# Patient Record
Sex: FEMALE | Race: WHITE | Employment: OTHER | URBAN - METROPOLITAN AREA
[De-identification: names, ages, dates, MRNs, and addresses within clinical notes are randomized per-mention and may not be internally consistent; named-entity substitution may affect disease eponyms.]

---

## 2017-01-04 ENCOUNTER — TELEPHONE (OUTPATIENT)
Dept: FAMILY MEDICINE | Facility: CLINIC | Age: 47
End: 2017-01-04

## 2017-01-12 ENCOUNTER — MYC REFILL (OUTPATIENT)
Dept: FAMILY MEDICINE | Facility: CLINIC | Age: 47
End: 2017-01-12

## 2017-01-12 DIAGNOSIS — G89.4 CHRONIC PAIN SYNDROME: ICD-10-CM

## 2017-01-12 NOTE — TELEPHONE ENCOUNTER
Last Rx was 90 on 12/15/16.  Was last seen by DR. Hunter 10/27/16.  Due back in April.  Routed to provider to advise, patient wants Rx mailed.  Judi Yeh RN  [facility]

## 2017-01-12 NOTE — TELEPHONE ENCOUNTER
Message from [employer]:  Original authorizing provider: MD Velma Rodriguez WANDA Diaz would like a refill of the following medications:  oxyCODONE-acetaminophen (PERCOCET)  MG per tablet [Srinivasa Hunter MD]    Preferred pharmacy: [facility] [address] AT [facility] & [address] [place] [address]    Comment:  I'm running low can you mail it to [facility] on [address] please. Thank you

## 2017-01-13 RX ORDER — OXYCODONE AND ACETAMINOPHEN 10; 325 MG/1; MG/1
1 TABLET ORAL EVERY 6 HOURS PRN
Qty: 90 TABLET | Refills: 0 | Status: SHIPPED | OUTPATIENT
Start: 2017-01-13 | End: 2017-02-14

## 2017-01-13 NOTE — TELEPHONE ENCOUNTER
Prescription of oxyCODONE-acetaminophen (PERCOCET)  MG was mailed to pharmacy.  Informed patient via [employer].  Liliana AGUILAR

## 2017-01-24 ENCOUNTER — RADIANT APPOINTMENT (OUTPATIENT)
Dept: GENERAL RADIOLOGY | Facility: CLINIC | Age: 47
End: 2017-01-24
Attending: PODIATRIST
Payer: COMMERCIAL

## 2017-01-24 ENCOUNTER — OFFICE VISIT (OUTPATIENT)
Dept: PODIATRY | Facility: CLINIC | Age: 47
End: 2017-01-24
Payer: COMMERCIAL

## 2017-01-24 VITALS
DIASTOLIC BLOOD PRESSURE: 78 MMHG | HEIGHT: 63 IN | WEIGHT: 234 LBS | SYSTOLIC BLOOD PRESSURE: 108 MMHG | BODY MASS INDEX: 41.46 KG/M2

## 2017-01-24 DIAGNOSIS — G89.29 CHRONIC PAIN OF BOTH ANKLES: ICD-10-CM

## 2017-01-24 DIAGNOSIS — M24.572 EQUINUS CONTRACTURE OF LEFT ANKLE: ICD-10-CM

## 2017-01-24 DIAGNOSIS — M24.571 EQUINUS CONTRACTURE OF RIGHT ANKLE: ICD-10-CM

## 2017-01-24 DIAGNOSIS — M25.572 CHRONIC PAIN OF BOTH ANKLES: Primary | ICD-10-CM

## 2017-01-24 DIAGNOSIS — M21.41 PES PLANUS OF BOTH FEET: ICD-10-CM

## 2017-01-24 DIAGNOSIS — M25.571 CHRONIC PAIN OF BOTH ANKLES: Primary | ICD-10-CM

## 2017-01-24 DIAGNOSIS — M72.2 PLANTAR FASCIITIS, BILATERAL: ICD-10-CM

## 2017-01-24 DIAGNOSIS — M25.571 CHRONIC PAIN OF BOTH ANKLES: ICD-10-CM

## 2017-01-24 DIAGNOSIS — M19.072 PRIMARY LOCALIZED OSTEOARTHROSIS, ANKLE AND FOOT, LEFT: ICD-10-CM

## 2017-01-24 DIAGNOSIS — M21.42 PES PLANUS OF BOTH FEET: ICD-10-CM

## 2017-01-24 DIAGNOSIS — G89.29 CHRONIC PAIN OF BOTH ANKLES: Primary | ICD-10-CM

## 2017-01-24 DIAGNOSIS — E66.01 MORBID OBESITY WITH BMI OF 40.0-44.9, ADULT (H): Chronic | ICD-10-CM

## 2017-01-24 DIAGNOSIS — M19.071 PRIMARY LOCALIZED OSTEOARTHROSIS, ANKLE AND FOOT, RIGHT: ICD-10-CM

## 2017-01-24 DIAGNOSIS — G62.9 NEUROPATHY: ICD-10-CM

## 2017-01-24 DIAGNOSIS — M25.572 CHRONIC PAIN OF BOTH ANKLES: ICD-10-CM

## 2017-01-24 PROCEDURE — 99203 OFFICE O/P NEW LOW 30 MIN: CPT | Performed by: PODIATRIST

## 2017-01-24 PROCEDURE — 73610 X-RAY EXAM OF ANKLE: CPT | Mod: LT

## 2017-01-24 NOTE — PATIENT INSTRUCTIONS
Dr. Corbett's Clinic Schedule     [facility]  [address] [address]  Ph: [phone]  Fax: [phone] [facility]  [address] [place] [address]  Ph: [phone]  Fax: [phone] [facility]  [address] [place] [address]  Ph: [phone]  Fax: [phone]   Monday PM &  AM Friday PM   Surgery Scheduling Line:  [phone] [facility] [place]  [address]  [place] [address]  Ph: [phone]   [address]  [place] [address]  Ph: [phone]  Fax: [phone]   Appointment [phone] General After Hours:  [phone] Patient [phone]         PLANTAR FASCIITIS   What is plantar fasciitis?   Plantar fasciitis is often referred to as heel spurs or heel pain. Plantar fasciitis is a very common problem that affects people of all foot shapes, age, weight and activity level. Pain may be in the arch or on the weight-bearing surface of the heel. The pain maycome on without injury or identifiable cause. Pain is generally present when first getting out of bed in the morning or up from a seated break.   What causes plantar fasciitis?   The plantar fascia is a dense fibrous band of tissue that stretches across the bottom surface of the foot. The fascia helps support the foot muscles and arch. Plantar fasciitis is thought to be caused by mechanical strain or overload. Frequent walking without shoes or wearing unsupportive shoes is thought to cause structural overload and ultimately inflammation of the plantar fascia. Some people have heel spurs that can be seen on x-ray. The heel spur is actually a minor component of plantar fascitis and is largely ignored.   How long will this last?   Plantar fasciitis can last from one day to a lifetime. Some people get intermittent fascitis that is very  short-lived. Others suffer daily for years. Excessive body weight, frequent bare foot walking, long hours on the feet, inadequate shoes, predisposing foot structures and excessive activity such as running are all potential issues that lead to chronic and/or recurring plantar fascitis. Having plantar fasciitis means that you are forever prone to this problem and will require modification of some of the above factors. Most people seek treatment within one to four months. Healing usually requires a similar one to four month time frame. Healing time is relative to the amount of effort spent treating the problem.   What can I do?   The easiest solution is to stop walking around your home without shoes. Plantar fasciitis is largely a shoe problem. Shoes are either not being worn often enough or your current shoes are inadequate for your weight, foot structure or activity level. The majority of shoes on the market today are not sufficient to resist development of plantar fasciitis or to promote healing. Assume that your current shoes are inadequate and will need to be replaced. Even high quality shoes wear out with 6 months to one year of frequent use. Other shoe options can be the ones with springs in the heels. SPIRA spring shoes is one brand type.   Weightloss is another option. Losing ten pounds in the next two months may be enough to resolve the problem. Ice and/or heat applied to the area of pain two to three times per day for ten minutes each session can be very helpful. This should continue until the problem resolves. Achilles tendon stretching is essential. Stretch multiple times daily to promote healing and to prevent recurrence in the future. Applying bengay or icy/hot to area can also try to calm down pain to the heel area.     What if this does not help?   Medical treatments often include custom arch supports, cortisone injections, physical therapy, splints to be worn in bed, prescription medications and  surgery. The home treatments listed above will be necessary regardless of these advanced medical treatments. Surgery is rarely needed but is very helpful in selected cases.     Heel pain in my future?   Plantar fasciitis is highly recurrent. Risk factors often continue, including return to bare foot walking, inadequate shoes, excessive body weight, excessive activities, etc. Your life style and foot structure may predispose you to recurrent plantar fasciitis. A daily prevention regimen can be very helpful. Ongoing use of shoe inserts, careful attention to appropriate shoes, daily Achilles stretching, etc. may prevent recurrence. Prompt attention at the earliest warning signs of heel pain can resolve the problem in as short as a few days.   Below are some exercises for Plantar fasciitis:  Stair exercise  You can step on the stairs with the ball of your foot and hold your position for at least 15 seconds, then slowly step down with the heels of your foot. You can do this daily and as often as you want. Plantar fasciitis exercise equipment and handout materials are useful in relieving pain.  Picking the towel  Plantar fasciitis exercise equipment and handout teach you how to exercise by picking the towel. You can sit comfortably and then pick the towel with your toes. Do this at least 10 to 20 times regularly. You can use any object other than a towel as long as the material can be soft and you can pick it up with your toes.  Rolling the bottle or ball  You can get a small ball or bottle and then roll it with your foot. Do this daily for at least 15 to 20 times. Plantar fasciitis exercise equipment and handout are very useful in treating the symptoms of the foot condition.  Stretching the calf  You could lie supine, raise one foot, and then point your toes towards the floor. Do this daily for at least 15 to 20 times. The calf is connected to the heel and the balls of the foot, so you should also exercise this also.  Plantar fasciitis exercise equipment and handout usually mentions the importance of exercising the calf also.  Flex the toes  Sit comfortably and then flex your toes by pointing it towards the floor or towards your body. This will relax and flex your foot and exercise your plantar fascia, the calf, and the Achilles tendon. The inability of the foot to stretch often causes the bunching up of the plantar fascia area leading to the pain.  Massaging the calf and the plantar fascia also helps a lot in alleviating the pain and preventing its recurrence. If you prefer standard plantar fasciitis exercise equipment and handout, then you can avail of this from legitimate sources. You can use the standard stretching device, the wheel, and the belt. These are all significant devices in treating the pain in the plantar fascia.    Orthotic & Prosthetic Clinic Locations  [facility]  [address]  [address] [address]  Phone: [phone]  Fax: [phone] [facility]  [address]  [place] [address]  Phone: [phone]   Fax: [phone]   [facility] [place]  [address]  [place] [address]  Phone: [phone]  Fax: [phone] [facility]  [address]  [place] [address]  Phone: [phone]   Fax: [phone]   [facility]   [address]  [place] [address] [place] [address]  Phone: [phone]   Fax: [phone] * Please call an location listed to make an appointment for a casting/fitting. Your referral was sent to their central office and they will all have the order on file.

## 2017-01-24 NOTE — NURSING NOTE
"Chief Complaint   Patient presents with     Foot Problems     bilateral neuropathy numbness with pain more on ankles and heels feels cramping on toes states both feet are at the same pain level        Initial /78 mmHg  Ht 5' 3\" (1.6 m)  Wt 234 lb (106.142 kg)  BMI 41.46 kg/m2 Estimated body mass index is 41.46 kg/(m^2) as calculated from the following:    Height as of this encounter: 5' 3\" (1.6 m).    Weight as of this encounter: 234 lb (106.142 kg).  BP completed using cuff size: jose Turner MA      "

## 2017-01-24 NOTE — PROGRESS NOTES
PATIENT HISTORY:  Velma Diaz is a 46 year old female who presents to clinic for pain to both ankles.  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 Also lower back issues. Notes in [place], she was established with a podiatrist. Is hoping to start that today as she does have lots of issues with the feet given the neuropathy. Notes more pain and tingling to the feet lately. Denies injury. Pain is 9/10. Takes percocet daily for pain. Notes she has tried icing, night splints, massage, compounding pain medications and chiropractic care. The chiropractic care seems to work best for her. Notes pain will be intermittent. Can be stabbing, tingling, shooting. Can shoot up the leg.     Review of Systems:  Patient denies fever, chills, rash, wound, stiffness, weakness, heart burn, blood in stool, chest pain with activity, calf pain when walking, shortness of breath with activity, chronic cough, easy bleeding/bruising, swelling of ankles, excessive thirst, fatigue, depression, anxiety.  Patient admits to limping, numbness, .     PAST MEDICAL HISTORY:   Past Medical History   Diagnosis Date     Skin cancer of anterior chest 2011     Basal cell on chest     History of cleft palate with cleft lip      cleft lip and palate, and has had 27 operations per the patient     TMJ (temporomandibular joint disorder)      Depressive disorder 2000     Neuropathy (H)      MAYA (obstructive sleep apnea)/Hypoventilation Syndrome 2/24/2014     Study Date: 2/13/2014- (245.1 lbs) Sleep Associated Hypoventilation  suggested with baseline PCO2 42 mmHg, maximum PCO2 55 mmHg. Lowest oxygen saturation 85.0% Apnea/Hypopnea Index 5.5 events per hour.  REM AHI 37.2.  The supine AHI is 13.8. RDI 6.7 Sleep study 3/2014 (245#)- CPAP 6 cm effective, Transcutaneous CO2 Monitoring (TCM) within normal limits.          Morbid obesity with BMI of 40.0-44.9, adult (H) 10/26/2015     Chronic pain syndrome 11/20/2015     She takes up to 90  oxycodone tablets per month for her chronic pain      Hyperlipidemia with target LDL less than 130 10/26/2015        PAST SURGICAL HISTORY:   Past Surgical History   Procedure Laterality Date     Orthopedic surgery  2011, 2011     Bilateral CTR     Ent surgery  1975     Tonsillectomy and Adenoids     Gyn surgery  2011     Hysterectomy     Arthroscopy knee  1986     Right knee     Ankle surgery  7/13     Left for torn tendons & loose bone chips     Repair cleft palate child       Age 3 months & afterwards (multiple surgeries)     Pe tubes       yearly times 10 years     Basal cell carcinoma  2011     Removal from the chest     Carpal tunnel release rt/lt  ~2010     Bilateral     Hc repair peroneal tendons  7/13     C vaginal hysterectomy  2011     Biopsy       Cosmetic surgery       Soft tissue surgery       Back surgery       Cosmetic surgery       Soft tissue surgery  2013     Decompression cubital tunnel Left 8/28/2015     Procedure: DECOMPRESSION CUBITAL TUNNEL;  Surgeon: Gus Donato MD;  Location: [facility]     Colonoscopy  2016        MEDICATIONS:   Current outpatient prescriptions:      oxyCODONE-acetaminophen (PERCOCET)  MG per tablet, Take 1 tablet by mouth every 6 hours as needed for moderate to severe pain, Disp: 90 tablet, Rfl: 0     cyclobenzaprine (FLEXERIL) 10 MG tablet, Take 0.5-1 tablets (5-10 mg) by mouth 3 times daily as needed for muscle spasms, Disp: 30 tablet, Rfl: 2     furosemide (LASIX) 20 MG tablet, Take 1 tablet (20 mg) by mouth daily as needed for leg swelling, Disp: 30 tablet, Rfl: 5     FLUoxetine (PROZAC) 40 MG capsule, Take 1 capsule (40 mg) by mouth daily, Disp: 30 capsule, Rfl: 5     Pregabalin (LYRICA) 225 MG CAPS, Take 1 capsule by mouth 2 times daily, Disp: 60 capsule, Rfl: 5     fluticasone (FLONASE) 50 MCG/ACT nasal spray, Spray 1-2 sprays into both nostrils daily, Disp: 1 Bottle, Rfl: 11     traZODone (DESYREL) 50 MG tablet, Take 1 tablet (50 mg) by mouth nightly  as needed for sleep, Disp: 30 tablet, Rfl: 5     nystatin (MYCOSTATIN) 390873 UNIT/GM POWD, Apply to affected area twice daily as needed, Disp: 60 g, Rfl: 2     lidocaine-prilocaine (EMLA) cream, Apply dime size amount to right hip and left knee qid prn, May use same time as Diclofinac.future refill through PCP, Disp: 30 g, Rfl: 0     SUMAtriptan (IMITREX) 100 MG tablet, Take 1 tablet (100 mg) by mouth at onset of headache for migraine May repeat in 2 hours if needed: max 2/day, Disp: 9 tablet, Rfl: 2     acetaminophen (TYLENOL) 325 MG tablet, Take 2 tablets (650 mg) by mouth every 4 hours as needed for other (mild pain), Disp: 100 tablet, Rfl: 0     ORDER FOR DME, Respironics REMSTAR 60 Series Auto ILSN7ty H2O, Airfit P10 nasal pillow mask w/xsmall pillows, Disp: , Rfl:      Multiple Vitamins-Minerals (MULTI FOR HER PO), Take by mouth daily , Disp: , Rfl:      ALLERGIES:  No Known Allergies     SOCIAL HISTORY:   Social History     Social History     Marital Status:      Spouse Name: N/A     Number of Children: 3     Years of Education: 12     Occupational History     Personal Care       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 No     Social History Narrative        FAMILY HISTORY:   Family History   Problem Relation Age of Onset     Glaucoma No family hx of      Macular Degeneration No family hx of      Alzheimer Disease Paternal Grandmother 60     Neurologic Disorder Sister 20     migraines     Depression/Anxiety Sister      Neurologic Disorder Son 14      "migraines     Asthma Son      HEART DISEASE Mother      OSTEOPOROSIS Mother      CANCER Father      Alcohol/Drug Father      Other Cancer Father      saliva glands & skin CA      DIABETES Maternal Grandmother      Breast Cancer Maternal Grandmother      Cancer - colorectal Other      Aunt     Asthma Daughter      Hypertension Father      CEREBROVASCULAR DISEASE Paternal Grandmother      Depression Sister      Asthma Daughter      Asthma Son      OSTEOPOROSIS No family hx of      Thyroid Disease Sister      Obesity No family hx of      Colon Cancer Other      Other Cancer No family hx of      saliva glands     Other Cancer Maternal Grandfather      skin cancer     Depression Sister      Thyroid Disease Sister      Obesity Mother      Obesity Maternal Grandmother      Obesity Sister         EXAM:Vitals: Ht 5' 3\" (1.6 m)  Wt 234 lb (106.142 kg)  BMI 41.46 kg/m2  BMI= Body mass index is 41.46 kg/(m^2).    General appearance: Patient is alert and fully cooperative with history & exam.  No sign of distress is noted during the visit.     Psychiatric: Affect is pleasant & appropriate.  Patient appears motivated to improve health.     Respiratory: Breathing is regular & unlabored while sitting.     HEENT: Hearing is intact to spoken word.  Speech is clear.  No gross evidence of visual impairment that would impact ambulation.     Dermatologic: Skin is intact to both lower extremities without significant lesions, rash or abrasion.  No paronychia or evidence of soft tissue infection is noted.     Vascular: DP & PT pulses are intact & regular bilaterally.  No significant edema or varicosities noted.  CFT and skin temperature is normal to both lower extremities.     Neurologic: Lower extremity sensation is absent via monofilament.      Musculoskeletal: Patient is ambulatory without assistive device or brace.  Decrease arch height. No pain on palpation of the foot. Patient notes it can occur anywhere but heels mostly. "     Radiographs: I have looked at and reviewed xrays personally.  minimal plantar and posterior heel spurring bilateral. Minimal degenerative changes to feet. No fractures. Ankle joint space well preserved.      ASSESSMENT:    Chronic pain of both ankles  Neuropathy (H)  Pes planus of both feet  Plantar fasciitis, bilateral  Equinus contracture of right ankle  Equinus contracture of left ankle  Morbid obesity with BMI of 40.0-44.9, adult (H)  Primary localized osteoarthrosis, ankle and foot, right  Primary localized osteoarthrosis, ankle and foot, left       PLAN:  [facility].talked about neuropathy and the feet and what to look out for. The potential causes and nature of plantar fasciitis were discussed with the patient.  We reviewed the natural history/prognosis of the condition and risks if left untreated.  These include chronic pain, other sites of pain due to gait changes, and potential plantar fascial rupture.      We discussed possible causes of the condition as it relates to the patients specific situation.      Conservative treatment options were reviewed:  appropriate shoes, avoidance of barefoot walking, inserts/orthoses, stretching, ice, massage, immobilization and NSAIDs.     We also reviewed the options of injection therapy and surgery.  However, it was made clear that surgery is only considered when conservative therapy fails.  The risks and benefits of injection therapy, and surgery were discussed.     Talked about arthritis and treatments including orthotics, injections, icing, NSAIDS, bracing, physical therapy, compounding pain cream, or surgical intervention.    At this time, recommend orthotics, anti inflammatory gel and was given an order for chiropractic care to feet. Discussed possible order for PT with ionto or TENS if pain continues and possible MRI if this does not work.        Patricia Corbett, YEVGENIY, Pod    Weight management plan: Patient was referred to their PCP to  discuss a diet and exercise plan.

## 2017-01-24 NOTE — MR AVS SNAPSHOT
After Visit Summary   2017    Velma Diaz    MRN: [id number]           Patient Information     Date Of Birth          1970        Visit Information        Provider Department      2017 4:00 PM Patricia Corbett, YEVGENIY, Pod [facility]        Today's Diagnoses     Chronic pain of both ankles    -  1     Neuropathy (H)         Pes planus of both feet         Plantar fasciitis, bilateral         Equinus contracture of right ankle         Equinus contracture of left ankle         Morbid obesity with BMI of 40.0-44.9, adult (H)           Care Instructions            Dr. Corbett's Clinic Schedule     [facility]  [address] [place] [address]  Ph: [phone]  Fax: [phone] [facility]  [address] [place] [address]  Ph: [phone]  Fax: [phone] [facility]  [address] [place] [address]  Ph: [phone]  Fax: [phone]   Monday PM &  AM Friday PM   Surgery Scheduling Line:  [phone] [facility]  [address]  [place] [address]  Ph: [phone] [facility]  [address]  [place] [address]  Ph: [phone]  Fax: [phone]   Appointment [phone] General After Hours:  [phone] Patient [phone]         PLANTAR FASCIITIS   What is plantar fasciitis?   Plantar fasciitis is often referred to as heel spurs or heel pain. Plantar fasciitis is a very common problem that affects people of all foot shapes, age, weight and activity level. Pain may be in the arch or on the weight-bearing surface of the heel. The pain maycome on without injury or identifiable cause. Pain is generally present when first getting out of bed in the morning or up from a seated break.   What causes plantar fasciitis?   The plantar fascia is a dense fibrous band of tissue that stretches  across the bottom surface of the foot. The fascia helps support the foot muscles and arch. Plantar fasciitis is thought to be caused by mechanical strain or overload. Frequent walking without shoes or wearing unsupportive shoes is thought to cause structural overload and ultimately inflammation of the plantar fascia. Some people have heel spurs that can be seen on x-ray. The heel spur is actually a minor component of plantar fascitis and is largely ignored.   How long will this last?   Plantar fasciitis can last from one day to a lifetime. Some people get intermittent fascitis that is very short-lived. Others suffer daily for years. Excessive body weight, frequent bare foot walking, long hours on the feet, inadequate shoes, predisposing foot structures and excessive activity such as running are all potential issues that lead to chronic and/or recurring plantar fascitis. Having plantar fasciitis means that you are forever prone to this problem and will require modification of some of the above factors. Most people seek treatment within one to four months. Healing usually requires a similar one to four month time frame. Healing time is relative to the amount of effort spent treating the problem.   What can I do?   The easiest solution is to stop walking around your home without shoes. Plantar fasciitis is largely a shoe problem. Shoes are either not being worn often enough or your current shoes are inadequate for your weight, foot structure or activity level. The majority of shoes on the market today are not sufficient to resist development of plantar fasciitis or to promote healing. Assume that your current shoes are inadequate and will need to be replaced. Even high quality shoes wear out with 6 months to one year of frequent use. Other shoe options can be the ones with springs in the heels. SPIRA spring shoes is one brand type.   Weightloss is another option. Losing ten pounds in the next two months may be enough  to resolve the problem. Ice and/or heat applied to the area of pain two to three times per day for ten minutes each session can be very helpful. This should continue until the problem resolves. Achilles tendon stretching is essential. Stretch multiple times daily to promote healing and to prevent recurrence in the future. Applying bengay or icy/hot to area can also try to calm down pain to the heel area.     What if this does not help?   Medical treatments often include custom arch supports, cortisone injections, physical therapy, splints to be worn in bed, prescription medications and surgery. The home treatments listed above will be necessary regardless of these advanced medical treatments. Surgery is rarely needed but is very helpful in selected cases.     Heel pain in my future?   Plantar fasciitis is highly recurrent. Risk factors often continue, including return to bare foot walking, inadequate shoes, excessive body weight, excessive activities, etc. Your life style and foot structure may predispose you to recurrent plantar fasciitis. A daily prevention regimen can be very helpful. Ongoing use of shoe inserts, careful attention to appropriate shoes, daily Achilles stretching, etc. may prevent recurrence. Prompt attention at the earliest warning signs of heel pain can resolve the problem in as short as a few days.   Below are some exercises for Plantar fasciitis:  Stair exercise  You can step on the stairs with the ball of your foot and hold your position for at least 15 seconds, then slowly step down with the heels of your foot. You can do this daily and as often as you want. Plantar fasciitis exercise equipment and handout materials are useful in relieving pain.  Picking the towel  Plantar fasciitis exercise equipment and handout teach you how to exercise by picking the towel. You can sit comfortably and then pick the towel with your toes. Do this at least 10 to 20 times regularly. You can use any object  other than a towel as long as the material can be soft and you can pick it up with your toes.  Rolling the bottle or ball  You can get a small ball or bottle and then roll it with your foot. Do this daily for at least 15 to 20 times. Plantar fasciitis exercise equipment and handout are very useful in treating the symptoms of the foot condition.  Stretching the calf  You could lie supine, raise one foot, and then point your toes towards the floor. Do this daily for at least 15 to 20 times. The calf is connected to the heel and the balls of the foot, so you should also exercise this also. Plantar fasciitis exercise equipment and handout usually mentions the importance of exercising the calf also.  Flex the toes  Sit comfortably and then flex your toes by pointing it towards the floor or towards your body. This will relax and flex your foot and exercise your plantar fascia, the calf, and the Achilles tendon. The inability of the foot to stretch often causes the bunching up of the plantar fascia area leading to the pain.  Massaging the calf and the plantar fascia also helps a lot in alleviating the pain and preventing its recurrence. If you prefer standard plantar fasciitis exercise equipment and handout, then you can avail of this from legitimate sources. You can use the standard stretching device, the wheel, and the belt. These are all significant devices in treating the pain in the plantar fascia.    Orthotic & Prosthetic Clinic Locations  [facility]  [address] [place] [address] [address]  [place] [address]  Phone: [phone]  Fax: [phone] [facility]  [address]  [place] [address]  Phone: [phone]   Fax: [phone]   [facility]  [address]  [place] [address]  Phone: [phone]  Fax: [phone] [facility]  [address]  [place] [address]  Phone: [phone]   Fax: [phone]   [facility]   [address]  [place] [address] [place] [address]  Phone: [phone]   Fax: [phone] * Please call an location listed to make an appointment for a casting/fitting. Your referral was sent to their central office and they will all have the order on file.             Follow-ups after your visit        Additional Services     CHIROPRACTIC REFERRAL       For chronic ankle pain and plantar fasciitis            ORTHOTICS REFERRAL       Please be aware that coverage of these services is subject to the terms and limitations of your health insurance plan.  Call member services at your health plan with any benefit or coverage questions.      Please bring the following to your appointment:    >>   Any x-rays, CTs or MRIs which have been performed.  Contact the facility where they were done to arrange for  prior to your scheduled appointment.  Any new CT, MRI or other procedures ordered by your specialist must be performed at a [facility] facility or coordinated by your clinic's referral office.    >>   List of current medications   >>   This referral request   >>   Any documents/labs given to you for this referral    ==This Referral PRINTS in the [facility] (ORTHOTICS & PROSTHETICS) Central scheduling office ==     The [facility] Central Scheduling staff will contact patient to arrange appointments. Central Scheduling Phone #:  [place]  [phone]     Orthotics: Foot Orthotics with arch supports and heel pad.                  Your next 10 appointments already scheduled     Feb 01, 2017  2:00 PM   Return Visit with Debra Hicks MD   [facility] ([facility])    [address] [place] [address] [phone]              Who to contact     If you have questions or need follow up information about today's clinic visit or your schedule please contact  "[facility] directly at [phone].  Normal or non-critical lab and imaging results will be communicated to you by MyChart, letter or phone within 4 business days after the clinic has received the results. If you do not hear from us within 7 days, please contact the clinic through Appographyhart or phone. If you have a critical or abnormal lab result, we will notify you by phone as soon as possible.  Submit refill requests through [employer] or call your pharmacy and they will forward the refill request to us. Please allow 3 business days for your refill to be completed.          Additional Information About Your Visit        AppographyharLocalLux Information     [employer] gives you secure access to your electronic health record. If you see a primary care provider, you can also send messages to your care team and make appointments. If you have questions, please call your primary care clinic.  If you do not have a primary care provider, please call [phone] and they will assist you.        Care EveryWhere ID     This is your Care EveryWhere ID. This could be used by other organizations to access your [facility] medical records  [id number]        Your Vitals Were     Height BMI (Body Mass Index)                5' 3\" (1.6 m) 41.46 kg/m2           Blood Pressure from Last 3 Encounters:   01/24/17 108/78   11/14/16 126/81   10/27/16 119/81    Weight from Last 3 Encounters:   01/24/17 234 lb (106.142 kg)   11/14/16 230 lb 4.8 oz (104.463 kg)   10/27/16 226 lb (102.513 kg)              We Performed the Following     CHIROPRACTIC REFERRAL     ORTHOTICS REFERRAL          Today's Medication Changes          These changes are accurate as of: 1/24/17  4:36 PM.  If you have any questions, ask your nurse or doctor.               Start taking these medicines.        Dose/Directions    diclofenac 1 % Gel topical gel   Commonly known as:  VOLTAREN   Used for:  Chronic pain of both ankles, Neuropathy (H), Pes planus of both feet, " Plantar fasciitis, bilateral, Equinus contracture of right ankle, Equinus contracture of left ankle, Morbid obesity with BMI of 40.0-44.9, adult (H)   Started by:  Patricia Corbett DPM, Pod        Apply  2 grams to foot four times daily as needed for pain using enclosed dosing card.   Quantity:  100 g   Refills:  1            Where to get your medicines      These medications were sent to [facility] [address] [place] [address] AT [facility] & [address] [place] [address]    Hours:  24-hours Phone:  [phone]    - diclofenac 1 % Gel topical gel             Primary Care Provider Office Phone # Fax #    Srinivasa Hunter [phone]       [address] [facility] [address]        Goals        Patient-Stated    I will call within next 2 weeks to schedule initial psychiatry appointment     Goal Comments - Note edited  5/20/2015 12:51 PM by Yesica Marsh    As of today's date 5/20/2015 goal is met at 76 - 100%.   Goal Status:  Complete  Patient states this is scheduled with Dr. Salvador for next month, but not on appointment calendar  LUDY Stark, MSW   [phone]  5/20/2015 12:51 PM        I will complete [employer] or reduced fare application within the next month     Goal Comments - Note edited  11/24/2015 12:00 PM by Yesica Marsh    As of today's date 11/24/2015 goal is met at 51 - 75%.   Goal Status:  Active  She reported today having [employer] application, [employer] worker has requested but packet from [employer].    LUDY Stark, MSW   [phone]  11/24/2015 12:00 PM        I will discuss [employer] worker with therapist next week for housing needs      Goal Comments - Note edited  12/3/2015  1:17 PM by Yesica Marsh    As of today's date 12/3/2015 goal is met at 76 - 100%.   Goal Status:  Discontinued  Patient has [employer] worker and has found housing with friend  Yesica  LUDY Camacho, MSW   [phone]  12/3/2015 1:16 PM        Thank you!     Thank you for choosing [facility]  for your care. Our goal is always to provide you with excellent care. Hearing back from our patients is one way we can continue to improve our services. Please take a few minutes to complete the written survey that you may receive in the mail after your visit with us. Thank you!             Your Updated Medication List - Protect others around you: Learn how to safely use, store and throw away your medicines at www.disposemymeds.org.          This list is accurate as of: 1/24/17  4:36 PM.  Always use your most recent med list.                   Brand Name Dispense Instructions for use    acetaminophen 325 MG tablet    TYLENOL    100 tablet    Take 2 tablets (650 mg) by mouth every 4 hours as needed for other (mild pain)       cyclobenzaprine 10 MG tablet    FLEXERIL    30 tablet    Take 0.5-1 tablets (5-10 mg) by mouth 3 times daily as needed for muscle spasms       diclofenac 1 % Gel topical gel    VOLTAREN    100 g    Apply  2 grams to foot four times daily as needed for pain using enclosed dosing card.       FLUoxetine 40 MG capsule    PROzac    30 capsule    Take 1 capsule (40 mg) by mouth daily       fluticasone 50 MCG/ACT spray    FLONASE    1 Bottle    Spray 1-2 sprays into both nostrils daily       furosemide 20 MG tablet    LASIX    30 tablet    Take 1 tablet (20 mg) by mouth daily as needed for leg swelling       lidocaine-prilocaine cream    EMLA    30 g    Apply dime size amount to right hip and left knee qid prn, May use same time as Diclofinac.future refill through PCP       MULTI FOR HER PO      Take by mouth daily       nystatin 598947 UNIT/GM Powd    MYCOSTATIN    60 g    Apply to affected area twice daily as needed       order for DME      Respironics REMSTAR 60 Series Auto DRZA1xd H2O, Airfit P10 nasal pillow mask w/xsmall pillows       oxyCODONE-acetaminophen  MG  per tablet    PERCOCET    90 tablet    Take 1 tablet by mouth every 6 hours as needed for moderate to severe pain       Pregabalin 225 MG Caps    LYRICA    60 capsule    Take 1 capsule by mouth 2 times daily       SUMAtriptan 100 MG tablet    IMITREX    9 tablet    Take 1 tablet (100 mg) by mouth at onset of headache for migraine May repeat in 2 hours if needed: max 2/day       traZODone 50 MG tablet    DESYREL    30 tablet    Take 1 tablet (50 mg) by mouth nightly as needed for sleep

## 2017-01-26 ENCOUNTER — MYC MEDICAL ADVICE (OUTPATIENT)
Dept: FAMILY MEDICINE | Facility: CLINIC | Age: 47
End: 2017-01-26

## 2017-02-01 ENCOUNTER — OFFICE VISIT (OUTPATIENT)
Dept: NEUROLOGY | Facility: CLINIC | Age: 47
End: 2017-02-01
Payer: COMMERCIAL

## 2017-02-01 ENCOUNTER — DOCUMENTATION ONLY (OUTPATIENT)
Dept: LAB | Facility: CLINIC | Age: 47
End: 2017-02-01

## 2017-02-01 VITALS
WEIGHT: 230.6 LBS | HEART RATE: 73 BPM | BODY MASS INDEX: 40.86 KG/M2 | SYSTOLIC BLOOD PRESSURE: 109 MMHG | HEIGHT: 63 IN | DIASTOLIC BLOOD PRESSURE: 64 MMHG

## 2017-02-01 DIAGNOSIS — R20.2 PARESTHESIA OF BOTH LOWER EXTREMITIES: Primary | ICD-10-CM

## 2017-02-01 DIAGNOSIS — G25.81 RESTLESS LEG SYNDROME: ICD-10-CM

## 2017-02-01 PROCEDURE — 99214 OFFICE O/P EST MOD 30 MIN: CPT | Performed by: PSYCHIATRY & NEUROLOGY

## 2017-02-01 NOTE — NURSING NOTE
"Chief Complaint   Patient presents with     RECHECK     Neuropathy in legs       Initial /64 mmHg  Pulse 73  Ht 5' 3\" (1.6 m)  Wt 230 lb 9.6 oz (104.599 kg)  BMI 40.86 kg/m2 Estimated body mass index is 40.86 kg/(m^2) as calculated from the following:    Height as of this encounter: 5' 3\" (1.6 m).    Weight as of this encounter: 230 lb 9.6 oz (104.599 kg).  BP completed using cuff size: jose Banerjee MA      "

## 2017-02-01 NOTE — PATIENT INSTRUCTIONS
Electromyography (EMG) test (Lower limbs)  DATE:  Monday, February 13, 2017   TIME: Reporting by 10:40 AM for registration at  and test preparation.   LOCATION: [facility], [place] (2nd floor)    You may bring pair of warm gloves and or socks to keep your hands and feet warm during the test.     No restrictions for diet. Please feel free to have breakfast/lunch before arrival.     Please do not apply lotion or cream to the skin one day before the test; as it could hinder the electrode connection to the skin.    EMG procedure and reasons for it explained.  -------------------------------------------------------------------------------------------------------------------  Orders Placed This Encounter   Procedures     Ferritin       Diagnostic possibilities reviewed    Preventive Neurology: Encouraged to keep physically and mentally active with particular emphasis on daily stretching exercises, walking, and healthy eating.    Additional  recommendations after the work-up    Thanks

## 2017-02-01 NOTE — PROGRESS NOTES
"ESTABLISHED PATIENT NEUROLOGY NOTE    LOCATION: .[facility]  DATE OF VISIT: February 1, 2017   DATE OF LAST VISIT: March 9, 2016  PRIMARY CARE PROVIDER: Srinivasa Hunter MD     REASON FOR VISIT: Increasing symptoms of feet paresthesias     HISTORY OF PRESENT ILLNESS ([place]): Ms. Velma Diaz previously referred by Srinivasa Hunter MD.  46 year old RIGHT-handed woman with feet paresthesias for more than 10 years. Paresthesias are described as numbness, tingling, burning, pain sensations. Feet paresthesias radiate up into the groin on the right side and up to the junction of the left leg upper 1/3 and lower 2/3 of the leg. Activities like standing and walking exacerbate the symptoms. She does not think that she has lower feet paresthesias in the evening. On direct questioning she relates that she has the feeling of \"spiders crawling\" under her skin. She confirms that she has an inner urge when relaxing to move her legs or wriggle her toes. She denies any previous history of blood loss. Ne previous Ferritin studies completed. She reports that she wheezes when she sleeps, but no snoring. She does not use a CPAP, and relates that she sleeps well. She denies any history of lumbar spine surgery. She has a history of chronic lower back pain for 15 years that radiates mostly down her right leg all the way down to the foot.     PREVIOUS DIAGNOSTIC STUDIES REVIEWED:   BLOOD TESTS:   Component      Latest Ref Rng 1/2/2015 10/26/2015 5/23/2016 10/27/2016   Sodium      133 - 144 mmol/L    137   Potassium      3.4 - 5.3 mmol/L    3.8   Chloride      94 - 109 mmol/L    103   Carbon Dioxide      20 - 32 mmol/L    26   Anion Gap      3 - 14 mmol/L    8   Glucose      70 - 99 mg/dL    90   Urea Nitrogen      7 - 30 mg/dL    10   Creatinine      0.52 - 1.04 mg/dL    0.81   GFR Estimate      >60 mL/min/1.7m2    77   GFR Estimate If Black      >60 mL/min/1.7m2    >90 . . .   Calcium      8.5 - 10.1 mg/dL    9.0   Vitamin B12      " >210 pg/mL 586      TSH      0.40 - 4.00 mU/L  1.29     Vitamin D Deficiency screening      20 - 75 ug/L   44         Reviewed and updated in [facility]:    Current Outpatient Prescriptions on File Prior to Visit:  diclofenac (VOLTAREN) 1 % GEL topical gel Apply  2 grams to foot four times daily as needed for pain using enclosed dosing card.   oxyCODONE-acetaminophen (PERCOCET)  MG per tablet Take 1 tablet by mouth every 6 hours as needed for moderate to severe pain   cyclobenzaprine (FLEXERIL) 10 MG tablet Take 0.5-1 tablets (5-10 mg) by mouth 3 times daily as needed for muscle spasms   FLUoxetine (PROZAC) 40 MG capsule Take 1 capsule (40 mg) by mouth daily   Pregabalin (LYRICA) 225 MG CAPS Take 1 capsule by mouth 2 times daily   traZODone (DESYREL) 50 MG tablet Take 1 tablet (50 mg) by mouth nightly as needed for sleep   nystatin (MYCOSTATIN) 691038 UNIT/GM POWD Apply to affected area twice daily as needed   lidocaine-prilocaine (EMLA) cream Apply dime size amount to right hip and left knee qid prn, May use same time as Diclofinac.future refill through PCP   SUMAtriptan (IMITREX) 100 MG tablet Take 1 tablet (100 mg) by mouth at onset of headache for migraine May repeat in 2 hours if needed: max 2/day   acetaminophen (TYLENOL) 325 MG tablet Take 2 tablets (650 mg) by mouth every 4 hours as needed for other (mild pain)   ORDER FOR [facility] Respironics REMSTAR 60 Series Auto FLXI2av H2O, Airfit P10 nasal pillow mask w/xsmall pillows   Multiple Vitamins-Minerals (MULTI FOR HER PO) Take by mouth daily    furosemide (LASIX) 20 MG tablet Take 1 tablet (20 mg) by mouth daily as needed for leg swelling     No current facility-administered medications on file prior to visit.  Past Medical History   Diagnosis Date     Skin cancer of anterior chest 2011     Basal cell on chest     History of cleft palate with cleft lip      cleft lip and palate, and has had 27 operations per the patient     TMJ (temporomandibular joint disorder)       Depressive disorder 2000     Neuropathy (H)      MAYA (obstructive sleep apnea)/Hypoventilation Syndrome 2/24/2014     Study Date: 2/13/2014- (245.1 lbs) Sleep Associated Hypoventilation  suggested with baseline PCO2 42 mmHg, maximum PCO2 55 mmHg. Lowest oxygen saturation 85.0% Apnea/Hypopnea Index 5.5 events per hour.  REM AHI 37.2.  The supine AHI is 13.8. RDI 6.7 Sleep study 3/2014 (245#)- CPAP 6 cm effective, Transcutaneous CO2 Monitoring (TCM) within normal limits.          Morbid obesity with BMI of 40.0-44.9, adult (H) 10/26/2015     Chronic pain syndrome 11/20/2015     She takes up to 90 oxycodone tablets per month for her chronic pain      Hyperlipidemia with target LDL less than 130 10/26/2015     Past Surgical History   Procedure Laterality Date     Orthopedic surgery  2011, 2011     Bilateral CTR     Ent surgery  1975     Tonsillectomy and Adenoids     Gyn surgery  2011     Hysterectomy     Arthroscopy knee  1986     Right knee     Ankle surgery  7/13     Left for torn tendons & loose bone chips     Repair cleft palate child       Age 3 months & afterwards (multiple surgeries)     Pe tubes       yearly times 10 years     Basal cell carcinoma  2011     Removal from the chest     Carpal tunnel release rt/lt  ~2010     Bilateral     Hc repair peroneal tendons  7/13     C vaginal hysterectomy  2011     Biopsy       Cosmetic surgery       Soft tissue surgery       Back surgery       Cosmetic surgery       Soft tissue surgery  2013     Decompression cubital tunnel Left 8/28/2015     Procedure: DECOMPRESSION CUBITAL TUNNEL;  Surgeon: Gus Donato MD;  [facility]     Colonoscopy  2016     Social History     Social History     Marital Status:      Spouse Name: N/A     Number of Children: 3     Years of Education: 12     Occupational History     Personal Care       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 No     Social History Narrative     This document serves as a record of the services and decisions personally performed and made by Debra Hicks MD. It was created on his behalf by Veronika Lawson, a trained medical scribe. The creation of this document is based the provider's statements to the medical scribe.    Scribvincent Lawson 2/1/2017    GENERAL EXAMINATION:   General appearance: Pleasant female sitting comfortably in a chair  /64 mmHg  Pulse 73  Ht 1.6 m (5' 3\")  Wt 104.599 kg (230 lb 9.6 oz)  BMI 40.86 kg/m2     LIMITED NEUROLOGICAL EXAMINATION:   - Normal strength in all muscle groups of lower limbs.   - Bilateral knee and ankle jerks present  - Diminished pinprick sensation over lower 1/3 of lower limbs.   - Diminished vibration (128Hz) sensation at both ankles    - Heel-shin test normal bilaterally  - Gait normal   - Romberg's Sign negative     IMPRESSION:   Encounter Diagnoses   Name Primary?     Paresthesia of both lower extremities Yes     Restless leg syndrome       COMMENTS: She has features of restless leg syndrome over a background of peripheral neuropathy. Her Electromyography of the lower limbs in 2014 was normal. wWill see if there are any changes with the new Electromyography test  planned for 13th of February, 2017. Also arranged for her to have ferritin level as low level can be associated with restless leg syndrome.     PLAN/RECOMMENDATIONS:   Patient Instructions     Electromyography (EMG) test (Lower limbs)  DATE:  Monday, February 13, 2017   TIME: Reporting by 10:40 AM for registration at  and " test preparation.   LOCATION: [facility] (2nd floor)    You may bring pair of warm gloves and or socks to keep your hands and feet warm during the test.     No restrictions for diet. Please feel free to have breakfast/lunch before arrival.     Please do not apply lotion or cream to the skin one day before the test; as it could hinder the electrode connection to the skin.    EMG procedure and reasons for it explained.  -------------------------------------------------------------------------------------------------------------------  Orders Placed This Encounter   Procedures     Ferritin       Diagnostic possibilities reviewed    Preventive Neurology: Encouraged to keep physically and mentally active with particular emphasis on daily stretching exercises, walking, and healthy eating.    Additional  recommendations after the work-up    Thanks to Srinivasa Hunter MD for allowing me to take part in Velma Diaz's care.  Please call me if you have any questions or concerns.    Time with patient  25 minutes minutes, greater than 50% of which was counseling and coordination of care.    The information in this document, created by the medical scribe for me, accurately reflects the services I personally performed and the decisions made by me. I have reviewed and approved this document for accuracy prior to leaving the patient care area.  Debra Hicks MD, [facility]  2:34 PM, 02/01/2017     Debra Hicks MD, [facility]  Neurologist    Cc: Srinivasa Hunter MD

## 2017-02-01 NOTE — PROGRESS NOTES
This patient's Urine Drug Screen orders is expiring today. Do you want me to send her a letter and extend the order?? Thank you, lab.

## 2017-02-01 NOTE — MR AVS SNAPSHOT
After Visit Summary   2/1/2017    Velma Diaz    MRN: [id number]           Patient Information     Date Of Birth          1970        Visit Information        Provider Department      2/1/2017 2:00 PM Debra Hicks MD [facility]        Today's Diagnoses     Paresthesia of both lower extremities    -  1     Restless leg syndrome           Care Instructions    Electromyography (EMG) test (Lower limbs)  DATE:  Monday, February 13, 2017   TIME: Reporting by 10:40 AM for registration at  and test preparation.   LOCATION: [facility] (2nd floor)    You may bring pair of warm gloves and or socks to keep your hands and feet warm during the test.     No restrictions for diet. Please feel free to have breakfast/lunch before arrival.     Please do not apply lotion or cream to the skin one day before the test; as it could hinder the electrode connection to the skin.    EMG procedure and reasons for it explained.  -------------------------------------------------------------------------------------------------------------------  Orders Placed This Encounter   Procedures     Ferritin       Diagnostic possibilities reviewed    Preventive Neurology: Encouraged to keep physically and mentally active with particular emphasis on daily stretching exercises, walking, and healthy eating.    Additional  recommendations after the work-up    Thanks                           Follow-ups after your visit        Follow-up notes from your care team     Return in about 12 days (around 2/13/2017) for EMG LL at 11:00 AM.      Your next 10 appointments already scheduled     Feb 06, 2017  9:45 AM   [facility] Chiropractor with CHARIS Bahena ([facility]OVALL)    [address]  [place] [id number]   [phone]            Feb 13, 2017 11:00 AM   PROCEDURE with Debra Hicks MD   [facility]  "[place])    [address] [id number] [phone]              Future tests that were ordered for you today     Open Future Orders        Priority Expected Expires Ordered    Ferritin Routine 2/10/2017 2/10/2018 2/1/2017            Who to contact     If you have questions or need follow up information about today's clinic visit or your schedule please contact [facility] directly at [phone].  Normal or non-critical lab and imaging results will be communicated to you by Qapitalhart, letter or phone within 4 business days after the clinic has received the results. If you do not hear from us within 7 days, please contact the clinic through Qapitalhart or phone. If you have a critical or abnormal lab result, we will notify you by phone as soon as possible.  Submit refill requests through Glipho or call your pharmacy and they will forward the refill request to us. Please allow 3 business days for your refill to be completed.          Additional Information About Your Visit        Glipho Information     Glipho gives you secure access to your electronic health record. If you see a primary care provider, you can also send messages to your care team and make appointments. If you have questions, please call your primary care clinic.  If you do not have a primary care provider, please call [phone] and they will assist you.        Care EveryWhere ID     This is your Care EveryWhere ID. This could be used by other organizations to access your [place] medical records  [id number]        Your Vitals Were     Pulse Height BMI (Body Mass Index)             73 1.6 m (5' 3\") 40.86 kg/m2          Blood Pressure from Last 3 Encounters:   02/01/17 109/64   01/24/17 108/78   11/14/16 126/81    Weight from Last 3 Encounters:   02/01/17 104.599 kg (230 lb 9.6 oz)   01/24/17 106.142 kg (234 lb)   11/14/16 104.463 kg (230 lb 4.8 oz)               Primary Care Provider Office Phone # Fax " #    Srinivasa Hunter [phone]       [facility] [address]  [place]. \[facility]ospital [facility] and Outlook.\"" [id number]        Goals        Patient-Stated    I will call within next 2 weeks to schedule initial psychiatry appointment     Goal Comments - Note edited  5/20/2015 12:51 PM by Yesica Marsh    As of today's date 5/20/2015 goal is met at 76 - 100%.   Goal Status:  Complete  Patient states this is scheduled with Dr. Salvador for next month, but not on appointment calendar  LUDY Stark, MSW   [phone]  5/20/2015 12:51 PM        I will complete [employer] or reduced fare application within the next month     Goal Comments - Note edited  11/24/2015 12:00 PM by Yesica Marsh    As of today's date 11/24/2015 goal is met at 51 - 75%.   Goal Status:  Active  She reported today having [employer] application, [employer] worker has requested but packet from [employer].    LUDY Stark, MSW   [phone]  11/24/2015 12:00 PM        I will discuss [employer] worker with therapist next week for housing needs      Goal Comments - Note edited  12/3/2015  1:17 PM by Yesica Marsh    As of today's date 12/3/2015 goal is met at 76 - 100%.   Goal Status:  Discontinued  Patient has [employer] worker and has found housing with friend  LUDY Stark, MSW   [phone]  12/3/2015 1:16 PM        Thank you!     Thank you for choosing [facility]  for your care. Our goal is always to provide you with excellent care. Hearing back from our patients is one way we can continue to improve our services. Please take a few minutes to complete the written survey that you may receive in the mail after your visit with us. Thank you!             Your Updated Medication List - Protect others around you: Learn how to safely use, store and throw away your medicines at www.disposemymeds.org.          This list is accurate as of: 2/1/17  2:47 PM.  Always use your most recent med list.                    Brand Name Dispense Instructions for use    acetaminophen 325 MG tablet    TYLENOL    100 tablet    Take 2 tablets (650 mg) by mouth every 4 hours as needed for other (mild pain)       cyclobenzaprine 10 MG tablet    FLEXERIL    30 tablet    Take 0.5-1 tablets (5-10 mg) by mouth 3 times daily as needed for muscle spasms       diclofenac 1 % Gel topical gel    VOLTAREN    100 g    Apply  2 grams to foot four times daily as needed for pain using enclosed dosing card.       FLUoxetine 40 MG capsule    PROzac    30 capsule    Take 1 capsule (40 mg) by mouth daily       furosemide 20 MG tablet    LASIX    30 tablet    Take 1 tablet (20 mg) by mouth daily as needed for leg swelling       lidocaine-prilocaine cream    EMLA    30 g    Apply dime size amount to right hip and left knee qid prn, May use same time as Diclofinac.future refill through PCP       MULTI FOR HER PO      Take by mouth daily       nystatin 766029 UNIT/GM Powd    MYCOSTATIN    60 g    Apply to affected area twice daily as needed       order for DME      Respironics REMSTAR 60 Series Auto [id number] H2O, Airfit P10 nasal pillow mask w/xsmall pillows       oxyCODONE-acetaminophen  MG per tablet    PERCOCET    90 tablet    Take 1 tablet by mouth every 6 hours as needed for moderate to severe pain       Pregabalin 225 MG Caps    LYRICA    60 capsule    Take 1 capsule by mouth 2 times daily       SUMAtriptan 100 MG tablet    IMITREX    9 tablet    Take 1 tablet (100 mg) by mouth at onset of headache for migraine May repeat in 2 hours if needed: max 2/day       traZODone 50 MG tablet    DESYREL    30 tablet    Take 1 tablet (50 mg) by mouth nightly as needed for sleep

## 2017-02-06 ENCOUNTER — THERAPY VISIT (OUTPATIENT)
Dept: CHIROPRACTIC MEDICINE | Facility: CLINIC | Age: 47
End: 2017-02-06
Payer: COMMERCIAL

## 2017-02-06 DIAGNOSIS — M99.9 NONALLOPATHIC LESION OF LUMBAR REGION: ICD-10-CM

## 2017-02-06 DIAGNOSIS — M62.838 SPASM OF MUSCLE: ICD-10-CM

## 2017-02-06 DIAGNOSIS — M54.50 LUMBAGO: ICD-10-CM

## 2017-02-06 DIAGNOSIS — M99.9 NONALLOPATHIC LESION OF PELVIC REGION: Primary | ICD-10-CM

## 2017-02-06 PROCEDURE — 98940 CHIROPRACT MANJ 1-2 REGIONS: CPT | Mod: AT | Performed by: CHIROPRACTOR

## 2017-02-06 PROCEDURE — 97810 ACUP 1/> WO ESTIM 1ST 15 MIN: CPT | Performed by: CHIROPRACTOR

## 2017-02-06 PROCEDURE — 99213 OFFICE O/P EST LOW 20 MIN: CPT | Mod: 25 | Performed by: CHIROPRACTOR

## 2017-02-06 NOTE — PROGRESS NOTES
Chiropractic Clinic Visit    PCP: Srinivasa Hunter    Velma Diaz is a 46 year old female who is seen  as a self referral presenting with upper trap and low back pain with bilateral leg, foot, lower arm and hand numbness. Patient reports that the onset was 15 years ago When asked, patient denies:, falling, slipping, bending and reaching or sleeping awkwardly. Velma reports having increased in both legs since her last visit in May of 2016.  She notes provacative factors including walking and standing.   Prior sleep without waking to onset, the patient was able to wake during the night with leg cramps. Patient notes  that due to symptoms, they can only take a bath with help of a PCA. Velma Diaz notes   standing rated at a 7/10 painful and prior to this incident it was 3/10.        Injury: none    Location of Pain: bilaterally lower lumbar  at the following level(s) L3 , L4 , L5  and PSIS Right   Duration of Pain: 15 year(s)  Rating of Pain at worst: 10/10  Rating of Pain Currently: 7/10  Symptoms are better with: Other medications and Sitting  Symptoms are worse with: standing and walking  Additional Features: paresthesias, numbness, weakness, pain in other joints and plantar fascitis     Other evaluation and/or treatments so far consists of: Chiropractic care in May 2016.  Recently fitted for custom orthotics.    Health History  as reported by the patient:    How does the patient rate their own health:   Fair    Current or past medical history:   Chemical dependency, Depression, Dizziness/Concussions, Fibromyalgia, Mental Illness, Migraines/headaches, Numbness/tingling, Overweight, Pain at night/rest, Sleep disorder/apnea and Weakness    Medical allergies  None    Past Traumas/Surgeries  Orthopedic: knee, elbow and Other:  Cleft repair, tubal ligation, hysterectomy    Family History  The family history includes Alcohol/Drug in her father; Alzheimer Disease (age of onset: 60) in her paternal grandmother;  Asthma in her daughter, daughter, son, and son; Breast Cancer in her maternal grandmother; CANCER in her father; CEREBROVASCULAR DISEASE in her paternal grandmother; Cancer - colorectal in an other family member; Colon Cancer in an other family member; DIABETES in her maternal grandmother; Depression in her sister and sister; Depression/Anxiety in her sister; HEART DISEASE in her mother; Hypertension in her father; Neurologic Disorder (age of onset: 14) in her son; Neurologic Disorder (age of onset: 20) in her sister; OSTEOPOROSIS in her mother; Obesity in her maternal grandmother, mother, and sister; Other Cancer in her father and maternal grandfather; Thyroid Disease in her sister and sister. There is no history of Glaucoma, Macular Degeneration, OSTEOPOROSIS, Obesity, or Other Cancer.    Medications:  Anti-depressants, Anti-inflammatory, Muscle relaxants, Pain and Sleep    Occupation: None    Primary job tasks:   Prolonged sitting    Barriers as home/work:   requires assistance (with dressing, personal hygiene, transfers, mobility)    Additional health Issues:                   Review of Systems  Musculoskeletal: as above  Remainder of review of systems is negative including constitutional, CV, pulmonary, GI, Skin and Neurologic except as noted in HPI or medical history.    Past Medical History   Diagnosis Date     Skin cancer of anterior chest 2011     Basal cell on chest     History of cleft palate with cleft lip      cleft lip and palate, and has had 27 operations per the patient     TMJ (temporomandibular joint disorder)      Depressive disorder 2000     Neuropathy (H)      MAYA (obstructive sleep apnea)/Hypoventilation Syndrome 2/24/2014     Study Date: 2/13/2014- (245.1 lbs) Sleep Associated Hypoventilation  suggested with baseline PCO2 42 mmHg, maximum PCO2 55 mmHg. Lowest oxygen saturation 85.0% Apnea/Hypopnea Index 5.5 events per hour.  REM AHI 37.2.  The supine AHI is 13.8. RDI 6.7 Sleep study 3/2014  "(245#)- CPAP 6 cm effective, Transcutaneous CO2 Monitoring (TCM) within normal limits.          Morbid obesity with BMI of 40.0-44.9, adult (H) 10/26/2015     Chronic pain syndrome 11/20/2015     She takes up to 90 oxycodone tablets per month for her chronic pain      Hyperlipidemia with target LDL less than 130 10/26/2015     Past Surgical History   Procedure Laterality Date     Orthopedic surgery  2011, 2011     Bilateral CTR     Ent surgery  1975     Tonsillectomy and Adenoids     Gyn surgery  2011     Hysterectomy     Arthroscopy knee  1986     Right knee     Ankle surgery  7/13     Left for torn tendons & loose bone chips     Repair cleft palate child       Age 3 months & afterwards (multiple surgeries)     Pe tubes       yearly times 10 years     Basal cell carcinoma  2011     Removal from the chest     Carpal tunnel release rt/lt  ~2010     Bilateral     Hc repair peroneal tendons  7/13     C vaginal hysterectomy  2011     Biopsy       Cosmetic surgery       Soft tissue surgery       Back surgery       Cosmetic surgery       Soft tissue surgery  2013     Decompression cubital tunnel Left 8/28/2015     Procedure: DECOMPRESSION CUBITAL TUNNEL;  Surgeon: Gus Donato MD;  [facility]: [facility]     Colonoscopy  2016       Objective  There were no vitals taken for this visit.    GENERAL APPEARANCE: healthy, alert and no distress   GAIT: NORMAL  SKIN: no suspicious lesions or rashes  NEURO: Normal strength and tone, mentation intact and speech normal  PSYCH:  mentation appears normal and affect normal/bright      Velma was asked to complete the Neck Disability Index, the Oswestry Low Back Disability Index and Shandra Start Back screening tool, today in the office. The Oswestry Disability score: 22%. Keel Start Total Score:4 Sub Score: 1       Lumbar exam:    Inspection:  \"     no visible deformity in the low back       normal skin\",    ROM:       limited flexion due to pain       limited extension due to " pain       asymmetric rotation of the pelvis with flexion    Tender:       paraspinal muscles    Non Tender:       remainder of lumbar spine    Strength:       hip flexion 5/5       knee extension 5/5       ankle dorsiflexion 5/5       ankle plantarflexion 5/5       dorsiflexion of the great toe 5/5    Reflexes:       patellar (L3, L4) symmetric normal       achilles tendons (S1) symmetric normal    Sensation:      grossly intact throughout lower extremities    Special tests:  SLR - Right negative and Left negative, Fabere - Right negative and Left negative, Yeoman's - Right negative and Left negative, Gifty - Right positive, and Ely's - Right positive,     Segmental spinal dysfunction/restrictions found at:  :  T10 Extension restriction  T11 Extension restriction  T12 Extension restriction  L4 Right rotation restricted  L5 Right rotation restricted  PSIS Right Extension restriction.    The following soft tissue hypotonicities were observed:Piriformis: right, referred pain: no    Trigger points were found in:Lumbar erector spine and Piriformis    Muscle spasm found in:Lumbar erector spine and Piriformis      Radiology:      Assessment:    1. Nonallopathic lesion of pelvic region    2. Lumbago    3. Nonallopathic lesion of lumbar region    4. Spasm of muscle        RX ordered/plan of care  Anticipated outcomes  Possible risks and side effects    After discussing the risk and benefits of care, patient consented to treatment    Prognosis: Guarded      Patient's condition:  Patient had restrictions pre-manipulation    Treatment effectiveness:  Post manipulation there is better intersegmental movement and Patient claims to feel looser post manipulation      Plan:    Procedures:  Evaluation and Management  03785 Moderate exam established patient 20 min    CMT:  91955 Chiropractic manipulative treatment 3-4 regions performed   Thoracic: Activator, T10, T11, T12, Prone  Lumbar: Activator, L4, L5, Prone  Pelvis: Drop Table,  PSIS Right , Prone    Modalities:  92250: Acupuncture:  Points: Huatuotonyaji Points in lumbar spine and Ahsi point in piriformis and lumbar erector,m ST36, SP6, BL55    Therapeutic procedures:  None    Response to Treatment  Reduction in symptoms as reported by patient    Goals:  PAIN: the patient specific goal of thier symptoms is to attain the pre-inury state of 3/10 on the VAS    Treatment plan  Evaluation  Spinal Chiropractic Manipulative Therapy:    Acupuncture:    1 X week, once daily  for 6 weeks            Recommendations:    Instructions:ice 20 minutes every other hour as needed    Follow-up:  Return to care in one week.       Disclaimer: This note consists of symbols derived from keyboarding, dictation and/or voice recognition software. As a result, there may be errors in the script that have gone undetected. Please consider this when interpreting information found in this chart.

## 2017-02-10 DIAGNOSIS — G89.29 OTHER CHRONIC PAIN: ICD-10-CM

## 2017-02-10 DIAGNOSIS — G25.81 RESTLESS LEG SYNDROME: ICD-10-CM

## 2017-02-10 LAB — FERRITIN SERPL-MCNC: 62 NG/ML (ref 8–252)

## 2017-02-10 PROCEDURE — 36415 COLL VENOUS BLD VENIPUNCTURE: CPT | Performed by: FAMILY MEDICINE

## 2017-02-10 PROCEDURE — 80305 DRUG TEST PRSMV DIR OPT OBS: CPT | Performed by: FAMILY MEDICINE

## 2017-02-10 PROCEDURE — 82728 ASSAY OF FERRITIN: CPT | Performed by: FAMILY MEDICINE

## 2017-02-11 LAB
AMPHETAMINES UR QL: ABNORMAL
BARBITURATES UR QL: ABNORMAL
BENZODIAZ UR QL: ABNORMAL
CANNABINOIDS UR QL: ABNORMAL
COCAINE UR QL: ABNORMAL
MDA UR QL SCN: ABNORMAL
METHADONE UR QL SCN: ABNORMAL
METHAMPHET UR QL SCN: ABNORMAL
OPIATES UR QL SCN: ABNORMAL
OXYCODONE UR QL: ABNORMAL
PCP UR QL SCN: ABNORMAL
TRICYCLICS UR QL SCN: ABNORMAL

## 2017-02-12 NOTE — PROGRESS NOTES
Kate,  Thanks for doing the urine drug test. The results came back as expected.    Srinivasa Hunter MD

## 2017-02-13 ENCOUNTER — OFFICE VISIT (OUTPATIENT)
Dept: NEUROLOGY | Facility: CLINIC | Age: 47
End: 2017-02-13
Payer: COMMERCIAL

## 2017-02-13 DIAGNOSIS — G25.81 RESTLESS LEG SYNDROME: ICD-10-CM

## 2017-02-13 DIAGNOSIS — R20.2 BILATERAL LEG PARESTHESIA: Primary | ICD-10-CM

## 2017-02-13 PROCEDURE — 95908 NRV CNDJ TST 3-4 STUDIES: CPT | Performed by: PSYCHIATRY & NEUROLOGY

## 2017-02-13 RX ORDER — PRAMIPEXOLE DIHYDROCHLORIDE 0.12 MG/1
TABLET ORAL
Qty: 90 TABLET | Refills: 3 | Status: SHIPPED | OUTPATIENT
Start: 2017-02-13 | End: 2017-07-19

## 2017-02-13 NOTE — PROGRESS NOTES
Fransisca Rosales Diaz,     Your blood FERRITIN level is normal. Follow-up as recommended during your recent visit.    Thanks,       Debra Hicks MD, MRCPI  Neurologist

## 2017-02-13 NOTE — PROGRESS NOTES
"                                             ELECTRODIAGNOSTIC LABORATORY REPORT    LOCATION: [facility]    DATE OF VISIT:  2017  [id number]  NAME: Ms. Velma Diaz  :  1970 (46 year old)    PRIMARY PROVIDER: Srinivasa Hunter MD  REASON FOR THE TEST: Feet parethesias    CLINICAL DATA: 46 year old RIGHT-handed woman with feet paresthesias for more than 10 years. Paresthesias are described as numbness, tingling, burning, pain sensations. On direct questioning she relates that she has the feeling of \"spiders crawling\" under her skin. She confirms that she has an inner urge when relaxing to move her legs or wriggle her toes. She denies any previous history of blood loss. Recent Ferritin study is normal.     This document serves as a record of the services and decisions personally performed and made by Debra Hicks MD. It was created on his behalf by Veronika Lawson, a trained medical scribe. The creation of this document is based the provider's statements to the medical scribe.    Scribe Veronika Lawson 2017    PERTINENT FINDINGS:   Sensory studies:  1. Right Sural sensory response normal    Motor studies:  1. Right Peroneal (EDB muscle) motor response normal   2. Left Tibial (AH muscle) motor response normal     Needle EMG examination of lower limb muscles normal not done because of pitting edema of the legs up to the knees.     IMPRESSION: Normal nerve conduction study. There is no electrophysiological evidence of  polyneuropathy of lower limbs.  The possibility of small fiber neuropathy cannot be completely excluded as you know that this test evaluates only large nerve fibers.     COMMENTS: Nerve conduction study test results reviewed with her. She has some features which are suggestive of her having restless leg syndrome as outlined in the recent consultation note. Therefore, I have started her on PRAMIPEXOLE  in a titration schedule to see if it helps with her restless leg " syndrome. She does take LYRICA for symptomatic relief of peripheral neuropathy symptoms.     The information in this document, created by the medical scribe for me, accurately reflects the services I personally performed and the decisions made by me. I have reviewed and approved this document for accuracy prior to leaving the patient care area.  Debra Hicks MD, [facility]  1:15 PM, 02/13/17     Thanks to Srinivasa Hunter MD for allowing me to participate in 's care. Please call me if you have any questions or concerns.    Cc. Srinivasa Hunter MD

## 2017-02-13 NOTE — PATIENT INSTRUCTIONS
AFTER VISIT SUMMARY (AVS)  Signed Prescriptions:                        Disp   Refills    pramipexole (MIRAPEX) 0.125 MG tablet      90 tab*3        Sig: Week 1: 1/2 tablet at bedtime. Week 2: 1/2 tablet two           times a day. Week 3: 1/2 tablet three times a           day. Week 4: 1/2 tablet morning and lunchtime, 1           tablet at bedtime. Week 5: 1 tablet morning and           bedtime, 1/2 tablet at lunchtime. Week 6 and           afterwards: 1 tablet three times a day.  Authorizing Provider: BAYRON JAVIER    Electromyography Test test results reviewed with her.     Blood test results-Ferritin reviewed with her.     Diagnostic possibilities reviewed    Preventive Neurology: Encouraged to keep physically and mentally active with particular emphasis on daily stretching exercises, walking, and healthy eating.    To call us for follow-up appointment in next 10 week(s) or earlier if needed.    Thanks

## 2017-02-13 NOTE — MR AVS SNAPSHOT
After Visit Summary   2/13/2017    Velma Diaz    MRN: [id number]           Patient Information     Date Of Birth          1970        Visit Information        Provider Department      2/13/2017 11:00 AM Debra Javier MD [facility]        Today's Diagnoses     Bilateral leg paresthesia    -  1    Restless leg syndrome          Care Instructions    AFTER VISIT SUMMARY (AVS)  Signed Prescriptions:                        Disp   Refills    pramipexole (MIRAPEX) 0.125 MG tablet      90 tab*3        Sig: Week 1: 1/2 tablet at bedtime. Week 2: 1/2 tablet two           times a day. Week 3: 1/2 tablet three times a           day. Week 4: 1/2 tablet morning and lunchtime, 1           tablet at bedtime. Week 5: 1 tablet morning and           bedtime, 1/2 tablet at lunchtime. Week 6 and           afterwards: 1 tablet three times a day.  Authorizing Provider: DEBRA JAVIER    Electromyography Test test results reviewed with her.     Blood test results-Ferritin reviewed with her.     Diagnostic possibilities reviewed    Preventive Neurology: Encouraged to keep physically and mentally active with particular emphasis on daily stretching exercises, walking, and healthy eating.    To call us for follow-up appointment in next 10 week(s) or earlier if needed.    Thanks               Follow-ups after your visit        Follow-up notes from your care team     Return in about 10 weeks (around 4/24/2017).      Your next 10 appointments already scheduled     Feb 20, 2017 10:00 AM CST   [facility] Chiropractor with CHARIS Bahena (HAZEL STOVALL)    [address] [address] [facility] [address]  [place] [id number] [phone]              Who to contact     If you have questions or need follow up information about today's clinic visit or your schedule please contact [facility] directly at [phone].  Normal or non-critical lab and imaging results  will be communicated to you by xaitmenthart, letter or phone within 4 business days after the clinic has received the results. If you do not hear from us within 7 days, please contact the clinic through Well[employer] or phone. If you have a critical or abnormal lab result, we will notify you by phone as soon as possible.  Submit refill requests through Welltok or call your pharmacy and they will forward the refill request to us. Please allow 3 business days for your refill to be completed.          Additional Information About Your Visit        Welltok Information     Well[employer] gives you secure access to your electronic health record. If you see a primary care provider, you can also send messages to your care team and make appointments. If you have questions, please call your primary care clinic.  If you do not have a primary care provider, please call [phone] and they will assist you.        Care EveryWhere ID     This is your Care EveryWhere ID. This could be used by other organizations to access your [place] medical records  [id number]         Blood Pressure from Last 3 Encounters:   02/01/17 109/64   01/24/17 108/78   11/14/16 126/81    Weight from Last 3 Encounters:   02/01/17 104.6 kg (230 lb 9.6 oz)   01/24/17 106.1 kg (234 lb)   11/14/16 104.5 kg (230 lb 4.8 oz)              Today, you had the following     No orders found for display         Today's Medication Changes          These changes are accurate as of: 2/13/17 12:03 PM.  If you have any questions, ask your nurse or doctor.               Start taking these medicines.        Dose/Directions    pramipexole 0.125 MG tablet   Commonly known as:  MIRAPEX   Used for:  Bilateral leg paresthesia, Restless leg syndrome        Week 1: 1/2 tablet at bedtime. Week 2: 1/2 tablet two times a day. Week 3: 1/2 tablet three times a day. Week 4: 1/2 tablet morning and lunchtime, 1 tablet at bedtime. Week 5: 1 tablet morning and bedtime, 1/2 tablet at lunchtime. Week 6 and  afterwards: 1 tablet three times a day.   Quantity:  90 tablet   Refills:  3            Where to get your medicines      Some of these will need a paper prescription and others can be bought over the counter.  Ask your nurse if you have questions.     Bring a paper prescription for each of these medications     pramipexole 0.125 MG tablet                Primary Care Provider Office Phone # Fax #    Srinivasa Hunter [phone]       [facility] [address] [facility] [address]        Goals        General    I will call within next 2 weeks to schedule initial psychiatry appointment (pt-stated)     Notes - Note edited  5/20/2015 12:51 PM by Yesica Mrash    As of today's date 5/20/2015 goal is met at 76 - 100%.   Goal Status:  Complete  Patient states this is scheduled with Dr. Salvador for next month, but not on appointment calendar  LUDY Stark, MSW   [phone]  5/20/2015 12:51 PM        I will complete [employer] or reduced fare application within the next month (pt-stated)     Notes - Note edited  11/24/2015 12:00 PM by Yesica Marsh    As of today's date 11/24/2015 goal is met at 51 - 75%.   Goal Status:  Active  She reported today having [employer] application, [employer] worker has requested but packet from [employer].    LUDY Stark, MSW   [phone]  11/24/2015 12:00 PM        I will discuss [employer] worker with therapist next week for housing needs  (pt-stated)     Notes - Note edited  12/3/2015  1:17 PM by Yesica Marsh    As of today's date 12/3/2015 goal is met at 76 - 100%.   Goal Status:  Discontinued  Patient has [employer] worker and has found housing with friend  LUDY Stark, MSW   [phone]  12/3/2015 1:16 PM        Thank you!     Thank you for choosing [facility]  for your care. Our goal is always to provide you with excellent care. Hearing back from our patients is one way we can continue to  improve our services. Please take a few minutes to complete the written survey that you may receive in the mail after your visit with us. Thank you!             Your Updated Medication List - Protect others around you: Learn how to safely use, store and throw away your medicines at www.disposemymeds.org.          This list is accurate as of: 2/13/17 12:03 PM.  Always use your most recent med list.                   Brand Name Dispense Instructions for use    acetaminophen 325 MG tablet    TYLENOL    100 tablet    Take 2 tablets (650 mg) by mouth every 4 hours as needed for other (mild pain)       cyclobenzaprine 10 MG tablet    FLEXERIL    30 tablet    Take 0.5-1 tablets (5-10 mg) by mouth 3 times daily as needed for muscle spasms       diclofenac 1 % Gel topical gel    VOLTAREN    100 g    Apply  2 grams to foot four times daily as needed for pain using enclosed dosing card.       FLUoxetine 40 MG capsule    PROzac    30 capsule    Take 1 capsule (40 mg) by mouth daily       furosemide 20 MG tablet    LASIX    30 tablet    Take 1 tablet (20 mg) by mouth daily as needed for leg swelling       lidocaine-prilocaine cream    EMLA    30 g    Apply dime size amount to right hip and left knee qid prn, May use same time as Diclofinac.future refill through PCP       MULTI FOR HER PO      Take by mouth daily       nystatin 916290 UNIT/GM Powd    MYCOSTATIN    60 g    Apply to affected area twice daily as needed       order for DME      Respironics REMSTAR 60 Series Auto [id number] H2O, Airfit P10 nasal pillow mask w/xsmall pillows       oxyCODONE-acetaminophen  MG per tablet    PERCOCET    90 tablet    Take 1 tablet by mouth every 6 hours as needed for moderate to severe pain       pramipexole 0.125 MG tablet    MIRAPEX    90 tablet    Week 1: 1/2 tablet at bedtime. Week 2: 1/2 tablet two times a day. Week 3: 1/2 tablet three times a day. Week 4: 1/2 tablet morning and lunchtime, 1 tablet at bedtime. Week 5: 1 tablet  morning and bedtime, 1/2 tablet at lunchtime. Week 6 and afterwards: 1 tablet three times a day.       Pregabalin 225 MG Caps    LYRICA    60 capsule    Take 1 capsule by mouth 2 times daily       SUMAtriptan 100 MG tablet    IMITREX    9 tablet    Take 1 tablet (100 mg) by mouth at onset of headache for migraine May repeat in 2 hours if needed: max 2/day       traZODone 50 MG tablet    DESYREL    30 tablet    Take 1 tablet (50 mg) by mouth nightly as needed for sleep

## 2017-02-14 ENCOUNTER — MYC REFILL (OUTPATIENT)
Dept: FAMILY MEDICINE | Facility: CLINIC | Age: 47
End: 2017-02-14

## 2017-02-14 DIAGNOSIS — G89.4 CHRONIC PAIN SYNDROME: ICD-10-CM

## 2017-02-14 NOTE — TELEPHONE ENCOUNTER
Message from MyChart:  Original authorizing provider: MD Jessica Rodriguezzan Diaz would like a refill of the following medications:  oxyCODONE-acetaminophen (PERCOCET)  MG per tablet [Srinivasa Hunter MD]    Preferred pharmacy: [facility] [address] [address] AT [facility] & [address] [place] [address]    Comment:  I'm running low on my percocet. It can be mailed to the [facility]. Thank you

## 2017-02-14 NOTE — TELEPHONE ENCOUNTER
Controlled Substance Refill Request for Percocet 10-325mg    Last refill: 1/13/2017 - 90qty    Last clinic visit: 10/27/2016     Next appt: None with PCP    Controlled substance agreement on file: Yes:  Date 8/15/2016.    Documentation in problem list reviewed:  Yes    Processing:  Mail to pended pharmacy     Naty Khan RN  [facility]

## 2017-02-14 NOTE — TELEPHONE ENCOUNTER
Routing refill request to provider for review/approval because:  Drug not on the [facility] refill protocol       Naty Khan RN  [facility]

## 2017-02-15 RX ORDER — OXYCODONE AND ACETAMINOPHEN 10; 325 MG/1; MG/1
1 TABLET ORAL EVERY 6 HOURS PRN
Qty: 90 TABLET | Refills: 0 | Status: SHIPPED | OUTPATIENT
Start: 2017-02-15 | End: 2017-03-14

## 2017-02-16 DIAGNOSIS — M54.9 CHRONIC BILATERAL BACK PAIN, UNSPECIFIED BACK LOCATION: Chronic | ICD-10-CM

## 2017-02-16 DIAGNOSIS — G89.29 CHRONIC BILATERAL BACK PAIN, UNSPECIFIED BACK LOCATION: Chronic | ICD-10-CM

## 2017-02-16 NOTE — TELEPHONE ENCOUNTER
cyclobenzaprine (FLEXERIL) 10 MG tablet      Last Written Prescription Date:  10-27-16  Last Fill Quantity: 30,   # refills: 2  Last Office Visit with [facility] prescribing provider: 8-15-16  Future Office visit:    Next 5 appointments (look out 90 days)     May 01, 2017  2:00 PM CDT   [facility] Hicks [facility]    [address] [place] [id number] [phone]                   Routing refill request to provider for review/approval because:  Drug not on the [facility] refill protocol or controlled substance

## 2017-02-17 RX ORDER — CYCLOBENZAPRINE HCL 10 MG
5-10 TABLET ORAL 3 TIMES DAILY PRN
Qty: 30 TABLET | Refills: 2 | Status: SHIPPED | OUTPATIENT
Start: 2017-02-17 | End: 2017-06-26

## 2017-02-20 ENCOUNTER — THERAPY VISIT (OUTPATIENT)
Dept: CHIROPRACTIC MEDICINE | Facility: CLINIC | Age: 47
End: 2017-02-20
Payer: COMMERCIAL

## 2017-02-20 DIAGNOSIS — M99.9 NONALLOPATHIC LESION OF LUMBAR REGION: ICD-10-CM

## 2017-02-20 DIAGNOSIS — M62.838 SPASM OF MUSCLE: ICD-10-CM

## 2017-02-20 DIAGNOSIS — M99.9 NONALLOPATHIC LESION OF PELVIC REGION: ICD-10-CM

## 2017-02-20 DIAGNOSIS — M54.50 LUMBAGO: Primary | ICD-10-CM

## 2017-02-20 PROCEDURE — 98941 CHIROPRACT MANJ 3-4 REGIONS: CPT | Mod: AT | Performed by: CHIROPRACTOR

## 2017-02-20 PROCEDURE — 97810 ACUP 1/> WO ESTIM 1ST 15 MIN: CPT | Performed by: CHIROPRACTOR

## 2017-02-20 NOTE — PROGRESS NOTES
Visit #:  2 of 6, based on treatment plan    Subjective:  Velma Diaz is a 46 year old female who is seen in f/u up for:        Nonallopathic lesion of pelvic region  Lumbago  Nonallopathic lesion of lumbar region  Spasm of muscle.     Since last visit on 2/6/2017,  Velma Diaz reports the following changes: Pain immediately after last treatment: 8/10 and their pain level today 4/10.  Velma reports having a lot of pain the day after her visit.  She also received her foot orthotics that days.  By the weekend she reports that she was bed ridden due to pain and her legs being on fire.  Velma also had a EMG a week ago with no nerve involvement found.  She was prescribed pramoprexil by her physician after her EMG.  She notes that the knife pain in her heels has decreased with her orthotics.     Area of chief complaint:  Lumbar :  Symptoms are graded at 4/10. The quality is described as stiff, achey, stabbing.  Motion has increased, but is still not normal, i ncreased, but is still not normal. Patient feels that they are improved due to a reduction in symptoms.        Objective:  The following was observed:    P: pain elicited on palpation, palpatory tenderness, piriformis R>>L    A: static palpation demonstrates intersegmental asymmetry , pelvis, lumbar    R: motion palpation notes restricted motion, :  T10 Extension restriction  T11 Extension restriction  T12 Extension restriction  L4 Right rotation restricted  L5 Right rotation restricted  PSIS Right Extension restriction    T: hypertonicity at: Piriformis R>>L      Assessment:    Segmental spinal dysfunction/restrictions found at:  T10  T11  T12  L4  L5  PSIS Left    Diagnoses:      1. Lumbago    2. Nonallopathic lesion of pelvic region    3. Nonallopathic lesion of lumbar region    4. Spasm of muscle        Patient's condition:  Patient had restrictions pre-manipulation    Treatment effectiveness:  Post manipulation there is better intersegmental  movement and Patient claims to feel looser post manipulation      Procedures:  CMT:  02362 Chiropractic manipulative treatment 3-4 regions performed   Thoracic: Activator, T10, T11, T12, Prone  Lumbar: Activator, L4, L5, Prone  Pelvis: Drop Table, PSIS Right , Prone    Modalities:  51203: Acupuncture:  Points: Keli Points in lumbar spine and Ahsi point in piriformis, ST36, BL55, SP6    Therapeutic procedures:  None      Prognosis: Good    Progress towards Goals: Patient is making progress towards the goal     Response to Treatment:   Reduction in symptoms as reported by patient      Recommendations:    Instructions:ice 20 minutes every other hour as needed    Follow-up:  Return to care in one week

## 2017-02-27 ENCOUNTER — THERAPY VISIT (OUTPATIENT)
Dept: CHIROPRACTIC MEDICINE | Facility: CLINIC | Age: 47
End: 2017-02-27
Payer: COMMERCIAL

## 2017-02-27 DIAGNOSIS — M54.50 LUMBAGO: ICD-10-CM

## 2017-02-27 DIAGNOSIS — M99.02 THORACIC SEGMENT DYSFUNCTION: ICD-10-CM

## 2017-02-27 DIAGNOSIS — M62.838 SPASM OF MUSCLE: ICD-10-CM

## 2017-02-27 DIAGNOSIS — M99.03 SEGMENTAL DYSFUNCTION OF LUMBAR REGION: ICD-10-CM

## 2017-02-27 DIAGNOSIS — M99.05 SEGMENTAL DYSFUNCTION OF PELVIC REGION: Primary | ICD-10-CM

## 2017-02-27 PROCEDURE — 97810 ACUP 1/> WO ESTIM 1ST 15 MIN: CPT | Performed by: CHIROPRACTOR

## 2017-02-27 PROCEDURE — 98941 CHIROPRACT MANJ 3-4 REGIONS: CPT | Mod: AT | Performed by: CHIROPRACTOR

## 2017-02-27 NOTE — PROGRESS NOTES
Visit #:  3 of 6, based on treatment plan    Subjective:  Velma Diaz is a 46 year old female who is seen in f/u up for:        Nonallopathic lesion of pelvic region  Lumbago  Nonallopathic lesion of lumbar region  Spasm of muscle.     Since last visit on 2/20/2017,  Velma Diaz reports the following changes: Pain immediately after last treatment: 8/10 and their pain level today 4/10.  Velma reports the she is feeling better.  Her low back is not as sore.  She does mention that she is having bilateral knee pain but relates that to wearing her new orthotics and working out more  Frequently.    Area of chief complaint:  Lumbar :  Symptoms are graded at 4/10. The quality is described as stiff, achey, stabbing.  Motion has increased, but is still not normal, i ncreased, but is still not normal. Patient feels that they are improved due to a reduction in symptoms.        Objective:  The following was observed:    P: pain elicited on palpation, palpatory tenderness, piriformis R>>L    A: static palpation demonstrates intersegmental asymmetry , pelvis, lumbar    R: motion palpation notes restricted motion, :  T10 Extension restriction  T11 Extension restriction  T12 Extension restriction  L4 Right rotation restricted  L5 Right rotation restricted  PSIS Right Extension restriction    T: hypertonicity at: Piriformis R>>L      Assessment:    Segmental spinal dysfunction/restrictions found at:  T10  T11  T12  L4  L5  PSIS Left    Diagnoses:      1. Lumbago    2. Nonallopathic lesion of pelvic region    3. Nonallopathic lesion of lumbar region    4. Spasm of muscle        Patient's condition:  Patient had restrictions pre-manipulation    Treatment effectiveness:  Post manipulation there is better intersegmental movement and Patient claims to feel looser post manipulation      Procedures:  CMT:  11725 Chiropractic manipulative treatment 3-4 regions performed   Thoracic: Activator, T10, T11, T12, Prone  Lumbar:  Activator, L4, L5, Prone  Pelvis: Drop Table, PSIS Right , Prone    Modalities:  57714: Acupuncture:  Points: Johnattran Points in lumbar spine and Ahsi point in piriformis, ST36, BL55, SP6    Therapeutic procedures:  None      Prognosis: Good    Progress towards Goals: Patient is making progress towards the goal     Response to Treatment:   Reduction in symptoms as reported by patient      Recommendations:    Instructions:ice 20 minutes every other hour as needed    Follow-up:  Return to care in one week

## 2017-03-13 ENCOUNTER — THERAPY VISIT (OUTPATIENT)
Dept: CHIROPRACTIC MEDICINE | Facility: CLINIC | Age: 47
End: 2017-03-13
Payer: COMMERCIAL

## 2017-03-13 DIAGNOSIS — M54.50 LUMBAGO: Primary | ICD-10-CM

## 2017-03-13 DIAGNOSIS — M99.9 NONALLOPATHIC LESION OF LUMBAR REGION: ICD-10-CM

## 2017-03-13 DIAGNOSIS — M99.9 NONALLOPATHIC LESION OF PELVIC REGION: ICD-10-CM

## 2017-03-13 DIAGNOSIS — M62.838 SPASM OF MUSCLE: ICD-10-CM

## 2017-03-13 PROCEDURE — 98941 CHIROPRACT MANJ 3-4 REGIONS: CPT | Mod: AT | Performed by: CHIROPRACTOR

## 2017-03-13 PROCEDURE — 97810 ACUP 1/> WO ESTIM 1ST 15 MIN: CPT | Performed by: CHIROPRACTOR

## 2017-03-13 NOTE — PROGRESS NOTES
Visit #:  4 of 6, based on treatment plan    Subjective:  Velma Diaz is a 46 year old female who is seen in f/u up for:        Nonallopathic lesion of pelvic region  Lumbago  Nonallopathic lesion of lumbar region  Spasm of muscle.     Since last visit on 2/27/2017,  Velma Diaz reports the following changes: Pain immediately after last treatment: 8/10 and their pain level today 4/10.  Velma reports the she is feeling better.  Her low back is not as sore.  She does mention that she has good days and bad days.  Today her low back is feeling pretty good with not radicular symptoms She does mention that she is still having bilateral knee pain.    Area of chief complaint:  Lumbar :  Symptoms are graded at 4/10. The quality is described as stiff, achey, stabbing.  Motion has increased, but is still not normal, i ncreased, but is still not normal. Patient feels that they are improved due to a reduction in symptoms.        Objective:  The following was observed:    P: pain elicited on palpation, palpatory tenderness, piriformis R>>L    A: static palpation demonstrates intersegmental asymmetry , pelvis, lumbar    R: motion palpation notes restricted motion, :  T10 Extension restriction  T11 Extension restriction  T12 Extension restriction  L4 Right rotation restricted  L5 Right rotation restricted  PSIS Right Extension restriction    T: hypertonicity at: Piriformis R>>L      Assessment:    Segmental spinal dysfunction/restrictions found at:  T10  T11  T12  L4  L5  PSIS Left    Diagnoses:      1. Lumbago    2. Nonallopathic lesion of pelvic region    3. Nonallopathic lesion of lumbar region    4. Spasm of muscle        Patient's condition:  Patient had restrictions pre-manipulation    Treatment effectiveness:  Post manipulation there is better intersegmental movement and Patient claims to feel looser post manipulation      Procedures:  CMT:  49302 Chiropractic manipulative treatment 3-4 regions performed    Thoracic: Activator, T10, T11, T12, Prone  Lumbar: Activator, L4, L5, Prone  Pelvis: Drop Table, PSIS Right , Prone    Modalities:  99605: Acupuncture:  Points: B25, B27, GV3, B62, SI3, K3, and Ahsi point in piriformis    Therapeutic procedures:  None      Prognosis: Good    Progress towards Goals: Patient is making progress towards the goal     Response to Treatment:   Reduction in symptoms as reported by patient      Recommendations:    Instructions:ice 20 minutes every other hour as needed    Follow-up:  Return to care in one week

## 2017-03-14 ENCOUNTER — MYC REFILL (OUTPATIENT)
Dept: FAMILY MEDICINE | Facility: CLINIC | Age: 47
End: 2017-03-14

## 2017-03-14 DIAGNOSIS — G89.4 CHRONIC PAIN SYNDROME: ICD-10-CM

## 2017-03-14 RX ORDER — OXYCODONE AND ACETAMINOPHEN 10; 325 MG/1; MG/1
1 TABLET ORAL EVERY 6 HOURS PRN
Qty: 90 TABLET | Refills: 0 | Status: SHIPPED | OUTPATIENT
Start: 2017-03-14 | End: 2017-04-11

## 2017-03-14 NOTE — TELEPHONE ENCOUNTER
Message from MyChart:  Original authorizing provider: MD Velma Rodriguez would like a refill of the following medications:  oxyCODONE-acetaminophen (PERCOCET)  MG per tablet [Srinivasa Hunter MD]    Preferred pharmacy: [facility] [id number] [place], [place] [address] AVE AT [address]    Comment:

## 2017-03-14 NOTE — TELEPHONE ENCOUNTER
Routing refill request to provider for review/approval because:  Drug not on the [facility] refill protocol       Naty Khan RN  [facility]

## 2017-03-14 NOTE — TELEPHONE ENCOUNTER
TC spoke with patient and notified patient that script was available for . Patient requested that script be mailed to [facility] on [address]. Script mailed as requested.

## 2017-03-14 NOTE — TELEPHONE ENCOUNTER
Controlled Substance Refill Request for Percocet 10-325mg    Last refill: 2/15/2017 - 90qty    Last clinic visit: 10/27/2016     Next appt: None with PCP    Controlled substance agreement on file: Yes:  Date 8/15/2016.    Documentation in problem list reviewed:  Yes    Processing:  Mail to pended pharmacy       Naty Khan RN  [facility]

## 2017-03-25 ENCOUNTER — OFFICE VISIT (OUTPATIENT)
Dept: URGENT CARE | Facility: URGENT CARE | Age: 47
End: 2017-03-25
Payer: COMMERCIAL

## 2017-03-25 VITALS
SYSTOLIC BLOOD PRESSURE: 112 MMHG | OXYGEN SATURATION: 97 % | HEART RATE: 74 BPM | TEMPERATURE: 97.8 F | WEIGHT: 225 LBS | HEIGHT: 63 IN | DIASTOLIC BLOOD PRESSURE: 79 MMHG | BODY MASS INDEX: 39.87 KG/M2

## 2017-03-25 DIAGNOSIS — R21 RASH AND NONSPECIFIC SKIN ERUPTION: Primary | ICD-10-CM

## 2017-03-25 PROCEDURE — 99213 OFFICE O/P EST LOW 20 MIN: CPT | Performed by: FAMILY MEDICINE

## 2017-03-25 RX ORDER — METHYLPREDNISOLONE 4 MG
TABLET, DOSE PACK ORAL
Qty: 21 TABLET | Refills: 0 | Status: SHIPPED | OUTPATIENT
Start: 2017-03-25 | End: 2017-07-19

## 2017-03-25 NOTE — MR AVS SNAPSHOT
After Visit Summary   3/25/2017    Velma Diaz    MRN: [id number]           Patient Information     Date Of Birth          1970        Visit Information        Provider Department      3/25/2017 10:30 AM Yoana Bryson DO [facility]        Today's Diagnoses     Rash and nonspecific skin eruption    -  1      Care Instructions    Start the steroid taper today.    If you have any itching:  Consider using calamine lotion; antihistamines (benadryl is sedating and may be best for home/night time, claritin and zyrtec are non sedating);  topical hydrocortisone lotion (sparingly and for no longer than 10 days); and, avoid hot water in your baths/showers.            Follow-ups after your visit        Your next 10 appointments already scheduled     Mar 28, 2017 12:00 PM CDT   [facility] Chiropractor with [facility] [place] ([facility] [place])    [address]  [place] [id number] [phone]            Mar 28, 2017  1:30 PM CDT   New Visit with Kylah Rowe OD   [facility] ([facility])    [address] [place] [id number] [phone]            May 01, 2017  2:00 PM CDT   Return Visit with Debra Hicks MD   [facility] ([facility])    [address] [facility] [id number] [phone]              Who to contact     If you have questions or need follow up information about today's clinic visit or your schedule please contact [facility] directly at [phone].  Normal or non-critical lab and imaging results will be communicated to you by MyChart, letter or phone within 4 business days after the clinic has received the results. If you do not hear from us within 7 days, please contact the clinic through MyChart or phone. If you have a critical or abnormal lab result, we will notify you by phone as  "soon as possible.  Submit refill requests through [employer] or call your pharmacy and they will forward the refill request to us. Please allow 3 business days for your refill to be completed.          Additional Information About Your Visit        [employer] Information     [employer] gives you secure access to your electronic health record. If you see a primary care provider, you can also send messages to your care team and make appointments. If you have questions, please call your primary care clinic.  If you do not have a primary care provider, please call [phone] and they will assist you.        Care EveryWhere ID     This is your Care EveryWhere ID. This could be used by other organizations to access your [facility] medical records  [id number]        Your Vitals Were     Pulse Temperature Height Pulse Oximetry BMI (Body Mass Index)       74 97.8  F (36.6  C) (Tympanic) 5' 3\" (1.6 m) 97% 39.86 kg/m2        Blood Pressure from Last 3 Encounters:   03/25/17 112/79   02/01/17 109/64   01/24/17 108/78    Weight from Last 3 Encounters:   03/25/17 225 lb (102.1 kg)   02/01/17 230 lb 9.6 oz (104.6 kg)   01/24/17 234 lb (106.1 kg)              Today, you had the following     No orders found for display         Today's Medication Changes          These changes are accurate as of: 3/25/17 11:25 AM.  If you have any questions, ask your nurse or doctor.               Start taking these medicines.        Dose/Directions    methylPREDNISolone 4 MG tablet   Commonly known as:  MEDROL DOSEPAK   Used for:  Rash and nonspecific skin eruption   Started by:  Yoana Bryson, DO        Follow package instructions   Quantity:  21 tablet   Refills:  0            Where to get your medicines      These medications were sent to [facility] [id number] [place] [address] AT [address] [address]    Hours:  24-hours Phone:  [phone]     methylPREDNISolone 4 " MG tablet                Primary Care Provider Office Phone # Fax #    Srinivasa Hunter [phone]       [facility] [address] [facility] [address]        Goals        General    I will call within next 2 weeks to schedule initial psychiatry appointment (pt-stated)     Notes - Note edited  5/20/2015 12:51 PM by Yesica Marsh    As of today's date 5/20/2015 goal is met at 76 - 100%.   Goal Status:  Complete  Patient states this is scheduled with Dr. Salvador for next month, but not on appointment calendar  LUDY Stark, MS   [phone]  5/20/2015 12:51 PM        I will complete [employer] or reduced fare application within the next month (pt-stated)     Notes - Note edited  11/24/2015 12:00 PM by Yesica Marsh    As of today's date 11/24/2015 goal is met at 51 - 75%.   Goal Status:  Active  She reported today having [employer] application, [employer] worker has requested but packet from [employer].    LUDY Stark, MS   [phone]  11/24/2015 12:00 PM        I will discuss [employer] worker with therapist next week for housing needs  (pt-stated)     Notes - Note edited  12/3/2015  1:17 PM by Yesica Marsh    As of today's date 12/3/2015 goal is met at 76 - 100%.   Goal Status:  Discontinued  Patient has [employer] worker and has found housing with friend  LUDY Stark, MS   [phone]  12/3/2015 1:16 PM        Thank you!     Thank you for choosing [facility]  for your care. Our goal is always to provide you with excellent care. Hearing back from our patients is one way we can continue to improve our services. Please take a few minutes to complete the written survey that you may receive in the mail after your visit with us. Thank you!             Your Updated Medication List - Protect others around you: Learn how to safely use, store and throw away your medicines at www.disposemymeds.org.          This list is  accurate as of: 3/25/17 11:25 AM.  Always use your most recent med list.                   Brand Name Dispense Instructions for use    acetaminophen 325 MG tablet    TYLENOL    100 tablet    Take 2 tablets (650 mg) by mouth every 4 hours as needed for other (mild pain)       cyclobenzaprine 10 MG tablet    FLEXERIL    30 tablet    Take 0.5-1 tablets (5-10 mg) by mouth 3 times daily as needed for muscle spasms       diclofenac 1 % Gel topical gel    VOLTAREN    100 g    Apply  2 grams to foot four times daily as needed for pain using enclosed dosing card.       FLUoxetine 40 MG capsule    PROzac    30 capsule    Take 1 capsule (40 mg) by mouth daily       furosemide 20 MG tablet    LASIX    30 tablet    Take 1 tablet (20 mg) by mouth daily as needed for leg swelling       lidocaine-prilocaine cream    EMLA    30 g    Apply dime size amount to right hip and left knee qid prn, May use same time as Diclofinac.future refill through PCP       methylPREDNISolone 4 MG tablet    MEDROL DOSEPAK    21 tablet    Follow package instructions       MULTI FOR HER PO      Take by mouth daily       nystatin 260147 UNIT/GM Powd    MYCOSTATIN    60 g    Apply to affected area twice daily as needed       order for DME      Respironics REMSTAR 60 Series Auto [id number] H2O, Airfit P10 nasal pillow mask w/xsmall pillows       oxyCODONE-acetaminophen  MG per tablet    PERCOCET    90 tablet    Take 1 tablet by mouth every 6 hours as needed for moderate to severe pain       pramipexole 0.125 MG tablet    MIRAPEX    90 tablet    Week 1: 1/2 tablet at bedtime. Week 2: 1/2 tablet two times a day. Week 3: 1/2 tablet three times a day. Week 4: 1/2 tablet morning and lunchtime, 1 tablet at bedtime. Week 5: 1 tablet morning and bedtime, 1/2 tablet at lunchtime. Week 6 and afterwards: 1 tablet three times a day.       Pregabalin 225 MG Caps    LYRICA    60 capsule    Take 1 capsule by mouth 2 times daily       SUMAtriptan 100 MG tablet    IMITREX     9 tablet    Take 1 tablet (100 mg) by mouth at onset of headache for migraine May repeat in 2 hours if needed: max 2/day       traZODone 50 MG tablet    DESYREL    30 tablet    Take 1 tablet (50 mg) by mouth nightly as needed for sleep

## 2017-03-25 NOTE — NURSING NOTE
"Chief Complaint   Patient presents with     Urgent Care     Derm Problem     c/o itching leg for 1 day       Initial /79 (BP Location: Left arm, Patient Position: Chair, Cuff Size: Adult Large)  Pulse 74  Temp 97.8  F (36.6  C) (Tympanic)  Ht 5' 3\" (1.6 m)  Wt 225 lb (102.1 kg)  SpO2 97%  BMI 39.86 kg/m2 Estimated body mass index is 39.86 kg/(m^2) as calculated from the following:    Height as of this encounter: 5' 3\" (1.6 m).    Weight as of this encounter: 225 lb (102.1 kg).  Medication Reconciliation: complete   Raisa Starr MA    "

## 2017-03-25 NOTE — PROGRESS NOTES
"SUBJECTIVE:   Velma Diaz is a 46 year old female presenting with a chief complaint of itchy rash on her legs.  Woke up with the itching and bumps this morning.  Itching right leg and left leg around the knee.  No fevers.  No other symptoms.  Feeling otherwise well.  Has tried antihistamine and steroid without much relief.  No new topical products, detergents, clothing or bedding recalled.       ROS:  5 point review of symptoms negative other than positives stated above.    OBJECTIVE  /79 (BP Location: Left arm, Patient Position: Chair, Cuff Size: Adult Large)  Pulse 74  Temp 97.8  F (36.6  C) (Tympanic)  Ht 5' 3\" (1.6 m)  Wt 225 lb (102.1 kg)  SpO2 97%  BMI 39.86 kg/m2  GENERAL:  Awake, alert and interactive. No acute distress.  SKIN:  Very few widely scattered small raised flesh colored lesions noted on the right calf, ankle and inner thigh and 2 lesions noted around left inner knee.  No erythema. No scabs.  Doesn't appear c/w insect bite marks.  Excoriations noted around the ankle lesions.  No surrounding erythema.      ASSESSMENT/PLAN    ICD-10-CM    1. Rash and nonspecific skin eruption R21 methylPREDNISolone (MEDROL DOSEPAK) 4 MG tablet     Rash vs hives.   We discussed the expected course and symptomatic cares in detail, including return to care if symptoms not improving as expected, do not resolve completely, or if any new or worsening symptoms develop.    Patient Instructions   Start the steroid taper today.    If you have any itching:  Consider using calamine lotion; antihistamines (benadryl is sedating and may be best for home/night time, claritin and zyrtec are non sedating);  topical hydrocortisone lotion (sparingly and for no longer than 10 days); and, avoid hot water in your baths/showers.              "

## 2017-03-25 NOTE — PATIENT INSTRUCTIONS
Start the steroid taper today.    If you have any itching:  Consider using calamine lotion; antihistamines (benadryl is sedating and may be best for home/night time, claritin and zyrtec are non sedating);  topical hydrocortisone lotion (sparingly and for no longer than 10 days); and, avoid hot water in your baths/showers.

## 2017-03-28 ENCOUNTER — OFFICE VISIT (OUTPATIENT)
Dept: OPTOMETRY | Facility: CLINIC | Age: 47
End: 2017-03-28
Payer: COMMERCIAL

## 2017-03-28 ENCOUNTER — THERAPY VISIT (OUTPATIENT)
Dept: CHIROPRACTIC MEDICINE | Facility: CLINIC | Age: 47
End: 2017-03-28
Payer: COMMERCIAL

## 2017-03-28 DIAGNOSIS — M62.838 SPASM OF MUSCLE: ICD-10-CM

## 2017-03-28 DIAGNOSIS — H52.201 ASTIGMATISM OF RIGHT EYE: ICD-10-CM

## 2017-03-28 DIAGNOSIS — M99.9 NONALLOPATHIC LESION OF PELVIC REGION: Primary | ICD-10-CM

## 2017-03-28 DIAGNOSIS — M99.9 NONALLOPATHIC LESION OF LUMBAR REGION: ICD-10-CM

## 2017-03-28 DIAGNOSIS — H52.13 MYOPIA WITH PRESBYOPIA OF BOTH EYES: ICD-10-CM

## 2017-03-28 DIAGNOSIS — M54.50 LUMBAGO: ICD-10-CM

## 2017-03-28 DIAGNOSIS — Z01.00 EXAMINATION OF EYES AND VISION: Primary | ICD-10-CM

## 2017-03-28 DIAGNOSIS — H52.4 MYOPIA WITH PRESBYOPIA OF BOTH EYES: ICD-10-CM

## 2017-03-28 PROCEDURE — 92014 COMPRE OPH EXAM EST PT 1/>: CPT | Performed by: OPTOMETRIST

## 2017-03-28 PROCEDURE — 98941 CHIROPRACT MANJ 3-4 REGIONS: CPT | Mod: AT | Performed by: CHIROPRACTOR

## 2017-03-28 PROCEDURE — 92015 DETERMINE REFRACTIVE STATE: CPT | Performed by: OPTOMETRIST

## 2017-03-28 PROCEDURE — 97810 ACUP 1/> WO ESTIM 1ST 15 MIN: CPT | Performed by: CHIROPRACTOR

## 2017-03-28 ASSESSMENT — TONOMETRY
IOP_METHOD: APPLANATION
OD_IOP_MMHG: 20
OS_IOP_MMHG: 20

## 2017-03-28 ASSESSMENT — REFRACTION_WEARINGRX
OD_SPHERE: +0.25
OS_SPHERE: +0.25
SPECS_TYPE: BIFOCAL
OD_ADD: +1.50
OS_CYLINDER: SPHERE
OD_CYLINDER: SPHERE
OS_ADD: +1.50

## 2017-03-28 ASSESSMENT — REFRACTION_MANIFEST
OS_CYLINDER: SPHERE
OD_ADD: +1.50
OD_SPHERE: -0.25
OD_AXIS: 035
OS_SPHERE: -0.25
OS_ADD: +1.50
OD_CYLINDER: +0.25

## 2017-03-28 ASSESSMENT — SLIT LAMP EXAM - LIDS
COMMENTS: NORMAL
COMMENTS: NORMAL

## 2017-03-28 ASSESSMENT — VISUAL ACUITY
OD_CC: 20/60
OS_CC: 20/20
OD_SC: 20/60
OD_SC+: -1
OS_SC: 20/20
METHOD: SNELLEN - LINEAR
OS_CC: 20/40
OS_SC: 20/40
OD_SC: 20/20
OD_CC: 20/20
CORRECTION_TYPE: GLASSES

## 2017-03-28 ASSESSMENT — EXTERNAL EXAM - RIGHT EYE: OD_EXAM: NORMAL

## 2017-03-28 ASSESSMENT — CUP TO DISC RATIO
OD_RATIO: 0.1
OS_RATIO: 0.1

## 2017-03-28 ASSESSMENT — CONF VISUAL FIELD
OD_NORMAL: 1
OS_NORMAL: 1

## 2017-03-28 ASSESSMENT — EXTERNAL EXAM - LEFT EYE: OS_EXAM: NORMAL

## 2017-03-28 NOTE — PATIENT INSTRUCTIONS
A final glasses prescription was given.  Allow time for adaptation.  The glasses may cause dizziness and affect depth perception for awhile.  Return to clinic 1 year for Comprehensive Vision Exam      Kylah Rowe O.D  [address]  [place]  [address]    [phone]

## 2017-03-28 NOTE — PROGRESS NOTES
Chief Complaint   Patient presents with     COMPREHENSIVE EYE EXAM      Accompanied by self  Last Eye Exam: 1 year ago  Dilated Previously: Yes    What are you currently using to see?  Glasses, driving at night, computer and watching tv       Distance Vision Acuity: Satisfied with vision    Near Vision Acuity: Satisfied with vision while reading  unaided    Eye Comfort: good  Do you use eye drops? : No  Occupation or Hobbies: disabled    Rosa Slade, Optometric Tech          Medical, surgical and family histories reviewed and updated 3/28/2017.       OBJECTIVE: See Ophthalmology exam    ASSESSMENT:    ICD-10-CM    1. Examination of eyes and vision Z01.00 EYE EXAM (SIMPLE-NONBILLABLE)     REFRACTION   2. Myopia with presbyopia of both eyes H52.13 EYE EXAM (SIMPLE-NONBILLABLE)    H52.4 REFRACTION   3. Astigmatism of right eye H52.201 EYE EXAM (SIMPLE-NONBILLABLE)     REFRACTION      PLAN:   A final glasses prescription was given.  Allow time for adaptation.  The glasses may cause dizziness and affect depth perception for awhile.  Return to clinic 1 year for Comprehensive Vision Exam      Kylah Rowe O.D  [facility]  [place]  [address]    [phone]

## 2017-03-28 NOTE — MR AVS SNAPSHOT
After Visit Summary   3/28/2017    Velma Diaz    MRN: [id number]           Patient Information     Date Of Birth          1970        Visit Information        Provider Department      3/28/2017 1:30 PM Kylah Rowe OD [facility]        Today's Diagnoses     Examination of eyes and vision    -  1    Myopia with presbyopia of both eyes        Astigmatism of right eye          Care Instructions        A final glasses prescription was given.  Allow time for adaptation.  The glasses may cause dizziness and affect depth perception for awhile.  Return to clinic 1 year for Comprehensive Vision Exam      Kylah Rowe O.D  [facility]  [address]  [place]  [id number] [phone]                Follow-ups after your visit        Follow-up notes from your care team     Return in about 1 year (around 3/28/2018) for Eye Exam.      Your next 10 appointments already scheduled     Apr 19, 2017 10:00 AM CDT   HAZEL Chiropractor with CHARIS BahenaM GERALD Antunez Chiro ([facility] GERALD ANTUNEZ)    [address]  [place] [address] [phone]            May 01, 2017  2:00 PM CDT   Return Visit with Debra Hicks MD   [facility] ([facility])    [address] [place] [address] [phone]              Who to contact     If you have questions or need follow up information about today's clinic visit or your schedule please contact [facility] directly at [phone].  Normal or non-critical lab and imaging results will be communicated to you by MyChart, letter or phone within 4 business days after the clinic has received the results. If you do not hear from us within 7 days, please contact the clinic through MyChart or phone. If you have a critical or abnormal lab result, we will notify you by phone as soon as possible.  Submit refill requests through  [email or web address] or call your pharmacy and they will forward the refill request to us. Please allow 3 business days for your refill to be completed.          Additional Information About Your Visit        The [employer] Information     [email or web address] gives you secure access to your electronic health record. If you see a primary care provider, you can also send messages to your care team and make appointments. If you have questions, please call your primary care clinic.  If you do not have a primary care provider, please call [phone] and they will assist you.        Care EveryWhere ID     This is your Care EveryWhere ID. This could be used by other organizations to access your [facility] medical records  [id number]         Blood Pressure from Last 3 Encounters:   03/25/17 112/79   02/01/17 109/64   01/24/17 108/78    Weight from Last 3 Encounters:   03/25/17 102.1 kg (225 lb)   02/01/17 104.6 kg (230 lb 9.6 oz)   01/24/17 106.1 kg (234 lb)              We Performed the Following     EYE EXAM (SIMPLE-NONBILLABLE)     REFRACTION        Primary Care Provider Office Phone # Fax #    Srinivasa Hunter [phone]       [facility] [address] [facility] [address]        Goals        General    I will call within next 2 weeks to schedule initial psychiatry appointment (pt-stated)     Notes - Note edited  5/20/2015 12:51 PM by Yesica Marsh    As of today's date 5/20/2015 goal is met at 76 - 100%.   Goal Status:  Complete  Patient states this is scheduled with Dr. Salvador for next month, but not on appointment calendar  LUDY Stark, MSW   [phone]  5/20/2015 12:51 PM        I will complete [employer] or reduced fare application within the next month (pt-stated)     Notes - Note edited  11/24/2015 12:00 PM by Yesica Marsh    As of today's date 11/24/2015 goal is met at 51 - 75%.   Goal Status:  Active  She reported today having [employer] application, [employer] worker has  requested but packet from [employer].    LUDY Stark, MSW   [phone]  11/24/2015 12:00 PM        I will discuss [employer] worker with therapist next week for housing needs  (pt-stated)     Notes - Note edited  12/3/2015  1:17 PM by Yesica Marsh    As of today's date 12/3/2015 goal is met at 76 - 100%.   Goal Status:  Discontinued  Patient has [employer] worker and has found housing with friend  YesicaLUDY Nielson, MSW   [phone]  12/3/2015 1:16 PM        Thank you!     Thank you for choosing [facility]  for your care. Our goal is always to provide you with excellent care. Hearing back from our patients is one way we can continue to improve our services. Please take a few minutes to complete the written survey that you may receive in the mail after your visit with us. Thank you!             Your Updated Medication List - Protect others around you: Learn how to safely use, store and throw away your medicines at www.disposemymeds.org.          This list is accurate as of: 3/28/17  2:18 PM.  Always use your most recent med list.                   Brand Name Dispense Instructions for use    acetaminophen 325 MG tablet    TYLENOL    100 tablet    Take 2 tablets (650 mg) by mouth every 4 hours as needed for other (mild pain)       cyclobenzaprine 10 MG tablet    FLEXERIL    30 tablet    Take 0.5-1 tablets (5-10 mg) by mouth 3 times daily as needed for muscle spasms       diclofenac 1 % Gel topical gel    VOLTAREN    100 g    Apply  2 grams to foot four times daily as needed for pain using enclosed dosing card.       FLUoxetine 40 MG capsule    PROzac    30 capsule    Take 1 capsule (40 mg) by mouth daily       furosemide 20 MG tablet    LASIX    30 tablet    Take 1 tablet (20 mg) by mouth daily as needed for leg swelling       lidocaine-prilocaine cream    EMLA    30 g    Apply dime size amount to right hip and left knee qid prn, May use same time as Diclofinac.future refill through PCP        methylPREDNISolone 4 MG tablet    MEDROL DOSEPAK    21 tablet    Follow package instructions       MULTI FOR HER PO      Take by mouth daily       nystatin 169237 UNIT/GM Powd    MYCOSTATIN    60 g    Apply to affected area twice daily as needed       order for DME      Respironics REMSTAR 60 Series Auto [id number] H2O, Airfit P10 nasal pillow mask w/xsmall pillows       oxyCODONE-acetaminophen  MG per tablet    PERCOCET    90 tablet    Take 1 tablet by mouth every 6 hours as needed for moderate to severe pain       pramipexole 0.125 MG tablet    MIRAPEX    90 tablet    Week 1: 1/2 tablet at bedtime. Week 2: 1/2 tablet two times a day. Week 3: 1/2 tablet three times a day. Week 4: 1/2 tablet morning and lunchtime, 1 tablet at bedtime. Week 5: 1 tablet morning and bedtime, 1/2 tablet at lunchtime. Week 6 and afterwards: 1 tablet three times a day.       Pregabalin 225 MG Caps    LYRICA    60 capsule    Take 1 capsule by mouth 2 times daily       SUMAtriptan 100 MG tablet    IMITREX    9 tablet    Take 1 tablet (100 mg) by mouth at onset of headache for migraine May repeat in 2 hours if needed: max 2/day       traZODone 50 MG tablet    DESYREL    30 tablet    Take 1 tablet (50 mg) by mouth nightly as needed for sleep

## 2017-03-28 NOTE — PROGRESS NOTES
Visit #:  4 of 6, based on treatment plan    Subjective:  Velma Diaz is a 46 year old female who is seen in f/u up for:        Nonallopathic lesion of pelvic region  Lumbago  Nonallopathic lesion of lumbar region  Spasm of muscle.     Since last visit on 3/13/2017,  Velma Diaz reports the following changes: Pain immediately after last treatment: 2/10 and their pain level today 4/10.  Velma reports the she is feeling better.  Her low back is not as sore.  She has been running around a lot over the past few days and her low back seems to be holding up     Area of chief complaint:  Lumbar :  Symptoms are graded at 4/10. The quality is described as stiff, achey, stabbing.  Motion has increased, but is still not normal, increased, but is still not normal. Patient feels that they are improved due to a reduction in symptoms.        Objective:  The following was observed:    P: pain elicited on palpation, palpatory tenderness, piriformis R>>L    A: static palpation demonstrates intersegmental asymmetry , pelvis, lumbar    R: motion palpation notes restricted motion, :  T10 Extension restriction  T11 Extension restriction  T12 Extension restriction  L4 Right rotation restricted  L5 Right rotation restricted  PSIS Right Extension restriction    T: hypertonicity at: Piriformis R>>L      Assessment:    Segmental spinal dysfunction/restrictions found at:  T10  T11  T12  L4  L5  PSIS Left    Diagnoses:      1. Lumbago    2. Nonallopathic lesion of pelvic region    3. Nonallopathic lesion of lumbar region    4. Spasm of muscle        Patient's condition:  Patient had restrictions pre-manipulation    Treatment effectiveness:  Post manipulation there is better intersegmental movement and Patient claims to feel looser post manipulation      Procedures:  CMT:  22596 Chiropractic manipulative treatment 3-4 regions performed   Thoracic: Activator, T10, T11, T12, Prone  Lumbar: Activator, L4, L5, Prone  Pelvis: Drop Table,  PSIS Right , Prone    Modalities:  26344: Acupuncture:  Points: B25, B27, GV3, B62, SI3, K3, and Ahsi point in piriformis    Therapeutic procedures:  None      Prognosis: Good    Progress towards Goals: Patient is making progress towards the goal     Response to Treatment:   Reduction in symptoms as reported by patient      Recommendations:    Instructions:ice 20 minutes every other hour as needed    Follow-up:  Return to care in one week

## 2017-04-08 DIAGNOSIS — M79.89 LEG SWELLING: ICD-10-CM

## 2017-04-08 DIAGNOSIS — F51.01 PRIMARY INSOMNIA: ICD-10-CM

## 2017-04-10 NOTE — TELEPHONE ENCOUNTER
traZODone (DESYREL) 50 MG tablet       Last Written Prescription Date: 10/27/16  Last Fill Quantity: 30; # refills: 5  Last Office Visit with [facility], [facility] or [facility] prescribing provider:  10/27/16   Next 5 appointments (look out 90 days)     May 01, 2017  2:00 PM CDT   [facility] Visit with Debra Hicks MD   [facility]    [address] [place] [id number] [phone]                   Last PHQ-9 score on record=   PHQ-9 SCORE 10/24/2016   Total Score -   Total Score MyChart 12 (Moderate depression)   Total Score -       Lab Results   Component Value Date    AST 13 12/11/2014     Lab Results   Component Value Date    ALT 23 12/11/2014

## 2017-04-10 NOTE — TELEPHONE ENCOUNTER
furosemide (LASIX) 20 MG tablet      Last Written Prescription Date: 10/27/16  Last Fill Quantity: 30, # refills: 5  Last Office Visit with [facility], [facility] or [facility] prescribing provider: 10/27/16  Next 5 appointments (look out 90 days)     May 01, 2017  2:00 PM CDT   Return Visit with Debra Hicks MD   [facility] ([facility]    [address] [place] [address] [phone]                   Potassium   Date Value Ref Range Status   10/27/2016 3.8 3.4 - 5.3 mmol/L Final     Creatinine   Date Value Ref Range Status   10/27/2016 0.81 0.52 - 1.04 mg/dL Final     BP Readings from Last 3 Encounters:   03/25/17 112/79   02/01/17 109/64   01/24/17 108/78

## 2017-04-11 ENCOUNTER — MYC REFILL (OUTPATIENT)
Dept: INTERNAL MEDICINE | Facility: CLINIC | Age: 47
End: 2017-04-11

## 2017-04-11 DIAGNOSIS — G89.4 CHRONIC PAIN SYNDROME: ICD-10-CM

## 2017-04-11 RX ORDER — TRAZODONE HYDROCHLORIDE 50 MG/1
TABLET, FILM COATED ORAL
Qty: 30 TABLET | Refills: 1 | Status: SHIPPED | OUTPATIENT
Start: 2017-04-11 | End: 2017-06-21

## 2017-04-11 RX ORDER — FUROSEMIDE 20 MG
TABLET ORAL
Qty: 30 TABLET | Refills: 1 | Status: SHIPPED | OUTPATIENT
Start: 2017-04-11 | End: 2017-07-19

## 2017-04-11 NOTE — TELEPHONE ENCOUNTER
Message from [employer]:  Original authorizing provider: CONNOR Maurice CNP would like a refill of the following medications:  oxyCODONE-acetaminophen (PERCOCET)  MG per tablet [CONNOR Maurice CNP]    Preferred pharmacy: [facility] [id number] [place] [address] AV AT [facility] & [address] [place] [address]    Comment:  Please mail to [facility] on [address]

## 2017-04-11 NOTE — TELEPHONE ENCOUNTER
Routing refill request to provider for review/approval because:  Drug not on the [facility] refill protocol       Naty Khan RN  [facility]

## 2017-04-11 NOTE — TELEPHONE ENCOUNTER
Controlled Substance Refill Request for Percocet 10-325mg    Last refill: 3/14/2017 - 90qty    Last clinic visit: 10/27/2016     Next appt: None with PCP    Controlled substance agreement on file: Yes:  Date 8/15/2016.    Documentation in problem list reviewed:  Yes    Processing:  Mail to pended pharmacy     Naty Khan RN  [facility]

## 2017-04-12 RX ORDER — OXYCODONE AND ACETAMINOPHEN 10; 325 MG/1; MG/1
1 TABLET ORAL EVERY 6 HOURS PRN
Qty: 90 TABLET | Refills: 0 | Status: SHIPPED | OUTPATIENT
Start: 2017-04-12 | End: 2017-05-10

## 2017-04-12 NOTE — TELEPHONE ENCOUNTER
Script mailed to [facility] as requested. Patient notified via [employer] message that this has been completed.

## 2017-04-19 ENCOUNTER — THERAPY VISIT (OUTPATIENT)
Dept: CHIROPRACTIC MEDICINE | Facility: CLINIC | Age: 47
End: 2017-04-19
Payer: COMMERCIAL

## 2017-04-19 DIAGNOSIS — M62.838 SPASM OF MUSCLE: ICD-10-CM

## 2017-04-19 DIAGNOSIS — M54.50 LUMBAGO: Primary | ICD-10-CM

## 2017-04-19 DIAGNOSIS — M99.9 NONALLOPATHIC LESION OF PELVIC REGION: ICD-10-CM

## 2017-04-19 DIAGNOSIS — M99.02 THORACIC SEGMENT DYSFUNCTION: ICD-10-CM

## 2017-04-19 DIAGNOSIS — M99.9 NONALLOPATHIC LESION OF LUMBAR REGION: ICD-10-CM

## 2017-04-19 PROCEDURE — 98941 CHIROPRACT MANJ 3-4 REGIONS: CPT | Mod: AT | Performed by: CHIROPRACTOR

## 2017-04-19 PROCEDURE — 97810 ACUP 1/> WO ESTIM 1ST 15 MIN: CPT | Performed by: CHIROPRACTOR

## 2017-04-19 NOTE — PROGRESS NOTES
Visit #:  5 of 6, based on treatment plan    Subjective:  Velma Diaz is a 46 year old female who is seen in f/u up for:        Nonallopathic lesion of pelvic region  Lumbago  Nonallopathic lesion of lumbar region  Spasm of muscle.     Since last visit on 3/28/2017,  Velma Diaz reports the following changes: Pain immediately after last treatment: 1/10 and their pain level today 3-4/10.  Velma reports the she is feeling better.  Her low back is not as sore. She notes that she is doing well despite sleeping on a friends couch for a week and riding a bus from [place] to [place] twice in the last week.  She notes she continues to have tingling, burning pain in both thighs, but predominately in her right anterior thigh.    Area of chief complaint:  Lumbar :  Symptoms are graded at 3-4/10. The quality is described as stiff, achey, stabbing.  Motion has increased, but is still not normal. Patient feels that they are improved due to a reduction in symptoms.        Objective:  The following was observed:    P: pain elicited on palpation, palpatory tenderness, piriformis R>>L    A: static palpation demonstrates intersegmental asymmetry , pelvis, lumbar    R: motion palpation notes restricted motion, :  T10 Extension restriction  T11 Extension restriction  T12 Extension restriction  L4 Right rotation restricted  L5 Right rotation restricted  PSIS Right Extension restriction    T: hypertonicity at: Piriformis R>>L      Assessment:    Segmental spinal dysfunction/restrictions found at:  T10  T11  T12  L4  L5  PSIS Left    Diagnoses:      1. Lumbago    2. Nonallopathic lesion of pelvic region    3. Nonallopathic lesion of lumbar region    4. Spasm of muscle   nonallopathic lesion of thoracic region       Patient's condition:  Patient had restrictions pre-manipulation    Treatment effectiveness:  Post manipulation there is better intersegmental movement and Patient claims to feel looser post  manipulation      Procedures:  CMT:  10213 Chiropractic manipulative treatment 3-4 regions performed   Thoracic: Activator, T10, T11, T12, Prone  Lumbar: Activator, L4, L5, Prone  Pelvis: Drop Table, PSIS Right , Prone    Modalities:  39546: Acupuncture:  Points: B25, B27, GV3, B62, SI3, K3, and Ahsi point in piriformis    Therapeutic procedures:  None      Prognosis: Good    Progress towards Goals: Patient is making progress towards the goal     Response to Treatment:   Reduction in symptoms as reported by patient      Recommendations:    Instructions:ice 20 minutes every other hour as needed    Follow-up:  Return to care in one week

## 2017-04-28 ENCOUNTER — TELEPHONE (OUTPATIENT)
Dept: FAMILY MEDICINE | Facility: CLINIC | Age: 47
End: 2017-04-28

## 2017-04-28 NOTE — TELEPHONE ENCOUNTER
Panel Management Review      Patient has the following on her problem list:     Depression / Dysthymia review  PHQ-9 SCORE 12/28/2015 6/15/2016 10/24/2016   Total Score - - -   Total Score MyChart - - 12 (Moderate depression)   Total Score 11 6 -      Patient is due for:  PHQ9      Composite cancer screening  Chart review shows that this patient is due/due soon for the following None  Summary:    Patient is due/failing the following:   PHQ9    Action needed:   Patient needs to do PHQ9.    Type of outreach:    Sent [employer] message.    Questions for provider review:    None                                                                                                                                    Micaela Chavez [facility]        Chart routed to Care Team .

## 2017-05-10 ENCOUNTER — MYC REFILL (OUTPATIENT)
Dept: FAMILY MEDICINE | Facility: CLINIC | Age: 47
End: 2017-05-10

## 2017-05-10 DIAGNOSIS — B37.2 CANDIDAL INTERTRIGO: ICD-10-CM

## 2017-05-10 DIAGNOSIS — G89.4 CHRONIC PAIN SYNDROME: ICD-10-CM

## 2017-05-10 RX ORDER — OXYCODONE AND ACETAMINOPHEN 10; 325 MG/1; MG/1
1 TABLET ORAL EVERY 6 HOURS PRN
Qty: 90 TABLET | Refills: 0 | Status: SHIPPED | OUTPATIENT
Start: 2017-05-10 | End: 2017-06-07

## 2017-05-10 RX ORDER — NYSTATIN 100000 [USP'U]/G
POWDER TOPICAL
Qty: 60 G | Refills: 2 | Status: SHIPPED | OUTPATIENT
Start: 2017-05-10 | End: 2018-03-01

## 2017-05-10 NOTE — TELEPHONE ENCOUNTER
Script delivered to [facility] . Patient notified via MyChart message and voicemail that this has been completed and ready for .

## 2017-05-10 NOTE — TELEPHONE ENCOUNTER
Routing refill request to provider for review/approval because:  Drug not on the [facility] refill protocol   Marisela Shea RN [facility] Triage.

## 2017-05-10 NOTE — TELEPHONE ENCOUNTER
Message from MyChart:  Original authorizing provider: MD Velma Rodriguez would like a refill of the following medications:  nystatin (MYCOSTATIN) 272441 UNIT/GM POWD [Srinivasa Hunter MD]  oxyCODONE-acetaminophen (PERCOCET)  MG per tablet [Srinivasa Hunter MD]    Preferred pharmacy: [facility]    Comment:  I will  script. I need this asap as I am going out of town this weekend for a week. Thank you

## 2017-05-23 DIAGNOSIS — F41.9 ANXIETY AND DEPRESSION: ICD-10-CM

## 2017-05-23 DIAGNOSIS — F32.A ANXIETY AND DEPRESSION: ICD-10-CM

## 2017-05-23 RX ORDER — FLUOXETINE 40 MG/1
40 CAPSULE ORAL DAILY
Qty: 30 CAPSULE | Refills: 0 | Status: SHIPPED | OUTPATIENT
Start: 2017-05-23 | End: 2017-06-26

## 2017-05-23 NOTE — TELEPHONE ENCOUNTER
FLUoxetine (PROZAC) 40 MG capsule     Last Written Prescription Date: 10/27/16  Last Fill Quantity: 30, # refills: 5  Last Office Visit with [facility] primary care provider:  10/27/16        Last PHQ-9 score on record=   PHQ-9 SCORE 10/24/2016   Total Score -   Total Score MyChart 12 (Moderate depression)   Total Score -

## 2017-05-23 NOTE — TELEPHONE ENCOUNTER
Routing refill request to provider for review/approval because:  Patient needs to be seen  PHQ 9 > 5    Marisela Shea RN [facility] Triage.

## 2017-06-07 ENCOUNTER — MYC REFILL (OUTPATIENT)
Dept: FAMILY MEDICINE | Facility: CLINIC | Age: 47
End: 2017-06-07

## 2017-06-07 DIAGNOSIS — G89.4 CHRONIC PAIN SYNDROME: ICD-10-CM

## 2017-06-07 RX ORDER — OXYCODONE AND ACETAMINOPHEN 10; 325 MG/1; MG/1
1 TABLET ORAL EVERY 6 HOURS PRN
Qty: 90 TABLET | Refills: 0 | Status: SHIPPED | OUTPATIENT
Start: 2017-06-07 | End: 2017-07-05

## 2017-06-07 NOTE — TELEPHONE ENCOUNTER
Message from MyChart:  Original authorizing provider: MD Velma Rodriguez would like a refill of the following medications:  oxyCODONE-acetaminophen (PERCOCET)  MG per tablet [Srinivasa Hunter MD]    Preferred pharmacy: [facility]    Comment:  If I can pick this up at clinic tomorrow June 8th that would be good. I'm bringing a friend for an appointment at 2pm. Thank you

## 2017-06-07 NOTE — TELEPHONE ENCOUNTER
Last Rx was 90 tablets on 5/10/17.    Last seen by PCP 10/27/16.    Routing refill request to provider for review/approval because:  Drug not on the [facility] refill protocol   Judi Yeh RN  [facility]

## 2017-06-08 NOTE — TELEPHONE ENCOUNTER
Script delivered to [facility] . Patient notified via [employer] that this has been completed.

## 2017-06-21 DIAGNOSIS — F51.01 PRIMARY INSOMNIA: ICD-10-CM

## 2017-06-21 RX ORDER — TRAZODONE HYDROCHLORIDE 50 MG/1
TABLET, FILM COATED ORAL
Qty: 30 TABLET | Refills: 2 | Status: SHIPPED | OUTPATIENT
Start: 2017-06-21 | End: 2017-07-19

## 2017-06-21 NOTE — TELEPHONE ENCOUNTER
traZODone (DESYREL) 50 MG tablet       Last Written Prescription Date: 4-11-17  Last Fill Quantity: 30; # refills: 1  Last Office Visit with [facility], [facility] or [facility] prescribing provider:  10-27-16        Last PHQ-9 score on record=   PHQ-9 SCORE 10/24/2016   Total Score -   Total Score MyChart 12 (Moderate depression)   Total Score -       Lab Results   Component Value Date    AST 13 12/11/2014     Lab Results   Component Value Date    ALT 23 12/11/2014

## 2017-06-21 NOTE — TELEPHONE ENCOUNTER
Routing refill request to provider for review/approval because:  PHQ 9 > 5  Marisela Shea, RN [facility] Triage.

## 2017-06-26 DIAGNOSIS — M54.9 CHRONIC BILATERAL BACK PAIN, UNSPECIFIED BACK LOCATION: Chronic | ICD-10-CM

## 2017-06-26 DIAGNOSIS — F32.A ANXIETY AND DEPRESSION: ICD-10-CM

## 2017-06-26 DIAGNOSIS — G89.29 OTHER CHRONIC PAIN: ICD-10-CM

## 2017-06-26 DIAGNOSIS — F41.9 ANXIETY AND DEPRESSION: ICD-10-CM

## 2017-06-26 DIAGNOSIS — G89.29 CHRONIC BILATERAL BACK PAIN, UNSPECIFIED BACK LOCATION: Chronic | ICD-10-CM

## 2017-06-27 RX ORDER — FLUOXETINE 40 MG/1
40 CAPSULE ORAL DAILY
Qty: 30 CAPSULE | Refills: 0 | Status: SHIPPED | OUTPATIENT
Start: 2017-06-27 | End: 2017-07-19

## 2017-06-27 RX ORDER — PREGABALIN 225 MG/1
225 CAPSULE ORAL 2 TIMES DAILY
Qty: 60 CAPSULE | Refills: 0 | Status: SHIPPED | OUTPATIENT
Start: 2017-06-27 | End: 2017-07-19

## 2017-06-27 RX ORDER — CYCLOBENZAPRINE HCL 10 MG
TABLET ORAL
Qty: 30 TABLET | Refills: 0 | Status: SHIPPED | OUTPATIENT
Start: 2017-06-27 | End: 2017-08-26

## 2017-06-27 NOTE — TELEPHONE ENCOUNTER
FLUoxetine (PROZAC) 40 MG capsule     Last Written Prescription Date: 5/23/17  Last Fill Quantity: 30, # refills: 0  Last Office Visit with [facility] primary care provider:  10/27/16        Last PHQ-9 score on record=   PHQ-9 SCORE 10/24/2016   Total Score -   Total Score MyChart 12 (Moderate depression)   Total Score -       cyclobenzaprine (FLEXERIL) 10 MG tablet      Last Written Prescription Date:  2/17/17  Last Fill Quantity: 30,   # refills: 2  Last Office Visit with [facility], [facility] or [facility] prescribing provider: 10/27/16  Future Office visit:       Routing refill request to provider for review/approval because:  Drug not on the [facility], [facility] or [facility] refill protocol or controlled substance        Pregabalin (LYRICA) 225 MG CAPS      Last Written Prescription Date:  10/27/16  Last Fill Quantity: 60,   # refills: 5  Last Office Visit with [facility], [facility] or  [facility] prescribing provider: 10/27/16  Future Office visit:       Routing refill request to provider for review/approval because:  Drug not on the [facility], [facility] or  [facility] refill protocol or controlled substance

## 2017-07-05 ENCOUNTER — MYC REFILL (OUTPATIENT)
Dept: FAMILY MEDICINE | Facility: CLINIC | Age: 47
End: 2017-07-05

## 2017-07-05 DIAGNOSIS — G89.4 CHRONIC PAIN SYNDROME: ICD-10-CM

## 2017-07-06 RX ORDER — OXYCODONE AND ACETAMINOPHEN 10; 325 MG/1; MG/1
1 TABLET ORAL EVERY 6 HOURS PRN
Qty: 90 TABLET | Refills: 0 | Status: SHIPPED | OUTPATIENT
Start: 2017-07-06 | End: 2017-07-31

## 2017-07-06 NOTE — TELEPHONE ENCOUNTER
Prescription of oxyCODONE-acetaminophen (PERCOCET)  MG was brought to the  and patient informed to  via [employer].

## 2017-07-06 NOTE — TELEPHONE ENCOUNTER
Message from [employer]:  Original authorizing provider: MD Jessica Rodriguezazn MUÑOZHan Diaz would like a refill of the following medications:  oxyCODONE-acetaminophen (PERCOCET)  MG per tablet [Srinivasa Hunter MD]    Preferred pharmacy: Other - [facility] can  at [facility]     Comment:      Medication renewals requested in this message routed to other providers:  diclofenac (VOLTAREN) 1 % GEL topical gel [Patricia Corbett DPM, Podiatry/Foot and Ankle Surgery]  lidocaine-prilocaine (EMLA) cream [Dilcia Souza, APRN CNP]

## 2017-07-06 NOTE — TELEPHONE ENCOUNTER
Routing refill request to provider for review/approval because:  Drug not on the [facility] refill protocol   Last refill 6/7/17 # 90  Marisela Shea, BARBIE [facility] Triage.

## 2017-07-19 ENCOUNTER — OFFICE VISIT (OUTPATIENT)
Dept: FAMILY MEDICINE | Facility: CLINIC | Age: 47
End: 2017-07-19
Payer: COMMERCIAL

## 2017-07-19 VITALS
OXYGEN SATURATION: 94 % | SYSTOLIC BLOOD PRESSURE: 121 MMHG | WEIGHT: 225 LBS | HEART RATE: 79 BPM | BODY MASS INDEX: 39.86 KG/M2 | DIASTOLIC BLOOD PRESSURE: 84 MMHG | TEMPERATURE: 98.3 F

## 2017-07-19 DIAGNOSIS — F32.A ANXIETY AND DEPRESSION: ICD-10-CM

## 2017-07-19 DIAGNOSIS — M79.89 LEG SWELLING: ICD-10-CM

## 2017-07-19 DIAGNOSIS — M54.9 CHRONIC BILATERAL BACK PAIN, UNSPECIFIED BACK LOCATION: Chronic | ICD-10-CM

## 2017-07-19 DIAGNOSIS — F33.1 MAJOR DEPRESSIVE DISORDER, RECURRENT EPISODE, MODERATE (H): Primary | Chronic | ICD-10-CM

## 2017-07-19 DIAGNOSIS — E66.01 MORBID OBESITY WITH BMI OF 40.0-44.9, ADULT (H): Chronic | ICD-10-CM

## 2017-07-19 DIAGNOSIS — F41.1 GAD (GENERALIZED ANXIETY DISORDER): ICD-10-CM

## 2017-07-19 DIAGNOSIS — F41.9 ANXIETY AND DEPRESSION: ICD-10-CM

## 2017-07-19 DIAGNOSIS — G89.29 OTHER CHRONIC PAIN: ICD-10-CM

## 2017-07-19 DIAGNOSIS — G89.4 CHRONIC PAIN SYNDROME: Chronic | ICD-10-CM

## 2017-07-19 DIAGNOSIS — G89.29 CHRONIC BILATERAL BACK PAIN, UNSPECIFIED BACK LOCATION: Chronic | ICD-10-CM

## 2017-07-19 DIAGNOSIS — G62.9 NEUROPATHY: ICD-10-CM

## 2017-07-19 PROCEDURE — 99214 OFFICE O/P EST MOD 30 MIN: CPT | Performed by: FAMILY MEDICINE

## 2017-07-19 RX ORDER — FUROSEMIDE 20 MG
20 TABLET ORAL DAILY
Qty: 30 TABLET | Refills: 3 | Status: SHIPPED | OUTPATIENT
Start: 2017-07-19 | End: 2017-10-18

## 2017-07-19 RX ORDER — PREGABALIN 225 MG/1
225 CAPSULE ORAL 2 TIMES DAILY
Qty: 60 CAPSULE | Refills: 3 | Status: SHIPPED | OUTPATIENT
Start: 2017-07-19 | End: 2017-10-18

## 2017-07-19 RX ORDER — FLUOXETINE 40 MG/1
40 CAPSULE ORAL DAILY
Qty: 30 CAPSULE | Refills: 3 | Status: SHIPPED | OUTPATIENT
Start: 2017-07-19 | End: 2017-10-18

## 2017-07-19 ASSESSMENT — ANXIETY QUESTIONNAIRES
3. WORRYING TOO MUCH ABOUT DIFFERENT THINGS: SEVERAL DAYS
7. FEELING AFRAID AS IF SOMETHING AWFUL MIGHT HAPPEN: SEVERAL DAYS
1. FEELING NERVOUS, ANXIOUS, OR ON EDGE: MORE THAN HALF THE DAYS
6. BECOMING EASILY ANNOYED OR IRRITABLE: MORE THAN HALF THE DAYS
5. BEING SO RESTLESS THAT IT IS HARD TO SIT STILL: MORE THAN HALF THE DAYS
IF YOU CHECKED OFF ANY PROBLEMS ON THIS QUESTIONNAIRE, HOW DIFFICULT HAVE THESE PROBLEMS MADE IT FOR YOU TO DO YOUR WORK, TAKE CARE OF THINGS AT HOME, OR GET ALONG WITH OTHER PEOPLE: NOT DIFFICULT AT ALL
GAD7 TOTAL SCORE: 11
2. NOT BEING ABLE TO STOP OR CONTROL WORRYING: SEVERAL DAYS

## 2017-07-19 ASSESSMENT — PATIENT HEALTH QUESTIONNAIRE - PHQ9: 5. POOR APPETITE OR OVEREATING: MORE THAN HALF THE DAYS

## 2017-07-19 NOTE — NURSING NOTE
"Chief Complaint   Patient presents with     Depression     Health Maintenance     Phq9, GAD7        Initial /84 (BP Location: Right arm, Patient Position: Chair, Cuff Size: Adult Regular)  Pulse 79  Temp 98.3  F (36.8  C) (Oral)  Wt 225 lb (102.1 kg)  SpO2 94%  BMI 39.86 kg/m2 Estimated body mass index is 39.86 kg/(m^2) as calculated from the following:    Height as of 3/25/17: 5' 3\" (1.6 m).    Weight as of this encounter: 225 lb (102.1 kg).  Medication Reconciliation: complete   Phq9 and GAD7 was given to the patient to be completed.    Susan Hines CMA       "

## 2017-07-19 NOTE — PROGRESS NOTES
SUBJECTIVE:                                                    Velma Diaz is a 46 year old female who presents to clinic today for the following health issues:      Depression and Anxiety Follow-Up    Status since last visit: No change    Other associated symptoms:The last couple of days high pain    Complicating factors:     Significant life event: Yes-  Taken custody for grandson     Current substance abuse: None    PHQ-9 SCORE 6/15/2016 10/24/2016 7/19/2017   Total Score - - -   Total Score MyChart - 12 (Moderate depression) -   Total Score 6 - 7     ROSE MARIE-7 SCORE 10/26/2015 6/15/2016 7/19/2017   Total Score - - -   Total Score 2 3 11       PHQ-9  English  PHQ-9   Any Language  GAD7      Amount of exercise or physical activity: 2-3 days/week for an average of 30-45 minutes    Problems taking medications regularly: No    Medication side effects: none  Diet: regular (no restrictions)    She remains on fluoxetine for anxiety and depression. She is in the process of trying to adopt a 3-year-old child who is not her biological grandson, but the patient is close to the child's biological grandfather and the child comes from a chaotic home life.    The patient is seeing outside chiropractor from [facility] on a regular basis every couple of weeks for her ongoing chronic back pain. She is taking 2-3 oxycodone per day. She is on Lyrica and other medication as below as well for her chronic pain.    She is using furosemide most every day now for leg swelling. The left leg usually swells a bit more than the right.  She's been trying to eat healthy and has been losing some weight.      Problem list and histories reviewed & adjusted, as indicated.  Additional history: as documented    Patient Active Problem List   Diagnosis     History of cleft palate with cleft lip     Back pain     MAYA (obstructive sleep apnea)/Hypoventilation Syndrome     Major depressive disorder, recurrent episode, moderate (H)     Paresthesias      Health Care Home     Vitamin D deficiency     Morbid obesity with BMI of 40.0-44.9, adult (H)     Neuropathy (H)     Hyperlipidemia with target LDL less than 130     Intervertebral disc prolapse with impingement     Nonallopathic lesion of pelvic region     Nonallopathic lesion of lumbar region     Chronic pain syndrome     Restless legs syndrome (RLS)     Insomnia     Migraine     Chronic bilateral back pain, unspecified back location     Chronic pain of left knee     Past Surgical History:   Procedure Laterality Date     ANKLE SURGERY  7/13    Left for torn tendons & loose bone chips     ARTHROSCOPY KNEE  1986    Right knee     BACK SURGERY       Basal cell carcinoma  2011    Removal from the chest     BIOPSY       C VAGINAL HYSTERECTOMY  2011     CARPAL TUNNEL RELEASE RT/LT  ~2010    Bilateral     COLONOSCOPY  2016     COSMETIC SURGERY       COSMETIC SURGERY       DECOMPRESSION CUBITAL TUNNEL Left 8/28/2015    Procedure: DECOMPRESSION CUBITAL TUNNEL;  Surgeon: Gus Donato MD;  [facility]     ENT SURGERY  1975    Tonsillectomy and Adenoids     GYN SURGERY  2011    Hysterectomy     HC REPAIR PERONEAL TENDONS  7/13     ORTHOPEDIC SURGERY  2011, 2011    Bilateral CTR     PE TUBES      yearly times 10 years     REPAIR CLEFT PALATE CHILD      Age 3 months & afterwards (multiple surgeries)     SOFT TISSUE SURGERY       SOFT TISSUE SURGERY  2013       Social History   Substance Use Topics     Smoking status: Former Smoker     Packs/day: 0.50     Years: 15.00     Types: Cigarettes     Quit date: 2/20/2015     Smokeless tobacco: Never Used     Alcohol use No      Comment: Quit in September 27th     Family History   Problem Relation Age of Onset     Alzheimer Disease Paternal Grandmother 60     CEREBROVASCULAR DISEASE Paternal Grandmother      Neurologic Disorder Sister 20     migraines     Depression/Anxiety Sister      Depression Sister      Neurologic Disorder Son 14     migraines     Asthma Son       HEART DISEASE Mother      OSTEOPOROSIS Mother      Obesity Mother      CANCER Father      Alcohol/Drug Father      Other Cancer Father      saliva glands & skin CA      Hypertension Father      DIABETES Maternal Grandmother      Breast Cancer Maternal Grandmother      Obesity Maternal Grandmother      Other Cancer Maternal Grandfather      skin cancer     Cancer - colorectal Other      Aunt     Asthma Daughter      Asthma Daughter      Asthma Son      Thyroid Disease Sister      Colon Cancer Other      Obesity Sister      Glaucoma No family hx of      Macular Degeneration No family hx of          Current Outpatient Prescriptions   Medication Sig Dispense Refill     FLUoxetine (PROZAC) 40 MG capsule Take 1 capsule (40 mg) by mouth daily 30 capsule 3     Pregabalin (LYRICA) 225 MG CAPS Take 225 mg by mouth 2 times daily 60 capsule 3     furosemide (LASIX) 20 MG tablet Take 1 tablet (20 mg) by mouth daily 30 tablet 3     oxyCODONE-acetaminophen (PERCOCET)  MG per tablet Take 1 tablet by mouth every 6 hours as needed for moderate to severe pain 90 tablet 0     cyclobenzaprine (FLEXERIL) 10 MG tablet TAKE 1/2 TO 1 TABLET BY MOUTH 3 TIMES A DAY AS NEEDED FOR MUSCLE SPASMS 30 tablet 0     diclofenac (VOLTAREN) 1 % GEL topical gel Apply  2 grams to foot four times daily as needed for pain using enclosed dosing card. 100 g 1     lidocaine-prilocaine (EMLA) cream Apply dime size amount to right hip and left knee qid prn, May use same time as Diclofinac.future refill through PCP 30 g 0     SUMAtriptan (IMITREX) 100 MG tablet Take 1 tablet (100 mg) by mouth at onset of headache for migraine May repeat in 2 hours if needed: max 2/day 9 tablet 2     acetaminophen (TYLENOL) 325 MG tablet Take 2 tablets (650 mg) by mouth every 4 hours as needed for other (mild pain) 100 tablet 0     ORDER FOR [facility] Respironics REMSTAR 60 Series Auto [id number] H2O, Airfit P10 nasal pillow mask w/xsmall pillows       Multiple Vitamins-Minerals  (MULTI FOR HER PO) Take by mouth daily        [DISCONTINUED] FLUoxetine (PROZAC) 40 MG capsule Take 1 capsule (40 mg) by mouth daily Due for recheck in office 30 capsule 0     [DISCONTINUED] Pregabalin (LYRICA) 225 MG CAPS Take 225 mg by mouth 2 times daily Due for recheck in office 60 capsule 0     nystatin (MYCOSTATIN) 071744 UNIT/GM POWD Apply to affected area twice daily as needed (Patient not taking: Reported on 7/19/2017) 60 g 2     [DISCONTINUED] furosemide (LASIX) 20 MG tablet TAKE 1 TABLET(20 MG) BY MOUTH DAILY AS NEEDED FOR LEG SWELLING 30 tablet 1     No Known Allergies      Reviewed and updated as needed this visit by clinical staffTobacco  Allergies  Med Hx  Surg Hx  Fam Hx  Soc Hx      Reviewed and updated as needed this visit by Provider         ROS:  Noncontributory except as above    OBJECTIVE:     /84 (BP Location: Right arm, Patient Position: Chair, Cuff Size: Adult Regular)  Pulse 79  Temp 98.3  F (36.8  C) (Oral)  Wt 225 lb (102.1 kg)  SpO2 94%  BMI 39.86 kg/m2  Body mass index is 39.86 kg/(m^2).  GENERAL: alert, no distress and obese  MS: Trace right lower extremity edema and 1+ left lower extremity edema  PSYCH: Affect is pleasant and appropriate. She scored an 11 on her ROSE MARIE 7 and a 7 on her PHQ-9     Diagnostic Test Results:  none     ASSESSMENT/PLAN:       ICD-10-CM    1. Major depressive disorder, recurrent episode, moderate (H) F33.1    2. ROSE MARIE (generalized anxiety disorder) F41.1    3. Neuropathy (H) G62.9    4. Morbid obesity with BMI of 40.0-44.9, adult (H) E66.01     Z68.41    5. Chronic bilateral back pain, unspecified back location M54.9     G89.29    6. Chronic pain syndrome G89.4    7. Anxiety and depression F41.9 FLUoxetine (PROZAC) 40 MG capsule    F32.9    8. Other chronic pain G89.29 Pregabalin (LYRICA) 225 MG CAPS   9. Leg swelling M79.89 furosemide (LASIX) 20 MG tablet     We discussed the above items   We will have her continue her same baseline  meds  Discussed some of the details of her upcoming court appearances regarding the possible adoption of this 3-year-old child  She was encouraged to continue with healthy eating habits  Continue chiropractic care as needed/desired  Continue up to 90 tablets of oxycodone maximum per month  Plan a recheck in 3 months with a complete physical and fasting lab work    Srinivasa Hunter MD  [facility]

## 2017-07-19 NOTE — MR AVS SNAPSHOT
After Visit Summary   7/19/2017    Velma Diaz    MRN: [id number]           Patient Information     Date Of Birth          1970        Visit Information        Provider Department      7/19/2017 2:40 PM Srinivasa Hunter MD [facility]        Today's Diagnoses     Major depressive disorder, recurrent episode, moderate (H)    -  1    ROSE MARIE (generalized anxiety disorder)        Neuropathy (H)        Chronic bilateral back pain, unspecified back location        Chronic pain syndrome        Anxiety and depression        Other chronic pain        Leg swelling           Follow-ups after your visit        Follow-up notes from your care team     Return in about 3 months (around 10/19/2017) for Lab Work, Physical Exam.      Who to contact     If you have questions or need follow up information about today's clinic visit or your schedule please contact [facility] directly at [phone].  Normal or non-critical lab and imaging results will be communicated to you by Dana Translationhart, letter or phone within 4 business days after the clinic has received the results. If you do not hear from us within 7 days, please contact the clinic through Dana Translationhart or phone. If you have a critical or abnormal lab result, we will notify you by phone as soon as possible.  Submit refill requests through Zutux or call your pharmacy and they will forward the refill request to us. Please allow 3 business days for your refill to be completed.          Additional Information About Your Visit        MyChart Information     Zutux gives you secure access to your electronic health record. If you see a primary care provider, you can also send messages to your care team and make appointments. If you have questions, please call your primary care clinic.  If you do not have a primary care provider, please call [phone] and they will assist you.        Care EveryWhere ID     This is your Care  EveryWhere ID. This could be used by other organizations to access your [place] medical records  [id number]        Your Vitals Were     Pulse Temperature Pulse Oximetry BMI (Body Mass Index)          79 98.3  F (36.8  C) (Oral) 94% 39.86 kg/m2         Blood Pressure from Last 3 Encounters:   07/19/17 121/84   03/25/17 112/79   02/01/17 109/64    Weight from Last 3 Encounters:   07/19/17 225 lb (102.1 kg)   03/25/17 225 lb (102.1 kg)   02/01/17 230 lb 9.6 oz (104.6 kg)              Today, you had the following     No orders found for display         Today's Medication Changes          These changes are accurate as of: 7/19/17  3:00 PM.  If you have any questions, ask your nurse or doctor.               These medicines have changed or have updated prescriptions.        Dose/Directions    FLUoxetine 40 MG capsule   Commonly known as:  PROzac   This may have changed:  additional instructions   Used for:  Anxiety and depression   Changed by:  Srinivasa Hunter MD        Dose:  40 mg   Take 1 capsule (40 mg) by mouth daily   Quantity:  30 capsule   Refills:  3       furosemide 20 MG tablet   Commonly known as:  LASIX   This may have changed:  See the new instructions.   Used for:  Leg swelling   Changed by:  Srinivasa Hunter MD        Dose:  20 mg   Take 1 tablet (20 mg) by mouth daily   Quantity:  30 tablet   Refills:  3       Pregabalin 225 MG Caps   Commonly known as:  LYRICA   This may have changed:  additional instructions   Used for:  Other chronic pain   Changed by:  Srinivasa Hunter MD        Dose:  225 mg   Take 225 mg by mouth 2 times daily   Quantity:  60 capsule   Refills:  3            Where to get your medicines      These medications were sent to [facility]/pharmacy #[id number] - [place], [place] - [address] [address]     Phone:  [phone]     FLUoxetine 40 MG capsule    furosemide 20 MG tablet         Some of these will need a paper prescription and others can be bought over  the counter.  Ask your nurse if you have questions.     Bring a paper prescription for each of these medications     Pregabalin 225 MG Caps                Primary Care Provider Office Phone # Fax #    Srinivasa Hunter [phone]       [facility] [address] [facility] [address]        Goals        General    I will call within next 2 weeks to schedule initial psychiatry appointment (pt-stated)     Notes - Note edited  5/20/2015 12:51 PM by Yesica Marsh    As of today's date 5/20/2015 goal is met at 76 - 100%.   Goal Status:  Complete  Patient states this is scheduled with Dr. Salvador for next month, but not on appointment calendar  LUDY Stark, MSW   [phone]  5/20/2015 12:51 PM        I will complete [employer] or reduced fare application within the next month (pt-stated)     Notes - Note edited  11/24/2015 12:00 PM by Yesica Marsh    As of today's date 11/24/2015 goal is met at 51 - 75%.   Goal Status:  Active  She reported today having [employer] application, [employer] worker has requested but packet from [employer].    LUDY Stark, MSW   [phone]  11/24/2015 12:00 PM        I will discuss [employer] worker with therapist next week for housing needs  (pt-stated)     Notes - Note edited  12/3/2015  1:17 PM by Yesica Marsh    As of today's date 12/3/2015 goal is met at 76 - 100%.   Goal Status:  Discontinued  Patient has [employer] worker and has found housing with friend  LUDY Stark, MSW   [phone]  12/3/2015 1:16 PM        Equal Access to Services     LOLA HERNANDEZ : Hadii antionette reid Soeugenio, waaxda luqadaha, qaybta kaalmada speedyyadre, grayson sauceda. So [facility] [phone].    ATENCIÓN: Si habla español, tiene a dowd disposición servicios gratuitos de asistencia lingüística. Llame al [phone].    We comply with applicable federal civil rights laws and [place] laws. We do not  discriminate on the basis of race, color, national origin, age, disability sex, sexual orientation or gender identity.            Thank you!     Thank you for choosing [facility]  for your care. Our goal is always to provide you with excellent care. Hearing back from our patients is one way we can continue to improve our services. Please take a few minutes to complete the written survey that you may receive in the mail after your visit with us. Thank you!             Your Updated Medication List - Protect others around you: Learn how to safely use, store and throw away your medicines at www.disposemymeds.org.          This list is accurate as of: 7/19/17  3:00 PM.  Always use your most recent med list.                   Brand Name Dispense Instructions for use Diagnosis    acetaminophen 325 MG tablet    TYLENOL    100 tablet    Take 2 tablets (650 mg) by mouth every 4 hours as needed for other (mild pain)    Lesion of left ulnar nerve       cyclobenzaprine 10 MG tablet    FLEXERIL    30 tablet    TAKE 1/2 TO 1 TABLET BY MOUTH 3 TIMES A DAY AS NEEDED FOR MUSCLE SPASMS    Chronic bilateral back pain, unspecified back location       diclofenac 1 % Gel topical gel    VOLTAREN    100 g    Apply  2 grams to foot four times daily as needed for pain using enclosed dosing card.    Chronic pain of both ankles, Neuropathy (H), Pes planus of both feet, Plantar fasciitis, bilateral, Equinus contracture of right ankle, Equinus contracture of left ankle, Morbid obesity with BMI of 40.0-44.9, adult (H)       FLUoxetine 40 MG capsule    PROzac    30 capsule    Take 1 capsule (40 mg) by mouth daily    Anxiety and depression       furosemide 20 MG tablet    LASIX    30 tablet    Take 1 tablet (20 mg) by mouth daily    Leg swelling       lidocaine-prilocaine cream    EMLA    30 g    Apply dime size amount to right hip and left knee qid prn, May use same time as Diclofinac.future refill through PCP    Other  chronic pain, Fibromyalgia, Myalgia and myositis, Enthesopathy of hip region, right, Left knee pain       MULTI FOR HER PO      Take by mouth daily        nystatin 248230 UNIT/GM Powd    MYCOSTATIN    60 g    Apply to affected area twice daily as needed    Candidal intertrigo       order for DME      Respironics REMSTAR 60 Series Auto [id number] H2O, Airfit P10 nasal pillow mask w/xsmall pillows        oxyCODONE-acetaminophen  MG per tablet    PERCOCET    90 tablet    Take 1 tablet by mouth every 6 hours as needed for moderate to severe pain    Chronic pain syndrome       Pregabalin 225 MG Caps    LYRICA    60 capsule    Take 225 mg by mouth 2 times daily    Other chronic pain       SUMAtriptan 100 MG tablet    IMITREX    9 tablet    Take 1 tablet (100 mg) by mouth at onset of headache for migraine May repeat in 2 hours if needed: max 2/day    Headache(784.0)

## 2017-07-20 ASSESSMENT — ANXIETY QUESTIONNAIRES: GAD7 TOTAL SCORE: 11

## 2017-07-20 ASSESSMENT — PATIENT HEALTH QUESTIONNAIRE - PHQ9: SUM OF ALL RESPONSES TO PHQ QUESTIONS 1-9: 7

## 2017-07-31 ENCOUNTER — MYC REFILL (OUTPATIENT)
Dept: FAMILY MEDICINE | Facility: CLINIC | Age: 47
End: 2017-07-31

## 2017-07-31 DIAGNOSIS — G89.4 CHRONIC PAIN SYNDROME: ICD-10-CM

## 2017-07-31 RX ORDER — OXYCODONE AND ACETAMINOPHEN 10; 325 MG/1; MG/1
1 TABLET ORAL EVERY 6 HOURS PRN
Qty: 90 TABLET | Refills: 0 | Status: SHIPPED | OUTPATIENT
Start: 2017-07-31 | End: 2017-08-26

## 2017-07-31 NOTE — TELEPHONE ENCOUNTER
Last Rx was 90 tabs on 7/6/17.   See patient's MyChart note, going out of town this coming weekend so would like to  this week.    Last visit with PCP 7/19/17.    Takes 90 tabs percocet per month noted in chronic pain plan in Epic.    Routing refill request to provider for review/approval because:  Drug not on the [facility] refill protocol     Judi Yeh RN  [facility]

## 2017-07-31 NOTE — TELEPHONE ENCOUNTER
Message from [employer]:  Original authorizing provider: CONNOR Maurice CNP would like a refill of the following medications:  oxyCODONE-acetaminophen (PERCOCET)  MG per tablet [CONNOR Maurice CNP]    Preferred pharmacy: [facility]    Comment:  I'm running low and will be going out of town this weekend. I'd like to be sure to have enough. I'll come pick it up.

## 2017-08-26 ENCOUNTER — MYC REFILL (OUTPATIENT)
Dept: FAMILY MEDICINE | Facility: CLINIC | Age: 47
End: 2017-08-26

## 2017-08-26 DIAGNOSIS — M54.9 CHRONIC BILATERAL BACK PAIN, UNSPECIFIED BACK LOCATION: Chronic | ICD-10-CM

## 2017-08-26 DIAGNOSIS — G89.4 CHRONIC PAIN SYNDROME: ICD-10-CM

## 2017-08-26 DIAGNOSIS — G89.29 CHRONIC BILATERAL BACK PAIN, UNSPECIFIED BACK LOCATION: Chronic | ICD-10-CM

## 2017-08-28 RX ORDER — OXYCODONE AND ACETAMINOPHEN 10; 325 MG/1; MG/1
1 TABLET ORAL EVERY 6 HOURS PRN
Qty: 90 TABLET | Refills: 0 | Status: SHIPPED | OUTPATIENT
Start: 2017-08-28 | End: 2017-09-19

## 2017-08-28 RX ORDER — CYCLOBENZAPRINE HCL 10 MG
5-10 TABLET ORAL 3 TIMES DAILY PRN
Qty: 30 TABLET | Refills: 1 | Status: SHIPPED | OUTPATIENT
Start: 2017-08-28 | End: 2017-12-11

## 2017-08-28 NOTE — TELEPHONE ENCOUNTER
Controlled Substance Refill Request for Flexeril 10mg    Last refill: 6/27/17 - 30qty      Controlled Substance Refill Request for Percocet 10-325mg    Last refill: 7/31/17 - 90qty    Last clinic visit: 7/19/17     Next appt: 10/18/17    Controlled substance agreement on file: Yes:  Date 8/15/16.    Documentation in problem list reviewed:  Yes    Processing:  Patient will  in clinic       Naty Khan RN  [facility]

## 2017-08-28 NOTE — TELEPHONE ENCOUNTER
Message from MyChart:  Original authorizing provider: MD Velma Rodriguez would like a refill of the following medications:  cyclobenzaprine (FLEXERIL) 10 MG tablet [Srinivasa Hunter MD]  oxyCODONE-acetaminophen (PERCOCET)  MG per tablet [Srinivasa Hunter MD]    Preferred pharmacy: [facility]    Comment:  I can  when ready. Thank you

## 2017-08-28 NOTE — TELEPHONE ENCOUNTER
Patient showed ID and picked up script at  at 2:03 on 8/28/17.    .Robson Spangler  Patient Representative

## 2017-08-28 NOTE — TELEPHONE ENCOUNTER
Routing refill request to provider for review/approval because:  Drug not on the [facility] refill protocol       Naty Khan RN  [facility]

## 2017-08-31 ENCOUNTER — OFFICE VISIT (OUTPATIENT)
Dept: PSYCHOLOGY | Facility: CLINIC | Age: 47
End: 2017-08-31
Payer: COMMERCIAL

## 2017-08-31 DIAGNOSIS — F41.1 GAD (GENERALIZED ANXIETY DISORDER): Primary | ICD-10-CM

## 2017-08-31 PROCEDURE — 90837 PSYTX W PT 60 MINUTES: CPT | Performed by: SOCIAL WORKER

## 2017-08-31 ASSESSMENT — PATIENT HEALTH QUESTIONNAIRE - PHQ9
5. POOR APPETITE OR OVEREATING: MORE THAN HALF THE DAYS
SUM OF ALL RESPONSES TO PHQ QUESTIONS 1-9: 7

## 2017-08-31 ASSESSMENT — ANXIETY QUESTIONNAIRES
6. BECOMING EASILY ANNOYED OR IRRITABLE: MORE THAN HALF THE DAYS
7. FEELING AFRAID AS IF SOMETHING AWFUL MIGHT HAPPEN: SEVERAL DAYS
3. WORRYING TOO MUCH ABOUT DIFFERENT THINGS: SEVERAL DAYS
1. FEELING NERVOUS, ANXIOUS, OR ON EDGE: MORE THAN HALF THE DAYS
2. NOT BEING ABLE TO STOP OR CONTROL WORRYING: SEVERAL DAYS
IF YOU CHECKED OFF ANY PROBLEMS ON THIS QUESTIONNAIRE, HOW DIFFICULT HAVE THESE PROBLEMS MADE IT FOR YOU TO DO YOUR WORK, TAKE CARE OF THINGS AT HOME, OR GET ALONG WITH OTHER PEOPLE: VERY DIFFICULT
5. BEING SO RESTLESS THAT IT IS HARD TO SIT STILL: MORE THAN HALF THE DAYS
GAD7 TOTAL SCORE: 11

## 2017-08-31 NOTE — MR AVS SNAPSHOT
MRN:[id number]                      After Visit Summary   8/31/2017    Velma Diaz    MRN: [id number]           Visit Information        Provider Department      8/31/2017 11:00 AM Blanca Antonio SEBASTIAN SERVANDO [facility]      Your next 10 appointments already scheduled     Sep 13, 2017  2:00 PM CDT   Return Visit with Antonio Rios [facility]    [address] [address]   [phone]            Sep 26, 2017 11:30 AM CDT   [facility]    [address] [address]   [phone]            Oct 18, 2017 10:00 AM CDT   PHYSICAL with Srinivasa Hunter MD   [facility]    [address] [place] [address]   [phone]              MyChart Information     Assure[employer]hart gives you secure access to your electronic health record. If you see a primary care provider, you can also send messages to your care team and make appointments. If you have questions, please call your primary care clinic.  If you do not have a primary care provider, please call [phone] and they will assist you.        Care EveryWhere ID     This is your Care EveryWhere ID. This could be used by other organizations to access your [place] medical records  [id number]        Equal Access to Services     LOLA HERNANDEZ AH: Hadii antionette gutierrez hadasho Sotheresaali, waaxda luqadaha, qaybta kaalmada adeegyada, grayson sauceda. So [facility] [phone].    ATENCIÓN: Si habla español, tiene a dowd disposición servicios gratuitos de asistencia lingüística. Llame al [phone].    We comply with applicable federal civil rights laws and [place] laws. We do not discriminate on the basis of race,  color, national origin, age, disability sex, sexual orientation or gender identity.

## 2017-08-31 NOTE — Clinical Note
Ivan Bernabe-I started with our patient today for her first counseling session.  She currently has increased stress and anxiety in her life at this time.  I will complete DA next session.  I look forward to coordinating care with you, as needed, in the future.  Best, Antonio

## 2017-09-01 ASSESSMENT — ANXIETY QUESTIONNAIRES: GAD7 TOTAL SCORE: 11

## 2017-09-12 ENCOUNTER — TELEPHONE (OUTPATIENT)
Dept: FAMILY MEDICINE | Facility: CLINIC | Age: 47
End: 2017-09-12

## 2017-09-12 NOTE — TELEPHONE ENCOUNTER
Forms received from: [facility]   Phone number listed: [phone]   Fax listed: [phone]  Date received: 09/12/2017  Form description: Letter of Recommendation for Safety Equipment  Once forms are completed, please return to [facility] via fax.  Is patient requesting to be contacted when forms are completed: NA    Form placed: In provider's basket  Nydia Tapia

## 2017-09-13 ENCOUNTER — OFFICE VISIT (OUTPATIENT)
Dept: PSYCHOLOGY | Facility: CLINIC | Age: 47
End: 2017-09-13
Payer: COMMERCIAL

## 2017-09-13 DIAGNOSIS — F41.1 GAD (GENERALIZED ANXIETY DISORDER): Primary | ICD-10-CM

## 2017-09-13 PROCEDURE — 90791 PSYCH DIAGNOSTIC EVALUATION: CPT | Performed by: SOCIAL WORKER

## 2017-09-13 NOTE — MR AVS SNAPSHOT
MRN:[id number]                      After Visit Summary   9/13/2017    Velma Diaz    MRN: [id number]           Visit Information        Provider Department      9/13/2017 2:00 PM Blanca Antonio SEBASTIAN SERVANDO [facility]      Your next 10 appointments already scheduled     Sep 26, 2017 11:30 AM CDT   Return Visit with Antonio Rios [facility]    [address] [address]   [phone]            Oct 09, 2017 10:00 AM CDT   Return Visit with Antonio Rios [facility]    [address] [address]   [phone]            Oct 18, 2017 10:00 AM CDT   PHYSICAL with Srinivasa Hunter MD   [facility]    [address] [place] [address]   [phone]              MyChart Information     Shout For Goodhart gives you secure access to your electronic health record. If you see a primary care provider, you can also send messages to your care team and make appointments. If you have questions, please call your primary care clinic.  If you do not have a primary care provider, please call [phone] and they will assist you.        Care EveryWhere ID     This is your Care EveryWhere ID. This could be used by other organizations to access your [place] medical records  [id number]        Equal Access to Services     LOLA HERNANDEZ AH: Hadii antionette gutierrez hadasho Sotheresaali, waaxda luqadaha, qaybta kaalmada adeegyada, grayson sauceda. So [facility] [phone].    ATENCIÓN: Si habla español, tiene a dowd disposición servicios gratuitos de asistencia lingüística. Llame al [phone].    We comply with applicable federal civil rights laws and [place] laws. We do not discriminate on the basis of race,  color, national origin, age, disability sex, sexual orientation or gender identity.

## 2017-09-13 NOTE — Clinical Note
Ivan Bernabe-I completed patient's DA.  Anxiety seems to be the current issue client is having a difficult time managing.  I look forward to coordinating care with you, as needed, in the future.  Let me know if you have any questions.  Best, Antonio

## 2017-09-14 PROBLEM — F41.1 GAD (GENERALIZED ANXIETY DISORDER): Status: ACTIVE | Noted: 2017-09-14
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
## 2017-09-19 ENCOUNTER — MYC REFILL (OUTPATIENT)
Dept: FAMILY MEDICINE | Facility: CLINIC | Age: 47
End: 2017-09-19

## 2017-09-19 DIAGNOSIS — G89.4 CHRONIC PAIN SYNDROME: ICD-10-CM

## 2017-09-19 NOTE — TELEPHONE ENCOUNTER
Message from CBIT A/Shart:  Original authorizing provider: MD Velma Rodriguez would like a refill of the following medications:  oxyCODONE-acetaminophen (PERCOCET)  MG per tablet [Srinivasa Hunter MD]    Preferred pharmacy: [facility] REQUESTED    Comment:  I have an appointment with Larry on Friday so I'm wondering if I can get my prescription at that time too. THANKS

## 2017-09-19 NOTE — TELEPHONE ENCOUNTER
Routing refill request to provider for review/approval because:  Drug not on the [facility] refill protocol       Naty Khan RN  [facility]

## 2017-09-19 NOTE — TELEPHONE ENCOUNTER
Controlled Substance Refill Request for Percocet 10-325mg    Last refill: 8/28/17 - 90qty    Last clinic visit: 7/19/17     Next appt: 10/18/17    Controlled substance agreement on file: Yes:  Date 8/15/16.    Documentation in problem list reviewed:  Yes    Processing:  Patient will  in clinic

## 2017-09-20 RX ORDER — OXYCODONE AND ACETAMINOPHEN 10; 325 MG/1; MG/1
1 TABLET ORAL EVERY 6 HOURS PRN
Qty: 90 TABLET | Refills: 0 | Status: SHIPPED | OUTPATIENT
Start: 2017-09-20 | End: 2017-10-18

## 2017-09-22 ENCOUNTER — OFFICE VISIT (OUTPATIENT)
Dept: PODIATRY | Facility: CLINIC | Age: 47
End: 2017-09-22
Payer: COMMERCIAL

## 2017-09-22 VITALS — BODY MASS INDEX: 39.68 KG/M2 | OXYGEN SATURATION: 98 % | WEIGHT: 224 LBS | HEART RATE: 80 BPM

## 2017-09-22 DIAGNOSIS — M72.2 PLANTAR FASCIITIS, BILATERAL: Primary | ICD-10-CM

## 2017-09-22 PROCEDURE — 99213 OFFICE O/P EST LOW 20 MIN: CPT | Mod: 25 | Performed by: PODIATRIST

## 2017-09-22 PROCEDURE — 20550 NJX 1 TENDON SHEATH/LIGAMENT: CPT | Mod: 50 | Performed by: PODIATRIST

## 2017-09-22 NOTE — PROGRESS NOTES
Subjective:    Patient is seen today with a 1 month(s) hx of worsening bilateral heel pain left>R.  Points to the plantarmedial calcaneal tubercle.  Most painful upon rising in a.m. or after prolonged sitting.  Aggravated by activity and relieved by rest.  Has tried orthotics in good shoes at all times, icing, stretching, bilateral night splints, minimizing activities without much success.  Not working and has chronic pain, neuropathy, radiculopathy.    ROS:   denies edema, denies increased weakness. Denies ecchymosis or trauma.     No Known Allergies    Current Outpatient Prescriptions   Medication Sig Dispense Refill     oxyCODONE-acetaminophen (PERCOCET)  MG per tablet Take 1 tablet by mouth every 6 hours as needed for moderate to severe pain 90 tablet 0     cyclobenzaprine (FLEXERIL) 10 MG tablet Take 0.5-1 tablets (5-10 mg) by mouth 3 times daily as needed for muscle spasms 30 tablet 1     FLUoxetine (PROZAC) 40 MG capsule Take 1 capsule (40 mg) by mouth daily 30 capsule 3     Pregabalin (LYRICA) 225 MG CAPS Take 225 mg by mouth 2 times daily 60 capsule 3     furosemide (LASIX) 20 MG tablet Take 1 tablet (20 mg) by mouth daily 30 tablet 3     SUMAtriptan (IMITREX) 100 MG tablet Take 1 tablet (100 mg) by mouth at onset of headache for migraine May repeat in 2 hours if needed: max 2/day 9 tablet 2     acetaminophen (TYLENOL) 325 MG tablet Take 2 tablets (650 mg) by mouth every 4 hours as needed for other (mild pain) 100 tablet 0     ORDER FOR [facility] Respironics REMSTAR 60 Series Auto [id number] H2O, Airfit P10 nasal pillow mask w/xsmall pillows       Multiple Vitamins-Minerals (MULTI FOR HER PO) Take by mouth daily        nystatin (MYCOSTATIN) 558571 UNIT/GM POWD Apply to affected area twice daily as needed 60 g 2     diclofenac (VOLTAREN) 1 % GEL topical gel Apply  2 grams to foot four times daily as needed for pain using enclosed dosing card. 100 g 1     lidocaine-prilocaine (EMLA) cream Apply dime size amount  to right hip and left knee qid prn, May use same time as Diclofinac.future refill through PCP 30 g 0       Patient Active Problem List   Diagnosis     History of cleft palate with cleft lip     Back pain     MAYA (obstructive sleep apnea)/Hypoventilation Syndrome     Major depressive disorder, recurrent episode, moderate (H)     Paresthesias     Health Care Home     Vitamin D deficiency     Morbid obesity with BMI of 40.0-44.9, adult (H)     Neuropathy (H)     Hyperlipidemia with target LDL less than 130     Intervertebral disc prolapse with impingement     Nonallopathic lesion of pelvic region     Nonallopathic lesion of lumbar region     Chronic pain syndrome     Restless legs syndrome (RLS)     Insomnia     Migraine     Chronic bilateral back pain, unspecified back location     Chronic pain of left knee     ROSE MARIE (generalized anxiety disorder)       Past Medical History:   Diagnosis Date     Chronic pain syndrome 11/20/2015    She takes up to 90 oxycodone tablets per month for her chronic pain      Depressive disorder 2000     History of cleft palate with cleft lip     cleft lip and palate, and has had 27 operations per the patient     Hyperlipidemia with target LDL less than 130 10/26/2015     Morbid obesity with BMI of 40.0-44.9, adult (H) 10/26/2015     Neuropathy (H)      MAYA (obstructive sleep apnea)/Hypoventilation Syndrome 2/24/2014    Study Date: 2/13/2014- (245.1 lbs) Sleep Associated Hypoventilation  suggested with baseline PCO2 42 mmHg, maximum PCO2 55 mmHg. Lowest oxygen saturation 85.0% Apnea/Hypopnea Index 5.5 events per hour.  REM AHI 37.2.  The supine AHI is 13.8. RDI 6.7 Sleep study 3/2014 (245#)- CPAP 6 cm effective, Transcutaneous CO2 Monitoring (TCM) within normal limits.          Skin cancer of anterior chest 2011    Basal cell on chest     TMJ (temporomandibular joint disorder)        Past Surgical History:   Procedure Laterality Date     ANKLE SURGERY  7/13    Left for torn tendons & loose  bone chips     ARTHROSCOPY KNEE  1986    Right knee     BACK SURGERY       Basal cell carcinoma  2011    Removal from the chest     BIOPSY       C VAGINAL HYSTERECTOMY  2011     CARPAL TUNNEL RELEASE RT/LT  ~2010    Bilateral     COLONOSCOPY  2016     COSMETIC SURGERY       COSMETIC SURGERY       DECOMPRESSION CUBITAL TUNNEL Left 8/28/2015    Procedure: DECOMPRESSION CUBITAL TUNNEL;  Surgeon: Gus Donato MD;  [facility]     ENT SURGERY  1975    Tonsillectomy and Adenoids     GYN SURGERY  2011    Hysterectomy     HC REPAIR PERONEAL TENDONS  7/13     ORTHOPEDIC SURGERY  2011, 2011    Bilateral CTR     PE TUBES      yearly times 10 years     REPAIR CLEFT PALATE CHILD      Age 3 months & afterwards (multiple surgeries)     SOFT TISSUE SURGERY       SOFT TISSUE SURGERY  2013       Family History   Problem Relation Age of Onset     Alzheimer Disease Paternal Grandmother 60     CEREBROVASCULAR DISEASE Paternal Grandmother      Neurologic Disorder Sister 20     migraines     Depression/Anxiety Sister      Depression Sister      Neurologic Disorder Son 14     migraines     Asthma Son      HEART DISEASE Mother      OSTEOPOROSIS Mother      Obesity Mother      CANCER Father      Alcohol/Drug Father      Other Cancer Father      saliva glands & skin CA      Hypertension Father      DIABETES Maternal Grandmother      Breast Cancer Maternal Grandmother      Obesity Maternal Grandmother      Other Cancer Maternal Grandfather      skin cancer     Cancer - colorectal Other      Aunt     Asthma Daughter      Asthma Daughter      Asthma Son      Thyroid Disease Sister      Colon Cancer Other      Obesity Sister      Glaucoma No family hx of      Macular Degeneration No family hx of        Social History   Substance Use Topics     Smoking status: Former Smoker     Packs/day: 0.50     Years: 15.00     Types: Cigarettes     Quit date: 2/20/2015     Smokeless tobacco: Never Used     Alcohol use No      Comment: Quit in  September 27th         Objective:    Pulse 80  Wt 101.6 kg (224 lb)  SpO2 98%  BMI 39.68 kg/m2.  Good historian.  A&O X3 Pulses are palpable +2/4 DP & PT bilateral.  CRT < 3 seconds X 10 digits.  Mild lower extremity edema or varicosities noted.  Sensation to light touch is intact.  Achilles reflex 2/4 bilaterally.  Skin has normal texture and turgor bilaterally. Decreased arch height.   Muscle strength and ROM is within normal limits.  Negative tinel's sign.  No side to side compression pain of the calcaneus.  Pain upon palpation to the bilateral plantarmedial  of the calcaneus left>R.  No erythema, edema ecchymosis, or subcutaneous masses noted.  No pain on palpation or stressing any tendons.      Assessment:  Plantar Fasciitis right and left foot     Plan:   Discussed etiology and treatment options with the patient.  The potential causes and nature of plantar fasciitis were discussed with the patient.  We reviewed the natural history/prognosis of the condition and risks if left untreated.  These include chronic pain, other sites of pain due to gait changes, and potential plantar fascial rupture.      We discussed possible causes of the condition as it relates to the patients specific situation.      Conservative treatment options were reviewed:  appropriate shoes, avoidance of barefoot walking, inserts/orthoses, stretching, ice, massage, immobilization and NSAIDs.     We also reviewed the options of injection therapy and surgery.  However, it was made clear that surgery is only considered when conservative therapy fails.  The risks and benefits of injection therapy, and surgery were discussed.  Dispensed handout.       After thorough discussion and answering all questions, the patient elected to modifying activities, supportive shoes, ice, stretching, and not going barefoot.  Good house shoes at all times and I made recommendations.  Risks complications, and efficacy of cortisone injection discussed.  Patient  understands there is a risk of plantar fascial rupture.  After written consent, sterile prep, injected each heel with 1 cc 1% lidocaine plain and 1 cc kenalog 10 mg.   RTC in 4 weeks if not better otherwise prn.     Axel Juarez DPM, FACFAS

## 2017-09-22 NOTE — MR AVS SNAPSHOT
After Visit Summary   9/22/2017    Velma Diaz    MRN: [id number]           Patient Information     Date Of Birth          1970        Visit Information        Provider Department      9/22/2017 7:30 AM Axel Juarez, YEVGENIY [facility]        Today's Diagnoses     Plantar fasciitis, bilateral    -  1      Care Instructions    We wish you continued good healing. If you have any questions or concerns, please do not hesitate to contact us at [phone]      Please remember to call and schedule a follow up appointment if one was recommended at your earliest convenience.   PODIATRY CLINIC HOURS  TELEPHONE NUMBER    Dr. Axel ROSENBERGPYVONNE FAC FAS    Clinics:  [facility]        Dahlia Batres MA  Medical Assistant  Tuesday 1PM-6PM  [place]  Wednesday 7AM-2PM  [place]  Thursday 10AM-6PM  [place] Friday 7AM-345PM  [place]  Specialty schedulers:   [phone] to make an appointment with any Specialty Provider.        Urgent Care locations:    [facility] Monday-Friday 5 pm - 9 pm. Saturday-Sunday 9 am -5pm    Monday-Friday 11 am - 9 pm Saturday 9 am - 5 pm     Monday-Sunday 12 noon-8PM [phone]     If you need a medication refill, please contact us you may need lab work and/or a follow up visit prior to your refill (i.e. Antifungal medications).    [employer] (secure e-mail communication and access to your chart) to send a message or to make an appointment.    If MRI needed please call [facility] at [phone]        Weight management plan: Patient was referred to their PCP to discuss a diet and exercise plan.            Follow-ups after your visit        Your next 10 appointments already scheduled     Sep 26, 2017 11:30 AM CDT   Return Visit with SERVANDO Yan   [facility]  ([facility])    [address] [address]   [phone]            Sep 29, 2017 10:40 AM CDT   Return Visit with Debra Hicks MD   [facility]    [address] [address]   [phone]            Oct 09, 2017 10:00 AM CDT   Return Visit with SERVANDO Yan   [facility] ([facility])    [address] [address]   [phone]            Oct 18, 2017 10:00 AM CDT   PHYSICAL with Srinivasa Hunter MD   [facility]    [address] [place] [address]   [phone]              Who to contact     If you have questions or need follow up information about today's clinic visit or your schedule please contact [facility] directly at [phone].  Normal or non-critical lab and imaging results will be communicated to you by [employer], letter or phone within 4 business days after the clinic has received the results. If you do not hear from us within 7 days, please contact the clinic through [employer] or phone. If you have a critical or abnormal lab result, we will notify you by phone as soon as possible.  Submit refill requests through [employer] or call your pharmacy and they will forward the refill request to us. Please allow 3 business days for your refill to be completed.          Additional Information About Your Visit        [employer] Information     [employer] gives you secure access to your electronic health record. If you see a primary care provider, you can also send messages to your care team and make appointments. If you have questions, please call your primary care clinic.  If you do not have a primary care provider, please call [phone] and they will assist you.        Care EveryWhere ID     This is your Care  EveryWhere ID. This could be used by other organizations to access your [facility] medical records  [id number]        Your Vitals Were     Pulse Pulse Oximetry BMI (Body Mass Index)             80 98% 39.68 kg/m2          Blood Pressure from Last 3 Encounters:   07/19/17 121/84   03/25/17 112/79   02/01/17 109/64    Weight from Last 3 Encounters:   09/22/17 101.6 kg (224 lb)   07/19/17 102.1 kg (225 lb)   03/25/17 102.1 kg (225 lb)              We Performed the Following     INJECTION SINGLE TENDON SHEATH/LIGAMENT     INJECTION SINGLE TENDON SHEATH/LIGAMENT        Primary Care Provider Office Phone # Fax #    Srinivasa Hunter [phone]       [address] [address]        Goals        General    I will call within next 2 weeks to schedule initial psychiatry appointment (pt-stated)     Notes - Note edited  5/20/2015 12:51 PM by Yesica Marsh    As of today's date 5/20/2015 goal is met at 76 - 100%.   Goal Status:  Complete  Patient states this is scheduled with Dr. Salvador for next month, but not on appointment calendar  LUDY Stark, MSW   [phone]  5/20/2015 12:51 PM        I will complete [employer] or reduced fare application within the next month (pt-stated)     Notes - Note edited  11/24/2015 12:00 PM by Yesica Marsh    As of today's date 11/24/2015 goal is met at 51 - 75%.   Goal Status:  Active  She reported today having [employer] application, [employer] worker has requested but packet from [employer].    LUDY Stark, MSW   [phone]  11/24/2015 12:00 PM        I will discuss [employer] worker with therapist next week for housing needs  (pt-stated)     Notes - Note edited  12/3/2015  1:17 PM by Yesica Marsh    As of today's date 12/3/2015 goal is met at 76 - 100%.   Goal Status:  Discontinued  Patient has [employer] worker and has found housing with friend  LUDY Stark, MSW   [phone]  12/3/2015 1:16 PM        Equal Access  to Services     LOLA HERNANDEZ : Katie Dumas, waradhada luqadaha, qaybta kaalmadre cook, grayson sauceda. So [facility] [phone].    ATENCIÓN: Si maryjane blair, tiene a dowd disposición servicios gratuitos de asistencia lingüística. Llame al [phone].    We comply with applicable federal civil rights laws and [place] laws. We do not discriminate on the basis of race, color, national origin, age, disability sex, sexual orientation or gender identity.            Thank you!     Thank you for choosing [facility]  for your care. Our goal is always to provide you with excellent care. Hearing back from our patients is one way we can continue to improve our services. Please take a few minutes to complete the written survey that you may receive in the mail after your visit with us. Thank you!             Your Updated Medication List - Protect others around you: Learn how to safely use, store and throw away your medicines at www.disposemymeds.org.          This list is accurate as of: 9/22/17  8:12 AM.  Always use your most recent med list.                   Brand Name Dispense Instructions for use Diagnosis    acetaminophen 325 MG tablet    TYLENOL    100 tablet    Take 2 tablets (650 mg) by mouth every 4 hours as needed for other (mild pain)    Lesion of left ulnar nerve       cyclobenzaprine 10 MG tablet    FLEXERIL    30 tablet    Take 0.5-1 tablets (5-10 mg) by mouth 3 times daily as needed for muscle spasms    Chronic bilateral back pain, unspecified back location       diclofenac 1 % Gel topical gel    VOLTAREN    100 g    Apply  2 grams to foot four times daily as needed for pain using enclosed dosing card.    Chronic pain of both ankles, Neuropathy (H), Pes planus of both feet, Plantar fasciitis, bilateral, Equinus contracture of right ankle, Equinus contracture of left ankle, Morbid obesity with BMI of 40.0-44.9, adult (H)       FLUoxetine 40 MG  capsule    PROzac    30 capsule    Take 1 capsule (40 mg) by mouth daily    Anxiety and depression       furosemide 20 MG tablet    LASIX    30 tablet    Take 1 tablet (20 mg) by mouth daily    Leg swelling       lidocaine-prilocaine cream    EMLA    30 g    Apply dime size amount to right hip and left knee qid prn, May use same time as Diclofinac.future refill through PCP    Other chronic pain, Fibromyalgia, Myalgia and myositis, Enthesopathy of hip region, right, Left knee pain       MULTI FOR HER PO      Take by mouth daily        nystatin 395315 UNIT/GM Powd    MYCOSTATIN    60 g    Apply to affected area twice daily as needed    Candidal intertrigo       order for [facility]      Respironics REMSTAR 60 Series Auto [id number] H2O, Airfit P10 nasal pillow mask w/xsmall pillows        oxyCODONE-acetaminophen  MG per tablet    PERCOCET    90 tablet    Take 1 tablet by mouth every 6 hours as needed for moderate to severe pain    Chronic pain syndrome       Pregabalin 225 MG Caps    LYRICA    60 capsule    Take 225 mg by mouth 2 times daily    Other chronic pain       SUMAtriptan 100 MG tablet    IMITREX    9 tablet    Take 1 tablet (100 mg) by mouth at onset of headache for migraine May repeat in 2 hours if needed: max 2/day    Headache(784.0)

## 2017-09-22 NOTE — LETTER
9/22/2017         RE: Velma Diaz  [address]  [place] [address]        Dear Colleague,    Thank you for referring your patient, Velma Diaz, to the [facility]. Please see a copy of my visit note below.    Subjective:    Patient is seen today with a 1 month(s) hx of worsening bilateral heel pain left>R.  Points to the plantarmedial calcaneal tubercle.  Most painful upon rising in a.m. or after prolonged sitting.  Aggravated by activity and relieved by rest.  Has tried orthotics in good shoes at all times, icing, stretching, bilateral night splints, minimizing activities without much success.  Not working and has chronic pain, neuropathy, radiculopathy.    ROS:   denies edema, denies increased weakness. Denies ecchymosis or trauma.     No Known Allergies    Current Outpatient Prescriptions   Medication Sig Dispense Refill     oxyCODONE-acetaminophen (PERCOCET)  MG per tablet Take 1 tablet by mouth every 6 hours as needed for moderate to severe pain 90 tablet 0     cyclobenzaprine (FLEXERIL) 10 MG tablet Take 0.5-1 tablets (5-10 mg) by mouth 3 times daily as needed for muscle spasms 30 tablet 1     FLUoxetine (PROZAC) 40 MG capsule Take 1 capsule (40 mg) by mouth daily 30 capsule 3     Pregabalin (LYRICA) 225 MG CAPS Take 225 mg by mouth 2 times daily 60 capsule 3     furosemide (LASIX) 20 MG tablet Take 1 tablet (20 mg) by mouth daily 30 tablet 3     SUMAtriptan (IMITREX) 100 MG tablet Take 1 tablet (100 mg) by mouth at onset of headache for migraine May repeat in 2 hours if needed: max 2/day 9 tablet 2     acetaminophen (TYLENOL) 325 MG tablet Take 2 tablets (650 mg) by mouth every 4 hours as needed for other (mild pain) 100 tablet 0     ORDER FOR DME Respironics REMSTAR 60 Series Auto THBD6ph H2O, Airfit P10 nasal pillow mask w/xsmall pillows       Multiple Vitamins-Minerals (MULTI FOR HER PO) Take by mouth daily        nystatin (MYCOSTATIN) 126468 UNIT/GM POWD Apply to  affected area twice daily as needed 60 g 2     diclofenac (VOLTAREN) 1 % GEL topical gel Apply  2 grams to foot four times daily as needed for pain using enclosed dosing card. 100 g 1     lidocaine-prilocaine (EMLA) cream Apply dime size amount to right hip and left knee qid prn, May use same time as Diclofinac.future refill through PCP 30 g 0       Patient Active Problem List   Diagnosis     History of cleft palate with cleft lip     Back pain     MAYA (obstructive sleep apnea)/Hypoventilation Syndrome     Major depressive disorder, recurrent episode, moderate (H)     Paresthesias     Health Care Home     Vitamin D deficiency     Morbid obesity with BMI of 40.0-44.9, adult (H)     Neuropathy (H)     Hyperlipidemia with target LDL less than 130     Intervertebral disc prolapse with impingement     Nonallopathic lesion of pelvic region     Nonallopathic lesion of lumbar region     Chronic pain syndrome     Restless legs syndrome (RLS)     Insomnia     Migraine     Chronic bilateral back pain, unspecified back location     Chronic pain of left knee     ROSE MARIE (generalized anxiety disorder)       Past Medical History:   Diagnosis Date     Chronic pain syndrome 11/20/2015    She takes up to 90 oxycodone tablets per month for her chronic pain      Depressive disorder 2000     History of cleft palate with cleft lip     cleft lip and palate, and has had 27 operations per the patient     Hyperlipidemia with target LDL less than 130 10/26/2015     Morbid obesity with BMI of 40.0-44.9, adult (H) 10/26/2015     Neuropathy (H)      MAYA (obstructive sleep apnea)/Hypoventilation Syndrome 2/24/2014    Study Date: 2/13/2014- (245.1 lbs) Sleep Associated Hypoventilation  suggested with baseline PCO2 42 mmHg, maximum PCO2 55 mmHg. Lowest oxygen saturation 85.0% Apnea/Hypopnea Index 5.5 events per hour.  REM AHI 37.2.  The supine AHI is 13.8. RDI 6.7 Sleep study 3/2014 (245#)- CPAP 6 cm effective, Transcutaneous CO2 Monitoring (TCM)  within normal limits.          Skin cancer of anterior chest 2011    Basal cell on chest     TMJ (temporomandibular joint disorder)        Past Surgical History:   Procedure Laterality Date     ANKLE SURGERY  7/13    Left for torn tendons & loose bone chips     ARTHROSCOPY KNEE  1986    Right knee     BACK SURGERY       Basal cell carcinoma  2011    Removal from the chest     BIOPSY       C VAGINAL HYSTERECTOMY  2011     CARPAL TUNNEL RELEASE RT/LT  ~2010    Bilateral     COLONOSCOPY  2016     COSMETIC SURGERY       COSMETIC SURGERY       DECOMPRESSION CUBITAL TUNNEL Left 8/28/2015    Procedure: DECOMPRESSION CUBITAL TUNNEL;  Surgeon: Gus Donato MD;  [facility]     ENT SURGERY  1975    Tonsillectomy and Adenoids     GYN SURGERY  2011    Hysterectomy     HC REPAIR PERONEAL TENDONS  7/13     ORTHOPEDIC SURGERY  2011, 2011    Bilateral CTR     PE TUBES      yearly times 10 years     REPAIR CLEFT PALATE CHILD      Age 3 months & afterwards (multiple surgeries)     SOFT TISSUE SURGERY       SOFT TISSUE SURGERY  2013       Family History   Problem Relation Age of Onset     Alzheimer Disease Paternal Grandmother 60     CEREBROVASCULAR DISEASE Paternal Grandmother      Neurologic Disorder Sister 20     migraines     Depression/Anxiety Sister      Depression Sister      Neurologic Disorder Son 14     migraines     Asthma Son      HEART DISEASE Mother      OSTEOPOROSIS Mother      Obesity Mother      CANCER Father      Alcohol/Drug Father      Other Cancer Father      saliva glands & skin CA      Hypertension Father      DIABETES Maternal Grandmother      Breast Cancer Maternal Grandmother      Obesity Maternal Grandmother      Other Cancer Maternal Grandfather      skin cancer     Cancer - colorectal Other      Aunt     Asthma Daughter      Asthma Daughter      Asthma Son      Thyroid Disease Sister      Colon Cancer Other      Obesity Sister      Glaucoma No family hx of      Macular Degeneration No family  hx of        Social History   Substance Use Topics     Smoking status: Former Smoker     Packs/day: 0.50     Years: 15.00     Types: Cigarettes     Quit date: 2/20/2015     Smokeless tobacco: Never Used     Alcohol use No      Comment: Quit in September 27th         Objective:    Pulse 80  Wt 101.6 kg (224 lb)  SpO2 98%  BMI 39.68 kg/m2.  Good historian.  A&O X3 Pulses are palpable +2/4 DP & PT bilateral.  CRT < 3 seconds X 10 digits.  Mild lower extremity edema or varicosities noted.  Sensation to light touch is intact.  Achilles reflex 2/4 bilaterally.  Skin has normal texture and turgor bilaterally. Decreased arch height.   Muscle strength and ROM is within normal limits.  Negative tinel's sign.  No side to side compression pain of the calcaneus.  Pain upon palpation to the bilateral plantarmedial  of the calcaneus left>R.  No erythema, edema ecchymosis, or subcutaneous masses noted.  No pain on palpation or stressing any tendons.      Assessment:  Plantar Fasciitis right and left foot     Plan:   Discussed etiology and treatment options with the patient.  The potential causes and nature of plantar fasciitis were discussed with the patient.  We reviewed the natural history/prognosis of the condition and risks if left untreated.  These include chronic pain, other sites of pain due to gait changes, and potential plantar fascial rupture.      We discussed possible causes of the condition as it relates to the patients specific situation.      Conservative treatment options were reviewed:  appropriate shoes, avoidance of barefoot walking, inserts/orthoses, stretching, ice, massage, immobilization and NSAIDs.     We also reviewed the options of injection therapy and surgery.  However, it was made clear that surgery is only considered when conservative therapy fails.  The risks and benefits of injection therapy, and surgery were discussed.  Dispensed handout.       After thorough discussion and answering all  questions, the patient elected to modifying activities, supportive shoes, ice, stretching, and not going barefoot.  Good house shoes at all times and I made recommendations.  Risks complications, and efficacy of cortisone injection discussed.  Patient understands there is a risk of plantar fascial rupture.  After written consent, sterile prep, injected each heel with 1 cc 1% lidocaine plain and 1 cc kenalog 10 mg.   RTC in 4 weeks if not better otherwise prn.     Axel Juarez DPM, FACFAS      Again, thank you for allowing me to participate in the care of your patient.        Sincerely,        Axel Juarez DPM

## 2017-09-22 NOTE — PATIENT INSTRUCTIONS
We wish you continued good healing. If you have any questions or concerns, please do not hesitate to contact us at [phone]      Please remember to call and schedule a follow up appointment if one was recommended at your earliest convenience.   PODIATRY CLINIC HOURS  TELEPHONE NUMBER    Dr. Axel Juarez D.P.M [facility]    Clinics:  [facility]        Dahlia Batres MA  Medical Assistant  Tuesday 1PM-6PM  [facility]  Wednesday 7AM-2PM  [place]  Thursday 10AM-6PM  [place] Friday 7AM-345PM  [place]  Specialty schedulers:   [phone] to make an appointment with any Specialty Provider.        Urgent Care locations:    [facility] Monday-Friday 5 pm - 9 pm. Saturday-Sunday 9 am -5pm    Monday-Friday 11 am - 9 pm Saturday 9 am - 5 pm     Monday-Sunday 12 noon-8PM [phone]     If you need a medication refill, please contact us you may need lab work and/or a follow up visit prior to your refill (i.e. Antifungal medications).    [facility] (secure e-mail communication and access to your chart) to send a message or to make an appointment.    If MRI needed please call Tulio Morales at [phone]        Weight management plan: Patient was referred to their PCP to discuss a diet and exercise plan.

## 2017-09-22 NOTE — NURSING NOTE
"Chief Complaint   Patient presents with     Foot Pain     chris feet, surgery 4 years ago left foot, scar is        Initial Pulse 80  Wt 101.6 kg (224 lb)  SpO2 98%  BMI 39.68 kg/m2 Estimated body mass index is 39.68 kg/(m^2) as calculated from the following:    Height as of 3/25/17: 1.6 m (5' 3\").    Weight as of this encounter: 101.6 kg (224 lb).  Medication Reconciliation: complete  "

## 2017-09-24 ENCOUNTER — TRANSFERRED RECORDS (OUTPATIENT)
Dept: HEALTH INFORMATION MANAGEMENT | Facility: CLINIC | Age: 47
End: 2017-09-24

## 2017-09-25 ENCOUNTER — TELEPHONE (OUTPATIENT)
Dept: FAMILY MEDICINE | Facility: CLINIC | Age: 47
End: 2017-09-25

## 2017-09-25 ENCOUNTER — OFFICE VISIT (OUTPATIENT)
Dept: FAMILY MEDICINE | Facility: CLINIC | Age: 47
End: 2017-09-25
Payer: COMMERCIAL

## 2017-09-25 ENCOUNTER — TRANSFERRED RECORDS (OUTPATIENT)
Dept: HEALTH INFORMATION MANAGEMENT | Facility: CLINIC | Age: 47
End: 2017-09-25

## 2017-09-25 VITALS
TEMPERATURE: 97 F | WEIGHT: 227 LBS | BODY MASS INDEX: 40.21 KG/M2 | DIASTOLIC BLOOD PRESSURE: 67 MMHG | SYSTOLIC BLOOD PRESSURE: 107 MMHG | OXYGEN SATURATION: 100 % | HEART RATE: 73 BPM

## 2017-09-25 DIAGNOSIS — M70.61 TROCHANTERIC BURSITIS OF RIGHT HIP: ICD-10-CM

## 2017-09-25 DIAGNOSIS — M25.551 HIP PAIN, RIGHT: Primary | ICD-10-CM

## 2017-09-25 PROCEDURE — 99213 OFFICE O/P EST LOW 20 MIN: CPT | Performed by: FAMILY MEDICINE

## 2017-09-25 NOTE — TELEPHONE ENCOUNTER
Please see message below, patient looking for hosp f/up appt today.    Routed to PCP.    Naty Khan RN  [facility]

## 2017-09-25 NOTE — NURSING NOTE
"Chief Complaint   Patient presents with     Hospital F/U     [place]       Initial /67 (BP Location: Right arm, Patient Position: Chair, Cuff Size: Adult Large)  Pulse 73  Temp 97  F (36.1  C) (Oral)  Wt 227 lb (103 kg)  SpO2 100%  BMI 40.21 kg/m2 Estimated body mass index is 40.21 kg/(m^2) as calculated from the following:    Height as of 3/25/17: 5' 3\" (1.6 m).    Weight as of this encounter: 227 lb (103 kg).  Medication Reconciliation: complete   Susan Hines CMA       "

## 2017-09-25 NOTE — PROGRESS NOTES
SUBJECTIVE:   Velma Diaz is a 46 year old female who presents to clinic today for the following health issues:      ED/UC Followup:    Facility:  [place]  Date of visit: 9/24/17  Reason for visit: Right hip pain  Current Status: It is better but still flaring up. Not her normal.       She started having some right hip pain early Saturday morning. It bothered her during the day. Saturday night she didn't sleep very well because of the discomfort. Yesterday, Sunday, it was bothering her enough that she went into the ER. X-rays of the right hip were normal. She's been having some lateral hip pain and some discomfort in the groin as well. No recent injury or trauma or overuse. She did have cortisone shots for her heels a few days ago because of bilateral plantar fasciitis.  She does have chronic pain issues and has ongoing numbness and tingling in the legs. She'll be seeing neurology for that on Friday.  She did actually get an appointment to see orthopedics later this afternoon at [facility] for further evaluation of her right hip pain as well.    Problem list and histories reviewed & adjusted, as indicated.  Additional history: as documented    Patient Active Problem List   Diagnosis     History of cleft palate with cleft lip     Back pain     MAYA (obstructive sleep apnea)/Hypoventilation Syndrome     Major depressive disorder, recurrent episode, moderate (H)     Paresthesias     Health Care Home     Vitamin D deficiency     Morbid obesity with BMI of 40.0-44.9, adult (H)     Neuropathy (H)     Hyperlipidemia with target LDL less than 130     Intervertebral disc prolapse with impingement     Nonallopathic lesion of pelvic region     Nonallopathic lesion of lumbar region     Chronic pain syndrome     Restless legs syndrome (RLS)     Insomnia     Migraine     Chronic bilateral back pain, unspecified back location     Chronic pain of left knee     ROSE MARIE (generalized anxiety disorder)     Past Surgical History:   Procedure  Laterality Date     ANKLE SURGERY  7/13    Left for torn tendons & loose bone chips     ARTHROSCOPY KNEE  1986    Right knee     BACK SURGERY       Basal cell carcinoma  2011    Removal from the chest     BIOPSY       C VAGINAL HYSTERECTOMY  2011     CARPAL TUNNEL RELEASE RT/LT  ~2010    Bilateral     COLONOSCOPY  2016     COSMETIC SURGERY       COSMETIC SURGERY       DECOMPRESSION CUBITAL TUNNEL Left 8/28/2015    Procedure: DECOMPRESSION CUBITAL TUNNEL;  Surgeon: Gus Donato MD;  [facility]     ENT SURGERY  1975    Tonsillectomy and Adenoids     GYN SURGERY  2011    Hysterectomy     HC REPAIR PERONEAL TENDONS  7/13     ORTHOPEDIC SURGERY  2011, 2011    Bilateral CTR     PE TUBES      yearly times 10 years     REPAIR CLEFT PALATE CHILD      Age 3 months & afterwards (multiple surgeries)     SOFT TISSUE SURGERY       SOFT TISSUE SURGERY  2013       Social History   Substance Use Topics     Smoking status: Former Smoker     Packs/day: 0.50     Years: 15.00     Types: Cigarettes     Quit date: 2/20/2015     Smokeless tobacco: Never Used     Alcohol use No      Comment: Quit in September 27th     Family History   Problem Relation Age of Onset     Alzheimer Disease Paternal Grandmother 60     CEREBROVASCULAR DISEASE Paternal Grandmother      Neurologic Disorder Sister 20     migraines     Depression/Anxiety Sister      Depression Sister      Neurologic Disorder Son 14     migraines     Asthma Son      HEART DISEASE Mother      OSTEOPOROSIS Mother      Obesity Mother      CANCER Father      Alcohol/Drug Father      Other Cancer Father      saliva glands & skin CA      Hypertension Father      DIABETES Maternal Grandmother      Breast Cancer Maternal Grandmother      Obesity Maternal Grandmother      Other Cancer Maternal Grandfather      skin cancer     Cancer - colorectal Other      Aunt     Asthma Daughter      Asthma Daughter      Asthma Son      Thyroid Disease Sister      Colon Cancer Other       Obesity Sister      Glaucoma No family hx of      Macular Degeneration No family hx of          Current Outpatient Prescriptions   Medication Sig Dispense Refill     oxyCODONE-acetaminophen (PERCOCET)  MG per tablet Take 1 tablet by mouth every 6 hours as needed for moderate to severe pain 90 tablet 0     cyclobenzaprine (FLEXERIL) 10 MG tablet Take 0.5-1 tablets (5-10 mg) by mouth 3 times daily as needed for muscle spasms 30 tablet 1     FLUoxetine (PROZAC) 40 MG capsule Take 1 capsule (40 mg) by mouth daily 30 capsule 3     Pregabalin (LYRICA) 225 MG CAPS Take 225 mg by mouth 2 times daily 60 capsule 3     furosemide (LASIX) 20 MG tablet Take 1 tablet (20 mg) by mouth daily 30 tablet 3     nystatin (MYCOSTATIN) 464929 UNIT/GM POWD Apply to affected area twice daily as needed 60 g 2     diclofenac (VOLTAREN) 1 % GEL topical gel Apply  2 grams to foot four times daily as needed for pain using enclosed dosing card. 100 g 1     lidocaine-prilocaine (EMLA) cream Apply dime size amount to right hip and left knee qid prn, May use same time as Diclofinac.future refill through PCP 30 g 0     SUMAtriptan (IMITREX) 100 MG tablet Take 1 tablet (100 mg) by mouth at onset of headache for migraine May repeat in 2 hours if needed: max 2/day 9 tablet 2     acetaminophen (TYLENOL) 325 MG tablet Take 2 tablets (650 mg) by mouth every 4 hours as needed for other (mild pain) 100 tablet 0     ORDER FOR DME Respironics REMSTAR 60 Series Auto KODE3os H2O, Airfit P10 nasal pillow mask w/xsmall pillows       Multiple Vitamins-Minerals (MULTI FOR HER PO) Take by mouth daily        No Known Allergies      Reviewed and updated as needed this visit by clinical staffTobacco  Allergies  Med Hx  Surg Hx  Fam Hx  Soc Hx      Reviewed and updated as needed this visit by Provider         ROS:  Mainly significant for the above    OBJECTIVE:     /67 (BP Location: Right arm, Patient Position: Chair, Cuff Size: Adult Large)  Pulse 73   Temp 97  F (36.1  C) (Oral)  Wt 227 lb (103 kg)  SpO2 100%  BMI 40.21 kg/m2  Body mass index is 40.21 kg/(m^2).  GENERAL: alert, no distress and obese  MS: She has discomfort to palpation over the lateral aspect of the right hip overlying the greater trochanter. She does not have pain like this over the left hip. She has some mild right hip discomfort with range of motion, especially external rotation. No point tenderness in the groin.    Diagnostic Test Results:  Her ER note from yesterday was reviewed via care everywhere. X-ray results showed no significant osteoarthritis.    ASSESSMENT/PLAN:         ICD-10-CM    1. Hip pain, right M25.551    2. Trochanteric bursitis of right hip M70.61      I suspect her right hip pain is primarily due to greater trochanteric bursitis  I discussed this with her and reviewed conservative care for it, including a possible cortisone shot  She is going to see orthopedics later this afternoon and because of that I will defer any specific treatment of this to them (otherwise, I would probably recommend a cortisone shot to be done at this visit)  She will see neurology later in the week    If new, worsening or persistent symptoms, the patient is to call or return for a recheck sooner, otherwise she'll be returning in a few weeks to see me for her annual physical      Srinivasa Hunter MD  [facility]

## 2017-09-25 NOTE — TELEPHONE ENCOUNTER
Reason for Call:  Same Day Appointment, Requested Provider:  Srinivasa Hunter MD    PCP: Srinivasa Hunter    Reason for visit: Hospital follow up for hip pain    Duration of symptoms: Ongoing    Have you been treated for this in the past? Yes    Additional comments: patient asked if there is any way Dr. Hunter can work her into his schedule today. She declined seeing another provider. Please call back to discuss.    Can we leave a detailed message on this number? YES    Phone number patient can be reached at: Home number on file [phone] (home)    Best Time: Anytime    Call taken on 9/25/2017 at 8:26 AM by Afua Sarkar

## 2017-09-25 NOTE — MR AVS SNAPSHOT
After Visit Summary   9/25/2017    Velma Diaz    MRN: [id number]           Patient Information     Date Of Birth          1970        Visit Information        Provider Department      9/25/2017 2:00 PM Srinivasa Hunter MD [facility]        Today's Diagnoses     Hip pain, right    -  1    Trochanteric bursitis of right hip           Follow-ups after your visit        Follow-up notes from your care team     Return if symptoms worsen or fail to improve.      Your next 10 appointments already scheduled     Sep 29, 2017 10:40 AM CDT   Return Visit with Debra Hicks MD   [facility] ([facility])    [address] [place] [address]   [phone]            Oct 09, 2017 10:00 AM CDT   [facility]    [address] [facility] [address] [phone]            Oct 18, 2017 10:00 AM CDT   PHYSICAL with Srinivasa Hunter MD   [facility] ([facility])    [address] [facility] [address] [phone]              Who to contact     If you have questions or need follow up information about today's clinic visit or your schedule please contact [facility] directly at [phone].  Normal or non-critical lab and imaging results will be communicated to you by MyChart, letter or phone within 4 business days after the clinic has received the results. If you do not hear from us within 7 days, please contact the clinic through MyChart or phone. If you have a critical or abnormal lab result, we will notify you by phone as soon as possible.  Submit refill requests through [employer] or call your pharmacy and they will forward the refill request to us. Please allow 3 business days for your refill to be completed.           Additional Information About Your Visit        MyChart Information     [employer] gives you secure access to your electronic health record. If you see a primary care provider, you can also send messages to your care team and make appointments. If you have questions, please call your primary care clinic.  If you do not have a primary care provider, please call [phone] and they will assist you.        Care EveryWhere ID     This is your Care EveryWhere ID. This could be used by other organizations to access your [place] medical records  [id number]        Your Vitals Were     Pulse Temperature Pulse Oximetry BMI (Body Mass Index)          73 97  F (36.1  C) (Oral) 100% 40.21 kg/m2         Blood Pressure from Last 3 Encounters:   09/25/17 107/67   07/19/17 121/84   03/25/17 112/79    Weight from Last 3 Encounters:   09/25/17 227 lb (103 kg)   09/22/17 224 lb (101.6 kg)   07/19/17 225 lb (102.1 kg)              Today, you had the following     No orders found for display       Primary Care Provider Office Phone # Fax #    Srinivasa Hunter [phone]       [address] [facility] [address]        Goals        General    I will call within next 2 weeks to schedule initial psychiatry appointment (pt-stated)     Notes - Note edited  5/20/2015 12:51 PM by Yesica Marsh    As of today's date 5/20/2015 goal is met at 76 - 100%.   Goal Status:  Complete  Patient states this is scheduled with Dr. Salvador for next month, but not on appointment calendar  LUDY Stark, MSW   [phone]  5/20/2015 12:51 PM        I will complete [employer] or reduced fare application within the next month (pt-stated)     Notes - Note edited  11/24/2015 12:00 PM by Yesica Marsh    As of today's date 11/24/2015 goal is met at 51 - 75%.   Goal Status:  Active  She reported today having [employer] application, [facility] worker has requested but packet from [employer].    LUDY Stark, MSW    [phone]  11/24/2015 12:00 PM        I will discuss ARM worker with therapist next week for housing needs  (pt-stated)     Notes - Note edited  12/3/2015  1:17 PM by Yesica Marsh    As of today's date 12/3/2015 goal is met at 76 - 100%.   Goal Status:  Discontinued  Patient has [employer] worker and has found housing with friend  Yesica QUIGLEYHan Marsh, LSW, MSW   [phone]  12/3/2015 1:16 PM        Equal Access to Services     [facility]: Hadii aad ku hadasho Soomaali, waaxda luqadaha, qaybta kaalmada adeegyada, waxay idiin hayaan adeeg khararadha carrillo . So [facility] [phone].    ATENCIÓN: Si habla español, tiene a dowd disposición servicios gratuitos de asistencia lingüística. Llame al [phone].    We comply with applicable federal civil rights laws and [place] laws. We do not discriminate on the basis of race, color, national origin, age, disability sex, sexual orientation or gender identity.            Thank you!     Thank you for choosing [facility]  for your care. Our goal is always to provide you with excellent care. Hearing back from our patients is one way we can continue to improve our services. Please take a few minutes to complete the written survey that you may receive in the mail after your visit with us. Thank you!             Your Updated Medication List - Protect others around you: Learn how to safely use, store and throw away your medicines at www.disposemymeds.org.          This list is accurate as of: 9/25/17  2:23 PM.  Always use your most recent med list.                   Brand Name Dispense Instructions for use Diagnosis    acetaminophen 325 MG tablet    TYLENOL    100 tablet    Take 2 tablets (650 mg) by mouth every 4 hours as needed for other (mild pain)    Lesion of left ulnar nerve       cyclobenzaprine 10 MG tablet    FLEXERIL    30 tablet    Take 0.5-1 tablets (5-10 mg) by mouth 3 times daily as needed for muscle spasms    Chronic bilateral back pain,  unspecified back location       diclofenac 1 % Gel topical gel    VOLTAREN    100 g    Apply  2 grams to foot four times daily as needed for pain using enclosed dosing card.    Chronic pain of both ankles, Neuropathy (H), Pes planus of both feet, Plantar fasciitis, bilateral, Equinus contracture of right ankle, Equinus contracture of left ankle, Morbid obesity with BMI of 40.0-44.9, adult (H)       FLUoxetine 40 MG capsule    PROzac    30 capsule    Take 1 capsule (40 mg) by mouth daily    Anxiety and depression       furosemide 20 MG tablet    LASIX    30 tablet    Take 1 tablet (20 mg) by mouth daily    Leg swelling       lidocaine-prilocaine cream    EMLA    30 g    Apply dime size amount to right hip and left knee qid prn, May use same time as Diclofinac.future refill through PCP    Other chronic pain, Fibromyalgia, Myalgia and myositis, Enthesopathy of hip region, right, Left knee pain       MULTI FOR HER PO      Take by mouth daily        nystatin 442394 UNIT/GM Powd    MYCOSTATIN    60 g    Apply to affected area twice daily as needed    Candidal intertrigo       order for DME      Respironics REMSTAR 60 Series Auto [id number] H2O, Airfit P10 nasal pillow mask w/xsmall pillows        oxyCODONE-acetaminophen  MG per tablet    PERCOCET    90 tablet    Take 1 tablet by mouth every 6 hours as needed for moderate to severe pain    Chronic pain syndrome       Pregabalin 225 MG Caps    LYRICA    60 capsule    Take 225 mg by mouth 2 times daily    Other chronic pain       SUMAtriptan 100 MG tablet    IMITREX    9 tablet    Take 1 tablet (100 mg) by mouth at onset of headache for migraine May repeat in 2 hours if needed: max 2/day    Headache(784.0)

## 2017-09-27 DIAGNOSIS — F51.01 PRIMARY INSOMNIA: ICD-10-CM

## 2017-09-27 NOTE — TELEPHONE ENCOUNTER
traZODone (DESYREL) 50 MG tablet (Discontinued)      Last Written Prescription Date:  6/21/17  Last Fill Quantity: 30,   # refills: 2  Last Office Visit with [facility], [facility] or [facility] prescribing provider: 9/25/17  Future Office visit:    Next 5 appointments (look out 90 days)     Sep 29, 2017 10:40 AM CDT   Return Visit with Debra Hicks MD   [facility]    [address] [facility] [address]   [phone]            Oct 09, 2017 10:00 AM CDT   Return Visit with SERVANDO Yan   [facility]    [address] [facility] [id number] [phone]            Oct 18, 2017 10:00 AM CDT   PHYSICAL with Srinivasa Hunter MD   [facility])    [address] [facility] [id number] [phone]                   Routing refill request to provider for review/approval because:  Drug not active on patient's medication list

## 2017-09-29 ENCOUNTER — TELEPHONE (OUTPATIENT)
Dept: NEUROLOGY | Facility: CLINIC | Age: 47
End: 2017-09-29

## 2017-09-29 ENCOUNTER — OFFICE VISIT (OUTPATIENT)
Dept: NEUROLOGY | Facility: CLINIC | Age: 47
End: 2017-09-29
Payer: COMMERCIAL

## 2017-09-29 VITALS
HEART RATE: 60 BPM | DIASTOLIC BLOOD PRESSURE: 81 MMHG | SYSTOLIC BLOOD PRESSURE: 125 MMHG | BODY MASS INDEX: 39.76 KG/M2 | WEIGHT: 224.4 LBS | HEIGHT: 63 IN

## 2017-09-29 DIAGNOSIS — G25.81 RESTLESS LEGS SYNDROME (RLS): Primary | ICD-10-CM

## 2017-09-29 DIAGNOSIS — G60.9 IDIOPATHIC PERIPHERAL NEUROPATHY: ICD-10-CM

## 2017-09-29 PROCEDURE — 99213 OFFICE O/P EST LOW 20 MIN: CPT | Performed by: PSYCHIATRY & NEUROLOGY

## 2017-09-29 RX ORDER — TRAZODONE HYDROCHLORIDE 50 MG/1
TABLET, FILM COATED ORAL
Qty: 30 TABLET | Refills: 2 | OUTPATIENT
Start: 2017-09-29

## 2017-09-29 NOTE — MR AVS SNAPSHOT
After Visit Summary   9/29/2017    Velma Diaz    MRN: [id number]           Patient Information     Date Of Birth          1970        Visit Information        Provider Department      9/29/2017 10:40 AM Debra Javier MD [facility]        Today's Diagnoses     Restless legs syndrome (RLS)    -  1    Idiopathic peripheral neuropathy-Clinically          Care Instructions    AFTER VISIT SUMMARY (AVS)  Signed Prescriptions:                        Disp   Refills    Rotigotine (NEUPRO) 1 MG/24HR 24 hr patch  30 pat*1        Sig: Place 1 patch onto the skin every evening  Authorizing Provider: DEBRA JAVIER     Educational material on Restless legs syndrome    Diagnostic possibilities reviewed and reasons for work-up explained    Preventive Neurology: Encouraged to keep physically and mentally active with particular emphasis on daily stretching exercises, walking, and healthy eating.    To call us for follow-up appointment in next 6 week(s) or earlier if needed.                        Follow-ups after your visit        Follow-up notes from your care team     Return in about 6 weeks (around 11/10/2017) for Follow-up.      Your next 10 appointments already scheduled     Oct 09, 2017 10:00 AM CDT   Return Visit with SERVANDO Yan   [facility] ([facility])    [address] [facility] [id number] [phone]            Oct 18, 2017 10:00 AM CDT   PHYSICAL with Sriinvasa Hunter MD   [facility] ([facility])    [address] [place] [id number] [phone]              Who to contact     If you have questions or need follow up information about today's clinic visit or your schedule please contact [facility] directly at [phone].  Normal or non-critical lab and imaging results will be communicated to you by  "MyChart, letter or phone within 4 business days after the clinic has received the results. If you do not hear from us within 7 days, please contact the clinic through [email or web address] or phone. If you have a critical or abnormal lab result, we will notify you by phone as soon as possible.  Submit refill requests through [employer] or call your pharmacy and they will forward the refill request to us. Please allow 3 business days for your refill to be completed.          Additional Information About Your Visit        [email or web address] Information     [employer] gives you secure access to your electronic health record. If you see a primary care provider, you can also send messages to your care team and make appointments. If you have questions, please call your primary care clinic.  If you do not have a primary care provider, please call [phone] and they will assist you.        Care EveryWhere ID     This is your Care EveryWhere ID. This could be used by other organizations to access your [place] medical records  [id number]        Your Vitals Were     Pulse Height BMI (Body Mass Index)             60 1.6 m (5' 3\") 39.75 kg/m2          Blood Pressure from Last 3 Encounters:   09/29/17 125/81   09/25/17 107/67   07/19/17 121/84    Weight from Last 3 Encounters:   09/29/17 101.8 kg (224 lb 6.4 oz)   09/25/17 103 kg (227 lb)   09/22/17 101.6 kg (224 lb)              Today, you had the following     No orders found for display         Today's Medication Changes          These changes are accurate as of: 9/29/17 11:05 AM.  If you have any questions, ask your nurse or doctor.               Start taking these medicines.        Dose/Directions    Rotigotine 1 MG/24HR 24 hr patch   Commonly known as:  NEUPRO   Used for:  Restless legs syndrome (RLS)   Started by:  Debra Hicks MD        Dose:  1 patch   Place 1 patch onto the skin every evening   Quantity:  30 patch   Refills:  1            Where to get your medicines      These medications " were sent to [facility]/pharmacy #[id number] - [place], [place] - [address] [address]     Phone:  [phone]     Rotigotine 1 MG/24HR 24 hr patch                Primary Care Provider Office Phone # Fax #    Srinivasa Hunter -[phone]       [address] [facility] [address]        Goals        General    I will call within next 2 weeks to schedule initial psychiatry appointment (pt-stated)     Notes - Note edited  5/20/2015 12:51 PM by Yesica Marsh    As of today's date 5/20/2015 goal is met at 76 - 100%.   Goal Status:  Complete  Patient states this is scheduled with Dr. Salvador for next month, but not on appointment calendar  LUDY Stark, MSW   [phone]  5/20/2015 12:51 PM        I will complete [employer] or reduced fare application within the next month (pt-stated)     Notes - Note edited  11/24/2015 12:00 PM by Yesica Marsh    As of today's date 11/24/2015 goal is met at 51 - 75%.   Goal Status:  Active  She reported today having [employer] application, [employer] worker has requested but packet from [employer].    LUDY Stark, MSW   [phone]  11/24/2015 12:00 PM        I will discuss [employer] worker with therapist next week for housing needs  (pt-stated)     Notes - Note edited  12/3/2015  1:17 PM by Yesica Marsh    As of today's date 12/3/2015 goal is met at 76 - 100%.   Goal Status:  Discontinued  Patient has [employer] worker and has found housing with friend  LUDY Stark, MSW   [phone]  12/3/2015 1:16 PM        Equal Access to Services     [facility]: Hadii antionette gutierrez hadsandrao Sotheresaali, waaxda luqadaha, qaybta kaalmada adeegyada, grayson sauceda. So [facility] [phone].    ATENCIÓN: Si habla español, tiene a dowd disposición servicios gratuitos de asistencia lingüística. Llame al [phone].    We comply with applicable federal civil rights laws and [place] laws. We do not  discriminate on the basis of race, color, national origin, age, disability sex, sexual orientation or gender identity.            Thank you!     Thank you for choosing [facility]  for your care. Our goal is always to provide you with excellent care. Hearing back from our patients is one way we can continue to improve our services. Please take a few minutes to complete the written survey that you may receive in the mail after your visit with us. Thank you!             Your Updated Medication List - Protect others around you: Learn how to safely use, store and throw away your medicines at www.disposemymeds.org.          This list is accurate as of: 9/29/17 11:05 AM.  Always use your most recent med list.                   Brand Name Dispense Instructions for use Diagnosis    acetaminophen 325 MG tablet    TYLENOL    100 tablet    Take 2 tablets (650 mg) by mouth every 4 hours as needed for other (mild pain)    Lesion of left ulnar nerve       cyclobenzaprine 10 MG tablet    FLEXERIL    30 tablet    Take 0.5-1 tablets (5-10 mg) by mouth 3 times daily as needed for muscle spasms    Chronic bilateral back pain, unspecified back location       FLUoxetine 40 MG capsule    PROzac    30 capsule    Take 1 capsule (40 mg) by mouth daily    Anxiety and depression       furosemide 20 MG tablet    LASIX    30 tablet    Take 1 tablet (20 mg) by mouth daily    Leg swelling       MULTI FOR HER PO      Take by mouth daily        nystatin 958072 UNIT/GM Powd    MYCOSTATIN    60 g    Apply to affected area twice daily as needed    Candidal intertrigo       order for DME      Respironics REMSTAR 60 Series Auto [id number] H2O, Airfit P10 nasal pillow mask w/xsmall pillows        oxyCODONE-acetaminophen  MG per tablet    PERCOCET    90 tablet    Take 1 tablet by mouth every 6 hours as needed for moderate to severe pain    Chronic pain syndrome       Pregabalin 225 MG Caps    LYRICA    60 capsule    Take 225 mg by  mouth 2 times daily    Other chronic pain       Rotigotine 1 MG/24HR 24 hr patch    NEUPRO    30 patch    Place 1 patch onto the skin every evening    Restless legs syndrome (RLS)       SUMAtriptan 100 MG tablet    IMITREX    9 tablet    Take 1 tablet (100 mg) by mouth at onset of headache for migraine May repeat in 2 hours if needed: max 2/day    Headache(784.0)

## 2017-09-29 NOTE — TELEPHONE ENCOUNTER
[facility] is faxing a PA request:    Plan does not cover Rotigotine (NEUPRO) 1 MG/24HR 24 hr patch.  Please call none given to initiate Prior Auth or change med.    Insurance type and ID number: none given       Additional Information:     Cristal Sullivan

## 2017-09-29 NOTE — PROGRESS NOTES
ESTABLISHED PATIENT NEUROLOGY NOTE    LOCATION: [facility]  DATE OF VISIT: 2017  NAME: Ms.Kimberly WANDA Diaz  : 1970 (46 year old)  MR #: [id number]    PRIMARY/REFERRING PROVIDER: Srinivasa Hunter MD    REASON FOR VISIT: Lower limb paresthesia    HISTORY OF PRESENT ILLNESS: Ms. Diaz is 46 year old year old woman with the symptoms of feet paresthesias for last few years. She had symptoms of restless leg syndrome also. On detailed question she relates that she has a crawling sensation over her lower limbs in addition to urge to constantly keep moving her toes and legs and urge to keep walking in the house. These activities tend to give some temporary sense of relief. I note that she has been taking Lyrica.    Further review of the chart indicates that her ferritin was in the normal range.    She was given pramipexole last time and she took the pramipexole 0.125 mg 3 times a day. She relates that pramipexole did not help her. It made her forgetful. She felt as if she was in a fog. She discontinued taking it.    Recent Diagnostic Studies:   Previous Electromyography Lower limbs  study reviewed with her-normal study    Blood tests:    Component      Latest Ref Rng & Units 2/10/2017   Ferritin      8 - 252 ng/mL 62       No Known Allergies    Current Outpatient Prescriptions on File Prior to Visit:  oxyCODONE-acetaminophen (PERCOCET)  MG per tablet Take 1 tablet by mouth every 6 hours as needed for moderate to severe pain   cyclobenzaprine (FLEXERIL) 10 MG tablet Take 0.5-1 tablets (5-10 mg) by mouth 3 times daily as needed for muscle spasms   FLUoxetine (PROZAC) 40 MG capsule Take 1 capsule (40 mg) by mouth daily   Pregabalin (LYRICA) 225 MG CAPS Take 225 mg by mouth 2 times daily   furosemide (LASIX) 20 MG tablet Take 1 tablet (20 mg) by mouth daily   SUMAtriptan (IMITREX) 100 MG tablet Take 1 tablet (100 mg) by mouth at onset of headache for migraine May repeat in 2  hours if needed: max 2/day   acetaminophen (TYLENOL) 325 MG tablet Take 2 tablets (650 mg) by mouth every 4 hours as needed for other (mild pain)   ORDER FOR [facility] Respironics REMSTAR 60 Series Auto [id number] H2O, Airfit P10 nasal pillow mask w/xsmall pillows   Multiple Vitamins-Minerals (MULTI FOR HER PO) Take by mouth daily    nystatin (MYCOSTATIN) 001238 UNIT/GM POWD Apply to affected area twice daily as needed (Patient not taking: Reported on 9/29/2017)     No current facility-administered medications on file prior to visit.   Past Medical History:   Diagnosis Date     Chronic pain syndrome 11/20/2015    She takes up to 90 oxycodone tablets per month for her chronic pain      Depressive disorder 2000     History of cleft palate with cleft lip     cleft lip and palate, and has had 27 operations per the patient     Hyperlipidemia with target LDL less than 130 10/26/2015     Morbid obesity with BMI of 40.0-44.9, adult (H) 10/26/2015     Neuropathy (H)      MAYA (obstructive sleep apnea)/Hypoventilation Syndrome 2/24/2014    Study Date: 2/13/2014- (245.1 lbs) Sleep Associated Hypoventilation  suggested with baseline PCO2 42 mmHg, maximum PCO2 55 mmHg. Lowest oxygen saturation 85.0% Apnea/Hypopnea Index 5.5 events per hour.  REM AHI 37.2.  The supine AHI is 13.8. RDI 6.7 Sleep study 3/2014 (245#)- CPAP 6 cm effective, Transcutaneous CO2 Monitoring (TCM) within normal limits.          Skin cancer of anterior chest 2011    Basal cell on chest     TMJ (temporomandibular joint disorder)      Past Surgical History:   Procedure Laterality Date     ANKLE SURGERY  7/13    Left for torn tendons & loose bone chips     ARTHROSCOPY KNEE  1986    Right knee     BACK SURGERY       Basal cell carcinoma  2011    Removal from the chest     BIOPSY       C VAGINAL HYSTERECTOMY  2011     CARPAL TUNNEL RELEASE RT/LT  ~2010    Bilateral     COLONOSCOPY  2016     COSMETIC SURGERY       COSMETIC SURGERY       DECOMPRESSION CUBITAL TUNNEL Left  "8/28/2015    Procedure: DECOMPRESSION CUBITAL TUNNEL;  Surgeon: Gus Donato MD;  [facility]     ENT SURGERY  1975    Tonsillectomy and Adenoids     GYN SURGERY  2011    Hysterectomy     HC REPAIR PERONEAL TENDONS  7/13     ORTHOPEDIC SURGERY  2011, 2011    Bilateral CTR     PE TUBES      yearly times 10 years     REPAIR CLEFT PALATE CHILD      Age 3 months & afterwards (multiple surgeries)     SOFT TISSUE SURGERY       SOFT TISSUE SURGERY  2013     PERSONAL & SOCIAL HISTORY Reviewed and documented in [facility]  GENERAL EXAMINATION: /81 (BP Location: Left arm, Patient Position: Chair, Cuff Size: Adult Large)  Pulse 60  Ht 1.6 m (5' 3\")  Wt 101.8 kg (224 lb 6.4 oz)  BMI 39.75 kg/m2    IMPRESSION:   Encounter Diagnoses   Name Primary?     Restless legs syndrome (RLS) Yes     Idiopathic peripheral neuropathy-Clinically      COMMENTS: Started her on REQUIP-patch as Pramipexole has not been not helpful and moreover gave her side effects.     PLANS:   Patient Instructions   AFTER VISIT SUMMARY (AVS)  Signed Prescriptions:                        Disp   Refills    Rotigotine (NEUPRO) 1 MG/24HR 24 hr patch  30 pat*1        Sig: Place 1 patch onto the skin every evening  Authorizing Provider: DEBRA JAVIER     Educational material on Restless legs syndrome    Diagnostic possibilities reviewed and reasons for work-up explained    Preventive Neurology: Encouraged to keep physically and mentally active with particular emphasis on daily stretching exercises, walking, and healthy eating.    To call us for follow-up appointment in next 6 week(s) or earlier if needed.    Thanks to Srinivasa Hunter MD for allowing me to participate in Ms. Diaz's care. Please feel free to call me with any questions or concerns.     *Chart documentation was completed in part with Dragon voice-recognition software. Even though reviewed, some grammatical, spelling, and word errors may remain.     Debra Javier MD, " MRCPI  Neurologist    Cc: Srinivasa Hunter MD

## 2017-09-29 NOTE — TELEPHONE ENCOUNTER
Trazodone D/C'ed at 7/19/17 OV, will have to contact pharmacy to see if automated refill and if so notify that patient D/C'ed david Khan RN  [facility]

## 2017-09-29 NOTE — TELEPHONE ENCOUNTER
"I called pharmacy who reports the patient got this med in June, July, and August.   Patient is requesting refill, not on auto refill.    Patient was seen 9/25/17 by PCP for hip pain, see note:    I suspect her right hip pain is primarily due to greater trochanteric bursitis  I discussed this with her and reviewed conservative care for it, including a possible cortisone shot  She is going to see orthopedics later this afternoon and because of that I will defer any specific treatment of this to them (otherwise, I would probably recommend a cortisone shot to be done at this visit)  She will see neurology later in the week     If new, worsening or persistent symptoms, the patient is to call or return for a recheck sooner, otherwise she'll be returning in a few weeks to see me for her annual physical        Srinivasa Hunter MD  [facility]      I see the med D/C is \"stopped by patient\" per MA at the 7/19/17 visit.    I called patient who states we can disregard this request, she has enough (uses prn) and will discuss use at upcoming visit in a couple of weeks.  \"Refusal\" routed back to pharmacy with note.  Judi Yeh RN  [facility]      "

## 2017-09-29 NOTE — TELEPHONE ENCOUNTER
Called and spoke with pharmacy and was given patient insurance information for [facility]. I was told the patch that was order is $700.00 out of pocket for patient. Prior Authorization would be needed. Number to [facility] given [phone].  Angeles Banerjee MA

## 2017-09-29 NOTE — TELEPHONE ENCOUNTER
[facility] [employer] gave me number for [facility] [phone] and was told they will have Prior Authorization form faxed to [facility]  Angeles Banerjee MA

## 2017-09-29 NOTE — PATIENT INSTRUCTIONS
AFTER VISIT SUMMARY (AVS)  Signed Prescriptions:                        Disp   Refills    Rotigotine (NEUPRO) 1 MG/24HR 24 hr patch  30 pat*1        Sig: Place 1 patch onto the skin every evening  Authorizing Provider: BAYRON JAVIER     Educational material on Restless legs syndrome    Diagnostic possibilities reviewed and reasons for work-up explained    Preventive Neurology: Encouraged to keep physically and mentally active with particular emphasis on daily stretching exercises, walking, and healthy eating.    To call us for follow-up appointment in next 6 week(s) or earlier if needed.

## 2017-09-29 NOTE — NURSING NOTE
"Chief Complaint   Patient presents with     RECHECK     Increase numbness and tingling in feet and hands       Initial /81 (BP Location: Left arm, Patient Position: Chair, Cuff Size: Adult Large)  Pulse 60  Ht 1.6 m (5' 3\")  Wt 101.8 kg (224 lb 6.4 oz)  BMI 39.75 kg/m2 Estimated body mass index is 39.75 kg/(m^2) as calculated from the following:    Height as of this encounter: 1.6 m (5' 3\").    Weight as of this encounter: 101.8 kg (224 lb 6.4 oz).  Medication Reconciliation: complete   Angeles Banerjee MA      "

## 2017-09-30 ENCOUNTER — MYC MEDICAL ADVICE (OUTPATIENT)
Dept: NEUROLOGY | Facility: CLINIC | Age: 47
End: 2017-09-30

## 2017-10-03 NOTE — TELEPHONE ENCOUNTER
PA form received and partial completely, will place on provider desk for completion  Angeles Banerjee MA

## 2017-10-09 ENCOUNTER — OFFICE VISIT (OUTPATIENT)
Dept: PSYCHOLOGY | Facility: CLINIC | Age: 47
End: 2017-10-09
Payer: COMMERCIAL

## 2017-10-09 DIAGNOSIS — F41.1 GAD (GENERALIZED ANXIETY DISORDER): Primary | ICD-10-CM

## 2017-10-09 PROCEDURE — 90834 PSYTX W PT 45 MINUTES: CPT | Performed by: SOCIAL WORKER

## 2017-10-09 ASSESSMENT — ANXIETY QUESTIONNAIRES
1. FEELING NERVOUS, ANXIOUS, OR ON EDGE: MORE THAN HALF THE DAYS
3. WORRYING TOO MUCH ABOUT DIFFERENT THINGS: SEVERAL DAYS
IF YOU CHECKED OFF ANY PROBLEMS ON THIS QUESTIONNAIRE, HOW DIFFICULT HAVE THESE PROBLEMS MADE IT FOR YOU TO DO YOUR WORK, TAKE CARE OF THINGS AT HOME, OR GET ALONG WITH OTHER PEOPLE: SOMEWHAT DIFFICULT
7. FEELING AFRAID AS IF SOMETHING AWFUL MIGHT HAPPEN: SEVERAL DAYS
GAD7 TOTAL SCORE: 8
6. BECOMING EASILY ANNOYED OR IRRITABLE: SEVERAL DAYS
5. BEING SO RESTLESS THAT IT IS HARD TO SIT STILL: SEVERAL DAYS
2. NOT BEING ABLE TO STOP OR CONTROL WORRYING: SEVERAL DAYS

## 2017-10-09 ASSESSMENT — PATIENT HEALTH QUESTIONNAIRE - PHQ9
5. POOR APPETITE OR OVEREATING: SEVERAL DAYS
SUM OF ALL RESPONSES TO PHQ QUESTIONS 1-9: 7

## 2017-10-09 NOTE — MR AVS SNAPSHOT
MRN:[id number]                      After Visit Summary   10/9/2017    Velma Diaz    MRN: [id number]           Visit Information        Provider Department      10/9/2017 10:00 AM [facility]      Your next 10 appointments already scheduled     Oct 18, 2017 10:00 AM CDT   PHYSICAL with Srinivasa Hunter MD   [facility] ([facility])    [address] [address]   [phone]            Oct 24, 2017  9:30 AM CDT   Return Visit with Antonio Rios [facility] ([facility])    [address] [facility] [address]   [phone]            Nov 06, 2017 10:00 AM CST   Return Visit with Antonio Rios [facility] ([facility])    [address] [facility] [address]   [phone]              MyChart Information     [employer] gives you secure access to your electronic health record. If you see a primary care provider, you can also send messages to your care team and make appointments. If you have questions, please call your primary care clinic.  If you do not have a primary care provider, please call [phone] and they will assist you.        Care EveryWhere ID     This is your Care EveryWhere ID. This could be used by other organizations to access your [place] medical records  [id number]        Equal Access to Services     LOLA HERNANDEZ AH: Hadii antionette britoo Sotheresaali, waaxda luqadaha, qaybta kaalmada adeegyada, grayson sauceda. So [facility] [phone].    ATENCIÓN: Si habla español, tiene a dowd disposición servicios gratuitos de asistencia lingüística. Llame al [phone].    We comply with applicable federal civil rights laws and [place] laws. We do not discriminate on the basis of race,  color, national origin, age, disability, sex, sexual orientation, or gender identity.
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
## 2017-10-10 ASSESSMENT — ANXIETY QUESTIONNAIRES: GAD7 TOTAL SCORE: 8

## 2017-10-12 ENCOUNTER — OFFICE VISIT (OUTPATIENT)
Dept: FAMILY MEDICINE | Facility: CLINIC | Age: 47
End: 2017-10-12
Payer: COMMERCIAL

## 2017-10-12 VITALS
BODY MASS INDEX: 39.33 KG/M2 | WEIGHT: 222 LBS | SYSTOLIC BLOOD PRESSURE: 107 MMHG | HEART RATE: 76 BPM | DIASTOLIC BLOOD PRESSURE: 69 MMHG | TEMPERATURE: 97.2 F | OXYGEN SATURATION: 97 %

## 2017-10-12 DIAGNOSIS — J01.00 ACUTE NON-RECURRENT MAXILLARY SINUSITIS: Primary | ICD-10-CM

## 2017-10-12 PROCEDURE — 99213 OFFICE O/P EST LOW 20 MIN: CPT | Performed by: NURSE PRACTITIONER

## 2017-10-12 ASSESSMENT — PAIN SCALES - GENERAL: PAINLEVEL: NO PAIN (0)

## 2017-10-12 NOTE — PROGRESS NOTES
SUBJECTIVE:   Velma Diaz is a 46 year old female who presents to clinic today for the following health issues:      Acute Illness   Acute illness concerns: Sinus infection  Onset: 1 week    Fever: no    Chills/Sweats: YES- chills    Headache (location?): YES- sinus headache    Sinus Pressure:YES- post-nasal drainage, facial pain and teeth pain    Conjunctivitis:  no    Ear Pain: YES: left    Rhinorrhea: no    Congestion: YES- head and nasal    Sore Throat: no     Cough: no    Wheeze: no    Decreased Appetite: YES    Nausea: no    Vomiting: no    Diarrhea:  no    Dysuria/Freq.: no    Fatigue/Achiness: YES    Sick/Strep Exposure: Grand kid with a cold     Therapies Tried and outcome: OTC clairitin and cold medication    Sinus symptoms for 1 week  Originally thought was due to allergies  Significantly worsened yesterday  Worse in bilateral maxillary sinuses  Over the counter cold medication not helping      Problem list and histories reviewed & adjusted, as indicated.  Additional history: none    Patient Active Problem List   Diagnosis     History of cleft palate with cleft lip     Back pain     MAYA (obstructive sleep apnea)/Hypoventilation Syndrome     Major depressive disorder, recurrent episode, moderate (H)     Paresthesias     Health Care Home     Vitamin D deficiency     Morbid obesity with BMI of 40.0-44.9, adult (H)     Neuropathy     Hyperlipidemia with target LDL less than 130     Intervertebral disc prolapse with impingement     Nonallopathic lesion of pelvic region     Nonallopathic lesion of lumbar region     Chronic pain syndrome     Restless legs syndrome (RLS)     Insomnia     Migraine     Chronic bilateral back pain, unspecified back location     Chronic pain of left knee     ROSE MARIE (generalized anxiety disorder)     Past Surgical History:   Procedure Laterality Date     ANKLE SURGERY  7/13    Left for torn tendons & loose bone chips     ARTHROSCOPY KNEE  1986    Right knee     BACK SURGERY        Basal cell carcinoma  2011    Removal from the chest     BIOPSY       C VAGINAL HYSTERECTOMY  2011     CARPAL TUNNEL RELEASE RT/LT  ~2010    Bilateral     COLONOSCOPY  2016     COSMETIC SURGERY       COSMETIC SURGERY       DECOMPRESSION CUBITAL TUNNEL Left 8/28/2015    Procedure: DECOMPRESSION CUBITAL TUNNEL;  Surgeon: Gus Donato MD;  [facility]     ENT SURGERY  1975    Tonsillectomy and Adenoids     GYN SURGERY  2011    Hysterectomy     HC REPAIR PERONEAL TENDONS  7/13     ORTHOPEDIC SURGERY  2011, 2011    Bilateral CTR     PE TUBES      yearly times 10 years     REPAIR CLEFT PALATE CHILD      Age 3 months & afterwards (multiple surgeries)     SOFT TISSUE SURGERY       SOFT TISSUE SURGERY  2013       Social History   Substance Use Topics     Smoking status: Former Smoker     Packs/day: 0.50     Years: 15.00     Types: Cigarettes     Quit date: 2/20/2015     Smokeless tobacco: Never Used     Alcohol use No      Comment: Quit in September 27th     Family History   Problem Relation Age of Onset     Alzheimer Disease Paternal Grandmother 60     CEREBROVASCULAR DISEASE Paternal Grandmother      Neurologic Disorder Sister 20     migraines     Depression/Anxiety Sister      Depression Sister      Neurologic Disorder Son 14     migraines     Asthma Son      HEART DISEASE Mother      OSTEOPOROSIS Mother      Obesity Mother      CANCER Father      Alcohol/Drug Father      Other Cancer Father      saliva glands & skin CA      Hypertension Father      DIABETES Maternal Grandmother      Breast Cancer Maternal Grandmother      Obesity Maternal Grandmother      Other Cancer Maternal Grandfather      skin cancer     Cancer - colorectal Other      Aunt     Asthma Daughter      Asthma Daughter      Asthma Son      Thyroid Disease Sister      Colon Cancer Other      Obesity Sister      Glaucoma No family hx of      Macular Degeneration No family hx of              Reviewed and updated as needed this visit by clinical  staffTobacco  Allergies  Meds  Med Hx  Surg Hx  Fam Hx  Soc Hx      Reviewed and updated as needed this visit by Provider         ROS:  Constitutional, HEENT, cardiovascular, pulmonary, gi and gu systems are negative, except as otherwise noted.      OBJECTIVE:   /69 (BP Location: Right arm, Patient Position: Chair, Cuff Size: Adult Large)  Pulse 76  Temp 97.2  F (36.2  C) (Oral)  Wt 222 lb (100.7 kg)  SpO2 97%  Breastfeeding? No  BMI 39.33 kg/m2  Body mass index is 39.33 kg/(m^2).  GENERAL: healthy, alert and no distress  HENT: normal cephalic/atraumatic, ear canals and TM's normal, nose and mouth without ulcers or lesions, nasal mucosa edematous , rhinorrhea yellow, oropharynx clear, oral mucous membranes moist and sinuses: maxillary tenderness on bilateral  NECK: no adenopathy, no asymmetry, masses, or scars and thyroid normal to palpation  RESP: lungs clear to auscultation - no rales, rhonchi or wheezes  CV: regular rate and rhythm, normal S1 S2, no S3 or S4, no murmur, click or rub, no peripheral edema and peripheral pulses strong    Diagnostic Test Results:  none     ASSESSMENT/PLAN:       ICD-10-CM    1. Acute non-recurrent maxillary sinusitis J01.00 amoxicillin-clavulanate (AUGMENTIN) 875-125 MG per tablet       Symptoms present > 1 week with double sickening  Recommend antibiotic treatment. Can continue with over the counter medications as needed for symptomatic relief  Steam inhalation, rest, hydration    CONNOR Maurice CNP  [facility]

## 2017-10-12 NOTE — MR AVS SNAPSHOT
After Visit Summary   10/12/2017    Velma Diaz    MRN: [id number]           Patient Information     Date Of Birth          1970        Visit Information        Provider Department      10/12/2017 11:40 AM Jessy Burgos APRN CNP [facility]        Today's Diagnoses     Acute non-recurrent maxillary sinusitis    -  1       Follow-ups after your visit        Your next 10 appointments already scheduled     Oct 18, 2017 10:00 AM CDT   PHYSICAL with Srinivasa Hunter MD   [facility] ([facility])    [address] [place] [address]   [phone]            Oct 24, 2017  9:30 AM CDT   Return Visit with SERVANDO Yan   [facility] ([address] [address]   [phone]            Nov 06, 2017 10:00 AM CST   Return Visit with SERVANDO Yan   [facility] ([address] [address]   [phone]              Who to contact     If you have questions or need follow up information about today's clinic visit or your schedule please contact [facility] directly at [phone].  Normal or non-critical lab and imaging results will be communicated to you by MyChart, letter or phone within 4 business days after the clinic has received the results. If you do not hear from us within 7 days, please contact the clinic through MyChart or phone. If you have a critical or abnormal lab result, we will notify you by phone as soon as possible.  Submit refill requests through [employer] or call your pharmacy and they will forward the refill request to us. Please allow 3 business days for your refill to be completed.          Additional Information About Your Visit        [employer] Information     [employer] gives you  secure access to your electronic health record. If you see a primary care provider, you can also send messages to your care team and make appointments. If you have questions, please call your primary care clinic.  If you do not have a primary care provider, please call [phone] and they will assist you.        Care EveryWhere ID     This is your Care EveryWhere ID. This could be used by other organizations to access your [place] medical records  [id number]        Your Vitals Were     Pulse Temperature Pulse Oximetry Breastfeeding? BMI (Body Mass Index)       76 97.2  F (36.2  C) (Oral) 97% No 39.33 kg/m2        Blood Pressure from Last 3 Encounters:   10/12/17 107/69   09/29/17 125/81   09/25/17 107/67    Weight from Last 3 Encounters:   10/12/17 222 lb (100.7 kg)   09/29/17 224 lb 6.4 oz (101.8 kg)   09/25/17 227 lb (103 kg)              Today, you had the following     No orders found for display         Today's Medication Changes          These changes are accurate as of: 10/12/17 11:51 AM.  If you have any questions, ask your nurse or doctor.               Start taking these medicines.        Dose/Directions    amoxicillin-clavulanate 875-125 MG per tablet   Commonly known as:  AUGMENTIN   Used for:  Acute non-recurrent maxillary sinusitis   Started by:  Jessy Burgos APRN CNP        Dose:  1 tablet   Take 1 tablet by mouth 2 times daily   Quantity:  20 tablet   Refills:  0            Where to get your medicines      These medications were sent to [facility]/pharmacy #[id number] - [place] - [address] [address]     Phone:  [phone]     amoxicillin-clavulanate 875-125 MG per tablet                Primary Care Provider Office Phone # Fax #    Srinivasa Hunter [phone]       [address] [facility] [address]        Goals        General    I will call within next 2 weeks to schedule initial psychiatry appointment (pt-stated)      Notes - Note edited  5/20/2015 12:51 PM by Yesica Marsh    As of today's date 5/20/2015 goal is met at 76 - 100%.   Goal Status:  Complete  Patient states this is scheduled with Dr. Salvador for next month, but not on appointment calendar  LUDY Stark, MSW   [phone]  5/20/2015 12:51 PM        I will complete [employer] or reduced fare application within the next month (pt-stated)     Notes - Note edited  11/24/2015 12:00 PM by Yesica Marsh    As of today's date 11/24/2015 goal is met at 51 - 75%.   Goal Status:  Active  She reported today having [employer] application, [employer] worker has requested but packet from [employer].    LUDY Stark, MSW   [phone]  11/24/2015 12:00 PM        I will discuss [employer] worker with therapist next week for housing needs  (pt-stated)     Notes - Note edited  12/3/2015  1:17 PM by Yesica Marsh    As of today's date 12/3/2015 goal is met at 76 - 100%.   Goal Status:  Discontinued  Patient has [employer] worker and has found housing with friend  LUDY Stark, MS   [phone]  12/3/2015 1:16 PM        Equal Access to Services     LOLA HERNANDEZ AH: Hadii antionette ku hadasho Soomaali, waaxda luqadaha, qaybta kaalmada adeegyada, grayson belle haylemuel carrillo . So [facility] [phone].    ATENCIÓN: Si habla español, tiene a dowd disposición servicios gratuitos de asistencia lingüística. Llame al [phone].    We comply with applicable federal civil rights laws and [place] laws. We do not discriminate on the basis of race, color, national origin, age, disability, sex, sexual orientation, or gender identity.            Thank you!     Thank you for choosing [facility]  for your care. Our goal is always to provide you with excellent care. Hearing back from our patients is one way we can continue to improve our services. Please take a few minutes to complete the written survey that you may receive in the mail after  your visit with us. Thank you!             Your Updated Medication List - Protect others around you: Learn how to safely use, store and throw away your medicines at www.disposemymeds.org.          This list is accurate as of: 10/12/17 11:51 AM.  Always use your most recent med list.                   Brand Name Dispense Instructions for use Diagnosis    acetaminophen 325 MG tablet    TYLENOL    100 tablet    Take 2 tablets (650 mg) by mouth every 4 hours as needed for other (mild pain)    Lesion of left ulnar nerve       amoxicillin-clavulanate 875-125 MG per tablet    AUGMENTIN    20 tablet    Take 1 tablet by mouth 2 times daily    Acute non-recurrent maxillary sinusitis       cyclobenzaprine 10 MG tablet    FLEXERIL    30 tablet    Take 0.5-1 tablets (5-10 mg) by mouth 3 times daily as needed for muscle spasms    Chronic bilateral back pain, unspecified back location       FLUoxetine 40 MG capsule    PROzac    30 capsule    Take 1 capsule (40 mg) by mouth daily    Anxiety and depression       furosemide 20 MG tablet    LASIX    30 tablet    Take 1 tablet (20 mg) by mouth daily    Leg swelling       MULTI FOR HER PO      Take by mouth daily        nystatin 004573 UNIT/GM Powd    MYCOSTATIN    60 g    Apply to affected area twice daily as needed    Candidal intertrigo       order for [facility]      Respironics REMSTAR 60 Series Auto MKLS3cu H2O, Airfit P10 nasal pillow mask w/xsmall pillows        oxyCODONE-acetaminophen  MG per tablet    PERCOCET    90 tablet    Take 1 tablet by mouth every 6 hours as needed for moderate to severe pain    Chronic pain syndrome       Pregabalin 225 MG Caps    LYRICA    60 capsule    Take 225 mg by mouth 2 times daily    Other chronic pain       Rotigotine 1 MG/24HR 24 hr patch    NEUPRO    30 patch    Place 1 patch onto the skin every evening    Restless legs syndrome (RLS)       SUMAtriptan 100 MG tablet    IMITREX    9 tablet    Take 1 tablet (100 mg) by mouth at onset of  headache for migraine May repeat in 2 hours if needed: max 2/day    Headache(784.0)

## 2017-10-12 NOTE — NURSING NOTE
"Chief Complaint   Patient presents with     URI       Initial /69 (BP Location: Right arm, Patient Position: Chair, Cuff Size: Adult Large)  Pulse 76  Temp 97.2  F (36.2  C) (Oral)  Wt 222 lb (100.7 kg)  SpO2 97%  Breastfeeding? No  BMI 39.33 kg/m2 Estimated body mass index is 39.33 kg/(m^2) as calculated from the following:    Height as of 9/29/17: 5' 3\" (1.6 m).    Weight as of this encounter: 222 lb (100.7 kg).  Medication Reconciliation: complete.  VENKATESH Durand MA      "

## 2017-10-13 DIAGNOSIS — G25.81 RESTLESS LEGS SYNDROME (RLS): Primary | ICD-10-CM

## 2017-10-13 RX ORDER — ROPINIROLE 0.25 MG/1
TABLET, FILM COATED ORAL
Qty: 180 TABLET | Refills: 1 | Status: SHIPPED | OUTPATIENT
Start: 2017-10-13 | End: 2018-07-13

## 2017-10-13 NOTE — PROGRESS NOTES
Please call patient and let her know that ROPINIROLE has been ordered through her [facility].

## 2017-10-13 NOTE — PROGRESS NOTES
Patient called no answer, message left explained medication, changes and  information as requested by Dr. Kurt Banerjee MA

## 2017-10-13 NOTE — TELEPHONE ENCOUNTER
Patient was called and informed that Dr. Hicks sent in a rx for Ropinirole which is covered by her insurance company. Patient was instructed to check with her pharmacy  Angeles Banerjee MA

## 2017-10-18 ENCOUNTER — OFFICE VISIT (OUTPATIENT)
Dept: FAMILY MEDICINE | Facility: CLINIC | Age: 47
End: 2017-10-18
Payer: COMMERCIAL

## 2017-10-18 VITALS
WEIGHT: 222 LBS | DIASTOLIC BLOOD PRESSURE: 78 MMHG | OXYGEN SATURATION: 96 % | SYSTOLIC BLOOD PRESSURE: 111 MMHG | HEART RATE: 68 BPM | HEIGHT: 64 IN | BODY MASS INDEX: 37.9 KG/M2 | TEMPERATURE: 99 F

## 2017-10-18 DIAGNOSIS — Z23 NEED FOR PROPHYLACTIC VACCINATION AND INOCULATION AGAINST INFLUENZA: ICD-10-CM

## 2017-10-18 DIAGNOSIS — Z00.00 ROUTINE GENERAL MEDICAL EXAMINATION AT A HEALTH CARE FACILITY: Primary | ICD-10-CM

## 2017-10-18 DIAGNOSIS — M79.89 LEG SWELLING: ICD-10-CM

## 2017-10-18 DIAGNOSIS — Z12.31 VISIT FOR SCREENING MAMMOGRAM: ICD-10-CM

## 2017-10-18 DIAGNOSIS — F41.1 GAD (GENERALIZED ANXIETY DISORDER): ICD-10-CM

## 2017-10-18 DIAGNOSIS — G25.81 RESTLESS LEGS SYNDROME (RLS): ICD-10-CM

## 2017-10-18 DIAGNOSIS — Z87.730 HISTORY OF CLEFT PALATE WITH CLEFT LIP: Chronic | ICD-10-CM

## 2017-10-18 DIAGNOSIS — E78.5 HYPERLIPIDEMIA WITH TARGET LDL LESS THAN 130: ICD-10-CM

## 2017-10-18 DIAGNOSIS — G89.4 CHRONIC PAIN SYNDROME: ICD-10-CM

## 2017-10-18 DIAGNOSIS — F33.1 MAJOR DEPRESSIVE DISORDER, RECURRENT EPISODE, MODERATE (H): Chronic | ICD-10-CM

## 2017-10-18 LAB
ANION GAP SERPL CALCULATED.3IONS-SCNC: 8 MMOL/L (ref 3–14)
BUN SERPL-MCNC: 11 MG/DL (ref 7–30)
CALCIUM SERPL-MCNC: 8.5 MG/DL (ref 8.5–10.1)
CHLORIDE SERPL-SCNC: 104 MMOL/L (ref 94–109)
CHOLEST SERPL-MCNC: 239 MG/DL
CO2 SERPL-SCNC: 26 MMOL/L (ref 20–32)
CREAT SERPL-MCNC: 0.73 MG/DL (ref 0.52–1.04)
ERYTHROCYTE [DISTWIDTH] IN BLOOD BY AUTOMATED COUNT: 13 % (ref 10–15)
GFR SERPL CREATININE-BSD FRML MDRD: 86 ML/MIN/1.7M2
GLUCOSE SERPL-MCNC: 83 MG/DL (ref 70–99)
HCT VFR BLD AUTO: 40.5 % (ref 35–47)
HDLC SERPL-MCNC: 52 MG/DL
HGB BLD-MCNC: 13.7 G/DL (ref 11.7–15.7)
LDLC SERPL CALC-MCNC: 153 MG/DL
MCH RBC QN AUTO: 31.6 PG (ref 26.5–33)
MCHC RBC AUTO-ENTMCNC: 33.8 G/DL (ref 31.5–36.5)
MCV RBC AUTO: 93 FL (ref 78–100)
NONHDLC SERPL-MCNC: 187 MG/DL
PLATELET # BLD AUTO: 272 10E9/L (ref 150–450)
POTASSIUM SERPL-SCNC: 3.9 MMOL/L (ref 3.4–5.3)
RBC # BLD AUTO: 4.34 10E12/L (ref 3.8–5.2)
SODIUM SERPL-SCNC: 138 MMOL/L (ref 133–144)
TRIGL SERPL-MCNC: 172 MG/DL
WBC # BLD AUTO: 5 10E9/L (ref 4–11)

## 2017-10-18 PROCEDURE — 85027 COMPLETE CBC AUTOMATED: CPT | Performed by: FAMILY MEDICINE

## 2017-10-18 PROCEDURE — 36415 COLL VENOUS BLD VENIPUNCTURE: CPT | Performed by: FAMILY MEDICINE

## 2017-10-18 PROCEDURE — 90471 IMMUNIZATION ADMIN: CPT | Performed by: FAMILY MEDICINE

## 2017-10-18 PROCEDURE — 99396 PREV VISIT EST AGE 40-64: CPT | Mod: 25 | Performed by: FAMILY MEDICINE

## 2017-10-18 PROCEDURE — 80061 LIPID PANEL: CPT | Performed by: FAMILY MEDICINE

## 2017-10-18 PROCEDURE — 90686 IIV4 VACC NO PRSV 0.5 ML IM: CPT | Performed by: FAMILY MEDICINE

## 2017-10-18 PROCEDURE — 80048 BASIC METABOLIC PNL TOTAL CA: CPT | Performed by: FAMILY MEDICINE

## 2017-10-18 RX ORDER — PREGABALIN 225 MG/1
225 CAPSULE ORAL 2 TIMES DAILY
Qty: 60 CAPSULE | Refills: 11 | Status: SHIPPED | OUTPATIENT
Start: 2017-10-18 | End: 2018-01-26

## 2017-10-18 RX ORDER — OXYCODONE AND ACETAMINOPHEN 10; 325 MG/1; MG/1
1 TABLET ORAL EVERY 6 HOURS PRN
Qty: 90 TABLET | Refills: 0 | Status: SHIPPED | OUTPATIENT
Start: 2017-10-18 | End: 2017-11-13

## 2017-10-18 RX ORDER — FLUOXETINE 40 MG/1
40 CAPSULE ORAL DAILY
Qty: 30 CAPSULE | Refills: 11 | Status: SHIPPED | OUTPATIENT
Start: 2017-10-18 | End: 2017-10-19

## 2017-10-18 RX ORDER — FUROSEMIDE 20 MG
20 TABLET ORAL DAILY
Qty: 30 TABLET | Refills: 11 | Status: SHIPPED | OUTPATIENT
Start: 2017-10-18 | End: 2018-10-30

## 2017-10-18 NOTE — NURSING NOTE
"Chief Complaint   Patient presents with     Physical     Health Maintenance     Mammogram, Flu shot     Flu Shot       Initial /78 (BP Location: Right arm, Patient Position: Chair, Cuff Size: Adult Regular)  Pulse 68  Temp 99  F (37.2  C) (Oral)  Ht 5' 3.5\" (1.613 m)  Wt 222 lb (100.7 kg)  SpO2 96%  BMI 38.71 kg/m2 Estimated body mass index is 38.71 kg/(m^2) as calculated from the following:    Height as of this encounter: 5' 3.5\" (1.613 m).    Weight as of this encounter: 222 lb (100.7 kg).  Medication Reconciliation: complete   Susan Hines CMA       "

## 2017-10-18 NOTE — MR AVS SNAPSHOT
After Visit Summary   10/18/2017    Velma Diaz    MRN: [id number]           Patient Information     Date Of Birth          1970        Visit Information        Provider Department      10/18/2017 10:00 AM Srinivasa Hunter MD [facility]        Today's Diagnoses     Routine general medical examination at a health care facility    -  1    Chronic pain syndrome        Major depressive disorder, recurrent episode, moderate (H)        ROSE MARIE (generalized anxiety disorder)        Morbid obesity with BMI of 40.0-44.9, adult (H)        Leg swelling        History of cleft palate with cleft lip        Visit for screening mammogram        Need for prophylactic vaccination and inoculation against influenza        Hyperlipidemia with target LDL less than 130        Restless legs syndrome (RLS)          Care Instructions      Preventive Health Recommendations  Female Ages 40 to 49    Yearly exam:     See your health care provider every year in order to  1. Review health changes.   2. Discuss preventive care.    3. Review your medicines if your doctor prescribed any.      Get a Pap test every three years (unless you have an abnormal result and your provider advises testing more often).      If you get Pap tests with HPV test, you only need to test every 5 years, unless you have an abnormal result. You do not need a Pap test if your uterus was removed (hysterectomy) and you have not had cancer.      You should be tested each year for STDs (sexually transmitted diseases), if you're at risk.       Ask your doctor if you should have a mammogram.      Have a colonoscopy (test for colon cancer) if someone in your family has had colon cancer or polyps before age 50.       Have a cholesterol test every 5 years.       Have a diabetes test (fasting glucose) after age 45. If you are at risk for diabetes, you should have this test every 3 years.    Shots: Get a flu shot each year. Get a tetanus  shot every 10 years.     Nutrition:     Eat at least 5 servings of fruits and vegetables each day.    Eat whole-grain bread, whole-wheat pasta and brown rice instead of white grains and rice.    Talk to your provider about Calcium and Vitamin D.     Lifestyle    Exercise at least 150 minutes a week (an average of 30 minutes a day, 5 days a week). This will help you control your weight and prevent disease.    Limit alcohol to one drink per day.    No smoking.     Wear sunscreen to prevent skin cancer.    See your dentist every six months for an exam and cleaning.          Follow-ups after your visit        Follow-up notes from your care team     Return in about 6 months (around 4/18/2018).      Your next 10 appointments already scheduled     Oct 24, 2017  9:30 AM CDT   [facility] with [facility]    [address] [place] [id number]   [phone]            Nov 06, 2017 10:00 AM CST   Return Visit with [facility]    [address] [place] [id number]   [phone]              Future tests that were ordered for you today     Open Future Orders        Priority Expected Expires Ordered    MA SCREENING DIGITAL BILAT - Future  (s+30) Routine  10/18/2018 10/18/2017            Who to contact     If you have questions or need follow up information about today's clinic visit or your schedule please contact [facility] directly at [phone].  Normal or non-critical lab and imaging results will be communicated to you by MyChart, letter or phone within 4 business days after the clinic has received the results. If you do not hear from us within 7 days, please contact the clinic through MyChart or phone. If you have a critical or abnormal lab result, we will notify you by phone as soon as possible.  Submit  "refill requests through [email or web address] or call your pharmacy and they will forward the refill request to us. Please allow 3 business days for your refill to be completed.          Additional Information About Your Visit        [employer] Information     [email or web address] gives you secure access to your electronic health record. If you see a primary care provider, you can also send messages to your care team and make appointments. If you have questions, please call your primary care clinic.  If you do not have a primary care provider, please call [phone] and they will assist you.        Care EveryWhere ID     This is your Care EveryWhere ID. This could be used by other organizations to access your [place] medical records  [id number]        Your Vitals Were     Pulse Temperature Height Pulse Oximetry BMI (Body Mass Index)       68 99  F (37.2  C) (Oral) 5' 3.5\" (1.613 m) 96% 38.71 kg/m2        Blood Pressure from Last 3 Encounters:   10/18/17 111/78   10/12/17 107/69   09/29/17 125/81    Weight from Last 3 Encounters:   10/18/17 222 lb (100.7 kg)   10/12/17 222 lb (100.7 kg)   09/29/17 224 lb 6.4 oz (101.8 kg)              We Performed the Following     Basic metabolic panel     CBC with platelets     DEPRESSION ACTION PLAN (DAP)     FLU VAC, SPLIT VIRUS IM > 3 YO (QUADRIVALENT) [02515]     Lipid panel reflex to direct LDL     Vaccine Administration, Initial [37080]          Where to get your medicines      These medications were sent to [facility]pharmacy #[id number]  [place], [place] - [address] [address]     Phone:  [phone]     FLUoxetine 40 MG capsule    furosemide 20 MG tablet         Some of these will need a paper prescription and others can be bought over the counter.  Ask your nurse if you have questions.     Bring a paper prescription for each of these medications     oxyCODONE-acetaminophen  MG per tablet    Pregabalin 225 MG Caps          Primary Care Provider Office Phone # Fax " #    Srinivasa Hunter [phone]       [address] [facility] [address]        Goals        General    I will call within next 2 weeks to schedule initial psychiatry appointment (pt-stated)     Notes - Note edited  5/20/2015 12:51 PM by Yesica Marsh    As of today's date 5/20/2015 goal is met at 76 - 100%.   Goal Status:  Complete  Patient states this is scheduled with Dr. Salvador for next month, but not on appointment calendar  LUDY Stark, [place]   [phone]  5/20/2015 12:51 PM        I will complete [employer] or reduced fare application within the next month (pt-stated)     Notes - Note edited  11/24/2015 12:00 PM by Yesica Marsh    As of today's date 11/24/2015 goal is met at 51 - 75%.   Goal Status:  Active  She reported today having [employer] application, [employer] worker has requested but packet from [employer].    LUDY [place], [place]   [phone]  11/24/2015 12:00 PM        I will discuss [employer] worker with therapist next week for housing needs  (pt-stated)     Notes - Note edited  12/3/2015  1:17 PM by Yesica Marsh    As of today's date 12/3/2015 goal is met at 76 - 100%.   Goal Status:  Discontinued  Patient has [employer] worker and has found housing with friend  LUDY [place], [place]   [phone]  12/3/2015 1:16 PM        Equal Access to Services     [facility] AH: Hadii aad ku hadasho Soomaali, waaxda luqadaha, qaybta kaalmada adeegyada, waxhadley yoshi dinh [facility] carrillo . So [facility] [phone].    ATENCIÓN: Si habla español, tiene a dowd disposición servicios gratuitos de asistencia lingüística. Llame al [phone].    We comply with applicable federal civil rights laws and [place] laws. We do not discriminate on the basis of race, color, national origin, age, disability, sex, sexual orientation, or gender identity.            Thank you!     Thank you for choosing [facility]  for your care. Our goal is  always to provide you with excellent care. Hearing back from our patients is one way we can continue to improve our services. Please take a few minutes to complete the written survey that you may receive in the mail after your visit with us. Thank you!             Your Updated Medication List - Protect others around you: Learn how to safely use, store and throw away your medicines at www.disposemymeds.org.          This list is accurate as of: 10/18/17 10:37 AM.  Always use your most recent med list.                   Brand Name Dispense Instructions for use Diagnosis    acetaminophen 325 MG tablet    TYLENOL    100 tablet    Take 2 tablets (650 mg) by mouth every 4 hours as needed for other (mild pain)    Lesion of left ulnar nerve       amoxicillin-clavulanate 875-125 MG per tablet    AUGMENTIN    20 tablet    Take 1 tablet by mouth 2 times daily    Acute non-recurrent maxillary sinusitis       cyclobenzaprine 10 MG tablet    FLEXERIL    30 tablet    Take 0.5-1 tablets (5-10 mg) by mouth 3 times daily as needed for muscle spasms    Chronic bilateral back pain, unspecified back location       FLUoxetine 40 MG capsule    PROzac    30 capsule    Take 1 capsule (40 mg) by mouth daily    Major depressive disorder, recurrent episode, moderate (H), ROSE MARIE (generalized anxiety disorder)       furosemide 20 MG tablet    LASIX    30 tablet    Take 1 tablet (20 mg) by mouth daily    Leg swelling       MULTI FOR HER PO      Take by mouth daily        nystatin 336912 UNIT/GM Powd    MYCOSTATIN    60 g    Apply to affected area twice daily as needed    Candidal intertrigo       order for DME      Respironics REMSTAR 60 Series Auto TYGM5ac H2O, Airfit P10 nasal pillow mask w/xsmall pillows        oxyCODONE-acetaminophen  MG per tablet    PERCOCET    90 tablet    Take 1 tablet by mouth every 6 hours as needed for moderate to severe pain    Chronic pain syndrome       Pregabalin 225 MG Caps    LYRICA    60 capsule    Take 225  mg by mouth 2 times daily    Chronic pain syndrome       rOPINIRole 0.25 MG tablet    REQUIP    180 tablet    Week 1 & 2: 1 tablet 1 hour before going to bed. Week 2 and afterwards: 1 tablet two times a day    Restless legs syndrome (RLS)       SUMAtriptan 100 MG tablet    IMITREX    9 tablet    Take 1 tablet (100 mg) by mouth at onset of headache for migraine May repeat in 2 hours if needed: max 2/day    Headache(784.0)

## 2017-10-18 NOTE — LETTER
My Depression Action Plan  Name: Velma Diaz   Date of Birth 1970  Date: 10/18/2017    My doctor: Srinivasa Hunter   My clinic: [address] [id number] [phone]          GREEN    ZONE   Good Control    What it looks like:     Things are going generally well. You have normal up s and down s. You may even feel depressed from time to time, but bad moods usually last less than a day.   What you need to do:  1. Continue to care for yourself (see self care plan)  2. Check your depression survival kit and update it as needed  3. Follow your physician s recommendations including any medication.  4. Do not stop taking medication unless you consult with your physician first.           YELLOW         ZONE Getting Worse    What it looks like:     Depression is starting to interfere with your life.     It may be hard to get out of bed; you may be starting to isolate yourself from others.    Symptoms of depression are starting to last most all day and this has happened for several days.     You may have suicidal thoughts but they are not constant.   What you need to do:     1. Call your care team, your response to treatment will improve if you keep your care team informed of your progress. Yellow periods are signs an adjustment may need to be made.     2. Continue your self-care, even if you have to fake it!    3. Talk to someone in your support network    4. Open up your depression survival kit           RED    ZONE Medical Alert - Get Help    What it looks like:     Depression is seriously interfering with your life.     You may experience these or other symptoms: You can t get out of bed most days, can t work or engage in other necessary activities, you have trouble taking care of basic hygiene, or basic responsibilities, thoughts of suicide or death that will not go away, self-injurious behavior.     What you need to  do:  1. Call your care team and request a same-day appointment. If they are not available (weekends or after hours) call your local crisis line, emergency room or 911.      Electronically signed by: Srinivasa Hunter, October 18, 2017    Depression Self Care Plan / Survival Kit    Self-Care for Depression  Here s the deal. Your body and mind are really not as separate as most people think.  What you do and think affects how you feel and how you feel influences what you do and think. This means if you do things that people who feel good do, it will help you feel better.  Sometimes this is all it takes.  There is also a place for medication and therapy depending on how severe your depression is, so be sure to consult with your medical provider and/ or Behavioral Health Consultant if your symptoms are worsening or not improving.     In order to better manage my stress, I will:    Exercise  Get some form of exercise, every day. This will help reduce pain and release endorphins, the  feel good  chemicals in your brain. This is almost as good as taking antidepressants!  This is not the same as joining a gym and then never going! (they count on that by the way ) It can be as simple as just going for a walk or doing some gardening, anything that will get you moving.      Hygiene   Maintain good hygiene (Get out of bed in the morning, Make your bed, Brush your teeth, Take a shower, and Get dressed like you were going to work, even if you are unemployed).  If your clothes don't fit try to get ones that do.    Diet  I will strive to eat foods that are good for me, drink plenty of water, and avoid excessive sugar, caffeine, alcohol, and other mood-altering substances.  Some foods that are helpful in depression are: complex carbohydrates, B vitamins, flaxseed, fish or fish oil, fresh fruits and vegetables.    Psychotherapy  I agree to participate in Individual Therapy (if recommended).    Medication  If prescribed medications, I  agree to take them.  Missing doses can result in serious side effects.  I understand that drinking alcohol, or other illicit drug use, may cause potential side effects.  I will not stop my medication abruptly without first discussing it with my provider.    Staying Connected With Others  I will stay in touch with my friends, family members, and my primary care provider/team.    Use your imagination  Be creative.  We all have a creative side; it doesn t matter if it s oil painting, sand castles, or mud pies! This will also kick up the endorphins.    Witness Beauty  (AKA stop and smell the roses) Take a look outside, even in mid-winter. Notice colors, textures. Watch the squirrels and birds.     Service to others  Be of service to others.  There is always someone else in need.  By helping others we can  get out of ourselves  and remember the really important things.  This also provides opportunities for practicing all the other parts of the program.    Humor  Laugh and be silly!  Adjust your TV habits for less news and crime-drama and more comedy.    Control your stress  Try breathing deep, massage therapy, biofeedback, and meditation. Find time to relax each day.     My support system    Clinic Contact:  Phone number:    Contact 1:  Phone number:    Contact 2:  Phone number:    Druze/:  Phone number:    Therapist:  Phone number:    Local crisis center:    Phone number:    Other community support:  Phone number:
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
## 2017-10-19 ENCOUNTER — RADIANT APPOINTMENT (OUTPATIENT)
Dept: MAMMOGRAPHY | Facility: CLINIC | Age: 47
End: 2017-10-19
Attending: FAMILY MEDICINE
Payer: COMMERCIAL

## 2017-10-19 DIAGNOSIS — Z12.31 VISIT FOR SCREENING MAMMOGRAM: ICD-10-CM

## 2017-10-19 PROCEDURE — G0202 SCR MAMMO BI INCL CAD: HCPCS | Mod: TC

## 2017-10-19 RX ORDER — FLUOXETINE 40 MG/1
40 CAPSULE ORAL DAILY
Qty: 30 CAPSULE | Refills: 11 | Status: SHIPPED | OUTPATIENT
Start: 2017-10-19 | End: 2018-03-01

## 2017-10-19 NOTE — PROGRESS NOTES
Mireille,  Your electrolytes and kidney tests are normal. Diabetes testing is normal. Blood counts are nice and normal.  Cholesterol values are still on the high side, similar to last year. Continued efforts at diet and exercise treatment for weight loss would be appropriate for this.    Srinivasa Hunter MD

## 2017-10-24 ENCOUNTER — OFFICE VISIT (OUTPATIENT)
Dept: PSYCHOLOGY | Facility: CLINIC | Age: 47
End: 2017-10-24
Payer: COMMERCIAL

## 2017-10-24 ENCOUNTER — OFFICE VISIT (OUTPATIENT)
Dept: FAMILY MEDICINE | Facility: CLINIC | Age: 47
End: 2017-10-24
Payer: COMMERCIAL

## 2017-10-24 VITALS
TEMPERATURE: 97 F | DIASTOLIC BLOOD PRESSURE: 67 MMHG | SYSTOLIC BLOOD PRESSURE: 96 MMHG | WEIGHT: 227 LBS | BODY MASS INDEX: 39.58 KG/M2 | HEART RATE: 79 BPM | OXYGEN SATURATION: 97 %

## 2017-10-24 DIAGNOSIS — F41.1 GAD (GENERALIZED ANXIETY DISORDER): Primary | ICD-10-CM
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
## 2017-10-24 PROCEDURE — 90471 IMMUNIZATION ADMIN: CPT | Performed by: NURSE PRACTITIONER

## 2017-10-24 PROCEDURE — 90834 PSYTX W PT 45 MINUTES: CPT | Performed by: SOCIAL WORKER

## 2017-10-24 PROCEDURE — 90714 TD VACC NO PRESV 7 YRS+ IM: CPT | Performed by: NURSE PRACTITIONER

## 2017-10-24 PROCEDURE — 99213 OFFICE O/P EST LOW 20 MIN: CPT | Mod: 25 | Performed by: NURSE PRACTITIONER

## 2017-10-24 RX ORDER — DOXYCYCLINE 100 MG/1
100 CAPSULE ORAL 2 TIMES DAILY
Qty: 20 CAPSULE | Refills: 0 | Status: SHIPPED | OUTPATIENT
Start: 2017-10-24 | End: 2017-11-29

## 2017-10-24 ASSESSMENT — PATIENT HEALTH QUESTIONNAIRE - PHQ9
5. POOR APPETITE OR OVEREATING: NOT AT ALL
SUM OF ALL RESPONSES TO PHQ QUESTIONS 1-9: 4

## 2017-10-24 ASSESSMENT — ANXIETY QUESTIONNAIRES
1. FEELING NERVOUS, ANXIOUS, OR ON EDGE: SEVERAL DAYS
IF YOU CHECKED OFF ANY PROBLEMS ON THIS QUESTIONNAIRE, HOW DIFFICULT HAVE THESE PROBLEMS MADE IT FOR YOU TO DO YOUR WORK, TAKE CARE OF THINGS AT HOME, OR GET ALONG WITH OTHER PEOPLE: SOMEWHAT DIFFICULT
5. BEING SO RESTLESS THAT IT IS HARD TO SIT STILL: NOT AT ALL
2. NOT BEING ABLE TO STOP OR CONTROL WORRYING: SEVERAL DAYS
7. FEELING AFRAID AS IF SOMETHING AWFUL MIGHT HAPPEN: NOT AT ALL
GAD7 TOTAL SCORE: 4
3. WORRYING TOO MUCH ABOUT DIFFERENT THINGS: SEVERAL DAYS
6. BECOMING EASILY ANNOYED OR IRRITABLE: SEVERAL DAYS

## 2017-10-24 ASSESSMENT — PAIN SCALES - GENERAL: PAINLEVEL: SEVERE PAIN (7)
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
## 2017-10-24 NOTE — MR AVS SNAPSHOT
MRN:[id number]                      After Visit Summary   10/24/2017    Velma Diaz    MRN: [id number]           Visit Information        Provider Department      10/24/2017 9:30 AM ChidiAntonio sun SERVANDO [facility] Generic      Your next 10 appointments already scheduled     Nov 06, 2017 10:00 AM CST   Return Visit with Antonio Rios [facility] ([facility])    [address] [facility] [address]   [phone]            Nov 27, 2017 10:00 AM CST   Return Visit with Antonio Rios [facility] ([facility])    [address] [facility] [address]   [phone]            Nov 29, 2017 10:00 AM CST   Return Visit with Debra Hicks MD   [facility] ([facility])    [address] [place] [address]   [phone]              MyChart Information     [employer] gives you secure access to your electronic health record. If you see a primary care provider, you can also send messages to your care team and make appointments. If you have questions, please call your primary care clinic.  If you do not have a primary care provider, please call [phone] and they will assist you.        Care EveryWhere ID     This is your Care EveryWhere ID. This could be used by other organizations to access your [facility] medical records  [id number]        Equal Access to Services     LOLA HERNANDEZ AH: Hadii antionette britoo Sotheresaali, waaxda luqadaha, qaybta kaalmada adeegyada, grayson sauceda. So [facility] [phone].    ATENCIÓN: Si habla español, tiene a dowd disposición servicios gratuitos de asistencia lingüística. Llame al [phone].    We comply with applicable federal civil rights laws and [place] laws. We do not discriminate on the basis of race, color, national origin, age,  disability, sex, sexual orientation, or gender identity.

## 2017-10-24 NOTE — NURSING NOTE
"Chief Complaint   Patient presents with     Trauma     Cat Bite       Initial BP 96/67 (BP Location: Right arm, Patient Position: Chair, Cuff Size: Adult Regular)  Pulse 79  Temp 97  F (36.1  C) (Oral)  Wt 227 lb (103 kg)  SpO2 97%  Breastfeeding? No  BMI 39.58 kg/m2 Estimated body mass index is 39.58 kg/(m^2) as calculated from the following:    Height as of 10/18/17: 5' 3.5\" (1.613 m).    Weight as of this encounter: 227 lb (103 kg).  Medication Reconciliation: complete.  VENKATESH Durand MA      Screening Questionnaire for Adult Immunization   Are you sick today?   No    Do you have allergies to medications, food, a vaccine component or latex?   No    Have you ever had a serious reaction after receiving a vaccination?   No    Do you have a long-term health problem with heart disease, lung disease,  asthma, kidney disease, metabolic (e.g., diabetes), anemia, or other blood disorder?   No    Do you have cancer, leukemia,HIV/ AIDS, or any immune system problem?   No       In the past 3 months, have you taken medications that weaken your immune system, such as cortisone, prednisone, other steroids, or anticancer drugs, or have you had radiation treatments?   No    Have you had a seizure or a brain, or other nervous system problem?   No    During the past year, have you received a transfusion of blood or blood       products, or been given a  immune (gamma) globulin or an antiviral drug?   No    For women: Are you pregnant or is there a chance you could become         pregnant during the next month?   No    Have you received any vaccinations in the past 4 weeks?   Yes     Immunization questionnaire was positive for at least one answer.  Notified Jessy Burgos CNP  .     VIS for Td given on same date of administration.  Staff signature/Title: VENKATESH Durand MA  Patient/or Parent of Patient instructed to wait 20 minutes after injection in the case of a allergic reaction.           "

## 2017-10-24 NOTE — PROGRESS NOTES
SUBJECTIVE:   Velma Diaz is a 47 year old female who presents to clinic today for the following health issues:      Patient is here today with the concern for a cat bite, was bitten on Saturday, her own cat.    She was bitten on left knee and scratches over left shin 3 days ago  She immediately washed with soap and water and hydrogen peroxide  Denies fever, chills, nausea, vomiting  No drainage from wounds  Tdap 2014  No record of previous tetanus vaccinations  She was recently on Augmentin for sinus infection and completed antibiotics 4 days ago    Problem list and histories reviewed & adjusted, as indicated.  Additional history: none    Patient Active Problem List   Diagnosis     History of cleft palate with cleft lip     MAYA (obstructive sleep apnea)/Hypoventilation Syndrome     Major depressive disorder, recurrent episode, moderate (H)     Paresthesias     Health Care Home     Vitamin D deficiency     Morbid obesity with BMI of 40.0-44.9, adult (H)     Hyperlipidemia with target LDL less than 130     Intervertebral disc prolapse with impingement     Nonallopathic lesion of lumbar region     Chronic pain syndrome     Restless legs syndrome (RLS)     Insomnia     Migraine     Chronic bilateral back pain, unspecified back location     Chronic pain of left knee     ROSE MARIE (generalized anxiety disorder)     Past Surgical History:   Procedure Laterality Date     ANKLE SURGERY  7/13    Left for torn tendons & loose bone chips     ARTHROSCOPY KNEE  1986    Right knee     BACK SURGERY       Basal cell carcinoma  2011    Removal from the chest     BIOPSY       C VAGINAL HYSTERECTOMY  2011     CARPAL TUNNEL RELEASE RT/LT  ~2010    Bilateral     COLONOSCOPY  2016     COSMETIC SURGERY       COSMETIC SURGERY       DECOMPRESSION CUBITAL TUNNEL Left 8/28/2015    Procedure: DECOMPRESSION CUBITAL TUNNEL;  Surgeon: Gus Donato MD;  [facility]     ENT SURGERY  1975    Tonsillectomy and Adenoids     GYN SURGERY   2011    Hysterectomy     HC REPAIR PERONEAL TENDONS  7/13     ORTHOPEDIC SURGERY  2011, 2011    Bilateral CTR     PE TUBES      yearly times 10 years     REPAIR CLEFT PALATE CHILD      Age 3 months & afterwards (multiple surgeries)     SOFT TISSUE SURGERY       SOFT TISSUE SURGERY  2013       Social History   Substance Use Topics     Smoking status: Former Smoker     Packs/day: 0.50     Years: 15.00     Types: Cigarettes     Quit date: 2/20/2015     Smokeless tobacco: Never Used     Alcohol use No      Comment: Quit in September 27th     Family History   Problem Relation Age of Onset     Alzheimer Disease Paternal Grandmother 60     CEREBROVASCULAR DISEASE Paternal Grandmother      Neurologic Disorder Sister 20     migraines     Depression/Anxiety Sister      Depression Sister      Neurologic Disorder Son 14     migraines     Asthma Son      HEART DISEASE Mother      OSTEOPOROSIS Mother      Obesity Mother      CANCER Father      Alcohol/Drug Father      Other Cancer Father      saliva glands & skin CA      Hypertension Father      DIABETES Maternal Grandmother      Breast Cancer Maternal Grandmother      Obesity Maternal Grandmother      Other Cancer Maternal Grandfather      skin cancer     Cancer - colorectal Other      Aunt     Asthma Daughter      Asthma Daughter      Asthma Son      Thyroid Disease Sister      Colon Cancer Other      Obesity Sister      Glaucoma No family hx of      Macular Degeneration No family hx of              Reviewed and updated as needed this visit by clinical staffTobacco  Allergies  Meds  Med Hx  Surg Hx  Fam Hx  Soc Hx      Reviewed and updated as needed this visit by Provider         ROS:  Constitutional, HEENT, cardiovascular, pulmonary, gi and gu systems are negative, except as otherwise noted.      OBJECTIVE:   BP 96/67 (BP Location: Right arm, Patient Position: Chair, Cuff Size: Adult Regular)  Pulse 79  Temp 97  F (36.1  C) (Oral)  Wt 227 lb (103 kg)  SpO2 97%   Breastfeeding? No  BMI 39.58 kg/m2  Body mass index is 39.58 kg/(m^2).  GENERAL: healthy, alert and no distress  RESP: lungs clear to auscultation - no rales, rhonchi or wheezes  CV: regular rate and rhythm, normal S1 S2, no S3 or S4, no murmur, click or rub, no peripheral edema and peripheral pulses strong  MS: no gross musculoskeletal defects noted, no edema  SKIN: small puncture wound (approximately 1-2 mm) over left knee with surrounding area of edema and swelling approximately 1 inch. Tender to palpation. Minimal warmth  3 superficial lacerations over left shin without surrounding erythema or warmth    Diagnostic Test Results:  none     ASSESSMENT/PLAN:       ICD-10-CM    1. Cat bite, initial encounter W55.01XA doxycycline (VIBRAMYCIN) 100 MG capsule     IMMUNIZATION ADMIN, FIRST     TD PRESERV FREE >=7 YRS ADS IM       Recommend antibiotic treatment. Will treat with doxycycline as patient previously on Augmentin.   Could be developing infection over puncture wound. Area of erythema is not warm. Outlined with marker and advised to follow up if spreading beyond outline  Will also update tetanus      CONNOR Maurice [facility]

## 2017-10-25 ASSESSMENT — ANXIETY QUESTIONNAIRES: GAD7 TOTAL SCORE: 4

## 2017-11-04 ENCOUNTER — TRANSFERRED RECORDS (OUTPATIENT)
Dept: FAMILY MEDICINE | Facility: CLINIC | Age: 47
End: 2017-11-04

## 2017-11-06 ENCOUNTER — OFFICE VISIT (OUTPATIENT)
Dept: PSYCHOLOGY | Facility: CLINIC | Age: 47
End: 2017-11-06
Payer: COMMERCIAL

## 2017-11-06 ENCOUNTER — TELEPHONE (OUTPATIENT)
Dept: FAMILY MEDICINE | Facility: CLINIC | Age: 47
End: 2017-11-06

## 2017-11-06 DIAGNOSIS — F41.1 GAD (GENERALIZED ANXIETY DISORDER): Primary | ICD-10-CM

## 2017-11-06 DIAGNOSIS — G89.29 CHRONIC BILATERAL BACK PAIN, UNSPECIFIED BACK LOCATION: Primary | Chronic | ICD-10-CM

## 2017-11-06 DIAGNOSIS — M54.9 CHRONIC BILATERAL BACK PAIN, UNSPECIFIED BACK LOCATION: Primary | Chronic | ICD-10-CM

## 2017-11-06 PROCEDURE — 90834 PSYTX W PT 45 MINUTES: CPT | Performed by: SOCIAL WORKER

## 2017-11-06 NOTE — MR AVS SNAPSHOT
MRN:[id number]                      After Visit Summary   11/6/2017    Velma Diaz    MRN: [id number]           Visit Information        Provider Department      11/6/2017 10:00 AM Antonio Rios SERVANDO [facility] Generic      Your next 10 appointments already scheduled     Nov 27, 2017 10:00 AM CST   Return Visit with Antonio Rios [facility] ([facility])    [address] [address]   [phone]            Nov 29, 2017 10:00 AM CST   Return Visit with Debra Hicks MD   [facility] ([facility])    [address] [place] [id number] [phone]            Dec 11, 2017 10:00 AM CST   Return Visit with Antonio Rios [facility] ([facility])    [address] [address]   [phone]              MyChart Information     [employer] gives you secure access to your electronic health record. If you see a primary care provider, you can also send messages to your care team and make appointments. If you have questions, please call your primary care clinic.  If you do not have a primary care provider, please call [phone] and they will assist you.        Care EveryWhere ID     This is your Care EveryWhere ID. This could be used by other organizations to access your [place] medical records  [id number]        Equal Access to Services     LOLA HERNANDEZ AH: Hadii antionette britoo Soeugenio, waaxda luqadaha, qaybta kaalmada adeegyada, grayson sauceda. So [facility] [phone].    ATENCIÓN: Si habla español, tiene a dowd disposición servicios gratuitos de asistencia lingüística. Llame al [phone].    We comply with applicable federal civil rights laws and [place] laws. We do not discriminate on the basis of race, color, national origin, age,  disability, sex, sexual orientation, or gender identity.

## 2017-11-06 NOTE — TELEPHONE ENCOUNTER
Patient lost her TENS unit and requests a prescription for a new one. This was provided for her today.
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
## 2017-11-13 ENCOUNTER — MYC REFILL (OUTPATIENT)
Dept: FAMILY MEDICINE | Facility: CLINIC | Age: 47
End: 2017-11-13

## 2017-11-13 DIAGNOSIS — G89.4 CHRONIC PAIN SYNDROME: ICD-10-CM

## 2017-11-13 RX ORDER — OXYCODONE AND ACETAMINOPHEN 10; 325 MG/1; MG/1
1 TABLET ORAL EVERY 6 HOURS PRN
Qty: 90 TABLET | Refills: 0 | Status: SHIPPED | OUTPATIENT
Start: 2017-11-13 | End: 2017-12-11

## 2017-11-13 NOTE — TELEPHONE ENCOUNTER
Message from MyChart:  Original authorizing provider: MD Velma Rodriguez would like a refill of the following medications:  oxyCODONE-acetaminophen (PERCOCET)  MG per tablet [Srinivasa Hunter MD]    Preferred pharmacy: [id number] - [place], [place] - [address]    Comment:  I can come in to . Please call me when ready. Thank you

## 2017-11-15 ENCOUNTER — TRANSFERRED RECORDS (OUTPATIENT)
Dept: HEALTH INFORMATION MANAGEMENT | Facility: CLINIC | Age: 47
End: 2017-11-15

## 2017-11-21 ENCOUNTER — TRANSFERRED RECORDS (OUTPATIENT)
Dept: HEALTH INFORMATION MANAGEMENT | Facility: CLINIC | Age: 47
End: 2017-11-21

## 2017-11-27 ENCOUNTER — OFFICE VISIT (OUTPATIENT)
Dept: PSYCHOLOGY | Facility: CLINIC | Age: 47
End: 2017-11-27
Payer: COMMERCIAL

## 2017-11-27 DIAGNOSIS — F41.1 GAD (GENERALIZED ANXIETY DISORDER): Primary | ICD-10-CM

## 2017-11-27 PROCEDURE — 90834 PSYTX W PT 45 MINUTES: CPT | Performed by: SOCIAL WORKER

## 2017-11-27 ASSESSMENT — PATIENT HEALTH QUESTIONNAIRE - PHQ9
SUM OF ALL RESPONSES TO PHQ QUESTIONS 1-9: 6
5. POOR APPETITE OR OVEREATING: SEVERAL DAYS

## 2017-11-27 ASSESSMENT — ANXIETY QUESTIONNAIRES
IF YOU CHECKED OFF ANY PROBLEMS ON THIS QUESTIONNAIRE, HOW DIFFICULT HAVE THESE PROBLEMS MADE IT FOR YOU TO DO YOUR WORK, TAKE CARE OF THINGS AT HOME, OR GET ALONG WITH OTHER PEOPLE: SOMEWHAT DIFFICULT
GAD7 TOTAL SCORE: 6
6. BECOMING EASILY ANNOYED OR IRRITABLE: SEVERAL DAYS
3. WORRYING TOO MUCH ABOUT DIFFERENT THINGS: SEVERAL DAYS
1. FEELING NERVOUS, ANXIOUS, OR ON EDGE: MORE THAN HALF THE DAYS
7. FEELING AFRAID AS IF SOMETHING AWFUL MIGHT HAPPEN: NOT AT ALL
2. NOT BEING ABLE TO STOP OR CONTROL WORRYING: SEVERAL DAYS
5. BEING SO RESTLESS THAT IT IS HARD TO SIT STILL: NOT AT ALL

## 2017-11-27 NOTE — MR AVS SNAPSHOT
MRN:[id number]                      After Visit Summary   11/27/2017    Velma Diaz    MRN: [id number]           Visit Information        Provider Department      11/27/2017 10:00 AM [facility]      Your next 10 appointments already scheduled     Nov 29, 2017 10:00 AM CST   Return Visit with Debra Hicks MD   [facility] ([address] [address]   [phone]            Dec 11, 2017 10:00 AM CST   Return Visit with Antonio Rios [facility] ([facility])    [address] [id number] [phone]            Jan 02, 2018  9:30 AM CST   Return Visit with Antonio Rios [facility] ([facility])    [address] [id number] [phone]              MyChart Information     [employer] gives you secure access to your electronic health record. If you see a primary care provider, you can also send messages to your care team and make appointments. If you have questions, please call your primary care clinic.  If you do not have a primary care provider, please call [phone] and they will assist you.        Care EveryWhere ID     This is your Care EveryWhere ID. This could be used by other organizations to access your [place] medical records  [id number]        Equal Access to Services     LOLA HERNANDEZ AH: Hadii antionette britoo Soeugenio, waaxda luqadaha, qaybta kaalmada adeegyada, grayson sauceda. So [facility] [phone].    ATENCIÓN: Si habla español, tiene a dowd disposición servicios gratuitos de asistencia lingüística. Llame al [phone].    We comply with applicable federal civil rights laws and [place] laws. We do not discriminate on the basis of race, color, national origin, age,  disability, sex, sexual orientation, or gender identity.
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
## 2017-11-28 ASSESSMENT — ANXIETY QUESTIONNAIRES: GAD7 TOTAL SCORE: 6

## 2017-11-29 ENCOUNTER — OFFICE VISIT (OUTPATIENT)
Dept: NEUROLOGY | Facility: CLINIC | Age: 47
End: 2017-11-29
Payer: COMMERCIAL

## 2017-11-29 VITALS
DIASTOLIC BLOOD PRESSURE: 67 MMHG | WEIGHT: 225.6 LBS | SYSTOLIC BLOOD PRESSURE: 103 MMHG | HEART RATE: 68 BPM | BODY MASS INDEX: 39.34 KG/M2

## 2017-11-29 DIAGNOSIS — M65.332 TRIGGER MIDDLE FINGER OF LEFT HAND: ICD-10-CM

## 2017-11-29 DIAGNOSIS — G25.81 RESTLESS LEGS SYNDROME (RLS): Primary | ICD-10-CM

## 2017-11-29 PROCEDURE — 99213 OFFICE O/P EST LOW 20 MIN: CPT | Performed by: PSYCHIATRY & NEUROLOGY

## 2017-11-29 NOTE — PATIENT INSTRUCTIONS
Revised AFTER VISIT SUMMARY (AVS)    Continue REQUIP for restless leg syndrome as before     Would suggest seeking the help of primary care provider Srinivasa Hunter MD if trigger finger symptoms persist and he could referral to  orthopedic specialist    Diagnostic possibilities reviewed    Preventive Neurology: Encouraged to keep physically and mentally active with particular emphasis on daily stretching exercises, walking, and healthy eating.      Explained to her that I am leaving [facility] and therefore advised to coordinate neurological care with Srinivasa Hunter MD.

## 2017-11-29 NOTE — MR AVS SNAPSHOT
After Visit Summary   11/29/2017    Velma Diaz    MRN: [id number]           Patient Information     Date Of Birth          1970        Visit Information        Provider Department      11/29/2017 10:00 AM Debar Hicks MD [facility]        Today's Diagnoses     Restless legs syndrome (RLS)    -  1    Trigger middle finger of left hand          Care Instructions    Revised AFTER VISIT SUMMARY (AVS)    Continue REQUIP for restless leg syndrome as before     Would suggest seeking the help of primary care provider Srinivasa Hunter MD if trigger finger symptoms persist and he could referral to  orthopedic specialist    Diagnostic possibilities reviewed    Preventive Neurology: Encouraged to keep physically and mentally active with particular emphasis on daily stretching exercises, walking, and healthy eating.      Explained to her that I am leaving [facility] and therefore advised to coordinate neurological care with Srinivasa Hunter MD.               Follow-ups after your visit        Follow-up notes from your care team     Return for [id number].      Your next 10 appointments already scheduled     Dec 07, 2017  2:30 PM CST   [facility]    [address] [place] [id number]   [phone]            Dec 11, 2017 10:00 AM CST   [facility]   [address] [address]   [phone]            Jan 02, 2018  9:30 AM CST   Return Visit with SERVANDO Yan   [facility]    [address] [address]   [phone]              Who to contact     If you have questions or need follow up information about today's clinic visit or your schedule please  contact [facility] directly at [phone].  Normal or non-critical lab and imaging results will be communicated to you by MyChart, letter or phone within 4 business days after the clinic has received the results. If you do not hear from us within 7 days, please contact the clinic through MyChart or phone. If you have a critical or abnormal lab result, we will notify you by phone as soon as possible.  Submit refill requests through [employer] or call your pharmacy and they will forward the refill request to us. Please allow 3 business days for your refill to be completed.          Additional Information About Your Visit        [email or web address] Information     [employer] gives you secure access to your electronic health record. If you see a primary care provider, you can also send messages to your care team and make appointments. If you have questions, please call your primary care clinic.  If you do not have a primary care provider, please call [phone] and they will assist you.        Care EveryWhere ID     This is your Care EveryWhere ID. This could be used by other organizations to access your [facility] medical records  [id number]        Your Vitals Were     Pulse BMI (Body Mass Index)                68 39.34 kg/m2           Blood Pressure from Last 3 Encounters:   11/30/17 105/74   11/29/17 103/67   10/24/17 96/67    Weight from Last 3 Encounters:   11/30/17 225 lb (102.1 kg)   11/29/17 225 lb 9.6 oz (102.3 kg)   10/24/17 227 lb (103 kg)              Today, you had the following     No orders found for display       Primary Care Provider Office Phone # Fax #    Srinivasa Hunter [phone]       [address] [facility] [address]        Goals        General    I will call within next 2 weeks to schedule initial psychiatry appointment (pt-stated)     Notes - Note edited  5/20/2015 12:51 PM by Yesica Marsh    As of today's date 5/20/2015 goal is met at 76 - 100%.   Goal Status:   Complete  Patient states this is scheduled with Dr. Salvador for next month, but not on appointment calendar  YesicaLUDY Nielson, MSW   [phone]  5/20/2015 12:51 PM        I will complete [employer] or reduced fare application within the next month (pt-stated)     Notes - Note edited  11/24/2015 12:00 PM by Yesica Marsh    As of today's date 11/24/2015 goal is met at 51 - 75%.   Goal Status:  Active  She reported today having [employer] application, [employer] worker has requested but packet from [employer].    YesicaLUDY Nielson, MSW   [phone]  11/24/2015 12:00 PM        I will discuss [employer] worker with therapist next week for housing needs  (pt-stated)     Notes - Note edited  12/3/2015  1:17 PM by Yesica Marsh    As of today's date 12/3/2015 goal is met at 76 - 100%.   Goal Status:  Discontinued  Patient has [employer] worker and has found housing with friend  LUDY Stark, MSW   [phone]  12/3/2015 1:16 PM        Equal Access to Services     [facility]: Hadii aad ku hadasho Soomaali, waaxda luqadaha, qaybta kaalmada adeegyada, grayson oharan speedy carrillo . So [facility] [phone].    ATENCIÓN: Si habla español, tiene a dowd disposición servicios gratuitos de asistencia lingüística. Llame al [phone].    We comply with applicable federal civil rights laws and [place] laws. We do not discriminate on the basis of race, color, national origin, age, disability, sex, sexual orientation, or gender identity.            Thank you!     Thank you for choosing [facility]  for your care. Our goal is always to provide you with excellent care. Hearing back from our patients is one way we can continue to improve our services. Please take a few minutes to complete the written survey that you may receive in the mail after your visit with us. Thank you!             Your Updated Medication List - Protect others around you: Learn how to safely use, store and throw  away your medicines at www.disposemymeds.org.          This list is accurate as of: 11/29/17 11:59 PM.  Always use your most recent med list.                   Brand Name Dispense Instructions for use Diagnosis    acetaminophen 325 MG tablet    TYLENOL    100 tablet    Take 2 tablets (650 mg) by mouth every 4 hours as needed for other (mild pain)    Lesion of left ulnar nerve       cyclobenzaprine 10 MG tablet    FLEXERIL    30 tablet    Take 0.5-1 tablets (5-10 mg) by mouth 3 times daily as needed for muscle spasms    Chronic bilateral back pain, unspecified back location       FLUoxetine 40 MG capsule    PROzac    30 capsule    Take 1 capsule (40 mg) by mouth daily    Major depressive disorder, recurrent episode, moderate (H), ROSE MARIE (generalized anxiety disorder)       furosemide 20 MG tablet    LASIX    30 tablet    Take 1 tablet (20 mg) by mouth daily    Leg swelling       MULTI FOR HER PO      Take by mouth daily        nystatin 716354 UNIT/GM Powd    MYCOSTATIN    60 g    Apply to affected area twice daily as needed    Candidal intertrigo       * order for DME      Respironics REMSTAR 60 Series Auto [id number] H2O, Airfit P10 nasal pillow mask w/xsmall pillows        * order for DME     1 Device    TENS unit    Chronic bilateral back pain, unspecified back location       oxyCODONE-acetaminophen  MG per tablet    PERCOCET    90 tablet    Take 1 tablet by mouth every 6 hours as needed for moderate to severe pain    Chronic pain syndrome       Pregabalin 225 MG Caps    LYRICA    60 capsule    Take 225 mg by mouth 2 times daily    Chronic pain syndrome       rOPINIRole 0.25 MG tablet    REQUIP    180 tablet    Week 1 & 2: 1 tablet 1 hour before going to bed. Week 2 and afterwards: 1 tablet two times a day    Restless legs syndrome (RLS)       SUMAtriptan 100 MG tablet    IMITREX    9 tablet    Take 1 tablet (100 mg) by mouth at onset of headache for migraine May repeat in 2 hours if needed: max 2/day     Headache(784.0)       * Notice:  This list has 2 medication(s) that are the same as other medications prescribed for you. Read the directions carefully, and ask your doctor or other care provider to review them with you.

## 2017-11-29 NOTE — NURSING NOTE
"Chief Complaint   Patient presents with     RECHECK     Restless leg syndrome       Initial /67 (BP Location: Right arm, Patient Position: Chair, Cuff Size: Adult Large)  Pulse 68  Wt 102.3 kg (225 lb 9.6 oz)  BMI 39.34 kg/m2 Estimated body mass index is 39.34 kg/(m^2) as calculated from the following:    Height as of 10/18/17: 1.613 m (5' 3.5\").    Weight as of this encounter: 102.3 kg (225 lb 9.6 oz).  Medication Reconciliation: complete   Angeles Banerjee MA      "

## 2017-11-29 NOTE — LETTER
2017         RE: Velma Diaz  [address]  [place] [address]        Dear Colleague,    Thank you for referring your patient, Velma Diaz, to the [facility]. Please see a copy of my visit note below.                               ESTABLISHED PATIENT NEUROLOGY NOTE    LOCATION: [facility]  DATE OF VISIT: 2017    NAME: Ms.Kimberly WANDA Diaz  : 1970 (47 year old)  MR #: [id number]    PRIMARY/REFERRING PROVIDER: Srinivasa Hunter MD    REASON FOR VISIT: Restless legs syndrome     HISTORY OF PRESENT ILLNESS: 47-year-old woman with restless legs syndrome.  During her last visit she was started on REQUIP 0.25 mg, one tablet at 7 AM and is second tablet between 9 and 10 PM.  She relates that it has been helping especially in relation to urge to walk and some decrease in unusual sensation over her legs.  She relates that numbness symptom are still present. No side effects reported with Requip.    Additional symptom of her left hand fingers, digits 2 and 3 locking up for last 3 weeks.  It happens while she is watching TV but it can happen other times also.  She also has a history of numbness of the left hand.  The whole event of fingers locking up lasts only a few seconds.  She is able to unlock it. Settles with massaging of fingers.     Recent Diagnostic Studies:   Component      Latest Ref Rng & Units 2/10/2017   Ferritin      8 - 252 ng/mL 62     No Known Allergies  Current Outpatient Prescriptions   Medication Sig     oxyCODONE-acetaminophen (PERCOCET)  MG per tablet Take 1 tablet by mouth every 6 hours as needed for moderate to severe pain     order for DME TENS unit     FLUoxetine (PROZAC) 40 MG capsule Take 1 capsule (40 mg) by mouth daily     Pregabalin (LYRICA) 225 MG CAPS Take 225 mg by mouth 2 times daily     furosemide (LASIX) 20 MG tablet Take 1 tablet (20 mg) by mouth daily     rOPINIRole (REQUIP) 0.25 MG tablet Week 1 & 2: 1 tablet 1 hour before going to  bed. Week 2 and afterwards: 1 tablet two times a day     cyclobenzaprine (FLEXERIL) 10 MG tablet Take 0.5-1 tablets (5-10 mg) by mouth 3 times daily as needed for muscle spasms     nystatin (MYCOSTATIN) 103460 UNIT/GM POWD Apply to affected area twice daily as needed     SUMAtriptan (IMITREX) 100 MG tablet Take 1 tablet (100 mg) by mouth at onset of headache for migraine May repeat in 2 hours if needed: max 2/day     acetaminophen (TYLENOL) 325 MG tablet Take 2 tablets (650 mg) by mouth every 4 hours as needed for other (mild pain)     ORDER FOR DME Respironics REMSTAR 60 Series Auto [id number] H2O, Airfit P10 nasal pillow mask w/xsmall pillows     Multiple Vitamins-Minerals (MULTI FOR HER PO) Take by mouth daily      No current facility-administered medications for this visit.        Current Outpatient Prescriptions on File Prior to Visit:  oxyCODONE-acetaminophen (PERCOCET)  MG per tablet Take 1 tablet by mouth every 6 hours as needed for moderate to severe pain   order for DME TENS unit   FLUoxetine (PROZAC) 40 MG capsule Take 1 capsule (40 mg) by mouth daily   Pregabalin (LYRICA) 225 MG CAPS Take 225 mg by mouth 2 times daily   furosemide (LASIX) 20 MG tablet Take 1 tablet (20 mg) by mouth daily   rOPINIRole (REQUIP) 0.25 MG tablet Week 1 & 2: 1 tablet 1 hour before going to bed. Week 2 and afterwards: 1 tablet two times a day   cyclobenzaprine (FLEXERIL) 10 MG tablet Take 0.5-1 tablets (5-10 mg) by mouth 3 times daily as needed for muscle spasms   nystatin (MYCOSTATIN) 121409 UNIT/GM POWD Apply to affected area twice daily as needed   SUMAtriptan (IMITREX) 100 MG tablet Take 1 tablet (100 mg) by mouth at onset of headache for migraine May repeat in 2 hours if needed: max 2/day   acetaminophen (TYLENOL) 325 MG tablet Take 2 tablets (650 mg) by mouth every 4 hours as needed for other (mild pain)   ORDER FOR DME Respironics REMSTAR 60 Series Auto [id number] H2O, Airfit P10 nasal pillow mask w/xsmall pillows    Multiple Vitamins-Minerals (MULTI FOR HER PO) Take by mouth daily      No current facility-administered medications on file prior to visit.   Past Medical History:   Diagnosis Date     Chronic pain syndrome 11/20/2015    She takes up to 90 oxycodone tablets per month for her chronic pain      Depressive disorder 2000     History of cleft palate with cleft lip     cleft lip and palate, and has had 27 operations per the patient     Hyperlipidemia with target LDL less than 130 10/26/2015     Morbid obesity with BMI of 40.0-44.9, adult (H) 10/26/2015     Neuropathy      MAYA (obstructive sleep apnea)/Hypoventilation Syndrome 2/24/2014    Study Date: 2/13/2014- (245.1 lbs) Sleep Associated Hypoventilation  suggested with baseline PCO2 42 mmHg, maximum PCO2 55 mmHg. Lowest oxygen saturation 85.0% Apnea/Hypopnea Index 5.5 events per hour.  REM AHI 37.2.  The supine AHI is 13.8. RDI 6.7 Sleep study 3/2014 (245#)- CPAP 6 cm effective, Transcutaneous CO2 Monitoring (TCM) within normal limits.          Skin cancer of anterior chest 2011    Basal cell on chest     TMJ (temporomandibular joint disorder)      Past Surgical History:   Procedure Laterality Date     ANKLE SURGERY  7/13    Left for torn tendons & loose bone chips     ARTHROSCOPY KNEE  1986    Right knee     BACK SURGERY       Basal cell carcinoma  2011    Removal from the chest     BIOPSY       C VAGINAL HYSTERECTOMY  2011     CARPAL TUNNEL RELEASE RT/LT  ~2010    Bilateral     COLONOSCOPY  2016     COSMETIC SURGERY       COSMETIC SURGERY       DECOMPRESSION CUBITAL TUNNEL Left 8/28/2015    Procedure: DECOMPRESSION CUBITAL TUNNEL;  Surgeon: Gus Donato MD;  [facility]     ENT SURGERY  1975    Tonsillectomy and Adenoids     GYN SURGERY  2011    Hysterectomy     HC REPAIR PERONEAL TENDONS  7/13     ORTHOPEDIC SURGERY  2011, 2011    Bilateral CTR     PE TUBES      yearly times 10 years     REPAIR CLEFT PALATE CHILD      Age 3 months & afterwards  (multiple surgeries)     SOFT TISSUE SURGERY       SOFT TISSUE SURGERY  2013     PERSONAL & SOCIAL HISTORY Reviewed and documented in [facility]    GENERAL EXAMINATION: /67 (BP Location: Right arm, Patient Position: Chair, Cuff Size: Adult Large)  Pulse 68  Wt 102.3 kg (225 lb 9.6 oz)  BMI 39.34 kg/m2    IMPRESSION:   Encounter Diagnoses   Name Primary?     Restless legs syndrome (RLS) Yes     Trigger middle finger of left hand      COMMENTS: Doing good with Requip for RLS.  Likely to have trigger fingers of Left hand. May need EDX studies of Left Upper limb  To look for focal entrapment neuropathy if hand paresthesias persist.   PLANS:   Patient Instructions   AFTER VISIT SUMMARY (AVS)    Continue REQUIP for restless leg syndrome as before     Would suggest seeking the help of primary care provider Srinivasa Hunter MD if trigger finger symptoms persist and he could referral to  orthopedic specialist    Diagnostic possibilities reviewed    Preventive Neurology: Encouraged to keep physically and mentally active with particular emphasis on daily stretching exercises, walking, and healthy eating.      Explained to her that I am leaving [facility] and therefore advised to coordinate neurological care with Srinivasa Hunter MD.     Thanks to Srinivasa Hunter MD for allowing me to participate in Ms. Diaz's care. Please feel free to call me with any questions or concerns.     *Chart documentation was completed in part with Dragon voice-recognition software. Even though reviewed, some grammatical, spelling, and word errors may remain.           Debra Hicks MD, [facility]  Neurologist    Cc: Srinivasa Hunter MD          Again, thank you for allowing me to participate in the care of your patient.        Sincerely,        Debra Hicks MD

## 2017-11-30 ENCOUNTER — OFFICE VISIT (OUTPATIENT)
Dept: FAMILY MEDICINE | Facility: CLINIC | Age: 47
End: 2017-11-30
Payer: COMMERCIAL

## 2017-11-30 VITALS
OXYGEN SATURATION: 97 % | HEART RATE: 84 BPM | SYSTOLIC BLOOD PRESSURE: 105 MMHG | BODY MASS INDEX: 39.23 KG/M2 | DIASTOLIC BLOOD PRESSURE: 74 MMHG | TEMPERATURE: 97.6 F | WEIGHT: 225 LBS

## 2017-11-30 DIAGNOSIS — J06.9 VIRAL URI WITH COUGH: ICD-10-CM

## 2017-11-30 DIAGNOSIS — R07.0 THROAT PAIN: Primary | ICD-10-CM

## 2017-11-30 DIAGNOSIS — Z87.730 HISTORY OF CLEFT PALATE WITH CLEFT LIP: Chronic | ICD-10-CM

## 2017-11-30 DIAGNOSIS — M25.642 JOINT STIFFNESS OF HAND, LEFT: ICD-10-CM

## 2017-11-30 LAB
DEPRECATED S PYO AG THROAT QL EIA: NORMAL
SPECIMEN SOURCE: NORMAL

## 2017-11-30 PROCEDURE — 99214 OFFICE O/P EST MOD 30 MIN: CPT | Performed by: FAMILY MEDICINE

## 2017-11-30 PROCEDURE — 87880 STREP A ASSAY W/OPTIC: CPT | Performed by: FAMILY MEDICINE

## 2017-11-30 PROCEDURE — 87081 CULTURE SCREEN ONLY: CPT | Performed by: FAMILY MEDICINE

## 2017-11-30 NOTE — MR AVS SNAPSHOT
After Visit Summary   11/30/2017    Velma Diaz    MRN: [id number]           Patient Information     Date Of Birth          1970        Visit Information        Provider Department      11/30/2017 10:20 AM Srinivasa Hunter MD [facility]        Today's Diagnoses     Throat pain    -  1    Viral URI with cough        Joint stiffness of hand, left           Follow-ups after your visit        Follow-up notes from your care team     Return if symptoms worsen or fail to improve.      Your next 10 appointments already scheduled     Dec 11, 2017 10:00 AM CST   Return Visit with Antonio Rios [facility] ([facility])    [address] [place] [id number]   [phone]            Jan 02, 2018  9:30 AM CST   Return Visit with Antonio Rios [facility] ([facility])    [address] [place] [id number]   [phone]              Who to contact     If you have questions or need follow up information about today's clinic visit or your schedule please contact [facility] directly at [phone].  Normal or non-critical lab and imaging results will be communicated to you by MyChart, letter or phone within 4 business days after the clinic has received the results. If you do not hear from us within 7 days, please contact the clinic through Grazehart or phone. If you have a critical or abnormal lab result, we will notify you by phone as soon as possible.  Submit refill requests through Jiglu or call your pharmacy and they will forward the refill request to us. Please allow 3 business days for your refill to be completed.          Additional Information About Your Visit        MyChart Information     Jiglu gives you secure access to your electronic health record. If you see a primary care provider, you can also send messages  to your care team and make appointments. If you have questions, please call your primary care clinic.  If you do not have a primary care provider, please call [phone] and they will assist you.        Care EveryWhere ID     This is your Care EveryWhere ID. This could be used by other organizations to access your May medical records  [id number]        Your Vitals Were     Pulse Temperature Pulse Oximetry BMI (Body Mass Index)          84 97.6  F (36.4  C) (Oral) 97% 39.23 kg/m2         Blood Pressure from Last 3 Encounters:   11/30/17 105/74   11/29/17 103/67   10/24/17 96/67    Weight from Last 3 Encounters:   11/30/17 225 lb (102.1 kg)   11/29/17 225 lb 9.6 oz (102.3 kg)   10/24/17 227 lb (103 kg)              We Performed the Following     Beta strep group A culture     Rapid strep screen        Primary Care Provider Office Phone # Fax #    Srinivasa Hunter [phone]       [address] [facility] [address]        Goals        General    I will call within next 2 weeks to schedule initial psychiatry appointment (pt-stated)     Notes - Note edited  5/20/2015 12:51 PM by Yesiac Mrash    As of today's date 5/20/2015 goal is met at 76 - 100%.   Goal Status:  Complete  Patient states this is scheduled with Dr. Salvador for next month, but not on appointment calendar  LUDY Stark, MSW   [phone]  5/20/2015 12:51 PM        I will complete [employer] or reduced fare application within the next month (pt-stated)     Notes - Note edited  11/24/2015 12:00 PM by Yesica Marsh    As of today's date 11/24/2015 goal is met at 51 - 75%.   Goal Status:  Active  She reported today having [employer] application, [employer] worker has requested but packet from [employer].    LUDY Stark, MSW   [phone]  11/24/2015 12:00 PM        I will discuss [facility] worker with therapist next week for housing needs  (pt-stated)     Notes - Note edited  12/3/2015  1:17 PM  by Yesica Marsh    As of today's date 12/3/2015 goal is met at 76 - 100%.   Goal Status:  Discontinued  Patient has [facility] worker and has found housing with friend  Yesica Marsh, hospitals, MSW   [phone]  12/3/2015 1:16 PM        Equal Access to Services     OLLA HERNANDEZ : Hadii aad ku hadasho Soomaali, waaxda luqadaha, qaybta kaalmada adeegyada, waxay estivenin haysivan speedy corieradha carrillo . So [facility] [phone].    ATENCIÓN: Si habla español, tiene a dowd disposición servicios gratuitos de asistencia lingüística. Llame al [phone].    We comply with applicable federal civil rights laws and [place] laws. We do not discriminate on the basis of race, color, national origin, age, disability, sex, sexual orientation, or gender identity.            Thank you!     Thank you for choosing [facility]  for your care. Our goal is always to provide you with excellent care. Hearing back from our patients is one way we can continue to improve our services. Please take a few minutes to complete the written survey that you may receive in the mail after your visit with us. Thank you!             Your Updated Medication List - Protect others around you: Learn how to safely use, store and throw away your medicines at www.disposemymeds.org.          This list is accurate as of: 11/30/17 10:56 AM.  Always use your most recent med list.                   Brand Name Dispense Instructions for use Diagnosis    acetaminophen 325 MG tablet    TYLENOL    100 tablet    Take 2 tablets (650 mg) by mouth every 4 hours as needed for other (mild pain)    Lesion of left ulnar nerve       cyclobenzaprine 10 MG tablet    FLEXERIL    30 tablet    Take 0.5-1 tablets (5-10 mg) by mouth 3 times daily as needed for muscle spasms    Chronic bilateral back pain, unspecified back location       FLUoxetine 40 MG capsule    PROzac    30 capsule    Take 1 capsule (40 mg) by mouth daily    Major depressive disorder, recurrent episode,  moderate (H), ROSE MARIE (generalized anxiety disorder)       furosemide 20 MG tablet    LASIX    30 tablet    Take 1 tablet (20 mg) by mouth daily    Leg swelling       MULTI FOR HER PO      Take by mouth daily        nystatin 290123 UNIT/GM Powd    MYCOSTATIN    60 g    Apply to affected area twice daily as needed    Candidal intertrigo       * order for DME      Respironics REMSTAR 60 Series Auto [id number] H2O, Airfit P10 nasal pillow mask w/xsmall pillows        * order for DME     1 Device    TENS unit    Chronic bilateral back pain, unspecified back location       oxyCODONE-acetaminophen  MG per tablet    PERCOCET    90 tablet    Take 1 tablet by mouth every 6 hours as needed for moderate to severe pain    Chronic pain syndrome       Pregabalin 225 MG Caps    LYRICA    60 capsule    Take 225 mg by mouth 2 times daily    Chronic pain syndrome       rOPINIRole 0.25 MG tablet    REQUIP    180 tablet    Week 1 & 2: 1 tablet 1 hour before going to bed. Week 2 and afterwards: 1 tablet two times a day    Restless legs syndrome (RLS)       SUMAtriptan 100 MG tablet    IMITREX    9 tablet    Take 1 tablet (100 mg) by mouth at onset of headache for migraine May repeat in 2 hours if needed: max 2/day    Headache(784.0)       * Notice:  This list has 2 medication(s) that are the same as other medications prescribed for you. Read the directions carefully, and ask your doctor or other care provider to review them with you.

## 2017-11-30 NOTE — NURSING NOTE
"Chief Complaint   Patient presents with     URI     Finger     Trigger finger, middle and index fingers left hand       Initial /74 (BP Location: Right arm, Patient Position: Chair, Cuff Size: Adult Large)  Pulse 84  Temp 97.6  F (36.4  C) (Oral)  Wt 225 lb (102.1 kg)  SpO2 97%  BMI 39.23 kg/m2 Estimated body mass index is 39.23 kg/(m^2) as calculated from the following:    Height as of 10/18/17: 5' 3.5\" (1.613 m).    Weight as of this encounter: 225 lb (102.1 kg).  Medication Reconciliation: complete   Susan Hines CMA       "

## 2017-11-30 NOTE — PROGRESS NOTES
"  SUBJECTIVE:   Velma Diaz is a 47 year old female who presents to clinic today for the following health issues:      Acute Illness   Acute illness concerns: Sore throat, cough  Onset: Yesterday afternoon    Fever: no    Chills/Sweats: no    Headache (location?): YES- Little bit    Sinus Pressure:YES    Conjunctivitis:  no    Ear Pain: YES: left    Rhinorrhea: no    Congestion: no    Sore Throat: YES     Cough: YES-non-productive    Wheeze: YES- Last night    Decreased Appetite: no    Nausea: no    Vomiting: no    Diarrhea:  no    Dysuria/Freq.: no    Fatigue/Achiness: YES- Fatigue    Sick/Strep Exposure: no     Therapies Tried and outcome: OTC cough syrup, cough drops      Left hand stiffness, mainly middle and index fingers left hand. She feels like at times her left index and middle fingers don't want to fully straighten out. It doesn't sound like she is getting locking of the fingers to suggest \"trigger finger\". She can flex them fine.        Problem list and histories reviewed & adjusted, as indicated.  Additional history: as documented    Patient Active Problem List   Diagnosis     History of cleft palate with cleft lip     MAYA (obstructive sleep apnea)/Hypoventilation Syndrome     Major depressive disorder, recurrent episode, moderate (H)     Paresthesias     Health Care Home     Vitamin D deficiency     Morbid obesity with BMI of 40.0-44.9, adult (H)     Hyperlipidemia with target LDL less than 130     Intervertebral disc prolapse with impingement     Nonallopathic lesion of lumbar region     Chronic pain syndrome     Restless legs syndrome (RLS)     Insomnia     Migraine     Chronic bilateral back pain, unspecified back location     Chronic pain of left knee     ROSE MARIE (generalized anxiety disorder)     Past Surgical History:   Procedure Laterality Date     ANKLE SURGERY  7/13    Left for torn tendons & loose bone chips     ARTHROSCOPY KNEE  1986    Right knee     BACK SURGERY       Basal cell carcinoma  " 2011    Removal from the chest     BIOPSY       C VAGINAL HYSTERECTOMY  2011     CARPAL TUNNEL RELEASE RT/LT  ~2010    Bilateral     COLONOSCOPY  2016     COSMETIC SURGERY       COSMETIC SURGERY       DECOMPRESSION CUBITAL TUNNEL Left 8/28/2015    Procedure: DECOMPRESSION CUBITAL TUNNEL;  Surgeon: Gus Donato MD;  [facility]     ENT SURGERY  1975    Tonsillectomy and Adenoids     GYN SURGERY  2011    Hysterectomy     HC REPAIR PERONEAL TENDONS  7/13     ORTHOPEDIC SURGERY  2011, 2011    Bilateral CTR     PE TUBES      yearly times 10 years     REPAIR CLEFT PALATE CHILD      Age 3 months & afterwards (multiple surgeries)     SOFT TISSUE SURGERY       SOFT TISSUE SURGERY  2013       Social History   Substance Use Topics     Smoking status: Former Smoker     Packs/day: 0.50     Years: 15.00     Types: Cigarettes     Quit date: 2/20/2015     Smokeless tobacco: Never Used     Alcohol use No      Comment: Quit in September 27th     Family History   Problem Relation Age of Onset     Alzheimer Disease Paternal Grandmother 60     CEREBROVASCULAR DISEASE Paternal Grandmother      Neurologic Disorder Sister 20     migraines     Depression/Anxiety Sister      Depression Sister      Neurologic Disorder Son 14     migraines     Asthma Son      HEART DISEASE Mother      OSTEOPOROSIS Mother      Obesity Mother      CANCER Father      Alcohol/Drug Father      Other Cancer Father      saliva glands & skin CA      Hypertension Father      DIABETES Maternal Grandmother      Breast Cancer Maternal Grandmother      Obesity Maternal Grandmother      Other Cancer Maternal Grandfather      skin cancer     Cancer - colorectal Other      Aunt     Asthma Daughter      Asthma Daughter      Asthma Son      Thyroid Disease Sister      Colon Cancer Other      Obesity Sister      Glaucoma No family hx of      Macular Degeneration No family hx of          Current Outpatient Prescriptions   Medication Sig Dispense Refill      oxyCODONE-acetaminophen (PERCOCET)  MG per tablet Take 1 tablet by mouth every 6 hours as needed for moderate to severe pain 90 tablet 0     order for DME TENS unit 1 Device 0     FLUoxetine (PROZAC) 40 MG capsule Take 1 capsule (40 mg) by mouth daily 30 capsule 11     Pregabalin (LYRICA) 225 MG CAPS Take 225 mg by mouth 2 times daily 60 capsule 11     furosemide (LASIX) 20 MG tablet Take 1 tablet (20 mg) by mouth daily 30 tablet 11     rOPINIRole (REQUIP) 0.25 MG tablet Week 1 & 2: 1 tablet 1 hour before going to bed. Week 2 and afterwards: 1 tablet two times a day 180 tablet 1     cyclobenzaprine (FLEXERIL) 10 MG tablet Take 0.5-1 tablets (5-10 mg) by mouth 3 times daily as needed for muscle spasms 30 tablet 1     nystatin (MYCOSTATIN) 548699 UNIT/GM POWD Apply to affected area twice daily as needed 60 g 2     SUMAtriptan (IMITREX) 100 MG tablet Take 1 tablet (100 mg) by mouth at onset of headache for migraine May repeat in 2 hours if needed: max 2/day 9 tablet 2     acetaminophen (TYLENOL) 325 MG tablet Take 2 tablets (650 mg) by mouth every 4 hours as needed for other (mild pain) 100 tablet 0     ORDER FOR DME Respironics REMSTAR 60 Series Auto [id number] H2O, Airfit P10 nasal pillow mask w/xsmall pillows       Multiple Vitamins-Minerals (MULTI FOR HER PO) Take by mouth daily        No Known Allergies      Reviewed and updated as needed this visit by clinical staffTobacco  Allergies  Meds  Med Hx  Surg Hx  Fam Hx  Soc Hx      Reviewed and updated as needed this visit by Provider         ROS:  Mainly significant for the above. She saw neurology yesterday for ongoing concerns that she has and will be seeing the cleft palate clinic in a couple of weeks or so.    OBJECTIVE:     /74 (BP Location: Right arm, Patient Position: Chair, Cuff Size: Adult Large)  Pulse 84  Temp 97.6  F (36.4  C) (Oral)  Wt 225 lb (102.1 kg)  SpO2 97%  BMI 39.23 kg/m2  Body mass index is 39.23 kg/(m^2).  GENERAL: alert,  no distress and obese  EYES: Eyes grossly normal to inspection, PERRL and conjunctivae and sclerae normal  HENT: ear canals and TM's normal, nose and mouth without ulcers or lesions. Nares are mildly congested. She's had prior cleft lip/palate repair.  NECK: no adenopathy, no asymmetry, masses, or scars and thyroid normal to palpation  RESP: lungs clear to auscultation - no rales, rhonchi or wheezes  MS: She is able to extend and flex her left hand fingers fully. She is not getting any triggering.    Diagnostic Test Results:  Strep screen - Negative    ASSESSMENT/PLAN:         ICD-10-CM    1. Throat pain R07.0 Rapid strep screen     Beta strep group A culture   2. Viral URI with cough J06.9     B97.89    3. History of cleft palate with cleft lip Z87.730    4. Joint stiffness of hand, left M25.642      She appears to have a viral URI  I recommended symptomatic treatment and expectant management for that  I recommended some cold packs to the left palmar and stretching exercises for the left hand    She will follow-up with the cleft palate clinic next month as scheduled    If new, worsening or persistent symptoms, the patient is to call or return for a recheck here prn      Srinivasa Hunter MD  [facility]

## 2017-12-01 LAB
BACTERIA SPEC CULT: NORMAL
SPECIMEN SOURCE: NORMAL

## 2017-12-03 NOTE — PROGRESS NOTES
ESTABLISHED PATIENT NEUROLOGY NOTE    LOCATION: [facility]  DATE OF VISIT: 2017    NAME: Ms.Kimberly WANDA Diaz  : 1970 (47 year old)  MR #: [id number]    PRIMARY/REFERRING PROVIDER: Srinivasa Hunter MD    REASON FOR VISIT: Restless legs syndrome     HISTORY OF PRESENT ILLNESS: 47-year-old woman with restless legs syndrome.  During her last visit she was started on REQUIP 0.25 mg, one tablet at 7 AM and is second tablet between 9 and 10 PM.  She relates that it has been helping especially in relation to urge to walk and some decrease in unusual sensation over her legs.  She relates that numbness symptom are still present. No side effects reported with Requip.    Additional symptom of her left hand fingers, digits 2 and 3 locking up for last 3 weeks.  It happens while she is watching TV but it can happen other times also.  She also has a history of numbness of the left hand.  The whole event of fingers locking up lasts only a few seconds.  She is able to unlock it. Settles with massaging of fingers.     Recent Diagnostic Studies:   Component      Latest Ref Rng & Units 2/10/2017   Ferritin      8 - 252 ng/mL 62     No Known Allergies  Current Outpatient Prescriptions   Medication Sig     oxyCODONE-acetaminophen (PERCOCET)  MG per tablet Take 1 tablet by mouth every 6 hours as needed for moderate to severe pain     order for DME TENS unit     FLUoxetine (PROZAC) 40 MG capsule Take 1 capsule (40 mg) by mouth daily     Pregabalin (LYRICA) 225 MG CAPS Take 225 mg by mouth 2 times daily     furosemide (LASIX) 20 MG tablet Take 1 tablet (20 mg) by mouth daily     rOPINIRole (REQUIP) 0.25 MG tablet Week 1 & 2: 1 tablet 1 hour before going to bed. Week 2 and afterwards: 1 tablet two times a day     cyclobenzaprine (FLEXERIL) 10 MG tablet Take 0.5-1 tablets (5-10 mg) by mouth 3 times daily as needed for muscle spasms     nystatin (MYCOSTATIN) 286778 UNIT/GM POWD Apply to affected  area twice daily as needed     SUMAtriptan (IMITREX) 100 MG tablet Take 1 tablet (100 mg) by mouth at onset of headache for migraine May repeat in 2 hours if needed: max 2/day     acetaminophen (TYLENOL) 325 MG tablet Take 2 tablets (650 mg) by mouth every 4 hours as needed for other (mild pain)     ORDER FOR DME Respironics REMSTAR 60 Series Auto [id number] H2O, Airfit P10 nasal pillow mask w/xsmall pillows     Multiple Vitamins-Minerals (MULTI FOR HER PO) Take by mouth daily      No current facility-administered medications for this visit.        Current Outpatient Prescriptions on File Prior to Visit:  oxyCODONE-acetaminophen (PERCOCET)  MG per tablet Take 1 tablet by mouth every 6 hours as needed for moderate to severe pain   order for DME TENS unit   FLUoxetine (PROZAC) 40 MG capsule Take 1 capsule (40 mg) by mouth daily   Pregabalin (LYRICA) 225 MG CAPS Take 225 mg by mouth 2 times daily   furosemide (LASIX) 20 MG tablet Take 1 tablet (20 mg) by mouth daily   rOPINIRole (REQUIP) 0.25 MG tablet Week 1 & 2: 1 tablet 1 hour before going to bed. Week 2 and afterwards: 1 tablet two times a day   cyclobenzaprine (FLEXERIL) 10 MG tablet Take 0.5-1 tablets (5-10 mg) by mouth 3 times daily as needed for muscle spasms   nystatin (MYCOSTATIN) 622663 UNIT/GM POWD Apply to affected area twice daily as needed   SUMAtriptan (IMITREX) 100 MG tablet Take 1 tablet (100 mg) by mouth at onset of headache for migraine May repeat in 2 hours if needed: max 2/day   acetaminophen (TYLENOL) 325 MG tablet Take 2 tablets (650 mg) by mouth every 4 hours as needed for other (mild pain)   ORDER FOR DME Respironics REMSTAR 60 Series Auto [id number] H2O, Airfit P10 nasal pillow mask w/xsmall pillows   Multiple Vitamins-Minerals (MULTI FOR HER PO) Take by mouth daily      No current facility-administered medications on file prior to visit.   Past Medical History:   Diagnosis Date     Chronic pain syndrome 11/20/2015    She takes up to 90  oxycodone tablets per month for her chronic pain      Depressive disorder 2000     History of cleft palate with cleft lip     cleft lip and palate, and has had 27 operations per the patient     Hyperlipidemia with target LDL less than 130 10/26/2015     Morbid obesity with BMI of 40.0-44.9, adult (H) 10/26/2015     Neuropathy      MAYA (obstructive sleep apnea)/Hypoventilation Syndrome 2/24/2014    Study Date: 2/13/2014- (245.1 lbs) Sleep Associated Hypoventilation  suggested with baseline PCO2 42 mmHg, maximum PCO2 55 mmHg. Lowest oxygen saturation 85.0% Apnea/Hypopnea Index 5.5 events per hour.  REM AHI 37.2.  The supine AHI is 13.8. RDI 6.7 Sleep study 3/2014 (245#)- CPAP 6 cm effective, Transcutaneous CO2 Monitoring (TCM) within normal limits.          Skin cancer of anterior chest 2011    Basal cell on chest     TMJ (temporomandibular joint disorder)      Past Surgical History:   Procedure Laterality Date     ANKLE SURGERY  7/13    Left for torn tendons & loose bone chips     ARTHROSCOPY KNEE  1986    Right knee     BACK SURGERY       Basal cell carcinoma  2011    Removal from the chest     BIOPSY       C VAGINAL HYSTERECTOMY  2011     CARPAL TUNNEL RELEASE RT/LT  ~2010    Bilateral     COLONOSCOPY  2016     COSMETIC SURGERY       COSMETIC SURGERY       DECOMPRESSION CUBITAL TUNNEL Left 8/28/2015    Procedure: DECOMPRESSION CUBITAL TUNNEL;  Surgeon: Gus Donato MD;  [facility]: [facility]     ENT SURGERY  1975    Tonsillectomy and Adenoids     GYN SURGERY  2011    Hysterectomy     HC REPAIR PERONEAL TENDONS  7/13     ORTHOPEDIC SURGERY  2011, 2011    Bilateral CTR     PE TUBES      yearly times 10 years     REPAIR CLEFT PALATE CHILD      Age 3 months & afterwards (multiple surgeries)     SOFT TISSUE SURGERY       SOFT TISSUE SURGERY  2013     PERSONAL & SOCIAL HISTORY Reviewed and documented in [facility]    GENERAL EXAMINATION: /67 (BP Location: Right arm, Patient Position: Chair, Cuff Size: Adult Large)   Pulse 68  Wt 102.3 kg (225 lb 9.6 oz)  BMI 39.34 kg/m2    IMPRESSION:   Encounter Diagnoses   Name Primary?     Restless legs syndrome (RLS) Yes     Trigger middle finger of left hand      COMMENTS: Doing good with Requip for RLS.  Likely to have trigger fingers of Left hand. May need EDX studies of Left Upper limb  To look for focal entrapment neuropathy if hand paresthesias persist.   PLANS:   Patient Instructions   AFTER VISIT SUMMARY (AVS)    Continue REQUIP for restless leg syndrome as before     Would suggest seeking the help of primary care provider Srinivasa Hunter MD if trigger finger symptoms persist and he could referral to  orthopedic specialist    Diagnostic possibilities reviewed    Preventive Neurology: Encouraged to keep physically and mentally active with particular emphasis on daily stretching exercises, walking, and healthy eating.      Explained to her that I am leaving [facility] and therefore advised to coordinate neurological care with Srinivasa Hunter MD.     Thanks to Srinivasa Hunter MD for allowing me to participate in Ms. Diaz's care. Please feel free to call me with any questions or concerns.     *Chart documentation was completed in part with Dragon voice-recognition software. Even though reviewed, some grammatical, spelling, and word errors may remain.           Debra Hicks MD, [facility]  Neurologist    Cc: Srinivasa Hunter MD

## 2017-12-06 DIAGNOSIS — M79.89 LEG SWELLING: ICD-10-CM

## 2017-12-07 ENCOUNTER — OFFICE VISIT (OUTPATIENT)
Dept: NEUROLOGY | Facility: CLINIC | Age: 47
End: 2017-12-07
Payer: COMMERCIAL

## 2017-12-07 ENCOUNTER — MYC MEDICAL ADVICE (OUTPATIENT)
Dept: FAMILY MEDICINE | Facility: CLINIC | Age: 47
End: 2017-12-07

## 2017-12-07 VITALS
RESPIRATION RATE: 16 BRPM | WEIGHT: 226 LBS | TEMPERATURE: 97.9 F | SYSTOLIC BLOOD PRESSURE: 116 MMHG | OXYGEN SATURATION: 96 % | HEART RATE: 79 BPM | DIASTOLIC BLOOD PRESSURE: 76 MMHG | BODY MASS INDEX: 38.58 KG/M2 | HEIGHT: 64 IN

## 2017-12-07 DIAGNOSIS — G25.81 RESTLESS LEGS SYNDROME (RLS): Primary | ICD-10-CM

## 2017-12-07 DIAGNOSIS — G25.81 RESTLESS LEG SYNDROME: ICD-10-CM

## 2017-12-07 DIAGNOSIS — G57.11 MERALGIA PARESTHETICA, RIGHT: ICD-10-CM

## 2017-12-07 DIAGNOSIS — Z98.890 HISTORY OF CARPAL TUNNEL RELEASE OF BOTH WRISTS: ICD-10-CM

## 2017-12-07 DIAGNOSIS — G60.9 IDIOPATHIC PERIPHERAL NEUROPATHY: Primary | ICD-10-CM

## 2017-12-07 PROCEDURE — 99215 OFFICE O/P EST HI 40 MIN: CPT | Performed by: PSYCHIATRY & NEUROLOGY

## 2017-12-07 NOTE — MR AVS SNAPSHOT
After Visit Summary   12/7/2017    Velma Diaz    MRN: [id number]           Patient Information     Date Of Birth          1970        Visit Information        Provider Department      12/7/2017 2:30 PM Joaquín Burger MD [facility]        Today's Diagnoses     Idiopathic peripheral neuropathy    -  1    Restless leg syndrome        Meralgia paresthetica, right        History of carpal tunnel release of both wrists          Care Instructions    AFTER VISIT SUMMARY (AVS):    At today's visit we thoroughly discussed current symptoms, most likely diagnoses, available treatment options, and plan. Given that your symptoms are relatively well controlled, no changes to your medications are needed.    Next follow-up appointment is in the next 6 months or earlier if needed.    Please do not hesitate to call me with any questions or concerns.    Thanks.            Follow-ups after your visit        Your next 10 appointments already scheduled     Dec 11, 2017 10:00 AM CST   [facility]    [address] [place] [id number]   [phone]            Jan 02, 2018  9:30 AM CST   Return Visit with SERVANDO Yan   [facility] ([facility])    [address] [place] [id number]   [phone]            Jun 07, 2018  1:00 PM CDT   Return Visit with Joaquín Burger MD   [facility]    [address] [place] [id number] [phone]              Who to contact     If you have questions or need follow up information about today's clinic visit or your schedule please contact [facility] directly at [phone].  Normal or non-critical lab and imaging results will be communicated to you by Marcy  "letter or phone within 4 business days after the clinic has received the results. If you do not hear from us within 7 days, please contact the clinic through [employer] or phone. If you have a critical or abnormal lab result, we will notify you by phone as soon as possible.  Submit refill requests through [employer] or call your pharmacy and they will forward the refill request to us. Please allow 3 business days for your refill to be completed.          Additional Information About Your Visit        [employer] Information     [employer] gives you secure access to your electronic health record. If you see a primary care provider, you can also send messages to your care team and make appointments. If you have questions, please call your primary care clinic.  If you do not have a primary care provider, please call [phone] and they will assist you.        Care EveryWhere ID     This is your Care EveryWhere ID. This could be used by other organizations to access your [place] medical records  [id number]        Your Vitals Were     Pulse Temperature Respirations Height Pulse Oximetry BMI (Body Mass Index)    79 97.9  F (36.6  C) (Oral) 16 1.613 m (5' 3.5\") 96% 39.41 kg/m2       Blood Pressure from Last 3 Encounters:   12/07/17 116/76   11/30/17 105/74   11/29/17 103/67    Weight from Last 3 Encounters:   12/07/17 102.5 kg (226 lb)   11/30/17 102.1 kg (225 lb)   11/29/17 102.3 kg (225 lb 9.6 oz)              Today, you had the following     No orders found for display         Today's Medication Changes          These changes are accurate as of: 12/7/17  3:11 PM.  If you have any questions, ask your nurse or doctor.               These medicines have changed or have updated prescriptions.        Dose/Directions    rOPINIRole 0.25 MG tablet   Commonly known as:  REQUIP   This may have changed:    - how much to take  - how to take this  - when to take this  - additional instructions   Used for:  Restless legs syndrome (RLS)     "    Week 1 & 2: 1 tablet 1 hour before going to bed. Week 2 and afterwards: 1 tablet two times a day   Quantity:  180 tablet   Refills:  1                Primary Care Provider Office Phone # Fax #    Srinivasa Hunter [phone]       [address] [facility] [address]        Goals        General    I will call within next 2 weeks to schedule initial psychiatry appointment (pt-stated)     Notes - Note edited  5/20/2015 12:51 PM by Yesica Marsh    As of today's date 5/20/2015 goal is met at 76 - 100%.   Goal Status:  Complete  Patient states this is scheduled with Dr. Salvador for next month, but not on appointment calendar  LUDY Stark, MSW   [phone]  5/20/2015 12:51 PM        I will complete [employer] or reduced fare application within the next month (pt-stated)     Notes - Note edited  11/24/2015 12:00 PM by Yesica Marsh    As of today's date 11/24/2015 goal is met at 51 - 75%.   Goal Status:  Active  She reported today having [employer] application, [employer] worker has requested but packet from [employer].    LUDY Stark, MSW   [phone]  11/24/2015 12:00 PM        I will discuss [employer] worker with therapist next week for housing needs  (pt-stated)     Notes - Note edited  12/3/2015  1:17 PM by Yesica Marsh    As of today's date 12/3/2015 goal is met at 76 - 100%.   Goal Status:  Discontinued  Patient has [employer] worker and has found housing with friend  LUDY Stark, MSW   [phone]  12/3/2015 1:16 PM        Equal Access to Services     RICHARDSON HERNANDEZ AH: Hadii antionette reid Soeugenio, waaxda luqadaha, qaybta kaalmada adeegyada, grayson sauceda. So [facility] [phone].    ATENCIÓN: Si habla español, tiene a dowd disposición servicios gratuitos de asistencia lingüística. Llame al [phone].    We comply with applicable federal civil rights laws and [place] laws. We do not discriminate on the basis of race,  color, national origin, age, disability, sex, sexual orientation, or gender identity.            Thank you!     Thank you for choosing [facility]  for your care. Our goal is always to provide you with excellent care. Hearing back from our patients is one way we can continue to improve our services. Please take a few minutes to complete the written survey that you may receive in the mail after your visit with us. Thank you!             Your Updated Medication List - Protect others around you: Learn how to safely use, store and throw away your medicines at www.disposemymeds.org.          This list is accurate as of: 12/7/17  3:11 PM.  Always use your most recent med list.                   Brand Name Dispense Instructions for use Diagnosis    acetaminophen 325 MG tablet    TYLENOL    100 tablet    Take 2 tablets (650 mg) by mouth every 4 hours as needed for other (mild pain)    Lesion of left ulnar nerve       cyclobenzaprine 10 MG tablet    FLEXERIL    30 tablet    Take 0.5-1 tablets (5-10 mg) by mouth 3 times daily as needed for muscle spasms    Chronic bilateral back pain, unspecified back location       FLUoxetine 40 MG capsule    PROzac    30 capsule    Take 1 capsule (40 mg) by mouth daily    Major depressive disorder, recurrent episode, moderate (H), ROSE MARIE (generalized anxiety disorder)       furosemide 20 MG tablet    LASIX    30 tablet    Take 1 tablet (20 mg) by mouth daily    Leg swelling       MULTI FOR HER PO      Take by mouth daily        nystatin 196918 UNIT/GM Powd    MYCOSTATIN    60 g    Apply to affected area twice daily as needed    Candidal intertrigo       * order for DME      Respironics REMSTAR 60 Series Auto [id number] H2O, Airfit P10 nasal pillow mask w/xsmall pillows        * order for DME     1 Device    TENS unit    Chronic bilateral back pain, unspecified back location       oxyCODONE-acetaminophen  MG per tablet    PERCOCET    90 tablet    Take 1 tablet by mouth  every 6 hours as needed for moderate to severe pain    Chronic pain syndrome       Pregabalin 225 MG Caps    LYRICA    60 capsule    Take 225 mg by mouth 2 times daily    Chronic pain syndrome       rOPINIRole 0.25 MG tablet    REQUIP    180 tablet    Week 1 & 2: 1 tablet 1 hour before going to bed. Week 2 and afterwards: 1 tablet two times a day    Restless legs syndrome (RLS)       SUMAtriptan 100 MG tablet    IMITREX    9 tablet    Take 1 tablet (100 mg) by mouth at onset of headache for migraine May repeat in 2 hours if needed: max 2/day    Headache(784.0)       * Notice:  This list has 2 medication(s) that are the same as other medications prescribed for you. Read the directions carefully, and ask your doctor or other care provider to review them with you.

## 2017-12-07 NOTE — PATIENT INSTRUCTIONS
AFTER VISIT SUMMARY (AVS):    At today's visit we thoroughly discussed current symptoms, most likely diagnoses, available treatment options, and plan. Given that your symptoms are relatively well controlled, no changes to your medications are needed.    Next follow-up appointment is in the next 6 months or earlier if needed.    Please do not hesitate to call me with any questions or concerns.    Thanks.

## 2017-12-07 NOTE — NURSING NOTE
COGNITIVE SCREEN  1) Repeat 3 items (Banana, Sunrise, Chair)    2) Clock draw: ABNORMAL Hands read 11:05  3) 3 item recall: Recalls 3 objects  Results: ABNORMAL clock, 1-2 items recalled: PROBABLE COGNITIVE IMPAIRMENT, **INFORM PROVIDER**    Mini-CogTM Copyright S Vin. Licensed by the author for use in [facility]; reprinted with permission (tatiana@[email or web address]). All rights reserved.        Marisela Andrade, CMA

## 2017-12-07 NOTE — PROGRESS NOTES
INITIAL NEUROLOGY CONSULTATION    DATE OF VISIT: 12/7/2017  CLINIC LOCATION: [facility]  MRN: [id number]  PATIENT NAME: Velma Diaz  YOB: 1970    PRIMARY CARE PROVIDER: Srinivasa Hunter MD.    REASON FOR VISIT:   Chief Complaint   Patient presents with     Consult     restless leg syndrome     HISTORY OF PRESENT ILLNESS:                                                    Ms. Velma Diaz is 47 year old right handed female patient with history of chronic pain syndrome, anxiety, migraine, peripheral neuropathy, restless leg syndrome, right meralgia paresthetica, bilateral carpal tunnel syndrome status post bilateral surgical release, and left ulnar neuropathy status post surgical release, who was seen today to establish neurologic care for her conditions. She was previously followed by Dr. Hicks, [place] neurologist, who is leaving [place].    Per patient's report, intermittent numbness, tingling, and pain in both feet started more than 15 years ago. She denies any associated weakness. Symptoms are worse with prolonged standing or walking. Keeping her feet up and rest make his symptoms better. She tried multiple medications, TENS unit and was seen by chiropractors in the past. Currently, she takes 225 mg of Lyrica twice daily and Percocet with good control of pain.    In addition, the patient also developed uncontrolled urge to move her lower extremities at rest. These symptoms are most prominent at night. Walking around and exercising helps. She was tried on pramipexole without good effect. Recently, was started on ropinirole at 0.25 mg twice daily, which works quite well. The patient denies any significant side effects.  Her ferritin level in February 2017 was 62.  She is not on iron replacement.    Also, the patient has history of bilateral carpal tunnel syndromes that were surgically released (2010). She experienced mild left ulnar neuropathy, that eventually worsened  and was also surgically released (2015). She denies any persistent recurrent symptoms in her hands, except occasional tingling in the median nerve distribution on both sides. She also has long-standing history of right anterior thigh burning and pain, started 15 years ago while she was pregnant with her daughter.  During her childhood, she had right hip surgery related to reconstruction of her hard palate. It felt that her right meralgia paresthetica might be related to the extensive scar in her right inguinal area. This symptom is controlled with current analgesics.    Over the years, the patient was extensively evaluated by several neurologists, including Dr. Hilario, Dr. Hicks, Dr. Quiñonez, and Dr. Sanchez. Her repeated EMG's were negative for large fiber peripheral neuropathy, but demonstrated left mild ulnar neuropathy. Extensive laboratory workup was also unrevealing. Her condition felt consistent with possible small fiber peripheral neuropathy of unknown etiology.    During the course of her evaluation, the patient had cervical spine MRI, which demonstrated minimal multilevel degenerative disc disease, which was most advanced at C5-C6, where a mild central canal stenosis secondary to broad-based disc protrusion was seen. According to the scanned report of lumbar spine MRI from 08/21/2014, mild degeneration of lumbar spine was seen. In addition, there was moderate foraminal narrowing bilaterally at L5-S1 possibly affecting L5 nerve roots, and lateral recess narrowing bilaterally at L4-5, which was also possibly affecting the L5 nerve roots, though they did not appear to be compressed. Thoracic spine MRI from the same day demonstrated minimal degenerative disc disease.    On 12/04/2014, the patient had brain MRI with and without contrast which demonstrated supratentorial white matter lesions concerning for demyelinating disease, leading to additional evaluation. Subsequently, the patient was also examined by MS  specialist, Dr. Quiñonez, who felt that these lesions are non-specific and not consistent with multiple sclerosis.    The patient also had a head CT in 2016 for headache ([facility]). It was negative for acute intracranial pathology, according to Care Everywhere report. In 2015, the patient had brain MRI without contrast ([facility]). It was negative for acute intracranial pathology and demonstrated nonspecific white matter lesions felt to be related to small vessel ischemic disease, according to available in Care Everywhere report. Head and neck MRA from the same day, performed at the same facility, were unremarkable.    The patient denies a history of recent head injury. Prior neurological history: negative for stroke, brain neoplasms, seizure disorders, multiple sclerosis, meningitis, encephalitis, and major head injuries.    Neurologic Review of Systems - no amaurosis, diplopia, abnormal speech, unilateral numbness or weakness. She endorses insomnia, fatigue, feet edema, bleeding gums, earache, sinus problem, hay fever, anxiety, depression, panic attacks, restlessness, arthritis, muscle tenderness, trigger fingers, and headaches/migraine (well controlled now). These complaints have been already discussed with other medical providers. Otherwise, she denies any other complaints on 14-point comprehensive review of systems.    PAST MEDICAL/SURGICAL HISTORY:                                                    I personally reviewed patient's past medical and surgical history with the patient at today's visit.  Past Medical History:   Diagnosis Date     Chronic pain syndrome 11/20/2015    She takes up to 90 oxycodone tablets per month for her chronic pain      Depressive disorder 2000     History of cleft palate with cleft lip     cleft lip and palate, and has had 27 operations per the patient     Hyperlipidemia with target LDL less than 130 10/26/2015     Morbid obesity with BMI of 40.0-44.9, adult (H) 10/26/2015     Neuropathy       MAYA (obstructive sleep apnea)/Hypoventilation Syndrome 2/24/2014    Study Date: 2/13/2014- (245.1 lbs) Sleep Associated Hypoventilation  suggested with baseline PCO2 42 mmHg, maximum PCO2 55 mmHg. Lowest oxygen saturation 85.0% Apnea/Hypopnea Index 5.5 events per hour.  REM AHI 37.2.  The supine AHI is 13.8. RDI 6.7 Sleep study 3/2014 (245#)- CPAP 6 cm effective, Transcutaneous CO2 Monitoring (TCM) within normal limits.          Skin cancer of anterior chest 2011    Basal cell on chest     TMJ (temporomandibular joint disorder)      Past Surgical History:   Procedure Laterality Date     ANKLE SURGERY  7/13    Left for torn tendons & loose bone chips     ARTHROSCOPY KNEE  1986    Right knee     BACK SURGERY       Basal cell carcinoma  2011    Removal from the chest     BIOPSY       C VAGINAL HYSTERECTOMY  2011     CARPAL TUNNEL RELEASE RT/LT  ~2010    Bilateral     COLONOSCOPY  2016     COSMETIC SURGERY       COSMETIC SURGERY       DECOMPRESSION CUBITAL TUNNEL Left 8/28/2015    Procedure: DECOMPRESSION CUBITAL TUNNEL;  Surgeon: Gus Donato MD;  [facility]     ENT SURGERY  1975    Tonsillectomy and Adenoids     GYN SURGERY  2011    Hysterectomy     HC REPAIR PERONEAL TENDONS  7/13     ORTHOPEDIC SURGERY  2011, 2011    Bilateral CTR     PE TUBES      yearly times 10 years     REPAIR CLEFT PALATE CHILD      Age 3 months & afterwards (multiple surgeries)     SOFT TISSUE SURGERY       SOFT TISSUE SURGERY  2013     MEDICATIONS:                                                    I personally reviewed patient's medications and allergies with the patient at today's visit.  Current Outpatient Prescriptions on File Prior to Visit:  oxyCODONE-acetaminophen (PERCOCET)  MG per tablet Take 1 tablet by mouth every 6 hours as needed for moderate to severe pain   order for DME TENS unit   FLUoxetine (PROZAC) 40 MG capsule Take 1 capsule (40 mg) by mouth daily   Pregabalin (LYRICA) 225 MG CAPS Take 225 mg by  mouth 2 times daily   furosemide (LASIX) 20 MG tablet Take 1 tablet (20 mg) by mouth daily   cyclobenzaprine (FLEXERIL) 10 MG tablet Take 0.5-1 tablets (5-10 mg) by mouth 3 times daily as needed for muscle spasms   nystatin (MYCOSTATIN) 157021 UNIT/GM POWD Apply to affected area twice daily as needed   SUMAtriptan (IMITREX) 100 MG tablet Take 1 tablet (100 mg) by mouth at onset of headache for migraine May repeat in 2 hours if needed: max 2/day   acetaminophen (TYLENOL) 325 MG tablet Take 2 tablets (650 mg) by mouth every 4 hours as needed for other (mild pain)   ORDER FOR [facility] Respironics REMSTAR 60 Series Auto [id number] H2O, Airfit P10 nasal pillow mask w/xsmall pillows   Multiple Vitamins-Minerals (MULTI FOR HER PO) Take by mouth daily    rOPINIRole (REQUIP) 0.25 MG tablet 1 tablet two times a day.     ALLERGIES:                                                    No Known Allergies  FAMILY/SOCIAL HISTORY:                                                    Family and social history was reviewed with the patient at today's visit.  Family history is positive for migraine, headache, dementia, Alzheimer's disease, and peripheral neuropathy.   Problem (# of Occurrences) Relation (Name,Age of Onset)    Alcohol/Drug (1) Father    Alzheimer Disease (1) Paternal Grandmother (60)    Asthma (4) Son, Daughter, Daughter (Leah Lam), Son (Jens Lam)    Breast Cancer (1) Maternal Grandmother    CANCER (1) Father    CEREBROVASCULAR DISEASE (1) Paternal Grandmother    Cancer - colorectal (1) Other: Aunt    Colon Cancer (1) Other ([place])    DIABETES (1) Maternal Grandmother    Depression (1) Sister    Depression/Anxiety (1) Sister    HEART DISEASE (1) Mother    Hypertension (1) Father    Neurologic Disorder (2) Sister (20): migraines, Son (14): migraines    OSTEOPOROSIS (1) Mother    Obesity (3) Mother, Maternal Grandmother, Sister    Other Cancer (2) Father: saliva glands & skin CA , Maternal Grandfather: skin cancer     "Thyroid Disease (1) Sister       Negative family history of: Glaucoma, Macular Degeneration        , lives with friends. Former smoker, quit in 2015. Denies alcohol and recreational drug use. Currently unemployed.  Social History   Substance Use Topics     Smoking status: Former Smoker     Packs/day: 0.50     Years: 15.00     Types: Cigarettes     Quit date: 2/20/2015     Smokeless tobacco: Never Used     Alcohol use No      Comment: Quit in September 27th     REVIEW OF SYSTEMS:                                                    Patient has completed a [facility] Patient Health History, including a 14-system review which was personally reviewed, and pertinent positives are listed in HPI. She denies any additional problems on the further questioning.    EXAM:                                                    VITAL SIGNS:   /76 (BP Location: Right arm, Patient Position: Sitting, Cuff Size: Adult Large)  Pulse 79  Temp 97.9  F (36.6  C) (Oral)  Resp 16  Ht 1.613 m (5' 3.5\")  Wt 102.5 kg (226 lb)  SpO2 96%  BMI 39.41 kg/m2    General: pt is in NAD, cooperative.  Skin: normal turgor, moist mucous membranes, no lesions/rashes noticed.  HEENT: ATNC, EOMI, PERRL, white sclera, normal conjunctiva, no nystagmus or ptosis. No carotid bruits bilaterally.  Respiratory: lung sounds clear to auscultation bilaterally, no crackles, wheezes, rhonchi. Symmetric lung excursion, no accessory respiratory muscle use.  Cardiovascular: normal S1/S2, no murmurs/rubs/gallops.   Abdomen: Not distended.   : deferred.    Neurological:  Mental: alert, follows commands, mini-cog is 3/5 with 3/3 on memory recall, no aphasia or dysarthria. Fund of knowledge is appropriate for age.  Cranial Nerves:  CN II: visual acuity - able to accurately count fingers with each eye. Visual fields intact, fundi: discs sharp, no papilledema and normal vessels bilaterally.  CN III, IV, VI: EOM intact, pupils equal and reactive  CN " V: facial sensation nl  CN VII: face symmetric, no facial droop  CN VIII: hearing normal  CN IX: palate elevation symmetric, uvula at midline  CN XI SCM normal, shoulder shrug nl  CN XII: tongue midline  Motor: Strength: 5/5 in all major groups of all extremities. Normal tone. No abnormal movements. No pronator drift b/l.  Reflexes: Triceps, biceps, brachioradialis, and patellar reflexes normal and symmetric,achilles reflexes are symmetrically diminished. No clonus noted. Toes are down-going b/l.   Sensory: temperature, light touch, pinprick, and vibration reduced in both distal lower extremities with the right leg slightly more affected. Romberg: negative.  Coordination: FNF and heel-shin tests intact b/l. No dysdiadochokinesia with rapid alternating movements.  Gait:  Normal, able to tandem, toe, and heel walk.    DATA:     LABS: I personally reviewed the following labs:  Office Visit on 11/30/2017   Component Date Value Ref Range Status     Specimen Description 11/30/2017 Throat   Final     Rapid Strep A Screen 11/30/2017 NEGATIVE: No Group A streptococcal antigen detected by immunoassay, await culture report.   Final     Specimen Description 11/30/2017 Throat   Final     Culture Micro 11/30/2017 No beta hemolytic Streptococcus Group A isolated   Final   Office Visit on 10/18/2017   Component Date Value Ref Range Status     Sodium 10/18/2017 138  133 - 144 mmol/L Final     Potassium 10/18/2017 3.9  3.4 - 5.3 mmol/L Final     Chloride 10/18/2017 104  94 - 109 mmol/L Final     Carbon Dioxide 10/18/2017 26  20 - 32 mmol/L Final     Anion Gap 10/18/2017 8  3 - 14 mmol/L Final     Glucose 10/18/2017 83  70 - 99 mg/dL Final    Fasting specimen     Urea Nitrogen 10/18/2017 11  7 - 30 mg/dL Final     Creatinine 10/18/2017 0.73  0.52 - 1.04 mg/dL Final     GFR Estimate 10/18/2017 86  >60 mL/min/1.7m2 Final    Non  GFR Calc     GFR Estimate If Black 10/18/2017 >90  >60 mL/min/1.7m2 Final    African American  GFR Calc     Calcium 10/18/2017 8.5  8.5 - 10.1 mg/dL Final     WBC 10/18/2017 5.0  4.0 - 11.0 10e9/L Final     RBC Count 10/18/2017 4.34  3.8 - 5.2 10e12/L Final     Hemoglobin 10/18/2017 13.7  11.7 - 15.7 g/dL Final     Hematocrit 10/18/2017 40.5  35.0 - 47.0 % Final     MCV 10/18/2017 93  78 - 100 fl Final     MCH 10/18/2017 31.6  26.5 - 33.0 pg Final     MCHC 10/18/2017 33.8  31.5 - 36.5 g/dL Final     RDW 10/18/2017 13.0  10.0 - 15.0 % Final     Platelet Count 10/18/2017 272  150 - 450 10e9/L Final     Cholesterol 10/18/2017 239* <200 mg/dL Final    Desirable:       <200 mg/dl     Triglycerides 10/18/2017 172* <150 mg/dL Final    Comment: Borderline high:  150-199 mg/dl  High:             200-499 mg/dl  Very high:       >499 mg/dl  Fasting specimen       HDL Cholesterol 10/18/2017 52  >49 mg/dL Final     LDL Cholesterol Calculated 10/18/2017 153* <100 mg/dL Final    Comment: Above desirable:  100-129 mg/dl  Borderline High:  130-159 mg/dL  High:             160-189 mg/dL  Very high:       >189 mg/dl       Non HDL Cholesterol 10/18/2017 187* <130 mg/dL Final    Comment: Above Desirable:  130-159 mg/dl  Borderline high:  160-189 mg/dl  High:             190-219 mg/dl  Very high:       >219 mg/dl       EMG/IMAGING/OTHER STUDIES: I reviewed extensively her medical records, including personal review of available brain and spine imaging, EMG's, previous neurologists' notes, and Care Everywhere, as documented in the history of present illness.    ASSESSMENT and PLAN:      ASSESSMENT: Velma Diaz is a 47 year old female patient with history of chronic pain syndrome, anxiety, migraine, peripheral neuropathy, restless leg syndrome, right meralgia paresthetica, bilateral carpal tunnel syndrome status post bilateral surgical release, and left ulnar neuropathy status post surgical release, who presents to establish care for her peripheral neuropathy, right meralgia paresthetica, and restless leg syndrome.    We  thoroughly discussed all of the diagnoses, current symptoms and treatment options. She reports adequate control of her symptoms on the current medication regimen. She denies side effects of both medications.    For peripheral neuropathy and meralgia paresthetica, she is on Lyrica 225 mg twice daily.    For restless leg syndrome, she takes ropinirole 0.25 mg twice daily. We reviewed common and serious side effects, that include, but not limited to impulse control behaviors, somnolence, changes in blood pressure, dizziness, and confusion.  We also discussed that with time augmentation to this medication might develop.    Her ferritin level was 62 in February 2017. I advised the patient to discuss with her primary care provider iron replacement because it is generally recommended for ferritin levels less than 75 for better control of RLS symptoms.    DIAGNOSES:    ICD-10-CM    1. Idiopathic peripheral neuropathy G60.9    2. Restless leg syndrome G25.81    3. Meralgia paresthetica, right G57.11    4. History of carpal tunnel release of both wrists Z98.890      PLAN: At today's visit we thoroughly reviewed current symptoms, most likely diagnoses, available treatment options, and plan. Given that her symptoms are relatively well controlled, no changes to her medications are needed, except iron replacement (as discussed above).    Next follow-up appointment is in the next 6 months or earlier if needed.    I advised the patient to call me with any questions or concerns.    Total Time: 60 minutes with > 50% spent counseling the patient on stated above assessment and recommendations, including nature of the diagnosis, needed w/u, proposed plan of treatment, and prognosis.  Additional time was used to answer patient's questions.    Joaquín Burger MD  / Neurology  [place]  (Chart documentation was completed in part with Dragon voice-recognition software. Even though reviewed, some grammatical, spelling, and word  errors may remain.)

## 2017-12-07 NOTE — TELEPHONE ENCOUNTER
I read the neurology notes but did not see advice on iron documented.    Hemoglobin   Date Value Ref Range Status   10/18/2017 13.7 11.7 - 15.7 g/dL Final   ]  Routed MyChart response to patient to clarify dose recommended and to select pharmacy.  Judi Yeh RN  [facility]

## 2017-12-08 RX ORDER — FUROSEMIDE 20 MG
TABLET ORAL
Qty: 30 TABLET | Refills: 3 | OUTPATIENT
Start: 2017-12-08

## 2017-12-08 NOTE — TELEPHONE ENCOUNTER
Requested Prescriptions   Pending Prescriptions Disp Refills     furosemide (LASIX) 20 MG tablet [Pharmacy Med Name: FUROSEMIDE 20 MG TABLET] 30 tablet 3     Sig: TAKE 1 TABLET (20 MG) BY MOUTH DAILY    Diuretics (Including Combos) Protocol Passed    12/6/2017  9:46 AM       Passed - Blood pressure under 140/90    BP Readings from Last 3 Encounters:   12/07/17 116/76   11/30/17 105/74   11/29/17 103/67                Passed - Recent or future visit with authorizing provider's specialty    Patient had office visit in the last year or has a visit in the next 30 days with authorizing provider.  See chart review.              Passed - Patient is age 18 or older       Passed - No active pregancy on record       Passed - Normal serum creatinine on file in past 12 months    Recent Labs   Lab Test  10/18/17   1048   CR  0.73             Passed - Normal serum potassium on file in past 12 months    Recent Labs   Lab Test  10/18/17   1048   POTASSIUM  3.9                   Passed - Normal serum sodium on file in past 12 months    Recent Labs   Lab Test  10/18/17   1048   NA  138             Passed - No positive pregnancy test in past 12 months        Last filled 10/18/2017 sent 30 with 11 refills  Last OV  11/30/2017    Prescription approved per [facility] Refill Protocol. Refused as duplicate new script sent as above on 10/18/2017 for 1 year supply  Ly Sandoval RN  [facility]

## 2017-12-11 ENCOUNTER — OFFICE VISIT (OUTPATIENT)
Dept: PSYCHOLOGY | Facility: CLINIC | Age: 47
End: 2017-12-11
Payer: COMMERCIAL

## 2017-12-11 DIAGNOSIS — G89.29 CHRONIC BILATERAL BACK PAIN, UNSPECIFIED BACK LOCATION: Chronic | ICD-10-CM

## 2017-12-11 DIAGNOSIS — F41.1 GAD (GENERALIZED ANXIETY DISORDER): Primary | ICD-10-CM

## 2017-12-11 DIAGNOSIS — G89.4 CHRONIC PAIN SYNDROME: ICD-10-CM

## 2017-12-11 DIAGNOSIS — M54.9 CHRONIC BILATERAL BACK PAIN, UNSPECIFIED BACK LOCATION: Chronic | ICD-10-CM

## 2017-12-11 PROCEDURE — 90834 PSYTX W PT 45 MINUTES: CPT | Performed by: SOCIAL WORKER

## 2017-12-11 RX ORDER — FERROUS SULFATE 325(65) MG
325 TABLET ORAL
Qty: 90 TABLET | Refills: 4 | Status: SHIPPED | OUTPATIENT
Start: 2017-12-11 | End: 2019-02-01

## 2017-12-11 RX ORDER — OXYCODONE AND ACETAMINOPHEN 10; 325 MG/1; MG/1
1 TABLET ORAL EVERY 6 HOURS PRN
Qty: 90 TABLET | Refills: 0 | Status: SHIPPED | OUTPATIENT
Start: 2017-12-11 | End: 2018-01-06

## 2017-12-11 RX ORDER — CYCLOBENZAPRINE HCL 10 MG
TABLET ORAL
Qty: 30 TABLET | Refills: 1 | Status: SHIPPED | OUTPATIENT
Start: 2017-12-11 | End: 2018-03-01

## 2017-12-11 NOTE — TELEPHONE ENCOUNTER
Routing to PCP to review and advise.    Please see my chart message - patient requesting Iron supplement.    My chart response from patient: [facility].... he didn't really say. He'd like my levels above 75.    Ly Sandoval RN  [facility]

## 2017-12-11 NOTE — MR AVS SNAPSHOT
MRN:[id number]                      After Visit Summary   12/11/2017    Velma Diaz    MRN: [id number]           Visit Information        Provider Department      12/11/2017 10:00 AM Antonio Rios SERVAND[facility]      Your next 10 appointments already scheduled     Jan 02, 2018  9:30 AM CST   Return Visit with Antonio Rios [facility] ([facility])    [address] [address]   [phone]            Jan 23, 2018  9:30 AM CST   Return Visit with Antonio Rios [facility] ([facility])    [address] [address]   [phone]            Jun 07, 2018  1:00 PM CDT   [facility] Visit with Joaquín Burger MD   [facility]    [address] [place] [phone] [phone]              MyChart Information     [employer] gives you secure access to your electronic health record. If you see a primary care provider, you can also send messages to your care team and make appointments. If you have questions, please call your primary care clinic.  If you do not have a primary care provider, please call [phone] and they will assist you.        Care EveryWhere ID     This is your Care EveryWhere ID. This could be used by other organizations to access your [facility] medical records  [id number]        Equal Access to Services     LOLA HERNANDEZ AH: Hadii antionette gutierrez hadasho Sotheresaali, waaxda luqadaha, qaybta kaalmada adeegyada, waxay yoshi dinh sammchaim carrillo . So [facility] [phone].    ATENCIÓN: Si habla español, tiene a dowd disposición servicios gratuitos de asistencia lingüística. Llame al [phone].    We comply with applicable federal civil rights laws and [place] laws. We do not discriminate on the basis of race, color,  national origin, age, disability, sex, sexual orientation, or gender identity.
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
## 2017-12-11 NOTE — TELEPHONE ENCOUNTER
Requested Prescriptions   Pending Prescriptions Disp Refills     cyclobenzaprine (FLEXERIL) 10 MG tablet [Pharmacy Med Name: CYCLOBENZAPRINE 10 MG TABLET] 30 tablet 1     Sig: TAKE 1/2 TO 1 TABLET BY MOUTH 3 TIMES A DAY AS NEEDED FOR MUSCLE SPASMS    There is no refill protocol information for this order        Routing refill request to provider for review/approval because:  Drug not on the [facility] refill protocol   Marisela Shea RN [facility] Triage.

## 2017-12-11 NOTE — TELEPHONE ENCOUNTER
Controlled Substance Refill Request for Percocet 10-325mg    Last refill: 11/13/17 - 90qty    Last clinic visit: 11/30/17     Next appt: None with PCP    Controlled substance agreement on file: Yes:  Date 8/15/16.    Documentation in problem list reviewed:  Yes    Processing:  Patient will  in clinic       Naty Khan RN  [facility]

## 2017-12-11 NOTE — TELEPHONE ENCOUNTER
Reason for Call:  Medication or medication refill:    Do you use a [facility]?  Name of the pharmacy and phone number for the current request:  Patient     Name of the medication requested:    Disp Refills Start End ARGENIS   oxyCODONE-acetaminophen (PERCOCET)  MG per tablet              Other request:  Patient only has a few days    Can we leave a detailed message on this number? YES    Phone number patient can be reached at: Home number on file [phone] (home)    Best Time:  Anytime     Call taken on 12/11/2017 at 9:46 AM by Juliette Hazel

## 2017-12-11 NOTE — TELEPHONE ENCOUNTER
Routing refill request to provider for review/approval because:  Drug not on the [facility] refill protocol       Naty Khan RN  [facility]

## 2017-12-13 ENCOUNTER — DOCUMENTATION ONLY (OUTPATIENT)
Dept: DENTISTRY | Facility: CLINIC | Age: 47
End: 2017-12-13

## 2018-01-02 ENCOUNTER — OFFICE VISIT (OUTPATIENT)
Dept: PSYCHOLOGY | Facility: CLINIC | Age: 48
End: 2018-01-02
Payer: COMMERCIAL

## 2018-01-02 DIAGNOSIS — F41.1 GAD (GENERALIZED ANXIETY DISORDER): Primary | ICD-10-CM

## 2018-01-02 PROCEDURE — 90834 PSYTX W PT 45 MINUTES: CPT | Performed by: SOCIAL WORKER

## 2018-01-02 ASSESSMENT — ANXIETY QUESTIONNAIRES
5. BEING SO RESTLESS THAT IT IS HARD TO SIT STILL: NOT AT ALL
6. BECOMING EASILY ANNOYED OR IRRITABLE: SEVERAL DAYS
3. WORRYING TOO MUCH ABOUT DIFFERENT THINGS: SEVERAL DAYS
7. FEELING AFRAID AS IF SOMETHING AWFUL MIGHT HAPPEN: NOT AT ALL
IF YOU CHECKED OFF ANY PROBLEMS ON THIS QUESTIONNAIRE, HOW DIFFICULT HAVE THESE PROBLEMS MADE IT FOR YOU TO DO YOUR WORK, TAKE CARE OF THINGS AT HOME, OR GET ALONG WITH OTHER PEOPLE: NOT DIFFICULT AT ALL
GAD7 TOTAL SCORE: 4
2. NOT BEING ABLE TO STOP OR CONTROL WORRYING: SEVERAL DAYS
1. FEELING NERVOUS, ANXIOUS, OR ON EDGE: SEVERAL DAYS

## 2018-01-02 ASSESSMENT — PATIENT HEALTH QUESTIONNAIRE - PHQ9
5. POOR APPETITE OR OVEREATING: NOT AT ALL
SUM OF ALL RESPONSES TO PHQ QUESTIONS 1-9: 7

## 2018-01-02 NOTE — MR AVS SNAPSHOT
MRN:[id number]                      After Visit Summary   1/2/2018    Velma Diaz    MRN: [id number]           Visit Information        Provider Department      1/2/2018 9:30 AM ConyAntonio huizar SERVANDO [facility] Generic      Your next 10 appointments already scheduled     Jan 23, 2018  9:30 AM CST   Return Visit with Antonio Rios [facility]    [address] [address]   [phone]            Feb 06, 2018  9:30 AM CST   Return Visit with Antonio Rios [facility]    [address] [address]   [phone]            Jun 07, 2018  1:00 PM CDT   [facility]    [address] [place] [id number] [phone]              MyChart Information     [employer] gives you secure access to your electronic health record. If you see a primary care provider, you can also send messages to your care team and make appointments. If you have questions, please call your primary care clinic.  If you do not have a primary care provider, please call [phone] and they will assist you.        Care EveryWhere ID     This is your Care EveryWhere ID. This could be used by other organizations to access your [place] medical records  [id number]        Equal Access to Services     LOLA HERNANDEZ : Hadii antionette ku hadasho Soomaali, waaxda luqadaha, qaybta kaalmada adeegyada, waxay yoshi carrillo . So [facility] [phone].    ATENCIÓN: Si habla español, tiene a dowd disposición servicios gratuitos de asistencia lingüística. Llame al [phone].    We comply with applicable federal civil rights laws and [place] laws. We do not discriminate on the basis of race, color,  national origin, age, disability, sex, sexual orientation, or gender identity.
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
## 2018-01-03 ASSESSMENT — ANXIETY QUESTIONNAIRES: GAD7 TOTAL SCORE: 4

## 2018-01-06 ENCOUNTER — MYC MEDICAL ADVICE (OUTPATIENT)
Dept: FAMILY MEDICINE | Facility: CLINIC | Age: 48
End: 2018-01-06

## 2018-01-06 ENCOUNTER — MYC REFILL (OUTPATIENT)
Dept: FAMILY MEDICINE | Facility: CLINIC | Age: 48
End: 2018-01-06

## 2018-01-06 DIAGNOSIS — G89.4 CHRONIC PAIN SYNDROME: ICD-10-CM

## 2018-01-08 RX ORDER — OXYCODONE AND ACETAMINOPHEN 10; 325 MG/1; MG/1
1 TABLET ORAL EVERY 6 HOURS PRN
Qty: 90 TABLET | Refills: 0 | Status: SHIPPED | OUTPATIENT
Start: 2018-01-08 | End: 2018-02-06

## 2018-01-08 NOTE — TELEPHONE ENCOUNTER
Requested Prescriptions   Pending Prescriptions Disp Refills     oxyCODONE-acetaminophen (PERCOCET)  MG per tablet 90 tablet 0     Sig: Take 1 tablet by mouth every 6 hours as needed for moderate to severe pain    There is no refill protocol information for this order        Last filled 12/11/2017  Last OV 11/30/2017    Routing refill request to provider for review/approval because:  Drug not on the [facility] refill protocol       Ly Sandoval RN  [facility]

## 2018-01-08 NOTE — TELEPHONE ENCOUNTER
Message from [email or web address]:  Original authorizing provider: MD Velma Rodriguez would like a refill of the following medications:  oxyCODONE-acetaminophen (PERCOCET)  MG per tablet [Srinivasa Hunter MD]    Preferred pharmacy: WRITTEN PRESCRIPTION REQUESTED    Comment:  I can  at clinic. Please call me at [phone] when ready. Thank you

## 2018-01-08 NOTE — TELEPHONE ENCOUNTER
Routing to PCP to review and advise.  See my chart message.    Ly Sandoval RN  [facility]

## 2018-01-18 ENCOUNTER — TELEPHONE (OUTPATIENT)
Dept: FAMILY MEDICINE | Facility: CLINIC | Age: 48
End: 2018-01-18

## 2018-01-18 NOTE — TELEPHONE ENCOUNTER
Reason for Call:  Other Note    Detailed comments: Patient stopped in at  stating she has re-joined [employer] again.  She states she needs another print out of the goal they had set a few years ago.  She thinks it was 175.   Please send that info to her through [employer].  If any questions please contact he at [phone]./    Phone Number Patient can be reached at: Cell number on file:    Telephone Information:   Mobile [phone]       Best Time: anytime    Can we leave a detailed message on this number? YES    Call taken on 1/18/2018 at 1:00 PM by Whit Thomas

## 2018-01-18 NOTE — TELEPHONE ENCOUNTER
Patient was last seen 10/27/16 by DBL, advised 6 mos follow up at that time.  I don't find a letter or locate a note advising weight loss goal.    Routed to Dr. Hunter to address.    Judi Yeh RN  [facility]

## 2018-01-18 NOTE — LETTER
[address] [address]  Phone: [phone]  Fax: [phone]    January 24, 2018        Velma Diaz  [facility] [address]  [place] [address]          To whom it may concern:    RE: Velma Diaz    A reasonable weight loss goal for Mireille would be to get her weight down to 170 pounds, which would be a BMI of just less than 30 and therefore just under the obesity range.      Please contact me for questions or concerns.      Sincerely,        Srinivasa Hunter MD

## 2018-01-24 NOTE — TELEPHONE ENCOUNTER
I would suggest a weight loss goal of 170 pounds, which would put her BMI just less than 30 and therefore just under the obesity range.  A letter was generated accordingly.

## 2018-01-24 NOTE — TELEPHONE ENCOUNTER
Informed via [employer] as requested, asked patient if she wants this mailed/faxed/.    Flagging for TC.    Naty Khan RN  [facility]

## 2018-01-24 NOTE — TELEPHONE ENCOUNTER
TC contacted patient and she would like to  letter at [facility] . Letter delivered to  for patient .

## 2018-01-26 DIAGNOSIS — G89.4 CHRONIC PAIN SYNDROME: ICD-10-CM

## 2018-01-26 NOTE — TELEPHONE ENCOUNTER
Requested Prescriptions   Pending Prescriptions Disp Refills     LYRICA 225 MG CAPS [Pharmacy Med Name: LYRICA 225 MG CAPSULE] 60 capsule 1     Sig: TAKE 1 TABLET (225 MG) BY MOUTH TWICE A DAY    There is no refill protocol information for this order         Last Written Prescription Date:  10/18/17  Last Fill Quantity: 60,   # refills: 11  Last Office Visit: 11/30/17  Future Office visit:    Next 5 appointments (look out 90 days)     Feb 06, 2018  9:30 AM CST   Return Visit with SERVANDO Yan   [facility] ([facility])    [address] [place] [address]   [phone]                   Routing refill request to provider for review/approval because:  Drug not on the [facility], [facility] or [facility] refill protocol or controlled substance

## 2018-01-30 RX ORDER — PREGABALIN 225 MG/1
CAPSULE ORAL
Qty: 60 CAPSULE | Refills: 5 | Status: SHIPPED | OUTPATIENT
Start: 2018-01-30 | End: 2018-10-07

## 2018-02-06 ENCOUNTER — OFFICE VISIT (OUTPATIENT)
Dept: PSYCHOLOGY | Facility: CLINIC | Age: 48
End: 2018-02-06
Payer: COMMERCIAL

## 2018-02-06 ENCOUNTER — TELEPHONE (OUTPATIENT)
Dept: FAMILY MEDICINE | Facility: CLINIC | Age: 48
End: 2018-02-06

## 2018-02-06 DIAGNOSIS — G89.4 CHRONIC PAIN SYNDROME: ICD-10-CM

## 2018-02-06 DIAGNOSIS — F41.1 GAD (GENERALIZED ANXIETY DISORDER): Primary | ICD-10-CM

## 2018-02-06 PROCEDURE — 90834 PSYTX W PT 45 MINUTES: CPT | Performed by: SOCIAL WORKER

## 2018-02-06 RX ORDER — OXYCODONE AND ACETAMINOPHEN 10; 325 MG/1; MG/1
1 TABLET ORAL EVERY 6 HOURS PRN
Qty: 90 TABLET | Refills: 0 | Status: SHIPPED | OUTPATIENT
Start: 2018-02-06 | End: 2018-03-01

## 2018-02-06 ASSESSMENT — ANXIETY QUESTIONNAIRES
3. WORRYING TOO MUCH ABOUT DIFFERENT THINGS: NOT AT ALL
IF YOU CHECKED OFF ANY PROBLEMS ON THIS QUESTIONNAIRE, HOW DIFFICULT HAVE THESE PROBLEMS MADE IT FOR YOU TO DO YOUR WORK, TAKE CARE OF THINGS AT HOME, OR GET ALONG WITH OTHER PEOPLE: SOMEWHAT DIFFICULT
7. FEELING AFRAID AS IF SOMETHING AWFUL MIGHT HAPPEN: SEVERAL DAYS
GAD7 TOTAL SCORE: 5
2. NOT BEING ABLE TO STOP OR CONTROL WORRYING: SEVERAL DAYS
6. BECOMING EASILY ANNOYED OR IRRITABLE: SEVERAL DAYS
1. FEELING NERVOUS, ANXIOUS, OR ON EDGE: SEVERAL DAYS
5. BEING SO RESTLESS THAT IT IS HARD TO SIT STILL: NOT AT ALL

## 2018-02-06 ASSESSMENT — PATIENT HEALTH QUESTIONNAIRE - PHQ9: 5. POOR APPETITE OR OVEREATING: SEVERAL DAYS

## 2018-02-06 NOTE — TELEPHONE ENCOUNTER
Percocet:    Last Written Prescription Date:  1/8/18  Last Fill Quantity: 90,  # refills: 0   Last Office Visit with [facility] provider:  11/30/17   Future Office Visit:   Appears she is in clinic now to see psych    Routing refill request to provider for review/approval because:  Drug not on the [facility] refill protocol     Judi Yeh RN  [facility]
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
## 2018-02-06 NOTE — TELEPHONE ENCOUNTER
Reason for Call:  Medication or medication refill:    Do you use a [facility]?  Name of the pharmacy and phone number for the current request:   Patient     Name of the medication requested: oxyCODONE-acetaminophen (PERCOCET)  MG per tablet      Other request: no    Can we leave a detailed message on this number? YES    Phone number patient can be reached at: Home number on file [phone] (home)    Best Time:  Anytime     Call taken on 2/6/2018 at 9:12 AM by Juliette Hazel

## 2018-02-06 NOTE — MR AVS SNAPSHOT
MRN:[id number]                      After Visit Summary   2/6/2018    Velma Diaz    MRN: [id number]           Visit Information        Provider Department      2/6/2018 9:30 AM Antonio Rios LICSW [facility]      Your next 10 appointments already scheduled     Feb 27, 2018  9:30 AM CST   Return Visit with SERVANDO Yan   [facility] ([facility])    [address] [facility] [id number] [phone]            Jun 07, 2018  1:00 PM CDT   Return Visit with Joaquín Burger MD   [facility] ([facility])    [address] [place] [address] [phone]              MyChart Information     [employer] gives you secure access to your electronic health record. If you see a primary care provider, you can also send messages to your care team and make appointments. If you have questions, please call your primary care clinic.  If you do not have a primary care provider, please call [phone] and they will assist you.        Care EveryWhere ID     This is your Care EveryWhere ID. This could be used by other organizations to access your [facility] medical records  [id number]        Equal Access to Services     LOLA HERNANDEZ : Hadii antionette britoo Sotheresaali, waaxda luqadaha, qaybta kaalmada adeegyada, grayson sauceda. So [facility] [phone].    ATENCIÓN: Si habla español, tiene a dowd disposición servicios gratuitos de asistencia lingüística. Llame al [phone].    We comply with applicable federal civil rights laws and [place] laws. We do not discriminate on the basis of race, color, national origin, age, disability, sex, sexual orientation, or gender identity.

## 2018-02-07 ASSESSMENT — PATIENT HEALTH QUESTIONNAIRE - PHQ9: SUM OF ALL RESPONSES TO PHQ QUESTIONS 1-9: 7

## 2018-02-07 ASSESSMENT — ANXIETY QUESTIONNAIRES: GAD7 TOTAL SCORE: 5

## 2018-02-20 ENCOUNTER — TELEPHONE (OUTPATIENT)
Dept: FAMILY MEDICINE | Facility: CLINIC | Age: 48
End: 2018-02-20

## 2018-02-20 NOTE — TELEPHONE ENCOUNTER
Reason for Call:  Form, our goal is to have forms completed with 72 hours, however, some forms may require a visit or additional information.    Type of letter, form or note:  disability    Who is the form from?: Patient    Where did the form come from: Patient or family brought in       What clinic location was the form placed at?: [place] ()    Where the form was placed: 's Box    What number is listed as a contact on the form?:  [phone]       Additional comments:  No     Call taken on 2/20/2018 at 12:11 PM by Juliette Hazel

## 2018-02-20 NOTE — PROGRESS NOTES
CLEFT TEAM REPORT      Re: Velma Diaz  [id number]   [facility] MRN: [id number]   :  1970   Date of Visit: 2017    We recently had the opportunity to see Velma was seen consultation for concerns related to her cleft lip and palate. Specific concern was related to has a lot of bleeding and crusting and scabbing in her nose.  At this time, recommendations include follow-up with Dr. Zuniga, ENT, for her hearing loss and the large septal deviation. Consideration should be given to a septal button. She should return to this clinic for endoscopy to assess possible fistula if concerns persist.     We would like to see Velma again on an as-needed basis.  Please do not hesitate to contact us with any questions or concerns ([phone]).      Eunice Calvo MA, CCC-SLP  Interim  /       COPIES DISTRIBUTED   Routed to [facility] for PCP.       HISTORY    Referral: Mireille was seen self-referred for consultation including interdisciplinary evaluation and treatment planning.  This was her first visit to this clinic.    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  She reports having approximately 10 surgeries as a child to repair her cleft lip and palate.  She also had multiple sets of PE tubes for chronic otitis media during childhood.  She had a sleep study in May 2014 revealing sleep apnea with overall AHI of 6.7. She has used CPAP in the past. 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    Cleft Surgical History:   Lip was repaired and palate at 18 months in [place], [place], and she was followed by a cleft team that  she believes was the [facility] cleft clinic.  Her palate was redone in 5th grade because of possible fistula because she remembers there being a pack of gauze tied up to the roof of her mouth after surgery.  Had bone graft at age 13 and was in fixation afterwards.  She had  nose reconstruction  at age 16 and 17 and was very black and blue afterwards. She has a history of PETs as well.     Medications: oxyCODONE-acetaminophen (PERCOCET), cyclobenzaprine (FLEXERIL) 10 MG tablet, FLUoxetine (PROZAC) 40 MG capsule, Pregabalin (LYRICA) 225 MG CAPS, furosemide (LASIX) 20 MG tablet, ropinirole (REQUIP) 0.25 MG tablet, Ferrous sulfate (iron), nystatin (MYCOSTATIN) 290373 UNIT/GM POWD, SUMAtriptan (IMITREX) 100 MG tablet, acetaminophen (TYLENOL) 325 MG tablet, Multiple Vitamins-Minerals (MULTI FOR HER PO)    Allergies: NKDA    RECORDS OBTAINED  Photographs: Facial and intraoral   Hearing: Tympanometry; Puretone air conduction thresholds      FINDINGS     NURSING / PEDIATRICS  Mireille s primary concern today is a palatal opening, increasing sinus issues, and epistaxis in the setting of a history of repaired cleft lip and palate. Her health history is significant for multiple chronic conditions for which she reports she is well established for specialty care; she is also established in primary care with Dr. Srinivasa Hunter. Her family history is significant for a father with cleft lip and son with cleft lip and palate, with no other congenital anomalies and no cardiac concerns. Through childhood she received cleft team care in [place] until the age of 18 years. She has a history of obstructive sleep apnea for which she uses CPAP for partial nights. Review of systems is negative for current problems with hearing, chewing or swallowing, breathing, chest pain, or cardiac concerns. She does report headaches associated with sinus symptoms. She reports distal extremity pain and  spiders  related to neuropathy, for which she is  currently being treated and anticipates vertebral injections for pain control. She should continue regular primary and specialty care in addition to follow-up cleft care as noted elsewhere in this report. Using humidity with her CPAP may improve symptoms at night.  Alejandrina Sethi, PhD, APRN, CPNP-PC    SPEECH-LANGUAGE PATHOLOGY  Velma is a 47-year-old who presents for an evaluation regarding possible fistula.  She reports that her speech has been more nasal over the past few years, particularly on sounds such as /s/, /sh/ and /f/.  She has a cold at this time.  Vocal history is negative for currently smoking, although she has a history of smoking.  She drinks approximately 48 ounces of fluids daily.  She has a cold at this time and was coughing throughout the evaluation.  Evaluation today revealed intelligible speech with distortion of anterior sibilant consonants such as /s, z, sh/.  This is secondary to her malocclusion which is an anterior crossbite with an open bite as well.  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  She has food that collects in the deep crevice.  She will have liquids that come out her nose and blood.  Per ENT exam, she has a large nasal septum perforation.  Exam shows the palate is intact, slightly short, but elevates during /ah/ phonation.  Overall, there was slight reduced oral pressure during production of oral consonants that slightly improved with nasal occlusion.  I did not appreciate audible nasal air emission and there was no visible nasal air emission.  Resonance was essentially within normal limits.  I did note mild vocal dysphonia with roughness.  She denies vocal abusive habits.  It is possible this is related to underlying VPI or her cold today.  I recommend follow-up with endoscopy after her cold has resolved.  I support the plan for a nasal septum button and we should also give consideration to obturating the palatal fistula.  There is no indication for  active speech therapy at this time.  Eunice Caballero MA, CCC-SLP  AT-051/dg  DD12/13/2017-DT12/13/2017 1:01:01 pm  Doc.# [id number]; Job#:[id number]    Dictated, but not reviewed.    AUDIOLOGY  Mireille is new to clinic today.  She has no specific hearing or ear concerns today.  She does report having multiple ear infections as a child in both ear, with what she remembers to be at least 7+ sets of tubes.  She has no family history of hearing loss.  She reports having some balance concerns, but feels this is more related to her neuropathy.  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    Otoscopy revealed clear ear canals in both ears.  Tympanometry revealed normal ear canal volume and normal eardrum mobility in both ears.  Puretone air conduction testing revealed hearing within the normal range from 250-4000Hz, sloping to a mild hearing loss in both ears from 6-8KH.   Recommendations include: 1) monitor hearing and middle ear status annually.    Cipriano Smith, CCC-A    ENT  Mireille is now 47.  Has moved to [place].  Her last surgery was as a teenager in the [place] area.  Her chief complaint is of bloody crusting from her nose and nasal obstruction.  She also has symptoms of palatal fistula complaining of drinking and material coming out her nose and food getting stuck in her hard palate.  Her significant physical findings are clear ears.  She has a good size perforation of the cartilaginous septum with a fair amount of crusting and dried blood around that area and it is undoubtedly the source of the bleeding.  There is also a question with regard to this fistula whether it may be partially obstructed on the nasal side by turbinate swelling and that would benefit from further evaluation, particularly from the nasal side.  She had seen another ear, nose and throat physician who recommended consideration of a septal button and I said  I concurred completely with that.  There is virtually no downside to a button and that should cut down on the bleeding and crusting.  When the nose is anesthetized for placement of the button, that would be an ideal time to examine with some transillumination or using a fiberoptic scope to possibly probing the nasal floor and assessing the state of that fistula.  Demond Anderson MD  RU-1005/dg  DD12/13/2017-DT12/13/2017 12:00:23 pm  Doc.# [id number]; Job#:[id number]    Dictated, but not reviewed.    PLASTIC SURGERY   Patient is 47.  Has a left unilateral cleft lip and palate.  Lip was repaired and palate at 18 months in [place], [place], and she was followed by a cleft team that she believes was the [facility] cleft clinic.  Her palate was redone in 5th grade because of possible fistula because she remembers there being a pack of gauze tied up to the roof of her mouth after surgery.  Had bone graft at age 13 and was in fixation afterwards.  She had  nose reconstruction  at age 16 and 17 and was very black and blue afterwards.  Her concern now is that she has a lot of bleeding and crusting and scabbing in her nose.  Sometimes when her nose bleeds she has blood in her mouth.  Sometimes when she drinks, liquids will come out of her nose.  She has recurrent  sinus problems.   There is a deep groove in the center of her palate that she associates with the back and forth movement of fluid, but also is a problem because when she eats, food gets packed into it and she has trouble getting it out.  Intraorally the palate elevates well.  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  When I probe the area anteriorly she perceives it as touching something in her nose.  It is possible that she has a fistula there with a turbinate impinging on the fistula intermittently.  Intranasally there is a large septal perforation with a lot  of crusting and scabbing and inflammation present.  We didn t scope her today because of an upper respiratory infection.  She has seen an otolaryngologist who has recommended a septal button, as does Dr. Anderson today.  I would agree that a repair of that fistula would be extremely difficult.  The palate repair would also be difficult and unreliable.  I would recommend a prosthetic obturator at least as a trial to see whether that, and the septal button provided some improvement in her symptoms.  When she is fitted for the septal button and soft tissues have been constricted with topical, I think they will get a better look at the dimension of whatever fistula might be present between the floor of the nose and the mouth.  She has no lip concerns.  Scope in the future if needed.  Cherelle Butchre MD  MC-054/dg  DD12/13/2017-DT12/13/2017 12:21:46 pm  Doc.# [id number]; Job#:[id number]    Dictated, but not reviewed.    DENTISTRY & DENTAL HYGIENE  Pt presents to Cleft Palate Clinic today. No changes in med hx.CC: No dental CC. Pt has an established dental home at Dr. Barnes in [place]  LDV:  9/2017. Brushing 2x day,  not  flossing. Poor oral hygiene, anterior calculus. Reviewed home hygiene.  Stressed the importance of flossing. Recommending to continue with 6 month recalls and the use of anti-tartar toothpaste.  Holli Mccullough [facility], LDA    ORTHODONTICS  New patient to cleft clinic  Findings: she is missing U2 s and UL4, and missing L4 s. She has slight Mx and Mn crowding, upper midline is 3mm left of facial, 2mm class III on right,  Bilateral posterior crossbite, Anterior crossbite. She is not interested in any orthodontic treatment.   Recommendations: no orthodontic treatment recommended for patient  Harrison Remy DDS / Orthodontic Resident  Juan Carlos Alexandra DDS    ORAL AND MAXILLOFACIAL SURGERY   47 year old woman with history of left cleft lip/ cleft palate.  CC I m getting food caught in the roof of my  mouth .  CL/CP treated in [place] when, last surgery when she was approx 17.  Patient is most concerned about the opening on the roof of the mouth.  Less concerned about how her teeth line up.  Class III malocclusion with cross bite in the posterior right and left.  Oral-nasal communication or clefting, approx [id number] in length.  No definitive fistula appreciated.  Oral-nasal communication closure vs obturator.  Closure would be difficult due to scar tissue, consider evaluation for obturator.  Gus Salamanca DDS / Oral Surgery Resident  Adan Augustine DDS, MD

## 2018-02-23 ENCOUNTER — TELEPHONE (OUTPATIENT)
Dept: FAMILY MEDICINE | Facility: CLINIC | Age: 48
End: 2018-02-23

## 2018-02-23 NOTE — TELEPHONE ENCOUNTER
Forms received from: [employer]   Phone number listed: [phone]   Fax listed: [phone]  Date received: 02/23/2018  Form description: Prescription and CMN-CPAP supplies  Once forms are completed, please return to [employer] via fax.  Is patient requesting to be contacted when forms are completed: NA    Form placed: In provider's basket  Nydia Tapia

## 2018-02-27 ENCOUNTER — OFFICE VISIT (OUTPATIENT)
Dept: PSYCHOLOGY | Facility: CLINIC | Age: 48
End: 2018-02-27
Payer: COMMERCIAL

## 2018-02-27 DIAGNOSIS — F41.1 GAD (GENERALIZED ANXIETY DISORDER): Primary | ICD-10-CM

## 2018-02-27 PROCEDURE — 90834 PSYTX W PT 45 MINUTES: CPT | Performed by: SOCIAL WORKER
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
## 2018-02-27 NOTE — MR AVS SNAPSHOT
MRN:[id number]                      After Visit Summary   2/27/2018    Velma Diaz    MRN: [id number]           Visit Information        Provider Department      2/27/2018 9:30 AM Antonio Rios LICSW [facility]      Your next 10 appointments already scheduled     Mar 01, 2018  1:40 PM CST   Office Visit with Srinivasa Hunter MD   [facility]    [address] [address]   [phone]           Bring a current list of meds and any records pertaining to this visit. For Physicals, please bring immunization records and any forms needing to be filled out. Please arrive 10 minutes early to complete paperwork.            Mar 20, 2018 10:30 AM CDT   Return Visit with SERVANDO Yan   [facility] ([facility]    [address] [address]   [phone]            Jun 07, 2018  1:00 PM CDT   Return Visit with Joaquín Burger MD   [facility] ([facility]    [address] [place] [id number] [phone]              MyChart Information     [employer] gives you secure access to your electronic health record. If you see a primary care provider, you can also send messages to your care team and make appointments. If you have questions, please call your primary care clinic.  If you do not have a primary care provider, please call [phone] and they will assist you.        Care EveryWhere ID     This is your Care EveryWhere ID. This could be used by other organizations to access your [facility] medical records  [id number]        Equal Access to Services     LOLA HERNANDEZ : Katie Dumas, anastacio wong, grayson figueroa. So [facility] [phone].    ATENCIÓN: Si maryjane  español, tiene a dowd disposición servicios gratuitos de asistencia lingüística. Llame al [phone].    We comply with applicable federal civil rights laws and [place] laws. We do not discriminate on the basis of race, color, national origin, age, disability, sex, sexual orientation, or gender identity.

## 2018-03-01 ENCOUNTER — OFFICE VISIT (OUTPATIENT)
Dept: FAMILY MEDICINE | Facility: CLINIC | Age: 48
End: 2018-03-01
Payer: COMMERCIAL

## 2018-03-01 VITALS
HEIGHT: 63 IN | SYSTOLIC BLOOD PRESSURE: 121 MMHG | HEART RATE: 101 BPM | WEIGHT: 226 LBS | BODY MASS INDEX: 40.04 KG/M2 | TEMPERATURE: 97.8 F | DIASTOLIC BLOOD PRESSURE: 79 MMHG | OXYGEN SATURATION: 96 %

## 2018-03-01 DIAGNOSIS — G89.4 CHRONIC PAIN SYNDROME: Chronic | ICD-10-CM

## 2018-03-01 DIAGNOSIS — G89.29 CHRONIC BILATERAL BACK PAIN, UNSPECIFIED BACK LOCATION: Primary | Chronic | ICD-10-CM

## 2018-03-01 DIAGNOSIS — M54.9 CHRONIC BILATERAL BACK PAIN, UNSPECIFIED BACK LOCATION: Primary | Chronic | ICD-10-CM

## 2018-03-01 DIAGNOSIS — B37.2 CANDIDAL INTERTRIGO: ICD-10-CM

## 2018-03-01 DIAGNOSIS — M79.642 PAIN OF LEFT HAND: ICD-10-CM

## 2018-03-01 DIAGNOSIS — F33.1 MAJOR DEPRESSIVE DISORDER, RECURRENT EPISODE, MODERATE (H): Chronic | ICD-10-CM

## 2018-03-01 DIAGNOSIS — R20.2 PARESTHESIAS: ICD-10-CM

## 2018-03-01 LAB
AMPHETAMINES UR QL: NOT DETECTED NG/ML
BARBITURATES UR QL SCN: NOT DETECTED NG/ML
BENZODIAZ UR QL SCN: NOT DETECTED NG/ML
BUPRENORPHINE UR QL: NOT DETECTED NG/ML
CANNABINOIDS UR QL: NOT DETECTED NG/ML
COCAINE UR QL SCN: NOT DETECTED NG/ML
D-METHAMPHET UR QL: NOT DETECTED NG/ML
ERYTHROCYTE [DISTWIDTH] IN BLOOD BY AUTOMATED COUNT: 13 % (ref 10–15)
FERRITIN SERPL-MCNC: 92 NG/ML (ref 8–252)
HCT VFR BLD AUTO: 39.5 % (ref 35–47)
HGB BLD-MCNC: 13.3 G/DL (ref 11.7–15.7)
IRON SATN MFR SERPL: 21 % (ref 15–46)
IRON SERPL-MCNC: 64 UG/DL (ref 35–180)
MCH RBC QN AUTO: 31.6 PG (ref 26.5–33)
MCHC RBC AUTO-ENTMCNC: 33.7 G/DL (ref 31.5–36.5)
MCV RBC AUTO: 94 FL (ref 78–100)
METHADONE UR QL SCN: NOT DETECTED NG/ML
OPIATES UR QL SCN: NOT DETECTED NG/ML
OXYCODONE UR QL SCN: ABNORMAL NG/ML
PCP UR QL SCN: NOT DETECTED NG/ML
PLATELET # BLD AUTO: 338 10E9/L (ref 150–450)
PROPOXYPH UR QL: NOT DETECTED NG/ML
RBC # BLD AUTO: 4.21 10E12/L (ref 3.8–5.2)
TIBC SERPL-MCNC: 310 UG/DL (ref 240–430)
TRICYCLICS UR QL SCN: ABNORMAL NG/ML
WBC # BLD AUTO: 9.8 10E9/L (ref 4–11)

## 2018-03-01 PROCEDURE — 99214 OFFICE O/P EST MOD 30 MIN: CPT | Performed by: FAMILY MEDICINE

## 2018-03-01 PROCEDURE — 83540 ASSAY OF IRON: CPT | Performed by: FAMILY MEDICINE

## 2018-03-01 PROCEDURE — 80306 DRUG TEST PRSMV INSTRMNT: CPT | Performed by: FAMILY MEDICINE

## 2018-03-01 PROCEDURE — 85027 COMPLETE CBC AUTOMATED: CPT | Performed by: FAMILY MEDICINE

## 2018-03-01 PROCEDURE — 82728 ASSAY OF FERRITIN: CPT | Performed by: FAMILY MEDICINE

## 2018-03-01 PROCEDURE — 83550 IRON BINDING TEST: CPT | Performed by: FAMILY MEDICINE

## 2018-03-01 PROCEDURE — 36415 COLL VENOUS BLD VENIPUNCTURE: CPT | Performed by: FAMILY MEDICINE

## 2018-03-01 RX ORDER — NYSTATIN 100000 [USP'U]/G
POWDER TOPICAL
Qty: 60 G | Refills: 2 | Status: SHIPPED | OUTPATIENT
Start: 2018-03-01 | End: 2019-07-11

## 2018-03-01 RX ORDER — OXYCODONE AND ACETAMINOPHEN 10; 325 MG/1; MG/1
1 TABLET ORAL EVERY 6 HOURS PRN
Qty: 90 TABLET | Refills: 0 | Status: SHIPPED | OUTPATIENT
Start: 2018-03-01 | End: 2018-04-05

## 2018-03-01 RX ORDER — CYCLOBENZAPRINE HCL 10 MG
TABLET ORAL
Qty: 30 TABLET | Refills: 1 | Status: SHIPPED | OUTPATIENT
Start: 2018-03-01 | End: 2018-05-01 | Stop reason: SINTOL

## 2018-03-01 ASSESSMENT — PAIN SCALES - GENERAL: PAINLEVEL: WORST PAIN (10)

## 2018-03-01 NOTE — PROGRESS NOTES
"  SUBJECTIVE:   Velma Diaz is a 47 year old female who presents to clinic today for the following health issues:      Patient is here to follow up on iron levels and left hand pain.    She has a history of restless leg syndrome and neuropathy and her neurologist would like her to have iron levels up in the 60-70 range.  She has been taking an iron supplement.  She would like to have those labs rechecked  She is also still having some left hand pain and stiffness.  She gets some numbness and tingling in the hand at times.  Sometimes her fingers are hard to bend and move.  She is not having any \"trigger fingers\".  She remains on oxycodone 2 tablets daily for chronic back pain.  She requests a refill on that medication today as long as she is here, but she will wait to fill it until next week.    She also is getting a recurrence of candida infection under her abdominal skin fold and requests a refill on her nystatin.    Problem list and histories reviewed & adjusted, as indicated.  Additional history: as documented    Patient Active Problem List   Diagnosis     History of cleft palate with cleft lip     MAYA (obstructive sleep apnea)/Hypoventilation Syndrome     Major depressive disorder, recurrent episode, moderate (H)     Paresthesias     Health Care Home     Vitamin D deficiency     Morbid obesity with BMI of 40.0-44.9, adult (H)     Hyperlipidemia with target LDL less than 130     Intervertebral disc prolapse with impingement     Nonallopathic lesion of lumbar region     Chronic pain syndrome     Restless legs syndrome (RLS)     Insomnia     Migraine     Chronic bilateral back pain, unspecified back location     Chronic pain of left knee     ROSE MARIE (generalized anxiety disorder)     Past Surgical History:   Procedure Laterality Date     ANKLE SURGERY  7/13    Left for torn tendons & loose bone chips     ARTHROSCOPY KNEE  1986    Right knee     BACK SURGERY       Basal cell carcinoma  2011    Removal from the chest "     BIOPSY       C VAGINAL HYSTERECTOMY  2011     CARPAL TUNNEL RELEASE RT/LT  ~2010    Bilateral     COLONOSCOPY  2016     COSMETIC SURGERY       COSMETIC SURGERY       DECOMPRESSION CUBITAL TUNNEL Left 8/28/2015    Procedure: DECOMPRESSION CUBITAL TUNNEL;  Surgeon: Gus Donato MD;  [facility]: [facility]     ENT SURGERY  1975    Tonsillectomy and Adenoids     GYN SURGERY  2011    Hysterectomy     HC REPAIR PERONEAL TENDONS  7/13     ORTHOPEDIC SURGERY  2011, 2011    Bilateral CTR     PE TUBES      yearly times 10 years     REPAIR CLEFT PALATE CHILD      Age 3 months & afterwards (multiple surgeries)     SOFT TISSUE SURGERY       SOFT TISSUE SURGERY  2013       Social History   Substance Use Topics     Smoking status: Former Smoker     Packs/day: 0.50     Years: 15.00     Types: Cigarettes     Quit date: 2/20/2015     Smokeless tobacco: Never Used     Alcohol use No      Comment: Quit in September 27th     Family History   Problem Relation Age of Onset     Alzheimer Disease Paternal Grandmother 60     CEREBROVASCULAR DISEASE Paternal Grandmother      Neurologic Disorder Sister 20     migraines     Depression/Anxiety Sister      Depression Sister      Neurologic Disorder Son 14     migraines     Asthma Son      HEART DISEASE Mother      OSTEOPOROSIS Mother      Obesity Mother      CANCER Father      Alcohol/Drug Father      Other Cancer Father      saliva glands & skin CA      Hypertension Father      DIABETES Maternal Grandmother      Breast Cancer Maternal Grandmother      Obesity Maternal Grandmother      Other Cancer Maternal Grandfather      skin cancer     Cancer - colorectal Other      Aunt     Asthma Daughter      Asthma Daughter      Asthma Son      Thyroid Disease Sister      Colon Cancer Other      Obesity Sister      Glaucoma No family hx of      Macular Degeneration No family hx of          Current Outpatient Prescriptions   Medication Sig Dispense Refill     cyclobenzaprine (FLEXERIL) 10 MG tablet  "TAKE 1/2 TO 1 TABLET BY MOUTH 3 TIMES A DAY AS NEEDED FOR MUSCLE SPASMS 30 tablet 1     nystatin (MYCOSTATIN) 463424 UNIT/GM POWD Apply to affected area twice daily as needed 60 g 2     oxyCODONE-acetaminophen (PERCOCET)  MG per tablet Take 1 tablet by mouth every 6 hours as needed for moderate to severe pain 90 tablet 0     LYRICA 225 MG CAPS TAKE 1 TABLET (225 MG) BY MOUTH TWICE A DAY 60 capsule 5     ferrous sulfate (IRON) 325 (65 FE) MG tablet Take 1 tablet (325 mg) by mouth daily (with breakfast) 90 tablet 4     order for DME TENS unit 1 Device 0     furosemide (LASIX) 20 MG tablet Take 1 tablet (20 mg) by mouth daily 30 tablet 11     rOPINIRole (REQUIP) 0.25 MG tablet Week 1 & 2: 1 tablet 1 hour before going to bed. Week 2 and afterwards: 1 tablet two times a day (Patient taking differently: Take 0.25 mg by mouth 2 times daily Patient takes one in the morning and one in the evening.) 180 tablet 1     SUMAtriptan (IMITREX) 100 MG tablet Take 1 tablet (100 mg) by mouth at onset of headache for migraine May repeat in 2 hours if needed: max 2/day 9 tablet 2     acetaminophen (TYLENOL) 325 MG tablet Take 2 tablets (650 mg) by mouth every 4 hours as needed for other (mild pain) 100 tablet 0     ORDER FOR DME Respironics REMSTAR 60 Series Auto [id number] H2O, Airfit P10 nasal pillow mask w/xsmall pillows       Multiple Vitamins-Minerals (MULTI FOR HER PO) Take by mouth daily        No Known Allergies    Reviewed and updated as needed this visit by clinical staff  Tobacco  Allergies  Meds  Med Hx  Surg Hx  Fam Hx  Soc Hx      Reviewed and updated as needed this visit by Provider         ROS:  She is off the fluoxetine and feels like she is doing fairly well with the depression.  She is seeing our clinic's therapist, Antonio, on a regular basis for that.    OBJECTIVE:     /79 (BP Location: Right arm, Patient Position: Chair, Cuff Size: Adult Large)  Pulse 101  Temp 97.8  F (36.6  C) (Oral)  Ht 5' 3.25\" " (1.607 m)  Wt 226 lb (102.5 kg)  SpO2 96%  BMI 39.72 kg/m2  Body mass index is 39.72 kg/(m^2).  GENERAL: alert, no distress and obese  MS: no gross musculoskeletal defects noted.  No obvious swelling of the left hand.  No triggering of the fingers.  She is able to flex and extend her fingers without significant difficulty.  She has some crepitus with flexion and extension of the wrist and fingers.    Diagnostic Test Results:  none     ASSESSMENT/PLAN:       ICD-10-CM    1. Chronic bilateral back pain, unspecified back location M54.9 cyclobenzaprine (FLEXERIL) 10 MG tablet    G89.29    2. Chronic pain syndrome G89.4 Drug Abuse Screen Panel 13, Urine (Pain Care Package)     oxyCODONE-acetaminophen (PERCOCET)  MG per tablet   3. Intervertebral disc prolapse with impingement M51.9    4. Major depressive disorder, recurrent episode, moderate (H) F33.1    5. Candidal intertrigo B37.2 nystatin (MYCOSTATIN) 116553 UNIT/GM POWD   6. Paresthesias R20.2 CBC with platelets     Iron and iron binding capacity     Ferritin   7. Pain of left hand M79.642      We discussed the above items   We will continue her same baseline meds  We will check some repeat lab test today as above   She will continue ongoing counseling and will stay off the fluoxetine for now  She plans to follow with neurology again on her paresthesias and neuropathy and left hand pain  Plan a recheck in about 6 months, or sooner nataliya Hunter MD  [facility]

## 2018-03-01 NOTE — MR AVS SNAPSHOT
After Visit Summary   3/1/2018    Velma Diaz    MRN: [id number]           Patient Information     Date Of Birth          1970        Visit Information        Provider Department      3/1/2018 1:40 PM Srinivasa Hunter MD [facility]        Today's Diagnoses     Chronic bilateral back pain, unspecified back location    -  1    Chronic pain syndrome        Intervertebral disc prolapse with impingement        Major depressive disorder, recurrent episode, moderate (H)        Candidal intertrigo        Paresthesias        Pain of left hand           Follow-ups after your visit        Follow-up notes from your care team     Return in about 6 months (around 9/1/2018).      Your next 10 appointments already scheduled     Mar 20, 2018 10:30 AM CDT   Return Visit with SERVANDO Yan   [facility] ([facility]    [address] [address] [phone]            Jun 07, 2018  1:00 PM CDT   Return Visit with Joaquín Burger MD   [facility] ([facility])    [address] [place] [address] [phone]              Who to contact     If you have questions or need follow up information about today's clinic visit or your schedule please contact [facility] directly at [phone].  Normal or non-critical lab and imaging results will be communicated to you by MyChart, letter or phone within 4 business days after the clinic has received the results. If you do not hear from us within 7 days, please contact the clinic through MyChart or phone. If you have a critical or abnormal lab result, we will notify you by phone as soon as possible.  Submit refill requests through [facility] or call your pharmacy and they will forward the refill request to us. Please allow 3 business days for your refill to be completed.          Additional  "Information About Your Visit        [employer] Information     [employer] gives you secure access to your electronic health record. If you see a primary care provider, you can also send messages to your care team and make appointments. If you have questions, please call your primary care clinic.  If you do not have a primary care provider, please call [phone] and they will assist you.        Care EveryWhere ID     This is your Care EveryWhere ID. This could be used by other organizations to access your [place] medical records  [id number]        Your Vitals Were     Pulse Temperature Height Pulse Oximetry BMI (Body Mass Index)       101 97.8  F (36.6  C) (Oral) 5' 3.25\" (1.607 m) 96% 39.72 kg/m2        Blood Pressure from Last 3 Encounters:   03/01/18 121/79   12/07/17 116/76   11/30/17 105/74    Weight from Last 3 Encounters:   03/01/18 226 lb (102.5 kg)   12/07/17 226 lb (102.5 kg)   11/30/17 225 lb (102.1 kg)              We Performed the Following     CBC with platelets     Drug Abuse Screen Panel 13, Urine (Pain Care Package)     Ferritin     Iron and iron binding capacity          Today's Medication Changes          These changes are accurate as of 3/1/18  2:06 PM.  If you have any questions, ask your nurse or doctor.               These medicines have changed or have updated prescriptions.        Dose/Directions    cyclobenzaprine 10 MG tablet   Commonly known as:  FLEXERIL   This may have changed:  See the new instructions.   Used for:  Chronic bilateral back pain, unspecified back location   Changed by:  Srinivasa Hunter MD        TAKE 1/2 TO 1 TABLET BY MOUTH 3 TIMES A DAY AS NEEDED FOR MUSCLE SPASMS   Quantity:  30 tablet   Refills:  1       rOPINIRole 0.25 MG tablet   Commonly known as:  REQUIP   This may have changed:    - how much to take  - how to take this  - when to take this  - additional instructions   Used for:  Restless legs syndrome (RLS)        Week 1 & 2: 1 tablet 1 hour before going to bed. " Week 2 and afterwards: 1 tablet two times a day   Quantity:  180 tablet   Refills:  1            Where to get your medicines      These medications were sent to [facility]pharmacy #[id number] - [place], [place] - [address]  [address] [place] [address] [facility] [address]     Phone:  [phone]     cyclobenzaprine 10 MG tablet    nystatin 254452 UNIT/GM Powd         Some of these will need a paper prescription and others can be bought over the counter.  Ask your nurse if you have questions.     Bring a paper prescription for each of these medications     oxyCODONE-acetaminophen  MG per tablet                Primary Care Provider Office Phone # Fax #    Srinivasa Hunter [phone]       [address] [facility] [address]        Goals        General    I will call within next 2 weeks to schedule initial psychiatry appointment (pt-stated)     Notes - Note edited  5/20/2015 12:51 PM by Yesica Marsh    As of today's date 5/20/2015 goal is met at 76 - 100%.   Goal Status:  Complete  Patient states this is scheduled with Dr. Salvador for next month, but not on appointment calendar  LUDY Stark, MSW   [phone]  5/20/2015 12:51 PM        I will complete [employer] or reduced fare application within the next month (pt-stated)     Notes - Note edited  11/24/2015 12:00 PM by Yesica Marsh    As of today's date 11/24/2015 goal is met at 51 - 75%.   Goal Status:  Active  She reported today having [employer] application, [employer] worker has requested but packet from [employer].    LUDY Stark, MSW   [phone]  11/24/2015 12:00 PM        I will discuss [employer] worker with therapist next week for housing needs  (pt-stated)     Notes - Note edited  12/3/2015  1:17 PM by Yesica Marsh    As of today's date 12/3/2015 goal is met at 76 - 100%.   Goal Status:  Discontinued  Patient has [employer] worker and has found housing with friend  LUDY Stark, MSW    [phone]  12/3/2015 1:16 PM        Equal Access to Services     RIKIRICHARDSON MARY : Hadii aad ku hadsandraglenda Soeugenio, waaxda lualexiadaha, qaybta karochelledre ruffbriannadre, grayson oharabritneyradha carrillo . So [facility] [phone].    ATENCIÓN: Si habla español, tiene a dowd disposición servicios gratuitos de asistencia lingüística. Llame al [phone].    We comply with applicable federal civil rights laws and [place] laws. We do not discriminate on the basis of race, color, national origin, age, disability, sex, sexual orientation, or gender identity.            Thank you!     Thank you for choosing [facility]  for your care. Our goal is always to provide you with excellent care. Hearing back from our patients is one way we can continue to improve our services. Please take a few minutes to complete the written survey that you may receive in the mail after your visit with us. Thank you!             Your Updated Medication List - Protect others around you: Learn how to safely use, store and throw away your medicines at www.disposemymeds.org.          This list is accurate as of 3/1/18  2:06 PM.  Always use your most recent med list.                   Brand Name Dispense Instructions for use Diagnosis    acetaminophen 325 MG tablet    TYLENOL    100 tablet    Take 2 tablets (650 mg) by mouth every 4 hours as needed for other (mild pain)    Lesion of left ulnar nerve       cyclobenzaprine 10 MG tablet    FLEXERIL    30 tablet    TAKE 1/2 TO 1 TABLET BY MOUTH 3 TIMES A DAY AS NEEDED FOR MUSCLE SPASMS    Chronic bilateral back pain, unspecified back location       ferrous sulfate 325 (65 FE) MG tablet    IRON    90 tablet    Take 1 tablet (325 mg) by mouth daily (with breakfast)    Restless legs syndrome (RLS)       furosemide 20 MG tablet    LASIX    30 tablet    Take 1 tablet (20 mg) by mouth daily    Leg swelling       LYRICA 225 MG Caps   Generic drug:  Pregabalin     60 capsule    TAKE 1 TABLET  (225 MG) BY MOUTH TWICE A DAY    Chronic pain syndrome       MULTI FOR HER PO      Take by mouth daily        nystatin 340907 UNIT/GM Powd    MYCOSTATIN    60 g    Apply to affected area twice daily as needed    Candidal intertrigo       * order for DME      Respironics REMSTAR 60 Series Auto ECVC0be H2O, Airfit P10 nasal pillow mask w/xsmall pillows        * order for DME     1 Device    TENS unit    Chronic bilateral back pain, unspecified back location       oxyCODONE-acetaminophen  MG per tablet    PERCOCET    90 tablet    Take 1 tablet by mouth every 6 hours as needed for moderate to severe pain    Chronic pain syndrome       rOPINIRole 0.25 MG tablet    REQUIP    180 tablet    Week 1 & 2: 1 tablet 1 hour before going to bed. Week 2 and afterwards: 1 tablet two times a day    Restless legs syndrome (RLS)       SUMAtriptan 100 MG tablet    IMITREX    9 tablet    Take 1 tablet (100 mg) by mouth at onset of headache for migraine May repeat in 2 hours if needed: max 2/day    Headache(784.0)       * Notice:  This list has 2 medication(s) that are the same as other medications prescribed for you. Read the directions carefully, and ask your doctor or other care provider to review them with you.

## 2018-03-01 NOTE — NURSING NOTE
"Chief Complaint   Patient presents with     Anemia     Recheck iron     Hand Pain     Left hand, Trigger fingers also has a sore finger on right hand     Refill Request     Pain medication       Initial /79 (BP Location: Right arm, Patient Position: Chair, Cuff Size: Adult Large)  Pulse 101  Temp 97.8  F (36.6  C) (Oral)  Ht 5' 3.25\" (1.607 m)  Wt 226 lb (102.5 kg)  SpO2 96%  BMI 39.72 kg/m2 Estimated body mass index is 39.72 kg/(m^2) as calculated from the following:    Height as of this encounter: 5' 3.25\" (1.607 m).    Weight as of this encounter: 226 lb (102.5 kg).  Medication Reconciliation: monica Hines CMA       "

## 2018-03-02 NOTE — PROGRESS NOTES
Kate,  Your iron level is up to 64, which is a good value. The rest of your lab work looks as expected. There is no anemia.  You should probably continue with your same iron supplementation.    Srinivasa Hunter MD

## 2018-03-08 ENCOUNTER — TRANSFERRED RECORDS (OUTPATIENT)
Dept: HEALTH INFORMATION MANAGEMENT | Facility: CLINIC | Age: 48
End: 2018-03-08

## 2018-03-14 ENCOUNTER — TRANSFERRED RECORDS (OUTPATIENT)
Dept: HEALTH INFORMATION MANAGEMENT | Facility: CLINIC | Age: 48
End: 2018-03-14

## 2018-03-14 LAB
CREAT SERPL-MCNC: 0.73 MG/DL (ref 0.57–1.11)
GFR SERPL CREATININE-BSD FRML MDRD: >60 ML/MIN/1.73M2
GLUCOSE SERPL-MCNC: 91 MG/DL (ref 65–100)
POTASSIUM SERPL-SCNC: 3.9 MMOL/L (ref 3.55–0)

## 2018-03-19 ENCOUNTER — TRANSFERRED RECORDS (OUTPATIENT)
Dept: HEALTH INFORMATION MANAGEMENT | Facility: CLINIC | Age: 48
End: 2018-03-19

## 2018-03-20 ENCOUNTER — OFFICE VISIT (OUTPATIENT)
Dept: PSYCHOLOGY | Facility: CLINIC | Age: 48
End: 2018-03-20
Payer: COMMERCIAL

## 2018-03-20 DIAGNOSIS — F41.1 GAD (GENERALIZED ANXIETY DISORDER): Primary | ICD-10-CM

## 2018-03-20 DIAGNOSIS — F33.1 MAJOR DEPRESSIVE DISORDER, RECURRENT EPISODE, MODERATE (H): Chronic | ICD-10-CM

## 2018-03-20 PROCEDURE — 90834 PSYTX W PT 45 MINUTES: CPT | Performed by: SOCIAL WORKER

## 2018-03-20 ASSESSMENT — ANXIETY QUESTIONNAIRES
2. NOT BEING ABLE TO STOP OR CONTROL WORRYING: MORE THAN HALF THE DAYS
5. BEING SO RESTLESS THAT IT IS HARD TO SIT STILL: NOT AT ALL
7. FEELING AFRAID AS IF SOMETHING AWFUL MIGHT HAPPEN: SEVERAL DAYS
1. FEELING NERVOUS, ANXIOUS, OR ON EDGE: SEVERAL DAYS
GAD7 TOTAL SCORE: 7
3. WORRYING TOO MUCH ABOUT DIFFERENT THINGS: SEVERAL DAYS
6. BECOMING EASILY ANNOYED OR IRRITABLE: SEVERAL DAYS
IF YOU CHECKED OFF ANY PROBLEMS ON THIS QUESTIONNAIRE, HOW DIFFICULT HAVE THESE PROBLEMS MADE IT FOR YOU TO DO YOUR WORK, TAKE CARE OF THINGS AT HOME, OR GET ALONG WITH OTHER PEOPLE: VERY DIFFICULT

## 2018-03-20 ASSESSMENT — PATIENT HEALTH QUESTIONNAIRE - PHQ9: 5. POOR APPETITE OR OVEREATING: SEVERAL DAYS

## 2018-03-20 NOTE — MR AVS SNAPSHOT
MRN:[id number]                      After Visit Summary   3/20/2018    Velma Diaz    MRN: [id number]           Visit Information        Provider Department      3/20/2018 10:30 AM Antonio Rios LICSW [facility]      Your next 10 appointments already scheduled     Apr 16, 2018 12:30 PM CDT   Return Visit with SERVANDO Yan   [facility]    [address] [id number] [phone]            Jun 07, 2018  1:00 PM CDT   Return Visit with Joaquín Burger MD   [facility] ([facility]    [address] [place] [address] [phone]              MyChart Information     [employer] gives you secure access to your electronic health record. If you see a primary care provider, you can also send messages to your care team and make appointments. If you have questions, please call your primary care clinic.  If you do not have a primary care provider, please call [phone] and they will assist you.        Care EveryWhere ID     This is your Care EveryWhere ID. This could be used by other organizations to access your [place] medical records  [id number]        Equal Access to Services     LOLA HERNANDEZ : Hadii antionette britoo Sotheresaali, waaxda luqadaha, qaybta kaalmada adeegyada, grayson sauceda. So [facility] [phone].    ATENCIÓN: Si habla español, tiene a dowd disposición servicios gratuitos de asistencia lingüística. Llame al [phone].    We comply with applicable federal civil rights laws and [place] laws. We do not discriminate on the basis of race, color, national origin, age, disability, sex, sexual orientation, or gender identity.
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
## 2018-03-21 ASSESSMENT — ANXIETY QUESTIONNAIRES: GAD7 TOTAL SCORE: 7

## 2018-03-21 ASSESSMENT — PATIENT HEALTH QUESTIONNAIRE - PHQ9: SUM OF ALL RESPONSES TO PHQ QUESTIONS 1-9: 10

## 2018-03-27 ENCOUNTER — MYC MEDICAL ADVICE (OUTPATIENT)
Dept: FAMILY MEDICINE | Facility: CLINIC | Age: 48
End: 2018-03-27

## 2018-03-30 ENCOUNTER — TRANSFERRED RECORDS (OUTPATIENT)
Dept: HEALTH INFORMATION MANAGEMENT | Facility: CLINIC | Age: 48
End: 2018-03-30

## 2018-04-05 ENCOUNTER — MYC REFILL (OUTPATIENT)
Dept: FAMILY MEDICINE | Facility: CLINIC | Age: 48
End: 2018-04-05

## 2018-04-05 DIAGNOSIS — G89.4 CHRONIC PAIN SYNDROME: Chronic | ICD-10-CM

## 2018-04-05 RX ORDER — OXYCODONE AND ACETAMINOPHEN 10; 325 MG/1; MG/1
1 TABLET ORAL EVERY 6 HOURS PRN
Qty: 90 TABLET | Refills: 0 | Status: SHIPPED | OUTPATIENT
Start: 2018-04-05 | End: 2018-04-30

## 2018-04-05 NOTE — TELEPHONE ENCOUNTER
Message from MyChart:  Original authorizing provider: MD Velma Rodriguez would like a refill of the following medications:  oxyCODONE-acetaminophen (PERCOCET)  MG per tablet [Srinivasa Hunter MD]    Preferred pharmacy: WRITTEN PRESCRIPTION REQUESTED    Comment:  Please contact me and Ill

## 2018-04-05 NOTE — TELEPHONE ENCOUNTER
Requested Prescriptions   Pending Prescriptions Disp Refills     oxyCODONE-acetaminophen (PERCOCET)  MG per tablet 90 tablet 0     Sig: Take 1 tablet by mouth every 6 hours as needed for moderate to severe pain    There is no refill protocol information for this order        Last refill 3/8/18 # 90  Last OV 3/1/18.    Routing refill request to provider for review/approval because:  Drug not on the [facility] refill protocol.  Marisela Shea RN [facility] Triage.

## 2018-04-16 ENCOUNTER — OFFICE VISIT (OUTPATIENT)
Dept: PSYCHOLOGY | Facility: CLINIC | Age: 48
End: 2018-04-16
Payer: COMMERCIAL

## 2018-04-16 ENCOUNTER — THERAPY VISIT (OUTPATIENT)
Dept: CHIROPRACTIC MEDICINE | Facility: CLINIC | Age: 48
End: 2018-04-16
Payer: COMMERCIAL

## 2018-04-16 DIAGNOSIS — F41.1 GAD (GENERALIZED ANXIETY DISORDER): Primary | ICD-10-CM

## 2018-04-16 DIAGNOSIS — M54.50 LUMBAGO: ICD-10-CM

## 2018-04-16 DIAGNOSIS — F33.1 MAJOR DEPRESSIVE DISORDER, RECURRENT EPISODE, MODERATE (H): Chronic | ICD-10-CM

## 2018-04-16 DIAGNOSIS — M99.03 SEGMENTAL DYSFUNCTION OF LUMBAR REGION: ICD-10-CM

## 2018-04-16 DIAGNOSIS — M99.05 SEGMENTAL DYSFUNCTION OF PELVIC REGION: Primary | ICD-10-CM

## 2018-04-16 DIAGNOSIS — M62.838 SPASM OF MUSCLE: ICD-10-CM

## 2018-04-16 DIAGNOSIS — M99.02 THORACIC SEGMENT DYSFUNCTION: ICD-10-CM

## 2018-04-16 PROCEDURE — 90834 PSYTX W PT 45 MINUTES: CPT | Performed by: SOCIAL WORKER

## 2018-04-16 PROCEDURE — 98941 CHIROPRACT MANJ 3-4 REGIONS: CPT | Mod: AT | Performed by: CHIROPRACTOR

## 2018-04-16 PROCEDURE — 97810 ACUP 1/> WO ESTIM 1ST 15 MIN: CPT | Performed by: CHIROPRACTOR

## 2018-04-16 PROCEDURE — 99211 OFF/OP EST MAY X REQ PHY/QHP: CPT | Mod: 25 | Performed by: CHIROPRACTOR

## 2018-04-16 NOTE — MR AVS SNAPSHOT
MRN:[id number]                      After Visit Summary   4/16/2018    Velma Diaz    MRN: [id number]           Visit Information        Provider Department      4/16/2018 12:30 PM Antonio Rios LICSW [facility]      Your next 10 appointments already scheduled     Apr 16, 2018  4:30 PM CDT   [facility]ractor with Juan Jose Boyd DC   [facility] [place] [facility] ([facility] [place])    [address]  [place] [id number] [phone]            May 14, 2018 11:00 AM CDT   Return Visit with Antonio Rios [facility] ([facility])    [address] [address] [phone]            Jun 07, 2018  1:00 PM CDT   Return Visit with Joaquín Burger MD   [facility] ([facility])    [address] [place] [id number] [phone]              MyChart Information     [facility] gives you secure access to your electronic health record. If you see a primary care provider, you can also send messages to your care team and make appointments. If you have questions, please call your primary care clinic.  If you do not have a primary care provider, please call [phone] and they will assist you.        Care EveryWhere ID     This is your Care EveryWhere ID. This could be used by other organizations to access your [facility] medical records  [id number]        Equal Access to Services     LOLA HERNANDEZ AH: Hadii aad ku hadasho Soomaali, waaxda luqadaha, qaybta kaalmada adeegyada, waxay estivenin haysivan speedy sauceda. So [facility] [phone].    ATENCIÓN: Si habla español, tiene a dowd disposición servicios gratuitos de asistencia lingüística. Llame al [phone].    We comply with applicable federal civil rights laws and [place] laws. We do not discriminate on the basis of race, color, national origin,  age, disability, sex, sexual orientation, or gender identity.
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
## 2018-04-16 NOTE — PROGRESS NOTES
Visit #: 1    Subjective:  Velma Diaz is a 46 year old female who is seen in f/u up for:        Nonallopathic lesion of pelvic region  Lumbago  Nonallopathic lesion of lumbar region  Spasm of muscle.     Since last visit on 4/19/2017,  Velma iDaz reports the following changes: Pain immediately after last treatment: 1/10 and their pain level today 5/10.  Velma reports the she has had a very hectic past year.  There has been a lot of changes in her life, but her back is still sore with pain radiating/burning down both legs with more on the right.  Since her last visit she has had an injection which has helped but the pain came back.  There has been no significant trauma since her last visit.  Overall she is feeling about the same    Area of chief complaint:  Lumbar :  Symptoms are graded at 510. The quality is described as stiff, achey, stabbing.  Motion has been decreasing over the past couple of monther Patient feels that were doing better but symptoms have got tne worse over the past couple of months.      Objective:  The following was observed:    P: pain elicited on palpation, palpatory tenderness, piriformis R>>L    A: static palpation demonstrates intersegmental asymmetry , pelvis, lumbar    R: motion palpation notes restricted motion, :  T10 Extension restriction  T11 Extension restriction  T12 Extension restriction  L4 Right rotation restricted  L5 Right rotation restricted  PSIS Right Extension restriction    T: hypertonicity at: Piriformis R>>L      Assessment:    Segmental spinal dysfunction/restrictions found at:  T10  T11  T12  L4  L5  PSIS Left    Diagnoses:      1. Lumbago    2. Nonallopathic lesion of pelvic region    3. Nonallopathic lesion of lumbar region    4. Spasm of muscle   nonallopathic lesion of thoracic region       Patient's condition:  Patient had restrictions pre-manipulation    Treatment effectiveness:  Post manipulation there is better intersegmental movement and Patient  claims to feel looser post manipulation      Procedures:  CMT:  70624 Chiropractic manipulative treatment 3-4 regions performed   Thoracic: Activator, T10, T11, T12, Prone  Lumbar: Activator, L4, L5, Prone  Pelvis: Drop Table, PSIS Right , Prone    Modalities:  58024: Acupuncture:  Points: B25, B27, GV3, B62, SI3, K3, and Ahsi point in piriformis    Therapeutic procedures:  None      Prognosis: Good    Progress towards Goals: Patient is making progress towards the goal     Response to Treatment:   Reduction in symptoms as reported by patient      Recommendations:    Instructions:ice 20 minutes every other hour as needed    Follow-up:  Return to care in one week

## 2018-04-23 ENCOUNTER — TRANSFERRED RECORDS (OUTPATIENT)
Dept: HEALTH INFORMATION MANAGEMENT | Facility: CLINIC | Age: 48
End: 2018-04-23

## 2018-04-30 ENCOUNTER — MYC REFILL (OUTPATIENT)
Dept: FAMILY MEDICINE | Facility: CLINIC | Age: 48
End: 2018-04-30

## 2018-04-30 DIAGNOSIS — G89.4 CHRONIC PAIN SYNDROME: Chronic | ICD-10-CM

## 2018-04-30 NOTE — TELEPHONE ENCOUNTER
Message from MyChart:  Original authorizing provider: MD Velma Rodriguez would like a refill of the following medications:  oxyCODONE-acetaminophen (PERCOCET)  MG per tablet [Srinivasa Hunter MD]    Preferred pharmacy: WRITTEN PRESCRIPTION REQUESTED    Comment:  I will  at clinic

## 2018-04-30 NOTE — TELEPHONE ENCOUNTER
Requested Prescriptions   Pending Prescriptions Disp Refills     oxyCODONE-acetaminophen (PERCOCET)  MG per tablet 90 tablet 0     Sig: Take 1 tablet by mouth every 6 hours as needed for moderate to severe pain    There is no refill protocol information for this order         Last Written Prescription Date:  4-5-18  Last Fill Quantity: 90,   # refills: 0  Last Office Visit:   Future Office visit:    Next 5 appointments (look out 90 days)     May 14, 2018 11:00 AM CDT   Return Visit with SERVANDO Yan   [facility] ([facility])    [address] [facility] [address]   [phone]            Jun 07, 2018  1:00 PM CDT   Return Visit with Joaquín Burger MD   [facility]    [address] [place] [id number] [phone]                   Routing refill request to provider for review/approval because:  Drug not on the [facility], [facility] or [facility] refill protocol or controlled substance

## 2018-05-01 ENCOUNTER — OFFICE VISIT (OUTPATIENT)
Dept: FAMILY MEDICINE | Facility: CLINIC | Age: 48
End: 2018-05-01
Payer: COMMERCIAL

## 2018-05-01 ENCOUNTER — MYC REFILL (OUTPATIENT)
Dept: FAMILY MEDICINE | Facility: CLINIC | Age: 48
End: 2018-05-01

## 2018-05-01 ENCOUNTER — THERAPY VISIT (OUTPATIENT)
Dept: CHIROPRACTIC MEDICINE | Facility: CLINIC | Age: 48
End: 2018-05-01
Payer: COMMERCIAL

## 2018-05-01 VITALS
DIASTOLIC BLOOD PRESSURE: 74 MMHG | BODY MASS INDEX: 39.89 KG/M2 | SYSTOLIC BLOOD PRESSURE: 109 MMHG | WEIGHT: 227 LBS | TEMPERATURE: 97.9 F | HEART RATE: 98 BPM | OXYGEN SATURATION: 95 %

## 2018-05-01 DIAGNOSIS — G89.4 CHRONIC PAIN SYNDROME: Chronic | ICD-10-CM

## 2018-05-01 DIAGNOSIS — M62.838 SPASM OF MUSCLE: ICD-10-CM

## 2018-05-01 DIAGNOSIS — M99.05 SEGMENTAL DYSFUNCTION OF PELVIC REGION: Primary | ICD-10-CM

## 2018-05-01 DIAGNOSIS — G89.29 CHRONIC LOW BACK PAIN, UNSPECIFIED BACK PAIN LATERALITY, WITH SCIATICA PRESENCE UNSPECIFIED: ICD-10-CM

## 2018-05-01 DIAGNOSIS — M54.50 LUMBAGO: ICD-10-CM

## 2018-05-01 DIAGNOSIS — M54.5 CHRONIC LOW BACK PAIN, UNSPECIFIED BACK PAIN LATERALITY, WITH SCIATICA PRESENCE UNSPECIFIED: ICD-10-CM

## 2018-05-01 DIAGNOSIS — M99.03 SEGMENTAL DYSFUNCTION OF LUMBAR REGION: ICD-10-CM

## 2018-05-01 DIAGNOSIS — L56.8 PHOTOSENSITIVE CONTACT DERMATITIS: Primary | ICD-10-CM

## 2018-05-01 DIAGNOSIS — M99.02 THORACIC SEGMENT DYSFUNCTION: ICD-10-CM

## 2018-05-01 PROCEDURE — 99213 OFFICE O/P EST LOW 20 MIN: CPT | Performed by: NURSE PRACTITIONER

## 2018-05-01 PROCEDURE — 98941 CHIROPRACT MANJ 3-4 REGIONS: CPT | Mod: AT | Performed by: CHIROPRACTOR

## 2018-05-01 PROCEDURE — 97810 ACUP 1/> WO ESTIM 1ST 15 MIN: CPT | Performed by: CHIROPRACTOR

## 2018-05-01 RX ORDER — TRIAMCINOLONE ACETONIDE 1 MG/G
CREAM TOPICAL
Qty: 30 G | Refills: 0 | Status: SHIPPED | OUTPATIENT
Start: 2018-05-01 | End: 2018-05-31

## 2018-05-01 RX ORDER — OXYCODONE AND ACETAMINOPHEN 10; 325 MG/1; MG/1
1 TABLET ORAL EVERY 6 HOURS PRN
Qty: 90 TABLET | Refills: 0 | Status: CANCELLED | OUTPATIENT
Start: 2018-05-01

## 2018-05-01 RX ORDER — OXYCODONE AND ACETAMINOPHEN 10; 325 MG/1; MG/1
1 TABLET ORAL EVERY 6 HOURS PRN
Qty: 90 TABLET | Refills: 0 | Status: SHIPPED | OUTPATIENT
Start: 2018-05-01 | End: 2018-05-31

## 2018-05-01 RX ORDER — METHOCARBAMOL 750 MG/1
750 TABLET, FILM COATED ORAL 3 TIMES DAILY PRN
Qty: 60 TABLET | Refills: 3 | Status: SHIPPED | OUTPATIENT
Start: 2018-05-01 | End: 2018-08-10

## 2018-05-01 ASSESSMENT — PAIN SCALES - GENERAL: PAINLEVEL: NO PAIN (0)

## 2018-05-01 NOTE — MR AVS SNAPSHOT
After Visit Summary   5/1/2018    Vlema Diaz    MRN: [id number]           Patient Information     Date Of Birth          1970        Visit Information        Provider Department      5/1/2018 3:40 PM Jessy Burgos APRN CNP [facility]        Today's Diagnoses     Photosensitive contact dermatitis    -  1    Chronic low back pain, unspecified back pain laterality, with sciatica presence unspecified          Care Instructions    You can apply Kenalog cream three times daily  Apply SPF (at least 30) when going outside and wear long sleeves  Stop taking Flexeril  You can take your anti histamine twice daily. You can take 2 tablets twice daily. At that dose, it may make you drowsy          Follow-ups after your visit        Your next 10 appointments already scheduled     May 14, 2018 11:00 AM CDT   Return Visit with SERVANDO Yan   [facility] ([facility])    [address] [address]   [phone]            May 15, 2018 10:15 AM CDT   [facility] Acupuncture Follow Up with CHARIS Bahena Chiro ([facility] [place])    [address]  [place] [id number] [phone]            Jun 07, 2018  1:00 PM CDT   [facility]    [address] [place] [address] [phone]              Who to contact     If you have questions or need follow up information about today's clinic visit or your schedule please contact [facility] directly at [phone].  Normal or non-critical lab and imaging results will be communicated to you by MyChart, letter or phone within 4 business days after the clinic has received the results. If you do not hear from us within 7 days, please contact the clinic through MyChart or phone. If you  have a critical or abnormal lab result, we will notify you by phone as soon as possible.  Submit refill requests through [employer] or call your pharmacy and they will forward the refill request to us. Please allow 3 business days for your refill to be completed.          Additional Information About Your Visit        [employer] Information     [employer] gives you secure access to your electronic health record. If you see a primary care provider, you can also send messages to your care team and make appointments. If you have questions, please call your primary care clinic.  If you do not have a primary care provider, please call [phone] and they will assist you.        Care EveryWhere ID     This is your Care EveryWhere ID. This could be used by other organizations to access your [facility] medical records  [id number]        Your Vitals Were     Pulse Temperature Pulse Oximetry Breastfeeding? BMI (Body Mass Index)       98 97.9  F (36.6  C) (Oral) 95% No 39.89 kg/m2        Blood Pressure from Last 3 Encounters:   05/01/18 109/74   03/01/18 121/79   12/07/17 116/76    Weight from Last 3 Encounters:   05/01/18 227 lb (103 kg)   03/01/18 226 lb (102.5 kg)   12/07/17 226 lb (102.5 kg)              Today, you had the following     No orders found for display         Today's Medication Changes          These changes are accurate as of 5/1/18  4:16 PM.  If you have any questions, ask your nurse or doctor.               Start taking these medicines.        Dose/Directions    methocarbamol 750 MG tablet   Commonly known as:  ROBAXIN   Used for:  Chronic low back pain, unspecified back pain laterality, with sciatica presence unspecified   Started by:  Jessy Burgos APRN CNP        Dose:  750 mg   Take 1 tablet (750 mg) by mouth 3 times daily as needed for muscle spasms   Quantity:  60 tablet   Refills:  3       triamcinolone 0.1 % cream   Commonly known as:  KENALOG   Used for:  Photosensitive contact dermatitis    Started by:  Jessy Burgos APRN CNP        Apply sparingly to affected area three times daily for 14 days.   Quantity:  30 g   Refills:  0         These medicines have changed or have updated prescriptions.        Dose/Directions    rOPINIRole 0.25 MG tablet   Commonly known as:  REQUIP   This may have changed:    - how much to take  - how to take this  - when to take this  - additional instructions   Used for:  Restless legs syndrome (RLS)        Week 1 & 2: 1 tablet 1 hour before going to bed. Week 2 and afterwards: 1 tablet two times a day   Quantity:  180 tablet   Refills:  1         Stop taking these medicines if you haven't already. Please contact your care team if you have questions.     cyclobenzaprine 10 MG tablet   Commonly known as:  FLEXERIL   Stopped by:  Jessy Burgos APRN CNP                Where to get your medicines      These medications were sent to [facility]/pharmacy #[id number] [place], [place] - [address] [address]     Phone:  [phone]     methocarbamol 750 MG tablet    triamcinolone 0.1 % cream                Primary Care Provider Office Phone # Fax #    Srinivasa Hunter [phone]       [address] [facility] [address]        Goals        General    I will call within next 2 weeks to schedule initial psychiatry appointment (pt-stated)     Notes - Note edited  5/20/2015 12:51 PM by Yesica Marsh    As of today's date 5/20/2015 goal is met at 76 - 100%.   Goal Status:  Complete  Patient states this is scheduled with Dr. Salvador for next month, but not on appointment calendar  Yesica Marsh, LUDY, MSW   [phone]  5/20/2015 12:51 PM        I will complete [employer] or reduced fare application within the next month (pt-stated)     Notes - Note edited  11/24/2015 12:00 PM by Yesica Marsh    As of today's date 11/24/2015 goal is met at 51 - 75%.   Goal Status:  Active  She reported today having  [employer] application, [employer] worker has requested but packet from [employer].    LORRAINE StarkW, MSW   [phone]  11/24/2015 12:00 PM        I will discuss [employer] worker with therapist next week for housing needs  (pt-stated)     Notes - Note edited  12/3/2015  1:17 PM by Yesica Marsh    As of today's date 12/3/2015 goal is met at 76 - 100%.   Goal Status:  Discontinued  Patient has [employer] worker and has found housing with friend  LORRAINE StarkW, MSW   [phone]  12/3/2015 1:16 PM        Equal Access to Services     [facility]: Hadii aad ku hadasho Soomaali, waaxda luqadaha, qaybta kaalmada adeegyada, waxhadley jean-baptistein haysivan speedy carrillo . So [facility] [phone].    ATENCIÓN: Si habla español, tiene a dowd disposición servicios gratuitos de asistencia lingüística. Llame al [phone].    We comply with applicable federal civil rights laws and [place] laws. We do not discriminate on the basis of race, color, national origin, age, disability, sex, sexual orientation, or gender identity.            Thank you!     Thank you for choosing [facility]  for your care. Our goal is always to provide you with excellent care. Hearing back from our patients is one way we can continue to improve our services. Please take a few minutes to complete the written survey that you may receive in the mail after your visit with us. Thank you!             Your Updated Medication List - Protect others around you: Learn how to safely use, store and throw away your medicines at www.disposemymeds.org.          This list is accurate as of 5/1/18  4:16 PM.  Always use your most recent med list.                   Brand Name Dispense Instructions for use Diagnosis    acetaminophen 325 MG tablet    TYLENOL    100 tablet    Take 2 tablets (650 mg) by mouth every 4 hours as needed for other (mild pain)    Lesion of left ulnar nerve       ferrous sulfate 325 (65 Fe) MG tablet    IRON    90  tablet    Take 1 tablet (325 mg) by mouth daily (with breakfast)    Restless legs syndrome (RLS)       furosemide 20 MG tablet    LASIX    30 tablet    Take 1 tablet (20 mg) by mouth daily    Leg swelling       LYRICA 225 MG Caps   Generic drug:  Pregabalin     60 capsule    TAKE 1 TABLET (225 MG) BY MOUTH TWICE A DAY    Chronic pain syndrome       methocarbamol 750 MG tablet    ROBAXIN    60 tablet    Take 1 tablet (750 mg) by mouth 3 times daily as needed for muscle spasms    Chronic low back pain, unspecified back pain laterality, with sciatica presence unspecified       MULTI FOR HER PO      Take by mouth daily        nystatin 126415 UNIT/GM Powd    MYCOSTATIN    60 g    Apply to affected area twice daily as needed    Candidal intertrigo       order for DME      Respironics REMSTAR 60 Series Auto FRMG4xl H2O, Airfit P10 nasal pillow mask w/xsmall pillows        order for DME     1 Device    TENS unit    Chronic bilateral back pain, unspecified back location       oxyCODONE-acetaminophen  MG per tablet    PERCOCET    90 tablet    Take 1 tablet by mouth every 6 hours as needed for moderate to severe pain    Chronic pain syndrome       rOPINIRole 0.25 MG tablet    REQUIP    180 tablet    Week 1 & 2: 1 tablet 1 hour before going to bed. Week 2 and afterwards: 1 tablet two times a day    Restless legs syndrome (RLS)       SUMAtriptan 100 MG tablet    IMITREX    9 tablet    Take 1 tablet (100 mg) by mouth at onset of headache for migraine May repeat in 2 hours if needed: max 2/day    Headache(784.0)       triamcinolone 0.1 % cream    KENALOG    30 g    Apply sparingly to affected area three times daily for 14 days.    Photosensitive contact dermatitis

## 2018-05-01 NOTE — PATIENT INSTRUCTIONS
You can apply Kenalog cream three times daily  Apply SPF (at least 30) when going outside and wear long sleeves  Stop taking Flexeril  You can take your anti histamine twice daily. You can take 2 tablets twice daily. At that dose, it may make you drowsy

## 2018-05-01 NOTE — TELEPHONE ENCOUNTER
Requested Prescriptions   Pending Prescriptions Disp Refills     oxyCODONE-acetaminophen (PERCOCET)  MG per tablet 90 tablet 0     Sig: Take 1 tablet by mouth every 6 hours as needed for moderate to severe pain    There is no refill protocol information for this order         Last Written Prescription Date:  5-1-18  Last Fill Quantity: 90,   # refills: 0  Last Office Visit: 3-1-18  Future Office visit:    Next 5 appointments (look out 90 days)     May 14, 2018 11:00 AM CDT   Return Visit with SERVANDO Yan   [facility] ([facility])    [address] [facility] [address]   [phone]            Jun 07, 2018  1:00 PM CDT   Return Visit with Joaquín Burger MD   [facility]    [address] [place] [address]   [phone]                   Routing refill request to provider for review/approval because:  Drug not on the [facility], [facility] or [facility] refill protocol or controlled substance

## 2018-05-01 NOTE — PROGRESS NOTES
Visit #: 2    Subjective:  Velma Diaz is a 46 year old female who is seen in f/u up for:        Nonallopathic lesion of pelvic region  Lumbago  Nonallopathic lesion of lumbar region  Spasm of muscle.     Since last visit on 4/16/2018,  Velma Diaz reports the following changes: Pain immediately after last treatment: 1/10 and their pain level today 7/10.  Velma reports the she was feeling pretty good after last visit but then sprained her ankle and had to wear a boot for a week.  This affected her walking stride which flared he low back again.    She also mentions that she is having a rash on her arms just when she is out in the sun.  It seems to be calming nadia but it still present and is very itchy.  The rash is only present on her arms where they were exposed to the sun.    Area of chief complaint:  Lumbar :  Symptoms are graded at 7/10. The quality is described as stiff, achey, stabbing.  Motion has been decreasing over the past couple of monther Patient feels that were doing better but symptoms have got tne worse over the past couple of months.      Objective:  The following was observed:    P: pain elicited on palpation, palpatory tenderness, piriformis R>>L    A: static palpation demonstrates intersegmental asymmetry , pelvis, lumbar    R: motion palpation notes restricted motion, :  T10 Extension restriction  T11 Extension restriction  T12 Extension restriction  L4 Right rotation restricted  L5 Right rotation restricted  PSIS Right Extension restriction    T: hypertonicity at: Piriformis R>>L      Assessment:    Segmental spinal dysfunction/restrictions found at:  T10  T11  T12  L4  L5  PSIS Left    Diagnoses:      1. Lumbago    2. Nonallopathic lesion of pelvic region    3. Nonallopathic lesion of lumbar region    4. Spasm of muscle   nonallopathic lesion of thoracic region       Patient's condition:  Patient had restrictions pre-manipulation    Treatment effectiveness:  Post manipulation there is  better intersegmental movement and Patient claims to feel looser post manipulation      Procedures:  CMT:  44826 Chiropractic manipulative treatment 3-4 regions performed   Thoracic: Activator, T10, T11, T12, Prone  Lumbar: Activator, L4, L5, Prone  Pelvis: Drop Table, PSIS Right , Prone    Modalities:  76791: Acupuncture:  Points: B25, B27, GV3, B62, SI3, K3, and Ahsi point in piriformis    Therapeutic procedures:  None      Prognosis: Good    Progress towards Goals: Patient is making progress towards the goal     Response to Treatment:   Reduction in symptoms as reported by patient      Recommendations:    Instructions:ice 20 minutes every other hour as needed    Follow-up:  Return to care in one week   Follow up with GP on rash

## 2018-05-01 NOTE — TELEPHONE ENCOUNTER
Message from MyChart:  Original authorizing provider: MD Velma Rodriguez would like a refill of the following medications:  oxyCODONE-acetaminophen (PERCOCET)  MG per tablet [Srinivasa Hunter MD]    Preferred pharmacy: WRITTEN PRESCRIPTION REQUESTED    Comment:  Call me when ready....

## 2018-05-01 NOTE — PROGRESS NOTES
SUBJECTIVE:   Velma Diaz is a 47 year old female who presents to clinic today for the following health issues:      Rash  Onset: 2 days    Description:   Location: both of her forearms  Character: blotchy, raised, red  Itching (Pruritis): YES    Progression of Symptoms:  worsening    Accompanying Signs & Symptoms:  Fever: no   Body aches or joint pain: no   Sore throat symptoms: no   Recent cold symptoms: no     History:   Previous similar rash: no     Precipitating factors:   Exposure to similar rash: no   New exposures: was in the sun   Recent travel: no     Alleviating factors:      Therapies Tried and outcome: Cortisone cream made it itch more.  Patient. states previous reaction in September 2017.  3 day history of itchy rash. Patient states after sun exposure the itching and redness increased. No sunscreen applied.No new lotions perfumes laundry detergent or animal exposure. No new food exposures.  Nothing relieves the itching redness. No poison ivy exposure.    Patient  states no new medication. She has been using more Flexeril recently which she was using in the fall as well    Problem list and histories reviewed & adjusted, as indicated.  Additional history: none    Patient Active Problem List   Diagnosis     History of cleft palate with cleft lip     MAYA (obstructive sleep apnea)/Hypoventilation Syndrome     Major depressive disorder, recurrent episode, moderate (H)     Paresthesias     Health Care Home     Vitamin D deficiency     Morbid obesity with BMI of 40.0-44.9, adult (H)     Hyperlipidemia with target LDL less than 130     Intervertebral disc prolapse with impingement     Nonallopathic lesion of lumbar region     Chronic pain syndrome     Restless legs syndrome (RLS)     Insomnia     Migraine     Chronic bilateral back pain, unspecified back location     Chronic pain of left knee     ROSE MARIE (generalized anxiety disorder)     Past Surgical History:   Procedure Laterality Date     ANKLE SURGERY   7/13    Left for torn tendons & loose bone chips     ARTHROSCOPY KNEE  1986    Right knee     BACK SURGERY       Basal cell carcinoma  2011    Removal from the chest     BIOPSY       C VAGINAL HYSTERECTOMY  2011     CARPAL TUNNEL RELEASE RT/LT  ~2010    Bilateral     COLONOSCOPY  2016     COSMETIC SURGERY       COSMETIC SURGERY       DECOMPRESSION CUBITAL TUNNEL Left 8/28/2015    Procedure: DECOMPRESSION CUBITAL TUNNEL;  Surgeon: Gus Donato MD;  [facility]     ENT SURGERY  1975    Tonsillectomy and Adenoids     GYN SURGERY  2011    Hysterectomy     HC REPAIR PERONEAL TENDONS  7/13     ORTHOPEDIC SURGERY  2011, 2011    Bilateral CTR     PE TUBES      yearly times 10 years     REPAIR CLEFT PALATE CHILD      Age 3 months & afterwards (multiple surgeries)     SOFT TISSUE SURGERY       SOFT TISSUE SURGERY  2013       Social History   Substance Use Topics     Smoking status: Former Smoker     Packs/day: 0.50     Years: 15.00     Types: Cigarettes     Quit date: 2/20/2015     Smokeless tobacco: Never Used     Alcohol use No      Comment: Quit in September 27th     Family History   Problem Relation Age of Onset     Alzheimer Disease Paternal Grandmother 60     CEREBROVASCULAR DISEASE Paternal Grandmother      Neurologic Disorder Sister 20     migraines     Depression/Anxiety Sister      Depression Sister      Neurologic Disorder Son 14     migraines     Asthma Son      HEART DISEASE Mother      OSTEOPOROSIS Mother      Obesity Mother      CANCER Father      Alcohol/Drug Father      Other Cancer Father      saliva glands & skin CA      Hypertension Father      DIABETES Maternal Grandmother      Breast Cancer Maternal Grandmother      Obesity Maternal Grandmother      Other Cancer Maternal Grandfather      skin cancer     Cancer - colorectal Other      Aunt     Asthma Daughter      Asthma Daughter      Asthma Son      Thyroid Disease Sister      Colon Cancer Other      Obesity Sister      Glaucoma No family  hx of      Macular Degeneration No family hx of            Reviewed and updated as needed this visit by clinical staff  Tobacco  Allergies  Meds  Med Hx  Surg Hx  Fam Hx  Soc Hx      Reviewed and updated as needed this visit by Provider         ROS:    OBJECTIVE:     /74 (BP Location: Right arm, Patient Position: Chair, Cuff Size: Adult Large)  Pulse 98  Temp 97.9  F (36.6  C) (Oral)  Wt 227 lb (103 kg)  SpO2 95%  Breastfeeding? No  BMI 39.89 kg/m2  Body mass index is 39.89 kg/(m^2).  CV: RRR  RESP: Clear to asculation  INTEG: Erythematous coalescing macular rash bilateral forearms when small scattered vessicles    ASSESSMENT/PLAN:         1. Photosensitive contact dermatitis  - triamcinolone (KENALOG) 0.1 % cream; Apply sparingly to affected area three times daily for 14 days.  Dispense: 30 g; Refill: 0    2. Chronic low back pain, unspecified back pain laterality, with sciatica presence unspecified  - methocarbamol (ROBAXIN) 750 MG tablet; Take 1 tablet (750 mg) by mouth 3 times daily as needed for muscle spasms  Dispense: 60 tablet; Refill: 3    Patient advised to apply triamcinolone to affected area TID. Patient advised to apply emollient to affected area. Patient advised to apply sunscreen 20 minutes prior to sun exposure and reapply as needed. Patient advised to avoid hot showers.    Due to possible photosensitivity reaction, cyclobenzaprine discontinued.  Methocarbomal 750 MG tablet by mouth 3 times daily as needed for muscle spasms.    Patient instructions:  You can apply Kenalog cream three times daily  Apply SPF (at least 30) when going outside and wear long sleeves  Stop taking Flexeril  You can take your anti histamine twice daily. You can take 2 tablets twice daily. At that dose, it may make you drowsy    The student, Clem Gee, [employer]student, acted as a scribe and the encounter documented above was completely performed by myself and the documentation reflects the work I have  performed today.         CONNOR Maurice CNP  [facility]

## 2018-05-08 ENCOUNTER — OFFICE VISIT (OUTPATIENT)
Dept: FAMILY MEDICINE | Facility: CLINIC | Age: 48
End: 2018-05-08
Payer: COMMERCIAL

## 2018-05-08 VITALS
TEMPERATURE: 97.8 F | SYSTOLIC BLOOD PRESSURE: 115 MMHG | WEIGHT: 227 LBS | BODY MASS INDEX: 39.89 KG/M2 | HEART RATE: 81 BPM | DIASTOLIC BLOOD PRESSURE: 80 MMHG | OXYGEN SATURATION: 96 %

## 2018-05-08 DIAGNOSIS — J40 BRONCHITIS: Primary | ICD-10-CM

## 2018-05-08 PROCEDURE — 99213 OFFICE O/P EST LOW 20 MIN: CPT | Performed by: NURSE PRACTITIONER

## 2018-05-08 RX ORDER — ALBUTEROL SULFATE 90 UG/1
2 AEROSOL, METERED RESPIRATORY (INHALATION) EVERY 6 HOURS PRN
Qty: 1 INHALER | Refills: 0 | Status: SHIPPED | OUTPATIENT
Start: 2018-05-08 | End: 2018-10-01

## 2018-05-08 RX ORDER — BENZONATATE 100 MG/1
100 CAPSULE ORAL 3 TIMES DAILY PRN
Qty: 42 CAPSULE | Refills: 0 | Status: SHIPPED | OUTPATIENT
Start: 2018-05-08 | End: 2018-05-31

## 2018-05-08 ASSESSMENT — PAIN SCALES - GENERAL: PAINLEVEL: NO PAIN (0)

## 2018-05-08 NOTE — MR AVS SNAPSHOT
After Visit Summary   5/8/2018    Velma Diaz    MRN: [id number]           Patient Information     Date Of Birth          1970        Visit Information        Provider Department      5/8/2018 11:40 AM Jsesy Burgos APRN [facility]        Today's Diagnoses     Bronchitis    -  1      Care Instructions      Viral or Bacterial Bronchitis with Wheezing (Adult)    Bronchitis is an infection of the air passages. It often occurs during a cold and is usually caused by a virus. Symptoms include cough with mucus (phlegm) and low-grade fever. This illness is contagious during the first few days and is spread through the air by coughing and sneezing, or by direct contact (touching the sick person and then touching your own eyes, nose, or mouth).  If there is a lot of inflammation, air flow is restricted. The air passages may also go into spasm, especially if you have asthma. This causes wheezing and difficulty breathing even in people who do not have asthma.  Bronchitis usually lasts 7 to 14 days. The wheezing should improve with treatment during the first week. An inhaler is often prescribed to relax the air passages and stop wheezing. Antibiotics will be prescribed if your doctor thinks there is also a secondary bacterial infection.  Home care    If symptoms are severe, rest at home for the first 2 to 3 days. When you go back to your usual activities, don't let yourself get too tired.    Do not smoke. Also avoid being exposed to secondhand smoke.    You may use over-the-counter medicine to control fever or pain, unless another medicine was prescribed. Note: If you have chronic liver or kidney disease or have ever had a stomach ulcer or gastrointestinal bleeding, talk with your healthcare provider before using these medicines. Also talk to your provider if you are taking medicine to prevent blood clots.) Aspirin should never be given to anyone younger than 18 years  of age who is ill with a viral infection or fever. It may cause severe liver or brain damage.    Your appetite may be poor, so a light diet is fine. Avoid dehydration by drinking 6 to 8 glasses of fluids per day (such as water, soft drinks, sports drinks, juices, tea, or soup). Extra fluids will help loosen secretions in the nose and lungs.    Over-the-counter cough, cold, and sore-throat medicines will not shorten the length of the illness, but they may be helpful to reduce symptoms. (Note: Do not use decongestants if you have high blood pressure.)    If you were given an inhaler, use it exactly as directed. If you need to use it more often than prescribed, your condition may be worsening. If this happens, contact your healthcare provider.    If prescribed, finish all antibiotic medicine, even if you are feeling better after only a few days.  Follow-up care  Follow up with your healthcare provider, or as advised. If you had an X-ray or ECG (electrocardiogram), a specialist will review it. You will be notified of any new findings that may affect your care.  If you are age 65 or older, or if you have a chronic lung disease or condition that affects your immune system, or you smoke, ask your healthcare provider about getting a pneumococcal vaccine and a yearly flu shot (influenza vaccine).  When to seek medical advice  Call your healthcare provider right away if any of these occur:    Fever of 100.4 F (38 C) or higher, or as directed by your healthcare provider    Coughing up increasing amounts of colored sputum    Weakness, drowsiness, headache, facial pain, ear pain, or a stiff neck  Call 911  Call 911 if any of these occur.    Coughing up blood    Worsening weakness, drowsiness, headache, or stiff neck    Increased wheezing not helped with medication, shortness of breath, or pain with breathing  Date Last Reviewed: 9/13/2015 2000-2017 [employer] [address] [place] [address], [place] [address]. All  rights reserved. This information is not intended as a substitute for professional medical care. Always follow your healthcare professional's instructions.                Follow-ups after your visit        Your next 10 appointments already scheduled     May 14, 2018 11:00 AM CDT   Return Visit with SERVANDO Yan   [facility]    [address] [address]   [phone]            May 15, 2018 10:15 AM CDT   [facility] Follow Up with CHARIS Bahena FSJANKI [facility] ([facility] [place])    [address]  [place] [address]   [phone]            Jun 07, 2018  1:00 PM CDT   [facility] Joaquín Burger [facility]   [facility]    [address] [place] [address]   [phone]              Who to contact     If you have questions or need follow up information about today's clinic visit or your schedule please contact [facility] hospital directly at [phone].  Normal or non-critical lab and imaging results will be communicated to you by [employer], letter or phone within 4 business days after the clinic has received the results. If you do not hear from us within 7 days, please contact the clinic through [employer] or phone. If you have a critical or abnormal lab result, we will notify you by phone as soon as possible.  Submit refill requests through [employer] or call your pharmacy and they will forward the refill request to us. Please allow 3 business days for your refill to be completed.          Additional Information About Your Visit        MyChart Information     [employer] gives you secure access to your electronic health record. If you see a primary care provider, you can also send messages to your care team and make appointments. If you have questions, please call your primary care clinic.  If you do  not have a primary care provider, please call [phone] and they will assist you.        Care EveryWhere ID     This is your Care EveryWhere ID. This could be used by other organizations to access your [facility] medical records  [id number]        Your Vitals Were     Pulse Temperature Pulse Oximetry Breastfeeding? BMI (Body Mass Index)       81 97.8  F (36.6  C) (Oral) 96% No 39.89 kg/m2        Blood Pressure from Last 3 Encounters:   05/08/18 115/80   05/01/18 109/74   03/01/18 121/79    Weight from Last 3 Encounters:   05/08/18 227 lb (103 kg)   05/01/18 227 lb (103 kg)   03/01/18 226 lb (102.5 kg)              Today, you had the following     No orders found for display         Today's Medication Changes          These changes are accurate as of 5/8/18 12:14 PM.  If you have any questions, ask your nurse or doctor.               Start taking these medicines.        Dose/Directions    albuterol 108 (90 Base) MCG/ACT Inhaler   Commonly known as:  PROAIR HFA/PROVENTIL HFA/VENTOLIN HFA   Used for:  Bronchitis   Started by:  Jessy Burgos APRN CNP        Dose:  2 puff   Inhale 2 puffs into the lungs every 6 hours as needed for shortness of breath / dyspnea or wheezing   Quantity:  1 Inhaler   Refills:  0       benzonatate 100 MG capsule   Commonly known as:  TESSALON   Used for:  Bronchitis   Started by:  Jessy Burgos APRN CNP        Dose:  100 mg   Take 1 capsule (100 mg) by mouth 3 times daily as needed for cough   Quantity:  42 capsule   Refills:  0         These medicines have changed or have updated prescriptions.        Dose/Directions    rOPINIRole 0.25 MG tablet   Commonly known as:  REQUIP   This may have changed:    - how much to take  - how to take this  - when to take this  - additional instructions   Used for:  Restless legs syndrome (RLS)        Week 1 & 2: 1 tablet 1 hour before going to bed. Week 2 and afterwards: 1 tablet two times a day   Quantity:  180 tablet   Refills:  1             Where to get your medicines      These medications were sent to [id number] - [place], [place] - [address] [facility] [address] [facility]  [address] [place] [address]     Phone:  [phone]     albuterol 108 (90 Base) MCG/ACT Inhaler    benzonatate 100 MG capsule                Primary Care Provider Office Phone # Fax #    Srinivasa Hunter [phone]       [address] [facility] [address]        Goals        General    I will call within next 2 weeks to schedule initial psychiatry appointment (pt-stated)     Notes - Note edited  5/20/2015 12:51 PM by Yesica Marsh    As of today's date 5/20/2015 goal is met at 76 - 100%.   Goal Status:  Complete  Patient states this is scheduled with Dr. Salvador for next month, but not on appointment calendar  LUDY Stark, MSW   [phone]  5/20/2015 12:51 PM        I will complete [employer] or reduced fare application within the next month (pt-stated)     Notes - Note edited  11/24/2015 12:00 PM by Yesica Marsh    As of today's date 11/24/2015 goal is met at 51 - 75%.   Goal Status:  Active  She reported today having [employer] application, [employer] worker has requested but packet from [employer].    LUDY Stark, MSW   [phone]  11/24/2015 12:00 PM        I will discuss [employer] worker with therapist next week for housing needs  (pt-stated)     Notes - Note edited  12/3/2015  1:17 PM by Yesica Marsh    As of today's date 12/3/2015 goal is met at 76 - 100%.   Goal Status:  Discontinued  Patient has [employer] worker and has found housing with friend  LUDY Stark, MSW   [phone]  12/3/2015 1:16 PM        Equal Access to Services     LOLA HERNANDEZ : Katie reid Soeugenio, waaxda luqadaha, qaybta kaalmada joey, grayson sauceda. [facility] [phone].    ATENCIÓN: Si habla español, tiene a dowd disposición servicios gratuitos de asistencia lingüística. Llame al  [phone].    We comply with applicable federal civil rights laws and [place] laws. We do not discriminate on the basis of race, color, national origin, age, disability, sex, sexual orientation, or gender identity.            Thank you!     Thank you for choosing [facility]  for your care. Our goal is always to provide you with excellent care. Hearing back from our patients is one way we can continue to improve our services. Please take a few minutes to complete the written survey that you may receive in the mail after your visit with us. Thank you!             Your Updated Medication List - Protect others around you: Learn how to safely use, store and throw away your medicines at www.disposemymeds.org.          This list is accurate as of 5/8/18 12:14 PM.  Always use your most recent med list.                   Brand Name Dispense Instructions for use Diagnosis    acetaminophen 325 MG tablet    TYLENOL    100 tablet    Take 2 tablets (650 mg) by mouth every 4 hours as needed for other (mild pain)    Lesion of left ulnar nerve       albuterol 108 (90 Base) MCG/ACT Inhaler    PROAIR HFA/PROVENTIL HFA/VENTOLIN HFA    1 Inhaler    Inhale 2 puffs into the lungs every 6 hours as needed for shortness of breath / dyspnea or wheezing    Bronchitis       benzonatate 100 MG capsule    TESSALON    42 capsule    Take 1 capsule (100 mg) by mouth 3 times daily as needed for cough    Bronchitis       ferrous sulfate 325 (65 Fe) MG tablet    IRON    90 tablet    Take 1 tablet (325 mg) by mouth daily (with breakfast)    Restless legs syndrome (RLS)       furosemide 20 MG tablet    LASIX    30 tablet    Take 1 tablet (20 mg) by mouth daily    Leg swelling       LYRICA 225 MG Caps   Generic drug:  Pregabalin     60 capsule    TAKE 1 TABLET (225 MG) BY MOUTH TWICE A DAY    Chronic pain syndrome       methocarbamol 750 MG tablet    ROBAXIN    60 tablet    Take 1 tablet (750 mg) by mouth 3 times daily as  needed for muscle spasms    Chronic low back pain, unspecified back pain laterality, with sciatica presence unspecified       MULTI FOR HER PO      Take by mouth daily        nystatin 144813 UNIT/GM Powd    MYCOSTATIN    60 g    Apply to affected area twice daily as needed    Candidal intertrigo       order for DME      Respironics REMSTAR 60 Series Auto [id number] H2O, Airfit P10 nasal pillow mask w/xsmall pillows        order for DME     1 Device    TENS unit    Chronic bilateral back pain, unspecified back location       oxyCODONE-acetaminophen  MG per tablet    PERCOCET    90 tablet    Take 1 tablet by mouth every 6 hours as needed for moderate to severe pain    Chronic pain syndrome       rOPINIRole 0.25 MG tablet    REQUIP    180 tablet    Week 1 & 2: 1 tablet 1 hour before going to bed. Week 2 and afterwards: 1 tablet two times a day    Restless legs syndrome (RLS)       SUMAtriptan 100 MG tablet    IMITREX    9 tablet    Take 1 tablet (100 mg) by mouth at onset of headache for migraine May repeat in 2 hours if needed: max 2/day    Headache(784.0)       triamcinolone 0.1 % cream    KENALOG    30 g    Apply sparingly to affected area three times daily for 14 days.    Photosensitive contact dermatitis

## 2018-05-08 NOTE — PROGRESS NOTES
SUBJECTIVE:   Velma Diaz is a 47 year old female who presents to clinic today for the following health issues:      Acute Illness   Acute illness concerns: coughing  Onset: 2 days    Fever: no    Chills/Sweats: YES- chills    Headache (location?): no    Sinus Pressure:YES- post-nasal drainage    Conjunctivitis:  no    Ear Pain: no    Rhinorrhea: YES    Congestion: YES- chest and nasal    Sore Throat: no     Cough: YES-productive of green sputum    Wheeze: YES    Decreased Appetite: YES    Nausea: YES- yesterday, better today    Vomiting: no    Diarrhea:  YES- loose stools xx 2 days    Dysuria/Freq.: no    Fatigue/Achiness: YES    Sick/Strep Exposure: no     Therapies Tried and outcome: OTC cough syrup not helpful, Ibuprofen helped a little for her headache.    Cold symptoms for 2 days  Wet cough this morning  Cough worse when laying down  Wheezing  Denies SOB    Of note, rash has resolved      Problem list and histories reviewed & adjusted, as indicated.  Additional history: none    Patient Active Problem List   Diagnosis     History of cleft palate with cleft lip     MAYA (obstructive sleep apnea)/Hypoventilation Syndrome     Major depressive disorder, recurrent episode, moderate (H)     Paresthesias     Health Care Home     Vitamin D deficiency     Morbid obesity with BMI of 40.0-44.9, adult (H)     Hyperlipidemia with target LDL less than 130     Intervertebral disc prolapse with impingement     Nonallopathic lesion of lumbar region     Chronic pain syndrome     Restless legs syndrome (RLS)     Insomnia     Migraine     Chronic bilateral back pain, unspecified back location     Chronic pain of left knee     ROSE MARIE (generalized anxiety disorder)     Past Surgical History:   Procedure Laterality Date     ANKLE SURGERY  7/13    Left for torn tendons & loose bone chips     ARTHROSCOPY KNEE  1986    Right knee     BACK SURGERY       Basal cell carcinoma  2011    Removal from the chest     BIOPSY       C VAGINAL  HYSTERECTOMY  2011     CARPAL TUNNEL RELEASE RT/LT  ~2010    Bilateral     COLONOSCOPY  2016     COSMETIC SURGERY       COSMETIC SURGERY       DECOMPRESSION CUBITAL TUNNEL Left 8/28/2015    Procedure: DECOMPRESSION CUBITAL TUNNEL;  Surgeon: Gus Donato MD;  [facility]     ENT SURGERY  1975    Tonsillectomy and Adenoids     GYN SURGERY  2011    Hysterectomy     HC REPAIR PERONEAL TENDONS  7/13     ORTHOPEDIC SURGERY  2011, 2011    Bilateral CTR     PE TUBES      yearly times 10 years     REPAIR CLEFT PALATE CHILD      Age 3 months & afterwards (multiple surgeries)     SOFT TISSUE SURGERY       SOFT TISSUE SURGERY  2013       Social History   Substance Use Topics     Smoking status: Former Smoker     Packs/day: 0.50     Years: 15.00     Types: Cigarettes     Quit date: 2/20/2015     Smokeless tobacco: Never Used     Alcohol use No      Comment: Quit in September 27th     Family History   Problem Relation Age of Onset     Alzheimer Disease Paternal Grandmother 60     CEREBROVASCULAR DISEASE Paternal Grandmother      Neurologic Disorder Sister 20     migraines     Depression/Anxiety Sister      Depression Sister      Neurologic Disorder Son 14     migraines     Asthma Son      HEART DISEASE Mother      OSTEOPOROSIS Mother      Obesity Mother      CANCER Father      Alcohol/Drug Father      Other Cancer Father      saliva glands & skin CA      Hypertension Father      DIABETES Maternal Grandmother      Breast Cancer Maternal Grandmother      Obesity Maternal Grandmother      Other Cancer Maternal Grandfather      skin cancer     Cancer - colorectal Other      Aunt     Asthma Daughter      Asthma Daughter      Asthma Son      Thyroid Disease Sister      Colon Cancer Other      Obesity Sister      Glaucoma No family hx of      Macular Degeneration No family hx of            Reviewed and updated as needed this visit by clinical staff  Tobacco  Allergies  Med Hx  Surg Hx  Fam Hx  Soc Hx      Reviewed and  updated as needed this visit by Provider         ROS:  Constitutional, HEENT, cardiovascular, pulmonary, gi and gu systems are negative, except as otherwise noted.    OBJECTIVE:     /80 (BP Location: Right arm, Patient Position: Chair, Cuff Size: Adult Large)  Pulse 81  Temp 97.8  F (36.6  C) (Oral)  Wt 227 lb (103 kg)  SpO2 96%  Breastfeeding? No  BMI 39.89 kg/m2  Body mass index is 39.89 kg/(m^2).  GENERAL: healthy, alert and no distress  HENT: ear canals and TM's normal, nose and mouth without ulcers or lesions  NECK: no adenopathy, no asymmetry, masses, or scars and thyroid normal to palpation  RESP: Good excursion bilaterally, scattered expiratory wheezes, no labored breathing  CV: regular rate and rhythm, normal S1 S2, no S3 or S4, no murmur, click or rub    Diagnostic Test Results:  none     ASSESSMENT/PLAN:       ICD-10-CM    1. Bronchitis J40 albuterol (PROAIR HFA/PROVENTIL HFA/VENTOLIN HFA) 108 (90 Base) MCG/ACT Inhaler     benzonatate (TESSALON) 100 MG capsule       Discussed likely viral cause of illness and that antibiotics are not warranted.  Reviewed recommendations for symptomatic care: rest, hydration, steam inhalation, over the counter medications as needed to treat symptoms.  Return to clinic if symptoms worsen, or persist for more than 2 weeks.   Patient voiced understanding.         CONNOR Maurice [facility]

## 2018-05-08 NOTE — PATIENT INSTRUCTIONS
Viral or Bacterial Bronchitis with Wheezing (Adult)    Bronchitis is an infection of the air passages. It often occurs during a cold and is usually caused by a virus. Symptoms include cough with mucus (phlegm) and low-grade fever. This illness is contagious during the first few days and is spread through the air by coughing and sneezing, or by direct contact (touching the sick person and then touching your own eyes, nose, or mouth).  If there is a lot of inflammation, air flow is restricted. The air passages may also go into spasm, especially if you have asthma. This causes wheezing and difficulty breathing even in people who do not have asthma.  Bronchitis usually lasts 7 to 14 days. The wheezing should improve with treatment during the first week. An inhaler is often prescribed to relax the air passages and stop wheezing. Antibiotics will be prescribed if your doctor thinks there is also a secondary bacterial infection.  Home care    If symptoms are severe, rest at home for the first 2 to 3 days. When you go back to your usual activities, don't let yourself get too tired.    Do not smoke. Also avoid being exposed to secondhand smoke.    You may use over-the-counter medicine to control fever or pain, unless another medicine was prescribed. Note: If you have chronic liver or kidney disease or have ever had a stomach ulcer or gastrointestinal bleeding, talk with your healthcare provider before using these medicines. Also talk to your provider if you are taking medicine to prevent blood clots.) Aspirin should never be given to anyone younger than 18 years of age who is ill with a viral infection or fever. It may cause severe liver or brain damage.    Your appetite may be poor, so a light diet is fine. Avoid dehydration by drinking 6 to 8 glasses of fluids per day (such as water, soft drinks, sports drinks, juices, tea, or soup). Extra fluids will help loosen secretions in the nose and lungs.    Over-the-counter  cough, cold, and sore-throat medicines will not shorten the length of the illness, but they may be helpful to reduce symptoms. (Note: Do not use decongestants if you have high blood pressure.)    If you were given an inhaler, use it exactly as directed. If you need to use it more often than prescribed, your condition may be worsening. If this happens, contact your healthcare provider.    If prescribed, finish all antibiotic medicine, even if you are feeling better after only a few days.  Follow-up care  Follow up with your healthcare provider, or as advised. If you had an X-ray or ECG (electrocardiogram), a specialist will review it. You will be notified of any new findings that may affect your care.  If you are age 65 or older, or if you have a chronic lung disease or condition that affects your immune system, or you smoke, ask your healthcare provider about getting a pneumococcal vaccine and a yearly flu shot (influenza vaccine).  When to seek medical advice  Call your healthcare provider right away if any of these occur:    Fever of 100.4 F (38 C) or higher, or as directed by your healthcare provider    Coughing up increasing amounts of colored sputum    Weakness, drowsiness, headache, facial pain, ear pain, or a stiff neck  Call 911  Call 911 if any of these occur.    Coughing up blood    Worsening weakness, drowsiness, headache, or stiff neck    Increased wheezing not helped with medication, shortness of breath, or pain with breathing  Date Last Reviewed: 9/13/2015 2000-2017 [employer]. [address] [place] [address]. All rights reserved. This information is not intended as a substitute for professional medical care. Always follow your healthcare professional's instructions.

## 2018-05-14 ENCOUNTER — OFFICE VISIT (OUTPATIENT)
Dept: PSYCHOLOGY | Facility: CLINIC | Age: 48
End: 2018-05-14
Payer: COMMERCIAL

## 2018-05-14 DIAGNOSIS — F41.1 GAD (GENERALIZED ANXIETY DISORDER): Primary | ICD-10-CM

## 2018-05-14 PROCEDURE — 90834 PSYTX W PT 45 MINUTES: CPT | Performed by: SOCIAL WORKER

## 2018-05-14 ASSESSMENT — ANXIETY QUESTIONNAIRES
6. BECOMING EASILY ANNOYED OR IRRITABLE: NOT AT ALL
2. NOT BEING ABLE TO STOP OR CONTROL WORRYING: NOT AT ALL
GAD7 TOTAL SCORE: 1
IF YOU CHECKED OFF ANY PROBLEMS ON THIS QUESTIONNAIRE, HOW DIFFICULT HAVE THESE PROBLEMS MADE IT FOR YOU TO DO YOUR WORK, TAKE CARE OF THINGS AT HOME, OR GET ALONG WITH OTHER PEOPLE: NOT DIFFICULT AT ALL
5. BEING SO RESTLESS THAT IT IS HARD TO SIT STILL: NOT AT ALL
3. WORRYING TOO MUCH ABOUT DIFFERENT THINGS: NOT AT ALL
7. FEELING AFRAID AS IF SOMETHING AWFUL MIGHT HAPPEN: NOT AT ALL
1. FEELING NERVOUS, ANXIOUS, OR ON EDGE: SEVERAL DAYS

## 2018-05-14 ASSESSMENT — PATIENT HEALTH QUESTIONNAIRE - PHQ9: 5. POOR APPETITE OR OVEREATING: NOT AT ALL
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
## 2018-05-14 NOTE — MR AVS SNAPSHOT
MRN:[id number]                      After Visit Summary   5/14/2018    Velma Diaz    MRN: [id number]           Visit Information        Provider Department      5/14/2018 11:00 AM Antonio Rios LICSW [facility] Generic      Your next 10 appointments already scheduled     May 15, 2018 10:15 AM CDT   [facility] Follow Up with Juan Jose Boyd DC   [facility] [place] [facility] ([facility] [place])    [address]  [place] [id number] [phone]            Jun 07, 2018  1:00 PM CDT   [facility] Visit with Joaquín Burger MD   [facility]    [address] [place] [id number] [phone]            Jun 18, 2018 10:00 AM CDT   Return Visit with SERVANDO Yan   [facility] ([facility])    [address] [place] [id number] [phone]              MyChart Information     [facility] gives you secure access to your electronic health record. If you see a primary care provider, you can also send messages to your care team and make appointments. If you have questions, please call your primary care clinic.  If you do not have a primary care provider, please call [phone] and they will assist you.        Care EveryWhere ID     This is your Care EveryWhere ID. This could be used by other organizations to access your [facility] medical records  [id number]        Equal Access to Services     LOLA HERNANDEZ : Hadii aad ku hadasho Soomaali, waaxda luqadaha, qaybta kaalmada adeegyada, waxhadley sauceda. So [facility] [phone].    ATENCIÓN: Si habla español, tiene a dowd disposición servicios gratuitos de asistencia lingüística. Llame al [phone].    We comply with applicable federal civil rights laws and [place] laws. We do not discriminate on the basis of race, color, national  origin, age, disability, sex, sexual orientation, or gender identity.

## 2018-05-15 ENCOUNTER — THERAPY VISIT (OUTPATIENT)
Dept: CHIROPRACTIC MEDICINE | Facility: CLINIC | Age: 48
End: 2018-05-15
Payer: COMMERCIAL

## 2018-05-15 DIAGNOSIS — M99.02 THORACIC SEGMENT DYSFUNCTION: ICD-10-CM

## 2018-05-15 DIAGNOSIS — M99.03 SEGMENTAL DYSFUNCTION OF LUMBAR REGION: ICD-10-CM

## 2018-05-15 DIAGNOSIS — M99.05 SEGMENTAL DYSFUNCTION OF PELVIC REGION: Primary | ICD-10-CM

## 2018-05-15 DIAGNOSIS — M54.50 LUMBAGO: ICD-10-CM

## 2018-05-15 DIAGNOSIS — M62.838 SPASM OF MUSCLE: ICD-10-CM

## 2018-05-15 PROCEDURE — 97810 ACUP 1/> WO ESTIM 1ST 15 MIN: CPT | Performed by: CHIROPRACTOR

## 2018-05-15 PROCEDURE — 98941 CHIROPRACT MANJ 3-4 REGIONS: CPT | Mod: AT | Performed by: CHIROPRACTOR

## 2018-05-15 ASSESSMENT — ANXIETY QUESTIONNAIRES: GAD7 TOTAL SCORE: 1

## 2018-05-15 ASSESSMENT — PATIENT HEALTH QUESTIONNAIRE - PHQ9: SUM OF ALL RESPONSES TO PHQ QUESTIONS 1-9: 2

## 2018-05-15 NOTE — PROGRESS NOTES
Visit #: 3    Subjective:  Velma Diaz is a 46 year old female who is seen in f/u up for:        Nonallopathic lesion of pelvic region  Lumbago  Nonallopathic lesion of lumbar region  Spasm of muscle.     Since last visit on 5/1/2018,  Velma Diaz reports the following changes: Pain immediately after last treatment: 7/10 and their pain level today 6/10.  Velma reports the she was feeling pretty good after last visit, she was able to go hiking in [place] this past weekend and that seemed to be ok.  Her back is a bit tender today.    Area of chief complaint:  Lumbar :  Symptoms are graded at 6/10. The quality is described as stiff, achey, stabbing.  Motion has improved but not normal. Patient feels that they are improved.     Objective:  The following was observed:    P: pain elicited on palpation, palpatory tenderness, piriformis R>>L    A: static palpation demonstrates intersegmental asymmetry , pelvis, lumbar    R: motion palpation notes restricted motion, :  T10 Extension restriction  T11 Extension restriction  T12 Extension restriction  L4 Right rotation restricted  L5 Right rotation restricted  PSIS Right Extension restriction    T: hypertonicity at: Piriformis R>>L      Assessment:    Segmental spinal dysfunction/restrictions found at:  T10  T11  T12  L4  L5  PSIS Left    Diagnoses:      1. Lumbago    2. Nonallopathic lesion of pelvic region    3. Nonallopathic lesion of lumbar region    4. Spasm of muscle   nonallopathic lesion of thoracic region       Patient's condition:  Patient had restrictions pre-manipulation    Treatment effectiveness:  Post manipulation there is better intersegmental movement and Patient claims to feel looser post manipulation      Procedures:  CMT:  17798 Chiropractic manipulative treatment 3-4 regions performed   Thoracic: Activator, T10, T11, T12, Prone  Lumbar: Activator, L4, L5, Prone  Pelvis: Drop Table, PSIS Right , Prone    Modalities:  80622: Acupuncture:  Points:  B25, B27, GV3, B62, SI3, K3, and Ahsi point in piriformis    Therapeutic procedures:  None      Prognosis: Good    Progress towards Goals: Patient is making progress towards the goal     Response to Treatment:   Reduction in symptoms as reported by patient      Recommendations:    Instructions:ice 20 minutes every other hour as needed    Follow-up:  Return to care in one week   Follow up with GP on rash

## 2018-05-22 ENCOUNTER — TRANSFERRED RECORDS (OUTPATIENT)
Dept: HEALTH INFORMATION MANAGEMENT | Facility: CLINIC | Age: 48
End: 2018-05-22

## 2018-05-23 ENCOUNTER — TRANSFERRED RECORDS (OUTPATIENT)
Dept: HEALTH INFORMATION MANAGEMENT | Facility: CLINIC | Age: 48
End: 2018-05-23

## 2018-05-25 LAB
CREAT SERPL-MCNC: 0.76 MG/DL (ref 0.57–1.11)
GFR SERPL CREATININE-BSD FRML MDRD: >60 ML/MIN/1.73M2

## 2018-05-31 ENCOUNTER — OFFICE VISIT (OUTPATIENT)
Dept: FAMILY MEDICINE | Facility: CLINIC | Age: 48
End: 2018-05-31
Payer: COMMERCIAL

## 2018-05-31 VITALS
TEMPERATURE: 99.4 F | WEIGHT: 230 LBS | OXYGEN SATURATION: 96 % | SYSTOLIC BLOOD PRESSURE: 103 MMHG | HEART RATE: 77 BPM | BODY MASS INDEX: 40.42 KG/M2 | DIASTOLIC BLOOD PRESSURE: 73 MMHG

## 2018-05-31 DIAGNOSIS — E66.01 MORBID OBESITY WITH BMI OF 40.0-44.9, ADULT (H): Chronic | ICD-10-CM

## 2018-05-31 DIAGNOSIS — G89.4 CHRONIC PAIN SYNDROME: Chronic | ICD-10-CM

## 2018-05-31 DIAGNOSIS — M25.551 HIP PAIN, RIGHT: Primary | ICD-10-CM

## 2018-05-31 DIAGNOSIS — G89.29 CHRONIC BILATERAL BACK PAIN, UNSPECIFIED BACK LOCATION: Chronic | ICD-10-CM

## 2018-05-31 DIAGNOSIS — M54.9 CHRONIC BILATERAL BACK PAIN, UNSPECIFIED BACK LOCATION: Chronic | ICD-10-CM

## 2018-05-31 PROCEDURE — 99214 OFFICE O/P EST MOD 30 MIN: CPT | Performed by: FAMILY MEDICINE

## 2018-05-31 RX ORDER — OXYCODONE AND ACETAMINOPHEN 10; 325 MG/1; MG/1
1 TABLET ORAL EVERY 6 HOURS PRN
Qty: 90 TABLET | Refills: 0 | Status: SHIPPED | OUTPATIENT
Start: 2018-05-31 | End: 2018-06-26

## 2018-05-31 NOTE — PROGRESS NOTES
SUBJECTIVE:   Velma Diaz is a 47 year old female who presents to clinic today for the following health issues:          Hospital Follow-up Visit:    Hospital/Nursing Home/IP Rehab Facility: [facility]  Date of Admission: 5/23/18  Date of Discharge: 5/25/18  Reason(s) for Admission: Pain in right hip            Problems taking medications regularly:  None       Medication changes since discharge: None       Problems adhering to non-medication therapy:  None    Summary of hospitalization:  See outside records, reviewed and scanned  Diagnostic Tests/Treatments reviewed.  Follow up needed: none  Other Healthcare Providers Involved in Patient s Care:         Specialist appointment - Neurology next week  Update since discharge: improved.     Post Discharge Medication Reconciliation: discharge medications reconciled, continue medications without change.  Plan of care communicated with patient     Coding guidelines for this visit:  Type of Medical   Decision Making Face-to-Face Visit       within 7 Days of discharge Face-to-Face Visit        within 14 days of discharge   Moderate Complexity [id number]   High Complexity [id number]            She went to the ER last Tuesday, 9 days ago, complaining of right hip pain.  Plain film x-rays were unremarkable of the right hip.  She was given some pain medicine in the ER and sent home.  She returned the next day because her hip pain was worse.  She was having a hard time getting comfortable.  She rated it at 10 out of 10.  She was admitted.  She underwent an MRI scan of the right hip which showed the right hip articular cartilage to be intact.  No fracture or osseous contusion.  Some mild tendinosis was seen.  Orthopedics was consulted.  Arrangements were made for outpatient physical therapy.  She plans to do that at [facility] starting next week.  She also has an appointment to see her neurologist next week.  She also sees a chiropractor and acupuncture provider  periodically.  She feels like her pain is somewhat better.  She is feeling it mainly in the groin area.  She does take oxycodone on a regular basis.  She requests a refill on that, and she is due for one.  She does have multiple chronic pain issues which are ongoing.  She generally does have ongoing pain in the low back and oftentimes over the lateral hip.  This discomfort in the inguinal area of the right hip was new for her, however.    Problem list and histories reviewed & adjusted, as indicated.  Additional history: as documented    Patient Active Problem List   Diagnosis     History of cleft palate with cleft lip     MAYA (obstructive sleep apnea)/Hypoventilation Syndrome     Major depressive disorder, recurrent episode, moderate (H)     Paresthesias     Health Care Home     Vitamin D deficiency     Morbid obesity with BMI of 40.0-44.9, adult (H)     Hyperlipidemia with target LDL less than 130     Intervertebral disc prolapse with impingement     Nonallopathic lesion of lumbar region     Chronic pain syndrome     Restless legs syndrome (RLS)     Insomnia     Migraine     Chronic bilateral back pain, unspecified back location     Chronic pain of left knee     ROSE MARIE (generalized anxiety disorder)     Past Surgical History:   Procedure Laterality Date     ANKLE SURGERY  7/13    Left for torn tendons & loose bone chips     ARTHROSCOPY KNEE  1986    Right knee     BACK SURGERY       Basal cell carcinoma  2011    Removal from the chest     BIOPSY       C VAGINAL HYSTERECTOMY  2011     CARPAL TUNNEL RELEASE RT/LT  ~2010    Bilateral     COLONOSCOPY  2016     COSMETIC SURGERY       COSMETIC SURGERY       DECOMPRESSION CUBITAL TUNNEL Left 8/28/2015    Procedure: DECOMPRESSION CUBITAL TUNNEL;  Surgeon: Gus Donato MD;  [facility]     ENT SURGERY  1975    Tonsillectomy and Adenoids     GYN SURGERY  2011    Hysterectomy     HC REPAIR PERONEAL TENDONS  7/13     ORTHOPEDIC SURGERY  2011, 2011    Bilateral CTR      PE TUBES      yearly times 10 years     REPAIR CLEFT PALATE CHILD      Age 3 months & afterwards (multiple surgeries)     SOFT TISSUE SURGERY       SOFT TISSUE SURGERY  2013       Social History   Substance Use Topics     Smoking status: Former Smoker     Packs/day: 0.50     Years: 15.00     Types: Cigarettes     Quit date: 2/20/2015     Smokeless tobacco: Never Used     Alcohol use No      Comment: Quit in September 27th     Family History   Problem Relation Age of Onset     Alzheimer Disease Paternal Grandmother 60     CEREBROVASCULAR DISEASE Paternal Grandmother      Neurologic Disorder Sister 20     migraines     Depression/Anxiety Sister      Depression Sister      Neurologic Disorder Son 14     migraines     Asthma Son      HEART DISEASE Mother      OSTEOPOROSIS Mother      Obesity Mother      CANCER Father      Alcohol/Drug Father      Other Cancer Father      saliva glands & skin CA      Hypertension Father      DIABETES Maternal Grandmother      Breast Cancer Maternal Grandmother      Obesity Maternal Grandmother      Other Cancer Maternal Grandfather      skin cancer     Cancer - colorectal Other      Aunt     Asthma Daughter      Asthma Daughter      Asthma Son      Thyroid Disease Sister      Colon Cancer Other      Obesity Sister      Glaucoma No family hx of      Macular Degeneration No family hx of          Current Outpatient Prescriptions   Medication Sig Dispense Refill     acetaminophen (TYLENOL) 325 MG tablet Take 2 tablets (650 mg) by mouth every 4 hours as needed for other (mild pain) 100 tablet 0     albuterol (PROAIR HFA/PROVENTIL HFA/VENTOLIN HFA) 108 (90 Base) MCG/ACT Inhaler Inhale 2 puffs into the lungs every 6 hours as needed for shortness of breath / dyspnea or wheezing 1 Inhaler 0     ferrous sulfate (IRON) 325 (65 FE) MG tablet Take 1 tablet (325 mg) by mouth daily (with breakfast) 90 tablet 4     furosemide (LASIX) 20 MG tablet Take 1 tablet (20 mg) by mouth daily 30 tablet 11      LYRICA 225 MG CAPS TAKE 1 TABLET (225 MG) BY MOUTH TWICE A DAY 60 capsule 5     methocarbamol (ROBAXIN) 750 MG tablet Take 1 tablet (750 mg) by mouth 3 times daily as needed for muscle spasms 60 tablet 3     Multiple Vitamins-Minerals (MULTI FOR HER PO) Take by mouth daily        nystatin (MYCOSTATIN) 154631 UNIT/GM POWD Apply to affected area twice daily as needed 60 g 2     order for DME TENS unit 1 Device 0     ORDER FOR DME Respironics REMSTAR 60 Series Auto [id number] H2O, Airfit P10 nasal pillow mask w/xsmall pillows       oxyCODONE-acetaminophen (PERCOCET)  MG per tablet Take 1 tablet by mouth every 6 hours as needed for moderate to severe pain 90 tablet 0     rOPINIRole (REQUIP) 0.25 MG tablet Week 1 & 2: 1 tablet 1 hour before going to bed. Week 2 and afterwards: 1 tablet two times a day (Patient taking differently: Take 0.25 mg by mouth 2 times daily Patient takes one in the morning and one in the evening.) 180 tablet 1     SUMAtriptan (IMITREX) 100 MG tablet Take 1 tablet (100 mg) by mouth at onset of headache for migraine May repeat in 2 hours if needed: max 2/day 9 tablet 2     No Known Allergies    Reviewed and updated as needed this visit by clinical staff  Tobacco  Allergies  Meds  Med Hx  Surg Hx  Fam Hx  Soc Hx      Reviewed and updated as needed this visit by Provider         ROS:  Mainly significant for the above.    OBJECTIVE:     /73 (BP Location: Right arm, Patient Position: Chair, Cuff Size: Adult Large)  Pulse 77  Temp 99.4  F (37.4  C) (Oral)  Wt 230 lb (104.3 kg)  SpO2 96%  BMI 40.42 kg/m2  Body mass index is 40.42 kg/(m^2).  GENERAL: alert, no distress and obese  MS: She does not have any specific tenderness today over the lateral aspect of the right hip to suggest hip bursitis.  She points to the inguinal area of the right hip as to where she is felt the discomfort, but it is better now.    Diagnostic Test Results:  Her hospital discharge summary was obtained and  reviewed    ASSESSMENT/PLAN:       ICD-10-CM    1. Hip pain, right M25.551    2. Chronic pain syndrome G89.4 oxyCODONE-acetaminophen (PERCOCET)  MG per tablet   3. Chronic bilateral back pain, unspecified back location M54.9     G89.29    4. Morbid obesity with BMI of 40.0-44.9, adult (H) E66.01     Z68.41      I will refill her oxycodone with another 1 month supply as per usual  I advised her to follow through with physical therapy next week as scheduled and to see her neurologist as scheduled as well  She will likely connect with her chiropractor in the near future as well  Continue to work on general conditioning  Continue other same baseline routine meds    If new, worsening or persistent symptoms, the patient is to call or return for a recheck.      Srinivasa Hunter MD  [facility]

## 2018-05-31 NOTE — MR AVS SNAPSHOT
After Visit Summary   5/31/2018    Velma Diaz    MRN: [id number]           Patient Information     Date Of Birth          1970        Visit Information        Provider Department      5/31/2018 1:00 PM Srinivasa Hunter MD [facility]        Today's Diagnoses     Hip pain, right    -  1    Chronic pain syndrome           Follow-ups after your visit        Follow-up notes from your care team     Return if symptoms worsen or fail to improve.      Your next 10 appointments already scheduled     Jun 07, 2018 11:00 AM CDT   Chiro Acupuncture Follow Up with CHARIS Bahena FSJANKI Antunez [facility] ([facility] [place])    [address]  [place] [id number] [phone]            Jun 07, 2018  1:00 PM CDT   [facility]    [address] [place] [id number] [phone]            Jun 18, 2018 10:00 AM CDT   Return Visit with SERVANDO Yan   [facility] ([facility])    [address] [id number] [phone]              Who to contact     If you have questions or need follow up information about today's clinic visit or your schedule please contact [facility] directly at [phone].  Normal or non-critical lab and imaging results will be communicated to you by MyChart, letter or phone within 4 business days after the clinic has received the results. If you do not hear from us within 7 days, please contact the clinic through MyChart or phone. If you have a critical or abnormal lab result, we will notify you by phone as soon as possible.  Submit refill requests through [employer] or call your pharmacy and they will forward the refill request to us. Please allow 3 business days for your refill to be completed.          Additional Information  About Your Visit        [employer] Information     [employer] gives you secure access to your electronic health record. If you see a primary care provider, you can also send messages to your care team and make appointments. If you have questions, please call your primary care clinic.  If you do not have a primary care provider, please call [phone] and they will assist you.        Care EveryWhere ID     This is your Care EveryWhere ID. This could be used by other organizations to access your [facility] medical records  [id number]        Your Vitals Were     Pulse Temperature Pulse Oximetry BMI (Body Mass Index)          77 99.4  F (37.4  C) (Oral) 96% 40.42 kg/m2         Blood Pressure from Last 3 Encounters:   05/31/18 103/73   05/08/18 115/80   05/01/18 109/74    Weight from Last 3 Encounters:   05/31/18 230 lb (104.3 kg)   05/08/18 227 lb (103 kg)   05/01/18 227 lb (103 kg)              Today, you had the following     No orders found for display         Today's Medication Changes          These changes are accurate as of 5/31/18  1:12 PM.  If you have any questions, ask your nurse or doctor.               These medicines have changed or have updated prescriptions.        Dose/Directions    rOPINIRole 0.25 MG tablet   Commonly known as:  REQUIP   This may have changed:    - how much to take  - how to take this  - when to take this  - additional instructions   Used for:  Restless legs syndrome (RLS)        Week 1 & 2: 1 tablet 1 hour before going to bed. Week 2 and afterwards: 1 tablet two times a day   Quantity:  180 tablet   Refills:  1            Where to get your medicines      Some of these will need a paper prescription and others can be bought over the counter.  Ask your nurse if you have questions.     Bring a paper prescription for each of these medications     oxyCODONE-acetaminophen  MG per tablet               Information about OPIOIDS     PRESCRIPTION OPIOIDS: WHAT YOU NEED TO KNOW   You have a  prescription for an opioid (narcotic) pain medicine. Opioids can cause addiction. If you have a history of chemical dependency of any type, you are at a higher risk of becoming addicted to opioids. Only take this medicine after all other options have been tried. Take it for as short a time and as few doses as possible.     Do not:    Drive. If you drive while taking these medicines, you could be arrested for driving under the influence (DUI).    Operate heavy machinery    Do any other dangerous activities while taking these medicines.     Drink any alcohol while taking these medicines.      Take with any other medicines that contain acetaminophen. Read all labels carefully. Look for the word  acetaminophen  or  Tylenol.  Ask your pharmacist if you have questions or are unsure.    Store your pills in a secure place, locked if possible. We will not replace any lost or stolen medicine. If you don t finish your medicine, please throw away (dispose) as directed by your pharmacist. The [place] [employer]ution [employer] has more information about safe disposal: https://www.pca.[facility].[place].us/living-green/managing-unwanted-medications    All opioids tend to cause constipation. Drink plenty of water and eat foods that have a lot of fiber, such as fruits, vegetables, prune juice, apple juice and high-fiber cereal. Take a laxative (Miralax, milk of magnesia, Colace, Senna) if you don t move your bowels at least every other day.          Primary Care Provider Office Phone # Fax #    Srinivasa Hunter [phone]       [address] [facility] [address]        Goals        General    I will call within next 2 weeks to schedule initial psychiatry appointment (pt-stated)     Notes - Note edited  5/20/2015 12:51 PM by Yesica Marsh    As of today's date 5/20/2015 goal is met at 76 - 100%.   Goal Status:  Complete  Patient states this is scheduled with Dr. Salvador for next month, but not on appointment  calendar  LUDY Stark, MSW   [phone]  5/20/2015 12:51 PM        I will complete [employer] or reduced fare application within the next month (pt-stated)     Notes - Note edited  11/24/2015 12:00 PM by Yesica Marsh    As of today's date 11/24/2015 goal is met at 51 - 75%.   Goal Status:  Active  She reported today having [employer] application, [employer] worker has requested but packet from [employer].    LUDY Stark, MSW   [phone]  11/24/2015 12:00 PM        I will discuss [employer] worker with therapist next week for housing needs  (pt-stated)     Notes - Note edited  12/3/2015  1:17 PM by Yesica Marsh    As of today's date 12/3/2015 goal is met at 76 - 100%.   Goal Status:  Discontinued  Patient has [employer] worker and has found housing with friend  LUDY Stark, MS   [phone]  12/3/2015 1:16 PM        Equal Access to Services     [facility]: Hadii antionette ku hadasho Soomaali, waaxda luqadaha, qaybta kaalmada adeegyada, waxhadley carrillo . So [facility] [phone].    ATENCIÓN: Si habla español, tiene a dowd disposición servicios gratuitos de asistencia lingüística. Llame al [phone].    We comply with applicable federal civil rights laws and [place] laws. We do not discriminate on the basis of race, color, national origin, age, disability, sex, sexual orientation, or gender identity.            Thank you!     Thank you for choosing [facility]  for your care. Our goal is always to provide you with excellent care. Hearing back from our patients is one way we can continue to improve our services. Please take a few minutes to complete the written survey that you may receive in the mail after your visit with us. Thank you!             Your Updated Medication List - Protect others around you: Learn how to safely use, store and throw away your medicines at www.disposemymeds.org.          This list is accurate as of 5/31/18   1:12 PM.  Always use your most recent med list.                   Brand Name Dispense Instructions for use Diagnosis    acetaminophen 325 MG tablet    TYLENOL    100 tablet    Take 2 tablets (650 mg) by mouth every 4 hours as needed for other (mild pain)    Lesion of left ulnar nerve       albuterol 108 (90 Base) MCG/ACT Inhaler    PROAIR HFA/PROVENTIL HFA/VENTOLIN HFA    1 Inhaler    Inhale 2 puffs into the lungs every 6 hours as needed for shortness of breath / dyspnea or wheezing    Bronchitis       ferrous sulfate 325 (65 Fe) MG tablet    IRON    90 tablet    Take 1 tablet (325 mg) by mouth daily (with breakfast)    Restless legs syndrome (RLS)       furosemide 20 MG tablet    LASIX    30 tablet    Take 1 tablet (20 mg) by mouth daily    Leg swelling       LYRICA 225 MG Caps   Generic drug:  Pregabalin     60 capsule    TAKE 1 TABLET (225 MG) BY MOUTH TWICE A DAY    Chronic pain syndrome       methocarbamol 750 MG tablet    ROBAXIN    60 tablet    Take 1 tablet (750 mg) by mouth 3 times daily as needed for muscle spasms    Chronic low back pain, unspecified back pain laterality, with sciatica presence unspecified       MULTI FOR HER PO      Take by mouth daily        nystatin [id number] UNIT/GM Powd    MYCOSTATIN    60 g    Apply to affected area twice daily as needed    Candidal intertrigo       order for DME      Respironics REMSTAR 60 Series Auto [id number] H2O, Airfit P10 nasal pillow mask w/xsmall pillows        order for DME     1 Device    TENS unit    Chronic bilateral back pain, unspecified back location       oxyCODONE-acetaminophen  MG per tablet    PERCOCET    90 tablet    Take 1 tablet by mouth every 6 hours as needed for moderate to severe pain    Chronic pain syndrome       rOPINIRole 0.25 MG tablet    REQUIP    180 tablet    Week 1 & 2: 1 tablet 1 hour before going to bed. Week 2 and afterwards: 1 tablet two times a day    Restless legs syndrome (RLS)       SUMAtriptan 100 MG tablet    IMITREX     9 tablet    Take 1 tablet (100 mg) by mouth at onset of headache for migraine May repeat in 2 hours if needed: max 2/day    Headache(784.0)

## 2018-06-04 ENCOUNTER — TRANSFERRED RECORDS (OUTPATIENT)
Dept: HEALTH INFORMATION MANAGEMENT | Facility: CLINIC | Age: 48
End: 2018-06-04

## 2018-06-07 ENCOUNTER — OFFICE VISIT (OUTPATIENT)
Dept: NEUROLOGY | Facility: CLINIC | Age: 48
End: 2018-06-07
Payer: COMMERCIAL

## 2018-06-07 ENCOUNTER — THERAPY VISIT (OUTPATIENT)
Dept: CHIROPRACTIC MEDICINE | Facility: CLINIC | Age: 48
End: 2018-06-07
Payer: COMMERCIAL

## 2018-06-07 VITALS
TEMPERATURE: 97.7 F | WEIGHT: 232.4 LBS | SYSTOLIC BLOOD PRESSURE: 108 MMHG | DIASTOLIC BLOOD PRESSURE: 72 MMHG | RESPIRATION RATE: 16 BRPM | BODY MASS INDEX: 40.84 KG/M2 | HEART RATE: 81 BPM | OXYGEN SATURATION: 95 %

## 2018-06-07 DIAGNOSIS — M62.838 SPASM OF MUSCLE: ICD-10-CM

## 2018-06-07 DIAGNOSIS — M99.05 SEGMENTAL DYSFUNCTION OF PELVIC REGION: Primary | ICD-10-CM

## 2018-06-07 DIAGNOSIS — G25.81 RESTLESS LEGS SYNDROME (RLS): Primary | ICD-10-CM

## 2018-06-07 DIAGNOSIS — M99.03 SEGMENTAL DYSFUNCTION OF LUMBAR REGION: ICD-10-CM

## 2018-06-07 DIAGNOSIS — M54.17 LUMBOSACRAL RADICULOPATHY AT L5: ICD-10-CM

## 2018-06-07 DIAGNOSIS — M99.02 THORACIC SEGMENT DYSFUNCTION: ICD-10-CM

## 2018-06-07 DIAGNOSIS — R20.0 NUMBNESS OF RIGHT HAND: ICD-10-CM

## 2018-06-07 DIAGNOSIS — G60.9 IDIOPATHIC PERIPHERAL NEUROPATHY: ICD-10-CM

## 2018-06-07 DIAGNOSIS — M54.50 LUMBAGO: ICD-10-CM

## 2018-06-07 PROCEDURE — 99215 OFFICE O/P EST HI 40 MIN: CPT | Performed by: PSYCHIATRY & NEUROLOGY

## 2018-06-07 PROCEDURE — 98941 CHIROPRACT MANJ 3-4 REGIONS: CPT | Mod: AT | Performed by: CHIROPRACTOR

## 2018-06-07 NOTE — PROGRESS NOTES
ESTABLISHED PATIENT NEUROLOGY NOTE    DATE OF VISIT: 6/7/2018  CLINIC LOCATION: [facility]  MRN: [id number]  PATIENT NAME: Velma Diaz  YOB: 1970    PCP: Srinivasa Hunter MD.    REASON FOR VISIT:   Chief Complaint   Patient presents with     Follow Up For     F/U-restless leg-feeling better one week ago, but she does have an increase of numbness and tingling.     SUBJECTIVE:                                                      HISTORY OF PRESENT ILLNESS: Patient is here for follow up regarding restless leg syndrome and peripheral polyneuropathy. Please refer to my initial note from 12/07/2017 for further information.    Since the last visit, the patient reports that recently she experienced significant worsening of her lower back pain radiating to right leg with 2 recent hospitalizations at the [facility].  Her pain improved following the lumbar epidural steroid injection, but numbness over the dorsum of the right foot and on the lateral side of right leg continues.  She also experiences worsening of her right hand numbness over the medial surface and in the fourth and fifth digits.  She tends to lean on her right elbow a lot.  She has history of bilateral carpal tunnel and left ulnar neuropathy.  Wonders if current symptoms are related to right ulnar neuropathy.    Her peripheral polyneuropathy and restless leg syndrome symptoms are stable.  She takes ropinirole 0.25 mg twice daily without significant side effects.  Was started on iron replacement.  She is on 225 mg of Lyrica twice daily for peripheral neuropathy.  Denies interval development of new focal neurological symptoms.    According to Care Everywhere, the patient had lumbar spine MRI on 03/14/2018 that demonstrated persistent moderate to severe right-sided foraminal stenosis at L5-S1 with impingement of exiting right L5 nerve root along with other milder degenerative changes.  MRI of the right hip from  05/24/2018 demonstrated mild bilateral gluteus minimus, gluteus medius and proximal hamstring tendinosis.    Most recent lab workup from March 2018 includes ferritin (92), normal electrolytes and CBC.  Urine drug screen was positive for TCA and oxycodone.    On review of systems, patient has no other new active complaints.  Medications, allergies, family and social history were also reviewed.  There are no changes reported by patient.    CURRENT MEDICATIONS:   Current Outpatient Prescriptions   Medication     acetaminophen (TYLENOL) 325 MG tablet     albuterol (PROAIR HFA/PROVENTIL HFA/VENTOLIN HFA) 108 (90 Base) MCG/ACT Inhaler     ferrous sulfate (IRON) 325 (65 FE) MG tablet     furosemide (LASIX) 20 MG tablet     LYRICA 225 MG CAPS     methocarbamol (ROBAXIN) 750 MG tablet     Multiple Vitamins-Minerals (MULTI FOR HER PO)     nystatin (MYCOSTATIN) 925131 UNIT/GM POWD     order for DME     ORDER FOR DME     oxyCODONE-acetaminophen (PERCOCET)  MG per tablet     rOPINIRole (REQUIP) 0.25 MG tablet     SUMAtriptan (IMITREX) 100 MG tablet     REVIEW OF SYSTEMS:                                                    10-system review was completed. Pertinent positives are included in HPI. The remainder of ROS is negative.  EXAM:                                                    Physical Exam:   Vitals: /72 (BP Location: Left arm, Patient Position: Sitting, Cuff Size: Adult Large)  Pulse 81  Temp 97.7  F (36.5  C) (Oral)  Resp 16  Wt 105.4 kg (232 lb 6.4 oz)  SpO2 95%  BMI 40.84 kg/m2    General: pt is in NAD, cooperative.  Skin: normal turgor, moist mucous membranes, no lesions/rashes noticed.  HEENT: ATNC, white sclera, normal conjunctiva.  Respiratory: Symmetric lung excursion, no accessory respiratory muscle use.  Abdomen: Non distended.  Neurological: awake, cooperative, follows commands, no aphasia or dysarthria noted, cranial nerves II-XII: no ptosis, extraocular motility is full, face is symmetric,  tongue is midline, equally moves all extremities, light touch sensation is reduced in both feet, no dysmetria bilaterally, casual gait is normal.  DATA:     Labs: I personally reviewed the following labs:  Transferred Records on 03/14/2018   Component Date Value Ref Range Status     Creatinine 03/14/2018 0.73  0.57 - 1.11 mg/dL Final     GFR Estimate 03/14/2018 >60  >60 ml/min/1.73m2 Final     GFR Estimate If Black 03/14/2018 >60  >60 ml/min/1.73m2 Final     Glucose 03/14/2018 91  65 - 100 mg/dL Final     Potassium 03/14/2018 3.9* 3.55 - 0.0 mmol/L Final   Office Visit on 03/01/2018   Component Date Value Ref Range Status     Cannabinoids (29-whq-3-carboxy-9-T* 03/01/2018 Not Detected  NDET^Not Detected ng/mL Final    Cutoff for a negative cannabinoid is 50 ng/mL or less.     Phencyclidine (Phencyclidine) 03/01/2018 Not Detected  NDET^Not Detected ng/mL Final    Cutoff for a negative PCP is 25 ng/mL or less.     Cocaine (Benzoylecgonine) 03/01/2018 Not Detected  NDET^Not Detected ng/mL Final    Cutoff for a negative cocaine is 150 ng/ml or less.     Methamphetamine (d-Methamphetamine) 03/01/2018 Not Detected  NDET^Not Detected ng/mL Final    Cutoff for a negative methamphetamine is 500 ng/ml or less.     Opiates (Morphine) 03/01/2018 Not Detected  NDET^Not Detected ng/mL Final    Cutoff for a negative opiate is 100 ng/ml or less.     Amphetamine (d-Amphetamine) 03/01/2018 Not Detected  NDET^Not Detected ng/mL Final    Cutoff for a negative amphetamine is 500 ng/mL or less.     Benzodiazepines (Nordiazepam) 03/01/2018 Not Detected  NDET^Not Detected ng/mL Final    Cutoff for a negative benzodiazepine is 150 ng/ml or less.     Tricyclic Antidepressants (Desipra* 03/01/2018 Detected, Abnormal Result* NDET^Not Detected ng/mL Final    Comment: Cutoff for a positive tricyclic antidepressant is greater than 300 ng/ml.  This is an unconfirmed screening result to be used for medical purposes only.   Order [id number] for  confirmation or individual confirmation tests to [employer].       Methadone (Methadone) 03/01/2018 Not Detected  NDET^Not Detected ng/mL Final    Cutoff for a negative methadone is 200 ng/ml or less.     Barbiturates (Butalbital) 03/01/2018 Not Detected  NDET^Not Detected ng/mL Final    Cutoff for a negative barbituate is 200 ng/ml or less.     Oxycodone (Oxycodone) 03/01/2018 Detected, Abnormal Result* NDET^Not Detected ng/mL Final    Comment: Cutoff for a positive oxycodone is greater than 100 ng/ml.  This is an unconfirmed screening result to be used for medical purposes only.   Order [id number] for confirmation or individual confirmation tests to [employer].       Propoxyphene (Norpropoxyphene) 03/01/2018 Not Detected  NDET^Not Detected ng/mL Final    Cutoff for a negative propoxyphene is 300 ng/ml or less     Buprenorphine (Buprenorphine) 03/01/2018 Not Detected  NDET^Not Detected ng/mL Final    Cutoff for a negative buprenorphine is 10 ng/ml or less     WBC 03/01/2018 9.8  4.0 - 11.0 10e9/L Final     RBC Count 03/01/2018 4.21  3.8 - 5.2 10e12/L Final     Hemoglobin 03/01/2018 13.3  11.7 - 15.7 g/dL Final     Hematocrit 03/01/2018 39.5  35.0 - 47.0 % Final     MCV 03/01/2018 94  78 - 100 fl Final     MCH 03/01/2018 31.6  26.5 - 33.0 pg Final     MCHC 03/01/2018 33.7  31.5 - 36.5 g/dL Final     RDW 03/01/2018 13.0  10.0 - 15.0 % Final     Platelet Count 03/01/2018 338  150 - 450 10e9/L Final     Iron 03/01/2018 64  35 - 180 ug/dL Final     Iron Binding Cap 03/01/2018 310  240 - 430 ug/dL Final     Iron Saturation Index 03/01/2018 21  15 - 46 % Final     Ferritin 03/01/2018 92  8 - 252 ng/mL Final   I also reviewed Care Everywhere, as detailed in the history of present illness.  ASSESSMENT and PLAN:                                                    Assessment: 47-year-old female patient with history of chronic pain syndrome, peripheral neuropathy, right meralgia paresthetica, left ulnar neuropathy, and bilateral carpal  tunnel syndrome presents for follow-up.    She is on Lyrica 225 mg twice daily for her peripheral neuropathy and meralgia paresthetica with good control of symptoms.  For restless leg syndrome, she uses ropinirole 0.25 mg twice daily without noticeable side effects.  Her ferritin level improved after starting iron replacement (92).  I do not think that we need to make any changes to her medications for these conditions.    At the same time the patient reports symptoms that are consistent with right L5 radiculopathy that are correlating with relatively recent lumbar spine MRI.  Recent epidural steroid injection helped, but not resolved her symptoms.  The patient was seen by a neurosurgeon during her last hospitalization 2 weeks ago.  I advised the patient to follow-up with this physician to discuss surgical options.    Finally, she also complains of right hand paresthesias in the distribution of right ulnar nerve, that might be consistent with right ulnar neuropathy or other entrapment neuropathies, brachial plexopathy, or cervical radiculopathy.  I ordered EMG to further clarify.    Diagnoses:    ICD-10-CM    1. Restless legs syndrome (RLS) G25.81    2. Idiopathic peripheral neuropathy G60.9    3. Numbness of right hand R20.0 EMG   4. Lumbosacral radiculopathy at L5 M54.17      Plan: At today's visit we thoroughly discussed current symptoms, available treatment options, and the plan, which includes:  Orders Placed This Encounter   Procedures     EMG     I advised the patient to cushion her right elbow during the day and use elbow splint at night.    No medication changes.    Additional recommendations after the work-up.    Next follow-up appointment is in the next 4-6 weeks or earlier if needed.    I encouraged the patient to call me with any questions or concerns.    Total Time: 43 minutes with > 50% spent counseling the patient on stated above assessment and recommendations, including review of symptoms, available  treatment options, and proposed plan.  Extra time was needed to discuss patient's symptoms, review recent test results, and answer questions related to the plan.    Joaquín Burger MD  / Neurology

## 2018-06-07 NOTE — MR AVS SNAPSHOT
After Visit Summary   6/7/2018    Velma Diaz    MRN: [id number]           Patient Information     Date Of Birth          1970        Visit Information        Provider Department      6/7/2018 1:00 PM Joaquín [facility] MD [facility]        Today's Diagnoses     Restless legs syndrome (RLS)    -  1    Idiopathic peripheral neuropathy        Numbness of right hand        Lumbosacral radiculopathy at L5          Care Instructions    AFTER VISIT SUMMARY (AVS):    At today's visit we thoroughly discussed current symptoms, available treatment options, and the plan, which includes:  Orders Placed This Encounter   Procedures     EMG     Please discuss surgical options for your right L5 lumbar radiculopathy with your neurosurgeon.    No medication changes.    Additional recommendations after the work-up.    Next follow-up appointment is in the next 4-6 weeks or earlier if needed.    Please do not hesitate to call me with any questions or concerns.    Thanks.           Follow-ups after your visit        Follow-up notes from your care team     Return in about 6 months (around 12/7/2018).      Your next 10 appointments already scheduled     Jun 14, 2018 11:15 AM CDT   Chiro Acupuncture Follow Up with CHARIS Bahena Chiro ([facility] [place])    [address]  [place] [id number] [phone]            Jun 18, 2018 10:00 AM CDT   Return Visit with SERVANDO Yan   [facility]    [address] [place] [id number] [phone]              Future tests that were ordered for you today     Open Future Orders        Priority Expected Expires Ordered    EMG Routine  6/7/2019 6/7/2018            Who to contact     If you have questions or need follow up information about today's clinic visit or your schedule please contact [facility]  [facility]AN[facility] directly at [phone].  Normal or non-critical lab and imaging results will be communicated to you by [employer], letter or phone within 4 business days after the clinic has received the results. If you do not hear from us within 7 days, please contact the clinic through [employer] or phone. If you have a critical or abnormal lab result, we will notify you by phone as soon as possible.  Submit refill requests through [employer] or call your pharmacy and they will forward the refill request to us. Please allow 3 business days for your refill to be completed.          Additional Information About Your Visit        Newswiredhart Information     [employer] gives you secure access to your electronic health record. If you see a primary care provider, you can also send messages to your care team and make appointments. If you have questions, please call your primary care clinic.  If you do not have a primary care provider, please call [phone] and they will assist you.        Care EveryWhere ID     This is your Care EveryWhere ID. This could be used by other organizations to access your [facility] medical records  [id number]        Your Vitals Were     Pulse Temperature Respirations Pulse Oximetry BMI (Body Mass Index)       81 97.7  F (36.5  C) (Oral) 16 95% 40.84 kg/m2        Blood Pressure from Last 3 Encounters:   06/07/18 108/72   05/31/18 103/73   05/08/18 115/80    Weight from Last 3 Encounters:   06/07/18 105.4 kg (232 lb 6.4 oz)   05/31/18 104.3 kg (230 lb)   05/08/18 103 kg (227 lb)                 Today's Medication Changes          These changes are accurate as of 6/7/18  1:52 PM.  If you have any questions, ask your nurse or doctor.               These medicines have changed or have updated prescriptions.        Dose/Directions    rOPINIRole 0.25 MG tablet   Commonly known as:  REQUIP   This may have changed:    - how much to take  - how to take this  - when to take this  - additional instructions   Used for:   Restless legs syndrome (RLS)        Week 1 & 2: 1 tablet 1 hour before going to bed. Week 2 and afterwards: 1 tablet two times a day   Quantity:  180 tablet   Refills:  1                Primary Care Provider Office Phone # Fax #    Srinivasa Hunter [phone]       [address] [address]        Goals        General    I will call within next 2 weeks to schedule initial psychiatry appointment (pt-stated)     Notes - Note edited  5/20/2015 12:51 PM by Yesica Marsh    As of today's date 5/20/2015 goal is met at 76 - 100%.   Goal Status:  Complete  Patient states this is scheduled with Dr. Salvador for next month, but not on appointment calendar  LUDY Stark, MSW   [phone]  5/20/2015 12:51 PM        I will complete [employer] or reduced fare application within the next month (pt-stated)     Notes - Note edited  11/24/2015 12:00 PM by Yesica Marsh    As of today's date 11/24/2015 goal is met at 51 - 75%.   Goal Status:  Active  She reported today having [employer] application, [employer] worker has requested but packet from [employer].    LUDY Stark, MSW   [phone]  11/24/2015 12:00 PM        I will discuss [employer] worker with therapist next week for housing needs  (pt-stated)     Notes - Note edited  12/3/2015  1:17 PM by Yesica Mrash    As of today's date 12/3/2015 goal is met at 76 - 100%.   Goal Status:  Discontinued  Patient has [employer] worker and has found housing with friend  LUDY Stark, MSW   [phone]  12/3/2015 1:16 PM        Equal Access to Services     LOLA HERNANDEZ AH: Hadrmoan Dumas, waaxda luqadaha, qaybta kaalmada adeegyada, grayson sauceda. So [facility] [phone].    ATENCIÓN: Si habla español, tiene a dowd disposición servicios gratuitos de asistencia lingüística. Llame al [phone].    We comply with applicable federal civil rights laws and [place] laws. We do not  discriminate on the basis of race, color, national origin, age, disability, sex, sexual orientation, or gender identity.            Thank you!     Thank you for choosing [facility]  for your care. Our goal is always to provide you with excellent care. Hearing back from our patients is one way we can continue to improve our services. Please take a few minutes to complete the written survey that you may receive in the mail after your visit with us. Thank you!             Your Updated Medication List - Protect others around you: Learn how to safely use, store and throw away your medicines at www.disposemymeds.org.          This list is accurate as of 6/7/18  1:52 PM.  Always use your most recent med list.                   Brand Name Dispense Instructions for use Diagnosis    acetaminophen 325 MG tablet    TYLENOL    100 tablet    Take 2 tablets (650 mg) by mouth every 4 hours as needed for other (mild pain)    Lesion of left ulnar nerve       albuterol 108 (90 Base) MCG/ACT Inhaler    PROAIR HFA/PROVENTIL HFA/VENTOLIN HFA    1 Inhaler    Inhale 2 puffs into the lungs every 6 hours as needed for shortness of breath / dyspnea or wheezing    Bronchitis       ferrous sulfate 325 (65 Fe) MG tablet    IRON    90 tablet    Take 1 tablet (325 mg) by mouth daily (with breakfast)    Restless legs syndrome (RLS)       furosemide 20 MG tablet    LASIX    30 tablet    Take 1 tablet (20 mg) by mouth daily    Leg swelling       LYRICA 225 MG Caps   Generic drug:  Pregabalin     60 capsule    TAKE 1 TABLET (225 MG) BY MOUTH TWICE A DAY    Chronic pain syndrome       methocarbamol 750 MG tablet    ROBAXIN    60 tablet    Take 1 tablet (750 mg) by mouth 3 times daily as needed for muscle spasms    Chronic low back pain, unspecified back pain laterality, with sciatica presence unspecified       MULTI FOR HER PO      Take by mouth daily        nystatin [id number] UNIT/GM Powd    MYCOSTATIN    60 g    Apply to affected  area twice daily as needed    Candidal intertrigo       order for DME      [employer]ironics REMSTAR 60 Series Auto [id number], Airfit P10 nasal pillow mask w/xsmall pillows        order for DME     1 Device    TENS unit    Chronic bilateral back pain, unspecified back location       oxyCODONE-acetaminophen  MG per tablet    PERCOCET    90 tablet    Take 1 tablet by mouth every 6 hours as needed for moderate to severe pain    Chronic pain syndrome       rOPINIRole 0.25 MG tablet    REQUIP    180 tablet    Week 1 & 2: 1 tablet 1 hour before going to bed. Week 2 and afterwards: 1 tablet two times a day    Restless legs syndrome (RLS)       SUMAtriptan 100 MG tablet    IMITREX    9 tablet    Take 1 tablet (100 mg) by mouth at onset of headache for migraine May repeat in 2 hours if needed: max 2/day    Headache(784.0)

## 2018-06-07 NOTE — PROGRESS NOTES
Visit #: 4    Subjective:  Velma Diaz is a 46 year old female who is seen in f/u up for:        Nonallopathic lesion of pelvic region  Lumbago  Nonallopathic lesion of lumbar region  Spasm of muscle.     Since last visit on 5/15/2018,  Velma Diaz reports the following changes: Pain immediately after last treatment: 6/10 and their pain level today 8/10.  Velma reports since last visit she has been in the hospital twice for her back .. During the second visit they gave her an injection and that seemed to help a lot.  She if off the pain medication, is able to walk but is still experiencing some pain.  She will be following up with her neurologist later today.    Area of chief complaint:  Lumbar :  Symptoms are graded at 8/10. The quality is described as stiff, achey, stabbing.  Motion has improved but not normal. Patient feels that they are improved.     Objective:  The following was observed:    P: pain elicited on palpation, palpatory tenderness, piriformis R>>L    A: static palpation demonstrates intersegmental asymmetry , pelvis, lumbar    R: motion palpation notes restricted motion, :  T10 Extension restriction  T11 Extension restriction  T12 Extension restriction  L4 Right rotation restricted  L5 Right rotation restricted  PSIS Right Extension restriction    T: hypertonicity at: Piriformis R>>L      Assessment:    Segmental spinal dysfunction/restrictions found at:  T10  T11  T12  L4  L5  PSIS Left    Diagnoses:      1. Lumbago    2. Nonallopathic lesion of pelvic region    3. Nonallopathic lesion of lumbar region    4. Spasm of muscle   nonallopathic lesion of thoracic region       Patient's condition:  Patient had restrictions pre-manipulation    Treatment effectiveness:  Post manipulation there is better intersegmental movement and Patient claims to feel looser post manipulation      Procedures:  CMT:  30611 Chiropractic manipulative treatment 3-4 regions performed   Thoracic: Activator, T10,  T11, T12, Prone  Lumbar: Activator, L4, L5, Prone  Pelvis: Drop Table, PSIS Right , Prone    Modalities:  None    Therapeutic procedures:  None      Prognosis: Good    Progress towards Goals: Patient is making progress towards the goal     Response to Treatment:   Reduction in symptoms as reported by patient      Recommendations:    Instructions:ice 20 minutes every other hour as needed    Follow-up:  Return to care in one week

## 2018-06-07 NOTE — PATIENT INSTRUCTIONS
AFTER VISIT SUMMARY (AVS):    At today's visit we thoroughly discussed current symptoms, available treatment options, and the plan, which includes:  Orders Placed This Encounter   Procedures     EMG     Please discuss surgical options for your right L5 lumbar radiculopathy with your neurosurgeon.    No medication changes.    Additional recommendations after the work-up.    Next follow-up appointment is in the next 4-6 weeks or earlier if needed.    Please do not hesitate to call me with any questions or concerns.    Thanks.

## 2018-06-07 NOTE — LETTER
6/7/2018         RE: Velma Diaz  [address]  [place] [address]        Dear Colleague,    Thank you for referring your patient, Velma Diaz, to the [facility]. Please see a copy of my visit note below.    ESTABLISHED PATIENT NEUROLOGY NOTE    DATE OF VISIT: 6/7/2018  CLINIC LOCATION: [facility]  MRN: [id number]  PATIENT NAME: Velma Diaz  YOB: 1970    PCP: Srinivasa Hunter MD.    REASON FOR VISIT:   Chief Complaint   Patient presents with     Follow Up For     F/U-restless leg-feeling better one week ago, but she does have an increase of numbness and tingling.     SUBJECTIVE:                                                      HISTORY OF PRESENT ILLNESS: Patient is here for follow up regarding restless leg syndrome and peripheral polyneuropathy. Please refer to my initial note from 12/07/2017 for further information.    Since the last visit, the patient reports that recently she experienced significant worsening of her lower back pain radiating to right leg with 2 recent hospitalizations at the [facility].  Her pain improved following the lumbar epidural steroid injection, but numbness over the dorsum of the right foot and on the lateral side of right leg continues.  She also experiences worsening of her right hand numbness over the medial surface and in the fourth and fifth digits.  She tends to lean on her right elbow a lot.  She has history of bilateral carpal tunnel and left ulnar neuropathy.  Wonders if current symptoms are related to right ulnar neuropathy.    Her peripheral polyneuropathy and restless leg syndrome symptoms are stable.  She takes ropinirole 0.25 mg twice daily without significant side effects.  Was started on iron replacement.  She is on 225 mg of Lyrica twice daily for peripheral neuropathy.  Denies interval development of new focal neurological symptoms.    According to Care Everywhere, the patient had lumbar  spine MRI on 03/14/2018 that demonstrated persistent moderate to severe right-sided foraminal stenosis at L5-S1 with impingement of exiting right L5 nerve root along with other milder degenerative changes.  MRI of the right hip from 05/24/2018 demonstrated mild bilateral gluteus minimus, gluteus medius and proximal hamstring tendinosis.    Most recent lab workup from March 2018 includes ferritin (92), normal electrolytes and CBC.  Urine drug screen was positive for TCA and oxycodone.    On review of systems, patient has no other new active complaints.  Medications, allergies, family and social history were also reviewed.  There are no changes reported by patient.    CURRENT MEDICATIONS:   Current Outpatient Prescriptions   Medication     acetaminophen (TYLENOL) 325 MG tablet     albuterol (PROAIR HFA/PROVENTIL HFA/VENTOLIN HFA) 108 (90 Base) MCG/ACT Inhaler     ferrous sulfate (IRON) 325 (65 FE) MG tablet     furosemide (LASIX) 20 MG tablet     LYRICA 225 MG CAPS     methocarbamol (ROBAXIN) 750 MG tablet     Multiple Vitamins-Minerals (MULTI FOR HER PO)     nystatin (MYCOSTATIN) 296323 UNIT/GM POWD     order for DME     ORDER FOR DME     oxyCODONE-acetaminophen (PERCOCET)  MG per tablet     rOPINIRole (REQUIP) 0.25 MG tablet     SUMAtriptan (IMITREX) 100 MG tablet     REVIEW OF SYSTEMS:                                                    10-system review was completed. Pertinent positives are included in HPI. The remainder of ROS is negative.  EXAM:                                                    Physical Exam:   Vitals: /72 (BP Location: Left arm, Patient Position: Sitting, Cuff Size: Adult Large)  Pulse 81  Temp 97.7  F (36.5  C) (Oral)  Resp 16  Wt 105.4 kg (232 lb 6.4 oz)  SpO2 95%  BMI 40.84 kg/m2    General: pt is in NAD, cooperative.  Skin: normal turgor, moist mucous membranes, no lesions/rashes noticed.  HEENT: ATNC, white sclera, normal conjunctiva.  Respiratory: Symmetric lung  excursion, no accessory respiratory muscle use.  Abdomen: Non distended.  Neurological: awake, cooperative, follows commands, no aphasia or dysarthria noted, cranial nerves II-XII: no ptosis, extraocular motility is full, face is symmetric, tongue is midline, equally moves all extremities, light touch sensation is reduced in both feet, no dysmetria bilaterally, casual gait is normal.  DATA:     Labs: I personally reviewed the following labs:  Transferred Records on 03/14/2018   Component Date Value Ref Range Status     Creatinine 03/14/2018 0.73  0.57 - 1.11 mg/dL Final     GFR Estimate 03/14/2018 >60  >60 ml/min/1.73m2 Final     GFR Estimate If Black 03/14/2018 >60  >60 ml/min/1.73m2 Final     Glucose 03/14/2018 91  65 - 100 mg/dL Final     Potassium 03/14/2018 3.9* 3.55 - 0.0 mmol/L Final   Office Visit on 03/01/2018   Component Date Value Ref Range Status     Cannabinoids (19-zgx-5-carboxy-9-T* 03/01/2018 Not Detected  NDET^Not Detected ng/mL Final    Cutoff for a negative cannabinoid is 50 ng/mL or less.     Phencyclidine (Phencyclidine) 03/01/2018 Not Detected  NDET^Not Detected ng/mL Final    Cutoff for a negative PCP is 25 ng/mL or less.     Cocaine (Benzoylecgonine) 03/01/2018 Not Detected  NDET^Not Detected ng/mL Final    Cutoff for a negative cocaine is 150 ng/ml or less.     Methamphetamine (d-Methamphetamine) 03/01/2018 Not Detected  NDET^Not Detected ng/mL Final    Cutoff for a negative methamphetamine is 500 ng/ml or less.     Opiates (Morphine) 03/01/2018 Not Detected  NDET^Not Detected ng/mL Final    Cutoff for a negative opiate is 100 ng/ml or less.     Amphetamine (d-Amphetamine) 03/01/2018 Not Detected  NDET^Not Detected ng/mL Final    Cutoff for a negative amphetamine is 500 ng/mL or less.     Benzodiazepines (Nordiazepam) 03/01/2018 Not Detected  NDET^Not Detected ng/mL Final    Cutoff for a negative benzodiazepine is 150 ng/ml or less.     Tricyclic Antidepressants (Desipra* 03/01/2018  Detected, Abnormal Result* NDET^Not Detected ng/mL Final    Comment: Cutoff for a positive tricyclic antidepressant is greater than 300 ng/ml.  This is an unconfirmed screening result to be used for medical purposes only.   Order [id number] for confirmation or individual confirmation tests to [facility].       Methadone (Methadone) 03/01/2018 Not Detected  NDET^Not Detected ng/mL Final    Cutoff for a negative methadone is 200 ng/ml or less.     Barbiturates (Butalbital) 03/01/2018 Not Detected  NDET^Not Detected ng/mL Final    Cutoff for a negative barbituate is 200 ng/ml or less.     Oxycodone (Oxycodone) 03/01/2018 Detected, Abnormal Result* NDET^Not Detected ng/mL Final    Comment: Cutoff for a positive oxycodone is greater than 100 ng/ml.  This is an unconfirmed screening result to be used for medical purposes only.   Order [id number] for confirmation or individual confirmation tests to [facility].       Propoxyphene (Norpropoxyphene) 03/01/2018 Not Detected  NDET^Not Detected ng/mL Final    Cutoff for a negative propoxyphene is 300 ng/ml or less     Buprenorphine (Buprenorphine) 03/01/2018 Not Detected  NDET^Not Detected ng/mL Final    Cutoff for a negative buprenorphine is 10 ng/ml or less     WBC 03/01/2018 9.8  4.0 - 11.0 10e9/L Final     RBC Count 03/01/2018 4.21  3.8 - 5.2 10e12/L Final     Hemoglobin 03/01/2018 13.3  11.7 - 15.7 g/dL Final     Hematocrit 03/01/2018 39.5  35.0 - 47.0 % Final     MCV 03/01/2018 94  78 - 100 fl Final     MCH 03/01/2018 31.6  26.5 - 33.0 pg Final     MCHC 03/01/2018 33.7  31.5 - 36.5 g/dL Final     RDW 03/01/2018 13.0  10.0 - 15.0 % Final     Platelet Count 03/01/2018 338  150 - 450 10e9/L Final     Iron 03/01/2018 64  35 - 180 ug/dL Final     Iron Binding Cap 03/01/2018 310  240 - 430 ug/dL Final     Iron Saturation Index 03/01/2018 21  15 - 46 % Final     Ferritin 03/01/2018 92  8 - 252 ng/mL Final   I also reviewed Care Everywhere, as detailed in the history of present  illness.  ASSESSMENT and PLAN:                                                    Assessment: 47-year-old female patient with history of chronic pain syndrome, peripheral neuropathy, right meralgia paresthetica, left ulnar neuropathy, and bilateral carpal tunnel syndrome presents for follow-up.    She is on Lyrica 225 mg twice daily for her peripheral neuropathy and meralgia paresthetica with good control of symptoms.  For restless leg syndrome, she uses ropinirole 0.25 mg twice daily without noticeable side effects.  Her ferritin level improved after starting iron replacement (92).  I do not think that we need to make any changes to her medications for these conditions.    At the same time the patient reports symptoms that are consistent with right L5 radiculopathy that are correlating with relatively recent lumbar spine MRI.  Recent epidural steroid injection helped, but not resolved her symptoms.  The patient was seen by a neurosurgeon during her last hospitalization 2 weeks ago.  I advised the patient to follow-up with this physician to discuss surgical options.    Finally, she also complains of right hand paresthesias in the distribution of right ulnar nerve, that might be consistent with right ulnar neuropathy or other entrapment neuropathies, brachial plexopathy, or cervical radiculopathy.  I ordered EMG to further clarify.    Diagnoses:    ICD-10-CM    1. Restless legs syndrome (RLS) G25.81    2. Idiopathic peripheral neuropathy G60.9    3. Numbness of right hand R20.0 EMG   4. Lumbosacral radiculopathy at L5 M54.17      Plan: At today's visit we thoroughly discussed current symptoms, available treatment options, and the plan, which includes:  Orders Placed This Encounter   Procedures     EMG     I advised the patient to cushion her right elbow during the day and use elbow splint at night.    No medication changes.    Additional recommendations after the work-up.    Next follow-up appointment is in the next  4-6 weeks or earlier if needed.    I encouraged the patient to call me with any questions or concerns.    Total Time: 43 minutes with > 50% spent counseling the patient on stated above assessment and recommendations, including review of symptoms, available treatment options, and proposed plan.  Extra time was needed to discuss patient's symptoms, review recent test results, and answer questions related to the plan.    Joaquín Burger MD  / Neurology         Again, thank you for allowing me to participate in the care of your patient.        Sincerely,        Joaquín Burger MD

## 2018-06-14 ENCOUNTER — OFFICE VISIT (OUTPATIENT)
Dept: FAMILY MEDICINE | Facility: CLINIC | Age: 48
End: 2018-06-14
Payer: COMMERCIAL

## 2018-06-14 ENCOUNTER — THERAPY VISIT (OUTPATIENT)
Dept: CHIROPRACTIC MEDICINE | Facility: CLINIC | Age: 48
End: 2018-06-14
Payer: COMMERCIAL

## 2018-06-14 VITALS
DIASTOLIC BLOOD PRESSURE: 74 MMHG | WEIGHT: 231 LBS | OXYGEN SATURATION: 96 % | BODY MASS INDEX: 40.6 KG/M2 | SYSTOLIC BLOOD PRESSURE: 107 MMHG | TEMPERATURE: 97.4 F | HEART RATE: 69 BPM

## 2018-06-14 DIAGNOSIS — M99.05 SEGMENTAL DYSFUNCTION OF PELVIC REGION: Primary | ICD-10-CM

## 2018-06-14 DIAGNOSIS — G89.29 CHRONIC RIGHT-SIDED LOW BACK PAIN WITH RIGHT-SIDED SCIATICA: Primary | ICD-10-CM

## 2018-06-14 DIAGNOSIS — M62.838 SPASM OF MUSCLE: ICD-10-CM

## 2018-06-14 DIAGNOSIS — R20.0 NUMBNESS AND TINGLING IN RIGHT HAND: ICD-10-CM

## 2018-06-14 DIAGNOSIS — M54.50 LUMBAGO: ICD-10-CM

## 2018-06-14 DIAGNOSIS — M99.02 THORACIC SEGMENT DYSFUNCTION: ICD-10-CM

## 2018-06-14 DIAGNOSIS — R20.2 NUMBNESS AND TINGLING IN RIGHT HAND: ICD-10-CM

## 2018-06-14 DIAGNOSIS — M99.03 SEGMENTAL DYSFUNCTION OF LUMBAR REGION: ICD-10-CM

## 2018-06-14 DIAGNOSIS — G89.4 CHRONIC PAIN SYNDROME: Chronic | ICD-10-CM

## 2018-06-14 DIAGNOSIS — M54.41 CHRONIC RIGHT-SIDED LOW BACK PAIN WITH RIGHT-SIDED SCIATICA: Primary | ICD-10-CM

## 2018-06-14 PROCEDURE — 99213 OFFICE O/P EST LOW 20 MIN: CPT | Performed by: FAMILY MEDICINE

## 2018-06-14 PROCEDURE — 98941 CHIROPRACT MANJ 3-4 REGIONS: CPT | Mod: AT | Performed by: CHIROPRACTOR

## 2018-06-14 PROCEDURE — 97810 ACUP 1/> WO ESTIM 1ST 15 MIN: CPT | Performed by: CHIROPRACTOR

## 2018-06-14 NOTE — MR AVS SNAPSHOT
After Visit Summary   6/14/2018    Velma Diaz    MRN: [id number]           Patient Information     Date Of Birth          1970        Visit Information        Provider Department      6/14/2018 8:00 AM Srinivasa Hunter MD [facility]        Today's Diagnoses     Chronic right-sided low back pain with right-sided sciatica    -  1       Follow-ups after your visit        Additional Services     NEUROSURGERY REFERRAL       Your provider has referred you to: [facility] Neurosurgery -- Dr. Noé Brooks    Please be aware that coverage of these services is subject to the terms and limitations of your health insurance plan.  Call member services at your health plan with any benefit or coverage questions.      Please bring the following with you to your appointment:    (1) Any X-Rays, CTs or MRIs which have been performed.  Contact the facility where they were done to arrange for  prior to your scheduled appointment.   (2) List of current medications  (3) This referral request   (4) Any documents/labs given to you for this referral                  Follow-up notes from your care team     Return if symptoms worsen or fail to improve.      Your next 10 appointments already scheduled     Jun 14, 2018 11:15 AM CDT   [facility] Acupuncture Follow Up with CHARIS [facility] [place] [facility] ([facility] [place])    [address]  [place] [address] [phone]            Jun 18, 2018 10:00 AM CDT   Return Visit with SERVANDO Yan   [facility] ([facility])    [address] [address] [phone]            Jun 19, 2018  3:15 PM CDT   (Arrive by 3:00 PM)   EMG with Mekhi Sanchez MD    [facility] ([facility])    [address] [facility] [address] [phone]           Do not use lotions or creams on the  area to be tested. If you are on blood thinners (Warfarin, Coumadin, Lovenox, etc), please contact your primary care physician to check if it is safe to stop them 3 days prior to testing. If you have anxiety, please check with your referring physician to obtain anti-anxiety medication for the procedure.            Jul 12, 2018  1:00 PM CDT   [facility] Joaquín Burger MD   [facility]    [address] [address] [phone]              Who to contact     If you have questions or need follow up information about today's clinic visit or your schedule please contact [facility] directly at [phone].  Normal or non-critical lab and imaging results will be communicated to you by MyChart, letter or phone within 4 business days after the clinic has received the results. If you do not hear from us within 7 days, please contact the clinic through MyChart or phone. If you have a critical or abnormal lab result, we will notify you by phone as soon as possible.  Submit refill requests through [facility] or call your pharmacy and they will forward the refill request to us. Please allow 3 business days for your refill to be completed.          Additional Information About Your Visit        [employer] Information     [facility] gives you secure access to your electronic health record. If you see a primary care provider, you can also send messages to your care team and make appointments. If you have questions, please call your primary care clinic.  If you do not have a primary care provider, please call [phone] and they will assist you.        Care EveryWhere ID     This is your Care EveryWhere ID. This could be used by other organizations to access your [place] medical records  [id number]        Your Vitals Were     Pulse Temperature Pulse Oximetry BMI (Body Mass Index)          69 97.4  F (36.3  C) (Oral) 96%  40.6 kg/m2         Blood Pressure from Last 3 Encounters:   06/14/18 107/74   06/07/18 108/72   05/31/18 103/73    Weight from Last 3 Encounters:   06/14/18 231 lb (104.8 kg)   06/07/18 232 lb 6.4 oz (105.4 kg)   05/31/18 230 lb (104.3 kg)              We Performed the Following     NEUROSURGERY REFERRAL          Today's Medication Changes          These changes are accurate as of 6/14/18  8:16 AM.  If you have any questions, ask your nurse or doctor.               These medicines have changed or have updated prescriptions.        Dose/Directions    rOPINIRole 0.25 MG tablet   Commonly known as:  REQUIP   This may have changed:    - how much to take  - how to take this  - when to take this  - additional instructions   Used for:  Restless legs syndrome (RLS)        Week 1 & 2: 1 tablet 1 hour before going to bed. Week 2 and afterwards: 1 tablet two times a day   Quantity:  180 tablet   Refills:  1                Primary Care Provider Office Phone # Fax #    Srinivasa Hunter [phone]       [address] [address]        Goals        General    I will call within next 2 weeks to schedule initial psychiatry appointment (pt-stated)     Notes - Note edited  5/20/2015 12:51 PM by Yesica Marsh    As of today's date 5/20/2015 goal is met at 76 - 100%.   Goal Status:  Complete  Patient states this is scheduled with Dr. Salvador for next month, but not on appointment calendar  LUDY Stark, MSW   [phone]  5/20/2015 12:51 PM        I will complete [employer] or reduced fare application within the next month (pt-stated)     Notes - Note edited  11/24/2015 12:00 PM by Yesica Marsh    As of today's date 11/24/2015 goal is met at 51 - 75%.   Goal Status:  Active  She reported today having [employer] application, [facility] worker has requested but packet from [employer].    LUDY Stark, MSW   [phone]  11/24/2015 12:00 PM        I will discuss [facility]  worker with therapist next week for housing needs  (pt-stated)     Notes - Note edited  12/3/2015  1:17 PM by Yesica Marsh    As of today's date 12/3/2015 goal is met at 76 - 100%.   Goal Status:  Discontinued  Patient has [facility] worker and has found housing with friend  Yesica QUIGLEYHan Marsh, LSW, MSW   [phone]  12/3/2015 1:16 PM        Equal Access to Services     [facility]: Hadii aad ku hadasho Soomaali, waaxda luqadaha, qaybta kaalmada adeegyada, waxay idiin hayaan adeeg khbritneysh la'lemuel . So [facility] [phone].    ATENCIÓN: Si habla espforrest, tiene a dowd disposición servicios gratuitos de asistencia lingüística. Llame al [phone].    We comply with applicable federal civil rights laws and [place] laws. We do not discriminate on the basis of race, color, national origin, age, disability, sex, sexual orientation, or gender identity.            Thank you!     Thank you for choosing [facility]  for your care. Our goal is always to provide you with excellent care. Hearing back from our patients is one way we can continue to improve our services. Please take a few minutes to complete the written survey that you may receive in the mail after your visit with us. Thank you!             Your Updated Medication List - Protect others around you: Learn how to safely use, store and throw away your medicines at www.disposemymeds.org.          This list is accurate as of 6/14/18  8:16 AM.  Always use your most recent med list.                   Brand Name Dispense Instructions for use Diagnosis    acetaminophen 325 MG tablet    TYLENOL    100 tablet    Take 2 tablets (650 mg) by mouth every 4 hours as needed for other (mild pain)    Lesion of left ulnar nerve       albuterol 108 (90 Base) MCG/ACT Inhaler    PROAIR HFA/PROVENTIL HFA/VENTOLIN HFA    1 Inhaler    Inhale 2 puffs into the lungs every 6 hours as needed for shortness of breath / dyspnea or wheezing    Bronchitis       ferrous sulfate  325 (65 Fe) MG tablet    IRON    90 tablet    Take 1 tablet (325 mg) by mouth daily (with breakfast)    Restless legs syndrome (RLS)       furosemide 20 MG tablet    LASIX    30 tablet    Take 1 tablet (20 mg) by mouth daily    Leg swelling       LYRICA 225 MG Caps   Generic drug:  Pregabalin     60 capsule    TAKE 1 TABLET (225 MG) BY MOUTH TWICE A DAY    Chronic pain syndrome       methocarbamol 750 MG tablet    ROBAXIN    60 tablet    Take 1 tablet (750 mg) by mouth 3 times daily as needed for muscle spasms    Chronic low back pain, unspecified back pain laterality, with sciatica presence unspecified       MULTI FOR HER PO      Take by mouth daily        nystatin 044130 UNIT/GM Powd    MYCOSTATIN    60 g    Apply to affected area twice daily as needed    Candidal intertrigo       order for DME      Respironics REMSTAR 60 Series Auto [id number] H2O, Airfit P10 nasal pillow mask w/xsmall pillows        order for DME     1 Device    TENS unit    Chronic bilateral back pain, unspecified back location       oxyCODONE-acetaminophen  MG per tablet    PERCOCET    90 tablet    Take 1 tablet by mouth every 6 hours as needed for moderate to severe pain    Chronic pain syndrome       rOPINIRole 0.25 MG tablet    REQUIP    180 tablet    Week 1 & 2: 1 tablet 1 hour before going to bed. Week 2 and afterwards: 1 tablet two times a day    Restless legs syndrome (RLS)       SUMAtriptan 100 MG tablet    IMITREX    9 tablet    Take 1 tablet (100 mg) by mouth at onset of headache for migraine May repeat in 2 hours if needed: max 2/day    Headache(784.0)

## 2018-06-14 NOTE — PROGRESS NOTES
SUBJECTIVE:   Velma Diaz is a 47 year old female who presents to clinic today for the following health issues:          Hospital Follow-up Visit:    Hospital/Nursing Home/IP Rehab Facility: [facility]  Date of Admission: 6/4/18  Date of Discharge: 6/6/18  Reason(s) for Admission: Right hip pain            Problems taking medications regularly:  None       Medication changes since discharge: None       Problems adhering to non-medication therapy:  None  Needs a referral for neurosurgery      Summary of hospitalization:  CareEverywhere information obtained and reviewed  Diagnostic Tests/Treatments reviewed.  Follow up needed: none  Other Healthcare Providers Involved in Patient s Care:         Neurology and physical therapy  Update since discharge: fluctuating course.     Post Discharge Medication Reconciliation: discharge medications reconciled, continue medications without change.  Plan of care communicated with patient     Coding guidelines for this visit:  Type of Medical   Decision Making Face-to-Face Visit       within 7 Days of discharge Face-to-Face Visit        within 14 days of discharge   Moderate Complexity [id number]   High Complexity 51819 [id number]            She was hospitalized again early last week for a flare of her right low back and hip and leg pain.  She was seen by neurosurgery during her hospitalization.  We think it was Dr. Noé Brooks is [facility], operated by [facility] neurosurgery.  Surgical treatment options were discussed.  The patient had another injection, but she has not found that very helpful.  She does have known moderate to severe right L5-S1 foraminal stenosis.  She was seen by neurology after hospitalization and they suggested the patient follow-up with neurosurgery to discuss surgical options.    The patient remains on medical therapy as below.  She is also been having some right hand numbness and tingling and will be having an EMG to further evaluate that.  She will also be going to physical  therapy.    Problem list and histories reviewed & adjusted, as indicated.  Additional history: as documented    Patient Active Problem List   Diagnosis     History of cleft palate with cleft lip     MAYA (obstructive sleep apnea)/Hypoventilation Syndrome     Major depressive disorder, recurrent episode, moderate (H)     Paresthesias     Health Care Home     Vitamin D deficiency     Morbid obesity with BMI of 40.0-44.9, adult (H)     Hyperlipidemia with target LDL less than 130     Intervertebral disc prolapse with impingement     Nonallopathic lesion of lumbar region     Chronic pain syndrome     Restless legs syndrome (RLS)     Insomnia     Migraine     Chronic bilateral back pain, unspecified back location     Chronic pain of left knee     ROSE MARIE (generalized anxiety disorder)     Idiopathic peripheral neuropathy     Past Surgical History:   Procedure Laterality Date     ANKLE SURGERY  7/13    Left for torn tendons & loose bone chips     ARTHROSCOPY KNEE  1986    Right knee     BACK SURGERY       Basal cell carcinoma  2011    Removal from the chest     BIOPSY       C VAGINAL HYSTERECTOMY  2011     CARPAL TUNNEL RELEASE RT/LT  ~2010    Bilateral     COLONOSCOPY  2016     COSMETIC SURGERY       COSMETIC SURGERY       DECOMPRESSION CUBITAL TUNNEL Left 8/28/2015    Procedure: DECOMPRESSION CUBITAL TUNNEL;  Surgeon: Gus Donato MD;  [facility]     ENT SURGERY  1975    Tonsillectomy and Adenoids     GYN SURGERY  2011    Hysterectomy     HC REPAIR PERONEAL TENDONS  7/13     ORTHOPEDIC SURGERY  2011, 2011    Bilateral CTR     PE TUBES      yearly times 10 years     REPAIR CLEFT PALATE CHILD      Age 3 months & afterwards (multiple surgeries)     SOFT TISSUE SURGERY       SOFT TISSUE SURGERY  2013       Social History   Substance Use Topics     Smoking status: Former Smoker     Packs/day: 0.50     Years: 15.00     Types: Cigarettes     Quit date: 2/20/2015     Smokeless tobacco: Never Used     Alcohol use No       Comment: Quit in September 27th     Family History   Problem Relation Age of Onset     Alzheimer Disease Paternal Grandmother 60     CEREBROVASCULAR DISEASE Paternal Grandmother      Neurologic Disorder Sister 20     migraines     Depression/Anxiety Sister      Depression Sister      Neurologic Disorder Son 14     migraines     Asthma Son      HEART DISEASE Mother      OSTEOPOROSIS Mother      Obesity Mother      CANCER Father      Alcohol/Drug Father      Other Cancer Father      saliva glands & skin CA      Hypertension Father      DIABETES Maternal Grandmother      Breast Cancer Maternal Grandmother      Obesity Maternal Grandmother      Other Cancer Maternal Grandfather      skin cancer     Cancer - colorectal Other      Aunt     Asthma Daughter      Asthma Daughter      Asthma Son      Thyroid Disease Sister      Colon Cancer Other      Obesity Sister      Glaucoma No family hx of      Macular Degeneration No family hx of          Current Outpatient Prescriptions   Medication Sig Dispense Refill     acetaminophen (TYLENOL) 325 MG tablet Take 2 tablets (650 mg) by mouth every 4 hours as needed for other (mild pain) 100 tablet 0     ferrous sulfate (IRON) 325 (65 FE) MG tablet Take 1 tablet (325 mg) by mouth daily (with breakfast) 90 tablet 4     furosemide (LASIX) 20 MG tablet Take 1 tablet (20 mg) by mouth daily 30 tablet 11     LYRICA 225 MG CAPS TAKE 1 TABLET (225 MG) BY MOUTH TWICE A DAY 60 capsule 5     methocarbamol (ROBAXIN) 750 MG tablet Take 1 tablet (750 mg) by mouth 3 times daily as needed for muscle spasms 60 tablet 3     Multiple Vitamins-Minerals (MULTI FOR HER PO) Take by mouth daily        nystatin (MYCOSTATIN) 502199 UNIT/GM POWD Apply to affected area twice daily as needed 60 g 2     order for DME TENS unit 1 Device 0     ORDER FOR DME Respironics REMSTAR 60 Series Auto [id number] H2O, Airfit P10 nasal pillow mask w/xsmall pillows       oxyCODONE-acetaminophen (PERCOCET)  MG per tablet  Take 1 tablet by mouth every 6 hours as needed for moderate to severe pain 90 tablet 0     rOPINIRole (REQUIP) 0.25 MG tablet Week 1 & 2: 1 tablet 1 hour before going to bed. Week 2 and afterwards: 1 tablet two times a day (Patient taking differently: Take 0.25 mg by mouth 2 times daily Patient takes one in the morning and one in the evening.) 180 tablet 1     SUMAtriptan (IMITREX) 100 MG tablet Take 1 tablet (100 mg) by mouth at onset of headache for migraine May repeat in 2 hours if needed: max 2/day 9 tablet 2     albuterol (PROAIR HFA/PROVENTIL HFA/VENTOLIN HFA) 108 (90 Base) MCG/ACT Inhaler Inhale 2 puffs into the lungs every 6 hours as needed for shortness of breath / dyspnea or wheezing (Patient not taking: Reported on 6/14/2018) 1 Inhaler 0     No Known Allergies    Reviewed and updated as needed this visit by clinical staff  Tobacco  Allergies  Meds  Med Hx  Surg Hx  Fam Hx  Soc Hx      Reviewed and updated as needed this visit by Provider         ROS:  Mainly significant for the above    OBJECTIVE:     /74 (BP Location: Right arm, Patient Position: Chair, Cuff Size: Adult Large)  Pulse 69  Temp 97.4  F (36.3  C) (Oral)  Wt 231 lb (104.8 kg)  SpO2 96%  BMI 40.6 kg/m2  Body mass index is 40.6 kg/(m^2).  GENERAL: alert, no distress and obese    Diagnostic Test Results:  Hospital information was reviewed via care everywhere.  I also reviewed her neurology note from the last week.    ASSESSMENT/PLAN:         ICD-10-CM    1. Chronic right-sided low back pain with right-sided sciatica M54.41 NEUROSURGERY REFERRAL    G89.29    2. Numbness and tingling in right hand R20.0     R20.2    3. Chronic pain syndrome G89.4      I will refer her to see the neurosurgeon again to discuss surgical treatment options for her right low back and leg pain  She will proceed with physical therapy in the meantime  She will follow-up with neurology for an EMG for further evaluation of her right upper extremity  symptoms  She will continue with her same baseline meds for now  She notes that she may be moving to [place], [place] with her boyfriend later in the summer, but those details have yet to be worked out    If new, worsening or persistent symptoms, the patient is to call or return for a recheck.      Srinivasa Hunter MD  [facility]

## 2018-06-14 NOTE — PROGRESS NOTES
Visit #: 5    Subjective:  Velma Diaz is a 46 year old female who is seen in f/u up for:        Nonallopathic lesion of pelvic region  Lumbago  Nonallopathic lesion of lumbar region  Spasm of muscle.     Since last visit on 5/15/2018,  Velma Diaz reports the following changes: Pain immediately after last treatment: 8/10 and their pain level today 7/10.  Velma reports that she is still experiencing a lot of pain in her low back that is radiating down her leg.  She has seen her neurologist who has referred her for a neurological consult.  She is feeling unsteady when she walks and is using a cane for support.      Area of chief complaint:  Lumbar :  Symptoms are graded at 7/10. The quality is described as stiff, achey, stabbing.  Motion has improved but not normal. Patient feels that they are improved.     Objective:  The following was observed:    P: pain elicited on palpation, palpatory tenderness, piriformis R>>L    A: static palpation demonstrates intersegmental asymmetry , pelvis, lumbar    R: motion palpation notes restricted motion, :  T10 Extension restriction  T11 Extension restriction  T12 Extension restriction  L4 Right rotation restricted  L5 Right rotation restricted  PSIS Right Extension restriction    T: hypertonicity at: Piriformis R>>L      Assessment:    Segmental spinal dysfunction/restrictions found at:  T10  T11  T12  L4  L5  PSIS Left    Diagnoses:      1. Lumbago    2. Nonallopathic lesion of pelvic region    3. Nonallopathic lesion of lumbar region    4.  5. Spasm of muscle   nonallopathic lesion of thoracic region       Patient's condition:  Patient had restrictions pre-manipulation    Treatment effectiveness:  Post manipulation there is better intersegmental movement and Patient claims to feel looser post manipulation      Procedures:  CMT:  27818 Chiropractic manipulative treatment 3-4 regions performed   Thoracic: Activator, T10, T11, T12, Prone  Lumbar: Activator, L4, L5,  Prone  Pelvis: Drop Table, PSIS Right , Prone    Modalities:  27405: Acupuncture, for 15 minutes:  Points: B25, B27, GV3, K3, B62, SI3  Ahsi point in piriformis and lumbar erector  For 15 minutes      Therapeutic procedures:  None      Prognosis: Good    Progress towards Goals: Patient is making progress towards the goal     Response to Treatment:   Reduction in symptoms as reported by patient      Recommendations:    Instructions:ice 20 minutes every other hour as needed    Follow-up:  Return to care in one week

## 2018-06-18 ENCOUNTER — OFFICE VISIT (OUTPATIENT)
Dept: PSYCHOLOGY | Facility: CLINIC | Age: 48
End: 2018-06-18
Payer: COMMERCIAL

## 2018-06-18 DIAGNOSIS — F41.1 GAD (GENERALIZED ANXIETY DISORDER): Primary | ICD-10-CM

## 2018-06-18 PROCEDURE — 90834 PSYTX W PT 45 MINUTES: CPT | Performed by: SOCIAL WORKER

## 2018-06-18 NOTE — MR AVS SNAPSHOT
MRN:[id number]                      After Visit Summary   6/18/2018    Velma Diaz    MRN: [id number]           Visit Information        Provider Department      6/18/2018 10:00 AM Antonio Rios LICSW [facility]      Your next 10 appointments already scheduled     Jun 19, 2018  3:15 PM CDT   (Arrive by 3:00 PM)   EMG with Mekhi Sanchez MD   [facility] ([facility])    [address] [address] [phone]           Do not use lotions or creams on the area to be tested. If you are on blood thinners (Warfarin, Coumadin, Lovenox, etc), please contact your primary care physician to check if it is safe to stop them 3 days prior to testing. If you have anxiety, please check with your referring physician to obtain anti-anxiety medication for the procedure.            Jun 21, 2018 10:30 AM CDT   [facility] Follow Up with CHARIS [facility] [place] [facility] ([facility] [place])    [address]  [place] [address] [phone]            Jul 12, 2018  1:00 PM CDT   Return Visit with Joaquín Burger MD   [facility]    [address] [place] [address] [id number] [phone]            Jul 16, 2018  2:30 PM CDT   Return Visit with SERVANDO Yan   [facility] ([facility])    [address] [place] [id number] [phone]              MyChart Information     [employer] gives you secure access to your electronic health record. If you see a primary care provider, you can also send messages to your care team and make appointments. If you have questions, please call your primary care clinic.  If you do not have a primary care provider, please call [phone] and they will assist you.        Care EveryWhere ID      This is your Care EveryWhere ID. This could be used by other organizations to access your [facility] medical records  [id number]        Equal Access to Services     LOLA HERNANDEZ : Katie Dumas, anastacio wong, pavan cook, grayson sauceda. [facility] [phone].    ATENCIÓN: Si habla español, tiene a dowd disposición servicios gratuitos de asistencia lingüística. Llame al [phone].    We comply with applicable federal civil rights laws and [place] laws. We do not discriminate on the basis of race, color, national origin, age, disability, sex, sexual orientation, or gender identity.
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
## 2018-06-19 ENCOUNTER — OFFICE VISIT (OUTPATIENT)
Dept: NEUROLOGY | Facility: CLINIC | Age: 48
End: 2018-06-19
Payer: COMMERCIAL

## 2018-06-19 DIAGNOSIS — G56.21 ULNAR NEUROPATHY OF RIGHT UPPER EXTREMITY: Primary | ICD-10-CM

## 2018-06-19 DIAGNOSIS — R20.0 NUMBNESS OF RIGHT HAND: ICD-10-CM

## 2018-06-19 NOTE — PROGRESS NOTES
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 Query right ulnar neuropathy.    Techniques: Motor and sensory conduction studies were done with surface recording electrodes. Temperature was monitored and recorded throughout the study. Upper extremities were maintained at a temperature of 32 degrees Centigrade or higher. EMG was done with a concentric needle electrode.     Results:    Right median, ulnar, and radial antidromic sensory NCSs were normal. Right median, and ulnar (the latter recorded from ADM and FDI muscles) was normal. EMG of right FDI and flexor carpi ulnaris showed no abnormal spontaneous activity, and mildly reduced recruitment of large, long duration, stable MUPs. EMG of right flexor pollicis longus, extensor indicis, triceps, and deltoid was normal.     Interpretation:    Abnormal study. There is electrodiagnostic evidence of a right chronic ulnar motor neuropathy localizing proximally to the branch of the flexor carpi ulnaris muscle. Because the right ulnar nerve conduction studies were normal, more precise localization is not possible; if required, inching studies or imaging (ultrasound, MRI) may be helpful. There is no electrodiagnostic evidence of right C8 radiculopathy or lower trunk brachial plexopathy.     EMG Physician:    Mekhi Sanchez MD       Sensory NCS      Nerve / Sites Rec. Site Onset Peak NP Amp Ref. PP Amp Dist Jaren Ref. Temp     ms ms  V  V  V cm m/s m/s  C   R MEDIAN - Dig II Anti      Wrist Dig II  2.29 3.18 43.9 10.0 79.0 14 61.1 48.0 34.1   R ULNAR - Dig V Anti      Wrist Dig V 1.82 2.55 27.9 8.0 56.9 12.5 68.6 48.0 32.5      A.Elbow Dig V 1.93 2.55 31.6  56.0    32.6   R RADIAL - Snuff      Forearm Snuff 1.67 2.24 38.2 15.0 64.8 10 60.0 48.0 32.7       Motor NCS      Nerve / Sites Rec. Site Lat Ref. Amp Ref. Rel Amp Dist Jaren Ref. Dur. Area Temp.     ms ms mV mV % cm m/s m/s ms %  C   R MEDIAN - APB      Wrist APB 2.92 4.40 10.9 5.0 100 8   5.36 100 32.9      Elbow APB 6.56  9.8  90.4 21 57.6 48.0 5.47 80.4 32.7   R ULNAR - ADM      Wrist ADM 2.29 3.50 10.2 5.0 100 8   6.35 100 33      B.Elbow ADM 6.15  9.1  89.4 21 54.5 48.0 6.09 92 33      A.Elbow ADM 7.66  8.7  85.1 8 53.0 48.0 6.35 90.3 33   R ULNAR - FDI      Wrist FDI 2.66  15.6  100    4.27 100 32.9      B.Elbow FDI 6.41  11.2  71.8 22 58.7  4.32 77.3 32.7      A.Elbow FDI 7.97  10.6  67.8 9 57.6  4.64 78.6 32.7       EMG Summary Table     Spontaneous MUAP Recruitment    IA Fib PSW Fasc H.F. Amp Dur. PPP Pattern   R. FIRST D INTEROSS N None None None None 2+ 2+ N Mild Reduced   R. EXT INDICIS N None None None None N N N N   R. FLEX POLL LONG N None None None None N N N N   R. FLEX CARPI ULN N None None None None 2+ 1+ N Mild Reduced   R. TRICEPS N None None None None N N N N   R. DELTOID N None None None None N N N N

## 2018-06-19 NOTE — LETTER
6/19/2018       RE: Velma Diaz  [address]  [facility] [address]     Dear Colleague,    Thank you for referring your patient, Velma Diaz, to the [facility] EMG at [facility]. Please see a copy of my visit note below.        [facility]  Electrodiagnostic Laboratory    Nerve Conduction & EMG Report          Patient:       Velma Diaz  Patient ID:    [id number]  Gender:        Female  YOB: 1970  Age:           47 Years 7 Months      Referring Physician: Joaquín Burger MD    History & Examination:    47 year old woman with previous history of left ulnar neuropathy at the elbow, status post surgical decompression, who now reports numbness/tingling at digits 4-5 of the right hand, aggravated by elbow flexion. Query right ulnar neuropathy.    Techniques: Motor and sensory conduction studies were done with surface recording electrodes. Temperature was monitored and recorded throughout the study. Upper extremities were maintained at a temperature of 32 degrees Centigrade or higher. EMG was done with a concentric needle electrode.     Results:    Right median, ulnar, and radial antidromic sensory NCSs were normal. Right median, and ulnar (the latter recorded from ADM and FDI muscles) was normal. EMG of right FDI and flexor carpi ulnaris showed no abnormal spontaneous activity, and mildly reduced recruitment of large, long duration, stable MUPs. EMG of right flexor pollicis longus, extensor indicis, triceps, and deltoid was normal.     Interpretation:    Abnormal study. There is electrodiagnostic evidence of a right chronic ulnar motor neuropathy localizing proximally to the branch of the flexor carpi ulnaris muscle. Because the right ulnar nerve conduction studies were normal, more precise localization is not possible; if required, inching studies or imaging (ultrasound, MRI) may be helpful. There is no electrodiagnostic evidence of right C8  radiculopathy or lower trunk brachial plexopathy.     EMG Physician:    Mekhi Sanchez MD       Sensory NCS      Nerve / Sites Rec. Site Onset Peak NP Amp Ref. PP Amp Dist Jaren Ref. Temp     ms ms  V  V  V cm m/s m/s  C   R MEDIAN - Dig II Anti      Wrist Dig II 2.29 3.18 43.9 10.0 79.0 14 61.1 48.0 34.1   R ULNAR - Dig V Anti      Wrist Dig V 1.82 2.55 27.9 8.0 56.9 12.5 68.6 48.0 32.5      A.Elbow Dig V 1.93 2.55 31.6  56.0    32.6   R RADIAL - Snuff      Forearm Snuff 1.67 2.24 38.2 15.0 64.8 10 60.0 48.0 32.7       Motor NCS      Nerve / Sites Rec. Site Lat Ref. Amp Ref. Rel Amp Dist Jaren Ref. Dur. Area Temp.     ms ms mV mV % cm m/s m/s ms %  C   R MEDIAN - APB      Wrist APB 2.92 4.40 10.9 5.0 100 8   5.36 100 32.9      Elbow APB 6.56  9.8  90.4 21 57.6 48.0 5.47 80.4 32.7   R ULNAR - ADM      Wrist ADM 2.29 3.50 10.2 5.0 100 8   6.35 100 33      B.Elbow ADM 6.15  9.1  89.4 21 54.5 48.0 6.09 92 33      A.Elbow ADM 7.66  8.7  85.1 8 53.0 48.0 6.35 90.3 33   R ULNAR - FDI      Wrist FDI 2.66  15.6  100    4.27 100 32.9      B.Elbow FDI 6.41  11.2  71.8 22 58.7  4.32 77.3 32.7      A.Elbow FDI 7.97  10.6  67.8 9 57.6  4.64 78.6 32.7       EMG Summary Table     Spontaneous MUAP Recruitment    IA Fib PSW Fasc H.F. Amp Dur. PPP Pattern   R. FIRST D INTEROSS N None None None None 2+ 2+ N Mild Reduced   R. EXT INDICIS N None None None None N N N N   R. FLEX POLL LONG N None None None None N N N N   R. FLEX CARPI ULN N None None None None 2+ 1+ N Mild Reduced   R. TRICEPS N None None None None N N N N   R. DELTOID N None None None None N N N N                        Again, thank you for allowing me to participate in the care of your patient.      Sincerely,    Mekhi Sanchez MD

## 2018-06-19 NOTE — MR AVS SNAPSHOT
After Visit Summary   6/19/2018    Velma Diaz    MRN: [id number]           Patient Information     Date Of Birth          1970        Visit Information        Provider Department      6/19/2018 3:15 PM Mekhi Sanchez MD  Health EMG        Today's Diagnoses     Ulnar neuropathy of right upper extremity    -  1    Numbness of right hand           Follow-ups after your visit        Your next 10 appointments already scheduled     Jun 21, 2018 10:30 AM CDT   [facility] Acupuncture Follow Up with [facility] [place] [facility] ([facility] [place])    [address]  [place] [address]   [phone]            Jul 12, 2018  1:00 PM CDT   Return Visit with Joaquín Burger MD   [facility]    [address] [place] [id number] [phone]            Jul 16, 2018  2:30 PM CDT   Return Visit with SERVANDO Yan   [facility] ([facility])    [address] [facility] [id number] [phone]              Future tests that were ordered for you today     Open Future Orders        Priority Expected Expires Ordered    Needle EMG Each Extremity w/Paraspinal Area Complete (83418) Routine  6/19/2019 6/19/2018            Who to contact     Please call your clinic at [phone] to:    Ask questions about your health    Make or cancel appointments    Discuss your medicines    Learn about your test results    Speak to your doctor            Additional Information About Your Visit        MyChart Information     [employer] gives you secure access to your electronic health record. If you see a primary care provider, you can also send messages to your care team and make appointments. If you have questions, please call your primary care clinic.  If you do not have a primary care provider, please call [phone] and they will  assist you.      [employer] is an electronic gateway that provides easy, online access to your medical records. With [employer], you can request a clinic appointment, read your test results, renew a prescription or communicate with your care team.     To access your existing account, please contact your [facility] or call [phone] for assistance.        Care EveryWhere ID     This is your Care EveryWhere ID. This could be used by other organizations to access your [place] medical records  [id number]         Blood Pressure from Last 3 Encounters:   06/14/18 107/74   06/07/18 108/72   05/31/18 103/73    Weight from Last 3 Encounters:   06/14/18 104.8 kg (231 lb)   06/07/18 105.4 kg (232 lb 6.4 oz)   05/31/18 104.3 kg (230 lb)              We Performed the Following     EMG     HC NCS MOTOR W OR W/O F-WAVE, 5 OR 6          Today's Medication Changes          These changes are accurate as of 6/19/18  3:53 PM.  If you have any questions, ask your nurse or doctor.               These medicines have changed or have updated prescriptions.        Dose/Directions    rOPINIRole 0.25 MG tablet   Commonly known as:  REQUIP   This may have changed:    - how much to take  - how to take this  - when to take this  - additional instructions   Used for:  Restless legs syndrome (RLS)        Week 1 & 2: 1 tablet 1 hour before going to bed. Week 2 and afterwards: 1 tablet two times a day   Quantity:  180 tablet   Refills:  1                Primary Care Provider Office Phone # Fax #    Srinivasa Hunter [phone]       [address] [facility] [address]        Goals        General    I will call within next 2 weeks to schedule initial psychiatry appointment (pt-stated)     Notes - Note edited  5/20/2015 12:51 PM by Yesica Marsh    As of today's date 5/20/2015 goal is met at 76 - 100%.   Goal Status:  Complete  Patient states this is scheduled with Dr. Salvador for next month,  but not on appointment calendar  LUDY Stark, MSW   [phone]  5/20/2015 12:51 PM        I will complete [employer] or reduced fare application within the next month (pt-stated)     Notes - Note edited  11/24/2015 12:00 PM by Yesica Marsh    As of today's date 11/24/2015 goal is met at 51 - 75%.   Goal Status:  Active  She reported today having [employer] application, [employer] worker has requested but packet from [employer].    LUDY Stark, MSW   [phone]  11/24/2015 12:00 PM        I will discuss [facility] worker with therapist next week for housing needs  (pt-stated)     Notes - Note edited  12/3/2015  1:17 PM by Yesica Marsh    As of today's date 12/3/2015 goal is met at 76 - 100%.   Goal Status:  Discontinued  Patient has [employer] worker and has found housing with friend  LUDY Stark, MSW   [phone]  12/3/2015 1:16 PM        Equal Access to Services     [facility]: Hadii aad ku hadasho Soomaali, waaxda luqadaha, qaybta kaalmada adeegyada, grayson carrillo . So [facility] [phone].    ATENCIÓN: Si habla español, tiene a dowd disposición servicios gratuitos de asistencia lingüística. Llame al [phone].    We comply with applicable federal civil rights laws and [place] laws. We do not discriminate on the basis of race, color, national origin, age, disability, sex, sexual orientation, or gender identity.            Thank you!     Thank you for choosing [facility]  for your care. Our goal is always to provide you with excellent care. Hearing back from our patients is one way we can continue to improve our services. Please take a few minutes to complete the written survey that you may receive in the mail after your visit with us. Thank you!             Your Updated Medication List - Protect others around you: Learn how to safely use, store and throw away your medicines at www.disposemymeds.org.          This list is accurate as of  6/19/18  3:53 PM.  Always use your most recent med list.                   Brand Name Dispense Instructions for use Diagnosis    acetaminophen 325 MG tablet    TYLENOL    100 tablet    Take 2 tablets (650 mg) by mouth every 4 hours as needed for other (mild pain)    Lesion of left ulnar nerve       albuterol 108 (90 Base) MCG/ACT Inhaler    PROAIR HFA/PROVENTIL HFA/VENTOLIN HFA    1 Inhaler    Inhale 2 puffs into the lungs every 6 hours as needed for shortness of breath / dyspnea or wheezing    Bronchitis       ferrous sulfate 325 (65 Fe) MG tablet    IRON    90 tablet    Take 1 tablet (325 mg) by mouth daily (with breakfast)    Restless legs syndrome (RLS)       furosemide 20 MG tablet    LASIX    30 tablet    Take 1 tablet (20 mg) by mouth daily    Leg swelling       LYRICA 225 MG Caps   Generic drug:  Pregabalin     60 capsule    TAKE 1 TABLET (225 MG) BY MOUTH TWICE A DAY    Chronic pain syndrome       methocarbamol 750 MG tablet    ROBAXIN    60 tablet    Take 1 tablet (750 mg) by mouth 3 times daily as needed for muscle spasms    Chronic low back pain, unspecified back pain laterality, with sciatica presence unspecified       MULTI FOR HER PO      Take by mouth daily        nystatin 114377 UNIT/GM Powd    MYCOSTATIN    60 g    Apply to affected area twice daily as needed    Candidal intertrigo       order for DME      Respironics REMSTAR 60 Series Auto [id number] H2O, Airfit P10 nasal pillow mask w/xsmall pillows        order for DME     1 Device    TENS unit    Chronic bilateral back pain, unspecified back location       oxyCODONE-acetaminophen  MG per tablet    PERCOCET    90 tablet    Take 1 tablet by mouth every 6 hours as needed for moderate to severe pain    Chronic pain syndrome       rOPINIRole 0.25 MG tablet    REQUIP    180 tablet    Week 1 & 2: 1 tablet 1 hour before going to bed. Week 2 and afterwards: 1 tablet two times a day    Restless legs syndrome (RLS)       SUMAtriptan 100 MG tablet     IMITREX    9 tablet    Take 1 tablet (100 mg) by mouth at onset of headache for migraine May repeat in 2 hours if needed: max 2/day    Headache(784.0)

## 2018-06-21 ENCOUNTER — THERAPY VISIT (OUTPATIENT)
Dept: CHIROPRACTIC MEDICINE | Facility: CLINIC | Age: 48
End: 2018-06-21
Payer: COMMERCIAL

## 2018-06-21 DIAGNOSIS — M99.03 SEGMENTAL DYSFUNCTION OF LUMBAR REGION: ICD-10-CM

## 2018-06-21 DIAGNOSIS — M99.05 SEGMENTAL DYSFUNCTION OF PELVIC REGION: Primary | ICD-10-CM

## 2018-06-21 DIAGNOSIS — M54.50 LUMBAGO: ICD-10-CM

## 2018-06-21 DIAGNOSIS — M99.02 THORACIC SEGMENT DYSFUNCTION: ICD-10-CM

## 2018-06-21 DIAGNOSIS — M62.838 SPASM OF MUSCLE: ICD-10-CM

## 2018-06-21 PROCEDURE — 98941 CHIROPRACT MANJ 3-4 REGIONS: CPT | Mod: AT | Performed by: CHIROPRACTOR

## 2018-06-21 PROCEDURE — 97810 ACUP 1/> WO ESTIM 1ST 15 MIN: CPT | Performed by: CHIROPRACTOR

## 2018-06-21 NOTE — PROGRESS NOTES
Visit #: 6    Subjective:  Velma Diaz is a 46 year old female who is seen in f/u up for:        Nonallopathic lesion of pelvic region  Lumbago  Nonallopathic lesion of lumbar region  Spasm of muscle.     Since last visit on 6/14/2018,  Velma Diaz reports the following changes: Pain immediately after last treatment: 8/10 and their pain level today 7/10.  Velma reports that she is still experiencing a lot of pain in her low back.  She reports taht she has been going to PT who had been working on her leg pain. Her leg pain has reduced but her back flared up.      Area of chief complaint:  Lumbar :  Symptoms are graded at 7/10. The quality is described as stiff, achey, stabbing.  Motion has improved but not normal. Patient feels that they are improved.     Objective:  The following was observed:    P: pain elicited on palpation, palpatory tenderness, piriformis R>>L    A: static palpation demonstrates intersegmental asymmetry , pelvis, lumbar    R: motion palpation notes restricted motion, :  T10 Extension restriction  T11 Extension restriction  T12 Extension restriction  L4 Right rotation restricted  L5 Right rotation restricted  PSIS Right Extension restriction    T: hypertonicity at: Piriformis R>>L      Assessment:    Segmental spinal dysfunction/restrictions found at:  T10  T11  T12  L4  L5  PSIS Left    Diagnoses:      1. Lumbago    2. Nonallopathic lesion of pelvic region    3. Nonallopathic lesion of lumbar region    4.  5. Spasm of muscle   nonallopathic lesion of thoracic region       Patient's condition:  Patient had restrictions pre-manipulation    Treatment effectiveness:  Post manipulation there is better intersegmental movement and Patient claims to feel looser post manipulation      Procedures:  CMT:  51268 Chiropractic manipulative treatment 3-4 regions performed   Thoracic: Activator, T10, T11, T12, Prone  Lumbar: Activator, L4, L5, Prone  Pelvis: Drop Table, PSIS Right ,  Prone    Modalities:  16517: Acupuncture, for 15 minutes:  Points: B25, B27, GV3, K3, B62, SI3  Ahsi point in piriformis and lumbar erector  For 15 minutes      Therapeutic procedures:  None      Prognosis: Good    Progress towards Goals: Patient is making progress towards the goal     Response to Treatment:   Reduction in symptoms as reported by patient      Recommendations:    Instructions:ice 20 minutes every other hour as needed    Follow-up:  Return to care in one week

## 2018-06-26 ENCOUNTER — MYC REFILL (OUTPATIENT)
Dept: FAMILY MEDICINE | Facility: CLINIC | Age: 48
End: 2018-06-26

## 2018-06-26 DIAGNOSIS — G89.4 CHRONIC PAIN SYNDROME: Chronic | ICD-10-CM

## 2018-06-27 RX ORDER — OXYCODONE AND ACETAMINOPHEN 10; 325 MG/1; MG/1
1 TABLET ORAL EVERY 6 HOURS PRN
Qty: 90 TABLET | Refills: 0 | Status: SHIPPED | OUTPATIENT
Start: 2018-06-27 | End: 2018-07-25

## 2018-06-27 NOTE — TELEPHONE ENCOUNTER
Message from [employer]:  Original authorizing provider: MD Mireille Rodriguez WANDAHan Diaz would like a refill of the following medications:  oxyCODONE-acetaminophen (PERCOCET)  MG per tablet [Srinivasa Hunter MD]    Preferred pharmacy: WRITTEN PRESCRIPTION REQUESTED    Comment:  I know it's a little early for my refill but I'm going [place] of [place] this Saturday for a week.

## 2018-06-27 NOTE — TELEPHONE ENCOUNTER
Oxycodone-Acetaminophen      Last Written Prescription Date:  5/31/18  Last Fill Quantity: 90,   # refills: 0  Last Office Visit: 6/14/18  Future Office visit:    Next 5 appointments (look out 90 days)     Jul 12, 2018  1:00 PM CDT   Return Visit with Joaquín Burger MD   [facility]    [address] [place] [id number]   [phone]            Jul 16, 2018  2:30 PM CDT   Return Visit with SERVANDO Yan   [facility] ([facility])    [address] [facility] [address] [phone]                   Routing refill request to provider for review/approval because:  Drug not on the FMG, UMP or  Health refill protocol or controlled substance    Ama Fall RN  [facility]

## 2018-07-02 ENCOUNTER — MYC MEDICAL ADVICE (OUTPATIENT)
Dept: FAMILY MEDICINE | Facility: CLINIC | Age: 48
End: 2018-07-02

## 2018-07-03 NOTE — TELEPHONE ENCOUNTER
Routing to PCP to review and advise.  Please see My Chart message.      Ly Sandoval RN  [facility]

## 2018-07-08 ENCOUNTER — TRANSFERRED RECORDS (OUTPATIENT)
Dept: HEALTH INFORMATION MANAGEMENT | Facility: CLINIC | Age: 48
End: 2018-07-08

## 2018-07-12 ENCOUNTER — OFFICE VISIT (OUTPATIENT)
Dept: NEUROLOGY | Facility: CLINIC | Age: 48
End: 2018-07-12
Payer: COMMERCIAL

## 2018-07-12 VITALS
TEMPERATURE: 97.7 F | BODY MASS INDEX: 41 KG/M2 | DIASTOLIC BLOOD PRESSURE: 70 MMHG | HEART RATE: 82 BPM | SYSTOLIC BLOOD PRESSURE: 118 MMHG | WEIGHT: 233.3 LBS | RESPIRATION RATE: 18 BRPM

## 2018-07-12 DIAGNOSIS — G89.29 CHRONIC BILATERAL BACK PAIN, UNSPECIFIED BACK LOCATION: Chronic | ICD-10-CM

## 2018-07-12 DIAGNOSIS — M54.9 CHRONIC BILATERAL BACK PAIN, UNSPECIFIED BACK LOCATION: Chronic | ICD-10-CM

## 2018-07-12 DIAGNOSIS — G56.21 ULNAR NEUROPATHY OF RIGHT UPPER EXTREMITY: Primary | ICD-10-CM

## 2018-07-12 DIAGNOSIS — G60.9 IDIOPATHIC PERIPHERAL NEUROPATHY: ICD-10-CM

## 2018-07-12 DIAGNOSIS — G25.81 RESTLESS LEGS SYNDROME (RLS): ICD-10-CM

## 2018-07-12 PROCEDURE — 99214 OFFICE O/P EST MOD 30 MIN: CPT | Performed by: PSYCHIATRY & NEUROLOGY

## 2018-07-12 RX ORDER — HYDROXYZINE PAMOATE 50 MG/1
50 CAPSULE ORAL DAILY
COMMUNITY
Start: 2018-07-10 | End: 2018-10-01

## 2018-07-12 NOTE — MR AVS SNAPSHOT
After Visit Summary   7/12/2018    Velma Diaz    MRN: [id number]           Patient Information     Date Of Birth          1970        Visit Information        Provider Department      7/12/2018 1:00 PM Joaquín Burger MD [facility]        Today's Diagnoses     Ulnar neuropathy of right upper extremity    -  1    Idiopathic peripheral neuropathy        Chronic bilateral back pain, unspecified back location        Restless legs syndrome (RLS)          Care Instructions    AFTER VISIT SUMMARY (AVS):    At today's visit we thoroughly discussed EMG results that demonstrated right ulnar neuropathy, available treatment options, and the plan.    Please wear elbow pad during the day and elbow splint every night for the next 2 months.    No new medications were ordered.    Next follow-up appointment is in the next 2 months or earlier if needed.    Please do not hesitate to call me with any questions or concerns.    Thanks.           Follow-ups after your visit        Follow-up notes from your care team     Return in about 2 months (around 9/12/2018).      Your next 10 appointments already scheduled     Jul 16, 2018  2:30 PM CDT   Return Visit with SERVANDO Yan   [facility] ([facility])    [address] [place] [id number] [phone]            Sep 13, 2018  1:00 PM CDT   Return Visit with Joaquín Burger MD   [facility] ([facility])    [address] [place] [id number] [phone]              Who to contact     If you have questions or need follow up information about today's clinic visit or your schedule please contact [facility] directly at [phone].  Normal or non-critical lab and imaging results will be communicated to you by MyChart, letter or phone within 4 business days after the clinic has  received the results. If you do not hear from us within 7 days, please contact the clinic through [employer] or phone. If you have a critical or abnormal lab result, we will notify you by phone as soon as possible.  Submit refill requests through [employer] or call your pharmacy and they will forward the refill request to us. Please allow 3 business days for your refill to be completed.          Additional Information About Your Visit        [employer] Information     [employer] gives you secure access to your electronic health record. If you see a primary care provider, you can also send messages to your care team and make appointments. If you have questions, please call your primary care clinic.  If you do not have a primary care provider, please call [phone] and they will assist you.        Care EveryWhere ID     This is your Care EveryWhere ID. This could be used by other organizations to access your [place] medical records  [id number]        Your Vitals Were     Pulse Temperature Respirations BMI (Body Mass Index)          82 97.7  F (36.5  C) (Oral) 18 41 kg/m2         Blood Pressure from Last 3 Encounters:   07/12/18 118/70   06/14/18 107/74   06/07/18 108/72    Weight from Last 3 Encounters:   07/12/18 105.8 kg (233 lb 4.8 oz)   06/14/18 104.8 kg (231 lb)   06/07/18 105.4 kg (232 lb 6.4 oz)              Today, you had the following     No orders found for display         Today's Medication Changes          These changes are accurate as of 7/12/18  1:10 PM.  If you have any questions, ask your nurse or doctor.               Start taking these medicines.        Dose/Directions    order for DME   Used for:  Ulnar neuropathy of right upper extremity   Started by:  Joaquín Burger MD        Equipment being ordered: right elbow pad and right elbow splint.   Quantity:  1 each   Refills:  0         These medicines have changed or have updated prescriptions.        Dose/Directions    rOPINIRole 0.25 MG  tablet   Commonly known as:  REQUIP   This may have changed:    - how much to take  - how to take this  - when to take this  - additional instructions   Used for:  Restless legs syndrome (RLS)        Week 1 & 2: 1 tablet 1 hour before going to bed. Week 2 and afterwards: 1 tablet two times a day   Quantity:  180 tablet   Refills:  1            Where to get your medicines      Some of these will need a paper prescription and others can be bought over the counter.  Ask your nurse if you have questions.     Bring a paper prescription for each of these medications     order for DME                Primary Care Provider Office Phone # Fax #    Srinivasa Hunter [phone]       [address] [facility] [address]        Goals        General    I will call within next 2 weeks to schedule initial psychiatry appointment (pt-stated)     Notes - Note edited  5/20/2015 12:51 PM by Yesica Marsh    As of today's date 5/20/2015 goal is met at 76 - 100%.   Goal Status:  Complete  Patient states this is scheduled with Dr. Salvador for next month, but not on appointment calendar  LUDY Stark, MSW   [phone]  5/20/2015 12:51 PM        I will complete [employer] or reduced fare application within the next month (pt-stated)     Notes - Note edited  11/24/2015 12:00 PM by Yesica Marsh    As of today's date 11/24/2015 goal is met at 51 - 75%.   Goal Status:  Active  She reported today having [employer] application, [employer] worker has requested but packet from [employer].    LUDY Stark, MSW   [phone]  11/24/2015 12:00 PM        I will discuss [employer] worker with therapist next week for housing needs  (pt-stated)     Notes - Note edited  12/3/2015  1:17 PM by Yesica Marsh    As of today's date 12/3/2015 goal is met at 76 - 100%.   Goal Status:  Discontinued  Patient has [employer] worker and has found housing with friend  LUDY Stark, MSW   [phone]  12/3/2015  1:16 PM        Equal Access to Services     [facility] : Hadii aad ku hadsandraglenda Yingeugenio, waradhada luqadaha, qaybta kaalmadre cook, grayson sauceda. So [facility] [phone].    ATENCIÓN: Si habla español, tiene a dowd disposición servicios gratuitos de asistencia lingüística. Kwaname al [phone].    We comply with applicable federal civil rights laws and [place] laws. We do not discriminate on the basis of race, color, national origin, age, disability, sex, sexual orientation, or gender identity.            Thank you!     Thank you for choosing [facility]  for your care. Our goal is always to provide you with excellent care. Hearing back from our patients is one way we can continue to improve our services. Please take a few minutes to complete the written survey that you may receive in the mail after your visit with us. Thank you!             Your Updated Medication List - Protect others around you: Learn how to safely use, store and throw away your medicines at www.disposemymeds.org.          This list is accurate as of 7/12/18  1:10 PM.  Always use your most recent med list.                   Brand Name Dispense Instructions for use Diagnosis    acetaminophen 325 MG tablet    TYLENOL    100 tablet    Take 2 tablets (650 mg) by mouth every 4 hours as needed for other (mild pain)    Lesion of left ulnar nerve       albuterol 108 (90 Base) MCG/ACT Inhaler    PROAIR HFA/PROVENTIL HFA/VENTOLIN HFA    1 Inhaler    Inhale 2 puffs into the lungs every 6 hours as needed for shortness of breath / dyspnea or wheezing    Bronchitis       ferrous sulfate 325 (65 Fe) MG tablet    IRON    90 tablet    Take 1 tablet (325 mg) by mouth daily (with breakfast)    Restless legs syndrome (RLS)       furosemide 20 MG tablet    LASIX    30 tablet    Take 1 tablet (20 mg) by mouth daily    Leg swelling       hydrOXYzine 50 MG capsule    VISTARIL     Take 50 mg by mouth daily At night         LYRICA 225 MG Caps   Generic drug:  Pregabalin     60 capsule    TAKE 1 TABLET (225 MG) BY MOUTH TWICE A DAY    Chronic pain syndrome       methocarbamol 750 MG tablet    ROBAXIN    60 tablet    Take 1 tablet (750 mg) by mouth 3 times daily as needed for muscle spasms    Chronic low back pain, unspecified back pain laterality, with sciatica presence unspecified       MULTI FOR HER PO      Take by mouth daily        nystatin 488119 UNIT/GM Powd    MYCOSTATIN    60 g    Apply to affected area twice daily as needed    Candidal intertrigo       order for DME      Respironics REMSTAR 60 Series Auto [id number] H2O, Airfit P10 nasal pillow mask w/xsmall pillows        order for DME     1 Device    TENS unit    Chronic bilateral back pain, unspecified back location       order for DME     1 each    Equipment being ordered: right elbow pad and right elbow splint.    Ulnar neuropathy of right upper extremity       oxyCODONE-acetaminophen  MG per tablet    PERCOCET    90 tablet    Take 1 tablet by mouth every 6 hours as needed for moderate to severe pain    Chronic pain syndrome       rOPINIRole 0.25 MG tablet    REQUIP    180 tablet    Week 1 & 2: 1 tablet 1 hour before going to bed. Week 2 and afterwards: 1 tablet two times a day    Restless legs syndrome (RLS)       SUMAtriptan 100 MG tablet    IMITREX    9 tablet    Take 1 tablet (100 mg) by mouth at onset of headache for migraine May repeat in 2 hours if needed: max 2/day    Headache(784.0)

## 2018-07-12 NOTE — PATIENT INSTRUCTIONS
AFTER VISIT SUMMARY (AVS):    At today's visit we thoroughly discussed EMG results that demonstrated right ulnar neuropathy, available treatment options, and the plan.    Please wear elbow pad during the day and elbow splint every night for the next 2 months.    No new medications were ordered.    Next follow-up appointment is in the next 2 months or earlier if needed.    Please do not hesitate to call me with any questions or concerns.    Thanks.

## 2018-07-12 NOTE — PROGRESS NOTES
ESTABLISHED PATIENT NEUROLOGY NOTE    DATE OF VISIT: 7/12/2018  CLINIC LOCATION: [facility]  MRN: [id number]  PATIENT NAME: Velma Diaz  YOB: 1970    PCP: Srinivasa Hunter MD    REASON FOR VISIT:   Chief Complaint   Patient presents with     Follow Up For     EMG review-had an injection in the right IS joint and it feels better     SUBJECTIVE:                                                      HISTORY OF PRESENT ILLNESS: Patient is here for follow up regarding right hand paresthesias.  At the last visit on 06/07/2018, EMG was ordered to clarify her symptoms.  Please refer to my initial/other prior notes for further information.    Since the last visit, the patient reports no significant interval changes of her right hand paresthesias.    The patient completed EMG on 06/19/2018.  There was electrodiagnostic evidence of right chronic ulnar motor neuropathy localizing proximally to the branch of the flexor carpi ulnaris muscle.  No evidence of right C8 radiculopathy or lower trunk brachial plexopathy was noted.    She visited the emergency room twice for her low back pain.  The last time, she got the right SI injection, which was helpful.  Will see a neurosurgeon next week for a follow-up.    Her RLS and peripheral neuropathy are manageable on the current medication regimen.    On review of systems, patient endorses no additional active complaints. Medications, allergies, family and social history were also reviewed. There are no changes reported by patient.  REVIEW OF SYSTEMS:                                                    10-system review was completed. Pertinent positives are included in HPI. The remainder of ROS is negative.  EXAM:                                                    Physical Exam:   Vitals: /70 (BP Location: Left arm, Patient Position: Sitting, Cuff Size: Adult Large)  Pulse 82  Temp 97.7  F (36.5  C) (Oral)  Resp 18  Wt 105.8 kg (233 lb 4.8  oz)  BMI 41 kg/m2    General: pt is in NAD, cooperative.  Skin: normal turgor, moist mucous membranes, no lesions/rashes noticed.  HEENT: ATNC, white sclera, normal conjunctiva.  Respiratory: Symmetric lung excursion, no accessory respiratory muscle use.  Abdomen: Non distended.  Neurological: awake, cooperative, follows commands, no changes compared to the last visit.  DATA:   EMG: I personally reviewed the tabulated EMG data, as detailed in the history of present illness.  ASSESSMENT and PLAN:                                                    Assessment: 47-year-old female patient with history of chronic pain syndrome, peripheral neuropathy, left ulnar neuropathy, low back pain, and restless leg syndrome presents for follow-up of her right hand paresthesias.    She completed the recommended EMG, which demonstrated mild chronic right ulnar motor neuropathy localizing proximally to the branch of the flexor carpi ulnaris muscle.  Nerve conduction studies of the right ulnar nerve were normal.  We reviewed in detail these findings with the patient.  I discussed that most likely her ulnar neuropathy is due to compression at the elbow and we should try conservative measures aimed at that area.  We might consider repeating the EMG study in 2 months if her symptoms do not improve or worsen after that time.    Her RLS and peripheral polyneuropathy are currently manageable on the current medication regimen, and no medication adjustments are needed.    Low back pain and MRI changes will be discussed with a neurosurgeon next week.    Diagnoses:    ICD-10-CM    1. Ulnar neuropathy of right upper extremity G56.21 order for DME   2. Idiopathic peripheral neuropathy G60.9    3. Chronic bilateral back pain, unspecified back location M54.9     G89.29    4. Restless legs syndrome (RLS) G25.81      Plan: At today's visit we thoroughly discussed EMG results (right ulnar neuropathy), available treatment options, and the plan.    I  recommended the patient to wear elbow pad during the day and elbow splint every night for the next 2 months.    No new medications were ordered.    Next follow-up appointment is in the next 2 months or earlier if needed.    I advised the patient to call me with any questions or concerns.    Total Time: 27 minutes with > 50% spent counseling the patient on stated above assessment and recommendations, including nature of the diagnosis, EMG results, and proposed plan of treatment.    Joaquín Burger MD  / Neurology

## 2018-07-12 NOTE — LETTER
7/12/2018         RE: Velma Diaz  [address]  [place] [address]        Dear Colleague,    Thank you for referring your patient, Velma Diaz, to the [facility]. Please see a copy of my visit note below.    ESTABLISHED PATIENT NEUROLOGY NOTE    DATE OF VISIT: 7/12/2018  CLINIC LOCATION: [facility]  MRN: [id number]  PATIENT NAME: Velma Diaz  YOB: 1970    PCP: Srinivasa Hunter MD    REASON FOR VISIT:   Chief Complaint   Patient presents with     Follow Up For     EMG review-had an injection in the right IS joint and it feels better     SUBJECTIVE:                                                      HISTORY OF PRESENT ILLNESS: Patient is here for follow up regarding right hand paresthesias.  At the last visit on 06/07/2018, EMG was ordered to clarify her symptoms.  Please refer to my initial/other prior notes for further information.    Since the last visit, the patient reports no significant interval changes of her right hand paresthesias.    The patient completed EMG on 06/19/2018.  There was electrodiagnostic evidence of right chronic ulnar motor neuropathy localizing proximally to the branch of the flexor carpi ulnaris muscle.  No evidence of right C8 radiculopathy or lower trunk brachial plexopathy was noted.    She visited the emergency room twice for her low back pain.  The last time, she got the right SI injection, which was helpful.  Will see a neurosurgeon next week for a follow-up.    Her RLS and peripheral neuropathy are manageable on the current medication regimen.    On review of systems, patient endorses no additional active complaints. Medications, allergies, family and social history were also reviewed. There are no changes reported by patient.  REVIEW OF SYSTEMS:                                                    10-system review was completed. Pertinent positives are included in HPI. The remainder of ROS is negative.  EXAM:                                                     Physical Exam:   Vitals: /70 (BP Location: Left arm, Patient Position: Sitting, Cuff Size: Adult Large)  Pulse 82  Temp 97.7  F (36.5  C) (Oral)  Resp 18  Wt 105.8 kg (233 lb 4.8 oz)  BMI 41 kg/m2    General: pt is in NAD, cooperative.  Skin: normal turgor, moist mucous membranes, no lesions/rashes noticed.  HEENT: ATNC, white sclera, normal conjunctiva.  Respiratory: Symmetric lung excursion, no accessory respiratory muscle use.  Abdomen: Non distended.  Neurological: awake, cooperative, follows commands, no changes compared to the last visit.  DATA:   EMG: I personally reviewed the tabulated EMG data, as detailed in the history of present illness.  ASSESSMENT and PLAN:                                                    Assessment: 47-year-old female patient with history of chronic pain syndrome, peripheral neuropathy, left ulnar neuropathy, low back pain, and restless leg syndrome presents for follow-up of her right hand paresthesias.    She completed the recommended EMG, which demonstrated mild chronic right ulnar motor neuropathy localizing proximally to the branch of the flexor carpi ulnaris muscle.  Nerve conduction studies of the right ulnar nerve were normal.  We reviewed in detail these findings with the patient.  I discussed that most likely her ulnar neuropathy is due to compression at the elbow and we should try conservative measures aimed at that area.  We might consider repeating the EMG study in 2 months if her symptoms do not improve or worsen after that time.    Her RLS and peripheral polyneuropathy are currently manageable on the current medication regimen, and no medication adjustments are needed.    Low back pain and MRI changes will be discussed with a neurosurgeon next week.    Diagnoses:    ICD-10-CM    1. Ulnar neuropathy of right upper extremity G56.21 order for DME   2. Idiopathic peripheral neuropathy G60.9    3. Chronic  bilateral back pain, unspecified back location M54.9     G89.29    4. Restless legs syndrome (RLS) G25.81      Plan: At today's visit we thoroughly discussed EMG results (right ulnar neuropathy), available treatment options, and the plan.    I recommended the patient to wear elbow pad during the day and elbow splint every night for the next 2 months.    No new medications were ordered.    Next follow-up appointment is in the next 2 months or earlier if needed.    I advised the patient to call me with any questions or concerns.    Total Time: 27 minutes with > 50% spent counseling the patient on stated above assessment and recommendations, including nature of the diagnosis, EMG results, and proposed plan of treatment.    Joaquín Burger MD  / Neurology         Again, thank you for allowing me to participate in the care of your patient.        Sincerely,        Joaquín Burger MD

## 2018-07-16 ENCOUNTER — OFFICE VISIT (OUTPATIENT)
Dept: PSYCHOLOGY | Facility: CLINIC | Age: 48
End: 2018-07-16
Payer: COMMERCIAL

## 2018-07-16 DIAGNOSIS — F33.1 MAJOR DEPRESSIVE DISORDER, RECURRENT EPISODE, MODERATE (H): Chronic | ICD-10-CM

## 2018-07-16 DIAGNOSIS — F41.1 GAD (GENERALIZED ANXIETY DISORDER): Primary | ICD-10-CM

## 2018-07-16 PROCEDURE — 90834 PSYTX W PT 45 MINUTES: CPT | Performed by: SOCIAL WORKER

## 2018-07-16 ASSESSMENT — ANXIETY QUESTIONNAIRES
7. FEELING AFRAID AS IF SOMETHING AWFUL MIGHT HAPPEN: SEVERAL DAYS
6. BECOMING EASILY ANNOYED OR IRRITABLE: MORE THAN HALF THE DAYS
2. NOT BEING ABLE TO STOP OR CONTROL WORRYING: SEVERAL DAYS
5. BEING SO RESTLESS THAT IT IS HARD TO SIT STILL: NOT AT ALL
1. FEELING NERVOUS, ANXIOUS, OR ON EDGE: NOT AT ALL
IF YOU CHECKED OFF ANY PROBLEMS ON THIS QUESTIONNAIRE, HOW DIFFICULT HAVE THESE PROBLEMS MADE IT FOR YOU TO DO YOUR WORK, TAKE CARE OF THINGS AT HOME, OR GET ALONG WITH OTHER PEOPLE: SOMEWHAT DIFFICULT
GAD7 TOTAL SCORE: 7
3. WORRYING TOO MUCH ABOUT DIFFERENT THINGS: SEVERAL DAYS

## 2018-07-16 ASSESSMENT — PATIENT HEALTH QUESTIONNAIRE - PHQ9: 5. POOR APPETITE OR OVEREATING: MORE THAN HALF THE DAYS
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
## 2018-07-16 NOTE — MR AVS SNAPSHOT
MRN:[id number]                      After Visit Summary   7/16/2018    Velma Diaz    MRN: [id number]           Visit Information        Provider Department      7/16/2018 2:30 PM Antonio Rios LICSW [facility]      Your next 10 appointments already scheduled     Aug 06, 2018  2:00 PM CDT   New Visit with Kelli Hahn MD   [facility]    [address] [facility] [address]   [phone]            Aug 13, 2018  2:30 PM CDT   Return Visit with SERVANDO Yan   [facility] ([facility]    [address] [id number] [phone]            Sep 13, 2018  1:00 PM CDT   [facility] Joaquín Burger MD   [facility]    [address] [place] [id number] [phone]              MyChart Information     [employer] gives you secure access to your electronic health record. If you see a primary care provider, you can also send messages to your care team and make appointments. If you have questions, please call your primary care clinic.  If you do not have a primary care provider, please call [phone] and they will assist you.        Care EveryWhere ID     This is your Care EveryWhere ID. This could be used by other organizations to access your [place] medical records  [id number]        Equal Access to Services     LOLA HERNANDEZ AH: Hadii aad ku hadasho Soomaali, waaxda luqadaha, qaybta kaalmada adeegyada, waxhadley jean-baptistein haylemuel carrillo . So [facility] [phone].    ATENCIÓN: Si habla español, tiene a dowd disposición servicios gratuitos de asistencia lingüística. Llame al [phone].    We comply with applicable federal civil rights laws and [place] laws. We do not discriminate on the basis of race, color, national origin, age,  disability, sex, sexual orientation, or gender identity.

## 2018-07-17 ASSESSMENT — ANXIETY QUESTIONNAIRES: GAD7 TOTAL SCORE: 7

## 2018-07-17 ASSESSMENT — PATIENT HEALTH QUESTIONNAIRE - PHQ9: SUM OF ALL RESPONSES TO PHQ QUESTIONS 1-9: 11

## 2018-07-19 ENCOUNTER — TRANSFERRED RECORDS (OUTPATIENT)
Dept: HEALTH INFORMATION MANAGEMENT | Facility: CLINIC | Age: 48
End: 2018-07-19

## 2018-07-24 ENCOUNTER — THERAPY VISIT (OUTPATIENT)
Dept: CHIROPRACTIC MEDICINE | Facility: CLINIC | Age: 48
End: 2018-07-24
Payer: COMMERCIAL

## 2018-07-24 DIAGNOSIS — M54.50 LUMBAGO: ICD-10-CM

## 2018-07-24 DIAGNOSIS — M99.02 THORACIC SEGMENT DYSFUNCTION: ICD-10-CM

## 2018-07-24 DIAGNOSIS — M99.05 SEGMENTAL DYSFUNCTION OF PELVIC REGION: Primary | ICD-10-CM

## 2018-07-24 DIAGNOSIS — M99.03 SEGMENTAL DYSFUNCTION OF LUMBAR REGION: ICD-10-CM

## 2018-07-24 DIAGNOSIS — M62.838 SPASM OF MUSCLE: ICD-10-CM

## 2018-07-24 PROCEDURE — 98941 CHIROPRACT MANJ 3-4 REGIONS: CPT | Mod: AT | Performed by: CHIROPRACTOR

## 2018-07-24 PROCEDURE — 97810 ACUP 1/> WO ESTIM 1ST 15 MIN: CPT | Performed by: CHIROPRACTOR

## 2018-07-24 NOTE — PROGRESS NOTES
Visit #: 7    Subjective:  Velma Diaz is a 46 year old female who is seen in f/u up for:        Nonallopathic lesion of pelvic region  Lumbago  Nonallopathic lesion of lumbar region  Spasm of muscle.     Since last visit on 6/21/2018,  Velma Diaz reports the following changes: Pain immediately after last treatment: 7/10 and their pain level today 5/10.  Velma reports that she is feeling a little better.  She has had a rough month in that she has been to the hospital for her low back pain. And has recieved a SI joint injection which has helped a lot.  She has also started pt for her low back which is helping as well.      Area of chief complaint:  Lumbar :  Symptoms are graded at 5/10. The quality is described as stiff, achey, stabbing.  Motion has improved but not normal. Patient feels that they are improved.     Objective:  The following was observed:    P: pain elicited on palpation, palpatory tenderness, piriformis R>>L    A: static palpation demonstrates intersegmental asymmetry , pelvis, lumbar    R: motion palpation notes restricted motion, :  T10 Extension restriction  T11 Extension restriction  T12 Extension restriction  L4 Right rotation restricted  L5 Right rotation restricted  PSIS Right Extension restriction    T: hypertonicity at: Piriformis R>>L      Assessment:    Segmental spinal dysfunction/restrictions found at:  T10  T11  T12  L4  L5  PSIS Left    Diagnoses:      1. Lumbago    2. Nonallopathic lesion of pelvic region    3. Nonallopathic lesion of lumbar region    4.  5. Spasm of muscle   nonallopathic lesion of thoracic region       Patient's condition:  Patient had restrictions pre-manipulation    Treatment effectiveness:  Post manipulation there is better intersegmental movement and Patient claims to feel looser post manipulation      Procedures:  CMT:  62282 Chiropractic manipulative treatment 3-4 regions performed   Thoracic: Activator, T10, T11, T12, Prone  Lumbar: Activator, L4,  L5, Prone  Pelvis: Drop Table, PSIS Right , Prone    Modalities:  47606: Acupuncture, for 15 minutes:  Points: B25, B27, GV3, K3, B62, SI3  Ahsi point in piriformis and lumbar erector  For 15 minutes      Therapeutic procedures:  None      Prognosis: Good    Progress towards Goals: Patient is making progress towards the goal     Response to Treatment:   Reduction in symptoms as reported by patient      Recommendations:    Instructions:ice 20 minutes every other hour as needed    Follow-up:  Return to care in one week

## 2018-07-25 ENCOUNTER — MYC REFILL (OUTPATIENT)
Dept: FAMILY MEDICINE | Facility: CLINIC | Age: 48
End: 2018-07-25

## 2018-07-25 DIAGNOSIS — G89.4 CHRONIC PAIN SYNDROME: Chronic | ICD-10-CM

## 2018-07-25 RX ORDER — OXYCODONE AND ACETAMINOPHEN 10; 325 MG/1; MG/1
1 TABLET ORAL EVERY 6 HOURS PRN
Qty: 90 TABLET | Refills: 0 | Status: SHIPPED | OUTPATIENT
Start: 2018-07-25 | End: 2018-08-20

## 2018-07-25 NOTE — TELEPHONE ENCOUNTER
Requested Prescriptions   Pending Prescriptions Disp Refills     oxyCODONE-acetaminophen (PERCOCET)  MG per tablet 90 tablet 0     Sig: Take 1 tablet by mouth every 6 hours as needed for moderate to severe pain    There is no refill protocol information for this order          Last refill 6/27/18 # 90  Last OV 6/14/18.    Routing refill request to provider for review/approval because:  Drug not on the [facility] refill protocol   Marisela Shea RN [facility] Triage.

## 2018-07-25 NOTE — TELEPHONE ENCOUNTER
Message from MyChart:  Original authorizing provider: MD Mireille Rodriguez would like a refill of the following medications:  oxyCODONE-acetaminophen (PERCOCET)  MG per tablet [Srinivasa Hunter MD]    Preferred pharmacy: WRITTEN PRESCRIPTION REQUESTED    Comment:  I will come  written prescription at office. Thank you

## 2018-08-06 ENCOUNTER — OFFICE VISIT (OUTPATIENT)
Dept: FAMILY MEDICINE | Facility: CLINIC | Age: 48
End: 2018-08-06
Payer: COMMERCIAL

## 2018-08-06 VITALS
WEIGHT: 238 LBS | OXYGEN SATURATION: 99 % | HEART RATE: 73 BPM | DIASTOLIC BLOOD PRESSURE: 76 MMHG | BODY MASS INDEX: 41.83 KG/M2 | SYSTOLIC BLOOD PRESSURE: 111 MMHG | TEMPERATURE: 97.6 F

## 2018-08-06 DIAGNOSIS — R21 RASH: Primary | ICD-10-CM

## 2018-08-06 DIAGNOSIS — Z20.7 SCABIES EXPOSURE: ICD-10-CM

## 2018-08-06 PROCEDURE — 99213 OFFICE O/P EST LOW 20 MIN: CPT | Performed by: NURSE PRACTITIONER

## 2018-08-06 RX ORDER — PERMETHRIN 50 MG/G
CREAM TOPICAL
Qty: 60 G | Refills: 1 | Status: SHIPPED | OUTPATIENT
Start: 2018-08-06 | End: 2018-08-28

## 2018-08-06 RX ORDER — TRIAMCINOLONE ACETONIDE 1 MG/G
CREAM TOPICAL
Qty: 30 G | Refills: 0 | Status: SHIPPED | OUTPATIENT
Start: 2018-08-06 | End: 2018-08-28

## 2018-08-06 ASSESSMENT — PAIN SCALES - GENERAL: PAINLEVEL: MODERATE PAIN (4)

## 2018-08-06 NOTE — PROGRESS NOTES
SUBJECTIVE:   Velma Diaz is a 47 year old female who presents to clinic today for the following health issues:      Rash  Onset: 4 days ago    Description:   Location: all over  Character: round, raised, red  Itching (Pruritis): YES    Progression of Symptoms:  worsening    Accompanying Signs & Symptoms:  Fever: no   Body aches or joint pain: no   Sore throat symptoms: no   Recent cold symptoms: no     History:   Previous similar rash: no     Precipitating factors:   Exposure to similar rash: no   New exposures: medication: recent Antibody use   Recent travel: no     Alleviating factors:  None    Therapies Tried and outcome: OTC itch cream; relieves itch    47 year old female presents with the above concerns. No new lotions, soaps, perfumes, detergents, foods, medications, insect bites. Exposed to someone with scabies. No recent travel. Very itchy, especially at night. Lesions to hands, wrists, ACs, low back.      Problem list and histories reviewed & adjusted, as indicated.  Additional history: as documented    Patient Active Problem List   Diagnosis     History of cleft palate with cleft lip     MAYA (obstructive sleep apnea)/Hypoventilation Syndrome     Major depressive disorder, recurrent episode, moderate (H)     Paresthesias     Health Care Home     Vitamin D deficiency     Morbid obesity with BMI of 40.0-44.9, adult (H)     Hyperlipidemia with target LDL less than 130     Intervertebral disc prolapse with impingement     Nonallopathic lesion of lumbar region     Chronic pain syndrome     Restless legs syndrome (RLS)     Insomnia     Migraine     Chronic bilateral back pain, unspecified back location     Chronic pain of left knee     ROSE MARIE (generalized anxiety disorder)     Idiopathic peripheral neuropathy     Past Surgical History:   Procedure Laterality Date     ANKLE SURGERY  7/13    Left for torn tendons & loose bone chips     ARTHROSCOPY KNEE  1986    Right knee     BACK SURGERY       Basal cell  carcinoma  2011    Removal from the chest     BIOPSY       C VAGINAL HYSTERECTOMY  2011     CARPAL TUNNEL RELEASE RT/LT  ~2010    Bilateral     COLONOSCOPY  2016     COSMETIC SURGERY       COSMETIC SURGERY       DECOMPRESSION CUBITAL TUNNEL Left 8/28/2015    Procedure: DECOMPRESSION CUBITAL TUNNEL;  Surgeon: Gus Donato MD;  [facility]     ENT SURGERY  1975    Tonsillectomy and Adenoids     GYN SURGERY  2011    Hysterectomy     HC REPAIR PERONEAL TENDONS  7/13     ORTHOPEDIC SURGERY  2011, 2011    Bilateral CTR     PE TUBES      yearly times 10 years     REPAIR CLEFT PALATE CHILD      Age 3 months & afterwards (multiple surgeries)     SOFT TISSUE SURGERY       SOFT TISSUE SURGERY  2013       Social History   Substance Use Topics     Smoking status: Former Smoker     Packs/day: 0.50     Years: 15.00     Types: Cigarettes     Quit date: 2/20/2015     Smokeless tobacco: Never Used     Alcohol use No      Comment: Quit in September 27th     Family History   Problem Relation Age of Onset     Alzheimer Disease Paternal Grandmother 60     Cerebrovascular Disease Paternal Grandmother      Neurologic Disorder Sister 20     migraines     Depression/Anxiety Sister      Depression Sister      Neurologic Disorder Son 14     migraines     Asthma Son      HEART DISEASE Mother      Osteoperosis Mother      Obesity Mother      Cancer Father      Alcohol/Drug Father      Other Cancer Father      saliva glands & skin CA      Hypertension Father      Diabetes Maternal Grandmother      Breast Cancer Maternal Grandmother      Obesity Maternal Grandmother      Other Cancer Maternal Grandfather      skin cancer     Cancer - colorectal Other      Aunt     Asthma Daughter      Asthma Daughter      Asthma Son      Thyroid Disease Sister      Colon Cancer Other      Obesity Sister      Glaucoma No family hx of      Macular Degeneration No family hx of          Current Outpatient Prescriptions   Medication Sig Dispense Refill      acetaminophen (TYLENOL) 325 MG tablet Take 2 tablets (650 mg) by mouth every 4 hours as needed for other (mild pain) 100 tablet 0     ferrous sulfate (IRON) 325 (65 FE) MG tablet Take 1 tablet (325 mg) by mouth daily (with breakfast) 90 tablet 4     furosemide (LASIX) 20 MG tablet Take 1 tablet (20 mg) by mouth daily 30 tablet 11     LYRICA 225 MG CAPS TAKE 1 TABLET (225 MG) BY MOUTH TWICE A DAY 60 capsule 5     methocarbamol (ROBAXIN) 750 MG tablet Take 1 tablet (750 mg) by mouth 3 times daily as needed for muscle spasms 60 tablet 3     Multiple Vitamins-Minerals (MULTI FOR HER PO) Take by mouth daily        nystatin (MYCOSTATIN) 616075 UNIT/GM POWD Apply to affected area twice daily as needed 60 g 2     order for DME Equipment being ordered: right elbow pad and right elbow splint. 1 each 0     order for DME TENS unit 1 Device 0     ORDER FOR DME Respironics REMSTAR 60 Series Auto [id number] H2O, Airfit P10 nasal pillow mask w/xsmall pillows       oxyCODONE-acetaminophen (PERCOCET)  MG per tablet Take 1 tablet by mouth every 6 hours as needed for moderate to severe pain 90 tablet 0     permethrin (ELIMITE) 5 % cream Apply cream from head to toe (except the face); leave on for 8-14 hours then wash off with water; reapply in 1 week 60 g 1     rOPINIRole (REQUIP) 0.25 MG tablet Take 1 tablet (0.25 mg) by mouth 2 times daily 60 tablet 3     SUMAtriptan (IMITREX) 100 MG tablet Take 1 tablet (100 mg) by mouth at onset of headache for migraine May repeat in 2 hours if needed: max 2/day 9 tablet 2     triamcinolone (KENALOG) 0.1 % cream Apply sparingly to affected area two times daily for 10 days. 30 g 0     albuterol (PROAIR HFA/PROVENTIL HFA/VENTOLIN HFA) 108 (90 Base) MCG/ACT Inhaler Inhale 2 puffs into the lungs every 6 hours as needed for shortness of breath / dyspnea or wheezing (Patient not taking: Reported on 6/14/2018) 1 Inhaler 0     hydrOXYzine (VISTARIL) 50 MG capsule Take 50 mg by mouth daily At night        No Known Allergies    Reviewed and updated as needed this visit by clinical staff  Tobacco  Allergies  Meds  Problems  Med Hx  Surg Hx  Fam Hx  Soc Hx        Reviewed and updated as needed this visit by Provider  Allergies  Meds  Problems         ROS:  Constitutional, HEENT, cardiovascular, pulmonary, GI, , musculoskeletal, neuro, skin, endocrine and psych systems are negative, except as otherwise noted.    OBJECTIVE:     /76 (BP Location: Right arm, Patient Position: Chair, Cuff Size: Adult Large)  Pulse 73  Temp 97.6  F (36.4  C) (Oral)  Wt 238 lb (108 kg)  LMP  (LMP Unknown)  SpO2 99%  Breastfeeding? No  BMI 41.83 kg/m2  Body mass index is 41.83 kg/(m^2).  GENERAL: healthy, alert and no distress  NECK: no adenopathy, no asymmetry, masses, or scars and thyroid normal to palpation  RESP: lungs clear to auscultation - no rales, rhonchi or wheezes  CV: regular rate and rhythm, normal S1 S2, no S3 or S4, no murmur, click or rub, no peripheral edema and peripheral pulses strong  MS: no gross musculoskeletal defects noted, no edema  SKIN: erythematous papules webbing of hands bilaterally, bilateral wrists, forearms, ACs, lower back  PSYCH: mentation appears normal, affect normal/bright    Diagnostic Test Results:  none     ASSESSMENT/PLAN:       ICD-10-CM    1. Rash R21 permethrin (ELIMITE) 5 % cream     triamcinolone (KENALOG) 0.1 % cream   2. Scabies exposure Z20.89 permethrin (ELIMITE) 5 % cream     Start permethrin today and repeat in 1 week. Follow directions on the bottle  Use triamcinolone only on the itchiest spots. Don't apply the same time as permethrin   Follow up in clinic if not improving in 1 week    See Patient Instructions    The benefits, risks and potential side effects were discussed in detail. Black box warnings discussed as relevant. All patient questions were answered. The patient was instructed to follow up immediately if any adverse reactions develop.    Patient  verbalizes understanding and agrees with plan of care. Patient stable for discharge.      CONNOR Bryan CNP  [facility]

## 2018-08-06 NOTE — MR AVS SNAPSHOT
After Visit Summary   8/6/2018    Velma Diaz    MRN: [id number]           Patient Information     Date Of Birth          1970        Visit Information        Provider Department      8/6/2018 11:20 AM Thuy Sharma APRN CNP [facility]        Today's Diagnoses     Rash    -  1    Scabies exposure          Care Instructions    Start permethrin today and repeat in 1 week. Follow directions on the bottle  Use triamcinolone only on the itchiest spots. Don't apply the same time as permethrin   Follow up in clinic if not improving in 1 week      At [facility], we strive to deliver an exceptional experience to you, every time we see you.  If you receive a survey in the mail, please send us back your thoughts. We really do value your feedback.    Based on your medical history, these are the current health maintenance/preventive care services that you are due for (some may have been done at this visit.)  Health Maintenance Due   Topic Date Due     HIV SCREEN (SYSTEM ASSIGNED)  10/22/1988     EYE EXAM Q1 YEAR  03/28/2018         Suggested websites for health information:  Www.[employer].apstrata : Up to date and easily searchable information on multiple topics.  Www.medlineplus.gov : medication info, interactive tutorials, watch real surgeries online  Www.familydoctor.org : good info from the Academy of Family Physicians  Www.cdc.gov : public health info, travel advisories, epidemics (H1N1)  Www.aap.org : children's health info, normal development, vaccinations  Www.health.[facility].[place].us : [place] dept of health, public health issues in [place], N1N1    Your care team:                            Family Medicine Internal Medicine   MD Steven Henry MD Shantel Branch-Fleming, MD Katya Georgiev PA-C Nam Ho, MD Pediatrics   Michael Byrd, CHESTER Walker, MD Kelly Fregoso CNP, MD Deborah Mielke, MD Kim  Davidin, APRMEGAN CNP      Clinic hours: Monday - Thursday 7 am-7 pm; Fridays 7 am-5 pm.   Urgent care: Monday - Friday 11 am-9 pm; Saturday and Sunday 9 am-5 pm.  Pharmacy : Monday -Thursday 8 am-8 pm; Friday 8 am-6 pm; Saturday and Sunday 9 am-5 pm.     Clinic: [phone]   Pharmacy: [phone]    Scabies  Scabies is a skin infection. It is caused by a tiny parasitic insect, or mite, that is too small to see directly. It can be seen under a microscope, but it is usually recognized only by the rash and symptoms it causes. This can make it hard to diagnose since the signs and symptoms can be similar to other diseases.  The scabies mite tunnels under the skin. It creates a small burrow, where it leaves its eggs. These eggs karimi and grow into adults. They then create new burrows over the next 1 to 2 weeks. The mites die in about 4 to 6 weeks. The rash and itching are caused by an allergic reaction to the scabies saliva or feces.  Scabies is highly contagious. It is spread by direct skin contact. It is easily spread by close personal contact, sexual contact, or by sharing bed linens or clothing used by an infected person.  It may take 4 to 6 weeks for symptoms to appear after being exposed. Everyone living in the house with you, as well as your sexual partners, should be treated at the same time. After the first treatment, you will no longer be contagious. You may return to work, school or .  Home care    Machine wash in hot water all sheets, towels, pillowcases, underwear, pajamas, and any other clothing you have worn lately. Use the hot cycle of a dryer or use a hot iron to sterilize.    Seal anything that is hard to wash in a plastic trash bag for 4 days. This includes coats, jackets, blankets, and bedspreads. (The insects die after 3 days off the human body.)  Medicines  Scabicides  Medicines used to treat scabies are called scabicides. These are creams that kill the scabies mites. A prescription is  needed. When using these medicines:    Always follow instructions provided by your healthcare provider and pharmacist. Also follow the printed instructions that come with the medicine.    Talk with your provider about precautions to take when using these medicines.    Use the cream on your body when your skin is cool and dry. Don t use it after a hot shower or bath.    Usually the cream is put on your whole body. This means from your chin all the way down to your toes. Scabies does not usually affect an adult s head. So cream is not needed there. For children, discuss this with your child s provider.    Leave the cream or lotion on for the recommended amount of time. This is usually 8 to 12 hours.    Don t leave cream or lotion on your skin longer than directed. Don t use more than recommended.    Clean clothes should be worn after the treatment.    If you wash your hands after using the cream, you will need to reapply the cream to your hands.    If you are breastfeeding, wash off your nipples before feeding. Then reapply the cream after breastfeeding.    For babies or infants, put mittens on their hands. This will stop them from licking the cream or lotion. It will also stop them from scratching themselves because of the itching.  Other medicines    An oral medicine called ivermectin may be prescribed for severe cases. It may also be used if you can t apply creams.    Itching may cause the most discomfort. If large areas of your skin are affected, over-the-counter antihistamines may be used to reduce itching. Or you may be given a prescription antihistamine. Some of these medicines may make you sleepy. They are best used at bedtime. Antihistamines that don t make you sleepy can be used during the day. Note: Don t use medicine that has diphenhydramine if you have glaucoma, or if you are a man who has trouble urinating due to an enlarged prostate.    If you were given antibiotics due to a bacterial infection, take  them until they are finished. It is important to finish the antibiotics even if the wound looks better. This is to make sure the infection has cleared.  Follow-up care  Follow up with your healthcare provider, or as advised. Call your provider if your symptoms don t improve after 1 week, or if new burrows or rashes appear.  When to seek medical advice  Call your healthcare provider right away if any of these occur:    Yellow-brown crusts or drainage from the sores    Other signs of infection, including increasing redness, swelling, pain, or pus    Fever of 100.4 F (38 C) or higher, or as directed by your provider  Date Last Reviewed: 8/1/2016 2000-2017 [employer]. [address] [place] [address], [place] [address]. All rights reserved. This information is not intended as a substitute for professional medical care. Always follow your healthcare professional's instructions.                Follow-ups after your visit        Your next 10 appointments already scheduled     Aug 06, 2018  2:00 PM CDT   New Visit with Kelli Hahn MD   [facility]    [address] [place] [id number]   [phone]            Aug 20, 2018 10:00 AM CDT   Return Visit with SERVANDO Yan   [facility]    [address] [place] [id number] [phone]            Sep 13, 2018  1:00 PM CDT   Return Visit with Joaquín Burger MD   [facility]    [address] [place] [id number] [phone]              Who to contact     If you have questions or need follow up information about today's clinic visit or your schedule please contact [facility] KRISSY GONZALES directly at [phone].  Normal or non-critical lab and imaging results will be communicated to you by MyChart, letter or phone within 4 business days after the  clinic has received the results. If you do not hear from us within 7 days, please contact the clinic through [employer] or phone. If you have a critical or abnormal lab result, we will notify you by phone as soon as possible.  Submit refill requests through [employer] or call your pharmacy and they will forward the refill request to us. Please allow 3 business days for your refill to be completed.          Additional Information About Your Visit        [employer] Information     [employer] gives you secure access to your electronic health record. If you see a primary care provider, you can also send messages to your care team and make appointments. If you have questions, please call your primary care clinic.  If you do not have a primary care provider, please call [phone] and they will assist you.        Care EveryWhere ID     This is your Care EveryWhere ID. This could be used by other organizations to access your [facility] medical records  [id number]        Your Vitals Were     Pulse Temperature Last Period Pulse Oximetry Breastfeeding? BMI (Body Mass Index)    73 97.6  F (36.4  C) (Oral) (LMP Unknown) 99% No 41.83 kg/m2       Blood Pressure from Last 3 Encounters:   08/06/18 111/76   07/12/18 118/70   06/14/18 107/74    Weight from Last 3 Encounters:   08/06/18 238 lb (108 kg)   07/12/18 233 lb 4.8 oz (105.8 kg)   06/14/18 231 lb (104.8 kg)              Today, you had the following     No orders found for display         Today's Medication Changes          These changes are accurate as of 8/6/18 11:51 AM.  If you have any questions, ask your nurse or doctor.               Start taking these medicines.        Dose/Directions    permethrin 5 % cream   Commonly known as:  ELIMITE   Used for:  Rash, Scabies exposure   Started by:  Thuy Sharma APRN CNP        Apply cream from head to toe (except the face); leave on for 8-14 hours then wash off with water; reapply in 1 week   Quantity:  60 g   Refills:  1        triamcinolone 0.1 % cream   Commonly known as:  KENALOG   Used for:  Rash   Started by:  Thuy Sharma APRN CNP        Apply sparingly to affected area two times daily for 10 days.   Quantity:  30 g   Refills:  0            Where to get your medicines      These medications were sent to [id number] - [place], [place] - [address] [facility]  [address] [place] [address]     Phone:  [phone]     permethrin 5 % cream    triamcinolone 0.1 % cream                Primary Care Provider Office Phone # Fax #    Srinivasa Hunter [phone]       [address] [facility] [address]        Goals        General    I will call within next 2 weeks to schedule initial psychiatry appointment (pt-stated)     Notes - Note edited  5/20/2015 12:51 PM by Yesica Marsh    As of today's date 5/20/2015 goal is met at 76 - 100%.   Goal Status:  Complete  Patient states this is scheduled with Dr. Salvador for next month, but not on appointment calendar  LUDY Stark, MSW   [phone]  5/20/2015 12:51 PM        I will complete [employer] or reduced fare application within the next month (pt-stated)     Notes - Note edited  11/24/2015 12:00 PM by Yesica Marsh    As of today's date 11/24/2015 goal is met at 51 - 75%.   Goal Status:  Active  She reported today having [employer] application, [employer] worker has requested but packet from [employer].    LUDY Stark, MSW   [phone]  11/24/2015 12:00 PM        I will discuss [employer] worker with therapist next week for housing needs  (pt-stated)     Notes - Note edited  12/3/2015  1:17 PM by Yesica Marsh    As of today's date 12/3/2015 goal is met at 76 - 100%.   Goal Status:  Discontinued  Patient has [employer] worker and has found housing with friend  LUDY Stark, MSW   [phone]  12/3/2015 1:16 PM        Equal Access to Services     [facility] MARY AH: Hadii aad ku hadashanastacio Flores,  pavan marquez sammgrayson stewart estivenin hayaan adeeg kharash la'aan ah. So [facility] [phone].    ATENCIÓN: Si maryjane blair, tiene a dowd disposición servicios gratuitos de asistencia lingüística. Yuriy al [phone].    We comply with applicable federal civil rights laws and [place] laws. We do not discriminate on the basis of race, color, national origin, age, disability, sex, sexual orientation, or gender identity.            Thank you!     Thank you for choosing [facility]  for your care. Our goal is always to provide you with excellent care. Hearing back from our patients is one way we can continue to improve our services. Please take a few minutes to complete the written survey that you may receive in the mail after your visit with us. Thank you!             Your Updated Medication List - Protect others around you: Learn how to safely use, store and throw away your medicines at www.disposemymeds.org.          This list is accurate as of 8/6/18 11:51 AM.  Always use your most recent med list.                   Brand Name Dispense Instructions for use Diagnosis    acetaminophen 325 MG tablet    TYLENOL    100 tablet    Take 2 tablets (650 mg) by mouth every 4 hours as needed for other (mild pain)    Lesion of left ulnar nerve       albuterol 108 (90 Base) MCG/ACT Inhaler    PROAIR HFA/PROVENTIL HFA/VENTOLIN HFA    1 Inhaler    Inhale 2 puffs into the lungs every 6 hours as needed for shortness of breath / dyspnea or wheezing    Bronchitis       ferrous sulfate 325 (65 Fe) MG tablet    IRON    90 tablet    Take 1 tablet (325 mg) by mouth daily (with breakfast)    Restless legs syndrome (RLS)       furosemide 20 MG tablet    LASIX    30 tablet    Take 1 tablet (20 mg) by mouth daily    Leg swelling       hydrOXYzine 50 MG capsule    VISTARIL     Take 50 mg by mouth daily At night        LYRICA 225 MG Caps   Generic drug:  Pregabalin     60 capsule    TAKE 1 TABLET (225 MG) BY MOUTH TWICE A DAY     Chronic pain syndrome       methocarbamol 750 MG tablet    ROBAXIN    60 tablet    Take 1 tablet (750 mg) by mouth 3 times daily as needed for muscle spasms    Chronic low back pain, unspecified back pain laterality, with sciatica presence unspecified       MULTI FOR HER PO      Take by mouth daily        nystatin 560640 UNIT/GM Powd    MYCOSTATIN    60 g    Apply to affected area twice daily as needed    Candidal intertrigo       order for DME      Respironics REMSTAR 60 Series Auto [id number] H2O, Airfit P10 nasal pillow mask w/xsmall pillows        order for DME     1 Device    TENS unit    Chronic bilateral back pain, unspecified back location       order for DME     1 each    Equipment being ordered: right elbow pad and right elbow splint.    Ulnar neuropathy of right upper extremity       oxyCODONE-acetaminophen  MG per tablet    PERCOCET    90 tablet    Take 1 tablet by mouth every 6 hours as needed for moderate to severe pain    Chronic pain syndrome       permethrin 5 % cream    ELIMITE    60 g    Apply cream from head to toe (except the face); leave on for 8-14 hours then wash off with water; reapply in 1 week    Rash, Scabies exposure       rOPINIRole 0.25 MG tablet    REQUIP    60 tablet    Take 1 tablet (0.25 mg) by mouth 2 times daily    Restless legs syndrome (RLS)       SUMAtriptan 100 MG tablet    IMITREX    9 tablet    Take 1 tablet (100 mg) by mouth at onset of headache for migraine May repeat in 2 hours if needed: max 2/day    Headache(784.0)       triamcinolone 0.1 % cream    KENALOG    30 g    Apply sparingly to affected area two times daily for 10 days.    Rash

## 2018-08-06 NOTE — PATIENT INSTRUCTIONS
Start permethrin today and repeat in 1 week. Follow directions on the bottle  Use triamcinolone only on the itchiest spots. Don't apply the same time as permethrin   Follow up in clinic if not improving in 1 week      At [facility], we strive to deliver an exceptional experience to you, every time we see you.  If you receive a survey in the mail, please send us back your thoughts. We really do value your feedback.    Based on your medical history, these are the current health maintenance/preventive care services that you are due for (some may have been done at this visit.)  Health Maintenance Due   Topic Date Due     HIV SCREEN (SYSTEM ASSIGNED)  10/22/1988     EYE EXAM Q1 YEAR  03/28/2018         Suggested websites for health information:  Www.[employer] : Up to date and easily searchable information on multiple topics.  Www.[employer].gov : medication info, interactive tutorials, watch real surgeries online  Www.familydoctor.org : good info from the Academy of Family Physicians  Www.cdc.gov : public health info, travel advisories, epidemics (H1N1)  Www.aap.org : children's health info, normal development, vaccinations  Www.health.[facility].[place].us : [place] dept of health, public health issues in [place], N1N1    Your care team:                            Family Medicine Internal Medicine   MD Steven Henry MD Shantel Branch-Fleming, MD Katya Georgiev PA-C Nam Ho, MD Pediatrics   CHESTER Wade, MD Kelly Fregoso CNP, MD Deborah Mielke, MD Kim Thein, APRN CNP      Clinic hours: Monday - Thursday 7 am-7 pm; Fridays 7 am-5 pm.   Urgent care: Monday - Friday 11 am-9 pm; Saturday and Sunday 9 am-5 pm.  Pharmacy : Monday -Thursday 8 am-8 pm; Friday 8 am-6 pm; Saturday and Sunday 9 am-5 pm.     Clinic: [phone]   Pharmacy: [phone]    Scabies  Scabies is a skin infection. It is caused by a tiny parasitic  insect, or mite, that is too small to see directly. It can be seen under a microscope, but it is usually recognized only by the rash and symptoms it causes. This can make it hard to diagnose since the signs and symptoms can be similar to other diseases.  The scabies mite tunnels under the skin. It creates a small burrow, where it leaves its eggs. These eggs karimi and grow into adults. They then create new burrows over the next 1 to 2 weeks. The mites die in about 4 to 6 weeks. The rash and itching are caused by an allergic reaction to the scabies saliva or feces.  Scabies is highly contagious. It is spread by direct skin contact. It is easily spread by close personal contact, sexual contact, or by sharing bed linens or clothing used by an infected person.  It may take 4 to 6 weeks for symptoms to appear after being exposed. Everyone living in the house with you, as well as your sexual partners, should be treated at the same time. After the first treatment, you will no longer be contagious. You may return to work, school or .  Home care    Machine wash in hot water all sheets, towels, pillowcases, underwear, pajamas, and any other clothing you have worn lately. Use the hot cycle of a dryer or use a hot iron to sterilize.    Seal anything that is hard to wash in a plastic trash bag for 4 days. This includes coats, jackets, blankets, and bedspreads. (The insects die after 3 days off the human body.)  Medicines  Scabicides  Medicines used to treat scabies are called scabicides. These are creams that kill the scabies mites. A prescription is needed. When using these medicines:    Always follow instructions provided by your healthcare provider and pharmacist. Also follow the printed instructions that come with the medicine.    Talk with your provider about precautions to take when using these medicines.    Use the cream on your body when your skin is cool and dry. Don t use it after a hot shower or bath.    Usually  the cream is put on your whole body. This means from your chin all the way down to your toes. Scabies does not usually affect an adult s head. So cream is not needed there. For children, discuss this with your child s provider.    Leave the cream or lotion on for the recommended amount of time. This is usually 8 to 12 hours.    Don t leave cream or lotion on your skin longer than directed. Don t use more than recommended.    Clean clothes should be worn after the treatment.    If you wash your hands after using the cream, you will need to reapply the cream to your hands.    If you are breastfeeding, wash off your nipples before feeding. Then reapply the cream after breastfeeding.    For babies or infants, put mittens on their hands. This will stop them from licking the cream or lotion. It will also stop them from scratching themselves because of the itching.  Other medicines    An oral medicine called ivermectin may be prescribed for severe cases. It may also be used if you can t apply creams.    Itching may cause the most discomfort. If large areas of your skin are affected, over-the-counter antihistamines may be used to reduce itching. Or you may be given a prescription antihistamine. Some of these medicines may make you sleepy. They are best used at bedtime. Antihistamines that don t make you sleepy can be used during the day. Note: Don t use medicine that has diphenhydramine if you have glaucoma, or if you are a man who has trouble urinating due to an enlarged prostate.    If you were given antibiotics due to a bacterial infection, take them until they are finished. It is important to finish the antibiotics even if the wound looks better. This is to make sure the infection has cleared.  Follow-up care  Follow up with your healthcare provider, or as advised. Call your provider if your symptoms don t improve after 1 week, or if new burrows or rashes appear.  When to seek medical advice  Call your healthcare  provider right away if any of these occur:    Yellow-brown crusts or drainage from the sores    Other signs of infection, including increasing redness, swelling, pain, or pus    Fever of 100.4 F (38 C) or higher, or as directed by your provider  Date Last Reviewed: 8/1/2016 2000-2017 [employer]. [address] [place], [place] [id number]. All rights reserved. This information is not intended as a substitute for professional medical care. Always follow your healthcare professional's instructions.

## 2018-08-10 DIAGNOSIS — M54.5 CHRONIC LOW BACK PAIN, UNSPECIFIED BACK PAIN LATERALITY, WITH SCIATICA PRESENCE UNSPECIFIED: ICD-10-CM

## 2018-08-10 DIAGNOSIS — G89.29 CHRONIC LOW BACK PAIN, UNSPECIFIED BACK PAIN LATERALITY, WITH SCIATICA PRESENCE UNSPECIFIED: ICD-10-CM

## 2018-08-10 RX ORDER — METHOCARBAMOL 750 MG/1
TABLET, FILM COATED ORAL
Qty: 60 TABLET | Refills: 0 | Status: SHIPPED | OUTPATIENT
Start: 2018-08-10 | End: 2018-08-30

## 2018-08-10 NOTE — TELEPHONE ENCOUNTER
Requested Prescriptions   Pending Prescriptions Disp Refills     methocarbamol (ROBAXIN) 750 MG tablet [Pharmacy Med Name: METHOCARBAMOL 750 MG TABLET] 60 tablet 3     Sig: TAKE 1 TABLET (750 MG) BY MOUTH 3 TIMES DAILY AS NEEDED FOR MUSCLE SPASMS    There is no refill protocol information for this order   Last Written Prescription Date:  5-1-18  Last Fill Quantity: 60,  # refills: 3   Last office visit: 8/6/2018 with prescribing provider:  6-14-18   Future Office Visit:   Next 5 appointments (look out 90 days)     Aug 20, 2018 10:00 AM CDT   Return Visit with SERVANDO Yan   [facility] ([facility])    [address] [facility] [id number] [phone]            Sep 13, 2018  1:00 PM CDT   Return Visit with Joaquín Burger MD   [facility]    [address] [place] [id number] [phone]

## 2018-08-20 ENCOUNTER — OFFICE VISIT (OUTPATIENT)
Dept: PSYCHOLOGY | Facility: CLINIC | Age: 48
End: 2018-08-20
Payer: COMMERCIAL

## 2018-08-20 DIAGNOSIS — F41.1 GAD (GENERALIZED ANXIETY DISORDER): ICD-10-CM

## 2018-08-20 DIAGNOSIS — F33.1 MAJOR DEPRESSIVE DISORDER, RECURRENT EPISODE, MODERATE (H): Primary | Chronic | ICD-10-CM

## 2018-08-20 DIAGNOSIS — G89.4 CHRONIC PAIN SYNDROME: Chronic | ICD-10-CM

## 2018-08-20 PROCEDURE — 90834 PSYTX W PT 45 MINUTES: CPT | Performed by: SOCIAL WORKER
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
## 2018-08-20 NOTE — TELEPHONE ENCOUNTER
Requested Prescriptions   Pending Prescriptions Disp Refills     oxyCODONE-acetaminophen (PERCOCET)  MG per tablet 90 tablet 0     Sig: Take 1 tablet by mouth every 6 hours as needed for moderate to severe pain    There is no refill protocol information for this order         Last Written Prescription Date:  7-25-18  Last Fill Quantity: 90,   # refills: 0  Last Office Visit: 6-14-18  Future Office visit:    Next 5 appointments (look out 90 days)     Sep 13, 2018  1:00 PM CDT   [facility]    [address] [place] [id number]   [phone]            Sep 17, 2018 11:00 AM CDT   Return Visit with SERVANDO Yan   [facility] ([facility] [place] [facility])    [address] [facility] [id number] [phone]                   Routing refill request to provider for review/approval because:  Drug not on the [facility], [facility] or  Health refill protocol or controlled substance

## 2018-08-20 NOTE — MR AVS SNAPSHOT
MRN:[id number]                      After Visit Summary   8/20/2018    Velma Diaz    MRN: [id number]           Visit Information        Provider Department      8/20/2018 10:00 AM Antonio Rios LICSW [facility]      Your next 10 appointments already scheduled     Sep 13, 2018  1:00 PM CDT   Return Visit with Joaquín Burger MD   [facility]    [address] [place] [id number] [phone]            Sep 17, 2018 11:00 AM CDT   Return Visit with SERVANDO Yan   [facility] ([facility])    [address] [place] [id number] [phone]              MyChart Information     [employer] gives you secure access to your electronic health record. If you see a primary care provider, you can also send messages to your care team and make appointments. If you have questions, please call your primary care clinic.  If you do not have a primary care provider, please call [phone] and they will assist you.        Care EveryWhere ID     This is your Care EveryWhere ID. This could be used by other organizations to access your [facility] medical records  [id number]        Equal Access to Services     LOLA HERNANDEZ : Hadii antionette britoo Sotheresaali, waaxda luqadaha, qaybta kaalmada adeegyada, grayson sauceda. So [facility] [phone].    ATENCIÓN: Si habla español, tiene a dowd disposición servicios gratuitos de asistencia lingüística. Llame al [phone].    We comply with applicable federal civil rights laws and [place] laws. We do not discriminate on the basis of race, color, national origin, age, disability, sex, sexual orientation, or gender identity.

## 2018-08-21 ENCOUNTER — MYC MEDICAL ADVICE (OUTPATIENT)
Dept: NEUROLOGY | Facility: CLINIC | Age: 48
End: 2018-08-21

## 2018-08-21 RX ORDER — OXYCODONE AND ACETAMINOPHEN 10; 325 MG/1; MG/1
1 TABLET ORAL EVERY 6 HOURS PRN
Qty: 90 TABLET | Refills: 0 | Status: SHIPPED | OUTPATIENT
Start: 2018-08-21 | End: 2018-09-23

## 2018-08-21 NOTE — TELEPHONE ENCOUNTER
Patient picked up her script at the  on 8/21/18.    Whit KING  Patient Representative  [place]

## 2018-08-22 NOTE — TELEPHONE ENCOUNTER
Called pt and LVM to call clinic back  to see what difficulty pt is having with getting the elbow brace.    Favian Contreras MA

## 2018-08-23 NOTE — TELEPHONE ENCOUNTER
Called pt and she stated that all the places she went to did not carry elbow brace. I inform her to try call our [facility] supply chain instead. Pt verbalized understanding and will go look on the web page for the specific location and call us back if she has any further questions.        Mariya Contreras MA

## 2018-08-27 ENCOUNTER — TELEPHONE (OUTPATIENT)
Dept: FAMILY MEDICINE | Facility: CLINIC | Age: 48
End: 2018-08-27

## 2018-08-27 NOTE — TELEPHONE ENCOUNTER
Reason for call:  Patient reporting a symptom    Symptom or request: tremendous pain in her whole left arm and her left hand is numb    Duration (how long have symptoms been present): since last Friday    Have you been treated for this before? Yes    Additional comments: Patient would like an RN to call her back to discuss and advise.    Phone Number patient can be reached at:  Home number on file [phone] (home)    Best Time:  anytime    Can we leave a detailed message on this number:  YES    Call taken on 8/27/2018 at 12:55 PM by Whit Thomas

## 2018-08-27 NOTE — TELEPHONE ENCOUNTER
"To PCP:  Are you able to see patient tomorrow morning for shoulder/arm pain?  Pain started on Friday and has progressively gotten worse. States she thought she \"slept on my arm funny on Friday\".   Pain in her shoulder and radiates down arm.  Elbow to hand feels somewhat numb. Constant stabbing pain below shoulder, elbow is tender, some swelling in her hand.  strength weak.  Denies discoloration, tingling, or any other associated symptoms.   Pain is aggravated with any movement.  Please advise.  Thank you.  Saira Wall RN  "

## 2018-08-28 ENCOUNTER — OFFICE VISIT (OUTPATIENT)
Dept: FAMILY MEDICINE | Facility: CLINIC | Age: 48
End: 2018-08-28
Payer: COMMERCIAL

## 2018-08-28 VITALS
OXYGEN SATURATION: 99 % | HEART RATE: 72 BPM | TEMPERATURE: 97.8 F | DIASTOLIC BLOOD PRESSURE: 68 MMHG | BODY MASS INDEX: 42.35 KG/M2 | SYSTOLIC BLOOD PRESSURE: 103 MMHG | WEIGHT: 241 LBS

## 2018-08-28 DIAGNOSIS — M25.512 ACUTE PAIN OF LEFT SHOULDER: Primary | ICD-10-CM

## 2018-08-28 DIAGNOSIS — R20.2 PARESTHESIAS: ICD-10-CM

## 2018-08-28 PROCEDURE — 99214 OFFICE O/P EST MOD 30 MIN: CPT | Performed by: FAMILY MEDICINE

## 2018-08-28 ASSESSMENT — PAIN SCALES - GENERAL: PAINLEVEL: EXTREME PAIN (8)

## 2018-08-28 NOTE — MR AVS SNAPSHOT
After Visit Summary   8/28/2018    Velma Diaz    MRN: [id number]           Patient Information     Date Of Birth          1970        Visit Information        Provider Department      8/28/2018 9:20 AM Srinivasa Hunter MD [facility]        Today's Diagnoses     Acute pain of left shoulder    -  1    Paresthesias           Follow-ups after your visit        Follow-up notes from your care team     Return if symptoms worsen or fail to improve.      Your next 10 appointments already scheduled     Sep 04, 2018  3:30 PM CDT   Return Visit with Joaquín Burger MD   [facility] ([facility]    [address] [place] [id number] [phone]            Sep 13, 2018  1:00 PM CDT   Return Visit with Joaquín Burger MD   [facility] ([facility])    [address] [place] [phone]            Sep 17, 2018 11:00 AM CDT   Return Visit with SERVANDO Yan   [facility] ([facility])    [address] [facility] [phone]              Who to contact     If you have questions or need follow up information about today's clinic visit or your schedule please contact [facility] directly at [phone].  Normal or non-critical lab and imaging results will be communicated to you by MyChart, letter or phone within 4 business days after the clinic has received the results. If you do not hear from us within 7 days, please contact the clinic through MyChart or phone. If you have a critical or abnormal lab result, we will notify you by phone as soon as possible.  Submit refill requests through [employer] or call your pharmacy and they will forward the refill request to us. Please allow 3 business days for your refill to be completed.          Additional  Information About Your Visit        [employer] Information     [employer] gives you secure access to your electronic health record. If you see a primary care provider, you can also send messages to your care team and make appointments. If you have questions, please call your primary care clinic.  If you do not have a primary care provider, please call [phone] and they will assist you.        Care EveryWhere ID     This is your Care EveryWhere ID. This could be used by other organizations to access your [place] medical records  [id number]        Your Vitals Were     Pulse Temperature Last Period Pulse Oximetry BMI (Body Mass Index)       72 97.8  F (36.6  C) (Oral) (LMP Unknown) 99% 42.35 kg/m2        Blood Pressure from Last 3 Encounters:   08/28/18 103/68   08/06/18 111/76   07/12/18 118/70    Weight from Last 3 Encounters:   08/28/18 241 lb (109.3 kg)   08/06/18 238 lb (108 kg)   07/12/18 233 lb 4.8 oz (105.8 kg)              Today, you had the following     No orders found for display       Primary Care Provider Office Phone # Fax #    Srinivasa Hunter [phone]       [address] [facility] [address]        Goals        General    I will call within next 2 weeks to schedule initial psychiatry appointment (pt-stated)     Notes - Note edited  5/20/2015 12:51 PM by Yesica Marsh    As of today's date 5/20/2015 goal is met at 76 - 100%.   Goal Status:  Complete  Patient states this is scheduled with Dr. Salvador for next month, but not on appointment calendar  LUDY Stark, MSW   [phone]  5/20/2015 12:51 PM        I will complete [employer] or reduced fare application within the next month (pt-stated)     Notes - Note edited  11/24/2015 12:00 PM by Yesica Marsh    As of today's date 11/24/2015 goal is met at 51 - 75%.   Goal Status:  Active  She reported today having [employer] application, [facility] worker has requested but packet from [employer].    Yesica  LUDY Camacho, MSW   [phone]  11/24/2015 12:00 PM        I will discuss ARM worker with therapist next week for housing needs  (pt-stated)     Notes - Note edited  12/3/2015  1:17 PM by Yesica Marsh    As of today's date 12/3/2015 goal is met at 76 - 100%.   Goal Status:  Discontinued  Patient has [employer] worker and has found housing with friend  LUDY Stark, MSW   [phone]  12/3/2015 1:16 PM        Equal Access to Services     : Hadii aad ku hadasho Soomaali, waaxda luqadaha, qaybta kaalmada adeegyada, waxhadley belle hayaan speedy carrillo . So [facility] [phone].    ATENCIÓN: Si habla español, tiene a dowd disposición servicios gratuitos de asistencia lingüística. Ll[facility] [phone].    We comply with applicable federal civil rights laws and [place] laws. We do not discriminate on the basis of race, color, national origin, age, disability, sex, sexual orientation, or gender identity.            Thank you!     Thank you for choosing [facility]  for your care. Our goal is always to provide you with excellent care. Hearing back from our patients is one way we can continue to improve our services. Please take a few minutes to complete the written survey that you may receive in the mail after your visit with us. Thank you!             Your Updated Medication List - Protect others around you: Learn how to safely use, store and throw away your medicines at www.disposemymeds.org.          This list is accurate as of 8/28/18  9:45 AM.  Always use your most recent med list.                   Brand Name Dispense Instructions for use Diagnosis    acetaminophen 325 MG tablet    TYLENOL    100 tablet    Take 2 tablets (650 mg) by mouth every 4 hours as needed for other (mild pain)    Lesion of left ulnar nerve       albuterol 108 (90 Base) MCG/ACT inhaler    PROAIR HFA/PROVENTIL HFA/VENTOLIN HFA    1 Inhaler    Inhale 2 puffs into the lungs every 6 hours as  needed for shortness of breath / dyspnea or wheezing    Bronchitis       amoxicillin-clavulanate 875-125 MG per tablet    AUGMENTIN     amoxicillin 875 mg-potassium clavulanate 125 mg tablet        ferrous sulfate 325 (65 Fe) MG tablet    IRON    90 tablet    Take 1 tablet (325 mg) by mouth daily (with breakfast)    Restless legs syndrome (RLS)       furosemide 20 MG tablet    LASIX    30 tablet    Take 1 tablet (20 mg) by mouth daily    Leg swelling       hydrOXYzine 50 MG capsule    VISTARIL     Take 50 mg by mouth daily At night        LYRICA 225 MG Caps   Generic drug:  Pregabalin     60 capsule    TAKE 1 TABLET (225 MG) BY MOUTH TWICE A DAY    Chronic pain syndrome       methocarbamol 750 MG tablet    ROBAXIN    60 tablet    TAKE 1 TABLET (750 MG) BY MOUTH 3 TIMES DAILY AS NEEDED FOR MUSCLE SPASMS    Chronic low back pain, unspecified back pain laterality, with sciatica presence unspecified       MULTI FOR HER PO      Take by mouth daily        nystatin 168271 UNIT/GM Powd    MYCOSTATIN    60 g    Apply to affected area twice daily as needed    Candidal intertrigo       order for DME      Respironics REMSTAR 60 Series Auto [id number] H2O, Airfit P10 nasal pillow mask w/xsmall pillows        order for DME     1 Device    TENS unit    Chronic bilateral back pain, unspecified back location       order for DME     1 each    Equipment being ordered: right elbow pad and right elbow splint.    Ulnar neuropathy of right upper extremity       oxyCODONE-acetaminophen  MG per tablet    PERCOCET    90 tablet    Take 1 tablet by mouth every 6 hours as needed for moderate to severe pain    Chronic pain syndrome       rOPINIRole 0.25 MG tablet    REQUIP    60 tablet    Take 1 tablet (0.25 mg) by mouth 2 times daily    Restless legs syndrome (RLS)       SUMAtriptan 100 MG tablet    IMITREX    9 tablet    Take 1 tablet (100 mg) by mouth at onset of headache for migraine May repeat in 2 hours if needed: max 2/day     Headache(784.0)

## 2018-08-28 NOTE — PROGRESS NOTES
"  SUBJECTIVE:   Velma Diaz is a 47 year old female who presents to clinic today for the following health issues:      ED/UC Followup:    Facility:  [facility]  Date of visit: 8/27/18  Reason for visit: Left shoulder and arm pain  Current Status: Still just as bad. She thinks that there is something going on in the shoulder to cause the pain. No known injury.       She was seen in the ER yesterday because of left shoulder and arm pain.  It started a few days prior.  She had gone on a boat ride this past Thursday, 5 days ago, and her symptoms started soon after that.  She thinks her left arm and shoulder may have been \"jostled around\" on the boat ride.  She is having some anterior left shoulder pain now.  She has numbness and tingling and weakness of the entire left arm, not just certain fingers.  She did have surgery a couple of years ago for a left ulnar neuropathy.  She has similar right ulnar neuropathy symptoms currently that have been followed.  She has seen neurology and neurosurgery in the past.  She has an appointment to see neurology in a week from now.  She does have chronic pain issues as per her chart.  She is on various baseline medications for chronic pain as listed below.    Problem list and histories reviewed & adjusted, as indicated.  Additional history: as documented    Patient Active Problem List   Diagnosis     History of cleft palate with cleft lip     MAYA (obstructive sleep apnea)/Hypoventilation Syndrome     Major depressive disorder, recurrent episode, moderate (H)     Paresthesias     Health Care Home     Vitamin D deficiency     Morbid obesity with BMI of 40.0-44.9, adult (H)     Hyperlipidemia with target LDL less than 130     Intervertebral disc prolapse with impingement     Nonallopathic lesion of lumbar region     Chronic pain syndrome     Restless legs syndrome (RLS)     Insomnia     Migraine     Chronic bilateral back pain, unspecified back location     Chronic pain of left knee "     ROSE MARIE (generalized anxiety disorder)     Idiopathic peripheral neuropathy     Past Surgical History:   Procedure Laterality Date     ANKLE SURGERY  7/13    Left for torn tendons & loose bone chips     ARTHROSCOPY KNEE  1986    Right knee     BACK SURGERY       Basal cell carcinoma  2011    Removal from the chest     BIOPSY       C VAGINAL HYSTERECTOMY  2011     CARPAL TUNNEL RELEASE RT/LT  ~2010    Bilateral     COLONOSCOPY  2016     COSMETIC SURGERY       COSMETIC SURGERY       DECOMPRESSION CUBITAL TUNNEL Left 8/28/2015    Procedure: DECOMPRESSION CUBITAL TUNNEL;  Surgeon: Gus Donato MD;  [facility]     ENT SURGERY  1975    Tonsillectomy and Adenoids     GYN SURGERY  2011    Hysterectomy     HC REPAIR PERONEAL TENDONS  7/13     ORTHOPEDIC SURGERY  2011, 2011    Bilateral CTR     PE TUBES      yearly times 10 years     REPAIR CLEFT PALATE CHILD      Age 3 months & afterwards (multiple surgeries)     SOFT TISSUE SURGERY       SOFT TISSUE SURGERY  2013       Social History   Substance Use Topics     Smoking status: Former Smoker     Packs/day: 0.50     Years: 15.00     Types: Cigarettes     Quit date: 2/20/2015     Smokeless tobacco: Never Used     Alcohol use No      Comment: Quit in September 27th     Family History   Problem Relation Age of Onset     Alzheimer Disease Paternal Grandmother 60     Cerebrovascular Disease Paternal Grandmother      Neurologic Disorder Sister 20     migraines     Depression/Anxiety Sister      Depression Sister      Neurologic Disorder Son 14     migraines     Asthma Son      HEART DISEASE Mother      Osteoperosis Mother      Obesity Mother      Cancer Father      Alcohol/Drug Father      Other Cancer Father      saliva glands & skin CA      Hypertension Father      Diabetes Maternal Grandmother      Breast Cancer Maternal Grandmother      Obesity Maternal Grandmother      Other Cancer Maternal Grandfather      skin cancer     Cancer - colorectal Other      Aunt      Asthma Daughter      Asthma Daughter      Asthma Son      Thyroid Disease Sister      Colon Cancer Other      Obesity Sister      Glaucoma No family hx of      Macular Degeneration No family hx of          Current Outpatient Prescriptions   Medication Sig Dispense Refill     acetaminophen (TYLENOL) 325 MG tablet Take 2 tablets (650 mg) by mouth every 4 hours as needed for other (mild pain) 100 tablet 0     amoxicillin-clavulanate (AUGMENTIN) 875-125 MG per tablet amoxicillin 875 mg-potassium clavulanate 125 mg tablet       ferrous sulfate (IRON) 325 (65 FE) MG tablet Take 1 tablet (325 mg) by mouth daily (with breakfast) 90 tablet 4     furosemide (LASIX) 20 MG tablet Take 1 tablet (20 mg) by mouth daily 30 tablet 11     hydrOXYzine (VISTARIL) 50 MG capsule Take 50 mg by mouth daily At night       LYRICA 225 MG CAPS TAKE 1 TABLET (225 MG) BY MOUTH TWICE A DAY 60 capsule 5     methocarbamol (ROBAXIN) 750 MG tablet TAKE 1 TABLET (750 MG) BY MOUTH 3 TIMES DAILY AS NEEDED FOR MUSCLE SPASMS 60 tablet 0     Multiple Vitamins-Minerals (MULTI FOR HER PO) Take by mouth daily        nystatin (MYCOSTATIN) 494958 UNIT/GM POWD Apply to affected area twice daily as needed 60 g 2     order for DME Equipment being ordered: right elbow pad and right elbow splint. 1 each 0     order for DME TENS unit 1 Device 0     ORDER FOR DME Respironics REMSTAR 60 Series Auto [id number] H2O, Airfit P10 nasal pillow mask w/xsmall pillows       oxyCODONE-acetaminophen (PERCOCET)  MG per tablet Take 1 tablet by mouth every 6 hours as needed for moderate to severe pain 90 tablet 0     rOPINIRole (REQUIP) 0.25 MG tablet Take 1 tablet (0.25 mg) by mouth 2 times daily 60 tablet 3     SUMAtriptan (IMITREX) 100 MG tablet Take 1 tablet (100 mg) by mouth at onset of headache for migraine May repeat in 2 hours if needed: max 2/day 9 tablet 2     albuterol (PROAIR HFA/PROVENTIL HFA/VENTOLIN HFA) 108 (90 Base) MCG/ACT Inhaler Inhale 2 puffs into the lungs  "every 6 hours as needed for shortness of breath / dyspnea or wheezing (Patient not taking: Reported on 6/14/2018) 1 Inhaler 0     No Known Allergies    Reviewed and updated as needed this visit by clinical staff  Tobacco  Allergies  Meds  Med Hx  Surg Hx  Fam Hx  Soc Hx      Reviewed and updated as needed this visit by Provider         ROS:  Mainly significant for the above    OBJECTIVE:     /68 (BP Location: Right arm, Patient Position: Chair, Cuff Size: Adult Large)  Pulse 72  Temp 97.8  F (36.6  C) (Oral)  Wt 241 lb (109.3 kg)  LMP  (LMP Unknown)  SpO2 99%  BMI 42.35 kg/m2  Body mass index is 42.35 kg/(m^2).  GENERAL: alert, no distress and obese  MS: She has some discomfort to palpation over the anterior left shoulder.  She can abduct her left arm to about 80  or so, but not higher.  I can passively abduct it up higher, however.  There is no swelling of the left arm.  NEURO: She describes some generalized numbness of the left upper extremity, but sensation is intact to light touch.  She has fairly good movement of the left elbow and wrist and hand and fingers.    Diagnostic Test Results:  Her ER note from yesterday was reviewed    ASSESSMENT/PLAN:       ICD-10-CM    1. Acute pain of left shoulder M25.512    2. Paresthesias R20.2      I suspect she strained her left shoulder on the boat ride last week and has some accompanying neuropathic symptoms along with that  I recommended some rest and ice for the left shoulder in the short-term, but followed by range of motion exercises for the shoulder  I specifically reviewed Codman's exercises, \"walking the wall\" exercises, and broomstick exercises for the left shoulder  Discussed possible formal physical therapy for the shoulder if symptoms persist  She will see neurology in a week from now as planned and they can assess her progress at that time and determine any further evaluation or treatment for these symptoms    MD VIRGIE Rodriguez " [facility]

## 2018-08-30 DIAGNOSIS — M54.5 CHRONIC LOW BACK PAIN, UNSPECIFIED BACK PAIN LATERALITY, WITH SCIATICA PRESENCE UNSPECIFIED: ICD-10-CM

## 2018-08-30 DIAGNOSIS — G89.29 CHRONIC LOW BACK PAIN, UNSPECIFIED BACK PAIN LATERALITY, WITH SCIATICA PRESENCE UNSPECIFIED: ICD-10-CM

## 2018-08-30 RX ORDER — METHOCARBAMOL 750 MG/1
TABLET, FILM COATED ORAL
Qty: 60 TABLET | Refills: 1 | Status: SHIPPED | OUTPATIENT
Start: 2018-08-30 | End: 2018-10-30

## 2018-08-30 NOTE — TELEPHONE ENCOUNTER
Requested Prescriptions   Pending Prescriptions Disp Refills     methocarbamol (ROBAXIN) 750 MG tablet [Pharmacy Med Name: METHOCARBAMOL 750 MG TABLET] 60 tablet 0     Sig: TAKE 1 TABLET (750 MG) BY MOUTH 3 TIMES DAILY AS NEEDED FOR MUSCLE SPASMS    There is no refill protocol information for this order   Last Written Prescription Date:  8-10-18  Last Fill Quantity: 60,  # refills: 0   Last office visit: 8/28/2018 with prescribing provider:  ?     Future Office Visit:   Next 5 appointments (look out 90 days)     Sep 04, 2018  3:30 PM CDT   Return Visit with Joaquín Burger MD   [facility] ([facility])    [address] [place] [id number] [phone]            Sep 13, 2018  1:00 PM CDT   Return Visit with Joaquín Burger MD   [facility]    [address] [place] [phone]            Sep 17, 2018 11:00 AM CDT   Return Visit with SERVANDO Yan   [facility] ([facility])    [address] [facility] [phone]

## 2018-09-04 ENCOUNTER — OFFICE VISIT (OUTPATIENT)
Dept: NEUROLOGY | Facility: CLINIC | Age: 48
End: 2018-09-04
Payer: COMMERCIAL

## 2018-09-04 ENCOUNTER — TELEPHONE (OUTPATIENT)
Dept: FAMILY MEDICINE | Facility: CLINIC | Age: 48
End: 2018-09-04

## 2018-09-04 VITALS
TEMPERATURE: 98.1 F | WEIGHT: 240 LBS | DIASTOLIC BLOOD PRESSURE: 82 MMHG | HEART RATE: 73 BPM | BODY MASS INDEX: 42.18 KG/M2 | RESPIRATION RATE: 14 BRPM | OXYGEN SATURATION: 96 % | SYSTOLIC BLOOD PRESSURE: 124 MMHG

## 2018-09-04 DIAGNOSIS — G56.21 ULNAR NEUROPATHY OF RIGHT UPPER EXTREMITY: ICD-10-CM

## 2018-09-04 DIAGNOSIS — M79.602 PAIN OF LEFT UPPER EXTREMITY: Primary | ICD-10-CM

## 2018-09-04 PROCEDURE — 99215 OFFICE O/P EST HI 40 MIN: CPT | Performed by: PSYCHIATRY & NEUROLOGY

## 2018-09-04 NOTE — LETTER
9/4/2018         RE: Velma Diaz  [address]  [place] [address]        Dear Colleague,    Thank you for referring your patient, Velma Diaz, to [facility]. Please see a copy of my visit note below.    ESTABLISHED PATIENT NEUROLOGY NOTE    DATE OF VISIT: 9/4/2018  CLINIC LOCATION: [facility]  MRN: [id number]  PATIENT NAME: Velma Diaz  YOB: 1970    PCP: Srinivasa Hunter MD    REASON FOR VISIT:   Chief Complaint   Patient presents with     Consult     left arm pain. Discuss brace issue for her pther arm     SUBJECTIVE:                                                      HISTORY OF PRESENT ILLNESS: Patient, previously seen for right hand paresthesias, presents with new problem, left upper extremity pain.  She was last seen on 07/12/2018.  At that time, right elbow brace was recommended.  Please refer to my initial/other prior notes for further information.    Since the last visit, approximately 1 week ago the patient developed left arm pain from her shoulder down to her arm along with associated numbness and tingling in all fingers.  Strength is not affected.  She feels that her symptoms are improved compared to the time of onset.  At the same time, she continues to have shoulder and left arm pain along with left hand numbness.  In addition, she lost a prescription for her right elbow brace and asks for another one.  No other new neurological symptoms.    On review of systems, patient endorses no additional active complaints. Medications, allergies, family and social history were also reviewed. There are no changes reported by patient.  REVIEW OF SYSTEMS:                                                    10-system review was completed. Pertinent positives are included in HPI. The remainder of ROS is negative.  EXAM:                                                    Physical Exam:   Vitals: /82 (BP Location: Left arm, Patient Position:  Sitting, Cuff Size: Adult Large)  Pulse 73  Temp 98.1  F (36.7  C) (Oral)  Resp 14  Wt 108.9 kg (240 lb)  LMP  (LMP Unknown)  SpO2 96%  BMI 42.18 kg/m2    General: pt is in NAD, cooperative.  Skin: normal turgor, moist mucous membranes, no lesions/rashes noticed.  HEENT: ATNC, white sclera, normal conjunctiva.  Respiratory: Symmetric lung excursion, no accessory respiratory muscle use.  Abdomen: Non distended.  Neurological: awake, cooperative, follows commands, no aphasia or dysarthria noted, cranial nerves II-XII: no ptosis, extraocular motility is full, face is symmetric, tongue is midline, equally moves all extremities, strength is 5/5 in both upper extremities, normal tone, deep tendon reflexes of upper extremities are symmetric, sensation to the light touch and pinprick is reduced in both hands (medial surfaces) and patchy areas of left upper extremity, pronator drift is negative, finger to nose intact bilaterally, casual gait is normal.  ASSESSMENT and PLAN:                                                    Assessment: 47-year-old female patient, previously seen for right hand paresthesias secondary to right ulnar neuropathy presents to discuss a new problem, left arm pain started approximately 1 week ago.    The neurological exam today is not significantly changed compared to the previous evaluations, except additional area of reduced pinprick in the left upper extremity.  The clinical presentation might be consistent with painful brachial plexopathy (Parsonage-Vázquez syndrome), cervical radiculopathy, and musculoskeletal pathology/nonspecific paresthesias.  I ordered EMG of left upper extremities to further clarify.  Based on results, might consider doing cervical spine MRI.  Meanwhile, I also ordered physical therapy for her left upper extremity strengthening and range of motion.    Diagnoses:    ICD-10-CM    1. Pain of left upper extremity M79.602 EMG     PHYSICAL THERAPY REFERRAL   2. Ulnar  neuropathy of right upper extremity G56.21 order for DME     Plan: At today's visit we thoroughly discussed various diagnostic possibilities for patient's symptoms and the reasons for work-up, which includes:  Orders Placed This Encounter   Procedures     PHYSICAL THERAPY REFERRAL     EMG     Additional recommendations after the work-up.     Next follow-up appointment is in the next 4 weeks or earlier if needed.    I advised the patient to contact me with any questions or concerns.    Total Time: 43 minutes with > 50% spent counseling the patient on stated above assessment and recommendations, including nature of the suspect diagnosis, needed w/u, and proposed plan.  Extra time was needed to discuss patient's symptoms and the recommended plan.    Joaquín Burger MD  HP/ Neurology         Again, thank you for allowing me to participate in the care of your patient.        Sincerely,        Joaquín Burger MD

## 2018-09-04 NOTE — MR AVS SNAPSHOT
After Visit Summary   9/4/2018    Velma Diaz    MRN: [id number]           Patient Information     Date Of Birth          1970        Visit Information        Provider Department      9/4/2018 3:30 PM Joaquín Burger MD [facility]        Today's Diagnoses     Pain of left upper extremity    -  1    Ulnar neuropathy of right upper extremity          Care Instructions    AFTER VISIT SUMMARY (AVS):    At today's visit we discussed various diagnostic possibilities for your symptoms and the reasons for work-up, which includes:  Orders Placed This Encounter   Procedures     PHYSICAL THERAPY REFERRAL     EMG     Additional recommendations after the work-up.    Next follow-up appointment is in the next 4 weeks or earlier if needed.    Please do not hesitate to call me with any questions or concerns.    Thanks.           Follow-ups after your visit        Additional Services     PHYSICAL THERAPY REFERRAL       *This therapy referral will be filtered to a centralized scheduling office at [facility] and the patient will receive a call to schedule an appointment at a [place] location most convenient for them. *     [facility] provides Physical Therapy evaluation and treatment and many specialty services across the [place] system.  If requesting a specialty program, please choose from the list below.    If you have not heard from the scheduling office within 2 business days, please call [phone] for all locations, with the exception of [place], please call [phone] and [facility], please call [phone]  Treatment: Evaluation & Treatment  Special Instructions/Modalities: Left arm pain, range of motion and strengthening exercises  Special Programs: None.  Please be aware that coverage of these services is subject to the terms and limitations of your health insurance plan.  Call member services at your health plan with  "any benefit or coverage questions.      **Note to Provider:  If you are referring outside of [place] for the therapy appointment, please list the name of the location in the \"special instructions\" above, print the referral and give to the patient to schedule the appointment.                  Follow-up notes from your care team     Return in about 4 weeks (around 10/2/2018).      Your next 10 appointments already scheduled     Sep 13, 2018  1:00 PM CDT   Return Visit with Joaquín Burger MD   [facility]    [address] [place] [id number]   [phone]            Sep 17, 2018 11:00 AM CDT   Return Visit with SERVANDO Yan   [facility]    [address] [facility] [id number] [phone]              Future tests that were ordered for you today     Open Future Orders        Priority Expected Expires Ordered    EMG Routine  9/4/2019 9/4/2018            Who to contact     If you have questions or need follow up information about today's clinic visit or your schedule please contact [facility] directly at [phone].  Normal or non-critical lab and imaging results will be communicated to you by MyRefershart, letter or phone within 4 business days after the clinic has received the results. If you do not hear from us within 7 days, please contact the clinic through MyRefershart or phone. If you have a critical or abnormal lab result, we will notify you by phone as soon as possible.  Submit refill requests through VividCortex or call your pharmacy and they will forward the refill request to us. Please allow 3 business days for your refill to be completed.          Additional Information About Your Visit        VividCortex Information     V[employer]Cortex gives you secure access to your electronic health record. If you see a primary care provider, you can also send messages to " your care team and make appointments. If you have questions, please call your primary care clinic.  If you do not have a primary care provider, please call [phone] and they will assist you.        Care EveryWhere ID     This is your Care EveryWhere ID. This could be used by other organizations to access your [place] medical records  [id number]        Your Vitals Were     Pulse Temperature Respirations Last Period Pulse Oximetry BMI (Body Mass Index)    73 98.1  F (36.7  C) (Oral) 14 (LMP Unknown) 96% 42.18 kg/m2       Blood Pressure from Last 3 Encounters:   09/04/18 124/82   08/28/18 103/68   08/06/18 111/76    Weight from Last 3 Encounters:   09/04/18 108.9 kg (240 lb)   08/28/18 109.3 kg (241 lb)   08/06/18 108 kg (238 lb)              We Performed the Following     PHYSICAL THERAPY REFERRAL          Today's Medication Changes          These changes are accurate as of 9/4/18  4:01 PM.  If you have any questions, ask your nurse or doctor.               Start taking these medicines.        Dose/Directions    order for DME   Used for:  Ulnar neuropathy of right upper extremity   Started by:  Joaquín Burger MD        Equipment being ordered: right elbow splint.   Quantity:  1 each   Refills:  0            Where to get your medicines      Some of these will need a paper prescription and others can be bought over the counter.  Ask your nurse if you have questions.     Bring a paper prescription for each of these medications     order for DME                Primary Care Provider Office Phone # Fax #    Srinivasa Hunter [phone]       [address] [facility] [address]        Goals        General    I will call within next 2 weeks to schedule initial psychiatry appointment (pt-stated)     Notes - Note edited  5/20/2015 12:51 PM by Yesica Marsh    As of today's date 5/20/2015 goal is met at 76 - 100%.   Goal Status:  Complete  Patient states this is scheduled  with Dr. Salavdor for next month, but not on appointment calendar  LUDY Stark, MSW   [phone]  5/20/2015 12:51 PM        I will complete [employer] or reduced fare application within the next month (pt-stated)     Notes - Note edited  11/24/2015 12:00 PM by Yesica Marsh    As of today's date 11/24/2015 goal is met at 51 - 75%.   Goal Status:  Active  She reported today having [employer] application, [employer] worker has requested but packet from [employer].    LUDY Stark, MSW   [phone]  11/24/2015 12:00 PM        I will discuss [employer] worker with therapist next week for housing needs  (pt-stated)     Notes - Note edited  12/3/2015  1:17 PM by Yesica Marsh    As of today's date 12/3/2015 goal is met at 76 - 100%.   Goal Status:  Discontinued  Patient has [employer] worker and has found housing with friend  LUDY Stark, MSW   [phone]  12/3/2015 1:16 PM        Equal Access to Services     RICHARDSON HERNANDEZ AH: Hadii antionette gutierrez hadasho Sotheresaali, waaxda luqadaha, qaybta kaalmada adechaimyadre, grayson carrillo . So [facility] [place] [facility] [phone].    ATENCIÓN: Si habla español, tiene a dowd disposición servicios gratuitos de asistencia lingüística. Llame al [phone].    We comply with applicable federal civil rights laws and [place] laws. We do not discriminate on the basis of race, color, national origin, age, disability, sex, sexual orientation, or gender identity.            Thank you!     Thank you for choosing [facility]  for your care. Our goal is always to provide you with excellent care. Hearing back from our patients is one way we can continue to improve our services. Please take a few minutes to complete the written survey that you may receive in the mail after your visit with us. Thank you!             Your Updated Medication List - Protect others around you: Learn how to safely use, store and throw away your medicines at www.StyleTechemymeds.org.           This list is accurate as of 9/4/18  4:01 PM.  Always use your most recent med list.                   Brand Name Dispense Instructions for use Diagnosis    acetaminophen 325 MG tablet    TYLENOL    100 tablet    Take 2 tablets (650 mg) by mouth every 4 hours as needed for other (mild pain)    Lesion of left ulnar nerve       albuterol 108 (90 Base) MCG/ACT inhaler    PROAIR HFA/PROVENTIL HFA/VENTOLIN HFA    1 Inhaler    Inhale 2 puffs into the lungs every 6 hours as needed for shortness of breath / dyspnea or wheezing    Bronchitis       amoxicillin-clavulanate 875-125 MG per tablet    AUGMENTIN     amoxicillin 875 mg-potassium clavulanate 125 mg tablet        ferrous sulfate 325 (65 Fe) MG tablet    IRON    90 tablet    Take 1 tablet (325 mg) by mouth daily (with breakfast)    Restless legs syndrome (RLS)       furosemide 20 MG tablet    LASIX    30 tablet    Take 1 tablet (20 mg) by mouth daily    Leg swelling       hydrOXYzine 50 MG capsule    VISTARIL     Take 50 mg by mouth daily At night        LYRICA 225 MG Caps   Generic drug:  Pregabalin     60 capsule    TAKE 1 TABLET (225 MG) BY MOUTH TWICE A DAY    Chronic pain syndrome       methocarbamol 750 MG tablet    ROBAXIN    60 tablet    TAKE 1 TABLET (750 MG) BY MOUTH 3 TIMES DAILY AS NEEDED FOR MUSCLE SPASMS    Chronic low back pain, unspecified back pain laterality, with sciatica presence unspecified       MULTI FOR HER PO      Take by mouth daily        nystatin 318453 UNIT/GM Powd    MYCOSTATIN    60 g    Apply to affected area twice daily as needed    Candidal intertrigo       order for DME      Respironics REMSTAR 60 Series Auto [id number] H2O, Airfit P10 nasal pillow mask w/xsmall pillows        order for DME     1 Device    TENS unit    Chronic bilateral back pain, unspecified back location       order for DME     1 each    Equipment being ordered: right elbow pad and right elbow splint.    Ulnar neuropathy of right upper extremity       order for  DME     1 each    Equipment being ordered: right elbow splint.    Ulnar neuropathy of right upper extremity       oxyCODONE-acetaminophen  MG per tablet    PERCOCET    90 tablet    Take 1 tablet by mouth every 6 hours as needed for moderate to severe pain    Chronic pain syndrome       rOPINIRole 0.25 MG tablet    REQUIP    60 tablet    Take 1 tablet (0.25 mg) by mouth 2 times daily    Restless legs syndrome (RLS)       SUMAtriptan 100 MG tablet    IMITREX    9 tablet    Take 1 tablet (100 mg) by mouth at onset of headache for migraine May repeat in 2 hours if needed: max 2/day    Headache(784.0)

## 2018-09-04 NOTE — PATIENT INSTRUCTIONS
AFTER VISIT SUMMARY (AVS):    At today's visit we discussed various diagnostic possibilities for your symptoms and the reasons for work-up, which includes:  Orders Placed This Encounter   Procedures     PHYSICAL THERAPY REFERRAL     EMG     Additional recommendations after the work-up.     Next follow-up appointment is in the next 4 weeks or earlier if needed.    Please do not hesitate to call me with any questions or concerns.    Thanks.

## 2018-09-04 NOTE — TELEPHONE ENCOUNTER
Prior Authorization Approval    Authorization Effective Date: 8/5/2018  Authorization Expiration Date: 9/4/2019  Medication: Lyrica approved   Approved Dose/Quantity:   Reference #:     Insurance Company: [employer] - Phone [phone] Fax [phone]  Expected CoPay:       CoPay Card Available:      Foundation Assistance Needed:    Which Pharmacy is filling the prescription (Not needed for infusion/clinic administered): [id number] [place], [place] - [address]  Pharmacy Notified: Yes   Patient Notified: Yes

## 2018-09-04 NOTE — PROGRESS NOTES
ESTABLISHED PATIENT NEUROLOGY NOTE    DATE OF VISIT: 9/4/2018  CLINIC LOCATION: [facility]  MRN: [id number]  PATIENT NAME: Velma Diaz  YOB: 1970    PCP: Srinivasa Hunter MD    REASON FOR VISIT:   Chief Complaint   Patient presents with     Consult     left arm pain. Discuss brace issue for her pther arm     SUBJECTIVE:                                                      HISTORY OF PRESENT ILLNESS: Patient, previously seen for right hand paresthesias, presents with new problem, left upper extremity pain.  She was last seen on 07/12/2018.  At that time, right elbow brace was recommended.  Please refer to my initial/other prior notes for further information.    Since the last visit, approximately 1 week ago the patient developed left arm pain from her shoulder down to her arm along with associated numbness and tingling in all fingers.  Strength is not affected.  She feels that her symptoms are improved compared to the time of onset.  At the same time, she continues to have shoulder and left arm pain along with left hand numbness.  In addition, she lost a prescription for her right elbow brace and asks for another one.  No other new neurological symptoms.    On review of systems, patient endorses no additional active complaints. Medications, allergies, family and social history were also reviewed. There are no changes reported by patient.  REVIEW OF SYSTEMS:                                                    10-system review was completed. Pertinent positives are included in HPI. The remainder of ROS is negative.  EXAM:                                                    Physical Exam:   Vitals: /82 (BP Location: Left arm, Patient Position: Sitting, Cuff Size: Adult Large)  Pulse 73  Temp 98.1  F (36.7  C) (Oral)  Resp 14  Wt 108.9 kg (240 lb)  LMP  (LMP Unknown)  SpO2 96%  BMI 42.18 kg/m2    General: pt is in NAD, cooperative.  Skin: normal turgor, moist mucous  membranes, no lesions/rashes noticed.  HEENT: ATNC, white sclera, normal conjunctiva.  Respiratory: Symmetric lung excursion, no accessory respiratory muscle use.  Abdomen: Non distended.  Neurological: awake, cooperative, follows commands, no aphasia or dysarthria noted, cranial nerves II-XII: no ptosis, extraocular motility is full, face is symmetric, tongue is midline, equally moves all extremities, strength is 5/5 in both upper extremities, normal tone, deep tendon reflexes of upper extremities are symmetric, sensation to the light touch and pinprick is reduced in both hands (medial surfaces) and patchy areas of left upper extremity, pronator drift is negative, finger to nose intact bilaterally, casual gait is normal.  ASSESSMENT and PLAN:                                                    Assessment: 47-year-old female patient, previously seen for right hand paresthesias secondary to right ulnar neuropathy presents to discuss a new problem, left arm pain started approximately 1 week ago.    The neurological exam today is not significantly changed compared to the previous evaluations, except additional area of reduced pinprick in the left upper extremity.  The clinical presentation might be consistent with painful brachial plexopathy (Parsonage-Vázquez syndrome), cervical radiculopathy, and musculoskeletal pathology/nonspecific paresthesias.  I ordered EMG of left upper extremities to further clarify.  Based on results, might consider doing cervical spine MRI.  Meanwhile, I also ordered physical therapy for her left upper extremity strengthening and range of motion.    Diagnoses:    ICD-10-CM    1. Pain of left upper extremity M79.602 EMG     PHYSICAL THERAPY REFERRAL   2. Ulnar neuropathy of right upper extremity G56.21 order for DME     Plan: At today's visit we thoroughly discussed various diagnostic possibilities for patient's symptoms and the reasons for work-up, which includes:  Orders Placed This Encounter    Procedures     PHYSICAL THERAPY REFERRAL     EMG     Additional recommendations after the work-up.     Next follow-up appointment is in the next 4 weeks or earlier if needed.    I advised the patient to contact me with any questions or concerns.    Total Time: 43 minutes with > 50% spent counseling the patient on stated above assessment and recommendations, including nature of the suspect diagnosis, needed w/u, and proposed plan.  Extra time was needed to discuss patient's symptoms and the recommended plan.    Joaquín Burger MD  / Neurology

## 2018-09-04 NOTE — TELEPHONE ENCOUNTER
PA Initiation    Medication: Lyrica  Insurance Company: [employer] - Phone [phone] Fax [phone]  Pharmacy Filling the Rx[id number] [place] [facility] [address]  Filling Pharmacy Phone: [phone]  Filling Pharmacy Fax:    Start Date: 9/4/2018    THIS HAS BEEN SUBMITTED BY THE PRIOR-AUTHORIZATION TEAM. ANY QUESTIONS PLEASE CALL [phone]. THANK YOU

## 2018-09-05 ENCOUNTER — TELEPHONE (OUTPATIENT)
Dept: FAMILY MEDICINE | Facility: CLINIC | Age: 48
End: 2018-09-05

## 2018-09-05 ENCOUNTER — TRANSFERRED RECORDS (OUTPATIENT)
Dept: HEALTH INFORMATION MANAGEMENT | Facility: CLINIC | Age: 48
End: 2018-09-05

## 2018-09-05 DIAGNOSIS — N20.0 CALCULUS OF KIDNEY: Primary | ICD-10-CM

## 2018-09-05 NOTE — TELEPHONE ENCOUNTER
Reason for Call: Request for an order or referral:    Order or referral being requested:  Patient was just seen at [facility] and they referred her to a urologist in the [facility] system but patient wants to see a urologist in the [place] system.  She would like Dr Hunter to give her a referral.    Date needed: as soon as possible    Has the patient been seen by the PCP for this problem? YES    Additional comments:     Phone number Patient can be reached at:  Home number on file [phone] (home)    Best Time:  any    Can we leave a detailed message on this number?  YES    Call taken on 9/5/2018 at 10:28 AM by Germaine Rivera

## 2018-09-05 NOTE — TELEPHONE ENCOUNTER
She could make an appointment to see Dr. Tang at our [place] clinic for this.  I put in a referral.

## 2018-09-11 ENCOUNTER — OFFICE VISIT (OUTPATIENT)
Dept: NEUROLOGY | Facility: CLINIC | Age: 48
End: 2018-09-11
Payer: COMMERCIAL

## 2018-09-11 DIAGNOSIS — M79.602 PAIN OF LEFT UPPER EXTREMITY: ICD-10-CM

## 2018-09-11 NOTE — LETTER
9/11/2018       RE: Velma Diaz  [address]  [place] [address]     Dear Colleague,    Thank you for referring your patient, Velma Diaz, to the [facility] EMG at [facility]. Please see a copy of my visit note below.        [facility]  Electrodiagnostic Laboratory    Nerve Conduction & EMG Report          Patient:       Velma Diaz  Patient ID:    [id number]  Gender:        Female  YOB: 1970  Age:           47 Years 10 Months      Referring Physician: Joaquín Burger MD    History & Examination:     47 year old woman with a 2-3 week history of pain at the left shoulder, upper arm and paresthesias at her dorsal forearm and all fingers. She has a previous history of left ulnar neuropathy status post transposition. Query left cervical radiculopathy, brachial plexopathy, vs another explanation for this presentation.     Techniques:    Motor and sensory conduction studies were done with surface recording electrodes. EMG was done with a concentric needle electrode.     Results:     Left median, ulnar, and radial antidromic sensory NCSs were normal. Left median and ulnar motor NCSs were normal. EMG of left FDI, triceps, deltoid, biceps, brachioradialis, pronator teres, EDC, and C7 paraspinals was normal.     Interpretation:     Normal study. See comment.     Comment: A normal EMG study makes a diagnosis of brachial plexitis (Parsonage Vázquez syndrome) unlikely, but it does not rule out cervical radiculopathy; in fact the sensitivity of needle EMG examination to detect radiculopathy is often no better than 50-60%. Clinical and imaging correlation is recommended.     EMG Physician:    Mekhi Sanchez MD        Sensory NCS      Nerve / Sites Rec. Site Onset Peak NP Amp Ref. PP Amp Dist Jaren Ref. Temp     ms ms  V  V  V cm m/s m/s  C   L MEDIAN - Dig II Anti      Wrist Dig II 2.60 3.23 24.5 10.0 34.4 14 53.8 48.0 33.9   L ULNAR - Dig V Anti       Wrist Dig V 2.14 2.81 22.7 8.0 47.5 12.5 58.5 48.0 33.9   L RADIAL - Snuff      Forearm Snuff 1.41 2.03 26.4 15.0 34.8 10 71.1 48.0 33.6       Motor NCS      Nerve / Sites Rec. Site Lat Ref. Amp Ref. Rel Amp Dist Jaren Ref. Dur. Area Temp.     ms ms mV mV % cm m/s m/s ms %  C   L MEDIAN - APB      Wrist APB 3.02 4.40 9.2 5.0 100 8   6.67 100 32.7      Elbow APB 6.41  8.8  95.4 20 59.1 48.0 6.72 91.3 32.7   L ULNAR - ADM      Wrist ADM 2.45 3.50 10.1 5.0 100 8   5.94 100 33.9      B.Elbow ADM 5.10  8.6  85.1 16 60.2 48.0 5.78 93.3 33.9      A.Elbow ADM 7.08  8.3  81.6 12 60.6 48.0 5.94 87.4 33.9      Wrist (ext) ADM 2.29  9.8  96.3 16 1024.0 48.0 5.99 100 33.9      B. Elbow (ext) ADM 4.58  8.9  87.9 12 230.4  5.73 95.1 33.9      A. Elbow (ext) ADM 7.08  7.5  73.5    5.99 83.8 33.9       EMG Summary Table     Spontaneous MUAP Recruitment    IA Fib/PSW Fasc H.F. Amp Dur. PPP Pattern   L. FIRST D INTEROSS N None None None N N N N   L. TRICEPS N None None None N N N N   L. DELTOID N None None None N N N N   L. BICEPS N None None None N N N N   L. PRON TERES N None None None N N N N   L. EXT DIG COMM N None None None N N N N   L. BRACHIORADIALIS N None None None N N N N   L. C7 PSP N None None None N N N N                      Again, thank you for allowing me to participate in the care of your patient.      Sincerely,    Mekhi Sanchez MD

## 2018-09-11 NOTE — PROGRESS NOTES
[facility]  Electrodiagnostic Laboratory    Nerve Conduction & EMG Report          Patient:       Velma Diaz  Patient ID:    [id number]  Gender:        Female  YOB: 1970  Age:           47 Years 10 Months      Referring Physician: Joaquín Burger MD    History & Examination:     47 year old woman with a 2-3 week history of pain at the left shoulder, upper arm and paresthesias at her dorsal forearm and all fingers. She has a previous history of left ulnar neuropathy status post transposition. Query left cervical radiculopathy, brachial plexopathy, vs another explanation for this presentation.     Techniques:    Motor and sensory conduction studies were done with surface recording electrodes. EMG was done with a concentric needle electrode.     Results:     Left median, ulnar, and radial antidromic sensory NCSs were normal. Left median and ulnar motor NCSs were normal. EMG of left FDI, triceps, deltoid, biceps, brachioradialis, pronator teres, EDC, and C7 paraspinals was normal.     Interpretation:     Normal study. See comment.     Comment: A normal EMG study makes a diagnosis of brachial plexitis (Parsonage Vázquez syndrome) unlikely, but it does not rule out cervical radiculopathy; in fact the sensitivity of needle EMG examination to detect radiculopathy is often no better than 50-60%. Clinical and imaging correlation is recommended.     EMG Physician:    Mekhi Sanchez MD        Sensory NCS      Nerve / Sites Rec. Site Onset Peak NP Amp Ref. PP Amp Dist Jaren Ref. Temp     ms ms  V  V  V cm m/s m/s  C   L MEDIAN - Dig II Anti      Wrist Dig II 2.60 3.23 24.5 10.0 34.4 14 53.8 48.0 33.9   L ULNAR - Dig V Anti      Wrist Dig V 2.14 2.81 22.7 8.0 47.5 12.5 58.5 48.0 33.9   L RADIAL - Snuff      Forearm Snuff 1.41 2.03 26.4 15.0 34.8 10 71.1 48.0 33.6       Motor NCS      Nerve / Sites Rec. Site Lat Ref. Amp Ref. Rel Amp Dist Jaren Ref. Dur. Area Temp.     ms ms mV mV % cm m/s  m/s ms %  C   L MEDIAN - APB      Wrist APB 3.02 4.40 9.2 5.0 100 8   6.67 100 32.7      Elbow APB 6.41  8.8  95.4 20 59.1 48.0 6.72 91.3 32.7   L ULNAR - ADM      Wrist ADM 2.45 3.50 10.1 5.0 100 8   5.94 100 33.9      B.Elbow ADM 5.10  8.6  85.1 16 60.2 48.0 5.78 93.3 33.9      A.Elbow ADM 7.08  8.3  81.6 12 60.6 48.0 5.94 87.4 33.9      Wrist (ext) ADM 2.29  9.8  96.3 16 1024.0 48.0 5.99 100 33.9      B. Elbow (ext) ADM 4.58  8.9  87.9 12 230.4  5.73 95.1 33.9      A. Elbow (ext) ADM 7.08  7.5  73.5    5.99 83.8 33.9       EMG Summary Table     Spontaneous MUAP Recruitment    IA Fib/PSW Fasc H.F. Amp Dur. PPP Pattern   L. FIRST D INTEROSS N None None None N N N N   L. TRICEPS N None None None N N N N   L. DELTOID N None None None N N N N   L. BICEPS N None None None N N N N   L. PRON TERES N None None None N N N N   L. EXT DIG COMM N None None None N N N N   L. BRACHIORADIALIS N None None None N N N N   L. C7 PSP N None None None N N N N

## 2018-09-11 NOTE — MR AVS SNAPSHOT
After Visit Summary   9/11/2018    Velma Diaz    MRN: [id number]           Patient Information     Date Of Birth          1970        Visit Information        Provider Department      9/11/2018 10:30 AM Mekhi Sanchez MD [facility] EMG        Today's Diagnoses     Pain of left upper extremity           Follow-ups after your visit        Your next 10 appointments already scheduled     Sep 12, 2018  9:40 AM CDT   (Arrive by 9:25 AM)   HAZEL Extremity with Elfego Rodas, PT   [facility] ([facility])    [address]  [address]  [facility] [address]   [phone]            Sep 17, 2018  9:15 AM CDT   New Visit with Gus Tang MD   [facility] ([facility])    [address] [address]   [phone]            Sep 17, 2018 11:00 AM CDT   Return Visit with SERVANDO Yan   [facility] ([facility])    [address] [facility] [facility] [address] [phone]            Sep 20, 2018  9:30 AM CDT   Return Visit with Joaquín Burger MD   [facility] ([facility])    [address] [place] [id number] [phone]              Future tests that were ordered for you today     Open Future Orders        Priority Expected Expires Ordered    Needle EMG Each Extremity w/Paraspinal Area Complete (38064) Routine  9/11/2019 9/11/2018            Who to contact     Please call your clinic at [phone] to:    Ask questions about your health    Make or cancel appointments    Discuss your medicines    Learn about your test results    Speak to your doctor            Additional Information About Your Visit        MyChart Information     [employer] gives you secure access to your electronic health record. If you see a primary care provider, you can also send messages to your care team and make appointments. If  you have questions, please call your primary care clinic.  If you do not have a primary care provider, please call [phone] and they will assist you.      [employer] is an electronic gateway that provides easy, online access to your medical records. With [employer], you can request a clinic appointment, read your test results, renew a prescription or communicate with your care team.     To access your existing account, please contact your [facility] or call [phone] for assistance.        Care EveryWhere ID     This is your Care EveryWhere ID. This could be used by other organizations to access your [facility] medical records  [id number]        Your Vitals Were     Last Period                   (LMP Unknown)            Blood Pressure from Last 3 Encounters:   09/04/18 124/82   08/28/18 103/68   08/06/18 111/76    Weight from Last 3 Encounters:   09/04/18 108.9 kg (240 lb)   08/28/18 109.3 kg (241 lb)   08/06/18 108 kg (238 lb)              We Performed the Following     EMG     HC NCS MOTOR W OR W/O F-WAVE, 5 OR 6        Primary Care Provider Office Phone # Fax #    Srinivasa Hunter [phone]       [address] [facility] [address]        Goals        General    I will call within next 2 weeks to schedule initial psychiatry appointment (pt-stated)     Notes - Note edited  5/20/2015 12:51 PM by Yesica Marsh    As of today's date 5/20/2015 goal is met at 76 - 100%.   Goal Status:  Complete  Patient states this is scheduled with Dr. Salvador for next month, but not on appointment calendar  Yesica Marsh, LUDY, MSW   [phone]  5/20/2015 12:51 PM        I will complete [employer] or reduced fare application within the next month (pt-stated)     Notes - Note edited  11/24/2015 12:00 PM by Yesica Marsh    As of today's date 11/24/2015 goal is met at 51 - 75%.   Goal Status:  Active  She reported today having [employer] application, [facility]  worker has requested but packet from [employer].    LUDY Stark, MSW   [phone]  11/24/2015 12:00 PM        I will discuss [facility] worker with therapist next week for housing needs  (pt-stated)     Notes - Note edited  12/3/2015  1:17 PM by Yesica Marsh    As of today's date 12/3/2015 goal is met at 76 - 100%.   Goal Status:  Discontinued  Patient has [facility] worker and has found housing with friend  LUDY Stark, MSW   [phone]  12/3/2015 1:16 PM        Equal Access to Services     [facility]: Hadii aad ku hadasho Soomaali, waaxda luqadaha, qaybta kaalmada adeegyada, waxhadley jean-baptistein hayaan adeeg graeme carrillo . So [facility] [phone].    ATENCIÓN: Si habla español, tiene a dowd disposición servicios gratuitos de asistencia lingüística. [facility] [phone].    We comply with applicable federal civil rights laws and [place] laws. We do not discriminate on the basis of race, color, national origin, age, disability, sex, sexual orientation, or gender identity.            Thank you!     Thank you for choosing [facility]  for your care. Our goal is always to provide you with excellent care. Hearing back from our patients is one way we can continue to improve our services. Please take a few minutes to complete the written survey that you may receive in the mail after your visit with us. Thank you!             Your Updated Medication List - Protect others around you: Learn how to safely use, store and throw away your medicines at www.disposemymeds.org.          This list is accurate as of 9/11/18 11:14 AM.  Always use your most recent med list.                   Brand Name Dispense Instructions for use Diagnosis    acetaminophen 325 MG tablet    TYLENOL    100 tablet    Take 2 tablets (650 mg) by mouth every 4 hours as needed for other (mild pain)    Lesion of left ulnar nerve       albuterol 108 (90 Base) MCG/ACT inhaler    PROAIR HFA/PROVENTIL HFA/VENTOLIN HFA    1 Inhaler    Inhale 2  puffs into the lungs every 6 hours as needed for shortness of breath / dyspnea or wheezing    Bronchitis       amoxicillin-clavulanate 875-125 MG per tablet    AUGMENTIN     amoxicillin 875 mg-potassium clavulanate 125 mg tablet        ferrous sulfate 325 (65 Fe) MG tablet    IRON    90 tablet    Take 1 tablet (325 mg) by mouth daily (with breakfast)    Restless legs syndrome (RLS)       furosemide 20 MG tablet    LASIX    30 tablet    Take 1 tablet (20 mg) by mouth daily    Leg swelling       hydrOXYzine 50 MG capsule    VISTARIL     Take 50 mg by mouth daily At night        LYRICA 225 MG Caps   Generic drug:  Pregabalin     60 capsule    TAKE 1 TABLET (225 MG) BY MOUTH TWICE A DAY    Chronic pain syndrome       methocarbamol 750 MG tablet    ROBAXIN    60 tablet    TAKE 1 TABLET (750 MG) BY MOUTH 3 TIMES DAILY AS NEEDED FOR MUSCLE SPASMS    Chronic low back pain, unspecified back pain laterality, with sciatica presence unspecified       MULTI FOR HER PO      Take by mouth daily        nystatin 404280 UNIT/GM Powd    MYCOSTATIN    60 g    Apply to affected area twice daily as needed    Candidal intertrigo       order for DME      Respironics REMSTAR 60 Series Auto [id number] H2O, Airfit P10 nasal pillow mask w/xsmall pillows        order for DME     1 Device    TENS unit    Chronic bilateral back pain, unspecified back location       order for DME     1 each    Equipment being ordered: right elbow pad and right elbow splint.    Ulnar neuropathy of right upper extremity       order for DME     1 each    Equipment being ordered: right elbow splint.    Ulnar neuropathy of right upper extremity       oxyCODONE-acetaminophen  MG per tablet    PERCOCET    90 tablet    Take 1 tablet by mouth every 6 hours as needed for moderate to severe pain    Chronic pain syndrome       rOPINIRole 0.25 MG tablet    REQUIP    60 tablet    Take 1 tablet (0.25 mg) by mouth 2 times daily    Restless legs syndrome (RLS)       SUMAtriptan  100 MG tablet    IMITREX    9 tablet    Take 1 tablet (100 mg) by mouth at onset of headache for migraine May repeat in 2 hours if needed: max 2/day    Headache(784.0)

## 2018-09-12 ENCOUNTER — THERAPY VISIT (OUTPATIENT)
Dept: PHYSICAL THERAPY | Facility: CLINIC | Age: 48
End: 2018-09-12
Attending: PSYCHIATRY & NEUROLOGY
Payer: COMMERCIAL

## 2018-09-12 DIAGNOSIS — M79.602 PAIN OF LEFT UPPER EXTREMITY: Primary | ICD-10-CM

## 2018-09-12 PROCEDURE — 97161 PT EVAL LOW COMPLEX 20 MIN: CPT | Mod: GP | Performed by: PHYSICAL THERAPIST

## 2018-09-12 PROCEDURE — G8983 BODY POS D/C STATUS: HCPCS | Mod: GP | Performed by: PHYSICAL THERAPIST

## 2018-09-12 PROCEDURE — G8981 BODY POS CURRENT STATUS: HCPCS | Mod: GP | Performed by: PHYSICAL THERAPIST

## 2018-09-12 PROCEDURE — 97110 THERAPEUTIC EXERCISES: CPT | Mod: GP | Performed by: PHYSICAL THERAPIST

## 2018-09-12 PROCEDURE — 97140 MANUAL THERAPY 1/> REGIONS: CPT | Mod: GP | Performed by: PHYSICAL THERAPIST

## 2018-09-12 PROCEDURE — G8982 BODY POS GOAL STATUS: HCPCS | Mod: GP | Performed by: PHYSICAL THERAPIST

## 2018-09-12 NOTE — PROGRESS NOTES
[facility] Initial Evaluation  Subjective:  Patient is a 47 year old female presenting with rehab left shoulder hpi.   Velma Diaz is a 47 year old female with a left shoulder (left upper extremity- stocking glove distribution) condition.  Condition occurred with:  Unknown cause.  Condition occurred: for unknown reasons.  This is a new condition  Patient reports a long history of chronic pain, but more recently reports an increase in left upper extremity pain 3 weeks ago without a specific mechanism of injury or change in daily routine..    Site of Pain: the entire left upper extremity.    Pain is described as aching and is constant and reported as 5/10.  Associated symptoms:  Loss of strength, loss of motion/stiffness, tingling and numbness. Pain is worse during the day.  Symptoms are exacerbated by using arm overhead (gripping (steering wheel, cup)) and relieved by rest.  Since onset symptoms are gradually worsening.  Special tests:  EMG (normal results per patient report).      General health as reported by patient is good.                      Red flags:  None as reported by the patient.                        Objective:  Standing Alignment:    Cervical/Thoracic:  Forward head  Shoulder/UE:  Rounded shoulders                                  Cervical/Thoracic Evaluation  Cervical AROM: normal         Headaches: none  Cervical Myotomes:    C1-2 (Neck Flex): Left:  4+    Right: 4+  C3 (neck side bend): Left: 4+    Right: 4+  C4 (shrug):  Left: 4+    Right: 4+  C5 (Deltoid):  Left: 4-    Right: 4-  C6 (Biceps):  Left: 4    Right: 4  C7 (Triceps):  Left: 4    Right: 4  C8 (Thumb Ext): Left: 4    Right: 4  T1 (Intrinsics): Left: 4    Right: 4      Cervical Dermatomes:  normal                    Cervical Palpation:    Tenderness present at Left:    Rhomboids; Upper Trap; Levator; Erector Spinae and Suboccipitals  Tenderness present at Right:    Rhomboids; Upper Trap; Levator; Erector Spinae  and Suboccipitals    Cervical Stability/Joint Clearing:  Stability/joint clearing spine: negative spurlings A and B bilaterally. pain reduces with gentle cervical traction.      Spinal Segmental Conclusions:    Level:  Hypo at              Shoulder Evaluation:  ROM:  AROM:    Flexion:  Left:  125 deg       Extension: Left: WNL  Abduction:  Left: 130 deg     Adduction:   Left:  WNL    Internal Rotation:  Left:  L1      External Rotation:  Left:  WNL with arm adducted          Elbow Flexion:  Left:  WNL      Elbow Extension:  Left:  WNL             Pain: reported with all end range left shoulder AROM    Strength:    Flexion: Left:4+/5   Pain:    Right: 4+/5     Pain:   Extension:  Left: 4+/5    Pain:    Right: 4+/5    Pain:  Abduction:  Left: 4-/5  Pain:    Right: 4-/5     Pain:  Adduction:  Left: 4+/5    Pain:    Right: 4+/5     Pain:  Internal Rotation:  Left:4/5     Pain:    Right: 4/5     Pain:  External Rotation:   Left:4/5     Pain:   Right:4/5     Pain:        Elbow Flexion:  Left:4+/5     Pain:    Right:4+/5     Pain:  Elbow Extension:  Left:4+/5     Pain:    Right:4+/5     Pain:  Stability Testing:  normal      Special Tests:    Left shoulder positive for the following special tests:  Impingement    Palpation:    Left shoulder tenderness present at:  Acrimioclavicular; Levator; Upper Trap and Bicipital Groove    Mobility Tests:  normal                                                 General     ROS    Assessment/Plan:    Patient is a 47 year old female with left side shoulder complaints.    Patient has the following significant findings with corresponding treatment plan.                Diagnosis 1:  Left upper extremity pain  Pain -  hot/cold therapy, mechanical traction, manual therapy, self management, education and home program  Decreased ROM/flexibility - manual therapy, therapeutic exercise, therapeutic activity and home program  Decreased joint mobility - manual therapy, therapeutic exercise,  therapeutic activity and home program  Decreased strength - therapeutic exercise, therapeutic activities and home program  Decreased function - therapeutic activities and home program  Impaired posture - neuro re-education, therapeutic activities and home program    Therapy Evaluation Codes:   1) History comprised of:   Personal factors that impact the plan of care:      Overall behavior pattern.    Comorbidity factors that impact the plan of care are:      None.     Medications impacting care: None.  2) Examination of Body Systems comprised of:   Body structures and functions that impact the plan of care:      Cervical spine and Shoulder.   Activity limitations that impact the plan of care are:      Bathing, Cooking, Dressing, Grasping, Lifting and Reading/Computer work.  3) Clinical presentation characteristics are:   Stable/Uncomplicated.  4) Decision-Making    Low complexity using standardized patient assessment instrument and/or measureable assessment of functional outcome.  Cumulative Therapy Evaluation is: Low complexity.    Previous and current functional limitations:  (See Goal Flow Sheet for this information)    Short term and Long term goals: (See Goal Flow Sheet for this information)     Communication ability:  Patient appears to be able to clearly communicate and understand verbal and written communication and follow directions correctly.  Treatment Explanation - The following has been discussed with the patient:   RX ordered/plan of care  Anticipated outcomes  Possible risks and side effects  This patient would benefit from PT intervention to resume normal activities.   Rehab potential is good.    Frequency:  1 X week, once daily  Duration:  for 6 weeks  Discharge Plan:  Achieve all LTG.  Independent in home treatment program.  Reach maximal therapeutic benefit.    Please refer to the daily flowsheet for treatment today, total treatment time and time spent performing 1:1 timed codes.

## 2018-09-12 NOTE — PROGRESS NOTES
[facility] Initial Evaluation  Subjective:  Patient is a 47 year old female presenting with rehab left ankle/foot hpi.                                      Pertinent medical history includes:  Chemical dependency, concussions/dizziness, depression, fibromyalgia, mental illness, migraines/headaches, numbness/tingling and other.  Medical allergies: no.  Other surgeries include:  Orthopedic surgery and other.  Current medications:  Anti-inflammatory, muscle relaxants, pain medication and other.  Current occupation is none.                                    Objective:  System    Physical Exam    General     ROS    Assessment/Plan:

## 2018-09-17 ENCOUNTER — OFFICE VISIT (OUTPATIENT)
Dept: PSYCHOLOGY | Facility: CLINIC | Age: 48
End: 2018-09-17
Payer: COMMERCIAL

## 2018-09-17 ENCOUNTER — OFFICE VISIT (OUTPATIENT)
Dept: UROLOGY | Facility: CLINIC | Age: 48
End: 2018-09-17
Payer: COMMERCIAL

## 2018-09-17 ENCOUNTER — RADIANT APPOINTMENT (OUTPATIENT)
Dept: GENERAL RADIOLOGY | Facility: CLINIC | Age: 48
End: 2018-09-17
Attending: UROLOGY
Payer: COMMERCIAL

## 2018-09-17 VITALS — HEART RATE: 70 BPM | OXYGEN SATURATION: 98 % | DIASTOLIC BLOOD PRESSURE: 78 MMHG | SYSTOLIC BLOOD PRESSURE: 112 MMHG

## 2018-09-17 DIAGNOSIS — F33.1 MAJOR DEPRESSIVE DISORDER, RECURRENT EPISODE, MODERATE (H): Primary | Chronic | ICD-10-CM

## 2018-09-17 DIAGNOSIS — F41.1 GAD (GENERALIZED ANXIETY DISORDER): ICD-10-CM

## 2018-09-17 DIAGNOSIS — N20.1 CALCULUS OF URETER: Primary | ICD-10-CM

## 2018-09-17 PROCEDURE — 99243 OFF/OP CNSLTJ NEW/EST LOW 30: CPT | Performed by: UROLOGY

## 2018-09-17 PROCEDURE — 90834 PSYTX W PT 45 MINUTES: CPT | Performed by: SOCIAL WORKER

## 2018-09-17 PROCEDURE — 74019 RADEX ABDOMEN 2 VIEWS: CPT

## 2018-09-17 RX ORDER — TAMSULOSIN HYDROCHLORIDE 0.4 MG/1
0.4 CAPSULE ORAL DAILY
Qty: 30 CAPSULE | Refills: 1 | Status: SHIPPED | OUTPATIENT
Start: 2018-09-17 | End: 2018-10-30

## 2018-09-17 ASSESSMENT — ANXIETY QUESTIONNAIRES
IF YOU CHECKED OFF ANY PROBLEMS ON THIS QUESTIONNAIRE, HOW DIFFICULT HAVE THESE PROBLEMS MADE IT FOR YOU TO DO YOUR WORK, TAKE CARE OF THINGS AT HOME, OR GET ALONG WITH OTHER PEOPLE: NOT DIFFICULT AT ALL
2. NOT BEING ABLE TO STOP OR CONTROL WORRYING: NOT AT ALL
3. WORRYING TOO MUCH ABOUT DIFFERENT THINGS: NOT AT ALL
6. BECOMING EASILY ANNOYED OR IRRITABLE: SEVERAL DAYS
5. BEING SO RESTLESS THAT IT IS HARD TO SIT STILL: NOT AT ALL
GAD7 TOTAL SCORE: 2
1. FEELING NERVOUS, ANXIOUS, OR ON EDGE: SEVERAL DAYS
7. FEELING AFRAID AS IF SOMETHING AWFUL MIGHT HAPPEN: NOT AT ALL

## 2018-09-17 ASSESSMENT — PATIENT HEALTH QUESTIONNAIRE - PHQ9: 5. POOR APPETITE OR OVEREATING: NOT AT ALL

## 2018-09-17 NOTE — MR AVS SNAPSHOT
After Visit Summary   9/17/2018    Velma Diaz    MRN: [id number]           Patient Information     Date Of Birth          1970        Visit Information        Provider Department      9/17/2018 9:15 AM Gus Tang MD [facility]        Today's Diagnoses     Calculus of ureter    -  1       Follow-ups after your visit        Your next 10 appointments already scheduled     Sep 17, 2018 11:00 AM CDT   [facility]    [address] [facility] [address]   [phone]            Sep 18, 2018 11:40 AM CDT   HAZEL Extremity with Elfego Rodas, PT   [facility] ([facility])    [address]  [facility] [address] [address]   [phone]            Sep 20, 2018  9:30 AM CDT   Return Visit with Joaquín Burger MD   [facility]    [address] [place] [address]   [phone]            Sep 25, 2018  9:40 AM CDT   HAZEL Extremity with Elfego Rodas, PT   [facility] ([facility])    [address]  [facility] [address] [address]   [phone]            Oct 08, 2018  9:30 AM CDT   Return Visit with Gus Tang MD   [facility] ([facility])    [address] [facility] [address] [phone]              Future tests that were ordered for you today     Open Future Orders        Priority Expected Expires Ordered    XR  KUB [[id number]] Routine 9/17/2018 9/17/2019 9/17/2018            Who to contact     If you have questions or need follow up information about today's clinic visit or your schedule please contact [facility] directly at [phone].  Normal or non-critical lab and imaging results will be communicated to you by MyChart, letter or phone within 4 business days after the clinic has received the  results. If you do not hear from us within 7 days, please contact the clinic through [employer] or phone. If you have a critical or abnormal lab result, we will notify you by phone as soon as possible.  Submit refill requests through [employer]Save or call your pharmacy and they will forward the refill request to us. Please allow 3 business days for your refill to be completed.          Additional Information About Your Visit        [employer] Information     [employer] gives you secure access to your electronic health record. If you see a primary care provider, you can also send messages to your care team and make appointments. If you have questions, please call your primary care clinic.  If you do not have a primary care provider, please call [phone] and they will assist you.        Care EveryWhere ID     This is your Care EveryWhere ID. This could be used by other organizations to access your [facility] medical records  [id number]        Your Vitals Were     Pulse Last Period Pulse Oximetry             70 (LMP Unknown) 98%          Blood Pressure from Last 3 Encounters:   09/17/18 112/78   09/04/18 124/82   08/28/18 103/68    Weight from Last 3 Encounters:   09/04/18 108.9 kg (240 lb)   08/28/18 109.3 kg (241 lb)   08/06/18 108 kg (238 lb)              We Performed the Following     XR KUB          Today's Medication Changes          These changes are accurate as of 9/17/18 10:03 AM.  If you have any questions, ask your nurse or doctor.               Start taking these medicines.        Dose/Directions    tamsulosin 0.4 MG capsule   Commonly known as:  FLOMAX   Used for:  Calculus of ureter   Started by:  Gus Tang MD        Dose:  0.4 mg   Take 1 capsule (0.4 mg) by mouth daily   Quantity:  30 capsule   Refills:  1            Where to get your medicines      These medications were sent to [facility]/pharmacy #[id number] - [place], [place] - [address] [address]     Phone:  [phone]      tamsulosin 0.4 MG capsule                Primary Care Provider Office Phone # Fax #    Srinivasa Hunter [phone]       [address] [facility] [address]        Goals        General    I will call within next 2 weeks to schedule initial psychiatry appointment (pt-stated)     Notes - Note edited  5/20/2015 12:51 PM by Yesica Marsh    As of today's date 5/20/2015 goal is met at 76 - 100%.   Goal Status:  Complete  Patient states this is scheduled with Dr. Salvador for next month, but not on appointment calendar  LUDY Stark, MSW   [phone]  5/20/2015 12:51 PM        I will complete [employer] or reduced fare application within the next month (pt-stated)     Notes - Note edited  11/24/2015 12:00 PM by Yesica Marsh    As of today's date 11/24/2015 goal is met at 51 - 75%.   Goal Status:  Active  She reported today having [employer] application, [employer] worker has requested but packet from [employer].    LUDY Stark, MS   [phone]  11/24/2015 12:00 PM        I will discuss [employer] worker with therapist next week for housing needs  (pt-stated)     Notes - Note edited  12/3/2015  1:17 PM by Yesica Marsh    As of today's date 12/3/2015 goal is met at 76 - 100%.   Goal Status:  Discontinued  Patient has [employer] worker and has found housing with friend  LUDY Stark, MSW   [phone]  12/3/2015 1:16 PM        Equal Access to Services     [facility] : Hadii aad ku hadasho Soomaali, waaxda luqadaha, qaybta kaalmada adeegyada, grayson sauceda. So [facility] [phone].    ATENCIÓN: Si habla español, tiene a dowd disposición servicios gratuitos de asistencia lingüística. Llame al [phone].    We comply with applicable federal civil rights laws and [place] laws. We do not discriminate on the basis of race, color, national origin, age, disability, sex, sexual orientation, or gender identity.            Thank you!      Thank you for choosing [facility]  for your care. Our goal is always to provide you with excellent care. Hearing back from our patients is one way we can continue to improve our services. Please take a few minutes to complete the written survey that you may receive in the mail after your visit with us. Thank you!             Your Updated Medication List - Protect others around you: Learn how to safely use, store and throw away your medicines at www.disposemymeds.org.          This list is accurate as of 9/17/18 10:03 AM.  Always use your most recent med list.                   Brand Name Dispense Instructions for use Diagnosis    acetaminophen 325 MG tablet    TYLENOL    100 tablet    Take 2 tablets (650 mg) by mouth every 4 hours as needed for other (mild pain)    Lesion of left ulnar nerve       albuterol 108 (90 Base) MCG/ACT inhaler    PROAIR HFA/PROVENTIL HFA/VENTOLIN HFA    1 Inhaler    Inhale 2 puffs into the lungs every 6 hours as needed for shortness of breath / dyspnea or wheezing    Bronchitis       amoxicillin-clavulanate 875-125 MG per tablet    AUGMENTIN     amoxicillin 875 mg-potassium clavulanate 125 mg tablet        ferrous sulfate 325 (65 Fe) MG tablet    IRON    90 tablet    Take 1 tablet (325 mg) by mouth daily (with breakfast)    Restless legs syndrome (RLS)       furosemide 20 MG tablet    LASIX    30 tablet    Take 1 tablet (20 mg) by mouth daily    Leg swelling       hydrOXYzine 50 MG capsule    VISTARIL     Take 50 mg by mouth daily At night        LYRICA 225 MG Caps   Generic drug:  Pregabalin     60 capsule    TAKE 1 TABLET (225 MG) BY MOUTH TWICE A DAY    Chronic pain syndrome       methocarbamol 750 MG tablet    ROBAXIN    60 tablet    TAKE 1 TABLET (750 MG) BY MOUTH 3 TIMES DAILY AS NEEDED FOR MUSCLE SPASMS    Chronic low back pain, unspecified back pain laterality, with sciatica presence unspecified       MULTI FOR HER PO      Take by mouth daily        nystatin 310078  UNIT/GM Powd    MYCOSTATIN    60 g    Apply to affected area twice daily as needed    Candidal intertrigo       order for DME      Respironics REMSTAR 60 Series Auto ZLHB1il H2O, Airfit P10 nasal pillow mask w/xsmall pillows        order for DME     1 Device    TENS unit    Chronic bilateral back pain, unspecified back location       order for DME     1 each    Equipment being ordered: right elbow pad and right elbow splint.    Ulnar neuropathy of right upper extremity       order for DME     1 each    Equipment being ordered: right elbow splint.    Ulnar neuropathy of right upper extremity       oxyCODONE-acetaminophen  MG per tablet    PERCOCET    90 tablet    Take 1 tablet by mouth every 6 hours as needed for moderate to severe pain    Chronic pain syndrome       rOPINIRole 0.25 MG tablet    REQUIP    60 tablet    Take 1 tablet (0.25 mg) by mouth 2 times daily    Restless legs syndrome (RLS)       SUMAtriptan 100 MG tablet    IMITREX    9 tablet    Take 1 tablet (100 mg) by mouth at onset of headache for migraine May repeat in 2 hours if needed: max 2/day    Headache(784.0)       tamsulosin 0.4 MG capsule    FLOMAX    30 capsule    Take 1 capsule (0.4 mg) by mouth daily    Calculus of ureter

## 2018-09-17 NOTE — PROGRESS NOTES
Visit Date:   2018      SUBJECTIVE:  The patient is a pleasant 47-year-old  female who was requested to be seen by Dr. Srinivasa Hunter for a consultation with regard to the  patient's renal colic and ureteral stone.  The patient was seen at [facility] on 2018 with right flank pain.  A CT scan of the abdomen and pelvis showed a 3 mm stone in the right upper ureter with hydronephrosis.  Since then, she denies any pain or discomfort.  She has no hematuria or any irritative voiding symptoms.  She has not passed the stone, however.  She has not required any pain medication at all.      KUB today showed a calcification in the L3-L4 upper ureter, most likely a retained stone.      ASSESSMENT:  A pleasant 47-year-old  female with a history of right renal colic due to a 3 mm stone in the right upper ureter.  A KUB today showed the stone still there most likely.      RECOMMENDATION:  I told the patient most likely she should be able to pass the stone given its size.  I recommend observation since she is not symptomatic from it.  I will have her come back to see me in 2-3 weeks from now with a repeat KUB.  Flomax was restarted today.  If she has worsening pain or any other health issues, she can contact me sooner.         ARIANA BLOUNT MD             D: 2018   T: 2018   MT: REE      Name:     ARVIN NASH   MRN:      [id number]        Account:      [id number]   :      1970           Visit Date:   2018      Document: [id number]       cc: Srinivasa Hunter MD

## 2018-09-17 NOTE — PROGRESS NOTES
S: See dictated note   Current Outpatient Prescriptions   Medication Sig Dispense Refill     tamsulosin (FLOMAX) 0.4 MG capsule Take 1 capsule (0.4 mg) by mouth daily 30 capsule 1     acetaminophen (TYLENOL) 325 MG tablet Take 2 tablets (650 mg) by mouth every 4 hours as needed for other (mild pain) 100 tablet 0     albuterol (PROAIR HFA/PROVENTIL HFA/VENTOLIN HFA) 108 (90 Base) MCG/ACT Inhaler Inhale 2 puffs into the lungs every 6 hours as needed for shortness of breath / dyspnea or wheezing (Patient not taking: Reported on 6/14/2018) 1 Inhaler 0     amoxicillin-clavulanate (AUGMENTIN) 875-125 MG per tablet amoxicillin 875 mg-potassium clavulanate 125 mg tablet       ferrous sulfate (IRON) 325 (65 FE) MG tablet Take 1 tablet (325 mg) by mouth daily (with breakfast) 90 tablet 4     furosemide (LASIX) 20 MG tablet Take 1 tablet (20 mg) by mouth daily 30 tablet 11     hydrOXYzine (VISTARIL) 50 MG capsule Take 50 mg by mouth daily At night       LYRICA 225 MG CAPS TAKE 1 TABLET (225 MG) BY MOUTH TWICE A DAY 60 capsule 5     methocarbamol (ROBAXIN) 750 MG tablet TAKE 1 TABLET (750 MG) BY MOUTH 3 TIMES DAILY AS NEEDED FOR MUSCLE SPASMS 60 tablet 1     Multiple Vitamins-Minerals (MULTI FOR HER PO) Take by mouth daily        nystatin (MYCOSTATIN) 205837 UNIT/GM POWD Apply to affected area twice daily as needed (Patient not taking: Reported on 9/4/2018) 60 g 2     order for DME Equipment being ordered: right elbow splint. 1 each 0     order for DME Equipment being ordered: right elbow pad and right elbow splint. (Patient not taking: Reported on 9/17/2018) 1 each 0     order for DME TENS unit (Patient not taking: Reported on 9/4/2018) 1 Device 0     ORDER FOR DME Respironics REMSTAR 60 Series Auto [id number] H2O, Airfit P10 nasal pillow mask w/xsmall pillows       oxyCODONE-acetaminophen (PERCOCET)  MG per tablet Take 1 tablet by mouth every 6 hours as needed for moderate to severe pain 90 tablet 0     rOPINIRole (REQUIP)  0.25 MG tablet Take 1 tablet (0.25 mg) by mouth 2 times daily 60 tablet 3     SUMAtriptan (IMITREX) 100 MG tablet Take 1 tablet (100 mg) by mouth at onset of headache for migraine May repeat in 2 hours if needed: max 2/day (Patient not taking: Reported on 9/4/2018) 9 tablet 2     No Known Allergies  Past Medical History:   Diagnosis Date     Chronic pain syndrome 11/20/2015    She takes up to 90 oxycodone tablets per month for her chronic pain      Depressive disorder 2000     History of cleft palate with cleft lip     cleft lip and palate, and has had 27 operations per the patient     Hyperlipidemia with target LDL less than 130 10/26/2015     Morbid obesity with BMI of 40.0-44.9, adult (H) 10/26/2015     Neuropathy      MAYA (obstructive sleep apnea)/Hypoventilation Syndrome 2/24/2014    Study Date: 2/13/2014- (245.1 lbs) Sleep Associated Hypoventilation  suggested with baseline PCO2 42 mmHg, maximum PCO2 55 mmHg. Lowest oxygen saturation 85.0% Apnea/Hypopnea Index 5.5 events per hour.  REM AHI 37.2.  The supine AHI is 13.8. RDI 6.7 Sleep study 3/2014 (245#)- CPAP 6 cm effective, Transcutaneous CO2 Monitoring (TCM) within normal limits.          Skin cancer of anterior chest 2011    Basal cell on chest     TMJ (temporomandibular joint disorder)      Past Surgical History:   Procedure Laterality Date     ANKLE SURGERY  7/13    Left for torn tendons & loose bone chips     ARTHROSCOPY KNEE  1986    Right knee     BACK SURGERY       Basal cell carcinoma  2011    Removal from the chest     BIOPSY       C VAGINAL HYSTERECTOMY  2011     CARPAL TUNNEL RELEASE RT/LT  ~2010    Bilateral     COLONOSCOPY  2016     COSMETIC SURGERY       COSMETIC SURGERY       DECOMPRESSION CUBITAL TUNNEL Left 8/28/2015    Procedure: DECOMPRESSION CUBITAL TUNNEL;  Surgeon: Gus Donato MD;  Location: [facility]     ENT SURGERY  1975    Tonsillectomy and Adenoids     GYN SURGERY  2011    Hysterectomy     HC REPAIR PERONEAL TENDONS  7/13      ORTHOPEDIC SURGERY  2011, 2011    Bilateral CTR     PE TUBES      yearly times 10 years     REPAIR CLEFT PALATE CHILD      Age 3 months & afterwards (multiple surgeries)     SOFT TISSUE SURGERY       SOFT TISSUE SURGERY  2013      Family History   Problem Relation Age of Onset     Alzheimer Disease Paternal Grandmother 60     Cerebrovascular Disease Paternal Grandmother      Neurologic Disorder Sister 20     migraines     Depression/Anxiety Sister      Depression Sister      Neurologic Disorder Son 14     migraines     Asthma Son      HEART DISEASE Mother      Osteoporosis Mother      Obesity Mother      Cancer Father      Alcohol/Drug Father      Other Cancer Father      saliva glands & skin CA      Hypertension Father      Diabetes Maternal Grandmother      Breast Cancer Maternal Grandmother      Obesity Maternal Grandmother      Other Cancer Maternal Grandfather      skin cancer     Cancer - colorectal Other      Aunt     Asthma Daughter      Asthma Daughter      Asthma Son      Thyroid Disease Sister      Colon Cancer Other      Obesity Sister      Glaucoma No family hx of      Macular Degeneration No family hx of      Social History     Social History     Marital status:      Spouse name: N/A     Number of children: 3     Years of education: 12     Occupational History     Personal Care       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 No     Social History Narrative       REVIEW OF SYSTEMS  =================  C: NEGATIVE for fever, chills, change in  weight  I: NEGATIVE for worrisome rashes, moles or lesions  E/M: NEGATIVE for ear, mouth and throat problems  R: NEGATIVE for significant cough or SHORTNESS OF BREATH  CV:  NEGATIVE for chest pain, palpitations or peripheral edema  GI: NEGATIVE for nausea, abdominal pain, heartburn, or change in bowel habits  NEURO: NEGATIVE numbness/weakness  : see HPI  PSYCH: NEGATIVE depression/anxiety  LYmph: no new enlarged lymph nodes  Ortho: no new trauma/movements      Physical Exam:  /78 (BP Location: Right arm, Patient Position: Chair, Cuff Size: Adult Large)  Pulse 70  LMP  (LMP Unknown)  SpO2 98%   Patient is pleasant, in no acute distress, good general condition.  HEENT:  Normalcephalic, atraumatic  Lung: no evidence of respiratory distress    Abdomen: Soft, nondistended, non tender. No masses. No rebound or guarding.   Exam: no cva tenderness  Skin: Warm and dry.  No redness.  Neuro: grossly normal  Psych normal mood and affect  Musculoskeletal  moving all extremities    Assessment/Plan:   See dictated note
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
## 2018-09-18 ENCOUNTER — THERAPY VISIT (OUTPATIENT)
Dept: PHYSICAL THERAPY | Facility: CLINIC | Age: 48
End: 2018-09-18
Payer: COMMERCIAL

## 2018-09-18 DIAGNOSIS — M79.602 PAIN OF LEFT UPPER EXTREMITY: ICD-10-CM

## 2018-09-18 PROCEDURE — 97140 MANUAL THERAPY 1/> REGIONS: CPT | Mod: GP | Performed by: PHYSICAL THERAPIST

## 2018-09-18 PROCEDURE — 97012 MECHANICAL TRACTION THERAPY: CPT | Mod: GP | Performed by: PHYSICAL THERAPIST

## 2018-09-18 PROCEDURE — 97110 THERAPEUTIC EXERCISES: CPT | Mod: GP | Performed by: PHYSICAL THERAPIST

## 2018-09-18 ASSESSMENT — ANXIETY QUESTIONNAIRES: GAD7 TOTAL SCORE: 2

## 2018-09-18 ASSESSMENT — PATIENT HEALTH QUESTIONNAIRE - PHQ9: SUM OF ALL RESPONSES TO PHQ QUESTIONS 1-9: 6

## 2018-09-20 ENCOUNTER — OFFICE VISIT (OUTPATIENT)
Dept: NEUROLOGY | Facility: CLINIC | Age: 48
End: 2018-09-20
Payer: COMMERCIAL

## 2018-09-20 VITALS
OXYGEN SATURATION: 98 % | SYSTOLIC BLOOD PRESSURE: 122 MMHG | WEIGHT: 237 LBS | RESPIRATION RATE: 16 BRPM | BODY MASS INDEX: 41.65 KG/M2 | DIASTOLIC BLOOD PRESSURE: 78 MMHG | HEART RATE: 78 BPM | TEMPERATURE: 98.2 F

## 2018-09-20 DIAGNOSIS — G56.21 ULNAR NEUROPATHY OF RIGHT UPPER EXTREMITY: ICD-10-CM

## 2018-09-20 DIAGNOSIS — M79.602 PAIN OF LEFT UPPER EXTREMITY: Primary | ICD-10-CM

## 2018-09-20 PROCEDURE — 99214 OFFICE O/P EST MOD 30 MIN: CPT | Performed by: PSYCHIATRY & NEUROLOGY

## 2018-09-20 NOTE — PROGRESS NOTES
"ESTABLISHED PATIENT NEUROLOGY NOTE    DATE OF VISIT: 9/20/2018  CLINIC LOCATION: [facility]  MRN: [id number]  PATIENT NAME: Velma Diaz  YOB: 1970    PCP: Srinivasa Hunter MD    REASON FOR VISIT:   Chief Complaint   Patient presents with     Follow Up For     EMG review     SUBJECTIVE:                                                      HISTORY OF PRESENT ILLNESS: Patient is here for follow up regarding left upper extremity pain.  Last seen on 09/04/2018.  EMG and physical therapy were ordered.  Please refer to my initial/other prior notes for further information.  The patient is accompanied by her boyfriend, who participates in interview.    Since the last visit, the patient reports that her symptoms slightly improved with physical therapy.  She had one session with cervical traction and felt \"amazing\" after that for a while.  Then her symptoms returned.  She has more sessions planned.  Also wears right elbow brace for a few weeks, but it does come off in the middle of the night.  Did not notice any difference in her symptoms in the right hand.  No new neurological symptoms.    EMG from 09/11/2018 was normal, though note was made that this study would not rule out cervical radiculopathy.    On review of systems, patient endorses no additional active complaints. Medications, allergies, family and social history were also reviewed. There are no changes reported by patient.  REVIEW OF SYSTEMS:                                                    10-system review was completed. Pertinent positives are included in HPI. The remainder of ROS is negative.  EXAM:                                                    Physical Exam:   Vitals: /78 (BP Location: Left arm, Patient Position: Sitting, Cuff Size: Adult Large)  Pulse 78  Temp 98.2  F (36.8  C) (Oral)  Resp 16  Wt 107.5 kg (237 lb)  LMP  (LMP Unknown)  SpO2 98%  BMI 41.65 kg/m2    General: pt is in NAD, " cooperative.  Skin: normal turgor, moist mucous membranes, no lesions/rashes noticed.  HEENT: ATNC, white sclera, normal conjunctiva.  Respiratory: Symmetric lung excursion, no accessory respiratory muscle use.  Abdomen: Non distended.  Neurological: awake, cooperative, follows commands, no exam changes compared to the last visit.  DATA:   EMG: I personally reviewed tabulated data of EMG report, as detailed in the history of present illness.  ASSESSMENT and PLAN:                                                    Assessment: 47-year-old female patient, previously seen for right hand paresthesias secondary to right ulnar neuropathy, presents for follow-up of left arm pain of several week duration.  She completed the recommended EMG of the left upper extremity, which was normal.  Note was made that cervical radiculopathy cannot be excluded based on these results.  The patient reports that physical therapy is helpful, especially cervical traction.  The clinical presentation might be considered a cervical radiculopathy that currently responds to physical therapy.  We discussed that if her pain persists or worsens again, we might consider doing cervical spine MRI and epidural steroid injection (if warranted).      Regarding her right hand paresthesias, she still needs more time for her right elbow splint to work for her ulnar neuropathy.    Diagnoses:    ICD-10-CM    1. Pain of left upper extremity M79.602    2. Ulnar neuropathy of right upper extremity G56.21      Plan: At today's visit we thoroughly discussed current symptoms, EMG results, possible future evaluation, available treatment options, and the plan.  We decided to continue with physical therapy for now.  If her symptoms persist or worsen, we will proceed with cervical spine MRI.    Next follow-up appointment is on as needed basis.    Total Time: 26 minutes with > 50% spent counseling the patient and her boyfriend on stated above assessment and  recommendations, including nature of the diagnosis, review of current symptoms, available treatment options, and the proposed plan.    Joaquín Burger MD  HP/ Neurology

## 2018-09-20 NOTE — MR AVS SNAPSHOT
After Visit Summary   9/20/2018    Velma Diaz    MRN: [id number]           Patient Information     Date Of Birth          1970        Visit Information        Provider Department      9/20/2018 9:30 AM Joaquín Burger MD [facility]        Today's Diagnoses     Pain of left upper extremity    -  1    Ulnar neuropathy of right upper extremity          Care Instructions    AFTER VISIT SUMMARY (AVS):    At today's visit we thoroughly discussed current symptoms, EMG results, possible future evaluation, available treatment options, and the plan.  We decided to continue with physical therapy for now.  If your symptoms persist or worsen, we will proceed with cervical spine MRI.    Next follow-up appointment is on as needed basis.    Please do not hesitate to call me with any questions or concerns.    Thanks.           Follow-ups after your visit        Follow-up notes from your care team     Return if symptoms worsen or fail to improve.      Your next 10 appointments already scheduled     Sep 20, 2018  3:10 PM CDT   [facility] Extremity with Michelle Lazaro, PTA   [facility] ([facility] [place]    [address]  [facility] [address]   [phone]            Sep 25, 2018  9:40 AM CDT   HAZEL Extremity with Elfego Rodas, PT   [facility] [place]    [address]  [facility] [place] [address]   [phone]            Oct 08, 2018  9:30 AM CDT   [facility] Visit with Gus Tang MD   [facility]    [address] [facility] [address]   [phone]            Oct 15, 2018 11:00 AM CDT   Return Visit with SERVANDO Yan   [facility]    [address] [facility] [address]   [phone]            Oct 30, 2018  9:40 AM CDT   PHYSICAL with Srinivasa Hunter MD   [facility] ([place]  [address] [address] [phone]              Who to contact     If you have questions or need follow up information about today's clinic visit or your schedule please contact [facility] directly at [phone].  Normal or non-critical lab and imaging results will be communicated to you by MyChart, letter or phone within 4 business days after the clinic has received the results. If you do not hear from us within 7 days, please contact the clinic through MyChart or phone. If you have a critical or abnormal lab result, we will notify you by phone as soon as possible.  Submit refill requests through [employer] or call your pharmacy and they will forward the refill request to us. Please allow 3 business days for your refill to be completed.          Additional Information About Your Visit        MyChart Information     [employer] gives you secure access to your electronic health record. If you see a primary care provider, you can also send messages to your care team and make appointments. If you have questions, please call your primary care clinic.  If you do not have a primary care provider, please call [phone] and they will assist you.        Care EveryWhere ID     This is your Care EveryWhere ID. This could be used by other organizations to access your [place] medical records  [id number]        Your Vitals Were     Pulse Temperature Respirations Last Period Pulse Oximetry BMI (Body Mass Index)    78 98.2  F (36.8  C) (Oral) 16 (LMP Unknown) 98% 41.65 kg/m2       Blood Pressure from Last 3 Encounters:   09/20/18 122/78   09/17/18 112/78   09/04/18 124/82    Weight from Last 3 Encounters:   09/20/18 107.5 kg (237 lb)   09/04/18 108.9 kg (240 lb)   08/28/18 109.3 kg (241 lb)              Today, you had the following     No orders found for display       Primary Care Provider Office Phone # Fax #    Srinivasa Hunter MD  [phone]       [address] [place], [place]. \[facility].\"" [id number]        Goals        General    I will call within next 2 weeks to schedule initial psychiatry appointment (pt-stated)     Notes - Note edited  5/20/2015 12:51 PM by Yesica Marsh    As of today's date 5/20/2015 goal is met at 76 - 100%.   Goal Status:  Complete  Patient states this is scheduled with Dr. Salvador for next month, but not on appointment calendar  LUDY Stark, MS   [phone]  5/20/2015 12:51 PM        I will complete [employer] or reduced fare application within the next month (pt-stated)     Notes - Note edited  11/24/2015 12:00 PM by Yesica Marsh    As of today's date 11/24/2015 goal is met at 51 - 75%.   Goal Status:  Active  She reported today having [employer] application, [employer] worker has requested but packet from [employer].    LUDY Stark, MS   [phone]  11/24/2015 12:00 PM        I will discuss [employer] worker with therapist next week for housing needs  (pt-stated)     Notes - Note edited  12/3/2015  1:17 PM by Yesica Marsh    As of today's date 12/3/2015 goal is met at 76 - 100%.   Goal Status:  Discontinued  Patient has [employer] worker and has found housing with friend  LUDY Stark, MSW   [phone]  12/3/2015 1:16 PM        Equal Access to Services     [facility]: Hadii aad ku hadasho Soomaali, waaxda luqadaha, qaybta kaalmada adeegyada, waxay yoshi dinh adechaim carrillo . So [facility] [phone].    ATENCIÓN: Si habla español, tiene a dowd disposición servicios gratuitos de asistencia lingüística. Llame al [phone].    We comply with applicable federal civil rights laws and [place] laws. We do not discriminate on the basis of race, color, national origin, age, disability, sex, sexual orientation, or gender identity.            Thank you!     Thank you for choosing [facility]  for your care. Our goal is always to provide you with  excellent care. Hearing back from our patients is one way we can continue to improve our services. Please take a few minutes to complete the written survey that you may receive in the mail after your visit with us. Thank you!             Your Updated Medication List - Protect others around you: Learn how to safely use, store and throw away your medicines at www.disposemymeds.org.          This list is accurate as of 9/20/18  9:59 AM.  Always use your most recent med list.                   Brand Name Dispense Instructions for use Diagnosis    acetaminophen 325 MG tablet    TYLENOL    100 tablet    Take 2 tablets (650 mg) by mouth every 4 hours as needed for other (mild pain)    Lesion of left ulnar nerve       albuterol 108 (90 Base) MCG/ACT inhaler    PROAIR HFA/PROVENTIL HFA/VENTOLIN HFA    1 Inhaler    Inhale 2 puffs into the lungs every 6 hours as needed for shortness of breath / dyspnea or wheezing    Bronchitis       amoxicillin-clavulanate 875-125 MG per tablet    AUGMENTIN     amoxicillin 875 mg-potassium clavulanate 125 mg tablet        ferrous sulfate 325 (65 Fe) MG tablet    IRON    90 tablet    Take 1 tablet (325 mg) by mouth daily (with breakfast)    Restless legs syndrome (RLS)       furosemide 20 MG tablet    LASIX    30 tablet    Take 1 tablet (20 mg) by mouth daily    Leg swelling       hydrOXYzine 50 MG capsule    VISTARIL     Take 50 mg by mouth daily At night        LYRICA 225 MG Caps   Generic drug:  Pregabalin     60 capsule    TAKE 1 TABLET (225 MG) BY MOUTH TWICE A DAY    Chronic pain syndrome       methocarbamol 750 MG tablet    ROBAXIN    60 tablet    TAKE 1 TABLET (750 MG) BY MOUTH 3 TIMES DAILY AS NEEDED FOR MUSCLE SPASMS    Chronic low back pain, unspecified back pain laterality, with sciatica presence unspecified       MULTI FOR HER PO      Take by mouth daily        nystatin 104157 UNIT/GM Powd    MYCOSTATIN    60 g    Apply to affected area twice daily as needed    Candidal intertrigo        order for DME      Respironics REMSTAR 60 Series Auto OLAS2wv H2O, Airfit P10 nasal pillow mask w/xsmall pillows        order for DME     1 Device    TENS unit    Chronic bilateral back pain, unspecified back location       order for DME     1 each    Equipment being ordered: right elbow pad and right elbow splint.    Ulnar neuropathy of right upper extremity       order for DME     1 each    Equipment being ordered: right elbow splint.    Ulnar neuropathy of right upper extremity       oxyCODONE-acetaminophen  MG per tablet    PERCOCET    90 tablet    Take 1 tablet by mouth every 6 hours as needed for moderate to severe pain    Chronic pain syndrome       rOPINIRole 0.25 MG tablet    REQUIP    60 tablet    Take 1 tablet (0.25 mg) by mouth 2 times daily    Restless legs syndrome (RLS)       SUMAtriptan 100 MG tablet    IMITREX    9 tablet    Take 1 tablet (100 mg) by mouth at onset of headache for migraine May repeat in 2 hours if needed: max 2/day    Headache(784.0)       tamsulosin 0.4 MG capsule    FLOMAX    30 capsule    Take 1 capsule (0.4 mg) by mouth daily    Calculus of ureter

## 2018-09-20 NOTE — LETTER
"    9/20/2018         RE: Velma Diaz  [address]  [place] [address]        Dear Colleague,    Thank you for referring your patient, Velma Diaz, to the [facility]. Please see a copy of my visit note below.    ESTABLISHED PATIENT NEUROLOGY NOTE    DATE OF VISIT: 9/20/2018  CLINIC LOCATION: [facility]  MRN: [id number]  PATIENT NAME: Velma Diaz  YOB: 1970    PCP: Srinivasa Hunter MD    REASON FOR VISIT:   Chief Complaint   Patient presents with     Follow Up For     EMG review     SUBJECTIVE:                                                      HISTORY OF PRESENT ILLNESS: Patient is here for follow up regarding left upper extremity pain.  Last seen on 09/04/2018.  EMG and physical therapy were ordered.  Please refer to my initial/other prior notes for further information.  The patient is accompanied by her boyfriend, who participates in interview.    Since the last visit, the patient reports that her symptoms slightly improved with physical therapy.  She had one session with cervical traction and felt \"amazing\" after that for a while.  Then her symptoms returned.  She has more sessions planned.  Also wears right elbow brace for a few weeks, but it does come off in the middle of the night.  Did not notice any difference in her symptoms in the right hand.  No new neurological symptoms.    EMG from 09/11/2018 was normal, though note was made that this study would not rule out cervical radiculopathy.    On review of systems, patient endorses no additional active complaints. Medications, allergies, family and social history were also reviewed. There are no changes reported by patient.  REVIEW OF SYSTEMS:                                                    10-system review was completed. Pertinent positives are included in HPI. The remainder of ROS is negative.  EXAM:                                                    Physical Exam:   Vitals: /78 " (BP Location: Left arm, Patient Position: Sitting, Cuff Size: Adult Large)  Pulse 78  Temp 98.2  F (36.8  C) (Oral)  Resp 16  Wt 107.5 kg (237 lb)  LMP  (LMP Unknown)  SpO2 98%  BMI 41.65 kg/m2    General: pt is in NAD, cooperative.  Skin: normal turgor, moist mucous membranes, no lesions/rashes noticed.  HEENT: ATNC, white sclera, normal conjunctiva.  Respiratory: Symmetric lung excursion, no accessory respiratory muscle use.  Abdomen: Non distended.  Neurological: awake, cooperative, follows commands, no exam changes compared to the last visit.  DATA:   EMG: I personally reviewed tabulated data of EMG report, as detailed in the history of present illness.  ASSESSMENT and PLAN:                                                    Assessment: 47-year-old female patient, previously seen for right hand paresthesias secondary to right ulnar neuropathy, presents for follow-up of left arm pain of several week duration.  She completed the recommended EMG of the left upper extremity, which was normal.  Note was made that cervical radiculopathy cannot be excluded based on these results.  The patient reports that physical therapy is helpful, especially cervical traction.  The clinical presentation might be considered a cervical radiculopathy that currently responds to physical therapy.  We discussed that if her pain persists or worsens again, we might consider doing cervical spine MRI and epidural steroid injection (if warranted).      Regarding her right hand paresthesias, she still needs more time for her right elbow splint to work for her ulnar neuropathy.    Diagnoses:    ICD-10-CM    1. Pain of left upper extremity M79.602    2. Ulnar neuropathy of right upper extremity G56.21      Plan: At today's visit we thoroughly discussed current symptoms, EMG results, possible future evaluation, available treatment options, and the plan.  We decided to continue with physical therapy for now.  If her symptoms persist or  worsen, we will proceed with cervical spine MRI.    Next follow-up appointment is on as needed basis.    Total Time: 26 minutes with > 50% spent counseling the patient and her boyfriend on stated above assessment and recommendations, including nature of the diagnosis, review of current symptoms, available treatment options, and the proposed plan.    Joaquín Burger MD  / Neurology         Again, thank you for allowing me to participate in the care of your patient.        Sincerely,        Joaquín Burger MD

## 2018-09-20 NOTE — PATIENT INSTRUCTIONS
AFTER VISIT SUMMARY (AVS):    At today's visit we thoroughly discussed current symptoms, EMG results, possible future evaluation, available treatment options, and the plan.  We decided to continue with physical therapy for now.  If your symptoms persist or worsen, we will proceed with cervical spine MRI.    Next follow-up appointment is on as needed basis.    Please do not hesitate to call me with any questions or concerns.    Thanks.

## 2018-09-23 ENCOUNTER — MYC REFILL (OUTPATIENT)
Dept: FAMILY MEDICINE | Facility: CLINIC | Age: 48
End: 2018-09-23

## 2018-09-23 DIAGNOSIS — G89.4 CHRONIC PAIN SYNDROME: Chronic | ICD-10-CM

## 2018-09-24 RX ORDER — OXYCODONE AND ACETAMINOPHEN 10; 325 MG/1; MG/1
1 TABLET ORAL EVERY 6 HOURS PRN
Qty: 90 TABLET | Refills: 0 | Status: SHIPPED | OUTPATIENT
Start: 2018-09-24 | End: 2018-10-18

## 2018-09-24 NOTE — TELEPHONE ENCOUNTER
Requested Prescriptions   Pending Prescriptions Disp Refills     oxyCODONE-acetaminophen (PERCOCET)  MG per tablet 90 tablet 0     Sig: Take 1 tablet by mouth every 6 hours as needed for moderate to severe pain    There is no refill protocol information for this order        Last Written Prescription Date:  8/21/2018  Last Fill Quantity: 90,  # refills: 0   Last office visit: 8/28/2018 with prescribing provider:  Dale - acute shoulder pain   Future Office Visit:   Next 5 appointments (look out 90 days)     Oct 08, 2018  9:30 AM CDT   Return Visit with Gus Tang MD   [facility] ([address] [address]   [phone]            Oct 15, 2018 11:00 AM CDT   [facility]    [address] [facility] [address]   [phone]            Oct 30, 2018  9:40 AM CDT   PHYSICAL with Srinivasa Hunter MD   [facility]    [address] [facility] [address]   [phone]                 Routing refill request to provider for review/approval because:  Drug not on the G refill protocol       Ly Sandoval RN  [facility]

## 2018-09-24 NOTE — TELEPHONE ENCOUNTER
Message from MyChart:  Original authorizing provider: MD Mireille Rodriguez would like a refill of the following medications:  oxyCODONE-acetaminophen (PERCOCET)  MG per tablet [Srinivasa Hunter MD]    [id number] [address] [facility]    Comment:  I'll  at clinic

## 2018-09-25 ENCOUNTER — THERAPY VISIT (OUTPATIENT)
Dept: PHYSICAL THERAPY | Facility: CLINIC | Age: 48
End: 2018-09-25
Payer: COMMERCIAL

## 2018-09-25 DIAGNOSIS — M79.602 PAIN OF LEFT UPPER EXTREMITY: ICD-10-CM

## 2018-09-25 PROCEDURE — 97012 MECHANICAL TRACTION THERAPY: CPT | Mod: GP | Performed by: PHYSICAL THERAPIST

## 2018-09-25 PROCEDURE — 97140 MANUAL THERAPY 1/> REGIONS: CPT | Mod: GP | Performed by: PHYSICAL THERAPIST

## 2018-09-28 ENCOUNTER — TRANSFERRED RECORDS (OUTPATIENT)
Dept: HEALTH INFORMATION MANAGEMENT | Facility: CLINIC | Age: 48
End: 2018-09-28

## 2018-10-01 ENCOUNTER — OFFICE VISIT (OUTPATIENT)
Dept: FAMILY MEDICINE | Facility: CLINIC | Age: 48
End: 2018-10-01
Payer: COMMERCIAL

## 2018-10-01 VITALS
DIASTOLIC BLOOD PRESSURE: 71 MMHG | TEMPERATURE: 97.5 F | WEIGHT: 245 LBS | HEART RATE: 78 BPM | BODY MASS INDEX: 43.06 KG/M2 | OXYGEN SATURATION: 98 % | SYSTOLIC BLOOD PRESSURE: 108 MMHG

## 2018-10-01 DIAGNOSIS — F33.1 MAJOR DEPRESSIVE DISORDER, RECURRENT EPISODE, MODERATE (H): Chronic | ICD-10-CM

## 2018-10-01 DIAGNOSIS — K59.01 SLOW TRANSIT CONSTIPATION: ICD-10-CM

## 2018-10-01 DIAGNOSIS — N20.0 RIGHT KIDNEY STONE: Primary | ICD-10-CM

## 2018-10-01 DIAGNOSIS — G89.4 CHRONIC PAIN SYNDROME: Chronic | ICD-10-CM

## 2018-10-01 PROCEDURE — 99496 TRANSJ CARE MGMT HIGH F2F 7D: CPT | Performed by: FAMILY MEDICINE

## 2018-10-01 RX ORDER — CEFDINIR 300 MG/1
CAPSULE ORAL
COMMUNITY
Start: 2018-09-28 | End: 2018-10-30

## 2018-10-01 NOTE — LETTER
My Depression Action Plan  Name: Velma Diaz   Date of Birth 1970  Date: 10/1/2018    My doctor: Srinivasa Hunter   My clinic: [address] [id number] [phone]          GREEN    ZONE   Good Control    What it looks like:     Things are going generally well. You have normal up s and down s. You may even feel depressed from time to time, but bad moods usually last less than a day.   What you need to do:  1. Continue to care for yourself (see self care plan)  2. Check your depression survival kit and update it as needed  3. Follow your physician s recommendations including any medication.  4. Do not stop taking medication unless you consult with your physician first.           YELLOW         ZONE Getting Worse    What it looks like:     Depression is starting to interfere with your life.     It may be hard to get out of bed; you may be starting to isolate yourself from others.    Symptoms of depression are starting to last most all day and this has happened for several days.     You may have suicidal thoughts but they are not constant.   What you need to do:     1. Call your care team, your response to treatment will improve if you keep your care team informed of your progress. Yellow periods are signs an adjustment may need to be made.     2. Continue your self-care, even if you have to fake it!    3. Talk to someone in your support network    4. Open up your depression survival kit           RED    ZONE Medical Alert - Get Help    What it looks like:     Depression is seriously interfering with your life.     You may experience these or other symptoms: You can t get out of bed most days, can t work or engage in other necessary activities, you have trouble taking care of basic hygiene, or basic responsibilities, thoughts of suicide or death that will not go away, self-injurious behavior.     What you need to  do:  1. Call your care team and request a same-day appointment. If they are not available (weekends or after hours) call your local crisis line, emergency room or 911.            Depression Self Care Plan / Survival Kit    Self-Care for Depression  Here s the deal. Your body and mind are really not as separate as most people think.  What you do and think affects how you feel and how you feel influences what you do and think. This means if you do things that people who feel good do, it will help you feel better.  Sometimes this is all it takes.  There is also a place for medication and therapy depending on how severe your depression is, so be sure to consult with your medical provider and/ or Behavioral Health Consultant if your symptoms are worsening or not improving.     In order to better manage my stress, I will:    Exercise  Get some form of exercise, every day. This will help reduce pain and release endorphins, the  feel good  chemicals in your brain. This is almost as good as taking antidepressants!  This is not the same as joining a gym and then never going! (they count on that by the way ) It can be as simple as just going for a walk or doing some gardening, anything that will get you moving.      Hygiene   Maintain good hygiene (Get out of bed in the morning, Make your bed, Brush your teeth, Take a shower, and Get dressed like you were going to work, even if you are unemployed).  If your clothes don't fit try to get ones that do.    Diet  I will strive to eat foods that are good for me, drink plenty of water, and avoid excessive sugar, caffeine, alcohol, and other mood-altering substances.  Some foods that are helpful in depression are: complex carbohydrates, B vitamins, flaxseed, fish or fish oil, fresh fruits and vegetables.    Psychotherapy  I agree to participate in Individual Therapy (if recommended).    Medication  If prescribed medications, I agree to take them.  Missing doses can result in serious  side effects.  I understand that drinking alcohol, or other illicit drug use, may cause potential side effects.  I will not stop my medication abruptly without first discussing it with my provider.    Staying Connected With Others  I will stay in touch with my friends, family members, and my primary care provider/team.    Use your imagination  Be creative.  We all have a creative side; it doesn t matter if it s oil painting, sand castles, or mud pies! This will also kick up the endorphins.    Witness Beauty  (AKA stop and smell the roses) Take a look outside, even in mid-winter. Notice colors, textures. Watch the squirrels and birds.     Service to others  Be of service to others.  There is always someone else in need.  By helping others we can  get out of ourselves  and remember the really important things.  This also provides opportunities for practicing all the other parts of the program.    Humor  Laugh and be silly!  Adjust your TV habits for less news and crime-drama and more comedy.    Control your stress  Try breathing deep, massage therapy, biofeedback, and meditation. Find time to relax each day.     My support system    Clinic Contact:  Phone number:    Contact 1:  Phone number:    Contact 2:  Phone number:    Jewish/:  Phone number:    Therapist:  Phone number:    Local crisis center:    Phone number:    Other community support:  Phone number:

## 2018-10-01 NOTE — MR AVS SNAPSHOT
After Visit Summary   10/1/2018    Velma Diaz    MRN: [id number]           Patient Information     Date Of Birth          1970        Visit Information        Provider Department      10/1/2018 10:20 AM Srinivasa Hunter MD [facility]        Today's Diagnoses     Right kidney stone    -  1       Follow-ups after your visit        Follow-up notes from your care team     Return if symptoms worsen or fail to improve.      Your next 10 appointments already scheduled     Oct 02, 2018  9:30 AM CDT   [facility] Extremity with Alek Richardson, PT   [facility] ([facility]    [address]  [facility] [address]   [phone]            Oct 05, 2018  8:20 AM CDT   HAZEL Extremity with Elfego Rodas, PT   [facility]    [address]  [facility] [address]   [phone]            Oct 08, 2018  9:30 AM CDT   Return Visit with Gus Tang MD   [facility] ([facility])    [address] [place] [address]   [phone]            Oct 09, 2018 11:40 AM CDT   HAZEL Extremity with Elfego Rodas, PT   [facility]    [address]  [facility] [address]   [phone]            Oct 15, 2018 11:00 AM CDT   Return Visit with SERVANDO Yan   [facility]    [address] [facility] [facility] [place] [address]   [phone]            Oct 30, 2018  9:40 AM CDT   [facility]YSICAL with Srinivasa Hunter MD   [facility]    [address] [facility] [address] [phone]              Who to contact     If you have questions or need follow up information about today's clinic visit or your schedule please contact [facility] directly at [phone].  Normal or non-critical lab and imaging  results will be communicated to you by MyChart, letter or phone within 4 business days after the clinic has received the results. If you do not hear from us within 7 days, please contact the clinic through [employer] or phone. If you have a critical or abnormal lab result, we will notify you by phone as soon as possible.  Submit refill requests through [employer] or call your pharmacy and they will forward the refill request to us. Please allow 3 business days for your refill to be completed.          Additional Information About Your Visit        [employer] Information     [employer] gives you secure access to your electronic health record. If you see a primary care provider, you can also send messages to your care team and make appointments. If you have questions, please call your primary care clinic.  If you do not have a primary care provider, please call [phone] and they will assist you.        Care EveryWhere ID     This is your Care EveryWhere ID. This could be used by other organizations to access your [place] medical records  [id number]        Your Vitals Were     Pulse Temperature Last Period Pulse Oximetry BMI (Body Mass Index)       78 97.5  F (36.4  C) (Oral) (LMP Unknown) 98% 43.06 kg/m2        Blood Pressure from Last 3 Encounters:   10/01/18 108/71   09/20/18 122/78   09/17/18 112/78    Weight from Last 3 Encounters:   10/01/18 245 lb (111.1 kg)   09/20/18 237 lb (107.5 kg)   09/04/18 240 lb (108.9 kg)              We Performed the Following     DEPRESSION ACTION PLAN (DAP)        Primary Care Provider Office Phone # Fax #    Srinivasa Hunter [phone]       [address] [facility] [address]        Goals        General    I will call within next 2 weeks to schedule initial psychiatry appointment (pt-stated)     Notes - Note edited  5/20/2015 12:51 PM by Yesica Marsh    As of today's date 5/20/2015 goal is met at 76 - 100%.   Goal Status:  Complete  Patient states this is  scheduled with Dr. Salvador for next month, but not on appointment calendar  LUDY Stark, MSW   [phone]  5/20/2015 12:51 PM        I will complete [employer] or reduced fare application within the next month (pt-stated)     Notes - Note edited  11/24/2015 12:00 PM by Yesica Marsh    As of today's date 11/24/2015 goal is met at 51 - 75%.   Goal Status:  Active  She reported today having [employer] application, [employer] worker has requested but packet from [employer].    LUDY Stark, MSW   [phone]  11/24/2015 12:00 PM        I will discuss [employer] worker with therapist next week for housing needs  (pt-stated)     Notes - Note edited  12/3/2015  1:17 PM by Yesica Marsh    As of today's date 12/3/2015 goal is met at 76 - 100%.   Goal Status:  Discontinued  Patient has [employer] worker and has found housing with friend  LUDY Stark, MSW   [phone]  12/3/2015 1:16 PM        Equal Access to Services     RICHARDSON HERNANDEZ AH: Hadii aad ku hadasho Soomaali, waaxda luqadaha, qaybta kaalmada adeegyada, grayson carrillo . So [facility] [place] [facility] [phone].    ATENCIÓN: Si habla español, tiene a dowd disposición servicios gratuitos de asistencia lingüística. Llame al [phone].    We comply with applicable federal civil rights laws and [place] laws. We do not discriminate on the basis of race, color, national origin, age, disability, sex, sexual orientation, or gender identity.            Thank you!     Thank you for choosing [facility]  for your care. Our goal is always to provide you with excellent care. Hearing back from our patients is one way we can continue to improve our services. Please take a few minutes to complete the written survey that you may receive in the mail after your visit with us. Thank you!             Your Updated Medication List - Protect others around you: Learn how to safely use, store and throw away your medicines at  www.disposemymeds.org.          This list is accurate as of 10/1/18 10:54 AM.  Always use your most recent med list.                   Brand Name Dispense Instructions for use Diagnosis    acetaminophen 325 MG tablet    TYLENOL    100 tablet    Take 2 tablets (650 mg) by mouth every 4 hours as needed for other (mild pain)    Lesion of left ulnar nerve       cefdinir 300 MG capsule    OMNICEF          ferrous sulfate 325 (65 Fe) MG tablet    IRON    90 tablet    Take 1 tablet (325 mg) by mouth daily (with breakfast)    Restless legs syndrome (RLS)       furosemide 20 MG tablet    LASIX    30 tablet    Take 1 tablet (20 mg) by mouth daily    Leg swelling       LYRICA 225 MG Caps   Generic drug:  Pregabalin     60 capsule    TAKE 1 TABLET (225 MG) BY MOUTH TWICE A DAY    Chronic pain syndrome       methocarbamol 750 MG tablet    ROBAXIN    60 tablet    TAKE 1 TABLET (750 MG) BY MOUTH 3 TIMES DAILY AS NEEDED FOR MUSCLE SPASMS    Chronic low back pain, unspecified back pain laterality, with sciatica presence unspecified       MULTI FOR HER PO      Take by mouth daily        nystatin 129265 UNIT/GM Powd    MYCOSTATIN    60 g    Apply to affected area twice daily as needed    Candidal intertrigo       order for DME      Respironics REMSTAR 60 Series Auto [id number] H2O, Airfit P10 nasal pillow mask w/xsmall pillows        order for DME     1 Device    TENS unit    Chronic bilateral back pain, unspecified back location       order for DME     1 each    Equipment being ordered: right elbow pad and right elbow splint.    Ulnar neuropathy of right upper extremity       order for DME     1 each    Equipment being ordered: right elbow splint.    Ulnar neuropathy of right upper extremity       oxyCODONE-acetaminophen  MG per tablet    PERCOCET    90 tablet    Take 1 tablet by mouth every 6 hours as needed for moderate to severe pain    Chronic pain syndrome       rOPINIRole 0.25 MG tablet    REQUIP    60 tablet    Take 1  tablet (0.25 mg) by mouth 2 times daily    Restless legs syndrome (RLS)       SUMAtriptan 100 MG tablet    IMITREX    9 tablet    Take 1 tablet (100 mg) by mouth at onset of headache for migraine May repeat in 2 hours if needed: max 2/day    Headache(784.0)       tamsulosin 0.4 MG capsule    FLOMAX    30 capsule    Take 1 capsule (0.4 mg) by mouth daily    Calculus of ureter

## 2018-10-01 NOTE — PROGRESS NOTES
SUBJECTIVE:   Velma Diaz is a 47 year old female who presents to clinic today for the following health issues:          Hospital Follow-up Visit:    Hospital/Nursing Home/IP Rehab Facility: [facility]  Date of Admission: 9/28/18  Date of Discharge: 9/29/18  Reason(s) for Admission: Abdominal pain            Problems taking medications regularly:  None       Medication changes since discharge: None       Problems adhering to non-medication therapy:  None    Summary of hospitalization:  CareEverywhere information obtained and reviewed  See outside records, reviewed and scanned  Diagnostic Tests/Treatments reviewed.  Follow up needed: none  Other Healthcare Providers Involved in Patient s Care:         Specialist appointment - In a week or so  Update since discharge: improved.     Post Discharge Medication Reconciliation: discharge medications reconciled, continue medications without change.  Plan of care communicated with patient and boyfriend     Coding guidelines for this visit:  Type of Medical   Decision Making Face-to-Face Visit       within 7 Days of discharge Face-to-Face Visit        within 14 days of discharge   Moderate Complexity [id number]   High Complexity 36826 80579            She has had a known right-sided kidney stone over the last month.  She went to the hospital ER a few days ago because of increased right-sided pain and dark colored urine.  She was found to have the obstructed right kidney stone.  She had a cystoscopy with right retrograde pyelogram and right ureteral stent placed.  The kidney stone is still present.  Urine culture was negative for UTI.  She was discharged on cefdinir anyway.  The urologist's office will be calling her in the next week to set up a follow-up appointment.  Her urine color is now back to normal.  She is having less discomfort.  She has been on increased narcotics, however, and has been having some constipation.  She remains on baseline medications for chronic  pain, depression, etc. as below.  She has a physical scheduled with me for the end of the month.    Problem list and histories reviewed & adjusted, as indicated.  Additional history: as documented    Patient Active Problem List   Diagnosis     History of cleft palate with cleft lip     MAYA (obstructive sleep apnea)/Hypoventilation Syndrome     Major depressive disorder, recurrent episode, moderate (H)     Paresthesias     Health Care Home     Vitamin D deficiency     Morbid obesity with BMI of 40.0-44.9, adult (H)     Hyperlipidemia with target LDL less than 130     Intervertebral disc prolapse with impingement     Nonallopathic lesion of lumbar region     Chronic pain syndrome     Restless legs syndrome (RLS)     Insomnia     Migraine     Chronic bilateral back pain, unspecified back location     Chronic pain of left knee     ROSE MARIE (generalized anxiety disorder)     Idiopathic peripheral neuropathy     Pain of left upper extremity     Past Surgical History:   Procedure Laterality Date     ANKLE SURGERY  7/13    Left for torn tendons & loose bone chips     ARTHROSCOPY KNEE  1986    Right knee     BACK SURGERY       Basal cell carcinoma  2011    Removal from the chest     BIOPSY       C VAGINAL HYSTERECTOMY  2011     CARPAL TUNNEL RELEASE RT/LT  ~2010    Bilateral     COLONOSCOPY  2016     COSMETIC SURGERY       cysto, right retrograde pyelogram, right ureteral stent Right 09/2018    for obstructing right kidney stone     DECOMPRESSION CUBITAL TUNNEL Left 8/28/2015    Procedure: DECOMPRESSION CUBITAL TUNNEL;  Surgeon: Gus Donato MD;  [facility]     ENT SURGERY  1975    Tonsillectomy and Adenoids     GYN SURGERY  2011    Hysterectomy     HC REPAIR PERONEAL TENDONS  7/13     ORTHOPEDIC SURGERY  2011, 2011    Bilateral CTR     PE TUBES      yearly times 10 years     REPAIR CLEFT PALATE CHILD      Age 3 months & afterwards (multiple surgeries)     SOFT TISSUE SURGERY  2013       Social History   Substance  Use Topics     Smoking status: Former Smoker     Packs/day: 0.50     Years: 15.00     Types: Cigarettes     Quit date: 2/20/2015     Smokeless tobacco: Never Used     Alcohol use No      Comment: Quit in September 27th     Family History   Problem Relation Age of Onset     Alzheimer Disease Paternal Grandmother 60     Cerebrovascular Disease Paternal Grandmother      Neurologic Disorder Sister 20     migraines     Depression/Anxiety Sister      Depression Sister      Neurologic Disorder Son 14     migraines     Asthma Son      HEART DISEASE Mother      Osteoporosis Mother      Obesity Mother      Cancer Father      Alcohol/Drug Father      Other Cancer Father      saliva glands & skin CA      Hypertension Father      Diabetes Maternal Grandmother      Breast Cancer Maternal Grandmother      Obesity Maternal Grandmother      Other Cancer Maternal Grandfather      skin cancer     Cancer - colorectal Other      Aunt     Asthma Daughter      Asthma Daughter      Asthma Son      Thyroid Disease Sister      Colon Cancer Other      Obesity Sister      Glaucoma No family hx of      Macular Degeneration No family hx of          Current Outpatient Prescriptions   Medication Sig Dispense Refill     acetaminophen (TYLENOL) 325 MG tablet Take 2 tablets (650 mg) by mouth every 4 hours as needed for other (mild pain) 100 tablet 0     cefdinir (OMNICEF) 300 MG capsule        ferrous sulfate (IRON) 325 (65 FE) MG tablet Take 1 tablet (325 mg) by mouth daily (with breakfast) 90 tablet 4     furosemide (LASIX) 20 MG tablet Take 1 tablet (20 mg) by mouth daily 30 tablet 11     LYRICA 225 MG CAPS TAKE 1 TABLET (225 MG) BY MOUTH TWICE A DAY 60 capsule 5     methocarbamol (ROBAXIN) 750 MG tablet TAKE 1 TABLET (750 MG) BY MOUTH 3 TIMES DAILY AS NEEDED FOR MUSCLE SPASMS 60 tablet 1     Multiple Vitamins-Minerals (MULTI FOR HER PO) Take by mouth daily        nystatin (MYCOSTATIN) 811553 UNIT/GM POWD Apply to affected area twice daily as  needed 60 g 2     order for DME Equipment being ordered: right elbow splint. 1 each 0     order for DME Equipment being ordered: right elbow pad and right elbow splint. 1 each 0     order for DME TENS unit 1 Device 0     ORDER FOR DME Respironics REMSTAR 60 Series Auto BHAP3ep H2O, Airfit P10 nasal pillow mask w/xsmall pillows       oxyCODONE-acetaminophen (PERCOCET)  MG per tablet Take 1 tablet by mouth every 6 hours as needed for moderate to severe pain 90 tablet 0     rOPINIRole (REQUIP) 0.25 MG tablet Take 1 tablet (0.25 mg) by mouth 2 times daily 60 tablet 3     SUMAtriptan (IMITREX) 100 MG tablet Take 1 tablet (100 mg) by mouth at onset of headache for migraine May repeat in 2 hours if needed: max 2/day 9 tablet 2     tamsulosin (FLOMAX) 0.4 MG capsule Take 1 capsule (0.4 mg) by mouth daily 30 capsule 1     No Known Allergies    Reviewed and updated as needed this visit by clinical staff  Tobacco  Allergies  Meds  Med Hx  Surg Hx  Fam Hx  Soc Hx      Reviewed and updated as needed this visit by Provider         ROS:  Noncontributory except as above    OBJECTIVE:     /71 (BP Location: Right arm, Patient Position: Chair, Cuff Size: Adult Large)  Pulse 78  Temp 97.5  F (36.4  C) (Oral)  Wt 245 lb (111.1 kg)  LMP  (LMP Unknown)  SpO2 98%  BMI 43.06 kg/m2  Body mass index is 43.06 kg/(m^2).  GENERAL: alert, no distress and obese  ABDOMEN: soft, nontender, without hepatosplenomegaly or masses  BACK: no CVA tenderness, no paralumbar tenderness    Diagnostic Test Results:  Her discharge summary was obtained and reviewed    ASSESSMENT/PLAN:       ICD-10-CM    1. Right kidney stone N20.0    2. Slow transit constipation K59.01    3. Major depressive disorder, recurrent episode, moderate (H) F33.1    4. Chronic pain syndrome G89.4      We discussed her kidney stone status and she will continue with conservative care for that  She will follow-up with urology as per their recommendation  Discussed  using milk of magnesia, magnesium citrate, or MiraLAX with the constipation  Continue with same baseline medicines  Plan a recheck at the end of the month for an annual physical with me as scheduled  I will then be seeing her for a preoperative exam later in November before undergoing sinus surgery in December    Srinivasa Hunter MD  [facility]

## 2018-10-02 ENCOUNTER — THERAPY VISIT (OUTPATIENT)
Dept: PHYSICAL THERAPY | Facility: CLINIC | Age: 48
End: 2018-10-02
Payer: COMMERCIAL

## 2018-10-02 DIAGNOSIS — M79.602 PAIN OF LEFT UPPER EXTREMITY: ICD-10-CM

## 2018-10-02 PROCEDURE — 97012 MECHANICAL TRACTION THERAPY: CPT | Mod: GP | Performed by: PHYSICAL THERAPIST

## 2018-10-04 ENCOUNTER — TRANSFERRED RECORDS (OUTPATIENT)
Dept: HEALTH INFORMATION MANAGEMENT | Facility: CLINIC | Age: 48
End: 2018-10-04

## 2018-10-05 ENCOUNTER — THERAPY VISIT (OUTPATIENT)
Dept: PHYSICAL THERAPY | Facility: CLINIC | Age: 48
End: 2018-10-05
Payer: COMMERCIAL

## 2018-10-05 DIAGNOSIS — M79.602 PAIN OF LEFT UPPER EXTREMITY: ICD-10-CM

## 2018-10-05 PROCEDURE — 97012 MECHANICAL TRACTION THERAPY: CPT | Mod: GP | Performed by: PHYSICAL THERAPIST

## 2018-10-07 DIAGNOSIS — G89.4 CHRONIC PAIN SYNDROME: ICD-10-CM

## 2018-10-08 NOTE — TELEPHONE ENCOUNTER
Requested Prescriptions   Pending Prescriptions Disp Refills     LYRICA 225 MG CAPS [Pharmacy Med Name: LYRICA 225 MG CAPSULE] 60 capsule      Sig: TAKE 1 CAPSULE BY MOUTH TWICE A DAY    There is no refill protocol information for this order         Last Written Prescription Date:  1/30/18  Last Fill Quantity: 60,   # refills: 5  Last Office Visit: 10/1/18  Future Office visit:    Next 5 appointments (look out 90 days)     Oct 15, 2018 11:00 AM CDT   Return Visit with SERVANDO Yan   [address] [address]   [phone]            Oct 30, 2018  9:40 AM CDT   PHYSICAL with Srinivasa Hunter MD   [facility] ([facility])    [address] [place] [address]   [phone]            Nov 12, 2018  9:40 AM CST   Pre-Op physical with Srinivasa Hunter MD   [facility] ([facility])    [address] [place] [address]   [phone]                   Routing refill request to provider for review/approval because:  Drug not on the G, P or [facility] refill protocol or controlled substance

## 2018-10-09 ENCOUNTER — THERAPY VISIT (OUTPATIENT)
Dept: PHYSICAL THERAPY | Facility: CLINIC | Age: 48
End: 2018-10-09
Payer: COMMERCIAL

## 2018-10-09 DIAGNOSIS — M79.602 PAIN OF LEFT UPPER EXTREMITY: ICD-10-CM

## 2018-10-09 PROCEDURE — 97012 MECHANICAL TRACTION THERAPY: CPT | Mod: GP | Performed by: PHYSICAL THERAPIST

## 2018-10-09 PROCEDURE — 97140 MANUAL THERAPY 1/> REGIONS: CPT | Mod: GP | Performed by: PHYSICAL THERAPIST

## 2018-10-09 RX ORDER — PREGABALIN 225 MG/1
CAPSULE ORAL
Qty: 60 CAPSULE | Refills: 5 | Status: SHIPPED | OUTPATIENT
Start: 2018-10-09 | End: 2019-04-10

## 2018-10-09 NOTE — TELEPHONE ENCOUNTER
Routing refill request to provider for review/approval because:  Drug not on the [facility] refill protocol       Ly Sandoval RN  [facility]

## 2018-10-09 NOTE — PROGRESS NOTES
"Subjective:  HPI                    Objective:  System    Physical Exam    General     ROS    Assessment/Plan:    PROGRESS  REPORT    Progress reporting period is from 9/12/2018 to 10/9/2018.       SUBJECTIVE  Subjective changes noted by patient:   Subjective: Patient reports feeling much better with a couple \"flare ups\" this weekend, but otherwise she is doing well. patient reports the cervical traction resolves her symptoms for approximatly 7 days. Patient would like to get a home traction unit so she can manage her condition independantly at home.    Current pain level is  Current Pain level: 3/10.     Previous pain level was   Initial Pain level: 5/10.   Changes in function:  Yes (See Goal flowsheet attached for changes in current functional level)  Adverse reaction to treatment or activity: None    OBJECTIVE  Changes noted in objective findings:  Yes,   Objective: No arm pain following traction. Performing all exercises correctly.  Cervical AROM WNL throughout. Cervical myotomes grossly 4+/5 bilaterally. Left shoulder AROM WNL.    ASSESSMENT/PLAN  Updated problem list and treatment plan: Diagnosis 1:  Left arm pain  Pain -  hot/cold therapy, mechanical traction, manual therapy, self management, education and home program  STG/LTGs have been met or progress has been made towards goals:  Yes (See Goal flow sheet completed today.)  Assessment of Progress: The patient's condition is improving.  Self Management Plans:  Patient has been instructed in a home treatment program.  Patient  has been instructed in self management of symptoms.  I have re-evaluated this patient and find that the nature, scope, duration and intensity of the therapy is appropriate for the medical condition of the patient.  Velma continues to require the following intervention to meet STG and LTG's:  PT    Recommendations:  This patient would benefit from continued therapy.     Frequency:  2 X a month, once daily  Duration:  for 1 " months        Please refer to the daily flowsheet for treatment today, total treatment time and time spent performing 1:1 timed codes.

## 2018-10-15 ENCOUNTER — OFFICE VISIT (OUTPATIENT)
Dept: PSYCHOLOGY | Facility: CLINIC | Age: 48
End: 2018-10-15
Payer: COMMERCIAL

## 2018-10-15 DIAGNOSIS — F33.1 MAJOR DEPRESSIVE DISORDER, RECURRENT EPISODE, MODERATE (H): Primary | Chronic | ICD-10-CM

## 2018-10-15 DIAGNOSIS — F41.1 GAD (GENERALIZED ANXIETY DISORDER): ICD-10-CM

## 2018-10-15 PROCEDURE — 90834 PSYTX W PT 45 MINUTES: CPT | Performed by: SOCIAL WORKER
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
## 2018-10-15 NOTE — MR AVS SNAPSHOT
MRN:[id number]                      After Visit Summary   10/15/2018    Velma Diaz    MRN: [id number]           Visit Information        Provider Department      10/15/2018 11:00 AM Antonio Rios LICSW [facility] Generic      Your next 10 appointments already scheduled     Oct 16, 2018 11:40 AM CDT   HAZEL Extremity with Elfego Rodas, PT   [facility] ([facility] [place])    [address]  [address]  [place] [address] [phone]            Oct 30, 2018  9:40 AM CDT   [facility]    [address] [place] [id number]   [phone]            Nov 12, 2018  9:40 AM CST   Pre-Op physical with Srinivasa Hunter MD   [facility]    [address] [place] [id number]   [phone]            Nov 20, 2018  9:30 AM CST   Return Visit with SERVANDO Yan   [facility]    [address] [place] [id number]   [phone]              MyChart Information     [employer] gives you secure access to your electronic health record. If you see a primary care provider, you can also send messages to your care team and make appointments. If you have questions, please call your primary care clinic.  If you do not have a primary care provider, please call [phone] and they will assist you.        Care EveryWhere ID     This is your Care EveryWhere ID. This could be used by other organizations to access your [place] medical records  [id number]        Equal Access to Services     RICHARDSON HERNANDEZ : Hadii antionette Dumas, waaxda luqadaha, qaybta kaalgrayson vines. So [facility] [phone].    ATENCIÓN: Si tristonla espforrest, jose luz dowd  disposición servicios gratuitos de asistencia lingüística. Llame al [phone].    We comply with applicable federal civil rights laws and [place] laws. We do not discriminate on the basis of race, color, national origin, age, disability, sex, sexual orientation, or gender identity.

## 2018-10-16 ENCOUNTER — THERAPY VISIT (OUTPATIENT)
Dept: PHYSICAL THERAPY | Facility: CLINIC | Age: 48
End: 2018-10-16
Payer: COMMERCIAL

## 2018-10-16 DIAGNOSIS — M79.602 PAIN OF LEFT UPPER EXTREMITY: ICD-10-CM

## 2018-10-16 PROCEDURE — 97140 MANUAL THERAPY 1/> REGIONS: CPT | Mod: GP | Performed by: PHYSICAL THERAPIST

## 2018-10-16 PROCEDURE — 97012 MECHANICAL TRACTION THERAPY: CPT | Mod: GP | Performed by: PHYSICAL THERAPIST

## 2018-10-18 ENCOUNTER — MYC REFILL (OUTPATIENT)
Dept: FAMILY MEDICINE | Facility: CLINIC | Age: 48
End: 2018-10-18

## 2018-10-18 DIAGNOSIS — G89.4 CHRONIC PAIN SYNDROME: Chronic | ICD-10-CM

## 2018-10-18 RX ORDER — OXYCODONE AND ACETAMINOPHEN 10; 325 MG/1; MG/1
1 TABLET ORAL EVERY 6 HOURS PRN
Qty: 90 TABLET | Refills: 0 | Status: SHIPPED | OUTPATIENT
Start: 2018-10-18 | End: 2018-11-15

## 2018-10-18 NOTE — TELEPHONE ENCOUNTER
Message from MyChart:  Original authorizing provider: MD Mireille Rodriguez would like a refill of the following medications:  oxyCODONE-acetaminophen (PERCOCET)  MG per tablet [Srinivasa Hunter MD]    Preferred pharmacy: WRITTEN PRESCRIPTION REQUESTED    Comment:  I'll come  at clinic

## 2018-10-18 NOTE — TELEPHONE ENCOUNTER
Requested Prescriptions   Pending Prescriptions Disp Refills     oxyCODONE-acetaminophen (PERCOCET)  MG per tablet 90 tablet 0     Sig: Take 1 tablet by mouth every 6 hours as needed for moderate to severe pain    There is no refill protocol information for this order        Last refill 9/24/18 # 90  Last OV 10/1/18    Routing refill request to provider for review/approval because:  Drug not on the [facility] refill protocol   Marisela Shea RN [facility] Triage.

## 2018-10-19 ENCOUNTER — TRANSFERRED RECORDS (OUTPATIENT)
Dept: HEALTH INFORMATION MANAGEMENT | Facility: CLINIC | Age: 48
End: 2018-10-19

## 2018-10-21 ENCOUNTER — TRANSFERRED RECORDS (OUTPATIENT)
Dept: HEALTH INFORMATION MANAGEMENT | Facility: CLINIC | Age: 48
End: 2018-10-21

## 2018-10-24 ENCOUNTER — THERAPY VISIT (OUTPATIENT)
Dept: PHYSICAL THERAPY | Facility: CLINIC | Age: 48
End: 2018-10-24
Payer: COMMERCIAL

## 2018-10-24 DIAGNOSIS — M79.602 PAIN OF LEFT UPPER EXTREMITY: ICD-10-CM

## 2018-10-24 PROCEDURE — 97012 MECHANICAL TRACTION THERAPY: CPT | Mod: GP

## 2018-10-30 ENCOUNTER — OFFICE VISIT (OUTPATIENT)
Dept: FAMILY MEDICINE | Facility: CLINIC | Age: 48
End: 2018-10-30
Payer: COMMERCIAL

## 2018-10-30 VITALS
HEIGHT: 63 IN | TEMPERATURE: 97.4 F | BODY MASS INDEX: 41.29 KG/M2 | WEIGHT: 233 LBS | SYSTOLIC BLOOD PRESSURE: 102 MMHG | HEART RATE: 73 BPM | OXYGEN SATURATION: 95 % | DIASTOLIC BLOOD PRESSURE: 73 MMHG

## 2018-10-30 DIAGNOSIS — Z11.4 SCREENING FOR HIV (HUMAN IMMUNODEFICIENCY VIRUS): ICD-10-CM

## 2018-10-30 DIAGNOSIS — M79.89 LEG SWELLING: ICD-10-CM

## 2018-10-30 DIAGNOSIS — G47.33 OSA (OBSTRUCTIVE SLEEP APNEA): Chronic | ICD-10-CM

## 2018-10-30 DIAGNOSIS — G60.9 IDIOPATHIC PERIPHERAL NEUROPATHY: ICD-10-CM

## 2018-10-30 DIAGNOSIS — G43.909 MIGRAINE WITHOUT STATUS MIGRAINOSUS, NOT INTRACTABLE, UNSPECIFIED MIGRAINE TYPE: Chronic | ICD-10-CM

## 2018-10-30 DIAGNOSIS — M79.602 PAIN OF LEFT UPPER EXTREMITY: ICD-10-CM

## 2018-10-30 DIAGNOSIS — F33.1 MAJOR DEPRESSIVE DISORDER, RECURRENT EPISODE, MODERATE (H): Chronic | ICD-10-CM

## 2018-10-30 DIAGNOSIS — G89.29 CHRONIC LOW BACK PAIN, UNSPECIFIED BACK PAIN LATERALITY, WITH SCIATICA PRESENCE UNSPECIFIED: ICD-10-CM

## 2018-10-30 DIAGNOSIS — E78.5 HYPERLIPIDEMIA WITH TARGET LDL LESS THAN 130: ICD-10-CM

## 2018-10-30 DIAGNOSIS — M54.5 CHRONIC LOW BACK PAIN, UNSPECIFIED BACK PAIN LATERALITY, WITH SCIATICA PRESENCE UNSPECIFIED: ICD-10-CM

## 2018-10-30 DIAGNOSIS — E66.01 MORBID OBESITY WITH BMI OF 40.0-44.9, ADULT (H): Chronic | ICD-10-CM

## 2018-10-30 DIAGNOSIS — L98.9 SKIN LESIONS: ICD-10-CM

## 2018-10-30 DIAGNOSIS — Z00.00 ROUTINE GENERAL MEDICAL EXAMINATION AT A HEALTH CARE FACILITY: Primary | ICD-10-CM

## 2018-10-30 DIAGNOSIS — Z23 NEED FOR PROPHYLACTIC VACCINATION AND INOCULATION AGAINST INFLUENZA: ICD-10-CM

## 2018-10-30 LAB
ANION GAP SERPL CALCULATED.3IONS-SCNC: 10 MMOL/L (ref 3–14)
BUN SERPL-MCNC: 8 MG/DL (ref 7–30)
CALCIUM SERPL-MCNC: 9 MG/DL (ref 8.5–10.1)
CHLORIDE SERPL-SCNC: 107 MMOL/L (ref 94–109)
CHOLEST SERPL-MCNC: 211 MG/DL
CO2 SERPL-SCNC: 24 MMOL/L (ref 20–32)
CREAT SERPL-MCNC: 0.74 MG/DL (ref 0.52–1.04)
ERYTHROCYTE [DISTWIDTH] IN BLOOD BY AUTOMATED COUNT: 12.3 % (ref 10–15)
GFR SERPL CREATININE-BSD FRML MDRD: 84 ML/MIN/1.7M2
GLUCOSE SERPL-MCNC: 94 MG/DL (ref 70–99)
HCT VFR BLD AUTO: 41.9 % (ref 35–47)
HDLC SERPL-MCNC: 38 MG/DL
HGB BLD-MCNC: 14.1 G/DL (ref 11.7–15.7)
LDLC SERPL CALC-MCNC: 102 MG/DL
MCH RBC QN AUTO: 31.7 PG (ref 26.5–33)
MCHC RBC AUTO-ENTMCNC: 33.7 G/DL (ref 31.5–36.5)
MCV RBC AUTO: 94 FL (ref 78–100)
NONHDLC SERPL-MCNC: 173 MG/DL
PLATELET # BLD AUTO: 333 10E9/L (ref 150–450)
POTASSIUM SERPL-SCNC: 4 MMOL/L (ref 3.4–5.3)
RBC # BLD AUTO: 4.45 10E12/L (ref 3.8–5.2)
SODIUM SERPL-SCNC: 141 MMOL/L (ref 133–144)
TRIGL SERPL-MCNC: 357 MG/DL
WBC # BLD AUTO: 7.2 10E9/L (ref 4–11)

## 2018-10-30 PROCEDURE — 36415 COLL VENOUS BLD VENIPUNCTURE: CPT | Performed by: FAMILY MEDICINE

## 2018-10-30 PROCEDURE — 99213 OFFICE O/P EST LOW 20 MIN: CPT | Mod: 25 | Performed by: FAMILY MEDICINE

## 2018-10-30 PROCEDURE — 80048 BASIC METABOLIC PNL TOTAL CA: CPT | Performed by: FAMILY MEDICINE

## 2018-10-30 PROCEDURE — 99396 PREV VISIT EST AGE 40-64: CPT | Mod: 25 | Performed by: FAMILY MEDICINE

## 2018-10-30 PROCEDURE — 90686 IIV4 VACC NO PRSV 0.5 ML IM: CPT | Performed by: FAMILY MEDICINE

## 2018-10-30 PROCEDURE — 80061 LIPID PANEL: CPT | Performed by: FAMILY MEDICINE

## 2018-10-30 PROCEDURE — 87389 HIV-1 AG W/HIV-1&-2 AB AG IA: CPT | Performed by: FAMILY MEDICINE

## 2018-10-30 PROCEDURE — 90471 IMMUNIZATION ADMIN: CPT | Performed by: FAMILY MEDICINE

## 2018-10-30 PROCEDURE — 85027 COMPLETE CBC AUTOMATED: CPT | Performed by: FAMILY MEDICINE

## 2018-10-30 RX ORDER — FUROSEMIDE 20 MG
20 TABLET ORAL DAILY
Qty: 30 TABLET | Refills: 11 | Status: SHIPPED | OUTPATIENT
Start: 2018-10-30 | End: 2018-11-12

## 2018-10-30 RX ORDER — SUMATRIPTAN 100 MG/1
100 TABLET, FILM COATED ORAL
Qty: 9 TABLET | Refills: 2 | Status: SHIPPED | OUTPATIENT
Start: 2018-10-30 | End: 2019-10-31

## 2018-10-30 RX ORDER — METHOCARBAMOL 750 MG/1
TABLET, FILM COATED ORAL
Qty: 60 TABLET | Refills: 2 | Status: SHIPPED | OUTPATIENT
Start: 2018-10-30 | End: 2018-12-31

## 2018-10-30 ASSESSMENT — PATIENT HEALTH QUESTIONNAIRE - PHQ9
10. IF YOU CHECKED OFF ANY PROBLEMS, HOW DIFFICULT HAVE THESE PROBLEMS MADE IT FOR YOU TO DO YOUR WORK, TAKE CARE OF THINGS AT HOME, OR GET ALONG WITH OTHER PEOPLE: SOMEWHAT DIFFICULT
SUM OF ALL RESPONSES TO PHQ QUESTIONS 1-9: 13
SUM OF ALL RESPONSES TO PHQ QUESTIONS 1-9: 13

## 2018-10-30 ASSESSMENT — ENCOUNTER SYMPTOMS
CHILLS: 0
DYSURIA: 0
PARESTHESIAS: 0
HEADACHES: 1
EYE PAIN: 1
WEAKNESS: 1
MYALGIAS: 1
BREAST MASS: 0
CONSTIPATION: 0
DIARRHEA: 0
NAUSEA: 0
PALPITATIONS: 0
FEVER: 0
ARTHRALGIAS: 1
JOINT SWELLING: 1
ABDOMINAL PAIN: 0
DIZZINESS: 0
FREQUENCY: 0
SORE THROAT: 0
COUGH: 0
NERVOUS/ANXIOUS: 1
HEMATOCHEZIA: 0
SHORTNESS OF BREATH: 0
HEMATURIA: 0
HEARTBURN: 0

## 2018-10-30 NOTE — PROGRESS NOTES
SUBJECTIVE:   CC: Velma Diaz is an 48 year old woman who presents for a preventive health visit and to address a number of other underlying health conditions.     Physical   Annual:     Getting at least 3 servings of Calcium per day:  Yes    Bi-annual eye exam:  NO    Dental care twice a year:  Yes    Sleep apnea or symptoms of sleep apnea:  Daytime drowsiness    Diet:  Regular (no restrictions)    Frequency of exercise:  2-3 days/week    Duration of exercise:  15-30 minutes    Taking medications regularly:  Yes    Medication side effects:  None    Additional concerns today:  YES    Other concerns:     Red spot on right shoulder x 2 weeks.  Has moles she would like checked on lower back.    She has been dealing with some neck and left arm pain recently. She is been receiving traction and that has been helpful.  She is trying to get a home traction unit covered by insurance.  She has been seeing our clinic's therapist because of anxiety and depression.  She recently broke up with her boyfriend and she feels like that will be positive thing for her going forward.  She has been to the ER a couple of times in recent weeks because of migraine headaches.  Those are doing better now.  She has chronic low back pain and is on medication for that as listed.  She sees neurology for some of her ongoing health concerns.  She uses Lasix about 3-4 times a week because of left leg swelling.  She uses nystatin powder periodically for a rash under her breasts.    Today's PHQ-2 Score:   PHQ-2 ( 1999 Pfizer) 10/30/2018   Q1: Little interest or pleasure in doing things 2   Q2: Feeling down, depressed or hopeless 2   PHQ-2 Score 4   Q1: Little interest or pleasure in doing things More than half the days   Q2: Feeling down, depressed or hopeless More than half the days   PHQ-2 Score 4       Abuse: Current or Past(Physical, Sexual or Emotional)- No  Do you feel safe in your environment - Yes    Social History   Substance Use Topics      Smoking status: Former Smoker     Packs/day: 0.50     Years: 15.00     Types: Cigarettes     Quit date: 2/20/2015     Smokeless tobacco: Never Used     Alcohol use No      Comment: Quit in September 27th     Alcohol Use 10/30/2018   If you drink alcohol do you typically have greater than 3 drinks per day OR greater than 7 drinks per week? Not Applicable       Reviewed orders with patient.  Reviewed health maintenance and updated orders accordingly - Yes  Patient Active Problem List   Diagnosis     History of cleft palate with cleft lip     MAYA (obstructive sleep apnea)/Hypoventilation Syndrome     Major depressive disorder, recurrent episode, moderate (H)     Paresthesias     Health Care Home     Vitamin D deficiency     Morbid obesity with BMI of 40.0-44.9, adult (H)     Hyperlipidemia with target LDL less than 130     Intervertebral disc prolapse with impingement     Nonallopathic lesion of lumbar region     Chronic pain syndrome     Restless legs syndrome (RLS)     Insomnia     Migraine     Chronic bilateral back pain, unspecified back location     Chronic pain of left knee     ROSE MARIE (generalized anxiety disorder)     Idiopathic peripheral neuropathy     Pain of left upper extremity     Past Surgical History:   Procedure Laterality Date     ANKLE SURGERY  7/13    Left for torn tendons & loose bone chips     ARTHROSCOPY KNEE  1986    Right knee     BACK SURGERY       Basal cell carcinoma  2011    Removal from the chest     BIOPSY       C VAGINAL HYSTERECTOMY  2011     CARPAL TUNNEL RELEASE RT/LT  ~2010    Bilateral     COLONOSCOPY  2016     COSMETIC SURGERY       cysto with right ureteroscopy, stone manipulation, double J stent removal  10/04/2018    Dr. Cisneros -- removal of right kidney stone     cysto, right retrograde pyelogram, right ureteral stent Right 09/2018    for obstructing right kidney stone     DECOMPRESSION CUBITAL TUNNEL Left 8/28/2015    Procedure: DECOMPRESSION CUBITAL TUNNEL;  Surgeon:  Gus Donato MD;  [facility]     ENT SURGERY  1975    Tonsillectomy and Adenoids     GYN SURGERY  2011    Hysterectomy     HC REPAIR PERONEAL TENDONS  7/13     ORTHOPEDIC SURGERY  2011, 2011    Bilateral CTR     PE TUBES      yearly times 10 years     REPAIR CLEFT PALATE CHILD      Age 3 months & afterwards (multiple surgeries)     SOFT TISSUE SURGERY  2013       Social History   Substance Use Topics     Smoking status: Former Smoker     Packs/day: 0.50     Years: 15.00     Types: Cigarettes     Quit date: 2/20/2015     Smokeless tobacco: Never Used     Alcohol use No      Comment: Quit in September 27th     Family History   Problem Relation Age of Onset     Alzheimer Disease Paternal Grandmother 60     Cerebrovascular Disease Paternal Grandmother      Neurologic Disorder Sister 20     migraines     Depression/Anxiety Sister      Depression Sister      Neurologic Disorder Son 14     migraines     Asthma Son      HEART DISEASE Mother      Osteoporosis Mother      Obesity Mother      Cancer Father      Alcohol/Drug Father      Other Cancer Father      saliva glands & skin CA      Hypertension Father      Diabetes Maternal Grandmother      Breast Cancer Maternal Grandmother      Obesity Maternal Grandmother      Other Cancer Maternal Grandfather      skin cancer     Cancer - colorectal Other      Aunt     Asthma Daughter      Asthma Daughter      Asthma Son      Thyroid Disease Sister      Colon Cancer Other      Obesity Sister      Glaucoma No family hx of      Macular Degeneration No family hx of          Current Outpatient Prescriptions   Medication Sig Dispense Refill     acetaminophen (TYLENOL) 325 MG tablet Take 2 tablets (650 mg) by mouth every 4 hours as needed for other (mild pain) 100 tablet 0     ferrous sulfate (IRON) 325 (65 FE) MG tablet Take 1 tablet (325 mg) by mouth daily (with breakfast) 90 tablet 4     furosemide (LASIX) 20 MG tablet Take 1 tablet (20 mg) by mouth daily 30 tablet 11      LYRICA 225 MG CAPS TAKE 1 CAPSULE BY MOUTH TWICE A DAY 60 capsule 5     methocarbamol (ROBAXIN) 750 MG tablet TAKE 1 TABLET (750 MG) BY MOUTH 3 TIMES DAILY AS NEEDED FOR MUSCLE SPASMS 60 tablet 2     Multiple Vitamins-Minerals (MULTI FOR HER PO) Take by mouth daily        nystatin (MYCOSTATIN) 898003 UNIT/GM POWD Apply to affected area twice daily as needed 60 g 2     order for DME Equipment being ordered: right elbow splint. 1 each 0     order for DME Equipment being ordered: right elbow pad and right elbow splint. 1 each 0     order for DME TENS unit 1 Device 0     ORDER FOR DME Respironics REMSTAR 60 Series Auto [id number] H2O, Airfit P10 nasal pillow mask w/xsmall pillows       oxyCODONE-acetaminophen (PERCOCET)  MG per tablet Take 1 tablet by mouth every 6 hours as needed for moderate to severe pain 90 tablet 0     rOPINIRole (REQUIP) 0.25 MG tablet Take 1 tablet (0.25 mg) by mouth 2 times daily 60 tablet 3     SUMAtriptan (IMITREX) 100 MG tablet Take 1 tablet (100 mg) by mouth at onset of headache for migraine May repeat in 2 hours if needed: max 2/day 9 tablet 2     [DISCONTINUED] furosemide (LASIX) 20 MG tablet Take 1 tablet (20 mg) by mouth daily 30 tablet 11     No Known Allergies    Patient under age 50, mutual decision reflected in health maintenance.      Pertinent mammograms are reviewed under the imaging tab.  History of abnormal Pap smear: Status post benign hysterectomy. Health Maintenance and Surgical History updated.     Reviewed and updated as needed this visit by clinical staff  Tobacco  Allergies  Meds  Med Hx  Surg Hx  Fam Hx  Soc Hx        Reviewed and updated as needed this visit by Provider            Review of Systems   Constitutional: Negative for chills and fever.   HENT: Negative for congestion, ear pain, hearing loss and sore throat.    Eyes: Positive for pain and visual disturbance.   Respiratory: Negative for cough and shortness of breath.    Cardiovascular: Positive for  peripheral edema. Negative for chest pain and palpitations.   Gastrointestinal: Negative for abdominal pain, constipation, diarrhea, heartburn, hematochezia and nausea.   Breasts:  Negative for tenderness, breast mass and discharge.   Genitourinary: Positive for pelvic pain. Negative for dysuria, frequency, genital sores, hematuria, urgency, vaginal bleeding and vaginal discharge.   Musculoskeletal: Positive for arthralgias, joint swelling and myalgias.   Skin: Negative for rash.   Neurological: Positive for weakness and headaches. Negative for dizziness and paresthesias.   Psychiatric/Behavioral: Positive for mood changes. The patient is nervous/anxious.      She notes that she will be having further surgery in early December for a perforation of her nasal septum and possible cleft lip/palate revision.     OBJECTIVE:   Wt 233 lb (105.7 kg)  LMP  (LMP Unknown)  BMI 40.95 kg/m2  Physical Exam  GENERAL: alert, no distress and obese  EYES: Eyes grossly normal to inspection, PERRL and conjunctivae and sclerae normal  HENT: ear canals and TM's normal, nose and mouth without ulcers or lesions.  She has had previous cleft lip and palate repair.  NECK: no adenopathy, no asymmetry, masses, or scars and thyroid normal to palpation  RESP: lungs clear to auscultation - no rales, rhonchi or wheezes  CV: regular rate and rhythm, normal S1 S2, no S3 or S4, no murmur, click or rub, no peripheral edema and peripheral pulses intact  ABDOMEN: soft, nontender, no hepatosplenomegaly, no masses   MS: no gross musculoskeletal defects noted, no edema  SKIN: no suspicious lesions or rashes.  She does have 3 raised fleshy skin growths of the left lower back consistent with intradermal nevi.  They are 2-3 mm in diameter.  She also has a small erythematous lesion near her right shoulder which looks excoriated.  Does not look like a skin cancer.  NEURO: Normal strength and tone, mentation intact and speech normal  PSYCH: mentation appears  normal, affect normal/bright    Diagnostic Test Results:  none     ASSESSMENT/PLAN:       ICD-10-CM    1. Routine general medical examination at a health care facility Z00.00 CBC with platelets   2. Chronic low back pain, unspecified back pain laterality, with sciatica presence unspecified M54.5 methocarbamol (ROBAXIN) 750 MG tablet    G89.29    3. Leg swelling M79.89 BASIC METABOLIC PANEL     furosemide (LASIX) 20 MG tablet   4. Idiopathic peripheral neuropathy G60.9    5. Pain of left upper extremity M79.602    6. Major depressive disorder, recurrent episode, moderate (H) F33.1    7. Migraine without status migrainosus, not intractable, unspecified migraine type G43.909 SUMAtriptan (IMITREX) 100 MG tablet   8. MAYA (obstructive sleep apnea)/Hypoventilation Syndrome G47.33    9. Morbid obesity with BMI of 40.0-44.9, adult (H) E66.01     Z68.41    10. Hyperlipidemia with target LDL less than 130 E78.5 Lipid panel reflex to direct LDL Fasting   11. Skin lesions L98.9    12. Need for prophylactic vaccination and inoculation against influenza Z23 FLU VACCINE, SPLIT VIRUS, IM (QUADRIVALENT) [11793]- >3 YRS     Vaccine Administration, Initial [76624]   13. Screening for HIV (human immunodeficiency virus) Z11.4 HIV Screening     Blood pressure and other vital signs look acceptable  We discussed the above conditions  We will check fasting labs today as above  We will give her a flu shot today  Continue same baseline meds  Continue to work on diet and exercise treatment for weight loss  Reassurance about the skin lesions  Continue traction and other therapy for the neck and left arm  Continue ongoing care with neurology as per their recommendations  Discussed her anxiety and depression and she will continue with counseling for now, but we will consider further medical therapy for this in the future if needed/desired as well  I will be seeing her again in a few weeks for a preoperative history and physical before undergoing  "the nasal and possible lift/palate surgery    COUNSELING:  Reviewed preventive health counseling, as reflected in patient instructions       Regular exercise       Healthy diet/nutrition       Immunizations    Vaccinated for: Influenza          BP Readings from Last 1 Encounters:   10/01/18 108/71     Estimated body mass index is 40.95 kg/(m^2) as calculated from the following:    Height as of 3/1/18: 5' 3.25\" (1.607 m).    Weight as of this encounter: 233 lb (105.7 kg).      Weight management plan: Discussed healthy diet and exercise guidelines and patient will follow up in 12 months in clinic to re-evaluate.     reports that she quit smoking about 3 years ago. Her smoking use included Cigarettes. She has a 7.50 pack-year smoking history. She has never used smokeless tobacco.      Counseling Resources:  ATP IV Guidelines  Pooled Cohorts Equation Calculator  Breast Cancer Risk Calculator  FRAX Risk Assessment  ICSI Preventive Guidelines  Dietary Guidelines for Americans, 2010  [employer]'s MyPlate  ASA Prophylaxis  Lung CA Screening    Srinivasa Hunter MD  [facility]      Answers for HPI/ROS submitted by the patient on 10/30/2018   PHQ-2 Score: 4  If you checked off any problems, how difficult have these problems made it for you to do your work, take care of things at home, or get along with other people?: Somewhat difficult  PHQ9 TOTAL SCORE: 13      Injectable Influenza Immunization Documentation    1.  Is the person to be vaccinated sick today?   No    2. Does the person to be vaccinated have an allergy to a component   of the vaccine?   No  Egg Allergy Algorithm Link    3. Has the person to be vaccinated ever had a serious reaction   to influenza vaccine in the past?   No    4. Has the person to be vaccinated ever had Guillain-Barré syndrome?   No    Form completed by Patient  Vaccine information supplied.  Due to injection administration, patient instructed to remain in clinic for 15 minutes  " afterwards, and to report any adverse reaction to me immediately.  Susan Hines CMA

## 2018-10-30 NOTE — MR AVS SNAPSHOT
After Visit Summary   10/30/2018    Velma Diaz    MRN: [id number]           Patient Information     Date Of Birth          1970        Visit Information        Provider Department      10/30/2018 9:40 AM Srinivasa Hunter MD [facility]        Today's Diagnoses     Routine general medical examination at a health care facility    -  1    Chronic low back pain, unspecified back pain laterality, with sciatica presence unspecified        Leg swelling        Idiopathic peripheral neuropathy        Pain of left upper extremity        Major depressive disorder, recurrent episode, moderate (H)        Migraine without status migrainosus, not intractable, unspecified migraine type        MAYA (obstructive sleep apnea)/Hypoventilation Syndrome        Hyperlipidemia with target LDL less than 130        Need for prophylactic vaccination and inoculation against influenza        Screening for HIV (human immunodeficiency virus)           Follow-ups after your visit        Follow-up notes from your care team     Return in about 2 weeks (around 11/12/2018) for a pre-op H and P.      Your next 10 appointments already scheduled     Nov 02, 2018  9:40 AM CDT   [facility] Extremity with Elfego Rodas, PT   [facility] [place]    [address] [address]   [phone]            Nov 12, 2018  9:40 AM CST   [facility] physical [facility]asa [facility]   [facility]    [address] [place] [address]   [phone]            Nov 20, 2018  9:30 AM CST   Return Visit with SERVANDO Yan   [facility]    [address] [facility] [address]   [phone]            Nov 26, 2018 10:00 AM CST   New Visit with Wade Larsen OD   [facility]    [address]  "[address] [place] [address] [phone]              Who to contact     If you have questions or need follow up information about today's clinic visit or your schedule please contact [facility] directly at [phone].  Normal or non-critical lab and imaging results will be communicated to you by MyChart, letter or phone within 4 business days after the clinic has received the results. If you do not hear from us within 7 days, please contact the clinic through [employer] or phone. If you have a critical or abnormal lab result, we will notify you by phone as soon as possible.  Submit refill requests through [employer] or call your pharmacy and they will forward the refill request to us. Please allow 3 business days for your refill to be completed.          Additional Information About Your Visit        [employer] Information     [employer] gives you secure access to your electronic health record. If you see a primary care provider, you can also send messages to your care team and make appointments. If you have questions, please call your primary care clinic.  If you do not have a primary care provider, please call [phone] and they will assist you.        Care EveryWhere ID     This is your Care EveryWhere ID. This could be used by other organizations to access your [facility] medical records  [id number]        Your Vitals Were     Pulse Temperature Height Last Period Pulse Oximetry BMI (Body Mass Index)    73 97.4  F (36.3  C) (Oral) 5' 3.25\" (1.607 m) (LMP Unknown) 95% 40.95 kg/m2       Blood Pressure from Last 3 Encounters:   10/30/18 102/73   10/01/18 108/71   09/20/18 122/78    Weight from Last 3 Encounters:   10/30/18 233 lb (105.7 kg)   10/01/18 245 lb (111.1 kg)   09/20/18 237 lb (107.5 kg)              We Performed the Following     BASIC METABOLIC PANEL     CBC with platelets     FLU VACCINE, SPLIT VIRUS, IM (QUADRIVALENT) [44003]- >3 YRS     HIV Screening     Lipid panel reflex to " direct LDL Fasting     Vaccine Administration, Initial [97771]          Where to get your medicines      These medications [facility] sent to [id number] - [facility], [place] - [address] [facility]  [address] [place] [address]     Phone:  [phone]     furosemide 20 MG tablet    methocarbamol 750 MG tablet    SUMAtriptan 100 MG tablet          Primary Care Provider Office Phone # Fax #    Srinivasa Hunter [phone]       [address] [facility] [address]        Goals        General    I will call within next 2 weeks to schedule initial psychiatry appointment (pt-stated)     Notes - Note edited  5/20/2015 12:51 PM by Yesica Marsh    As of today's date 5/20/2015 goal is met at 76 - 100%.   Goal Status:  Complete  Patient states this is scheduled with Dr. Salvador for next month, but not on appointment calendar  LUDY Stark, MSW   [phone]  5/20/2015 12:51 PM        I will complete [employer] or reduced fare application within the next month (pt-stated)     Notes - Note edited  11/24/2015 12:00 PM by Yesica Marsh    As of today's date 11/24/2015 goal is met at 51 - 75%.   Goal Status:  Active  She reported today having [employer] application, [employer] worker has requested but packet from [employer].    LUDY Stark, MSW   [phone]  11/24/2015 12:00 PM        I will discuss [employer] worker with therapist next week for housing needs  (pt-stated)     Notes - Note edited  12/3/2015  1:17 PM by Yesica Marsh    As of today's date 12/3/2015 goal is met at 76 - 100%.   Goal Status:  Discontinued  Patient has [employer] worker and has found housing with friend  LUDY Stark, MSW   [phone]  12/3/2015 1:16 PM        Equal Access to Services     RICHARDSON HERNANDEZ AH: Katie britoo Soomaali, waaxda luqadaha, qaybta kaalmada adeegyadre, grayson carrillo . [facility] [phone].    ATENCIÓN: Si jose gauthier  disposición servicios gratuitos de asistencia lingüística. Yuriy fowler [phone].    We comply with applicable federal civil rights laws and [place] laws. We do not discriminate on the basis of race, color, national origin, age, disability, sex, sexual orientation, or gender identity.            Thank you!     Thank you for choosing [facility]  for your care. Our goal is always to provide you with excellent care. Hearing back from our patients is one way we can continue to improve our services. Please take a few minutes to complete the written survey that you may receive in the mail after your visit with us. Thank you!             Your Updated Medication List - Protect others around you: Learn how to safely use, store and throw away your medicines at www.disposemymeds.org.          This list is accurate as of 10/30/18 10:34 AM.  Always use your most recent med list.                   Brand Name Dispense Instructions for use Diagnosis    acetaminophen 325 MG tablet    TYLENOL    100 tablet    Take 2 tablets (650 mg) by mouth every 4 hours as needed for other (mild pain)    Lesion of left ulnar nerve       ferrous sulfate 325 (65 Fe) MG tablet    IRON    90 tablet    Take 1 tablet (325 mg) by mouth daily (with breakfast)    Restless legs syndrome (RLS)       furosemide 20 MG tablet    LASIX    30 tablet    Take 1 tablet (20 mg) by mouth daily    Leg swelling       LYRICA 225 MG Caps   Generic drug:  Pregabalin     60 capsule    TAKE 1 CAPSULE BY MOUTH TWICE A DAY    Chronic pain syndrome       methocarbamol 750 MG tablet    ROBAXIN    60 tablet    TAKE 1 TABLET (750 MG) BY MOUTH 3 TIMES DAILY AS NEEDED FOR MUSCLE SPASMS    Chronic low back pain, unspecified back pain laterality, with sciatica presence unspecified       MULTI FOR HER PO      Take by mouth daily        nystatin 465238 UNIT/GM Powd    MYCOSTATIN    60 g    Apply to affected area twice daily as needed    Candidal intertrigo        order for DME      Respironics REMSTAR 60 Series Auto [id number] H2O, Airfit P10 nasal pillow mask w/xsmall pillows        order for DME     1 Device    TENS unit    Chronic bilateral back pain, unspecified back location       order for DME     1 each    Equipment being ordered: right elbow pad and right elbow splint.    Ulnar neuropathy of right upper extremity       order for DME     1 each    Equipment being ordered: right elbow splint.    Ulnar neuropathy of right upper extremity       oxyCODONE-acetaminophen  MG per tablet    PERCOCET    90 tablet    Take 1 tablet by mouth every 6 hours as needed for moderate to severe pain    Chronic pain syndrome       rOPINIRole 0.25 MG tablet    REQUIP    60 tablet    Take 1 tablet (0.25 mg) by mouth 2 times daily    Restless legs syndrome (RLS)       SUMAtriptan 100 MG tablet    IMITREX    9 tablet    Take 1 tablet (100 mg) by mouth at onset of headache for migraine May repeat in 2 hours if needed: max 2/day    Migraine without status migrainosus, not intractable, unspecified migraine type

## 2018-10-31 LAB — HIV 1+2 AB+HIV1 P24 AG SERPL QL IA: NONREACTIVE

## 2018-10-31 ASSESSMENT — PATIENT HEALTH QUESTIONNAIRE - PHQ9: SUM OF ALL RESPONSES TO PHQ QUESTIONS 1-9: 13

## 2018-10-31 NOTE — PROGRESS NOTES
Mireille,  Your lab work generally looks good and/or stable from the past.  Electrolytes and kidney tests are normal.  Diabetes testing is normal.  HIV test is normal/negative.  Blood counts are normal.  Lipid values remain elevated.  Continued efforts at healthy eating and regular exercise would be appropriate treatment for this.    Srinivasa Hunter MD

## 2018-11-05 NOTE — PROGRESS NOTES
[address] [id number] [phone]  Dept: [phone]    PRE-OP EVALUATION:  Today's date: 2018    Velma Diaz (: 1970) presents for pre-operative evaluation assessment as requested by Dr. Escamilla.  She requires evaluation and anesthesia risk assessment prior to undergoing surgery/procedure for treatment of a nasal septum perforation.    Fax number for surgical facility: [facility] [phone]  Primary Physician: Srinivasa Hunter  Type of Anesthesia Anticipated: General    Patient has a Health Care Directive or Living Will:  NO    Preop Questions 2018   Who is doing your surgery? Dr Escamilla   What are you having done? nasal septal perforation repair   Date of Surgery/Procedure: Dec 4 2018   Facility or Hospital where procedure/surgery will be performed: [facility]   1.  Do you have a history of Heart attack, stroke, stent, coronary bypass surgery, or other heart surgery? No   2.  Do you ever have any pain or discomfort in your chest? No   3.  Do you have a history of  Heart Failure? No   4.   Are you troubled by shortness of breath when:  walking on a level surface, or up a slight hill, or at night? No   5.  Do you currently have a cold, bronchitis or other respiratory infection? No   6.  Do you have a cough, shortness of breath, or wheezing? No   7.  Do you sometimes get pains in the calves of your legs when you walk? YES    8. Do you or anyone in your family have previous history of blood clots? No   9.  Do you or does anyone in your family have a serious bleeding problem such as prolonged bleeding following surgeries or cuts? No   10. Have you ever had problems with anemia or been told to take iron pills? UNKNOWN   11. Have you had any abnormal blood loss such as black, tarry or bloody stools, or abnormal vaginal bleeding? No   12. Have you ever had a blood transfusion? No    13. Have you or any of your relatives ever had problems with anesthesia? No   14. Do you have sleep apnea, excessive snoring or daytime drowsiness? No   15. Do you have any prosthetic heart valves? No   16. Do you have prosthetic joints? No   17. Is there any chance that you may be pregnant? No         HPI:     HPI related to upcoming procedure: 48-year-old white female with a history of numerous previous surgeries for cleft lip and palate is in now for surgical treatment of a nasal septum perforation.      See problem list for active medical problems.  Problems all longstanding and stable, except as noted/documented.  See ROS for pertinent symptoms related to these conditions.                                                                                                                                                          .    MEDICAL HISTORY:     Patient Active Problem List    Diagnosis Date Noted     Pain of left upper extremity 09/12/2018     Priority: Medium     Idiopathic peripheral neuropathy 06/07/2018     Priority: Medium     ROSE MARIE (generalized anxiety disorder) 09/14/2017     Priority: Medium     Chronic pain of left knee 11/03/2016     Priority: Medium     Chronic bilateral back pain, unspecified back location 10/27/2016     Priority: Medium     Migraine 04/19/2016     Priority: Medium     Chronic pain syndrome 11/20/2015     Priority: Medium     She takes up to 90 oxycodone tablets per month for her chronic pain       Nonallopathic lesion of lumbar region 11/19/2015     Priority: Medium     Morbid obesity with BMI of 40.0-44.9, adult (H) 10/26/2015     Priority: Medium     Hyperlipidemia with target LDL less than 130 10/26/2015     Priority: Medium     Vitamin D deficiency 01/09/2015     Priority: Medium     Health Care Home 12/08/2014     Priority: Medium     No Active Care Coordination 11/24/2015  Care Coordinator: Yesica PETERSW, MSW   See Letters for [facility] Care Plan                Paresthesias 07/10/2014     Priority: Medium     Major depressive disorder, recurrent episode, moderate (H) 03/12/2014     Priority: Medium     MAYA (obstructive sleep apnea)/Hypoventilation Syndrome 02/24/2014     Priority: Medium     Study Date: 2/13/2014- (245.1 lbs) Sleep Associated Hypoventilation  suggested with baseline PCO2 42 mmHg, maximum PCO2 55 mmHg. Lowest oxygen saturation 85.0% Apnea/Hypopnea Index 5.5 events per hour.  REM AHI 37.2.  The supine AHI is 13.8. RDI 6.7  Sleep study 3/2014 (245#)- CPAP 6 cm effective, Transcutaneous CO2 Monitoring (TCM) within normal limits.             History of cleft palate with cleft lip 11/20/2013     Priority: Medium     Intervertebral disc prolapse with impingement 03/27/2013     Priority: Medium     L4-5 foraminal mod stenosis and L5-S1 Bilaterally 8/25/14  C3-4 left spurring no stenosis , C 5-6 right spurring no stenosis C 6-7 Broad Base Disc moderate stenosis April 2014       Restless legs syndrome (RLS) 04/19/2016     Priority: Low     Insomnia 04/19/2016     Priority: Low      Past Medical History:   Diagnosis Date     Chronic pain syndrome 11/20/2015    She takes up to 90 oxycodone tablets per month for her chronic pain      Depressive disorder 2000     History of cleft palate with cleft lip     cleft lip and palate, and has had 27 operations per the patient     Hyperlipidemia with target LDL less than 130 10/26/2015     Morbid obesity with BMI of 40.0-44.9, adult (H) 10/26/2015     Neuropathy      MAYA (obstructive sleep apnea)/Hypoventilation Syndrome 2/24/2014    Study Date: 2/13/2014- (245.1 lbs) Sleep Associated Hypoventilation  suggested with baseline PCO2 42 mmHg, maximum PCO2 55 mmHg. Lowest oxygen saturation 85.0% Apnea/Hypopnea Index 5.5 events per hour.  REM AHI 37.2.  The supine AHI is 13.8. RDI 6.7 Sleep study 3/2014 (245#)- CPAP 6 cm effective, Transcutaneous CO2 Monitoring (TCM) within normal limits.          Skin cancer of anterior chest 2011     Basal cell on chest     TMJ (temporomandibular joint disorder)      Past Surgical History:   Procedure Laterality Date     ANKLE SURGERY  7/13    Left for torn tendons & loose bone chips     ARTHROSCOPY KNEE  1986    Right knee     BACK SURGERY       Basal cell carcinoma  2011    Removal from the chest     BIOPSY       C VAGINAL HYSTERECTOMY  2011     CARPAL TUNNEL RELEASE RT/LT  ~2010    Bilateral     COLONOSCOPY  2016     COSMETIC SURGERY       cysto with right ureteroscopy, stone manipulation, double J stent removal  10/04/2018    Dr. Cisneros -- removal of right kidney stone     cysto, right retrograde pyelogram, right ureteral stent Right 09/2018    for obstructing right kidney stone     DECOMPRESSION CUBITAL TUNNEL Left 8/28/2015    Procedure: DECOMPRESSION CUBITAL TUNNEL;  Surgeon: Gus Donato MD;  Location: [facility]     ENT SURGERY  1975    Tonsillectomy and Adenoids     GYN SURGERY  2011    Hysterectomy     HC REPAIR PERONEAL TENDONS  7/13     ORTHOPEDIC SURGERY  2011, 2011    Bilateral CTR     PE TUBES      yearly times 10 years     REPAIR CLEFT PALATE CHILD      Age 3 months & afterwards (multiple surgeries)     SOFT TISSUE SURGERY  2013     Current Outpatient Prescriptions   Medication Sig Dispense Refill     acetaminophen (TYLENOL) 325 MG tablet Take 2 tablets (650 mg) by mouth every 4 hours as needed for other (mild pain) 100 tablet 0     ferrous sulfate (IRON) 325 (65 FE) MG tablet Take 1 tablet (325 mg) by mouth daily (with breakfast) 90 tablet 4     furosemide (LASIX) 20 MG tablet Take 1 tablet (20 mg) by mouth daily 30 tablet 11     LYRICA 225 MG CAPS TAKE 1 CAPSULE BY MOUTH TWICE A DAY 60 capsule 5     methocarbamol (ROBAXIN) 750 MG tablet TAKE 1 TABLET (750 MG) BY MOUTH 3 TIMES DAILY AS NEEDED FOR MUSCLE SPASMS 60 tablet 2     Multiple Vitamins-Minerals (MULTI FOR HER PO) Take by mouth daily        nystatin (MYCOSTATIN) 328359 UNIT/GM POWD Apply to affected area twice daily as needed 60 g  2     order for DME Equipment being ordered: right elbow splint. 1 each 0     order for DME Equipment being ordered: right elbow pad and right elbow splint. 1 each 0     order for DME TENS unit 1 Device 0     ORDER FOR DME Respironics REMSTAR 60 Series Auto [id number] H2O, Airfit P10 nasal pillow mask w/xsmall pillows       oxyCODONE-acetaminophen (PERCOCET)  MG per tablet Take 1 tablet by mouth every 6 hours as needed for moderate to severe pain 90 tablet 0     rOPINIRole (REQUIP) 0.25 MG tablet Take 1 tablet (0.25 mg) by mouth 2 times daily 60 tablet 3     SUMAtriptan (IMITREX) 100 MG tablet Take 1 tablet (100 mg) by mouth at onset of headache for migraine May repeat in 2 hours if needed: max 2/day 9 tablet 2     OTC products: None, except as noted above    No Known Allergies   Latex Allergy: NO    Social History   Substance Use Topics     Smoking status: Former Smoker     Packs/day: 0.50     Years: 15.00     Types: Cigarettes     Quit date: 2/20/2015     Smokeless tobacco: Never Used     Alcohol use No      Comment: Quit in September 27th     History   Drug Use No       REVIEW OF SYSTEMS:   CONSTITUTIONAL: NEGATIVE for fever, chills, change in weight  INTEGUMENTARY/SKIN: NEGATIVE for worrisome rashes, moles or lesions  EYES: NEGATIVE for vision changes or irritation  ENT/MOUTH: See above  RESP: NEGATIVE for significant cough or SOB  BREAST: NEGATIVE for masses, tenderness or discharge  CV: Mild ankle swelling at times  GI: NEGATIVE for nausea, abdominal pain, heartburn, or change in bowel habits  : NEGATIVE for frequency, dysuria, or hematuria  MUSCULOSKELETAL: Has chronic pain issues  NEURO: Some paresthesias  ENDOCRINE: NEGATIVE for temperature intolerance, skin/hair changes  HEME: NEGATIVE for bleeding problems  PSYCHIATRIC: NEGATIVE for changes in mood or affect    EXAM:   /74 (BP Location: Right arm, Patient Position: Chair, Cuff Size: Adult Large)  Pulse 71  Temp 98.5  F (36.9  C) (Oral)  Wt  239 lb (108.4 kg)  LMP  (LMP Unknown)  SpO2 99%  BMI 42 kg/m2    GENERAL APPEARANCE: alert, no distress, cooperative and over weight     EYES: EOMI, PERRL     HENT: Postsurgical changes consistent with previous cleft lip/palate repair     NECK: no adenopathy, no asymmetry, masses, or scars and thyroid normal to palpation     RESP: lungs clear to auscultation - no rales, rhonchi or wheezes     CV: regular rates and rhythm, normal S1 S2, no S3 or S4 and no murmur, click or rub     ABDOMEN:  soft, nontender, no HSM or masses      MS: extremities normal -- no gross deformities noted     SKIN: no suspicious lesions or rashes     NEURO: Normal strength and tone, sensory exam grossly normal, mentation intact and speech normal     PSYCH: mentation appears normal. and affect normal/bright    DIAGNOSTICS:     Office Visit on 10/30/2018   Component Date Value Ref Range Status     HIV Antigen Antibody Combo 10/30/2018 Nonreactive  NR^Nonreactive     Final    HIV-1 p24 Ag & HIV-1/HIV-2 Ab Not Detected     Sodium 10/30/2018 141  133 - 144 mmol/L Final     Potassium 10/30/2018 4.0  3.4 - 5.3 mmol/L Final     Chloride 10/30/2018 107  94 - 109 mmol/L Final     Carbon Dioxide 10/30/2018 24  20 - 32 mmol/L Final     Anion Gap 10/30/2018 10  3 - 14 mmol/L Final     Glucose 10/30/2018 94  70 - 99 mg/dL Final    Fasting specimen     Urea Nitrogen 10/30/2018 8  7 - 30 mg/dL Final     Creatinine 10/30/2018 0.74  0.52 - 1.04 mg/dL Final     GFR Estimate 10/30/2018 84  >60 mL/min/1.7m2 Final    Non  GFR Calc     GFR Estimate If Black 10/30/2018 >90  >60 mL/min/1.7m2 Final    African American GFR Calc     Calcium 10/30/2018 9.0  8.5 - 10.1 mg/dL Final     WBC 10/30/2018 7.2  4.0 - 11.0 10e9/L Final     RBC Count 10/30/2018 4.45  3.8 - 5.2 10e12/L Final     Hemoglobin 10/30/2018 14.1  11.7 - 15.7 g/dL Final     Hematocrit 10/30/2018 41.9  35.0 - 47.0 % Final     MCV 10/30/2018 94  78 - 100 fl Final     MCH 10/30/2018 31.7   26.5 - 33.0 pg Final     MCHC 10/30/2018 33.7  31.5 - 36.5 g/dL Final     RDW 10/30/2018 12.3  10.0 - 15.0 % Final     Platelet Count 10/30/2018 333  150 - 450 10e9/L Final     Cholesterol 10/30/2018 211* <200 mg/dL Final    Desirable:       <200 mg/dl     Triglycerides 10/30/2018 357* <150 mg/dL Final    Comment: Borderline high:  150-199 mg/dl  High:             200-499 mg/dl  Very high:       >499 mg/dl  Fasting specimen       HDL Cholesterol 10/30/2018 38* >49 mg/dL Final     LDL Cholesterol Calculated 10/30/2018 102* <100 mg/dL Final    Comment: Above desirable:  100-129 mg/dl  Borderline High:  130-159 mg/dL  High:             160-189 mg/dL  Very high:       >189 mg/dl       Non HDL Cholesterol 10/30/2018 173* <130 mg/dL Final    Comment: Above Desirable:  130-159 mg/dl  Borderline high:  160-189 mg/dl  High:             190-219 mg/dl  Very high:       >219 mg/dl           Recent Labs   Lab Test  10/30/18   1040 05/25/18 03/14/18 03/01/18   1419  10/18/17   1048   10/16/14   0803   HGB  14.1   --    --   13.3  13.7   < >  13.7   PLT  333   --    --   338  272   < >  347   NA  141   --    --    --   138   < >  138   POTASSIUM  4.0   --   3.9*   --   3.9   < >  4.7   CR  0.74  0.76  0.73   --   0.73   < >  0.85   A1C   --    --    --    --    --    --   5.5    < > = values in this interval not displayed.        IMPRESSION:   Reason for surgery/procedure: Nasal septum perforation  Diagnosis/reason for consult: Preoperative history and physical    The proposed surgical procedure is considered INTERMEDIATE risk.    REVISED CARDIAC RISK INDEX  The patient has the following serious cardiovascular risks for perioperative complications such as (MI, PE, VFib and 3  AV Block):  No serious cardiac risks  INTERPRETATION: 0 risks: Class I (very low risk - 0.4% complication rate)    The patient has the following additional risks for perioperative complications:  Morbid obesity      ICD-10-CM    1. Preop general physical  exam Z01.818    2. Nasal septal perforation J34.89    3. History of cleft palate with cleft lip Z87.730      She has chronic pain issues and currently takes 90 oxycodone tablets per month because of chronic back pain.  I might suggest using hydrocodone specifically for postoperative pain for a short time to allow her to continue with her usual oxycodone regimen which we track on a monthly basis with her.    RECOMMENDATIONS:       APPROVAL GIVEN to proceed with proposed procedure, without further diagnostic evaluation       Signed Electronically by: Srinivasa Hunter MD    Copy of this evaluation report is provided to requesting physician.    [facility] Preop Guidelines    Revised Cardiac Risk Index

## 2018-11-12 ENCOUNTER — OFFICE VISIT (OUTPATIENT)
Dept: FAMILY MEDICINE | Facility: CLINIC | Age: 48
End: 2018-11-12
Payer: COMMERCIAL

## 2018-11-12 VITALS
TEMPERATURE: 98.5 F | DIASTOLIC BLOOD PRESSURE: 74 MMHG | BODY MASS INDEX: 42 KG/M2 | HEART RATE: 71 BPM | SYSTOLIC BLOOD PRESSURE: 108 MMHG | WEIGHT: 239 LBS | OXYGEN SATURATION: 99 %

## 2018-11-12 DIAGNOSIS — J34.89 NASAL SEPTAL PERFORATION: ICD-10-CM

## 2018-11-12 DIAGNOSIS — Z01.818 PREOP GENERAL PHYSICAL EXAM: Primary | ICD-10-CM

## 2018-11-12 DIAGNOSIS — Z87.730 HISTORY OF CLEFT PALATE WITH CLEFT LIP: Chronic | ICD-10-CM

## 2018-11-12 DIAGNOSIS — G89.29 CHRONIC BILATERAL BACK PAIN, UNSPECIFIED BACK LOCATION: Chronic | ICD-10-CM

## 2018-11-12 DIAGNOSIS — G89.4 CHRONIC PAIN SYNDROME: Chronic | ICD-10-CM

## 2018-11-12 DIAGNOSIS — M54.9 CHRONIC BILATERAL BACK PAIN, UNSPECIFIED BACK LOCATION: Chronic | ICD-10-CM

## 2018-11-12 PROCEDURE — 99215 OFFICE O/P EST HI 40 MIN: CPT | Performed by: FAMILY MEDICINE

## 2018-11-12 RX ORDER — FUROSEMIDE 20 MG
20 TABLET ORAL DAILY
Qty: 30 TABLET | Refills: 11 | COMMUNITY
Start: 2018-11-12 | End: 2019-10-31

## 2018-11-12 NOTE — MR AVS SNAPSHOT
After Visit Summary   11/12/2018    Velma Diaz    MRN: [id number]           Patient Information     Date Of Birth          1970        Visit Information        Provider Department      11/12/2018 9:40 AM Srinivasa Hunter MD [facility]        Today's Diagnoses     Preop general physical exam    -  1    Nasal septal perforation        History of cleft palate with cleft lip          Care Instructions      Before Your Surgery      Call your surgeon if there is any change in your health. This includes signs of a cold or flu (such as a sore throat, runny nose, cough, rash or fever).    Do not smoke, drink alcohol or take over the counter medicine (unless your surgeon or primary care doctor tells you to) for the 24 hours before and after surgery.    If you take prescribed drugs: Follow your doctor s orders about which medicines to take and which to stop until after surgery.    Eating and drinking prior to surgery: follow the instructions from your surgeon    Take a shower or bath the night before surgery. Use the soap your surgeon gave you to gently clean your skin. If you do not have soap from your surgeon, use your regular soap. Do not shave or scrub the surgery site.  Wear clean pajamas and have clean sheets on your bed.           Follow-ups after your visit        Your next 10 appointments already scheduled     Nov 13, 2018  9:45 AM CST   MA SCREENING DIGITAL BILATERAL with [id number]   [facility] ([facility]    [address] [place] [id number] [phone]           How do I prepare for my exam? (Food and drink instructions) No Food and Drink Restrictions.  How do I prepare for my exam? (Other instructions) Do not use any powder, lotion or deodorant under your arms or on your breast. If you do, we will ask you to remove it before your exam.  What should I wear: Wear comfortable, two-piece clothing.  How long does the exam take:  "Most scans will take 15 minutes.  What should I bring: Bring any previous mammograms from other facilities or have them mailed to the breast center.  Do I need a :  No  is needed.  What do I need to tell my doctor: If you have any allergies, tell your care team.  What should I do after the exam: No restrictions, You may resume normal activities.  What is this test: This test is an x-ray of the breast to look for breast disease. The breast is pressed between two plates to flatten and spread the tissue. An X-ray is taken of the breast from different angles.  Who should I call with questions: If you have any questions, please call the Imaging Department where you will have your exam. Directions, parking instructions, and other information is available on our website, [employer].  Other information about my exam Three-dimensional (3D) mammograms are available at [place] locations in [facility], [facility], [facility], [place], [facility] and [place]. [facility] locations include [place] and the [facility] in [place].  Benefits of 3D mammograms include * Improved rate of cancer detection * Decreases your chance of having to go back for more tests, which means fewer: * \"False-positive\" results (This means that there is an abnormal area but it isn't cancer.) * Invasive testing procedures, such as a biopsy or surgery * Can provide clearer images of the breast if you have dense breast tissue.  *3D mammography is an optional exam that anyone can have with a 2D mammogram. It doesn't replace or take the place of a 2D mammogram. 2D mammograms remain an effective screening test for all women.  Not all insurance companies cover the cost of a 3D mammogram. Check with your insurance.            Nov 20, 2018  9:30 AM CST   Return Visit with SERVANDO Yan   [facility]    [address] [place] [address] " [facility] [id number]   [phone]            Nov 26, 2018 10:00 AM CST   New Visit with Wade Larsen OD   [facility]    [address] [place] [address]  [place] [id number] [phone]              Who to contact     If you have questions or need follow up information about today's clinic visit or your schedule please contact [facility] directly at [phone].  Normal or non-critical lab and imaging results will be communicated to you by [employer], letter or phone within 4 business days after the clinic has received the results. If you do not hear from us within 7 days, please contact the clinic through [employer] or phone. If you have a critical or abnormal lab result, we will notify you by phone as soon as possible.  Submit refill requests through [employer] or call your pharmacy and they will forward the refill request to us. Please allow 3 business days for your refill to be completed.          Additional Information About Your Visit        [employer] Information     [employer] gives you secure access to your electronic health record. If you see a primary care provider, you can also send messages to your care team and make appointments. If you have questions, please call your primary care clinic.  If you do not have a primary care provider, please call [phone] and they will assist you.        Care EveryWhere ID     This is your Care EveryWhere ID. This could be used by other organizations to access your [facility] medical records  [id number]        Your Vitals Were     Pulse Temperature Last Period Pulse Oximetry BMI (Body Mass Index)       71 98.5  F (36.9  C) (Oral) (LMP Unknown) 99% 42 kg/m2        Blood Pressure from Last 3 Encounters:   11/12/18 108/74   10/30/18 102/73   10/01/18 108/71    Weight from Last 3 Encounters:   11/12/18 239 lb (108.4 kg)   10/30/18 233 lb (105.7 kg)   10/01/18 245 lb (111.1 kg)              Today, you had the following      No orders found for display         Today's Medication Changes          These changes are accurate as of 11/12/18 10:13 AM.  If you have any questions, ask your nurse or doctor.               These medicines have changed or have updated prescriptions.        Dose/Directions    furosemide 20 MG tablet   Commonly known as:  LASIX   This may have changed:  additional instructions   Changed by:  Srinivasa Hunter MD        Dose:  20 mg   Take 1 tablet (20 mg) by mouth daily as needed   Quantity:  30 tablet   Refills:  11                Primary Care Provider Office Phone # Fax #    Srinivasa Hunter [phone]       [address] [facility] [address]        Goals        General    I will call within next 2 weeks to schedule initial psychiatry appointment (pt-stated)     Notes - Note edited  5/20/2015 12:51 PM by Yesica Marsh    As of today's date 5/20/2015 goal is met at 76 - 100%.   Goal Status:  Complete  Patient states this is scheduled with Dr. Salvador for next month, but not on appointment calendar  LUDY Stark, MS   [phone]  5/20/2015 12:51 PM        I will complete [employer] or reduced fare application within the next month (pt-stated)     Notes - Note edited  11/24/2015 12:00 PM by Yesica Marsh    As of today's date 11/24/2015 goal is met at 51 - 75%.   Goal Status:  Active  She reported today having [employer] application, [employer] worker has requested but packet from [employer].    LUDY Stark, MS   [phone]  11/24/2015 12:00 PM        I will discuss [employer] worker with therapist next week for housing needs  (pt-stated)     Notes - Note edited  12/3/2015  1:17 PM by Yesica Marsh    As of today's date 12/3/2015 goal is met at 76 - 100%.   Goal Status:  Discontinued  Patient has [employer] worker and has found housing with friend  LUDY Stark, MSW   [phone]  12/3/2015 1:16 PM        Equal Access to Services     LOLA HERNANDEZ AH:  Hadii aad ku hadsandrao Sotheresaali, waaxda luqadaha, qaybta kaalrose cook, grayson estivenin hayaaml quijanochaim bedolla lamary annml komal. So [facility] [phone].    ATENCIÓN: Si maryjane blair, tiene a dowd disposición servicios gratuitos de asistencia lingüística. Llame al [phone].    We comply with applicable federal civil rights laws and [place] laws. We do not discriminate on the basis of race, color, national origin, age, disability, sex, sexual orientation, or gender identity.            Thank you!     Thank you for choosing [facility]  for your care. Our goal is always to provide you with excellent care. Hearing back from our patients is one way we can continue to improve our services. Please take a few minutes to complete the written survey that you may receive in the mail after your visit with us. Thank you!             Your Updated Medication List - Protect others around you: Learn how to safely use, store and throw away your medicines at www.disposemymeds.org.          This list is accurate as of 11/12/18 10:13 AM.  Always use your most recent med list.                   Brand Name Dispense Instructions for use Diagnosis    acetaminophen 325 MG tablet    TYLENOL    100 tablet    Take 2 tablets (650 mg) by mouth every 4 hours as needed for other (mild pain)    Lesion of left ulnar nerve       ferrous sulfate 325 (65 Fe) MG tablet    IRON    90 tablet    Take 1 tablet (325 mg) by mouth daily (with breakfast)    Restless legs syndrome (RLS)       furosemide 20 MG tablet    LASIX    30 tablet    Take 1 tablet (20 mg) by mouth daily as needed        LYRICA 225 MG Caps   Generic drug:  Pregabalin     60 capsule    TAKE 1 CAPSULE BY MOUTH TWICE A DAY    Chronic pain syndrome       methocarbamol 750 MG tablet    ROBAXIN    60 tablet    TAKE 1 TABLET (750 MG) BY MOUTH 3 TIMES DAILY AS NEEDED FOR MUSCLE SPASMS    Chronic low back pain, unspecified back pain laterality, with sciatica presence unspecified        MULTI FOR HER PO      Take by mouth daily        nystatin 253114 UNIT/GM Powd    MYCOSTATIN    60 g    Apply to affected area twice daily as needed    Candidal intertrigo       order for DME      Respironics REMSTAR 60 Series Auto CHNW1vm H2O, Airfit P10 nasal pillow mask w/xsmall pillows        order for DME     1 Device    TENS unit    Chronic bilateral back pain, unspecified back location       order for DME     1 each    Equipment being ordered: right elbow pad and right elbow splint.    Ulnar neuropathy of right upper extremity       order for DME     1 each    Equipment being ordered: right elbow splint.    Ulnar neuropathy of right upper extremity       oxyCODONE-acetaminophen  MG per tablet    PERCOCET    90 tablet    Take 1 tablet by mouth every 6 hours as needed for moderate to severe pain    Chronic pain syndrome       rOPINIRole 0.25 MG tablet    REQUIP    60 tablet    Take 1 tablet (0.25 mg) by mouth 2 times daily    Restless legs syndrome (RLS)       SUMAtriptan 100 MG tablet    IMITREX    9 tablet    Take 1 tablet (100 mg) by mouth at onset of headache for migraine May repeat in 2 hours if needed: max 2/day    Migraine without status migrainosus, not intractable, unspecified migraine type

## 2018-11-13 ENCOUNTER — RADIANT APPOINTMENT (OUTPATIENT)
Dept: MAMMOGRAPHY | Facility: CLINIC | Age: 48
End: 2018-11-13
Payer: COMMERCIAL

## 2018-11-13 DIAGNOSIS — Z12.31 VISIT FOR SCREENING MAMMOGRAM: ICD-10-CM

## 2018-11-13 PROCEDURE — 77067 SCR MAMMO BI INCL CAD: CPT | Mod: TC

## 2018-11-14 DIAGNOSIS — G25.81 RESTLESS LEGS SYNDROME (RLS): ICD-10-CM

## 2018-11-14 NOTE — TELEPHONE ENCOUNTER
"Requested Prescriptions   Pending Prescriptions Disp Refills     rOPINIRole (REQUIP) 0.25 MG tablet  Last Written Prescription Date:  7/13/2018  Last Fill Quantity: 60 tablet,  # refills: 3   Last Office Visit: 11/12/2018   Future Office Visit:    Next 5 appointments (look out 90 days)     Nov 20, 2018  9:30 AM CST   [facility]    [address] [facility] [id number] [phone]                  60 tablet 3     Sig: Take 1 tablet (0.25 mg) by mouth 2 times daily    Antiparkinson's Agents Protocol Failed    11/14/2018  3:12 PM       Failed - ALT on record in past 12 months        Recent Labs   Lab Test  12/11/14   1831   ALT  23          Passed - Blood pressure under 140/90 in past 12 months    BP Readings from Last 3 Encounters:   11/12/18 108/74   10/30/18 102/73   10/01/18 108/71          Passed - CBC on record in past 12 months    Recent Labs   Lab Test  10/30/18   1040   WBC  7.2   RBC  4.45   HGB  14.1   HCT  41.9   PLT  333          Passed - Serum Creatinine on file in past 12 months    Recent Labs   Lab Test  10/30/18   1040   CR  0.74          Passed - Patient is age 18 or older       Passed - No active pregnancy on record       Passed - No positive pregnancy test in the past 12 months       Passed - Recent (6 mo) or future (30 days) visit within the authorizing provider's specialty    Patient had office visit in the last 6 months or has a visit in the next 30 days with authorizing provider or within the authorizing provider's specialty.  See \"Patient Info\" tab in inbasket, or \"Choose Columns\" in Meds & Orders section of the refill encounter.              "

## 2018-11-15 ENCOUNTER — MYC REFILL (OUTPATIENT)
Dept: FAMILY MEDICINE | Facility: CLINIC | Age: 48
End: 2018-11-15

## 2018-11-15 DIAGNOSIS — G89.4 CHRONIC PAIN SYNDROME: Chronic | ICD-10-CM

## 2018-11-15 RX ORDER — OXYCODONE AND ACETAMINOPHEN 10; 325 MG/1; MG/1
1 TABLET ORAL EVERY 6 HOURS PRN
Qty: 90 TABLET | Refills: 0 | Status: SHIPPED | OUTPATIENT
Start: 2018-11-15 | End: 2018-12-12

## 2018-11-15 NOTE — TELEPHONE ENCOUNTER
Last percocet Rx was 90 tabs on 10/18/18.  Last office visit was preop on 11/12/18.    Has chronic pain plan in Epic:    Chronic pain syndrome (Chronic)         Problem Detail      Noted:  11/20/2015      Priority:  Medium      Overview Signed 8/15/2016  3:28 PM by Srinivasa Hunter MD     She takes up to 90 oxycodone tablets per month for her chronic pain     Previous Version         Routing refill request to provider for review/approval because:  Drug not on the [facility] refill protocol     Judi Yeh RN  [facility]

## 2018-11-15 NOTE — TELEPHONE ENCOUNTER
Patient picked up her script at the  on 11/15/18.    Whit KING  Patient Representative  [employer]

## 2018-11-15 NOTE — TELEPHONE ENCOUNTER
Message from [employer]:  Original authorizing provider: MD Mireille Rodriguez WANDAHan Diaz would like a refill of the following medications:  oxyCODONE-acetaminophen (PERCOCET)  MG per tablet [Srinivasa Hunter MD]    Preferred pharmacy: WRITTEN PRESCRIPTION REQUESTED    Comment:  With the upcoming holiday Id like to refill so I know Im covered. Ill pock up at clinic.

## 2018-11-19 NOTE — TELEPHONE ENCOUNTER
ALT is outdated.  Pended ALT.    This med was rx by neurology.  Sending to Dr. Burger.  Sera, RN    Addendum.  Since I am not seeing the patient on the regular basis, please forward to her PCP.    Thanks,  Joaquín Burger MD

## 2018-11-20 ENCOUNTER — OFFICE VISIT (OUTPATIENT)
Dept: PSYCHOLOGY | Facility: CLINIC | Age: 48
End: 2018-11-20
Payer: COMMERCIAL

## 2018-11-20 DIAGNOSIS — F33.1 MAJOR DEPRESSIVE DISORDER, RECURRENT EPISODE, MODERATE (H): Primary | Chronic | ICD-10-CM

## 2018-11-20 DIAGNOSIS — F41.1 GAD (GENERALIZED ANXIETY DISORDER): ICD-10-CM

## 2018-11-20 PROCEDURE — 90834 PSYTX W PT 45 MINUTES: CPT | Performed by: SOCIAL WORKER

## 2018-11-20 RX ORDER — ROPINIROLE 0.25 MG/1
0.25 TABLET, FILM COATED ORAL 2 TIMES DAILY
Qty: 60 TABLET | Refills: 5 | Status: SHIPPED | OUTPATIENT
Start: 2018-11-20 | End: 2019-05-27

## 2018-11-20 ASSESSMENT — ANXIETY QUESTIONNAIRES
IF YOU CHECKED OFF ANY PROBLEMS ON THIS QUESTIONNAIRE, HOW DIFFICULT HAVE THESE PROBLEMS MADE IT FOR YOU TO DO YOUR WORK, TAKE CARE OF THINGS AT HOME, OR GET ALONG WITH OTHER PEOPLE: NOT DIFFICULT AT ALL
5. BEING SO RESTLESS THAT IT IS HARD TO SIT STILL: NOT AT ALL
6. BECOMING EASILY ANNOYED OR IRRITABLE: SEVERAL DAYS
3. WORRYING TOO MUCH ABOUT DIFFERENT THINGS: SEVERAL DAYS
7. FEELING AFRAID AS IF SOMETHING AWFUL MIGHT HAPPEN: NOT AT ALL
1. FEELING NERVOUS, ANXIOUS, OR ON EDGE: SEVERAL DAYS
GAD7 TOTAL SCORE: 4
2. NOT BEING ABLE TO STOP OR CONTROL WORRYING: SEVERAL DAYS

## 2018-11-20 ASSESSMENT — PATIENT HEALTH QUESTIONNAIRE - PHQ9
5. POOR APPETITE OR OVEREATING: NOT AT ALL
SUM OF ALL RESPONSES TO PHQ QUESTIONS 1-9: 3
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
## 2018-11-20 NOTE — MR AVS SNAPSHOT
MRN:[id number]                      After Visit Summary   11/20/2018    Velma Diaz    MRN: [id number]           Visit Information        Provider Department      11/20/2018 9:30 AM [facility]      Your next 10 appointments already scheduled     Nov 26, 2018 10:00 AM CST   New Visit with Wade Larsen, JEEVAN   [facility] ([facility]    [address] [place] [id number]   [phone]            Dec 20, 2018  9:00 AM CST   Return Visit with Antonio Rios [facility] ([facility])    [address] [facility] [address] [phone]            Brian 15, 2019  9:30 AM CST   Return Visit with Antonio Rios [facility]    [address] [facility] [id number] [phone]              MyChart Information     "[employer]t gives you secure access to your electronic health record. If you see a primary care provider, you can also send messages to your care team and make appointments. If you have questions, please call your primary care clinic.  If you do not have a primary care provider, please call [phone] and they will assist you.        Care EveryWhere ID     This is your Care EveryWhere ID. This could be used by other organizations to access your [facility] medical records  [id number]        Equal Access to Services     LOLA HERNANDEZ AH: Hadii antionette britoo Soeugenio, waaxda luqadaha, qaybta kaalmada adeegyada, grayson sauceda. So [facility] [phone].    ATENCIÓN: Si habla español, tiene a dowd disposición servicios gratuitos de asistencia lingüística. Llame al [phone].    We comply with applicable federal civil rights laws and [place] laws. We do not discriminate on the basis of race, color, national origin, age,  disability, sex, sexual orientation, or gender identity.

## 2018-11-21 ASSESSMENT — ANXIETY QUESTIONNAIRES: GAD7 TOTAL SCORE: 4

## 2018-11-26 ENCOUNTER — OFFICE VISIT (OUTPATIENT)
Dept: OPTOMETRY | Facility: CLINIC | Age: 48
End: 2018-11-26
Payer: COMMERCIAL

## 2018-11-26 DIAGNOSIS — H52.221 REGULAR ASTIGMATISM OF RIGHT EYE: ICD-10-CM

## 2018-11-26 DIAGNOSIS — H52.13 MYOPIA OF BOTH EYES: ICD-10-CM

## 2018-11-26 DIAGNOSIS — Z01.00 ENCOUNTER FOR EXAMINATION OF EYES AND VISION WITHOUT ABNORMAL FINDINGS: Primary | ICD-10-CM

## 2018-11-26 PROCEDURE — 92014 COMPRE OPH EXAM EST PT 1/>: CPT | Performed by: OPTOMETRIST

## 2018-11-26 PROCEDURE — 92015 DETERMINE REFRACTIVE STATE: CPT | Performed by: OPTOMETRIST

## 2018-11-26 ASSESSMENT — REFRACTION_MANIFEST
OD_SPHERE: -0.25
OS_CYLINDER: SPHERE
OS_ADD: +1.75
OD_ADD: +1.75
OD_CYLINDER: +0.50
OD_AXIS: 042
OS_SPHERE: -0.25

## 2018-11-26 ASSESSMENT — CUP TO DISC RATIO
OS_RATIO: 0.1
OD_RATIO: 0.1

## 2018-11-26 ASSESSMENT — CONF VISUAL FIELD
OS_NORMAL: 1
OD_NORMAL: 1

## 2018-11-26 ASSESSMENT — VISUAL ACUITY
OS_SC: 20/20
OS_SC: 20/120
OD_SC: 20/80
OD_SC+: -1
METHOD: SNELLEN - LINEAR
OS_SC+: -1
OD_SC: 20/20

## 2018-11-26 ASSESSMENT — REFRACTION_WEARINGRX
OS_SPHERE: -0.25
OD_CYLINDER: +0.25
OS_CYLINDER: SPHERE
OS_ADD: +1.50
OD_SPHERE: -0.25
OD_ADD: +1.50
OD_AXIS: 035

## 2018-11-26 ASSESSMENT — TONOMETRY
OD_IOP_MMHG: 17
IOP_METHOD: APPLANATION
OS_IOP_MMHG: 18

## 2018-11-26 ASSESSMENT — SLIT LAMP EXAM - LIDS
COMMENTS: NORMAL
COMMENTS: NORMAL

## 2018-11-26 ASSESSMENT — EXTERNAL EXAM - LEFT EYE: OS_EXAM: NORMAL

## 2018-11-26 ASSESSMENT — EXTERNAL EXAM - RIGHT EYE: OD_EXAM: NORMAL

## 2018-11-26 NOTE — LETTER
11/26/2018         RE: Velma Diaz  [address]  [place] [address]        Dear Colleague,    Thank you for referring your patient, Velma Diaz, to the [facility]. Please see a copy of my visit note below.    Chief Complaint   Patient presents with     COMPREHENSIVE EYE EXAM      Accompanied by self  Last Eye Exam: 03/28/2017  Dilated Previously: Yes    What are you currently using to see?  Glasses-2 years old, patient doesn't wear glasses very much. Patient didn't bring glasses.       Distance Vision Acuity: Noticed gradual change in both eyes    Near Vision Acuity: Not satisfied     Eye Comfort: good  Do you use eye drops? : No  Occupation or Hobbies: disabled    Rosa Slade, [employer]          Medical, surgical and family histories reviewed and updated 11/26/2018.       OBJECTIVE: See Ophthalmology exam    ASSESSMENT:    ICD-10-CM    1. Encounter for examination of eyes and vision without abnormal findings Z01.00    2. Myopia of both eyes H52.13    3. Regular astigmatism of right eye H52.221       PLAN:     Patient Instructions   Velma was advised of today's exam findings.  Optional to fill new glasses prescription, minimal change  Copy of glasses Rx provided today. Okay to use OTC reading glasses- recommend +1.50 or +1.75.   Return in 1 year for eye exam, or sooner if needed.    The affects of the dilating drops last for 4- 6 hours.  You will be more sensitive to light and vision will be blurry up close.  Mydriatic sunglasses were given if needed.      Wade Larsen O.D.  [facility]  [address] [place]  [address]    [phone]           Again, thank you for allowing me to participate in the care of your patient.        Sincerely,        Wade Larsen, JEEVAN

## 2018-11-26 NOTE — PROGRESS NOTES
Chief Complaint   Patient presents with     COMPREHENSIVE EYE EXAM      Accompanied by self  Last Eye Exam: 03/28/2017  Dilated Previously: Yes    What are you currently using to see?  Glasses-2 years old, patient doesn't wear glasses very much. Patient didn't bring glasses.       Distance Vision Acuity: Noticed gradual change in both eyes    Near Vision Acuity: Not satisfied     Eye Comfort: good  Do you use eye drops? : No  Occupation or Hobbies: disabled    Rosa Slade, [employer] Tech          Medical, surgical and family histories reviewed and updated 11/26/2018.       OBJECTIVE: See Ophthalmology exam    ASSESSMENT:    ICD-10-CM    1. Encounter for examination of eyes and vision without abnormal findings Z01.00    2. Myopia of both eyes H52.13    3. Regular astigmatism of right eye H52.221       PLAN:     Patient Instructions   Velma was advised of today's exam findings.  Optional to fill new glasses prescription, minimal change  Copy of glasses Rx provided today. Okay to use OTC reading glasses- recommend +1.50 or +1.75.   Return in 1 year for eye exam, or sooner if needed.    The affects of the dilating drops last for 4- 6 hours.  You will be more sensitive to light and vision will be blurry up close.  Mydriatic sunglasses were given if needed.      Wade Larsen O.D.  [address]  [place]  [address] [phone]

## 2018-11-26 NOTE — MR AVS SNAPSHOT
After Visit Summary   11/26/2018    Velma Diaz    MRN: [id number]           Patient Information     Date Of Birth          1970        Visit Information        Provider Department      11/26/2018 10:00 AM Wade Larsen OD [facility]        Today's Diagnoses     Encounter for examination of eyes and vision without abnormal findings    -  1    Myopia of both eyes        Regular astigmatism of right eye          Care Instructions    Velma was advised of today's exam findings.  Optional to fill new glasses prescription, minimal change  Copy of glasses Rx provided today. Okay to use OTC reading glasses- recommend +1.50 or +1.75.   Return in 1 year for eye exam, or sooner if needed.    The affects of the dilating drops last for 4- 6 hours.  You will be more sensitive to light and vision will be blurry up close.  Mydriatic sunglasses were given if needed.        Wade Larsen O.D.  [address] [place]  [address] [phone]            Follow-ups after your visit        Follow-up notes from your care team     Return in about 1 year (around 11/26/2019) for Comprehensive Eye Exam.      Your next 10 appointments already scheduled     Dec 20, 2018  9:00 AM CST   Return Visit with SERVANDO Yan   [address] [id number] [phone]            Brian 15, 2019  9:30 AM CST   Return Visit with SERVANDO Yan   [address] [id number] [phone]              Who to contact     If you have questions or need follow up information about today's clinic visit or your schedule please contact [facility] directly at [phone].  Normal or non-critical lab and imaging results will be communicated to you by Marcy  letter or phone within 4 business days after the clinic has received the results. If you do not hear from us within 7 days, please contact the clinic through [employer] or phone. If you have a critical or abnormal lab result, we will notify you by phone as soon as possible.  Submit refill requests through [employer] or call your pharmacy and they will forward the refill request to us. Please allow 3 business days for your refill to be completed.          Additional Information About Your Visit        [employer] Information     [employer] gives you secure access to your electronic health record. If you see a primary care provider, you can also send messages to your care team and make appointments. If you have questions, please call your primary care clinic.  If you do not have a primary care provider, please call [phone] and they will assist you.        Care EveryWhere ID     This is your Care EveryWhere ID. This could be used by other organizations to access your [facility] medical records  [id number]        Your Vitals Were     Last Period                   (LMP Unknown)            Blood Pressure from Last 3 Encounters:   11/12/18 108/74   10/30/18 102/73   10/01/18 108/71    Weight from Last 3 Encounters:   11/12/18 108.4 kg (239 lb)   10/30/18 105.7 kg (233 lb)   10/01/18 111.1 kg (245 lb)              Today, you had the following     No orders found for display       Primary Care Provider Office Phone # Fax #    Srinivasa Hunter [phone]       [address] [facility] [address]        Goals        General    I will call within next 2 weeks to schedule initial psychiatry appointment (pt-stated)     Notes - Note edited  5/20/2015 12:51 PM by Yesica Marsh    As of today's date 5/20/2015 goal is met at 76 - 100%.   Goal Status:  Complete  Patient states this is scheduled with Dr. Salvador for next month, but not on appointment calendar  LUDY Stark, MSW   [phone]  5/20/2015  12:51 PM        I will complete [employer] or reduced fare application within the next month (pt-stated)     Notes - Note edited  11/24/2015 12:00 PM by Yesica Marsh    As of today's date 11/24/2015 goal is met at 51 - 75%.   Goal Status:  Active  She reported today having [employer] application, [employer] worker has requested but packet from [employer].    LUDY Stark, MSW   [phone]  11/24/2015 12:00 PM        I will discuss [employer] worker with therapist next week for housing needs  (pt-stated)     Notes - Note edited  12/3/2015  1:17 PM by Yesica Marsh    As of today's date 12/3/2015 goal is met at 76 - 100%.   Goal Status:  Discontinued  Patient has [employer] worker and has found housing with friend  LUDY Stark, MSW   [phone]  12/3/2015 1:16 PM        Equal Access to Services     [facility] AH: Hadii antionette ku hadasho Soomaali, waaxda luqadaha, qaybta kaalmada adeegyada, waxay yoshi carrillo . So [facility] [phone].    ATENCIÓN: Si habla español, tiene a dowd disposición servicios gratuitos de asistencia lingüística. Llame al [phone].    We comply with applicable federal civil rights laws and [place] laws. We do not discriminate on the basis of race, color, national origin, age, disability, sex, sexual orientation, or gender identity.            Thank you!     Thank you for choosing [facility]  for your care. Our goal is always to provide you with excellent care. Hearing back from our patients is one way we can continue to improve our services. Please take a few minutes to complete the written survey that you may receive in the mail after your visit with us. Thank you!             Your Updated Medication List - Protect others around you: Learn how to safely use, store and throw away your medicines at www.disposemymeds.org.          This list is accurate as of 11/26/18 10:45 AM.  Always use your most recent med list.                   Brand  Name Dispense Instructions for use Diagnosis    acetaminophen 325 MG tablet    TYLENOL    100 tablet    Take 2 tablets (650 mg) by mouth every 4 hours as needed for other (mild pain)    Lesion of left ulnar nerve       ferrous sulfate 325 (65 Fe) MG tablet    IRON    90 tablet    Take 1 tablet (325 mg) by mouth daily (with breakfast)    Restless legs syndrome (RLS)       furosemide 20 MG tablet    LASIX    30 tablet    Take 1 tablet (20 mg) by mouth daily as needed        LYRICA 225 MG capsule   Generic drug:  pregabalin     60 capsule    TAKE 1 CAPSULE BY MOUTH TWICE A DAY    Chronic pain syndrome       methocarbamol 750 MG tablet    ROBAXIN    60 tablet    TAKE 1 TABLET (750 MG) BY MOUTH 3 TIMES DAILY AS NEEDED FOR MUSCLE SPASMS    Chronic low back pain, unspecified back pain laterality, with sciatica presence unspecified       MULTI FOR HER PO      Take by mouth daily        nystatin 895241 UNIT/GM Powd    MYCOSTATIN    60 g    Apply to affected area twice daily as needed    Candidal intertrigo       order for DME      Respironics REMSTAR 60 Series Auto [id number] H2O, Airfit P10 nasal pillow mask w/xsmall pillows        order for DME     1 Device    TENS unit    Chronic bilateral back pain, unspecified back location       order for DME     1 each    Equipment being ordered: right elbow pad and right elbow splint.    Ulnar neuropathy of right upper extremity       order for DME     1 each    Equipment being ordered: right elbow splint.    Ulnar neuropathy of right upper extremity       oxyCODONE-acetaminophen  MG per tablet    PERCOCET    90 tablet    Take 1 tablet by mouth every 6 hours as needed for moderate to severe pain    Chronic pain syndrome       rOPINIRole 0.25 MG tablet    REQUIP    60 tablet    Take 1 tablet (0.25 mg) by mouth 2 times daily    Restless legs syndrome (RLS)       SUMAtriptan 100 MG tablet    IMITREX    9 tablet    Take 1 tablet (100 mg) by mouth at onset of headache for migraine May  repeat in 2 hours if needed: max 2/day    Migraine without status migrainosus, not intractable, unspecified migraine type

## 2018-11-26 NOTE — PATIENT INSTRUCTIONS
Velma was advised of today's exam findings.  Optional to fill new glasses prescription, minimal change  Copy of glasses Rx provided today. Okay to use OTC reading glasses- recommend +1.50 or +1.75.   Return in 1 year for eye exam, or sooner if needed.    The affects of the dilating drops last for 4- 6 hours.  You will be more sensitive to light and vision will be blurry up close.  Mydriatic sunglasses were given if needed.      Wade Larsen O.D.  [address] [place]  [address]    [phone]

## 2018-11-29 ENCOUNTER — MYC MEDICAL ADVICE (OUTPATIENT)
Dept: FAMILY MEDICINE | Facility: CLINIC | Age: 48
End: 2018-11-29

## 2018-11-29 NOTE — TELEPHONE ENCOUNTER
Patient was last seen 11/12/18 by Dr. Hunter for pre-op.  Nasal surgery.    She has pain issues listed on problem list.  Neck and arm pain was addressed briefly at 10/30/18 routine visit.    Routed to Dr. Hunter, see patient's mychart request for possible referral to rheumatology.    Judi Yeh RN  [facility]

## 2018-12-12 ENCOUNTER — MYC REFILL (OUTPATIENT)
Dept: FAMILY MEDICINE | Facility: CLINIC | Age: 48
End: 2018-12-12

## 2018-12-12 DIAGNOSIS — G89.4 CHRONIC PAIN SYNDROME: Chronic | ICD-10-CM

## 2018-12-12 RX ORDER — OXYCODONE AND ACETAMINOPHEN 10; 325 MG/1; MG/1
1 TABLET ORAL EVERY 6 HOURS PRN
Qty: 90 TABLET | Refills: 0 | Status: SHIPPED | OUTPATIENT
Start: 2018-12-12 | End: 2019-01-08

## 2018-12-12 NOTE — TELEPHONE ENCOUNTER
Requested Prescriptions   Pending Prescriptions Disp Refills     oxyCODONE-acetaminophen (PERCOCET)  MG per tablet 90 tablet 0     Sig: Take 1 tablet by mouth every 6 hours as needed for moderate to severe pain    There is no refill protocol information for this order        Routing refill request to provider for review/approval because:  Drug not on the [facility] refill protocol     Dasia Rodrigez RN

## 2018-12-17 ENCOUNTER — TRANSFERRED RECORDS (OUTPATIENT)
Dept: HEALTH INFORMATION MANAGEMENT | Facility: CLINIC | Age: 48
End: 2018-12-17

## 2018-12-20 ENCOUNTER — OFFICE VISIT (OUTPATIENT)
Dept: PSYCHOLOGY | Facility: CLINIC | Age: 48
End: 2018-12-20
Payer: COMMERCIAL

## 2018-12-20 ENCOUNTER — OFFICE VISIT (OUTPATIENT)
Dept: FAMILY MEDICINE | Facility: CLINIC | Age: 48
End: 2018-12-20
Payer: COMMERCIAL

## 2018-12-20 VITALS
DIASTOLIC BLOOD PRESSURE: 78 MMHG | WEIGHT: 240 LBS | TEMPERATURE: 97.6 F | HEART RATE: 75 BPM | SYSTOLIC BLOOD PRESSURE: 115 MMHG | BODY MASS INDEX: 42.18 KG/M2 | OXYGEN SATURATION: 94 %

## 2018-12-20 DIAGNOSIS — G89.29 CHRONIC BILATERAL BACK PAIN, UNSPECIFIED BACK LOCATION: Chronic | ICD-10-CM

## 2018-12-20 DIAGNOSIS — M54.9 CHRONIC BILATERAL BACK PAIN, UNSPECIFIED BACK LOCATION: Chronic | ICD-10-CM

## 2018-12-20 DIAGNOSIS — F33.1 MAJOR DEPRESSIVE DISORDER, RECURRENT EPISODE, MODERATE (H): Chronic | ICD-10-CM

## 2018-12-20 DIAGNOSIS — F33.1 MAJOR DEPRESSIVE DISORDER, RECURRENT EPISODE, MODERATE (H): Primary | ICD-10-CM

## 2018-12-20 DIAGNOSIS — M25.551 HIP PAIN, RIGHT: Primary | ICD-10-CM

## 2018-12-20 DIAGNOSIS — F41.1 GAD (GENERALIZED ANXIETY DISORDER): ICD-10-CM

## 2018-12-20 PROCEDURE — 90834 PSYTX W PT 45 MINUTES: CPT | Performed by: SOCIAL WORKER

## 2018-12-20 PROCEDURE — 99214 OFFICE O/P EST MOD 30 MIN: CPT | Performed by: FAMILY MEDICINE

## 2018-12-20 RX ORDER — PREDNISONE 20 MG/1
TABLET ORAL
Refills: 0 | COMMUNITY
Start: 2018-12-17 | End: 2019-05-16

## 2018-12-20 ASSESSMENT — PATIENT HEALTH QUESTIONNAIRE - PHQ9
5. POOR APPETITE OR OVEREATING: NOT AT ALL
SUM OF ALL RESPONSES TO PHQ QUESTIONS 1-9: 6

## 2018-12-20 ASSESSMENT — ANXIETY QUESTIONNAIRES
7. FEELING AFRAID AS IF SOMETHING AWFUL MIGHT HAPPEN: NOT AT ALL
GAD7 TOTAL SCORE: 2
3. WORRYING TOO MUCH ABOUT DIFFERENT THINGS: SEVERAL DAYS
2. NOT BEING ABLE TO STOP OR CONTROL WORRYING: NOT AT ALL
IF YOU CHECKED OFF ANY PROBLEMS ON THIS QUESTIONNAIRE, HOW DIFFICULT HAVE THESE PROBLEMS MADE IT FOR YOU TO DO YOUR WORK, TAKE CARE OF THINGS AT HOME, OR GET ALONG WITH OTHER PEOPLE: NOT DIFFICULT AT ALL
1. FEELING NERVOUS, ANXIOUS, OR ON EDGE: NOT AT ALL
6. BECOMING EASILY ANNOYED OR IRRITABLE: SEVERAL DAYS
5. BEING SO RESTLESS THAT IT IS HARD TO SIT STILL: NOT AT ALL
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
## 2018-12-20 NOTE — PROGRESS NOTES
SUBJECTIVE:   Velma Diaz is a 48 year old female who presents to clinic today for the following health issues:      ED/UC Followup:    Facility:  [facility]  Date of visit: 12/17/18  Reason for visit: Fall  Current Status: Legs feel very heavy, Still has pain and discomfort. Using a walker.       She has a history of chronic back and leg pain.  She often uses a cane to get around.  She is been starting to do some pool therapy again for that.  This last weekend she was on her feet a lot baking cookies.  Her legs became extra sore.  On Monday after the weekend, 3 days ago, which she was walking when her right leg buckled and she fell to the ground and landed on her right hip.  She had right hip pain, so she went to the ER.  X-rays were negative for fracture.  She was prescribed prednisone 20 mg 3 times a day for 5 days.  She will be finishing that up in the next day or 2.  She still has some right hip discomfort, but it somewhat better.  She is using a walker now to get around.  She feels more stable with that.  She anticipates gradually progressing to a cane and then 2 without any walking assistive devices if able.    She did meet with him, our clinics therapist, this morning for her depression.  She meets with him every month or so and finds that helpful.    Problem list and histories reviewed & adjusted, as indicated.  Additional history: as documented    Patient Active Problem List   Diagnosis     History of cleft palate with cleft lip     MAYA (obstructive sleep apnea)/Hypoventilation Syndrome     Major depressive disorder, recurrent episode, moderate (H)     Paresthesias     Health Care Home     Vitamin D deficiency     Morbid obesity with BMI of 40.0-44.9, adult (H)     Hyperlipidemia with target LDL less than 130     Intervertebral disc prolapse with impingement     Nonallopathic lesion of lumbar region     Chronic pain syndrome     Restless legs syndrome (RLS)     Insomnia     Migraine     Chronic bilateral  back pain, unspecified back location     Chronic pain of left knee     ROSE MARIE (generalized anxiety disorder)     Idiopathic peripheral neuropathy     Pain of left upper extremity     Past Surgical History:   Procedure Laterality Date     ANKLE SURGERY  7/13    Left for torn tendons & loose bone chips     ARTHROSCOPY KNEE  1986    Right knee     BACK SURGERY       Basal cell carcinoma  2011    Removal from the chest     BIOPSY       C VAGINAL HYSTERECTOMY  2011     CARPAL TUNNEL RELEASE RT/LT  ~2010    Bilateral     COLONOSCOPY  2016     COSMETIC SURGERY       cysto with right ureteroscopy, stone manipulation, double J stent removal  10/04/2018    Dr. Cisneros -- removal of right kidney stone     cysto, right retrograde pyelogram, right ureteral stent Right 09/2018    for obstructing right kidney stone     DECOMPRESSION CUBITAL TUNNEL Left 8/28/2015    Procedure: DECOMPRESSION CUBITAL TUNNEL;  Surgeon: Gus Donato MD;  Location: [facility]     ENT SURGERY  1975    Tonsillectomy and Adenoids     GYN SURGERY  2011    Hysterectomy     HC REPAIR PERONEAL TENDONS  7/13     ORTHOPEDIC SURGERY  2011, 2011    Bilateral CTR     PE TUBES      yearly times 10 years     REPAIR CLEFT PALATE CHILD      Age 3 months & afterwards (multiple surgeries)     SOFT TISSUE SURGERY  2013       Social History     Tobacco Use     Smoking status: Former Smoker     Packs/day: 0.50     Years: 15.00     Pack years: 7.50     Types: Cigarettes     Last attempt to quit: 2/20/2015     Years since quitting: 3.8     Smokeless tobacco: Never Used   Substance Use Topics     Alcohol use: No     Alcohol/week: 0.0 oz     Comment: Quit in September 27th     Family History   Problem Relation Age of Onset     Alzheimer Disease Paternal Grandmother 60     Cerebrovascular Disease Paternal Grandmother      Neurologic Disorder Sister 20        migraines     Depression/Anxiety Sister      Depression Sister      Neurologic Disorder Son 14        migraines      Asthma Son      Heart Disease Mother      Osteoporosis Mother      Obesity Mother      Cancer Father      Alcohol/Drug Father      Other Cancer Father         saliva glands & skin CA      Hypertension Father      Diabetes Maternal Grandmother      Breast Cancer Maternal Grandmother      Obesity Maternal Grandmother      Other Cancer Maternal Grandfather         skin cancer     Cancer - colorectal Other         Aunt     Asthma Daughter      Asthma Daughter      Asthma Son      Thyroid Disease Sister      Colon Cancer Other      Obesity Sister      Glaucoma No family hx of      Macular Degeneration No family hx of          Current Outpatient Medications   Medication Sig Dispense Refill     acetaminophen (TYLENOL) 325 MG tablet Take 2 tablets (650 mg) by mouth every 4 hours as needed for other (mild pain) 100 tablet 0     ferrous sulfate (IRON) 325 (65 FE) MG tablet Take 1 tablet (325 mg) by mouth daily (with breakfast) 90 tablet 4     furosemide (LASIX) 20 MG tablet Take 1 tablet (20 mg) by mouth daily as needed 30 tablet 11     LYRICA 225 MG CAPS TAKE 1 CAPSULE BY MOUTH TWICE A DAY 60 capsule 5     methocarbamol (ROBAXIN) 750 MG tablet TAKE 1 TABLET (750 MG) BY MOUTH 3 TIMES DAILY AS NEEDED FOR MUSCLE SPASMS 60 tablet 2     Multiple Vitamins-Minerals (MULTI FOR HER PO) Take by mouth daily        nystatin (MYCOSTATIN) 635458 UNIT/GM POWD Apply to affected area twice daily as needed 60 g 2     order for DME Equipment being ordered: right elbow splint. 1 each 0     order for DME Equipment being ordered: right elbow pad and right elbow splint. 1 each 0     order for DME TENS unit 1 Device 0     ORDER FOR DME Respironics REMSTAR 60 Series Auto BIHO8mb H2O, Airfit P10 nasal pillow mask w/xsmall pillows       oxyCODONE-acetaminophen (PERCOCET)  MG per tablet Take 1 tablet by mouth every 6 hours as needed for moderate to severe pain 90 tablet 0     predniSONE (DELTASONE) 20 MG tablet TAKE 3 TABLETS BY MOUTH EVERY DAY  WITH MEAL FOR 5 DAYS  0     rOPINIRole (REQUIP) 0.25 MG tablet Take 1 tablet (0.25 mg) by mouth 2 times daily 60 tablet 5     sodium chloride (OCEAN) 0.65 % nasal spray Inhale 2 sprays in each nostril twice daily until your follow up appointment.       SUMAtriptan (IMITREX) 100 MG tablet Take 1 tablet (100 mg) by mouth at onset of headache for migraine May repeat in 2 hours if needed: max 2/day 9 tablet 2     No Known Allergies    Reviewed and updated as needed this visit by clinical staff  Tobacco  Allergies  Meds  Med Hx  Surg Hx  Fam Hx  Soc Hx      Reviewed and updated as needed this visit by Provider         ROS:  Constitutional, HEENT, cardiovascular, pulmonary, gi and gu systems are negative, except as otherwise noted.    OBJECTIVE:     /78 (BP Location: Right arm, Patient Position: Chair, Cuff Size: Adult Large)   Pulse 75   Temp 97.6  F (36.4  C) (Oral)   Wt 108.9 kg (240 lb)   LMP  (LMP Unknown)   SpO2 94%   BMI 42.18 kg/m    Body mass index is 42.18 kg/m .  GENERAL: alert, no distress and obese  MS: She has mild discomfort to palpation over the lateral aspect of her right hip.  No ecchymosis.    Diagnostic Test Results:  Her ER note and x-ray results from a few days ago were reviewed    ASSESSMENT/PLAN:       ICD-10-CM    1. Hip pain, right M25.551    2. Intervertebral disc prolapse with impingement M51.9    3. Chronic bilateral back pain, unspecified back location M54.9     G89.29    4. Major depressive disorder, recurrent episode, moderate (H) F33.1      She has an acute right hip contusion superimposed on chronic back and leg pain  She will finish out the prednisone as planned  Continue with use of the walker and then try to graduate to a cane as planned  Gradually resume pool therapy as able  Continue with general conditioning as able  Continue same baseline meds, including various products for pain such as acetaminophen, Lyrica, methocarbamol, oxycodone, etc.  Continue routine  meetings with Antonio for depression    If new, worsening or persistent symptoms, the patient is to call or return for a recheck.      Srinivasa Hunter MD  [facility]

## 2018-12-21 ASSESSMENT — ANXIETY QUESTIONNAIRES: GAD7 TOTAL SCORE: 2

## 2018-12-31 DIAGNOSIS — M54.5 CHRONIC LOW BACK PAIN, UNSPECIFIED BACK PAIN LATERALITY, WITH SCIATICA PRESENCE UNSPECIFIED: ICD-10-CM

## 2018-12-31 DIAGNOSIS — G89.29 CHRONIC LOW BACK PAIN, UNSPECIFIED BACK PAIN LATERALITY, WITH SCIATICA PRESENCE UNSPECIFIED: ICD-10-CM

## 2018-12-31 RX ORDER — METHOCARBAMOL 750 MG/1
TABLET, FILM COATED ORAL
Qty: 60 TABLET | Refills: 2 | Status: SHIPPED | OUTPATIENT
Start: 2018-12-31 | End: 2019-03-31

## 2018-12-31 NOTE — TELEPHONE ENCOUNTER
Requested Prescriptions   Pending Prescriptions Disp Refills     methocarbamol (ROBAXIN) 750 MG tablet [Pharmacy Med Name: METHOCARBAMOL 750 MG TABLET] 60 tablet 2     Sig: TAKE 1 TABLET (750 MG) BY MOUTH 3 TIMES DAILY AS NEEDED FOR MUSCLE SPASMS    There is no refill protocol information for this order   Last Written Prescription Date:  10-30-18  Last Fill Quantity: 60,  # refills: 2   Last office visit: 12/20/2018 with prescribing provider:  12-20-18   Future Office Visit:   Next 5 appointments (look out 90 days)    Brian 15, 2019  9:30 AM CST  [facility] [address] [place] [id number]  [phone]   Feb 11, 2019 10:00 AM CST  Return Visit with [facility] ([facility]) [address] [facility] [address]  [phone]

## 2019-01-08 ENCOUNTER — MYC REFILL (OUTPATIENT)
Dept: FAMILY MEDICINE | Facility: CLINIC | Age: 49
End: 2019-01-08

## 2019-01-08 DIAGNOSIS — G89.4 CHRONIC PAIN SYNDROME: Chronic | ICD-10-CM

## 2019-01-09 ENCOUNTER — MYC REFILL (OUTPATIENT)
Dept: FAMILY MEDICINE | Facility: CLINIC | Age: 49
End: 2019-01-09

## 2019-01-09 DIAGNOSIS — G89.4 CHRONIC PAIN SYNDROME: Chronic | ICD-10-CM

## 2019-01-09 NOTE — TELEPHONE ENCOUNTER
Requested Prescriptions   Pending Prescriptions Disp Refills     oxyCODONE-acetaminophen (PERCOCET)  MG per tablet 90 tablet 0     Sig: Take 1 tablet by mouth every 6 hours as needed for moderate to severe pain    There is no refill protocol information for this order        Last Written Prescription Date:  12/12/2018  Last Fill Quantity: 90,  # refills: 1   Last office visit: 12/20/2018 with prescribing provider:  trina   Future Office Visit:   Next 5 appointments (look out 90 days)    Brian 15, 2019  9:30 AM CST  Return Visit with Antonio [facility]ios [facility] ([facility]) [address] [place] [id number]  [phone]   Feb 11, 2019 10:00 AM CST  Return Visit with Antonio Rios [facility] ([facility]) [address] [place] [id number]  [phone]         Routing refill request to provider for review/approval because:  Drug not on the G refill protocol       Ly Sandoval RN  [facility]

## 2019-01-10 RX ORDER — OXYCODONE AND ACETAMINOPHEN 10; 325 MG/1; MG/1
1 TABLET ORAL EVERY 6 HOURS PRN
Qty: 90 TABLET | Refills: 0 | OUTPATIENT
Start: 2019-01-10

## 2019-01-10 RX ORDER — OXYCODONE AND ACETAMINOPHEN 10; 325 MG/1; MG/1
1 TABLET ORAL EVERY 6 HOURS PRN
Qty: 90 TABLET | Refills: 0 | Status: SHIPPED | OUTPATIENT
Start: 2019-01-10 | End: 2019-02-12

## 2019-01-11 PROBLEM — M79.602 PAIN OF LEFT UPPER EXTREMITY: Status: RESOLVED | Noted: 2018-09-12 | Resolved: 2019-01-11

## 2019-01-11 NOTE — PROGRESS NOTES
"Subjective:  HPI                    Objective:  System    Physical Exam    General     ROS    Assessment/Plan:    DISCHARGE REPORT  Patient did not return to PT following todays session. See below for the most recent subjective and objective findings    SUBJECTIVE  Subjective changes noted by patient:   Subjective: Pt reports she's just getting over a \"flu bug\" that she's had for the past week. States she's still fatigued. L arm and neck have been feeling pretty good but she hasn't done much besides lay in bed for 5 days.     Current pain level is  Current Pain level: 3/10.     Previous pain level was   Initial Pain level: 5/10.   OBJECTIVE  Changes noted in objective findings:  Patient has failed to return to therapy so current objective findings are unknown.  Objective: traction only today per pt request     ASSESSMENT/PLAN  Updated problem list and treatment plan: Diagnosis 1:  Neck pain    STG/LTGs have been met or progress has been made towards goals:    Assessment of Progress: The patient has not returned to therapy. Current status is unknown.  Self Management Plans:  Patient has been instructed in a home treatment program.  Patient  has been instructed in self management of symptoms.    Recommendations:  This patient is to be discharged from therapy and continue their home treatment program.    Please refer to the daily flowsheet for treatment today, total treatment time and time spent performing 1:1 timed codes.            "

## 2019-01-15 ENCOUNTER — OFFICE VISIT (OUTPATIENT)
Dept: PSYCHOLOGY | Facility: CLINIC | Age: 49
End: 2019-01-15
Payer: COMMERCIAL

## 2019-01-15 DIAGNOSIS — F33.1 MAJOR DEPRESSIVE DISORDER, RECURRENT EPISODE, MODERATE (H): Primary | ICD-10-CM

## 2019-01-15 DIAGNOSIS — F41.1 GAD (GENERALIZED ANXIETY DISORDER): ICD-10-CM

## 2019-01-15 PROCEDURE — 90834 PSYTX W PT 45 MINUTES: CPT | Performed by: SOCIAL WORKER
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
## 2019-02-01 DIAGNOSIS — G25.81 RESTLESS LEGS SYNDROME (RLS): ICD-10-CM

## 2019-02-04 RX ORDER — FERROUS SULFATE 325(65) MG
325 TABLET ORAL
Qty: 90 TABLET | Refills: 1 | Status: SHIPPED | OUTPATIENT
Start: 2019-02-04 | End: 2019-08-03

## 2019-02-04 NOTE — TELEPHONE ENCOUNTER
"Requested Prescriptions   Pending Prescriptions Disp Refills     ferrous sulfate (FEROSUL) 325 (65 Fe) MG tablet [Pharmacy Med Name: FERROUS SULFATE 325 MG TABLET] 90 tablet 1    Last Written Prescription Date:  12-11-17  Last Fill Quantity: 90,  # refills: 4   Last office visit: 12/20/2018 with prescribing provider:  12-20-18   Future Office Visit:   Next 5 appointments (look out 90 days)    Feb 11, 2019 10:00 AM CST  Return Visit with Antonio Rios [address] [address]  [phone]   Mar 11, 2019 10:00 AM CDT  Return Visit with Antonio Rios [address] [address]  [phone]       \   Sig: TAKE 1 TABLET (325 MG) BY MOUTH DAILY (WITH BREAKFAST)    Iron Supplements Passed - 2/1/2019  6:22 PM       Passed - Patient is 12 years of age or older       Passed - Recent (12 mo) or future (30 days) visit within the authorizing provider's specialty    Patient had office visit in the last 12 months or has a visit in the next 30 days with authorizing provider or within the authorizing provider's specialty.  See \"Patient Info\" tab in inbasket, or \"Choose Columns\" in Meds & Orders section of the refill encounter.             Passed - Hgb OR Hct on record within the past 12 mos.    Patient need only have had a HGB or HCT on file in the past 12 mos. That result does not need to be normal.    Recent Labs   Lab Test 10/30/18  1040 03/01/18  1419 10/18/17  1048   HGB 14.1 13.3 13.7     Recent Labs   Lab Test 10/30/18  1040 03/01/18  1419 10/18/17  1048   HCT 41.9 39.5 40.5       Please verify a HGB or HCT has been checked SINCE THE LAST DOSE CHANGE.           Passed - Medication is active on med list          "

## 2019-02-04 NOTE — TELEPHONE ENCOUNTER
Prescription approved per [facility] Refill Protocol.  Dasia Rodrigez RN

## 2019-02-11 DIAGNOSIS — G89.4 CHRONIC PAIN SYNDROME: Chronic | ICD-10-CM

## 2019-02-11 NOTE — TELEPHONE ENCOUNTER
Reason for Call:  Medication or medication refill:    Do you use a [facility]?  Name of the pharmacy and phone number for the current request:  [facility]    Name of the medication requested: oxyCODONE-acetaminophen (PERCOCET)  MG per tablet    Other request: Patient will  hard copy at .    Can we leave a detailed message on this number? YES    Phone number patient can be reached at: Home number on file [phone] (home)    Best Time: any    Call taken on 2/11/2019 at 9:58 AM by Nydia Tapia

## 2019-02-11 NOTE — TELEPHONE ENCOUNTER
Requested Prescriptions   Pending Prescriptions Disp Refills     oxyCODONE-acetaminophen (PERCOCET)  MG per tablet 90 tablet 0     Sig: Take 1 tablet by mouth every 6 hours as needed for moderate to severe pain    There is no refill protocol information for this order         Last Written Prescription Date:  1/10/18  Last Fill Quantity: 90,   # refills: 0  Last Office Visit: 12/20/18  Future Office visit:    Next 5 appointments (look out 90 days)    Feb 15, 2019  8:00 AM CST  [facility] [address] [place] [id number]  [phone]   Mar 11, 2019 10:00 AM CDT  Return Visit with Antonio Rios [address] [address]  [phone]           Routing refill request to provider for review/approval because:  Drug not on the G, P or [facility] refill protocol or controlled substance

## 2019-02-12 ENCOUNTER — TELEPHONE (OUTPATIENT)
Dept: FAMILY MEDICINE | Facility: CLINIC | Age: 49
End: 2019-02-12

## 2019-02-12 RX ORDER — OXYCODONE AND ACETAMINOPHEN 10; 325 MG/1; MG/1
1 TABLET ORAL EVERY 6 HOURS PRN
Qty: 90 TABLET | Refills: 0 | Status: SHIPPED | OUTPATIENT
Start: 2019-02-12 | End: 2019-03-11

## 2019-02-12 NOTE — TELEPHONE ENCOUNTER
Prescription for Percocet was brought to the  for .  Dedra [facility]  Team 3 Coordinator

## 2019-02-12 NOTE — TELEPHONE ENCOUNTER
Reason for Call:  Other prescription    Detailed comments: Mireille wanted to let Dr. Hunter know that she has to fill her scripts (Oxy and Robaxin) at the [facility] at [facility] this time, instead of her regular pharmacy.  Her regular pharmacy doesn't have the drugs in right now as the drugs are on back-order.    Phone Number Patient can be reached at: Cell number on file:    Telephone Information:   Mobile [phone]       Best Time: anytime    Can we leave a detailed message on this number? YES    Call taken on 2/12/2019 at 11:09 AM by Whit Thomas

## 2019-02-12 NOTE — TELEPHONE ENCOUNTER
Patient picked up her script at the  on 2/12/19.    Whit KING  Patient Representative  [place]

## 2019-02-14 ENCOUNTER — TRANSFERRED RECORDS (OUTPATIENT)
Dept: HEALTH INFORMATION MANAGEMENT | Facility: CLINIC | Age: 49
End: 2019-02-14

## 2019-02-15 ENCOUNTER — OFFICE VISIT (OUTPATIENT)
Dept: PSYCHOLOGY | Facility: CLINIC | Age: 49
End: 2019-02-15
Payer: COMMERCIAL

## 2019-02-15 ENCOUNTER — OFFICE VISIT (OUTPATIENT)
Dept: FAMILY MEDICINE | Facility: CLINIC | Age: 49
End: 2019-02-15
Payer: COMMERCIAL

## 2019-02-15 VITALS
TEMPERATURE: 98.2 F | BODY MASS INDEX: 41.65 KG/M2 | HEART RATE: 79 BPM | OXYGEN SATURATION: 97 % | SYSTOLIC BLOOD PRESSURE: 119 MMHG | DIASTOLIC BLOOD PRESSURE: 78 MMHG | WEIGHT: 237 LBS

## 2019-02-15 DIAGNOSIS — M79.661 PAIN IN BOTH LOWER LEGS: Primary | ICD-10-CM

## 2019-02-15 DIAGNOSIS — F33.1 MAJOR DEPRESSIVE DISORDER, RECURRENT EPISODE, MODERATE (H): ICD-10-CM

## 2019-02-15 DIAGNOSIS — E66.01 MORBID OBESITY WITH BMI OF 40.0-44.9, ADULT (H): ICD-10-CM

## 2019-02-15 DIAGNOSIS — G89.4 CHRONIC PAIN SYNDROME: Chronic | ICD-10-CM

## 2019-02-15 DIAGNOSIS — M79.662 PAIN IN BOTH LOWER LEGS: Primary | ICD-10-CM

## 2019-02-15 DIAGNOSIS — F33.1 MAJOR DEPRESSIVE DISORDER, RECURRENT EPISODE, MODERATE (H): Primary | ICD-10-CM

## 2019-02-15 DIAGNOSIS — F41.1 GAD (GENERALIZED ANXIETY DISORDER): ICD-10-CM

## 2019-02-15 PROCEDURE — 80307 DRUG TEST PRSMV CHEM ANLYZR: CPT | Mod: 90 | Performed by: FAMILY MEDICINE

## 2019-02-15 PROCEDURE — 99000 SPECIMEN HANDLING OFFICE-LAB: CPT | Performed by: FAMILY MEDICINE

## 2019-02-15 PROCEDURE — 90834 PSYTX W PT 45 MINUTES: CPT | Performed by: SOCIAL WORKER

## 2019-02-15 PROCEDURE — 99214 OFFICE O/P EST MOD 30 MIN: CPT | Performed by: FAMILY MEDICINE

## 2019-02-15 ASSESSMENT — PAIN SCALES - GENERAL: PAINLEVEL: SEVERE PAIN (7)

## 2019-02-15 ASSESSMENT — ANXIETY QUESTIONNAIRES
7. FEELING AFRAID AS IF SOMETHING AWFUL MIGHT HAPPEN: NOT AT ALL
GAD7 TOTAL SCORE: 4
1. FEELING NERVOUS, ANXIOUS, OR ON EDGE: SEVERAL DAYS
6. BECOMING EASILY ANNOYED OR IRRITABLE: SEVERAL DAYS
5. BEING SO RESTLESS THAT IT IS HARD TO SIT STILL: NOT AT ALL
IF YOU CHECKED OFF ANY PROBLEMS ON THIS QUESTIONNAIRE, HOW DIFFICULT HAVE THESE PROBLEMS MADE IT FOR YOU TO DO YOUR WORK, TAKE CARE OF THINGS AT HOME, OR GET ALONG WITH OTHER PEOPLE: NOT DIFFICULT AT ALL
3. WORRYING TOO MUCH ABOUT DIFFERENT THINGS: NOT AT ALL
2. NOT BEING ABLE TO STOP OR CONTROL WORRYING: SEVERAL DAYS

## 2019-02-15 ASSESSMENT — PATIENT HEALTH QUESTIONNAIRE - PHQ9
SUM OF ALL RESPONSES TO PHQ QUESTIONS 1-9: 9
5. POOR APPETITE OR OVEREATING: SEVERAL DAYS

## 2019-02-15 NOTE — PROGRESS NOTES
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 above plan.      Antonio Rios, [facility]  October 9, 2017

## 2019-02-15 NOTE — PROGRESS NOTES
SUBJECTIVE:   Velma Diaz is a 48 year old female who presents to clinic today for the following health issues:      ED/UC Followup:    Facility:  [facility]  Date of visit: 2/14/19  Reason for visit: Left leg pain  Current Status: Still having pain       She was seen yesterday in the hospital ER with left leg greater than right leg pain.  She had a prolonged trip home from [place] this past Sunday which took 11 hours instead of the usual 6 or 7 because of a flat tire, bad road conditions, etc.  She does have a history of chronic pain including leg pain, but her left foot was especially painful yesterday.  In the ER she had a left leg ultrasound which was negative for DVT.  She had an EKG and lab work which was all normal, including d-dimer.  She is on oxycodone at baseline as well as methocarbamol.  She also takes Lyrica and Requip for chronic pain and restless leg symptoms.  She has a history of depression, but has been doing okay in that area.    Problem list and histories reviewed & adjusted, as indicated.  Additional history: as documented    Patient Active Problem List   Diagnosis     History of cleft palate with cleft lip     MAYA (obstructive sleep apnea)/Hypoventilation Syndrome     Major depressive disorder, recurrent episode, moderate (H)     Paresthesias     Health Care Home     Vitamin D deficiency     Morbid obesity with BMI of 40.0-44.9, adult (H)     Hyperlipidemia with target LDL less than 130     Intervertebral disc prolapse with impingement     Nonallopathic lesion of lumbar region     Chronic pain syndrome     Restless legs syndrome (RLS)     Insomnia     Migraine     Chronic bilateral back pain, unspecified back location     Chronic pain of left knee     ROSE MARIE (generalized anxiety disorder)     Idiopathic peripheral neuropathy     Past Surgical History:   Procedure Laterality Date     ANKLE SURGERY  7/13    Left for torn tendons & loose bone chips     ARTHROSCOPY KNEE  1986    Right knee     BACK  SURGERY       Basal cell carcinoma  2011    Removal from the chest     BIOPSY       C VAGINAL HYSTERECTOMY  2011     CARPAL TUNNEL RELEASE RT/LT  ~2010    Bilateral     COLONOSCOPY  2016     COSMETIC SURGERY       cysto with right ureteroscopy, stone manipulation, double J stent removal  10/04/2018    Dr. Cisneros -- removal of right kidney stone     cysto, right retrograde pyelogram, right ureteral stent Right 09/2018    for obstructing right kidney stone     DECOMPRESSION CUBITAL TUNNEL Left 8/28/2015    Procedure: DECOMPRESSION CUBITAL TUNNEL;  Surgeon: Gus Donato MD;  [facility]     ENT SURGERY  1975    Tonsillectomy and Adenoids     GYN SURGERY  2011    Hysterectomy     HC REPAIR PERONEAL TENDONS  7/13     ORTHOPEDIC SURGERY  2011, 2011    Bilateral CTR     PE TUBES      yearly times 10 years     REPAIR CLEFT PALATE CHILD      Age 3 months & afterwards (multiple surgeries)     SOFT TISSUE SURGERY  2013       Social History     Tobacco Use     Smoking status: Former Smoker     Packs/day: 0.50     Years: 15.00     Pack years: 7.50     Types: Cigarettes     Last attempt to quit: 2/20/2015     Years since quitting: 3.9     Smokeless tobacco: Never Used   Substance Use Topics     Alcohol use: No     Alcohol/week: 0.0 oz     Comment: Quit in September 27th     Family History   Problem Relation Age of Onset     Alzheimer Disease Paternal Grandmother 60     Cerebrovascular Disease Paternal Grandmother      Neurologic Disorder Sister 20        migraines     Depression/Anxiety Sister      Depression Sister      Neurologic Disorder Son 14        migraines     Asthma Son      Heart Disease Mother      Osteoporosis Mother      Obesity Mother      Cancer Father      Alcohol/Drug Father      Other Cancer Father         saliva glands & skin CA      Hypertension Father      Diabetes Maternal Grandmother      Breast Cancer Maternal Grandmother      Obesity Maternal Grandmother      Other Cancer Maternal  Grandfather         skin cancer     Cancer - colorectal Other         Aunt     Asthma Daughter      Asthma Daughter      Asthma Son      Thyroid Disease Sister      Colon Cancer Other      Obesity Sister      Glaucoma No family hx of      Macular Degeneration No family hx of          Current Outpatient Medications   Medication Sig Dispense Refill     acetaminophen (TYLENOL) 325 MG tablet Take 2 tablets (650 mg) by mouth every 4 hours as needed for other (mild pain) 100 tablet 0     ferrous sulfate (FEROSUL) 325 (65 Fe) MG tablet TAKE 1 TABLET (325 MG) BY MOUTH DAILY (WITH BREAKFAST) 90 tablet 1     furosemide (LASIX) 20 MG tablet Take 1 tablet (20 mg) by mouth daily as needed 30 tablet 11     LYRICA 225 MG CAPS TAKE 1 CAPSULE BY MOUTH TWICE A DAY 60 capsule 5     methocarbamol (ROBAXIN) 750 MG tablet TAKE 1 TABLET (750 MG) BY MOUTH 3 TIMES DAILY AS NEEDED FOR MUSCLE SPASMS 60 tablet 2     Multiple Vitamins-Minerals (MULTI FOR HER PO) Take by mouth daily        nystatin (MYCOSTATIN) 328406 UNIT/GM POWD Apply to affected area twice daily as needed 60 g 2     order for DME Equipment being ordered: right elbow splint. 1 each 0     order for DME Equipment being ordered: right elbow pad and right elbow splint. 1 each 0     order for DME TENS unit 1 Device 0     ORDER FOR DME Respironics REMSTAR 60 Series Auto [id number] H2O, Airfit P10 nasal pillow mask w/xsmall pillows       oxyCODONE-acetaminophen (PERCOCET)  MG per tablet Take 1 tablet by mouth every 6 hours as needed for moderate to severe pain 90 tablet 0     predniSONE (DELTASONE) 20 MG tablet TAKE 3 TABLETS BY MOUTH EVERY DAY WITH MEAL FOR 5 DAYS  0     rOPINIRole (REQUIP) 0.25 MG tablet Take 1 tablet (0.25 mg) by mouth 2 times daily 60 tablet 5     sodium chloride (OCEAN) 0.65 % nasal spray Inhale 2 sprays in each nostril twice daily until your follow up appointment.       SUMAtriptan (IMITREX) 100 MG tablet Take 1 tablet (100 mg) by mouth at onset of headache  for migraine May repeat in 2 hours if needed: max 2/day 9 tablet 2     No Known Allergies    Reviewed and updated as needed this visit by clinical staff  Tobacco  Allergies  Meds  Med Hx  Surg Hx  Fam Hx  Soc Hx      Reviewed and updated as needed this visit by Provider         ROS:  Noncontributory except as above.    OBJECTIVE:     /78 (BP Location: Right arm, Patient Position: Chair, Cuff Size: Adult Large)   Pulse 79   Temp 98.2  F (36.8  C) (Oral)   Wt 107.5 kg (237 lb)   LMP  (LMP Unknown)   SpO2 97%   BMI 41.65 kg/m    Body mass index is 41.65 kg/m .  GENERAL: alert, no distress and obese  MS: She has no pitting edema of the lower extremities.  NEURO: She has some mild discomfort to palpation over the left foot and lower leg, but no apparent erythema or sign of infection.  PSYCH: mentation appears normal, affect normal/bright    Diagnostic Test Results:  Her ER report from yesterday was obtained and reviewed, as well as the various test results.    ASSESSMENT/PLAN:         ICD-10-CM    1. Pain in both lower legs M79.661     M79.662    2. Chronic pain syndrome G89.4 Drug  Screen Comprehensive, Urine w/o Reported Meds (Pain Care Package)   3. Morbid obesity with BMI of 40.0-44.9, adult (H) E66.01     Z68.41    4. Major depressive disorder, recurrent episode, moderate (H) F33.1      She has had some increased leg pain in the last couple of days, likely related to the prolonged inactivity and immobilization from the long car ride on Sunday  I encouraged some stretching and strengthening activities for her  Discussed possible use of a hot tub or other form of heat to help with the circulation  Continue same baseline meds  She is generally up-to-date on routine care, except that she is due for a routine urine drug screen, so we will try to obtain that from her today    If new, worsening or persistent symptoms, the patient is to call or return for a recheck.      Srinivasa Hunter MD  [place]  [facility]

## 2019-02-16 ASSESSMENT — ANXIETY QUESTIONNAIRES: GAD7 TOTAL SCORE: 4

## 2019-02-18 LAB — COMPREHEN DRUG ANALYSIS UR: NORMAL

## 2019-02-25 ENCOUNTER — TRANSFERRED RECORDS (OUTPATIENT)
Dept: HEALTH INFORMATION MANAGEMENT | Facility: CLINIC | Age: 49
End: 2019-02-25

## 2019-02-28 ENCOUNTER — OFFICE VISIT (OUTPATIENT)
Dept: NEUROLOGY | Facility: CLINIC | Age: 49
End: 2019-02-28
Payer: COMMERCIAL

## 2019-02-28 VITALS
BODY MASS INDEX: 42.71 KG/M2 | RESPIRATION RATE: 14 BRPM | HEART RATE: 94 BPM | TEMPERATURE: 97.8 F | SYSTOLIC BLOOD PRESSURE: 116 MMHG | DIASTOLIC BLOOD PRESSURE: 86 MMHG | OXYGEN SATURATION: 94 % | WEIGHT: 243 LBS

## 2019-02-28 DIAGNOSIS — M79.604 PAIN IN BOTH LOWER EXTREMITIES: Primary | ICD-10-CM

## 2019-02-28 DIAGNOSIS — G56.21 ULNAR NEUROPATHY OF RIGHT UPPER EXTREMITY: ICD-10-CM

## 2019-02-28 DIAGNOSIS — M79.602 PAIN IN BOTH UPPER EXTREMITIES: ICD-10-CM

## 2019-02-28 DIAGNOSIS — G60.9 IDIOPATHIC PERIPHERAL NEUROPATHY: ICD-10-CM

## 2019-02-28 DIAGNOSIS — M79.601 PAIN IN BOTH UPPER EXTREMITIES: ICD-10-CM

## 2019-02-28 DIAGNOSIS — M79.605 PAIN IN BOTH LOWER EXTREMITIES: Primary | ICD-10-CM

## 2019-02-28 LAB
HBA1C MFR BLD: 5.1 % (ref 0–5.6)
VIT B12 SERPL-MCNC: 400 PG/ML (ref 193–986)

## 2019-02-28 PROCEDURE — 36415 COLL VENOUS BLD VENIPUNCTURE: CPT | Performed by: PSYCHIATRY & NEUROLOGY

## 2019-02-28 PROCEDURE — 84425 ASSAY OF VITAMIN B-1: CPT | Mod: 90 | Performed by: PSYCHIATRY & NEUROLOGY

## 2019-02-28 PROCEDURE — 83036 HEMOGLOBIN GLYCOSYLATED A1C: CPT | Performed by: PSYCHIATRY & NEUROLOGY

## 2019-02-28 PROCEDURE — 99000 SPECIMEN HANDLING OFFICE-LAB: CPT | Performed by: PSYCHIATRY & NEUROLOGY

## 2019-02-28 PROCEDURE — 83921 ORGANIC ACID SINGLE QUANT: CPT | Mod: 90 | Performed by: PSYCHIATRY & NEUROLOGY

## 2019-02-28 PROCEDURE — 99215 OFFICE O/P EST HI 40 MIN: CPT | Performed by: PSYCHIATRY & NEUROLOGY

## 2019-02-28 PROCEDURE — 82607 VITAMIN B-12: CPT | Performed by: PSYCHIATRY & NEUROLOGY

## 2019-02-28 RX ORDER — NORTRIPTYLINE HCL 10 MG
10 CAPSULE ORAL AT BEDTIME
Qty: 30 CAPSULE | Refills: 1 | Status: SHIPPED | OUTPATIENT
Start: 2019-02-28 | End: 2019-04-22

## 2019-02-28 RX ORDER — DIAZEPAM 5 MG
5 TABLET ORAL 3 TIMES DAILY
COMMUNITY
Start: 2019-02-25 | End: 2019-03-18

## 2019-02-28 NOTE — PROGRESS NOTES
ESTABLISHED PATIENT NEUROLOGY NOTE    DATE OF VISIT: 2/28/2019  CLINIC LOCATION: [facility]  MRN: [id number]  PATIENT NAME: Velma Diaz  YOB: 1970    PCP: Srinivasa Hunter MD    REASON FOR VISIT:   Chief Complaint   Patient presents with     Follow Up     ER visit for increase neuropathy in the legs and arms      SUBJECTIVE:                                                      HISTORY OF PRESENT ILLNESS: Patient presents for a follow up for presumed small fiber peripheral polyneuropathy and bilateral upper extremity paresthesias and pain.  She also has right ulnar neuropathy and restless leg syndrome.  Please refer to my initial/other prior notes for further information.    Since the last visit, the patient reports an increase in arm and leg pain.  She reports worsening of numbness, tingling, and throbbing/burning bilateral leg pain.  She feels that her left leg is more affected than the right, especially over the lateral surface below the knee.  Her both arms get numb over the ulnar surfaces after holding a phone for prolonged time.  She visited emergency room twice recently due to worsening of feet pain (02/14/2019 and 02/25/2019).  At the former visit, the pain was primarily localized in the left lower extremity with associated mild swelling in context of prolonged trip from [place].  Her d-dimer level was normal, and Doppler ultrasound of left lower extremity was negative for DVT.  CBC and BMP were normal.  During second visit she reported bilateral burning feet pain, especially at bedtime.  Small supply of Valium was prescribed.    The patient is on 225 mg of Lyrica twice daily.  In the past she tried gabapentin several years ago.  She also takes Robaxin, Percocet, and ropinirole for her chronic pain and restless leg syndrome.  She denies interval development of new focal neurological symptoms.    On review of systems, patient endorses no additional active complaints.  Medications, allergies, family and social history were also reviewed. There are no changes reported by patient.  REVIEW OF SYSTEMS:                                                    10-system review was completed. Pertinent positives are included in HPI. The remainder of ROS is negative.  EXAM:                                                    Physical Exam:   Vitals: /86 (BP Location: Left arm, Patient Position: Sitting, Cuff Size: Adult Large)   Pulse 94   Temp 97.8  F (36.6  C) (Oral)   Resp 14   Wt 110.2 kg (243 lb)   LMP  (LMP Unknown)   SpO2 94%   BMI 42.71 kg/m      General: pt is in NAD, cooperative.  Skin: normal turgor, moist mucous membranes, no lesions/rashes noticed.  HEENT: ATNC, white sclera, normal conjunctiva.  Respiratory: Symmetric lung excursion, no accessory respiratory muscle use.  Abdomen: Non distended.  Neurological: awake, cooperative, follows commands, no aphasia or dysarthria noted, cranial nerves II-XII: no ptosis, extraocular motility is full, face is symmetric, tongue is midline, equally moves all extremities, achilles reflexes are absent bilaterally, light touch and pinprick sensation is reduced in both feet and distal halves of lower extremities below the knees (right worse than left), on the left lateral surface below the knee the patient reports hyperesthesia to light touch and pinprick, no dysmetria bilaterally, casual gait is normal.  DATA:   I personally reviewed recent emergency room notes, as detailed in the history of present illness.  ASSESSMENT and PLAN:                                                    Assessment: 48-year-old female patient with chronic pain syndrome, right meralgia paresthetica, right ulnar neuropathy, and presumed small fiber peripheral polyneuropathy presents for follow-up.    She reports interval increase in her bilateral feet pain requiring 2 emergency room visits. She is on 225 mg of Lyrica twice daily.  Also takes Robaxin, Percocet,  and ropinirole for her chronic pain and restless leg syndrome.    We had a prolonged discussion regarding management options.    Her neurological exam changes, suggestive of peripheral polyneuropathy, seem to be more pronounced today compared to her prior visits.  Her previous EMG from 2017 was negative for large fiber neuropathy.  However, given exam changes, I think this study needs to be repeated.  In addition, given the perceived asymmetry with more pain over the lateral surface of left lower extremity below the knee, it is possible that the patient has mild peroneal neuropathy.  Ordered EMG will be helpful to evaluate for this possibility as well.    I also placed several screening labs for peripheral neuropathy, including methylmalonic acid level, vitamin B12, vitamin B1, and hemoglobin A1C.  The patient is sober for the last 3 years, prior to that she  extensively used alcohol for many years (might be possible etiology of her polyneuropathy).    We also reviewed treatment options including amitriptyline, nortriptyline, Cymbalta, Effexor, and alpha lipoic acid.  We will start with nortriptyline at the low dose because the patient's symptoms are more prominent in the evening and at night.  I do not think that further increase in Lyrica dose will be beneficial.    Diagnoses:    ICD-10-CM    1. Pain in both lower extremities M79.604 EMG    M79.605    2. Pain in both upper extremities M79.601     M79.602    3. Ulnar neuropathy of right upper extremity G56.21    4. Idiopathic peripheral neuropathy G60.9 nortriptyline (PAMELOR) 10 MG capsule     Vitamin B1 whole blood     Methylmalonic acid     Vitamin B12     Hemoglobin A1c     EMG     Plan: At today's visit we thoroughly discussed current symptoms, available treatment options, and the plan, which includes:    Orders Placed This Encounter   Procedures     Vitamin B1 whole blood     Methylmalonic acid     Vitamin B12     Hemoglobin A1C     EMG     For symptomatic  relief, will add Nortriptyline to her Lyrica.    Additional recommendations after the work-up.    Next follow-up appointment is in the next 4-6 weeks or earlier if needed.    Total Time: 41 minutes with > 50% spent counseling the patient on stated above assessment and recommendations.    Joaquín Burger MD  [facility]/ Neurology

## 2019-02-28 NOTE — LETTER
2/28/2019         RE: Velma Diaz  [address]  [place] [address]        Dear Colleague,    Thank you for referring your patient, Velma Diaz, to the [facility]. Please see a copy of my visit note below.    ESTABLISHED PATIENT NEUROLOGY NOTE    DATE OF VISIT: 2/28/2019  CLINIC LOCATION: [facility]  MRN: [id number]  PATIENT NAME: Velma Diaz  YOB: 1970    PCP: Srinivasa Hunter MD    REASON FOR VISIT:   Chief Complaint   Patient presents with     Follow Up     ER visit for increase neuropathy in the legs and arms      SUBJECTIVE:                                                      HISTORY OF PRESENT ILLNESS: Patient presents for a follow up for presumed small fiber peripheral polyneuropathy and bilateral upper extremity paresthesias and pain.  She also has right ulnar neuropathy and restless leg syndrome.  Please refer to my initial/other prior notes for further information.    Since the last visit, the patient reports an increase in arm and leg pain.  She reports worsening of numbness, tingling, and throbbing/burning bilateral leg pain.  She feels that her left leg is more affected than the right, especially over the lateral surface below the knee.  Her both arms get numb over the ulnar surfaces after holding a phone for prolonged time.  She visited emergency room twice recently due to worsening of feet pain (02/14/2019 and 02/25/2019).  At the former visit, the pain was primarily localized in the left lower extremity with associated mild swelling in context of prolonged trip from [place].  Her d-dimer level was normal, and Doppler ultrasound of left lower extremity was negative for DVT.  CBC and BMP were normal.  During second visit she reported bilateral burning feet pain, especially at bedtime.  Small supply of Valium was prescribed.    The patient is on 225 mg of Lyrica twice daily.  In the past she tried gabapentin several years ago.   She also takes Robaxin, Percocet, and ropinirole for her chronic pain and restless leg syndrome.  She denies interval development of new focal neurological symptoms.    On review of systems, patient endorses no additional active complaints. Medications, allergies, family and social history were also reviewed. There are no changes reported by patient.  REVIEW OF SYSTEMS:                                                    10-system review was completed. Pertinent positives are included in HPI. The remainder of ROS is negative.  EXAM:                                                    Physical Exam:   Vitals: /86 (BP Location: Left arm, Patient Position: Sitting, Cuff Size: Adult Large)   Pulse 94   Temp 97.8  F (36.6  C) (Oral)   Resp 14   Wt 110.2 kg (243 lb)   LMP  (LMP Unknown)   SpO2 94%   BMI 42.71 kg/m       General: pt is in NAD, cooperative.  Skin: normal turgor, moist mucous membranes, no lesions/rashes noticed.  HEENT: ATNC, white sclera, normal conjunctiva.  Respiratory: Symmetric lung excursion, no accessory respiratory muscle use.  Abdomen: Non distended.  Neurological: awake, cooperative, follows commands, no aphasia or dysarthria noted, cranial nerves II-XII: no ptosis, extraocular motility is full, face is symmetric, tongue is midline, equally moves all extremities, achilles reflexes are absent bilaterally, light touch and pinprick sensation is reduced in both feet and distal halves of lower extremities below the knees (right worse than left), on the left lateral surface below the knee the patient reports hyperesthesia to light touch and pinprick, no dysmetria bilaterally, casual gait is normal.  DATA:   I personally reviewed recent emergency room notes, as detailed in the history of present illness.  ASSESSMENT and PLAN:                                                    Assessment: 48-year-old female patient with chronic pain syndrome, right meralgia paresthetica, right ulnar neuropathy,  and presumed small fiber peripheral polyneuropathy presents for follow-up.    She reports interval increase in her bilateral feet pain requiring 2 emergency room visits. She is on 225 mg of Lyrica twice daily.  Also takes Robaxin, Percocet, and ropinirole for her chronic pain and restless leg syndrome.    We had a prolonged discussion regarding management options.    Her neurological exam changes, suggestive of peripheral polyneuropathy, seem to be more pronounced today compared to her prior visits.  Her previous EMG from 2017 was negative for large fiber neuropathy.  However, given exam changes, I think this study needs to be repeated.  In addition, given the perceived asymmetry with more pain over the lateral surface of left lower extremity below the knee, it is possible that the patient has mild peroneal neuropathy.  Ordered EMG will be helpful to evaluate for this possibility as well.    I also placed several screening labs for peripheral neuropathy, including methylmalonic acid level, vitamin B12, vitamin B1, and hemoglobin A1C.  The patient is sober for the last 3 years, prior to that she  extensively used alcohol for many years (might be possible etiology of her polyneuropathy).    We also reviewed treatment options including amitriptyline, nortriptyline, Cymbalta, Effexor, and alpha lipoic acid.  We will start with nortriptyline at the low dose because the patient's symptoms are more prominent in the evening and at night.  I do not think that further increase in Lyrica dose will be beneficial.    Diagnoses:    ICD-10-CM    1. Pain in both lower extremities M79.604 EMG    M79.605    2. Pain in both upper extremities M79.601     M79.602    3. Ulnar neuropathy of right upper extremity G56.21    4. Idiopathic peripheral neuropathy G60.9 nortriptyline (PAMELOR) 10 MG capsule     Vitamin B1 whole blood     Methylmalonic acid     Vitamin B12     Hemoglobin A1c     EMG     Plan: At today's visit we thoroughly  discussed current symptoms, available treatment options, and the plan, which includes:    Orders Placed This Encounter   Procedures     Vitamin B1 whole blood     Methylmalonic acid     Vitamin B12     Hemoglobin A1C     EMG     For symptomatic relief, will add Nortriptyline to her Lyrica.    Additional recommendations after the work-up.    Next follow-up appointment is in the next 4-6 weeks or earlier if needed.    Total Time: 41 minutes with > 50% spent counseling the patient on stated above assessment and recommendations.    Joaquín Burger MD  / Neurology         Again, thank you for allowing me to participate in the care of your patient.        Sincerely,        Joaquín Burger MD

## 2019-02-28 NOTE — PATIENT INSTRUCTIONS
AFTER VISIT SUMMARY (AVS):    At today's visit we thoroughly discussed current symptoms, available treatment options, and the plan, which includes:    Orders Placed This Encounter   Procedures     Vitamin B1 whole blood     Methylmalonic acid     Vitamin B12     Hemoglobin A1C     EMG     For symptomatic relief, will add Nortriptyline to your Lyrica.    Additional recommendations after the work-up.    Next follow-up appointment is in the next 4-6 weeks or earlier if needed.    Please do not hesitate to call me with any questions or concerns.    Thanks.

## 2019-03-02 LAB
METHYLMALONATE SERPL-SCNC: 0.21 UMOL/L (ref 0–0.4)
VIT B1 BLD-MCNC: 180 NMOL/L (ref 70–180)

## 2019-03-07 ENCOUNTER — THERAPY VISIT (OUTPATIENT)
Dept: CHIROPRACTIC MEDICINE | Facility: CLINIC | Age: 49
End: 2019-03-07
Payer: COMMERCIAL

## 2019-03-07 DIAGNOSIS — M62.838 SPASM OF MUSCLE: ICD-10-CM

## 2019-03-07 DIAGNOSIS — M99.03 SEGMENTAL DYSFUNCTION OF LUMBAR REGION: ICD-10-CM

## 2019-03-07 DIAGNOSIS — M99.02 THORACIC SEGMENT DYSFUNCTION: ICD-10-CM

## 2019-03-07 DIAGNOSIS — M54.50 LUMBAGO: ICD-10-CM

## 2019-03-07 DIAGNOSIS — M99.05 SEGMENTAL DYSFUNCTION OF PELVIC REGION: Primary | ICD-10-CM

## 2019-03-07 PROCEDURE — 97810 ACUP 1/> WO ESTIM 1ST 15 MIN: CPT | Mod: GY | Performed by: CHIROPRACTOR

## 2019-03-07 PROCEDURE — 99212 OFFICE O/P EST SF 10 MIN: CPT | Mod: 25 | Performed by: CHIROPRACTOR

## 2019-03-07 PROCEDURE — 98941 CHIROPRACT MANJ 3-4 REGIONS: CPT | Mod: AT | Performed by: CHIROPRACTOR

## 2019-03-07 NOTE — PROGRESS NOTES
Initial Chiropractic Clinic Visit    PCP: Srinivasa Hunterzan Diaz is a 48 year old female who is seen  as a self referral presenting with low back pain with radiculopathy . Patient reports that the onset was 16 years ago and is unsure how it started. She has been a patient in the past, her last visit was on 7/24/2018.  She is being seen for the same reason as her symptoms have gotten worse.  She is having low abck pain and bilateral foot numbness.  Mireille will be getting a emg for her legs next week.     Injury:     Location of Pain: bilateral lower lumbar at the following level(s) L4  and L5   Duration of Pain: 16 year(s)  Rating of Pain at worst: 9/10  Rating of Pain Currently: 7/10  Symptoms are better with: Rest  Symptoms are worse with: sitting  Additional Features:      Health History  as reported by the patient:    How does the patient rate their own health:   Fair    Current or past medical history:   Calf pain, swelling or warmth, Chemical dependency, Depression, Fibromyalgia, Foot drop, Mental Illness, Migraines/headaches, Numbness/tingling, Overweight, Pain at night/rest, Sleep disorder/apnea, Smoking and Weakness    Medical allergies  None    Past Traumas/Surgeries  Other:  Cleft repair, hysterectomy, knee, torn ligament repair ankle.    Family History  The family history includes Alcohol/Drug in her father; Alzheimer Disease (age of onset: 60) in her paternal grandmother; Asthma in her daughter, daughter, son, and son; Breast Cancer in her maternal grandmother; Cancer in her father; Cancer - colorectal in an other family member; Cerebrovascular Disease in her paternal grandmother; Colon Cancer in an other family member; Depression in her sister; Depression/Anxiety in her sister; Diabetes in her maternal grandmother; Heart Disease in her mother; Hypertension in her father; Neurologic Disorder (age of onset: 14) in her son; Neurologic Disorder (age of onset: 20) in her sister; Obesity in her  maternal grandmother, mother, and sister; Osteoporosis in her mother; Other Cancer in her father and maternal grandfather; Thyroid Disease in her sister.    Medications:  Muscle relaxants, Pain and Sleep    Occupation:  none    Primary job tasks:       Barriers as home/work:   none    Additional health Issues:                 Velma was asked to complete the Oswestry Low Back Disability Index and Shandra Start Back screening tool, today in the office.  Disability score: 56%. Keel Start Total Score:5 Sub Score: 1     Review of Systems  Musculoskeletal: as above  Remainder of review of systems is negative including constitutional, CV, pulmonary, GI, Skin and Neurologic except as noted in HPI or medical history.    Past Medical History:   Diagnosis Date     Chronic pain syndrome 11/20/2015    She takes up to 90 oxycodone tablets per month for her chronic pain      Depressive disorder 2000     History of cleft palate with cleft lip     cleft lip and palate, and has had 27 operations per the patient     Hyperlipidemia with target LDL less than 130 10/26/2015     Morbid obesity with BMI of 40.0-44.9, adult (H) 10/26/2015     Neuropathy      MAYA (obstructive sleep apnea)/Hypoventilation Syndrome 2/24/2014    Study Date: 2/13/2014- (245.1 lbs) Sleep Associated Hypoventilation  suggested with baseline PCO2 42 mmHg, maximum PCO2 55 mmHg. Lowest oxygen saturation 85.0% Apnea/Hypopnea Index 5.5 events per hour.  REM AHI 37.2.  The supine AHI is 13.8. RDI 6.7 Sleep study 3/2014 (245#)- CPAP 6 cm effective, Transcutaneous CO2 Monitoring (TCM) within normal limits.          Skin cancer of anterior chest 2011    Basal cell on chest     TMJ (temporomandibular joint disorder)      Past Surgical History:   Procedure Laterality Date     ANKLE SURGERY  7/13    Left for torn tendons & loose bone chips     ARTHROSCOPY KNEE  1986    Right knee     BACK SURGERY       Basal cell carcinoma  2011    Removal from the chest     BIOPSY       C  "VAGINAL HYSTERECTOMY  2011     CARPAL TUNNEL RELEASE RT/LT  ~2010    Bilateral     COLONOSCOPY  2016     COSMETIC SURGERY       cysto with right ureteroscopy, stone manipulation, double J stent removal  10/04/2018    Dr. Cisneros -- removal of right kidney stone     cysto, right retrograde pyelogram, right ureteral stent Right 09/2018    for obstructing right kidney stone     DECOMPRESSION CUBITAL TUNNEL Left 8/28/2015    Procedure: DECOMPRESSION CUBITAL TUNNEL;  Surgeon: Gus Donato MD;  Location: [facility]     ENT SURGERY  1975    Tonsillectomy and Adenoids     GYN SURGERY  2011    Hysterectomy     HC REPAIR PERONEAL TENDONS  7/13     ORTHOPEDIC SURGERY  2011, 2011    Bilateral CTR     PE TUBES      yearly times 10 years     REPAIR CLEFT PALATE CHILD      Age 3 months & afterwards (multiple surgeries)     SOFT TISSUE SURGERY  2013     Objective  LMP  (LMP Unknown)       GENERAL APPEARANCE: healthy, alert and no distress   GAIT: NORMAL  SKIN: no suspicious lesions or rashes  NEURO: Normal strength and tone, mentation intact and speech normal  PSYCH:  mentation appears normal and affect normal/bright    Low back exam:    Inspection:  \"     no visible deformity in the low back       normal skin\",    ROM:       limited flexion due to pain       limited extension due to pain    Tender:       paraspinal muscles    Non Tender:       remainder of lumbar spine    Segmental spinal dysfunction/restrictions found at:  T10, T11 , T12 , L4 , L5  and PSIS Right     The following soft tissue hypotonicities were observed:Piriformis: right, referred pain: no    Trigger points were found in:Piriformis    Muscle spasm found in:Piriformis      Radiology:      Assessment:    1. Segmental dysfunction of pelvic region    2. Lumbago    3. Segmental dysfunction of lumbar region    4. Spasm of muscle    5. Thoracic segment dysfunction        RX ordered/plan of care  Anticipated outcomes  Possible risks and side effects    After " discussing the risk and benefits of care, patient consented to treatment    Prognosis: Good      Patient's condition:  Patient had restrictions pre-manipulation    Treatment effectiveness:  Post manipulation there is better intersegmental movement and Patient claims to feel looser post manipulation      Plan:    Procedures:  Evaluation and Management:  00609 Low to Moderate exam established patient 10 min    CMT:  28553 Chiropractic manipulative treatment 3-4 regions performed   Thoracic: Activator, T10, T11, T12, Prone  Lumbar: Activator, L4, L5, Prone  Pelvis: Drop Table, PSIS Right , Prone    Modalities:  90809: Acupuncture, for 15 minutes:  Points: B25, B27, GV3, K3, B62, SI3  Ahsi point in pirirfmormis  For 15 minutes    Therapeutic procedures:  None    Response to Treatment  Reduction in symptoms as reported by patient      Treatment plan and goals:  Goals:  SITTING: the patient specific goal is to attain pre-injury status of  3 hours comfortably    Frequency of care  This treatment plan will one of supportive care as she was a former patient with the same symptoms.    In-Office Treatment  Evaluation  Spinal Chiropractic Manipulative Therapy:    Acupuncture:          Recommendations:    Instructions:  ice 20 minutes every other hour as needed    Follow-up:    Return to care if symptoms persist.       Discussed the assessment with the patient.      Disclaimer: This note consists of symbols derived from keyboarding, dictation and/or voice recognition software. As a result, there may be errors in the script that have gone undetected. Please consider this when interpreting information found in this chart.

## 2019-03-11 ENCOUNTER — TELEPHONE (OUTPATIENT)
Dept: FAMILY MEDICINE | Facility: CLINIC | Age: 49
End: 2019-03-11

## 2019-03-11 ENCOUNTER — MYC MEDICAL ADVICE (OUTPATIENT)
Dept: FAMILY MEDICINE | Facility: CLINIC | Age: 49
End: 2019-03-11

## 2019-03-11 ENCOUNTER — OFFICE VISIT (OUTPATIENT)
Dept: PSYCHOLOGY | Facility: CLINIC | Age: 49
End: 2019-03-11
Payer: COMMERCIAL

## 2019-03-11 DIAGNOSIS — F33.1 MAJOR DEPRESSIVE DISORDER, RECURRENT EPISODE, MODERATE (H): Primary | ICD-10-CM

## 2019-03-11 DIAGNOSIS — G89.4 CHRONIC PAIN SYNDROME: Chronic | ICD-10-CM

## 2019-03-11 DIAGNOSIS — F41.1 GAD (GENERALIZED ANXIETY DISORDER): ICD-10-CM

## 2019-03-11 PROCEDURE — 90834 PSYTX W PT 45 MINUTES: CPT | Performed by: SOCIAL WORKER

## 2019-03-11 RX ORDER — OXYCODONE AND ACETAMINOPHEN 10; 325 MG/1; MG/1
1 TABLET ORAL EVERY 6 HOURS PRN
Qty: 90 TABLET | Refills: 0 | Status: SHIPPED | OUTPATIENT
Start: 2019-03-11 | End: 2019-04-10

## 2019-03-11 NOTE — PROGRESS NOTES
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 9-17-18, 12-20-18    Intervention(s)  Therapist will encourage and process journaling with client to see if it has improved her mood.    Objective #B  Client will use cognitive strategies identified in therapy to challenge anxious thoughts.  Status: Continued - Date(s): 10-09-17, 1-02-18, 2-06-18, 6-18-18, 9-17-18, 12-20-18    Intervention(s)  Therapist will assign homework Client will complete mood log to learn effective CBT skills and utilize daily. .    Objective #C  Client will engage in self care activities that reduce anxiety and improve mood.  Status: Continued - Date(s): 10-09-17, 1-02-18, 2-06-18, 6-18-18, 9-17-18, 12-20-18    Intervention(s)  Therapist will assign homework Client will develop a list of activities that will imrpove her mood and engage in these activities three times per week. .        Client has reviewed and agreed to the above plan.      Antonio Rios, [facility]  October 9, 2017

## 2019-03-11 NOTE — TELEPHONE ENCOUNTER
Routing refill request to provider for review/approval because:  Drug not on the [facility] refill protocol     Last Written Prescription Date:  2/12/19  Last Fill Quantity: 90,  # refills: 0   Last office visit: 2/15/2019 with prescribing provider   Future Office Visit:   Next 5 appointments (look out 90 days)    Mar 11, 2019 10:00 AM CDT  Return Visit with SERVANDO Yan  [facility] ([facility]) [address] [place] [id number] [phone]   Mar 15, 2019 10:30 AM CDT  [facility] Visit with Joaquín Burger MD  [facility] [address] [place] [phone]        Dasia Rodrigez RN

## 2019-03-11 NOTE — TELEPHONE ENCOUNTER
Panel Management Review      Patient has the following on her problem list:     Depression / Dysthymia review    Measure:  Needs PHQ-9 score of 4 or less during index window.  Administer PHQ-9 and if score is 5 or more, send encounter to provider for next steps.    5 - 7 month window range: 2/28/19-6/30/19    PHQ-9 SCORE 11/20/2018 12/20/2018 2/15/2019   PHQ-9 Total Score - - -   PHQ-9 Total Score MyChart - - -   PHQ-9 Total Score 3 6 9       If PHQ-9 recheck is 5 or more, route to provider for next steps.    Patient is due for:  PHQ9      Composite cancer screening  Chart review shows that this patient is due/due soon for the following None  Summary:    Patient is due/failing the following:   PHQ9    Action needed:   Patient needs to do PHQ9.    Type of outreach:    Sent Luqithart message. 3/11/19    Questions for provider review:    None                                                                                                                                    Susan Hines [facility]        Chart routed to Care Team .

## 2019-03-11 NOTE — TELEPHONE ENCOUNTER
Reason for Call:  Medication or medication refill:    Do you use a [facility]?  Name of the pharmacy and phone number for the current request:  [facility]     Name of the medication requested: Oxycodone    Other request: Please call patient when the script is ready for  at .    Can we leave a detailed message on this number? YES    Phone number patient can be reached at: Cell number on file:    Telephone Information:   Mobile [phone]       Best Time: anytime    Call taken on 3/11/2019 at 9:42 AM by Whit Thomas

## 2019-03-13 ENCOUNTER — OFFICE VISIT (OUTPATIENT)
Dept: NEUROLOGY | Facility: CLINIC | Age: 49
End: 2019-03-13
Attending: PSYCHIATRY & NEUROLOGY
Payer: COMMERCIAL

## 2019-03-13 DIAGNOSIS — G60.9 IDIOPATHIC PERIPHERAL NEUROPATHY: ICD-10-CM

## 2019-03-13 DIAGNOSIS — M79.605 PAIN IN BOTH LOWER EXTREMITIES: ICD-10-CM

## 2019-03-13 DIAGNOSIS — M79.604 PAIN IN BOTH LOWER EXTREMITIES: ICD-10-CM

## 2019-03-13 NOTE — LETTER
3/13/2019       RE: Velma Diaz  [address]  [place] [facility] [address]     Dear Colleague,    Thank you for referring your patient, Velma Diaz, to the [facility] EMG at [facility]. Please see a copy of my visit note below.        [facility]  Electrodiagnostic Laboratory    Nerve Conduction & EMG Report          Patient:       Velma Diaz  Patient ID:    [id number]  Gender:        Female  YOB: 1970  Age:           48 Years 4 Months      History & Examination:  48 year old woman with burning pain in both feet, arms, and hands. Eval for polyneuropathy.    Techniques:  Sensory and motor conduction studies were done with surface recording electrodes. EMG was done with a concentric needle electrode.     Results:  Nerve conduction studies:  1. Right median-D2, right ulnar-D5, bilateral sural, and right superficial peroneal sensory responses are normal.   2. Right median-APB, right ulnar-ADM, bilateral peroneal-EDB, and right tibial-AH motor responses are normal.     Needle EMG of selected bilateral lower limb muscles was performed as tabulated below. No abnormal spontaneous activity was observed in the sampled muscles. Motor unit potential morphology and recruitment patterns were normal.     Interpretation:  This is a normal study. There is no electrophysiologic evidence of a large-fiber polyneuropathy affecting the upper or lower limbs on the basis of this study.     Hema Alfaro MD  Department of Neurology          Sensory NCS      Nerve / Sites Rec. Site Onset Peak NP Amp Ref. PP Amp Dist Jaren Ref. Temp     ms ms  V  V  V cm m/s m/s  C   R MEDIAN - Dig II Anti      Wrist Dig II 2.55 3.39 29.8 10.0 59.8 14 54.9 48.0 32.5   R ULNAR - Dig V Anti      Wrist Dig V 2.08 2.76 23.0 8.0 44.4 12.5 60.0 48.0 32.5   R SURAL - Lat Mall      Calf Ankle 2.50 3.33 9.3 8.0 12.4 14 56.0 38.0 31.2   L SURAL - Lat Mall      Calf Ankle 2.55 3.23 8.9 8.0 9.5 14 54.9 38.0  31.4   R SUP PERONEAL      Lat Leg Hoang 2.45 3.23 13.1  20.2 12.5 51.1 38.0 31.5       Motor NCS      Nerve / Sites Rec. Site Lat Ref. Amp Ref. Rel Amp Dist Jaren Ref. Dur. Area Temp.     ms ms mV mV % cm m/s m/s ms %  C   R MEDIAN - APB      Wrist APB 2.97 4.40 8.4 5.0 100 8   6.04 100 32.4      Elbow APB 6.51  8.4  99.7 21 59.3 48.0 5.99 93.2 32.4   R ULNAR - ADM      Wrist ADM 2.29 3.50 10.6 5.0 100 8   6.51 100 32.5      B.Elbow ADM 5.42  10.0  94.6 17.5 56.0 48.0 6.20 97.3 32.4      A.Elbow ADM 7.29  8.9  83.9 11 58.7 48.0 6.67 89.3 32.4   R DEEP PERONEAL - EDB      Ankle EDB 3.39 6.00 10.9 2.5 100 8   4.74 100 31.5      FibHead EDB 8.85  10.4  94.9 27.5 50.3 38.0 4.90 90.4 31.5      Pop Fos EDB 10.36  10.1  92.5 9 59.6 38.0 5.05 87.1 31.2   L DEEP PERONEAL - EDB      Ankle EDB 3.02 6.00 7.3 2.5 100 8   4.58 100 31.5      FibHead EDB 9.11  6.7  91.7 31 50.9 38.0 5.00 92.2 31.5      Pop Fos EDB 10.57  6.5  88.6 9 61.7 38.0 5.05 88.9 31.4   R TIBIAL - AH      Ankle AH 2.08 6.00 21.1 4.0 100 8   5.73 100 31.5      Pop Fos AH 10.63  15.4  73.2 38 44.5 38.0 6.51 88.1 31.4       EMG Summary Table     Spontaneous MUAP Recruitment    IA Fib/PSW Fasc H.F. Amp Dur. PPP Pattern   R. TIB ANTERIOR N None None None N N N N   R. GASTROCN (MED) N None None None N N N N   L. TIB ANTERIOR N None None None N N N N   L. GASTROCN (MED) N None None None N N N N                                Again, thank you for allowing me to participate in the care of your patient.      Sincerely,    Hema Alfaro MD

## 2019-03-13 NOTE — PROGRESS NOTES
[facility]  Electrodiagnostic Laboratory    Nerve Conduction & EMG Report          Patient:       Velma Diaz  Patient ID:    [id number]  Gender:        Female  YOB: 1970  Age:           48 Years 4 Months      History & Examination:  48 year old woman with burning pain in both feet, arms, and hands. Eval for polyneuropathy.    Techniques:  Sensory and motor conduction studies were done with surface recording electrodes. EMG was done with a concentric needle electrode.     Results:  Nerve conduction studies:  1. Right median-D2, right ulnar-D5, bilateral sural, and right superficial peroneal sensory responses are normal.   2. Right median-APB, right ulnar-ADM, bilateral peroneal-EDB, and right tibial-AH motor responses are normal.     Needle EMG of selected bilateral lower limb muscles was performed as tabulated below. No abnormal spontaneous activity was observed in the sampled muscles. Motor unit potential morphology and recruitment patterns were normal.     Interpretation:  This is a normal study. There is no electrophysiologic evidence of a large-fiber polyneuropathy affecting the upper or lower limbs on the basis of this study.     Hema Alfaro MD  Department of Neurology          Sensory NCS      Nerve / Sites Rec. Site Onset Peak NP Amp Ref. PP Amp Dist Jaren Ref. Temp     ms ms  V  V  V cm m/s m/s  C   R MEDIAN - Dig II Anti      Wrist Dig II 2.55 3.39 29.8 10.0 59.8 14 54.9 48.0 32.5   R ULNAR - Dig V Anti      Wrist Dig V 2.08 2.76 23.0 8.0 44.4 12.5 60.0 48.0 32.5   R SURAL - Lat Mall      Calf Ankle 2.50 3.33 9.3 8.0 12.4 14 56.0 38.0 31.2   L SURAL - Lat Mall      Calf Ankle 2.55 3.23 8.9 8.0 9.5 14 54.9 38.0 31.4   R SUP PERONEAL      Lat Leg Hoang 2.45 3.23 13.1  20.2 12.5 51.1 38.0 31.5       Motor NCS      Nerve / Sites Rec. Site Lat Ref. Amp Ref. Rel Amp Dist Jaren Ref. Dur. Area Temp.     ms ms mV mV % cm m/s m/s ms %  C   R MEDIAN - APB      Wrist APB 2.97 4.40 8.4  5.0 100 8   6.04 100 32.4      Elbow APB 6.51  8.4  99.7 21 59.3 48.0 5.99 93.2 32.4   R ULNAR - ADM      Wrist ADM 2.29 3.50 10.6 5.0 100 8   6.51 100 32.5      B.Elbow ADM 5.42  10.0  94.6 17.5 56.0 48.0 6.20 97.3 32.4      A.Elbow ADM 7.29  8.9  83.9 11 58.7 48.0 6.67 89.3 32.4   R DEEP PERONEAL - EDB      Ankle EDB 3.39 6.00 10.9 2.5 100 8   4.74 100 31.5      FibHead EDB 8.85  10.4  94.9 27.5 50.3 38.0 4.90 90.4 31.5      Pop Fos EDB 10.36  10.1  92.5 9 59.6 38.0 5.05 87.1 31.2   L DEEP PERONEAL - EDB      Ankle EDB 3.02 6.00 7.3 2.5 100 8   4.58 100 31.5      FibHead EDB 9.11  6.7  91.7 31 50.9 38.0 5.00 92.2 31.5      Pop Fos EDB 10.57  6.5  88.6 9 61.7 38.0 5.05 88.9 31.4   R TIBIAL - AH      Ankle AH 2.08 6.00 21.1 4.0 100 8   5.73 100 31.5      Pop Fos AH 10.63  15.4  73.2 38 44.5 38.0 6.51 88.1 31.4       EMG Summary Table     Spontaneous MUAP Recruitment    IA Fib/PSW Fasc H.F. Amp Dur. PPP Pattern   R. TIB ANTERIOR N None None None N N N N   R. GASTROCN (MED) N None None None N N N N   L. TIB ANTERIOR N None None None N N N N   L. GASTROCN (MED) N None None None N N N N

## 2019-03-14 NOTE — PROGRESS NOTES
Called and inform pt that EMG was normal and inform her to keep appt tomorrow due to unresolved symptoms.      Mariya Contreras MA

## 2019-03-15 ENCOUNTER — OFFICE VISIT (OUTPATIENT)
Dept: NEUROLOGY | Facility: CLINIC | Age: 49
End: 2019-03-15
Payer: COMMERCIAL

## 2019-03-15 VITALS
TEMPERATURE: 98.1 F | BODY MASS INDEX: 42.71 KG/M2 | RESPIRATION RATE: 16 BRPM | OXYGEN SATURATION: 98 % | SYSTOLIC BLOOD PRESSURE: 112 MMHG | HEART RATE: 102 BPM | DIASTOLIC BLOOD PRESSURE: 78 MMHG | WEIGHT: 243 LBS

## 2019-03-15 DIAGNOSIS — G60.9 IDIOPATHIC PERIPHERAL NEUROPATHY: ICD-10-CM

## 2019-03-15 PROCEDURE — 99214 OFFICE O/P EST MOD 30 MIN: CPT | Performed by: PSYCHIATRY & NEUROLOGY

## 2019-03-15 RX ORDER — NORTRIPTYLINE HCL 10 MG
25 CAPSULE ORAL AT BEDTIME
Qty: 30 CAPSULE | Refills: 3 | Status: CANCELLED | OUTPATIENT
Start: 2019-03-15

## 2019-03-15 NOTE — LETTER
3/15/2019         RE: Velma Diaz  [address]  [place] [address]        Dear Colleague,    Thank you for referring your patient, Velma Diaz, to the [facility]. Please see a copy of my visit note below.    ESTABLISHED PATIENT NEUROLOGY NOTE    DATE OF VISIT: 3/15/2019  CLINIC LOCATION: [facility]  MRN: [id number]  PATIENT NAME: Vlema Diaz  YOB: 1970    PCP: Srinivasa Hunter MD    REASON FOR VISIT:   Chief Complaint   Patient presents with     Follow Up     EMG and sx are still the same      SUBJECTIVE:                                                      HISTORY OF PRESENT ILLNESS: Patient is here for follow up regarding worsening symptoms due to presumed small fiber peripheral polyneuropathy.  She was last seen on 02/28/2019.  At that time, additional screening labs and EMG were ordered.  Please refer to my initial/other prior notes for further information.    Since the last visit, the patient reports that her symptoms are not significantly changed during the day, though noticed some improvement at night after starting nortriptyline.  It also helped her sleep.  On 225 mg of Lyrica twice daily.  We added low-dose of nortriptyline (10 mg at bedtime) at the last visit.  Took it for 2 weeks.  No noticeable side effects.  She denies interval development of new focal neurological symptoms.    Laboratory work-up from the previous visit, including hemoglobin A1C, vitamin B12, MMA, and vitamin B1, was normal.  EMG from 03/13/2019 was normal without evidence of large fiber polyneuropathy or focal entrapment neuropathies affecting upper or lower limbs.    On review of systems, patient endorses no additional active complaints. Medications, allergies, family and social history were also reviewed. There are no changes reported by patient.  REVIEW OF SYSTEMS:                                                    10-system review was completed. Pertinent  positives are included in HPI. The remainder of ROS is negative.  EXAM:                                                    Physical Exam:   Vitals: /78 (BP Location: Right arm, Patient Position: Sitting, Cuff Size: Adult Large)   Pulse 102   Temp 98.1  F (36.7  C) (Oral)   Resp 16   Wt 110.2 kg (243 lb)   LMP  (LMP Unknown)   SpO2 98%   BMI 42.71 kg/m       General: pt is in NAD, cooperative.  Skin: normal turgor, moist mucous membranes, no lesions/rashes noticed.  HEENT: ATNC, white sclera, normal conjunctiva.  Respiratory: Symmetric lung excursion, no accessory respiratory muscle use.  Abdomen: Non distended.  Neurological: awake, cooperative, follows commands, no significant interval changes in patient's exam compared to the last time.  DATA:   EMG/labs: I personally reviewed tabulated data from EMG report and pertinent labs, as detailed in the history of present illness.  ASSESSMENT AND PLAN:                                                    Assessment: 48-year-old female patient with chronic pain syndrome and presumed small fiber peripheral polyneuropathy presents for follow-up after the completion of additional work-up.  Her EMG was negative, and laboratory work-up was unrevealing.  We added 10 mg of nortriptyline at the last visit to her 225 mg of Lyrica twice daily to see if it helps her pain.  So far she did not notice significant difference with her symptoms during the day, though they somewhat improved at night.    We had a detailed discussion regarding work-up results and available treatment options.  I reviewed with the patient that it might take 4-6 weeks after starting nortriptyline to see an effect, and she only took it for 2 weeks.  On the other hand, we could further increase nortriptyline.  I also discussed alternative options including Effexor, Cymbalta, and alpha lipoic acid along with topical treatments.  We decided not to make any medication changes for the next 4 weeks.  After  that the patient will update me on her progress, and if pain is not well controlled we will further increase nortriptyline to 20 mg at bedtime.    Diagnoses:    ICD-10-CM    1. Idiopathic peripheral neuropathy G60.9      Plan: At today's visit we thoroughly discussed results of lab work and EMG (unrevealing), available treatment options, and the plan.  We decided to continue the same dose of nortriptyline for additional 4 weeks.      Next follow-up appointment is in the next 3 months or earlier if needed.    Total Time: 25 minutes with > 50% spent counseling the patient on stated above assessment and recommendations.    Joaquín Burger MD  / Neurology         Again, thank you for allowing me to participate in the care of your patient.        Sincerely,        Joaquín Burger MD

## 2019-03-15 NOTE — PATIENT INSTRUCTIONS
AFTER VISIT SUMMARY (AVS):    At today's visit we thoroughly discussed results of lab work and EMG (unrevealing), available treatment options, and the plan.  We decided to continue the same dose of nortriptyline for additional 4 weeks.  After that please update me via My Chart whether it is working or not.  If you continue to have a lot of pain we will increase the dose to 20 mg at bedtime.  No other medication changes.    Next follow-up appointment is in the next 3 months or earlier if needed.    Please do not hesitate to call me with any questions or concerns.    Thanks.

## 2019-03-15 NOTE — PROGRESS NOTES
ESTABLISHED PATIENT NEUROLOGY NOTE    DATE OF VISIT: 3/15/2019  CLINIC LOCATION: [facility]  MRN: [id number]  PATIENT NAME: Velma Diaz  YOB: 1970    PCP: Srinivasa Huntre MD    REASON FOR VISIT:   Chief Complaint   Patient presents with     Follow Up     EMG and sx are still the same      SUBJECTIVE:                                                      HISTORY OF PRESENT ILLNESS: Patient is here for follow up regarding worsening symptoms due to presumed small fiber peripheral polyneuropathy.  She was last seen on 02/28/2019.  At that time, additional screening labs and EMG were ordered.  Please refer to my initial/other prior notes for further information.    Since the last visit, the patient reports that her symptoms are not significantly changed during the day, though noticed some improvement at night after starting nortriptyline.  It also helped her sleep.  On 225 mg of Lyrica twice daily.  We added low-dose of nortriptyline (10 mg at bedtime) at the last visit.  Took it for 2 weeks.  No noticeable side effects.  She denies interval development of new focal neurological symptoms.    Laboratory work-up from the previous visit, including hemoglobin A1C, vitamin B12, MMA, and vitamin B1, was normal.  EMG from 03/13/2019 was normal without evidence of large fiber polyneuropathy or focal entrapment neuropathies affecting upper or lower limbs.    On review of systems, patient endorses no additional active complaints. Medications, allergies, family and social history were also reviewed. There are no changes reported by patient.  REVIEW OF SYSTEMS:                                                    10-system review was completed. Pertinent positives are included in HPI. The remainder of ROS is negative.  EXAM:                                                    Physical Exam:   Vitals: /78 (BP Location: Right arm, Patient Position: Sitting, Cuff Size: Adult Large)   Pulse  102   Temp 98.1  F (36.7  C) (Oral)   Resp 16   Wt 110.2 kg (243 lb)   LMP  (LMP Unknown)   SpO2 98%   BMI 42.71 kg/m      General: pt is in NAD, cooperative.  Skin: normal turgor, moist mucous membranes, no lesions/rashes noticed.  HEENT: ATNC, white sclera, normal conjunctiva.  Respiratory: Symmetric lung excursion, no accessory respiratory muscle use.  Abdomen: Non distended.  Neurological: awake, cooperative, follows commands, no significant interval changes in patient's exam compared to the last time.  DATA:   EMG/labs: I personally reviewed tabulated data from EMG report and pertinent labs, as detailed in the history of present illness.  ASSESSMENT AND PLAN:                                                    Assessment: 48-year-old female patient with chronic pain syndrome and presumed small fiber peripheral polyneuropathy presents for follow-up after the completion of additional work-up.  Her EMG was negative, and laboratory work-up was unrevealing.  We added 10 mg of nortriptyline at the last visit to her 225 mg of Lyrica twice daily to see if it helps her pain.  So far she did not notice significant difference with her symptoms during the day, though they somewhat improved at night.    We had a detailed discussion regarding work-up results and available treatment options.  I reviewed with the patient that it might take 4-6 weeks after starting nortriptyline to see an effect, and she only took it for 2 weeks.  On the other hand, we could further increase nortriptyline.  I also discussed alternative options including Effexor, Cymbalta, and alpha lipoic acid along with topical treatments.  We decided not to make any medication changes for the next 4 weeks.  After that the patient will update me on her progress, and if pain is not well controlled we will further increase nortriptyline to 20 mg at bedtime.    Diagnoses:    ICD-10-CM    1. Idiopathic peripheral neuropathy G60.9      Plan: At today's visit  we thoroughly discussed results of lab work and EMG (unrevealing), available treatment options, and the plan.  We decided to continue the same dose of nortriptyline for additional 4 weeks.      Next follow-up appointment is in the next 3 months or earlier if needed.    Total Time: 25 minutes with > 50% spent counseling the patient on stated above assessment and recommendations.    Joaquín Burger MD  / Neurology

## 2019-03-18 ENCOUNTER — OFFICE VISIT (OUTPATIENT)
Dept: FAMILY MEDICINE | Facility: CLINIC | Age: 49
End: 2019-03-18
Payer: COMMERCIAL

## 2019-03-18 ENCOUNTER — THERAPY VISIT (OUTPATIENT)
Dept: CHIROPRACTIC MEDICINE | Facility: CLINIC | Age: 49
End: 2019-03-18
Payer: COMMERCIAL

## 2019-03-18 VITALS
BODY MASS INDEX: 42.53 KG/M2 | DIASTOLIC BLOOD PRESSURE: 88 MMHG | HEART RATE: 85 BPM | OXYGEN SATURATION: 94 % | WEIGHT: 242 LBS | TEMPERATURE: 97.4 F | SYSTOLIC BLOOD PRESSURE: 137 MMHG

## 2019-03-18 DIAGNOSIS — M62.838 SPASM OF MUSCLE: ICD-10-CM

## 2019-03-18 DIAGNOSIS — R07.0 THROAT PAIN: Primary | ICD-10-CM

## 2019-03-18 DIAGNOSIS — M99.03 SEGMENTAL DYSFUNCTION OF LUMBAR REGION: ICD-10-CM

## 2019-03-18 DIAGNOSIS — M99.05 SEGMENTAL DYSFUNCTION OF PELVIC REGION: Primary | ICD-10-CM

## 2019-03-18 DIAGNOSIS — M54.50 LUMBAGO: ICD-10-CM

## 2019-03-18 DIAGNOSIS — M99.02 THORACIC SEGMENT DYSFUNCTION: ICD-10-CM

## 2019-03-18 DIAGNOSIS — J02.0 STREP THROAT: ICD-10-CM

## 2019-03-18 LAB
DEPRECATED S PYO AG THROAT QL EIA: ABNORMAL
SPECIMEN SOURCE: ABNORMAL

## 2019-03-18 PROCEDURE — 97810 ACUP 1/> WO ESTIM 1ST 15 MIN: CPT | Performed by: CHIROPRACTOR

## 2019-03-18 PROCEDURE — 99213 OFFICE O/P EST LOW 20 MIN: CPT | Performed by: PHYSICIAN ASSISTANT

## 2019-03-18 PROCEDURE — 87880 STREP A ASSAY W/OPTIC: CPT | Performed by: PHYSICIAN ASSISTANT

## 2019-03-18 PROCEDURE — 98941 CHIROPRACT MANJ 3-4 REGIONS: CPT | Mod: AT | Performed by: CHIROPRACTOR

## 2019-03-18 RX ORDER — AMOXICILLIN 500 MG/1
500 TABLET, FILM COATED ORAL 2 TIMES DAILY
Qty: 20 TABLET | Refills: 0 | Status: SHIPPED | OUTPATIENT
Start: 2019-03-18 | End: 2019-06-12

## 2019-03-18 NOTE — PROGRESS NOTES
SUBJECTIVE:   Velma Diaz is a 48 year old female who presents to clinic today for the following health issues:      ENT Symptoms             Symptoms: cc Present Absent Comment   Fever/Chills   x    Fatigue   x    Muscle Aches   x    Eye Irritation   x    Sneezing   x    Nasal Ferny/Drg  x  Stuffy, not too bad   Sinus Pressure/Pain  x  A little   Loss of smell   x    Dental pain   x    Sore Throat  x  Has lost voice   Swollen Glands  x     Ear Pain/Fullness  x  A little on both   Cough  x  With sputum yesterday    Wheeze   x    Chest Pain  x     Shortness of breath  x  Tight with deep breath    Rash   x    Other   x      Symptom duration:  1 week   Symptom severity:  severe for throat as she lost her voice   Treatments tried:  cough drops, increasing fluid intake   Contacts:  grandson was sick a few weeks ago with runny nose             Problem list and histories reviewed & adjusted, as indicated.  Additional history: as documented    Patient Active Problem List   Diagnosis     History of cleft palate with cleft lip     MAYA (obstructive sleep apnea)/Hypoventilation Syndrome     Major depressive disorder, recurrent episode, moderate (H)     Paresthesias     Health Care Home     Vitamin D deficiency     Morbid obesity with BMI of 40.0-44.9, adult (H)     Hyperlipidemia with target LDL less than 130     Intervertebral disc prolapse with impingement     Nonallopathic lesion of lumbar region     Chronic pain syndrome     Restless legs syndrome (RLS)     Insomnia     Migraine     Chronic bilateral back pain, unspecified back location     Chronic pain of left knee     ROSE MARIE (generalized anxiety disorder)     Idiopathic peripheral neuropathy     Past Surgical History:   Procedure Laterality Date     ANKLE SURGERY  7/13    Left for torn tendons & loose bone chips     ARTHROSCOPY KNEE  1986    Right knee     BACK SURGERY       Basal cell carcinoma  2011    Removal from the chest     BIOPSY       C VAGINAL HYSTERECTOMY        CARPAL TUNNEL RELEASE RT/LT  ~    Bilateral     COLONOSCOPY  2016     COSMETIC SURGERY       cysto with right ureteroscopy, stone manipulation, double J stent removal  10/04/2018    Dr. Cisneros -- removal of right kidney stone     cysto, right retrograde pyelogram, right ureteral stent Right 2018    for obstructing right kidney stone     DECOMPRESSION CUBITAL TUNNEL Left 2015    Procedure: DECOMPRESSION CUBITAL TUNNEL;  Surgeon: Gus Donato MD;  [facility]: [facility]     ENT SURGERY      Tonsillectomy and Adenoids     GYN SURGERY      Hysterectomy     HC REPAIR PERONEAL TENDONS       ORTHOPEDIC SURGERY  ,     Bilateral CTR     PE TUBES      yearly times 10 years     REPAIR CLEFT PALATE CHILD      Age 3 months & afterwards (multiple surgeries)     SOFT TISSUE SURGERY         Social History     Tobacco Use     Smoking status: Former Smoker     Packs/day: 0.50     Years: 15.00     Pack years: 7.50     Types: Cigarettes     Last attempt to quit: 2015     Years since quittin.0     Smokeless tobacco: Never Used   Substance Use Topics     Alcohol use: No     Alcohol/week: 0.0 oz     Comment: Quit in      Family History   Problem Relation Age of Onset     Alzheimer Disease Paternal Grandmother 60     Cerebrovascular Disease Paternal Grandmother      Neurologic Disorder Sister 20        migraines     Depression/Anxiety Sister      Depression Sister      Neurologic Disorder Son 14        migraines     Asthma Son      Heart Disease Mother      Osteoporosis Mother      Obesity Mother      Cancer Father      Alcohol/Drug Father      Other Cancer Father         saliva glands & skin CA      Hypertension Father      Diabetes Maternal Grandmother      Breast Cancer Maternal Grandmother      Obesity Maternal Grandmother      Other Cancer Maternal Grandfather         skin cancer     Cancer - colorectal Other         Aunt     Asthma Daughter      Asthma Daughter       Asthma Son      Thyroid Disease Sister      Colon Cancer Other      Obesity Sister      Glaucoma No family hx of      Macular Degeneration No family hx of            Reviewed and updated as needed this visit by clinical staff  Tobacco  Allergies  Meds  Med Hx  Surg Hx  Fam Hx  Soc Hx      Reviewed and updated as needed this visit by Provider         ROS:  As above    OBJECTIVE:     /88 (BP Location: Right arm, Patient Position: Chair, Cuff Size: Adult Large)   Pulse 85   Temp 97.4  F (36.3  C) (Oral)   Wt 109.8 kg (242 lb)   LMP  (LMP Unknown)   SpO2 94%   BMI 42.53 kg/m    Body mass index is 42.53 kg/m .  GENERAL: alert, no distress and obese  HENT: ear canals and TM's normal, oropharynx clear and oral mucous membranes moist  NECK: no adenopathy and no asymmetry, masses, or scars  RESP: lungs clear to auscultation - no rales, rhonchi or wheezes  CV: regular rates and rhythm, normal S1 S2, no S3 or S4 and no murmur, click or rub    Diagnostic Test Results:  Results for orders placed or performed in visit on 03/18/19 (from the past 24 hour(s))   Rapid strep screen   Result Value Ref Range    Specimen Description Throat     Rapid Strep A Screen (A)      POSITIVE: Group A Streptococcal antigen detected by immunoassay.       ASSESSMENT/PLAN:         ICD-10-CM    1. Throat pain R07.0 Rapid strep screen   2. Strep throat J02.0 amoxicillin (AMOXIL) 500 MG tablet           Rosa Franklin PA-C  [facility]

## 2019-03-18 NOTE — PROGRESS NOTES
Visit #:  2    Subjective:  Velma Diaz is a 48 year old female who is seen in f/u up for:        Segmental dysfunction of pelvic region  Lumbago  Segmental dysfunction of lumbar region  Spasm of muscle  Thoracic segment dysfunction.     Since last visit on 3/7/2019,  Velma Diaz reports:    Area of chief complaint:  Lumbar :  Symptoms are graded at 5/10. The quality is described as stiff, achey, dull.  Motion has increased, but is still not normal. Patient feels that they have not improved and feel the same. Mireille reports that she is feeling about the same.  Her back is still sore but the pain going down her legs has reduced.     Objective:  The following was observed:    P: palpatory tenderness Piriformis R>>L  A: static palpation demonstrates intersegmental asymmetry , thoracic, lumbar, pelvis  R: motion palpation notes restricted motion, T10, T11 , T12 , L4 , L5  and PSIS Right   T: hypertonicity at: Piriformis R>>L    Segmental spinal dysfunction/restrictions found at:  T10, T11 , T12 , L4 , L5  and PSIS Right       Assessment:    Diagnoses:      1. Segmental dysfunction of pelvic region    2. Lumbago    3. Segmental dysfunction of lumbar region    4. Spasm of muscle    5. Thoracic segment dysfunction        Patient's condition:  Patient had restrictions pre-manipulation    Treatment effectiveness:  Post manipulation there is better intersegmental movement and Patient claims to feel looser post manipulation      Procedures:  CMT:  46329 Chiropractic manipulative treatment 3-4 regions performed   Thoracic: Activator, T10, T11, T12, Prone  Lumbar: Activator, L4, L5, Prone  Pelvis: Drop Table, PSIS Right , Prone    Modalities:  31071: Acupuncture, for 15 minutes:  Points: B25, B27, GV3, K3, B62, SI3  Ahsi point in piriformis  For 15 minutes    Therapeutic procedures:  None    Response to Treatment  Reduction in symptoms as reported by patient    Prognosis: Good    Progress towards Goals: Patient is making  progress towards the goal.     Recommendations:    Instructions:  ice 20 minutes every other hour as needed    Follow-up:    Return to care in one week.

## 2019-03-25 ENCOUNTER — THERAPY VISIT (OUTPATIENT)
Dept: CHIROPRACTIC MEDICINE | Facility: CLINIC | Age: 49
End: 2019-03-25
Payer: COMMERCIAL

## 2019-03-25 DIAGNOSIS — M99.05 SEGMENTAL DYSFUNCTION OF PELVIC REGION: Primary | ICD-10-CM

## 2019-03-25 DIAGNOSIS — M54.50 LUMBAGO: ICD-10-CM

## 2019-03-25 DIAGNOSIS — M99.03 SEGMENTAL DYSFUNCTION OF LUMBAR REGION: ICD-10-CM

## 2019-03-25 DIAGNOSIS — M62.838 SPASM OF MUSCLE: ICD-10-CM

## 2019-03-25 DIAGNOSIS — M99.02 THORACIC SEGMENT DYSFUNCTION: ICD-10-CM

## 2019-03-25 PROCEDURE — 97810 ACUP 1/> WO ESTIM 1ST 15 MIN: CPT | Performed by: CHIROPRACTOR

## 2019-03-25 PROCEDURE — 98941 CHIROPRACT MANJ 3-4 REGIONS: CPT | Mod: AT | Performed by: CHIROPRACTOR

## 2019-03-25 NOTE — PROGRESS NOTES
Visit #:  3    Subjective:  Velma Diaz is a 48 year old female who is seen in f/u up for:        Segmental dysfunction of pelvic region  Lumbago  Segmental dysfunction of lumbar region  Spasm of muscle  Thoracic segment dysfunction.     Since last visit on 3/18/2019,  Velma Diaz reports:    Area of chief complaint:  Lumbar :  Symptoms are graded at 6/10. The quality is described as stiff, achey, dull.  Motion has increased, but is still not normal. Patient feels that they have improved. Her back is still sore but the pain going down to her right hip, but not down her leg.       Objective:  The following was observed:    P: palpatory tenderness Piriformis R>>L  A: static palpation demonstrates intersegmental asymmetry , thoracic, lumbar, pelvis  R: motion palpation notes restricted motion, T10, T11 , T12 , L4 , L5  and PSIS Right   T: hypertonicity at: Piriformis R>>L    Segmental spinal dysfunction/restrictions found at:  T10, T11 , T12 , L4 , L5  and PSIS Right       Assessment:    Diagnoses:      1. Segmental dysfunction of pelvic region    2. Lumbago    3. Segmental dysfunction of lumbar region    4. Spasm of muscle    5. Thoracic segment dysfunction        Patient's condition:  Patient had restrictions pre-manipulation    Treatment effectiveness:  Post manipulation there is better intersegmental movement and Patient claims to feel looser post manipulation      Procedures:  CMT:  19305 Chiropractic manipulative treatment 3-4 regions performed   Thoracic: Activator, T10, T11, T12, Prone  Lumbar: Activator, L4, L5, Prone  Pelvis: Drop Table, PSIS Right , Prone    Modalities:  04924: Acupuncture, for 15 minutes:  Points: B25, B27, GV3, K3, B62, SI3  Ahsi point in piriformis  For 15 minutes    Therapeutic procedures:  None    Response to Treatment  Reduction in symptoms as reported by patient    Prognosis: Good    Progress towards Goals: Patient is making progress towards the  goal.     Recommendations:    Instructions:  ice 20 minutes every other hour as needed    Follow-up:    Return to care in one week.

## 2019-03-26 ASSESSMENT — PATIENT HEALTH QUESTIONNAIRE - PHQ9
SUM OF ALL RESPONSES TO PHQ QUESTIONS 1-9: 7
10. IF YOU CHECKED OFF ANY PROBLEMS, HOW DIFFICULT HAVE THESE PROBLEMS MADE IT FOR YOU TO DO YOUR WORK, TAKE CARE OF THINGS AT HOME, OR GET ALONG WITH OTHER PEOPLE: SOMEWHAT DIFFICULT
SUM OF ALL RESPONSES TO PHQ QUESTIONS 1-9: 7

## 2019-03-27 ASSESSMENT — PATIENT HEALTH QUESTIONNAIRE - PHQ9: SUM OF ALL RESPONSES TO PHQ QUESTIONS 1-9: 7

## 2019-03-31 DIAGNOSIS — M54.5 CHRONIC LOW BACK PAIN, UNSPECIFIED BACK PAIN LATERALITY, WITH SCIATICA PRESENCE UNSPECIFIED: ICD-10-CM

## 2019-03-31 DIAGNOSIS — G89.29 CHRONIC LOW BACK PAIN, UNSPECIFIED BACK PAIN LATERALITY, WITH SCIATICA PRESENCE UNSPECIFIED: ICD-10-CM

## 2019-04-01 ENCOUNTER — THERAPY VISIT (OUTPATIENT)
Dept: CHIROPRACTIC MEDICINE | Facility: CLINIC | Age: 49
End: 2019-04-01
Payer: COMMERCIAL

## 2019-04-01 DIAGNOSIS — M99.02 THORACIC SEGMENT DYSFUNCTION: ICD-10-CM

## 2019-04-01 DIAGNOSIS — M62.838 SPASM OF MUSCLE: ICD-10-CM

## 2019-04-01 DIAGNOSIS — M99.03 SOMATIC DYSFUNCTION OF LUMBAR REGION: ICD-10-CM

## 2019-04-01 DIAGNOSIS — M54.50 LUMBAGO: ICD-10-CM

## 2019-04-01 DIAGNOSIS — M99.05 SEGMENTAL DYSFUNCTION OF PELVIC REGION: Primary | ICD-10-CM

## 2019-04-01 PROCEDURE — 98941 CHIROPRACT MANJ 3-4 REGIONS: CPT | Mod: AT | Performed by: CHIROPRACTOR

## 2019-04-01 PROCEDURE — 97810 ACUP 1/> WO ESTIM 1ST 15 MIN: CPT | Performed by: CHIROPRACTOR

## 2019-04-01 RX ORDER — METHOCARBAMOL 750 MG/1
TABLET, FILM COATED ORAL
Qty: 60 TABLET | Refills: 0 | Status: SHIPPED | OUTPATIENT
Start: 2019-04-01 | End: 2019-04-18

## 2019-04-01 NOTE — PROGRESS NOTES
Visit #:  4    Subjective:  Velma Diaz is a 48 year old female who is seen in f/u up for:        Segmental dysfunction of pelvic region  Lumbago  Segmental dysfunction of lumbar region  Spasm of muscle  Thoracic segment dysfunction.     Since last visit on 3/25/2019,  Velma Diaz reports:    Area of chief complaint:  Lumbar :  Symptoms are graded at 6/10. The quality is described as stiff, achey, dull.  Motion has increased, but is still not normal. Patient feels that they have improved. Her back is still sore but is still having pain into her right hip.  Overall she is feeling improved but is still a little stiff and sore.     Objective:  The following was observed:    P: palpatory tenderness Piriformis R>>L  A: static palpation demonstrates intersegmental asymmetry , thoracic, lumbar, pelvis  R: motion palpation notes restricted motion, T10, T11 , T12 , L4 , L5  and PSIS Right   T: hypertonicity at: Piriformis R>>L    Segmental spinal dysfunction/restrictions found at:  T10, T11 , T12 , L4 , L5  and PSIS Right       Assessment:    Diagnoses:      1. Segmental dysfunction of pelvic region    2. Lumbago    3. Segmental dysfunction of lumbar region    4. Spasm of muscle    5. Thoracic segment dysfunction        Patient's condition:  Patient had restrictions pre-manipulation    Treatment effectiveness:  Post manipulation there is better intersegmental movement and Patient claims to feel looser post manipulation      Procedures:  CMT:  90946 Chiropractic manipulative treatment 3-4 regions performed   Thoracic: Activator, T10, T11, T12, Prone  Lumbar: Activator, L4, L5, Prone  Pelvis: Drop Table, PSIS Right , Prone    Modalities:  97117: Acupuncture, for 15 minutes:  Points: B25, B27, GV3, K3, B62, SI3  Ahsi point in piriformis  For 15 minutes    Therapeutic procedures:  None    Response to Treatment  Reduction in symptoms as reported by patient    Prognosis: Good    Progress towards Goals: Patient is making  progress towards the goal.     Recommendations:    Instructions:  ice 20 minutes every other hour as needed    Follow-up:    Return to care in one week.

## 2019-04-01 NOTE — TELEPHONE ENCOUNTER
Requested Prescriptions   Pending Prescriptions Disp Refills     methocarbamol (ROBAXIN) 750 MG tablet [Pharmacy Med Name: METHOCARBAMOL 750 MG TABLET] 60 tablet 0     Sig: TAKE 1 TABLET (750 MG) BY MOUTH 3 TIMES DAILY AS NEEDED FOR MUSCLE SPASMS    There is no refill protocol information for this order         Last Written Prescription Date:  12/31/18  Last Fill Quantity: 60,   # refills: 2  Last Office Visit: 3/18/19  Future Office visit:    Next 5 appointments (look out 90 days)    Apr 16, 2019  8:30 AM CDT  Return Visit with SERVANDO Yan  [facility] ([facility]) [address] [address]  [phone]   Jun 14, 2019 10:30 AM CDT  Return Visit with Joaquín Burger MD  [facility] [address] [place] [id number]  [phone]           Routing refill request to provider for review/approval because:  Drug not on the [facility], [facility] or  Health refill protocol or controlled substance

## 2019-04-10 DIAGNOSIS — G89.4 CHRONIC PAIN SYNDROME: ICD-10-CM

## 2019-04-10 RX ORDER — OXYCODONE AND ACETAMINOPHEN 10; 325 MG/1; MG/1
1 TABLET ORAL EVERY 6 HOURS PRN
Qty: 90 TABLET | Refills: 0 | Status: SHIPPED | OUTPATIENT
Start: 2019-04-10 | End: 2019-05-09

## 2019-04-10 RX ORDER — PREGABALIN 225 MG/1
CAPSULE ORAL
Qty: 60 CAPSULE | Refills: 5 | Status: SHIPPED | OUTPATIENT
Start: 2019-04-10 | End: 2019-10-31

## 2019-04-10 NOTE — TELEPHONE ENCOUNTER
Routing refill request to provider for review/approval because:  Drug not on the [facility] refill protocol     Dasia Rodrigez RN

## 2019-04-10 NOTE — TELEPHONE ENCOUNTER
[facility]   Patient notified that oxycodone prescription is available to  at the [facility] .

## 2019-04-10 NOTE — TELEPHONE ENCOUNTER
Reason for Call:  Medication or medication refill:    Do you use a [facility]?  Name of the [facility] and phone number [facility] the [facility] [place]    Name of the medication requested: oxycodone and Lyrica    Other request: please call patient to  oxycodone and , and [facility]    Can we leave a detailed message on this number? YES    Phone number patient can be reached at: Home number on file [phone] (home)    Best Time: anytime    Call taken on 4/10/2019 at 11:22 AM by Robson Spangler

## 2019-04-15 ENCOUNTER — ANCILLARY PROCEDURE (OUTPATIENT)
Dept: GENERAL RADIOLOGY | Facility: CLINIC | Age: 49
End: 2019-04-15
Attending: PEDIATRICS
Payer: COMMERCIAL

## 2019-04-15 ENCOUNTER — THERAPY VISIT (OUTPATIENT)
Dept: CHIROPRACTIC MEDICINE | Facility: CLINIC | Age: 49
End: 2019-04-15
Payer: COMMERCIAL

## 2019-04-15 ENCOUNTER — OFFICE VISIT (OUTPATIENT)
Dept: ORTHOPEDICS | Facility: CLINIC | Age: 49
End: 2019-04-15
Payer: COMMERCIAL

## 2019-04-15 VITALS — WEIGHT: 241 LBS | BODY MASS INDEX: 42.35 KG/M2 | DIASTOLIC BLOOD PRESSURE: 70 MMHG | SYSTOLIC BLOOD PRESSURE: 124 MMHG

## 2019-04-15 DIAGNOSIS — S99.921A INJURY OF RIGHT FOOT, INITIAL ENCOUNTER: ICD-10-CM

## 2019-04-15 DIAGNOSIS — M99.02 THORACIC SEGMENT DYSFUNCTION: ICD-10-CM

## 2019-04-15 DIAGNOSIS — M54.50 LUMBAGO: ICD-10-CM

## 2019-04-15 DIAGNOSIS — M62.838 SPASM OF MUSCLE: ICD-10-CM

## 2019-04-15 DIAGNOSIS — S99.911A INJURY OF RIGHT ANKLE, INITIAL ENCOUNTER: Primary | ICD-10-CM

## 2019-04-15 DIAGNOSIS — M99.05 SEGMENTAL DYSFUNCTION OF PELVIC REGION: Primary | ICD-10-CM

## 2019-04-15 DIAGNOSIS — M25.571 RIGHT ANKLE PAIN: ICD-10-CM

## 2019-04-15 DIAGNOSIS — M99.03 SEGMENTAL DYSFUNCTION OF LUMBAR REGION: ICD-10-CM

## 2019-04-15 DIAGNOSIS — S99.911A INJURY OF RIGHT ANKLE, INITIAL ENCOUNTER: ICD-10-CM

## 2019-04-15 PROCEDURE — 98941 CHIROPRACT MANJ 3-4 REGIONS: CPT | Mod: AT | Performed by: CHIROPRACTOR

## 2019-04-15 PROCEDURE — 99203 OFFICE O/P NEW LOW 30 MIN: CPT | Performed by: PEDIATRICS

## 2019-04-15 PROCEDURE — 97810 ACUP 1/> WO ESTIM 1ST 15 MIN: CPT | Performed by: CHIROPRACTOR

## 2019-04-15 PROCEDURE — 73610 X-RAY EXAM OF ANKLE: CPT | Mod: RT

## 2019-04-15 PROCEDURE — 73630 X-RAY EXAM OF FOOT: CPT | Mod: RT

## 2019-04-15 NOTE — PATIENT INSTRUCTIONS
Plan:  - Today's Plan of Care:  Topical Treatments: Ice  Over the counter medication: Patient's preferred OTC medication as directed on packaging.  Rehab: Home Exercise Program  Medical Equipment: Tall boot and ankle brace     -We also discussed other future treatment options:  Rehab: Physical Therapy    Follow Up: 1-2 weeks     If you have any further questions for your physician or physician s care team you can call [phone] and use option 3 to leave a voice message. Calls received during business hours will be returned same day.

## 2019-04-15 NOTE — PROGRESS NOTES
Visit #:  5    Subjective:  Velma Diaz is a 48 year old female who is seen in f/u up for:        Segmental dysfunction of pelvic region  Lumbago  Segmental dysfunction of lumbar region  Spasm of muscle  Thoracic segment dysfunction.     Since last visit on 4/1/2019,  Velma Diaz reports:    Area of chief complaint:  Lumbar :  Symptoms are graded at 6/10. The quality is described as stiff, achey, dull.  Motion has increased, but is still not normal. Patient feels that they have improvedbut she went to a conference for a few days last week and had a stress full week.  This caused her back to be very stiff and sore.  Overall she is feeling improved but is still a little stiff and sore.     Objective:  The following was observed:    P: palpatory tenderness Piriformis R>>L  A: static palpation demonstrates intersegmental asymmetry , thoracic, lumbar, pelvis  R: motion palpation notes restricted motion, T10, T11 , T12 , L4 , L5  and PSIS Right   T: hypertonicity at: Piriformis R>>L    Segmental spinal dysfunction/restrictions found at:  T10, T11 , T12 , L4 , L5  and PSIS Right       Assessment:    Diagnoses:      1. Segmental dysfunction of pelvic region    2. Lumbago    3. Segmental dysfunction of lumbar region    4. Spasm of muscle    5. Thoracic segment dysfunction        Patient's condition:  Patient had restrictions pre-manipulation    Treatment effectiveness:  Post manipulation there is better intersegmental movement and Patient claims to feel looser post manipulation      Procedures:  CMT:  26275 Chiropractic manipulative treatment 3-4 regions performed   Thoracic: Activator, T10, T11, T12, Prone  Lumbar: Activator, L4, L5, Prone  Pelvis: Drop Table, PSIS Right , Prone    Modalities:  09026: Acupuncture, for 15 minutes:  Points: B25, B27, GV3, K3, B62, SI3  Ahsi point in piriformis  For 15 minutes    Therapeutic procedures:  None    Response to Treatment  Reduction in symptoms as reported by  patient    Prognosis: Good    Progress towards Goals: Patient is making progress towards the goal.     Recommendations:    Instructions:  ice 20 minutes every other hour as needed    Follow-up:    Return to care in one week.

## 2019-04-15 NOTE — LETTER
4/15/2019         RE: Velma Diaz  [address]  [place] [address]        Dear Colleague,    Thank you for referring your patient, Velma Diaz, to the [facility]. Please see a copy of my visit note below.    Sports Medicine Clinic Visit    PCP: Srinivasa Hunter    Velma Diaz is a 48 year old female who is seen as an AIC self referral presenting with right ankle injury.    Injury: Patient reports an ankle injury 4 days ago.  She slipped on ice, twisted ankle.  Pain is lateral.  Does have prior injuries to that ankle.    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    Patient's current problem list, past medical and surgical history, and family history were reviewed.    Patient Active Problem List   Diagnosis     History of cleft palate with cleft lip     MYAA (obstructive sleep apnea)/Hypoventilation Syndrome     Major depressive disorder, recurrent episode, moderate (H)     Paresthesias     Health Care Home     Vitamin D deficiency     Morbid obesity with BMI of 40.0-44.9, adult (H)     Hyperlipidemia with target LDL less than 130     Intervertebral disc prolapse with impingement     Nonallopathic lesion of  lumbar region     Chronic pain syndrome     Restless legs syndrome (RLS)     Insomnia     Migraine     Chronic bilateral back pain, unspecified back location     Chronic pain of left knee     ROSE MARIE (generalized anxiety disorder)     Idiopathic peripheral neuropathy     Past Medical History:   Diagnosis Date     Chronic pain syndrome 11/20/2015    She takes up to 90 oxycodone tablets per month for her chronic pain      Depressive disorder 2000     History of cleft palate with cleft lip     cleft lip and palate, and has had 27 operations per the patient     Hyperlipidemia with target LDL less than 130 10/26/2015     Morbid obesity with BMI of 40.0-44.9, adult (H) 10/26/2015     Neuropathy      MAYA (obstructive sleep apnea)/Hypoventilation Syndrome 2/24/2014    Study Date: 2/13/2014- (245.1 lbs) Sleep Associated Hypoventilation  suggested with baseline PCO2 42 mmHg, maximum PCO2 55 mmHg. Lowest oxygen saturation 85.0% Apnea/Hypopnea Index 5.5 events per hour.  REM AHI 37.2.  The supine AHI is 13.8. RDI 6.7 Sleep study 3/2014 (245#)- CPAP 6 cm effective, Transcutaneous CO2 Monitoring (TCM) within normal limits.          Skin cancer of anterior chest 2011    Basal cell on chest     TMJ (temporomandibular joint disorder)      Past Surgical History:   Procedure Laterality Date     ANKLE SURGERY  7/13    Left for torn tendons & loose bone chips     ARTHROSCOPY KNEE  1986    Right knee     BACK SURGERY       Basal cell carcinoma  2011    Removal from the chest     BIOPSY       C VAGINAL HYSTERECTOMY  2011     CARPAL TUNNEL RELEASE RT/LT  ~2010    Bilateral     COLONOSCOPY  2016     COSMETIC SURGERY       cysto with right ureteroscopy, stone manipulation, double J stent removal  10/04/2018    Dr. Cisneros -- removal of right kidney stone     cysto, right retrograde pyelogram, right ureteral stent Right 09/2018    for obstructing right kidney stone     DECOMPRESSION CUBITAL TUNNEL Left 8/28/2015    Procedure: DECOMPRESSION  CUBITAL TUNNEL;  Surgeon: Gus Donato MD;  [facility]     ENT SURGERY  1975    Tonsillectomy and Adenoids     GYN SURGERY  2011    Hysterectomy     HC REPAIR PERONEAL TENDONS  7/13     ORTHOPEDIC SURGERY  2011, 2011    Bilateral CTR     PE TUBES      yearly times 10 years     REPAIR CLEFT PALATE CHILD      Age 3 months & afterwards (multiple surgeries)     SOFT TISSUE SURGERY  2013     Family History   Problem Relation Age of Onset     Alzheimer Disease Paternal Grandmother 60     Cerebrovascular Disease Paternal Grandmother      Neurologic Disorder Sister 20        migraines     Depression/Anxiety Sister      Depression Sister      Neurologic Disorder Son 14        migraines     Asthma Son      Heart Disease Mother      Osteoporosis Mother      Obesity Mother      Cancer Father      Alcohol/Drug Father      Other Cancer Father         saliva glands & skin CA      Hypertension Father      Diabetes Maternal Grandmother      Breast Cancer Maternal Grandmother      Obesity Maternal Grandmother      Other Cancer Maternal Grandfather         skin cancer     Cancer - colorectal Other         Aunt     Asthma Daughter      Asthma Daughter      Asthma Son      Thyroid Disease Sister      Colon Cancer Other      Obesity Sister      Glaucoma No family hx of      Macular Degeneration No family hx of          Objective  /70   Wt 109.3 kg (241 lb)   LMP  (LMP Unknown)   BMI 42.35 kg/m       GENERAL APPEARANCE: healthy, alert and no distress, overweight  GAIT: antalgic  SKIN: no suspicious lesions or rashes  HEENT: Sclera clear, anicteric  CV: good peripheral pulses  RESP: Breathing not labored  NEURO: Normal strength and tone, mentation intact and speech normal  PSYCH:  mentation appears normal and affect normal/bright    Bilateral Foot and Ankle Exam:  Inspection:       no visible ecchymosis       no visible edema or effusion       edema noted right lateral ankle    Foot inspection:       no deformity  noted    Tender:       lateral ankle ligaments  right       proximal 5th metatarsal  right    Non-Tender:       remainder of ankle and foot bilateral    ROM:        Slightly limited ankle dorsiflexion, plantarflexion, inversion and eversion right    Strength:       ankle dorsiflexion 4/5 right       plantarflexion 4/5 right       inversion 4/5 right       eversion 4/5 right    Special Tests:       neg (-) anterior drawer right       neg (-) talar tilt right    Gait:       antalgic gait    Neurovascular:       2+ peripheral pulses bilaterally and brisk capillary refill       sensation grossly intact      Radiology  I ordered, visualized and reviewed these images with the patient  6 XR views of right ankle and foot reviewed: no acute bony abnormality, mild degenerative change throughout and evidence of some prior avulsions (navicular)  - will follow official read      Assessment:  1. Injury of right ankle, initial encounter    2. Injury of right foot, initial encounter      Likely lateral ankle sprain given negative x-rays.  Recommend supportive care including cam walking boot, crutches, rest, ice, elevation. Follow up in 1-2 weeks for repeat exam. Would consider further imaging at that point pending clinical course. Patient will likely wean to ankle brace and need physical therapy at some point.     Plan:  - Today's Plan of Care:  Topical Treatments: Ice  Over the counter medication: Patient's preferred OTC medication as directed on packaging.  Rehab: Home Exercise Program  Medical Equipment: Tall boot and ankle brace     -We also discussed other future treatment options:  Rehab: Physical Therapy    Follow Up: 1-2 weeks     Concerning signs and symptoms were reviewed.  The patient expressed understanding of this management plan and all questions were answered at this time.    Jessica Jaramillo MD [facility]  Primary Care Sports Medicine  [facility]    Again, thank you for allowing me to participate in  the care of your patient.        Sincerely,        Jessica Jaramillo MD

## 2019-04-15 NOTE — PROGRESS NOTES
Sports Medicine Clinic Visit    PCP: Srinivasa Hunter    Velma Diaz is a 48 year old female who is seen as an AIC self referral presenting with right ankle injury.    Injury: Patient reports an ankle injury 4 days ago.  She slipped on ice, twisted ankle.  Pain is lateral.  Does have prior injuries to that ankle.    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    Patient's current problem list, past medical and surgical history, and family history were reviewed.    Patient Active Problem List   Diagnosis     History of cleft palate with cleft lip     MAYA (obstructive sleep apnea)/Hypoventilation Syndrome     Major depressive disorder, recurrent episode, moderate (H)     Paresthesias     Health Care Home     Vitamin D deficiency     Morbid obesity with BMI of 40.0-44.9, adult (H)     Hyperlipidemia with target LDL less than 130     Intervertebral disc prolapse with impingement     Nonallopathic lesion of lumbar region     Chronic pain syndrome     Restless legs syndrome (RLS)     Insomnia     Migraine     Chronic bilateral back pain, unspecified back location     Chronic pain of left knee     ROSE MARIE (generalized anxiety disorder)     Idiopathic peripheral  neuropathy     Past Medical History:   Diagnosis Date     Chronic pain syndrome 11/20/2015    She takes up to 90 oxycodone tablets per month for her chronic pain      Depressive disorder 2000     History of cleft palate with cleft lip     cleft lip and palate, and has had 27 operations per the patient     Hyperlipidemia with target LDL less than 130 10/26/2015     Morbid obesity with BMI of 40.0-44.9, adult (H) 10/26/2015     Neuropathy      MAYA (obstructive sleep apnea)/Hypoventilation Syndrome 2/24/2014    Study Date: 2/13/2014- (245.1 lbs) Sleep Associated Hypoventilation  suggested with baseline PCO2 42 mmHg, maximum PCO2 55 mmHg. Lowest oxygen saturation 85.0% Apnea/Hypopnea Index 5.5 events per hour.  REM AHI 37.2.  The supine AHI is 13.8. RDI 6.7 Sleep study 3/2014 (245#)- CPAP 6 cm effective, Transcutaneous CO2 Monitoring (TCM) within normal limits.          Skin cancer of anterior chest 2011    Basal cell on chest     TMJ (temporomandibular joint disorder)      Past Surgical History:   Procedure Laterality Date     ANKLE SURGERY  7/13    Left for torn tendons & loose bone chips     ARTHROSCOPY KNEE  1986    Right knee     BACK SURGERY       Basal cell carcinoma  2011    Removal from the chest     BIOPSY       C VAGINAL HYSTERECTOMY  2011     CARPAL TUNNEL RELEASE RT/LT  ~2010    Bilateral     COLONOSCOPY  2016     COSMETIC SURGERY       cysto with right ureteroscopy, stone manipulation, double J stent removal  10/04/2018    Dr. Cisneros -- removal of right kidney stone     cysto, right retrograde pyelogram, right ureteral stent Right 09/2018    for obstructing right kidney stone     DECOMPRESSION CUBITAL TUNNEL Left 8/28/2015    Procedure: DECOMPRESSION CUBITAL TUNNEL;  Surgeon: Gus Donato MD;  [facility]     ENT SURGERY  1975    Tonsillectomy and Adenoids     GYN SURGERY  2011    Hysterectomy     HC REPAIR PERONEAL TENDONS  7/13     ORTHOPEDIC SURGERY  2011, 2011    Bilateral CTR     PE  TUBES      yearly times 10 years     REPAIR CLEFT PALATE CHILD      Age 3 months & afterwards (multiple surgeries)     SOFT TISSUE SURGERY  2013     Family History   Problem Relation Age of Onset     Alzheimer Disease Paternal Grandmother 60     Cerebrovascular Disease Paternal Grandmother      Neurologic Disorder Sister 20        migraines     Depression/Anxiety Sister      Depression Sister      Neurologic Disorder Son 14        migraines     Asthma Son      Heart Disease Mother      Osteoporosis Mother      Obesity Mother      Cancer Father      Alcohol/Drug Father      Other Cancer Father         saliva glands & skin CA      Hypertension Father      Diabetes Maternal Grandmother      Breast Cancer Maternal Grandmother      Obesity Maternal Grandmother      Other Cancer Maternal Grandfather         skin cancer     Cancer - colorectal Other         Aunt     Asthma Daughter      Asthma Daughter      Asthma Son      Thyroid Disease Sister      Colon Cancer Other      Obesity Sister      Glaucoma No family hx of      Macular Degeneration No family hx of          Objective  /70   Wt 109.3 kg (241 lb)   LMP  (LMP Unknown)   BMI 42.35 kg/m      GENERAL APPEARANCE: healthy, alert and no distress, overweight  GAIT: antalgic  SKIN: no suspicious lesions or rashes  HEENT: Sclera clear, anicteric  CV: good peripheral pulses  RESP: Breathing not labored  NEURO: Normal strength and tone, mentation intact and speech normal  PSYCH:  mentation appears normal and affect normal/bright    Bilateral Foot and Ankle Exam:  Inspection:       no visible ecchymosis       no visible edema or effusion       edema noted right lateral ankle    Foot inspection:       no deformity noted    Tender:       lateral ankle ligaments  right       proximal 5th metatarsal  right    Non-Tender:       remainder of ankle and foot bilateral    ROM:        Slightly limited ankle dorsiflexion, plantarflexion, inversion and eversion  right    Strength:       ankle dorsiflexion 4/5 right       plantarflexion 4/5 right       inversion 4/5 right       eversion 4/5 right    Special Tests:       neg (-) anterior drawer right       neg (-) talar tilt right    Gait:       antalgic gait    Neurovascular:       2+ peripheral pulses bilaterally and brisk capillary refill       sensation grossly intact      Radiology  I ordered, visualized and reviewed these images with the patient  6 XR views of right ankle and foot reviewed: no acute bony abnormality, mild degenerative change throughout and evidence of some prior avulsions (navicular)  - will follow official read      Assessment:  1. Injury of right ankle, initial encounter    2. Injury of right foot, initial encounter      Likely lateral ankle sprain given negative x-rays.  Recommend supportive care including cam walking boot, crutches, rest, ice, elevation. Follow up in 1-2 weeks for repeat exam. Would consider further imaging at that point pending clinical course. Patient will likely wean to ankle brace and need physical therapy at some point.     Plan:  - Today's Plan of Care:  Topical Treatments: Ice  Over the counter medication: Patient's preferred OTC medication as directed on packaging.  Rehab: Home Exercise Program  Medical Equipment: Tall boot and ankle brace     -We also discussed other future treatment options:  Rehab: Physical Therapy    Follow Up: 1-2 weeks     Concerning signs and symptoms were reviewed.  The patient expressed understanding of this management plan and all questions were answered at this time.    Jessica Jaramillo MD CA[facility]  Primary Care Sports Medicine  [facility]

## 2019-04-16 ENCOUNTER — OFFICE VISIT (OUTPATIENT)
Dept: PSYCHOLOGY | Facility: CLINIC | Age: 49
End: 2019-04-16
Payer: COMMERCIAL

## 2019-04-16 DIAGNOSIS — F41.1 GAD (GENERALIZED ANXIETY DISORDER): ICD-10-CM

## 2019-04-16 DIAGNOSIS — F33.1 MAJOR DEPRESSIVE DISORDER, RECURRENT EPISODE, MODERATE (H): Primary | ICD-10-CM

## 2019-04-16 PROCEDURE — 90834 PSYTX W PT 45 MINUTES: CPT | Performed by: SOCIAL WORKER

## 2019-04-16 ASSESSMENT — ANXIETY QUESTIONNAIRES
6. BECOMING EASILY ANNOYED OR IRRITABLE: SEVERAL DAYS
7. FEELING AFRAID AS IF SOMETHING AWFUL MIGHT HAPPEN: NOT AT ALL
2. NOT BEING ABLE TO STOP OR CONTROL WORRYING: NOT AT ALL
1. FEELING NERVOUS, ANXIOUS, OR ON EDGE: SEVERAL DAYS
3. WORRYING TOO MUCH ABOUT DIFFERENT THINGS: NOT AT ALL
GAD7 TOTAL SCORE: 2
5. BEING SO RESTLESS THAT IT IS HARD TO SIT STILL: NOT AT ALL
IF YOU CHECKED OFF ANY PROBLEMS ON THIS QUESTIONNAIRE, HOW DIFFICULT HAVE THESE PROBLEMS MADE IT FOR YOU TO DO YOUR WORK, TAKE CARE OF THINGS AT HOME, OR GET ALONG WITH OTHER PEOPLE: NOT DIFFICULT AT ALL

## 2019-04-16 ASSESSMENT — PATIENT HEALTH QUESTIONNAIRE - PHQ9
SUM OF ALL RESPONSES TO PHQ QUESTIONS 1-9: 3
5. POOR APPETITE OR OVEREATING: NOT AT ALL
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
## 2019-04-17 ASSESSMENT — ANXIETY QUESTIONNAIRES: GAD7 TOTAL SCORE: 2

## 2019-04-18 DIAGNOSIS — M54.5 CHRONIC LOW BACK PAIN, UNSPECIFIED BACK PAIN LATERALITY, WITH SCIATICA PRESENCE UNSPECIFIED: ICD-10-CM

## 2019-04-18 DIAGNOSIS — G89.29 CHRONIC LOW BACK PAIN, UNSPECIFIED BACK PAIN LATERALITY, WITH SCIATICA PRESENCE UNSPECIFIED: ICD-10-CM

## 2019-04-18 RX ORDER — METHOCARBAMOL 750 MG/1
TABLET, FILM COATED ORAL
Qty: 60 TABLET | Refills: 1 | Status: SHIPPED | OUTPATIENT
Start: 2019-04-18 | End: 2019-05-23

## 2019-04-18 NOTE — TELEPHONE ENCOUNTER
Requested Prescriptions   Pending Prescriptions Disp Refills     methocarbamol (ROBAXIN) 750 MG tablet [Pharmacy Med Name: METHOCARBAMOL 750 MG TABLET] 60 tablet 0     Sig: TAKE 1 TABLET (750 MG) BY MOUTH 3 TIMES DAILY AS NEEDED FOR MUSCLE SPASMS       There is no refill protocol information for this order   Last Written Prescription Date:  4-1-19  Last Fill Quantity: 60,  # refills: 0   Last office visit: 3/18/2019 with prescribing provider:  2-15-19   Future Office Visit:   Next 5 appointments (look out 90 days)    Apr 22, 2019 10:00 AM CDT  Return Visit with Jessica Jaramillo MD  [facility] [place] ([facility] [place]) [address] [address]  [place] [address]  [phone]   May 28, 2019  9:30 AM CDT  Return Visit with SERVANDO Yan  [facility] ([facility]) [address] [facility] [address]  [phone]   Jun 14, 2019 10:30 AM CDT  [facility] Visit with Joaquín Burger MD  [facility] [address] [place] [id number] [phone]

## 2019-04-22 ENCOUNTER — OFFICE VISIT (OUTPATIENT)
Dept: ORTHOPEDICS | Facility: CLINIC | Age: 49
End: 2019-04-22
Payer: COMMERCIAL

## 2019-04-22 ENCOUNTER — ANCILLARY PROCEDURE (OUTPATIENT)
Dept: GENERAL RADIOLOGY | Facility: CLINIC | Age: 49
End: 2019-04-22
Attending: PEDIATRICS
Payer: COMMERCIAL

## 2019-04-22 ENCOUNTER — ANCILLARY PROCEDURE (OUTPATIENT)
Dept: MRI IMAGING | Facility: CLINIC | Age: 49
End: 2019-04-22
Attending: PEDIATRICS
Payer: COMMERCIAL

## 2019-04-22 ENCOUNTER — THERAPY VISIT (OUTPATIENT)
Dept: CHIROPRACTIC MEDICINE | Facility: CLINIC | Age: 49
End: 2019-04-22
Payer: COMMERCIAL

## 2019-04-22 VITALS
HEIGHT: 63 IN | WEIGHT: 242 LBS | DIASTOLIC BLOOD PRESSURE: 82 MMHG | SYSTOLIC BLOOD PRESSURE: 122 MMHG | BODY MASS INDEX: 42.88 KG/M2

## 2019-04-22 DIAGNOSIS — M99.02 THORACIC SEGMENT DYSFUNCTION: ICD-10-CM

## 2019-04-22 DIAGNOSIS — S99.911D INJURY OF RIGHT ANKLE, SUBSEQUENT ENCOUNTER: Primary | ICD-10-CM

## 2019-04-22 DIAGNOSIS — M62.838 SPASM OF MUSCLE: ICD-10-CM

## 2019-04-22 DIAGNOSIS — M99.03 SEGMENTAL DYSFUNCTION OF LUMBAR REGION: ICD-10-CM

## 2019-04-22 DIAGNOSIS — S99.911D INJURY OF RIGHT ANKLE, SUBSEQUENT ENCOUNTER: ICD-10-CM

## 2019-04-22 DIAGNOSIS — M54.50 LUMBAGO: ICD-10-CM

## 2019-04-22 DIAGNOSIS — M99.05 SEGMENTAL DYSFUNCTION OF PELVIC REGION: Primary | ICD-10-CM

## 2019-04-22 PROCEDURE — 97810 ACUP 1/> WO ESTIM 1ST 15 MIN: CPT | Performed by: CHIROPRACTOR

## 2019-04-22 PROCEDURE — 99214 OFFICE O/P EST MOD 30 MIN: CPT | Performed by: PEDIATRICS

## 2019-04-22 PROCEDURE — 73721 MRI JNT OF LWR EXTRE W/O DYE: CPT | Mod: RT | Performed by: RADIOLOGY

## 2019-04-22 PROCEDURE — 98941 CHIROPRACT MANJ 3-4 REGIONS: CPT | Mod: AT | Performed by: CHIROPRACTOR

## 2019-04-22 PROCEDURE — 73610 X-RAY EXAM OF ANKLE: CPT | Mod: RT

## 2019-04-22 ASSESSMENT — MIFFLIN-ST. JEOR: SCORE: 1696.83

## 2019-04-22 NOTE — PATIENT INSTRUCTIONS
Plan:  - Today's Plan of Care:  MRI of the right ankle  Continue with relative rest and activity modification, Ice, Compression, and Elevation.  Can apply ice 10-15 minutes 3-4 times per day as needed.  Continue with walking boot and home exercise program    -We also discussed other future treatment options:  Rehab: Physical Therapy    Follow Up: In clinic with Dr. Jaramillo after MRI (wait at least 1-2 days)    If you have any further questions for your physician or physician s care team you can call [phone] and use option 3 to leave a voice message. Calls received during business hours will be returned same day.

## 2019-04-22 NOTE — LETTER
4/22/2019         RE: Velma Diaz  [address]  [place] [address]        Dear Colleague,    Thank you for referring your patient, Velma Diaz, to the [facility] [place]. Please see a copy of my visit note below.    Sports Medicine Clinic Visit - Interim History April 22, 2019    PCP: Srinivasa Hunter    Velma Diaz is a 48 year old female who is seen in f/u up for Injury of right ankle, subsequent encounter. Since last visit on 4/15/19, patient has been using the walking boot and ankle brace with no improvement in her ankle pain. She reports her pain has increased in the lateral ankle. She has sharp pain with weight bearing. She is currently using a cane to limit weight bearing.    Symptoms are better with: Ice, Rest and Elevation  Symptoms are worse with: weight bearing and dorsiflexion  Additional Features:   Positive: pain   Negative: swelling, bruising, popping, grinding, catching, locking, instability, paresthesias, numbness and weakness    Social History: Unemployed    Review of Systems  Skin: yes bruising, yes swelling  Musculoskeletal: as above  Neurologic: no numbness, paresthesias  Remainder of review of systems is negative including constitutional, CV, pulmonary, GI, except as noted in HPI or medical history.    Patient's current problem list, past medical and surgical history, and family history were reviewed.    Patient Active Problem List   Diagnosis     History of cleft palate with cleft lip     MAYA (obstructive sleep apnea)/Hypoventilation Syndrome     Major depressive disorder, recurrent episode, moderate (H)     Paresthesias     Health Care Home     Vitamin D deficiency     Morbid obesity with BMI of 40.0-44.9, adult (H)     Hyperlipidemia with target LDL less than 130     Intervertebral disc prolapse with impingement     Nonallopathic lesion of lumbar region     Chronic pain syndrome     Restless legs syndrome (RLS)     Insomnia     Migraine     Chronic  bilateral back pain, unspecified back location     Chronic pain of left knee     ROSE MARIE (generalized anxiety disorder)     Idiopathic peripheral neuropathy     Past Medical History:   Diagnosis Date     Chronic pain syndrome 11/20/2015    She takes up to 90 oxycodone tablets per month for her chronic pain      Depressive disorder 2000     History of cleft palate with cleft lip     cleft lip and palate, and has had 27 operations per the patient     Hyperlipidemia with target LDL less than 130 10/26/2015     Morbid obesity with BMI of 40.0-44.9, adult (H) 10/26/2015     Neuropathy      MAYA (obstructive sleep apnea)/Hypoventilation Syndrome 2/24/2014    Study Date: 2/13/2014- (245.1 lbs) Sleep Associated Hypoventilation  suggested with baseline PCO2 42 mmHg, maximum PCO2 55 mmHg. Lowest oxygen saturation 85.0% Apnea/Hypopnea Index 5.5 events per hour.  REM AHI 37.2.  The supine AHI is 13.8. RDI 6.7 Sleep study 3/2014 (245#)- CPAP 6 cm effective, Transcutaneous CO2 Monitoring (TCM) within normal limits.          Skin cancer of anterior chest 2011    Basal cell on chest     TMJ (temporomandibular joint disorder)      Past Surgical History:   Procedure Laterality Date     ANKLE SURGERY  7/13    Left for torn tendons & loose bone chips     ARTHROSCOPY KNEE  1986    Right knee     BACK SURGERY       Basal cell carcinoma  2011    Removal from the chest     BIOPSY       C VAGINAL HYSTERECTOMY  2011     CARPAL TUNNEL RELEASE RT/LT  ~2010    Bilateral     COLONOSCOPY  2016     COSMETIC SURGERY       cysto with right ureteroscopy, stone manipulation, double J stent removal  10/04/2018    Dr. Cisneros -- removal of right kidney stone     cysto, right retrograde pyelogram, right ureteral stent Right 09/2018    for obstructing right kidney stone     DECOMPRESSION CUBITAL TUNNEL Left 8/28/2015    Procedure: DECOMPRESSION CUBITAL TUNNEL;  Surgeon: Gus Donato MD;  [facility]     ENT SURGERY  1975    Tonsillectomy and  "Adenoids     GYN SURGERY  2011    Hysterectomy     HC REPAIR PERONEAL TENDONS  7/13     ORTHOPEDIC SURGERY  2011, 2011    Bilateral CTR     PE TUBES      yearly times 10 years     REPAIR CLEFT PALATE CHILD      Age 3 months & afterwards (multiple surgeries)     SOFT TISSUE SURGERY  2013     Family History   Problem Relation Age of Onset     Alzheimer Disease Paternal Grandmother 60     Cerebrovascular Disease Paternal Grandmother      Neurologic Disorder Sister 20        migraines     Depression/Anxiety Sister      Depression Sister      Neurologic Disorder Son 14        migraines     Asthma Son      Heart Disease Mother      Osteoporosis Mother      Obesity Mother      Cancer Father      Alcohol/Drug Father      Other Cancer Father         saliva glands & skin CA      Hypertension Father      Diabetes Maternal Grandmother      Breast Cancer Maternal Grandmother      Obesity Maternal Grandmother      Other Cancer Maternal Grandfather         skin cancer     Cancer - colorectal Other         Aunt     Asthma Daughter      Asthma Daughter      Asthma Son      Thyroid Disease Sister      Colon Cancer Other      Obesity Sister      Glaucoma No family hx of      Macular Degeneration No family hx of        Objective  /82 (BP Location: Left arm, Patient Position: Chair, Cuff Size: Adult Regular)   Ht 1.6 m (5' 3\")   Wt 109.8 kg (242 lb)   LMP  (LMP Unknown)   BMI 42.87 kg/m       GENERAL APPEARANCE: healthy, alert and no distress, overweight  GAIT: antalgic  SKIN: no suspicious lesions or rashes  HEENT: Sclera clear, anicteric  CV: good peripheral pulses  RESP: Breathing not labored  NEURO: Normal strength and tone, mentation intact and speech normal  PSYCH:  mentation appears normal and affect normal/bright     Bilateral Foot and Ankle Exam:  Inspection:       no visible ecchymosis       no visible edema or effusion       edema noted right lateral ankle     Foot inspection:       no deformity noted     Tender:    "    lateral ankle ligaments  right     Non-Tender:       remainder of ankle and foot bilateral     ROM:        Slightly limited ankle dorsiflexion, plantarflexion, inversion and eversion right     Strength:       ankle dorsiflexion 4/5 right       plantarflexion 4/5 right       inversion 4/5 right       eversion 4/5 right     Special Tests:       neg (-) anterior drawer right       neg (-) talar tilt right     Gait:       antalgic gait     Neurovascular:       2+ peripheral pulses bilaterally and brisk capillary refill       sensation grossly intact      Radiology  I ordered, visualized and reviewed these images with the patient  6 XR views of right ankle and foot reviewed: no acute bony abnormality, mild degenerative change throughout and evidence of some prior avulsions (navicular)  - will follow official read    Assessment:  1. Injury of right ankle, subsequent encounter      Right ankle injury, given lack of improvement, neuropathy we discussed continued immobilization v. Utility of MRI imaging.  Will obtain MRI.  Continue walking boot and supportive care in the interim.    Plan:  - Today's Plan of Care:  MRI of the right ankle  Continue with relative rest and activity modification, Ice, Compression, and Elevation.  Can apply ice 10-15 minutes 3-4 times per day as needed.  Continue with walking boot and home exercise program    -We also discussed other future treatment options:  Rehab: Physical Therapy    Follow Up: In clinic with Dr. Jaramillo after MRI (wait at least 1-2 days)    Concerning signs and symptoms were reviewed.  The patient expressed understanding of this management plan and all questions were answered at this time.    Jessica Jaramillo MD [facility]  Primary Care Sports Medicine  [facility]    Again, thank you for allowing me to participate in the care of your patient.        Sincerely,        Jessica Jaramillo MD

## 2019-04-22 NOTE — PROGRESS NOTES
Sports Medicine Clinic Visit - Interim History April 22, 2019    PCP: Srinivasa Hunter Emily is a 48 year old female who is seen in f/u up for Injury of right ankle, subsequent encounter. Since last visit on 4/15/19, patient has been using the walking boot and ankle brace with no improvement in her ankle pain. She reports her pain has increased in the lateral ankle. She has sharp pain with weight bearing. She is currently using a cane to limit weight bearing.    Symptoms are better with: Ice, Rest and Elevation  Symptoms are worse with: weight bearing and dorsiflexion  Additional Features:   Positive: pain   Negative: swelling, bruising, popping, grinding, catching, locking, instability, paresthesias, numbness and weakness    Social History: Unemployed    Review of Systems  Skin: yes bruising, yes swelling  Musculoskeletal: as above  Neurologic: no numbness, paresthesias  Remainder of review of systems is negative including constitutional, CV, pulmonary, GI, except as noted in HPI or medical history.    Patient's current problem list, past medical and surgical history, and family history were reviewed.    Patient Active Problem List   Diagnosis     History of cleft palate with cleft lip     MAYA (obstructive sleep apnea)/Hypoventilation Syndrome     Major depressive disorder, recurrent episode, moderate (H)     Paresthesias     Health Care Home     Vitamin D deficiency     Morbid obesity with BMI of 40.0-44.9, adult (H)     Hyperlipidemia with target LDL less than 130     Intervertebral disc prolapse with impingement     Nonallopathic lesion of lumbar region     Chronic pain syndrome     Restless legs syndrome (RLS)     Insomnia     Migraine     Chronic bilateral back pain, unspecified back location     Chronic pain of left knee     ROSE MARIE (generalized anxiety disorder)     Idiopathic peripheral neuropathy     Past Medical History:   Diagnosis Date     Chronic pain syndrome 11/20/2015    She takes up to 90  oxycodone tablets per month for her chronic pain      Depressive disorder 2000     History of cleft palate with cleft lip     cleft lip and palate, and has had 27 operations per the patient     Hyperlipidemia with target LDL less than 130 10/26/2015     Morbid obesity with BMI of 40.0-44.9, adult (H) 10/26/2015     Neuropathy      MAYA (obstructive sleep apnea)/Hypoventilation Syndrome 2/24/2014    Study Date: 2/13/2014- (245.1 lbs) Sleep Associated Hypoventilation  suggested with baseline PCO2 42 mmHg, maximum PCO2 55 mmHg. Lowest oxygen saturation 85.0% Apnea/Hypopnea Index 5.5 events per hour.  REM AHI 37.2.  The supine AHI is 13.8. RDI 6.7 Sleep study 3/2014 (245#)- CPAP 6 cm effective, Transcutaneous CO2 Monitoring (TCM) within normal limits.          Skin cancer of anterior chest 2011    Basal cell on chest     TMJ (temporomandibular joint disorder)      Past Surgical History:   Procedure Laterality Date     ANKLE SURGERY  7/13    Left for torn tendons & loose bone chips     ARTHROSCOPY KNEE  1986    Right knee     BACK SURGERY       Basal cell carcinoma  2011    Removal from the chest     BIOPSY       C VAGINAL HYSTERECTOMY  2011     CARPAL TUNNEL RELEASE RT/LT  ~2010    Bilateral     COLONOSCOPY  2016     COSMETIC SURGERY       cysto with right ureteroscopy, stone manipulation, double J stent removal  10/04/2018    Dr. Cisneros -- removal of right kidney stone     cysto, right retrograde pyelogram, right ureteral stent Right 09/2018    for obstructing right kidney stone     DECOMPRESSION CUBITAL TUNNEL Left 8/28/2015    Procedure: DECOMPRESSION CUBITAL TUNNEL;  Surgeon: Gus Donato MD;  [facility]     ENT SURGERY  1975    Tonsillectomy and Adenoids     GYN SURGERY  2011    Hysterectomy     HC REPAIR PERONEAL TENDONS  7/13     ORTHOPEDIC SURGERY  2011, 2011    Bilateral CTR     PE TUBES      yearly times 10 years     REPAIR CLEFT PALATE CHILD      Age 3 months & afterwards (multiple surgeries)  "    SOFT TISSUE SURGERY  2013     Family History   Problem Relation Age of Onset     Alzheimer Disease Paternal Grandmother 60     Cerebrovascular Disease Paternal Grandmother      Neurologic Disorder Sister 20        migraines     Depression/Anxiety Sister      Depression Sister      Neurologic Disorder Son 14        migraines     Asthma Son      Heart Disease Mother      Osteoporosis Mother      Obesity Mother      Cancer Father      Alcohol/Drug Father      Other Cancer Father         saliva glands & skin CA      Hypertension Father      Diabetes Maternal Grandmother      Breast Cancer Maternal Grandmother      Obesity Maternal Grandmother      Other Cancer Maternal Grandfather         skin cancer     Cancer - colorectal Other         Aunt     Asthma Daughter      Asthma Daughter      Asthma Son      Thyroid Disease Sister      Colon Cancer Other      Obesity Sister      Glaucoma No family hx of      Macular Degeneration No family hx of        Objective  /82 (BP Location: Left arm, Patient Position: Chair, Cuff Size: Adult Regular)   Ht 1.6 m (5' 3\")   Wt 109.8 kg (242 lb)   LMP  (LMP Unknown)   BMI 42.87 kg/m      GENERAL APPEARANCE: healthy, alert and no distress, overweight  GAIT: antalgic  SKIN: no suspicious lesions or rashes  HEENT: Sclera clear, anicteric  CV: good peripheral pulses  RESP: Breathing not labored  NEURO: Normal strength and tone, mentation intact and speech normal  PSYCH:  mentation appears normal and affect normal/bright     Bilateral Foot and Ankle Exam:  Inspection:       no visible ecchymosis       no visible edema or effusion       edema noted right lateral ankle     Foot inspection:       no deformity noted     Tender:       lateral ankle ligaments  right     Non-Tender:       remainder of ankle and foot bilateral     ROM:        Slightly limited ankle dorsiflexion, plantarflexion, inversion and eversion right     Strength:       ankle dorsiflexion 4/5 right       " plantarflexion 4/5 right       inversion 4/5 right       eversion 4/5 right     Special Tests:       neg (-) anterior drawer right       neg (-) talar tilt right     Gait:       antalgic gait     Neurovascular:       2+ peripheral pulses bilaterally and brisk capillary refill       sensation grossly intact      Radiology  I ordered, visualized and reviewed these images with the patient  6 XR views of right ankle and foot reviewed: no acute bony abnormality, mild degenerative change throughout and evidence of some prior avulsions (navicular)  - will follow official read    Assessment:  1. Injury of right ankle, subsequent encounter      Right ankle injury, given lack of improvement, neuropathy we discussed continued immobilization v. Utility of MRI imaging.  Will obtain MRI.  Continue walking boot and supportive care in the interim.    Plan:  - Today's Plan of Care:  MRI of the right ankle  Continue with relative rest and activity modification, Ice, Compression, and Elevation.  Can apply ice 10-15 minutes 3-4 times per day as needed.  Continue with walking boot and home exercise program    -We also discussed other future treatment options:  Rehab: Physical Therapy    Follow Up: In clinic with Dr. Jaramillo after MRI (wait at least 1-2 days)    Concerning signs and symptoms were reviewed.  The patient expressed understanding of this management plan and all questions were answered at this time.    Jessica Jaramillo MD [facility]  Primary Care Sports Medicine  [facility]

## 2019-04-22 NOTE — PROGRESS NOTES
Visit #:  6    Subjective:  Velma Diaz is a 48 year old female who is seen in f/u up for:        Segmental dysfunction of pelvic region  Lumbago  Segmental dysfunction of lumbar region  Spasm of muscle  Thoracic segment dysfunction.     Since last visit on 4/15/2019,  Velma Diaz reports:    Area of chief complaint:  Lumbar :  Symptoms are graded at 6/10. The quality is described as stiff, achey, dull.  Motion has decreased . Patient feels that her low back is stiff and sore.  She attributes this wo having to walk in a ankle boot as she sprained her ankle.  This has affected her gait.  He low back is a little more stiff and sore as a result.     Objective:  The following was observed:    P: palpatory tenderness Piriformis R>>L  A: static palpation demonstrates intersegmental asymmetry , thoracic, lumbar, pelvis  R: motion palpation notes restricted motion, T10, T11 , T12 , L4 , L5  and PSIS Right   T: hypertonicity at: Piriformis R>>L    Segmental spinal dysfunction/restrictions found at:  T10, T11 , T12 , L4 , L5  and PSIS Right       Assessment:    Diagnoses:      1. Segmental dysfunction of pelvic region    2. Lumbago    3. Segmental dysfunction of lumbar region    4. Spasm of muscle    5. Thoracic segment dysfunction        Patient's condition:  Patient had restrictions pre-manipulation    Treatment effectiveness:  Post manipulation there is better intersegmental movement and Patient claims to feel looser post manipulation      Procedures:  CMT:  82756 Chiropractic manipulative treatment 3-4 regions performed   Thoracic: Activator, T10, T11, T12, Prone  Lumbar: Activator, L4, L5, Prone  Pelvis: Drop Table, PSIS Right , Prone    Modalities:  84954: Acupuncture, for 15 minutes:  Points: B25, B27, GV3, K3, B62, SI3  Ahsi point in piriformis  For 15 minutes    Therapeutic procedures:  None    Response to Treatment  Reduction in symptoms as reported by patient    Prognosis: Good    Progress towards Goals:  Patient is making progress towards the goal.     Recommendations:    Instructions:  ice 20 minutes every other hour as needed    Follow-up:    Return to care in one week.

## 2019-04-25 ENCOUNTER — OFFICE VISIT (OUTPATIENT)
Dept: ORTHOPEDICS | Facility: CLINIC | Age: 49
End: 2019-04-25
Payer: COMMERCIAL

## 2019-04-25 VITALS
WEIGHT: 242 LBS | HEIGHT: 63 IN | SYSTOLIC BLOOD PRESSURE: 126 MMHG | DIASTOLIC BLOOD PRESSURE: 82 MMHG | BODY MASS INDEX: 42.88 KG/M2

## 2019-04-25 DIAGNOSIS — S99.911D INJURY OF RIGHT ANKLE, SUBSEQUENT ENCOUNTER: Primary | ICD-10-CM

## 2019-04-25 PROCEDURE — 99213 OFFICE O/P EST LOW 20 MIN: CPT | Performed by: PEDIATRICS

## 2019-04-25 ASSESSMENT — MIFFLIN-ST. JEOR: SCORE: 1696.83

## 2019-04-25 NOTE — LETTER
4/25/2019         RE: Velma Diaz  [address]  [place] [address]        Dear Colleague,    Thank you for referring your patient, Velma Diaz, to the [facility]. Please see a copy of my visit note below.    Sports Medicine Clinic Visit - Interim History April 25, 2019    PCP: Srinivasa Hunter    Velma Diaz is a 48 year old female who is seen in f/u up for Injury of right ankle, subsequent encounter. Since last visit on 4/22/19 patient has completed an MRI of the right ankle. She reports her ankle pain is not as intense and she reports she is able to walk more with the walking boot.    Symptoms are better with: Ice, Rest and Elevation  Symptoms are worse with: weight bearing and Dorsiflexion  Additional Features:   Positive: pain   Negative: swelling, bruising, popping, grinding, catching, locking, instability, paresthesias, numbness and weakness    Social History: Unemployed    Review of Systems  Skin: no bruising, no swelling  Musculoskeletal: as above  Neurologic: no numbness, paresthesias  Remainder of review of systems is negative including constitutional, CV, pulmonary, GI, except as noted in HPI or medical history.    Patient's current problem list, past medical and surgical history, and family history were reviewed.    Patient Active Problem List   Diagnosis     History of cleft palate with cleft lip     MAAY (obstructive sleep apnea)/Hypoventilation Syndrome     Major depressive disorder, recurrent episode, moderate (H)     Paresthesias     Health Care Home     Vitamin D deficiency     Morbid obesity with BMI of 40.0-44.9, adult (H)     Hyperlipidemia with target LDL less than 130     Intervertebral disc prolapse with impingement     Nonallopathic lesion of lumbar region     Chronic pain syndrome     Restless legs syndrome (RLS)     Insomnia     Migraine     Chronic bilateral back pain, unspecified back location     Chronic pain of left knee     ROSE MARIE  (generalized anxiety disorder)     Idiopathic peripheral neuropathy     Past Medical History:   Diagnosis Date     Chronic pain syndrome 11/20/2015    She takes up to 90 oxycodone tablets per month for her chronic pain      Depressive disorder 2000     History of cleft palate with cleft lip     cleft lip and palate, and has had 27 operations per the patient     Hyperlipidemia with target LDL less than 130 10/26/2015     Morbid obesity with BMI of 40.0-44.9, adult (H) 10/26/2015     Neuropathy      MAYA (obstructive sleep apnea)/Hypoventilation Syndrome 2/24/2014    Study Date: 2/13/2014- (245.1 lbs) Sleep Associated Hypoventilation  suggested with baseline PCO2 42 mmHg, maximum PCO2 55 mmHg. Lowest oxygen saturation 85.0% Apnea/Hypopnea Index 5.5 events per hour.  REM AHI 37.2.  The supine AHI is 13.8. RDI 6.7 Sleep study 3/2014 (245#)- CPAP 6 cm effective, Transcutaneous CO2 Monitoring (TCM) within normal limits.          Skin cancer of anterior chest 2011    Basal cell on chest     TMJ (temporomandibular joint disorder)      Past Surgical History:   Procedure Laterality Date     ANKLE SURGERY  7/13    Left for torn tendons & loose bone chips     ARTHROSCOPY KNEE  1986    Right knee     BACK SURGERY       Basal cell carcinoma  2011    Removal from the chest     BIOPSY       C VAGINAL HYSTERECTOMY  2011     CARPAL TUNNEL RELEASE RT/LT  ~2010    Bilateral     COLONOSCOPY  2016     COSMETIC SURGERY       cysto with right ureteroscopy, stone manipulation, double J stent removal  10/04/2018    Dr. Cisneros -- removal of right kidney stone     cysto, right retrograde pyelogram, right ureteral stent Right 09/2018    for obstructing right kidney stone     DECOMPRESSION CUBITAL TUNNEL Left 8/28/2015    Procedure: DECOMPRESSION CUBITAL TUNNEL;  Surgeon: Gus Donato MD;  [facility]     ENT SURGERY  1975    Tonsillectomy and Adenoids     GYN SURGERY  2011    Hysterectomy     HC REPAIR PERONEAL TENDONS  7/13      "ORTHOPEDIC SURGERY  2011, 2011    Bilateral CTR     PE TUBES      yearly times 10 years     REPAIR CLEFT PALATE CHILD      Age 3 months & afterwards (multiple surgeries)     SOFT TISSUE SURGERY  2013     Family History   Problem Relation Age of Onset     Alzheimer Disease Paternal Grandmother 60     Cerebrovascular Disease Paternal Grandmother      Neurologic Disorder Sister 20        migraines     Depression/Anxiety Sister      Depression Sister      Neurologic Disorder Son 14        migraines     Asthma Son      Heart Disease Mother      Osteoporosis Mother      Obesity Mother      Cancer Father      Alcohol/Drug Father      Other Cancer Father         saliva glands & skin CA      Hypertension Father      Diabetes Maternal Grandmother      Breast Cancer Maternal Grandmother      Obesity Maternal Grandmother      Other Cancer Maternal Grandfather         skin cancer     Cancer - colorectal Other         Aunt     Asthma Daughter      Asthma Daughter      Asthma Son      Thyroid Disease Sister      Colon Cancer Other      Obesity Sister      Glaucoma No family hx of      Macular Degeneration No family hx of        Objective  /82 (BP Location: Left arm, Patient Position: Chair, Cuff Size: Adult Regular)   Ht 1.6 m (5' 3\")   Wt 109.8 kg (242 lb)   LMP  (LMP Unknown)   BMI 42.87 kg/m       GENERAL APPEARANCE: healthy, alert and no distress   GAIT: slightly antalgic  SKIN: no suspicious lesions or rashes  HEENT: Sclera clear, anicteric  CV: good peripheral pulses  RESP: Breathing not labored  NEURO: Normal strength and tone, mentation intact and speech normal  PSYCH:  mentation appears normal and affect normal/bright    Bilateral Foot and Ankle Exam:  Inspection:       no visible ecchymosis       no visible edema or effusion       edema noted right lateral ankle - mild     Foot inspection:       no deformity noted     Tender:       lateral ankle ligaments  right     Non-Tender:       remainder of ankle and foot " bilateral     ROM:        Slightly limited ankle dorsiflexion, plantarflexion, inversion and eversion right     Strength:       ankle dorsiflexion 4/5 right       plantarflexion 4/5 right       inversion 4/5 right       eversion 4/5 right     Special Tests:       neg (-) anterior drawer right       neg (-) talar tilt right     Gait:       antalgic gait     Neurovascular:       2+ peripheral pulses bilaterally and brisk capillary refill       sensation grossly intact    Radiology  I ordered, visualized and reviewed these images with the patient  Exam: MRI of the right ankle dated 4/22/2019.     COMPARISON: Radiographs dated 4/22/2019.     CLINICAL HISTORY: Ankle pain.     TECHNIQUE: Multiplanar, multisequence MR imaging of the right ankle  was obtained using standard sequences in 3 orthogonal planes without  the use of intravenous or intra-articular gadolinium contrast.     FINDINGS:     Small amount of fluid at the right ankle joint.     No marrow signal abnormalities in the right ankle to suggest fracture,  osteonecrosis, or marrow infiltration.     The Achilles tendon is intact. The plantar fascia is unremarkable.  Normal fat in the sinus tarsi.     No osteochondral lesion. The deep and superficial fibers of the  deltoid ligamentous complex are intact. The plantar musculature is  intact.      The anterior extensor tendons are intact. The Lisfranc ligament is  unremarkable. The medial flexor and lateral peroneal tendons are  intact.     The anterior and posterior syndesmotic ligaments, inferior transverse,  anterior and posterior talofibular, and calcaneofibular ligaments are  intact.                                                                      IMPRESSION:  1. No bone marrow signal abnormalities in the right ankle to suggest  fracture, osteonecrosis, marrow infiltration, or an osteochondral  lesion.  2. The tendinous and ligamentous structures about the right ankle are  intact.  3. No significant right  ankle joint effusion.     Assessment:  1. Injury of right ankle, subsequent encounter      Reassuring MRI, discussed weaning to ankle brace, start PT.    Plan:  - Today's Plan of Care:  Rehab: Physical Therapy: [facility] - [phone]  Gradually wean out of walking boot to ankle brace  Continue home exercise program    Follow Up: 6 - 8 weeks or as needed    Concerning signs and symptoms were reviewed.  The patient expressed understanding of this management plan and all questions were answered at this time.    Jessica Jaramillo MD CAQ  Primary Care Sports Medicine  [facility]        Again, thank you for allowing me to participate in the care of your patient.        Sincerely,        Jessica Jaramillo MD

## 2019-04-25 NOTE — PROGRESS NOTES
Sports Medicine Clinic Visit - Interim History April 25, 2019    PCP: Srinivasa Hunter Emily is a 48 year old female who is seen in f/u up for Injury of right ankle, subsequent encounter. Since last visit on 4/22/19 patient has completed an MRI of the right ankle. She reports her ankle pain is not as intense and she reports she is able to walk more with the walking boot.    Symptoms are better with: Ice, Rest and Elevation  Symptoms are worse with: weight bearing and Dorsiflexion  Additional Features:   Positive: pain   Negative: swelling, bruising, popping, grinding, catching, locking, instability, paresthesias, numbness and weakness    Social History: Unemployed    Review of Systems  Skin: no bruising, no swelling  Musculoskeletal: as above  Neurologic: no numbness, paresthesias  Remainder of review of systems is negative including constitutional, CV, pulmonary, GI, except as noted in HPI or medical history.    Patient's current problem list, past medical and surgical history, and family history were reviewed.    Patient Active Problem List   Diagnosis     History of cleft palate with cleft lip     MAYA (obstructive sleep apnea)/Hypoventilation Syndrome     Major depressive disorder, recurrent episode, moderate (H)     Paresthesias     Health Care Home     Vitamin D deficiency     Morbid obesity with BMI of 40.0-44.9, adult (H)     Hyperlipidemia with target LDL less than 130     Intervertebral disc prolapse with impingement     Nonallopathic lesion of lumbar region     Chronic pain syndrome     Restless legs syndrome (RLS)     Insomnia     Migraine     Chronic bilateral back pain, unspecified back location     Chronic pain of left knee     ROSE MARIE (generalized anxiety disorder)     Idiopathic peripheral neuropathy     Past Medical History:   Diagnosis Date     Chronic pain syndrome 11/20/2015    She takes up to 90 oxycodone tablets per month for her chronic pain      Depressive disorder 2000     History of  cleft palate with cleft lip     cleft lip and palate, and has had 27 operations per the patient     Hyperlipidemia with target LDL less than 130 10/26/2015     Morbid obesity with BMI of 40.0-44.9, adult (H) 10/26/2015     Neuropathy      MAYA (obstructive sleep apnea)/Hypoventilation Syndrome 2/24/2014    Study Date: 2/13/2014- (245.1 lbs) Sleep Associated Hypoventilation  suggested with baseline PCO2 42 mmHg, maximum PCO2 55 mmHg. Lowest oxygen saturation 85.0% Apnea/Hypopnea Index 5.5 events per hour.  REM AHI 37.2.  The supine AHI is 13.8. RDI 6.7 Sleep study 3/2014 (245#)- CPAP 6 cm effective, Transcutaneous CO2 Monitoring (TCM) within normal limits.          Skin cancer of anterior chest 2011    Basal cell on chest     TMJ (temporomandibular joint disorder)      Past Surgical History:   Procedure Laterality Date     ANKLE SURGERY  7/13    Left for torn tendons & loose bone chips     ARTHROSCOPY KNEE  1986    Right knee     BACK SURGERY       Basal cell carcinoma  2011    Removal from the chest     BIOPSY       C VAGINAL HYSTERECTOMY  2011     CARPAL TUNNEL RELEASE RT/LT  ~2010    Bilateral     COLONOSCOPY  2016     COSMETIC SURGERY       cysto with right ureteroscopy, stone manipulation, double J stent removal  10/04/2018    Dr. Cisneros -- removal of right kidney stone     cysto, right retrograde pyelogram, right ureteral stent Right 09/2018    for obstructing right kidney stone     DECOMPRESSION CUBITAL TUNNEL Left 8/28/2015    Procedure: DECOMPRESSION CUBITAL TUNNEL;  Surgeon: Gus Donato MD;  [facility]     ENT SURGERY  1975    Tonsillectomy and Adenoids     GYN SURGERY  2011    Hysterectomy     HC REPAIR PERONEAL TENDONS  7/13     ORTHOPEDIC SURGERY  2011, 2011    Bilateral CTR     PE TUBES      yearly times 10 years     REPAIR CLEFT PALATE CHILD      Age 3 months & afterwards (multiple surgeries)     SOFT TISSUE SURGERY  2013     Family History   Problem Relation Age of Onset     Alzheimer  "Disease Paternal Grandmother 60     Cerebrovascular Disease Paternal Grandmother      Neurologic Disorder Sister 20        migraines     Depression/Anxiety Sister      Depression Sister      Neurologic Disorder Son 14        migraines     Asthma Son      Heart Disease Mother      Osteoporosis Mother      Obesity Mother      Cancer Father      Alcohol/Drug Father      Other Cancer Father         saliva glands & skin CA      Hypertension Father      Diabetes Maternal Grandmother      Breast Cancer Maternal Grandmother      Obesity Maternal Grandmother      Other Cancer Maternal Grandfather         skin cancer     Cancer - colorectal Other         Aunt     Asthma Daughter      Asthma Daughter      Asthma Son      Thyroid Disease Sister      Colon Cancer Other      Obesity Sister      Glaucoma No family hx of      Macular Degeneration No family hx of        Objective  /82 (BP Location: Left arm, Patient Position: Chair, Cuff Size: Adult Regular)   Ht 1.6 m (5' 3\")   Wt 109.8 kg (242 lb)   LMP  (LMP Unknown)   BMI 42.87 kg/m      GENERAL APPEARANCE: healthy, alert and no distress   GAIT: slightly antalgic  SKIN: no suspicious lesions or rashes  HEENT: Sclera clear, anicteric  CV: good peripheral pulses  RESP: Breathing not labored  NEURO: Normal strength and tone, mentation intact and speech normal  PSYCH:  mentation appears normal and affect normal/bright    Bilateral Foot and Ankle Exam:  Inspection:       no visible ecchymosis       no visible edema or effusion       edema noted right lateral ankle - mild     Foot inspection:       no deformity noted     Tender:       lateral ankle ligaments  right     Non-Tender:       remainder of ankle and foot bilateral     ROM:        Slightly limited ankle dorsiflexion, plantarflexion, inversion and eversion right     Strength:       ankle dorsiflexion 4/5 right       plantarflexion 4/5 right       inversion 4/5 right       eversion 4/5 right     Special " Tests:       neg (-) anterior drawer right       neg (-) talar tilt right     Gait:       antalgic gait     Neurovascular:       2+ peripheral pulses bilaterally and brisk capillary refill       sensation grossly intact    Radiology  I ordered, visualized and reviewed these images with the patient  Exam: MRI of the right ankle dated 4/22/2019.     COMPARISON: Radiographs dated 4/22/2019.     CLINICAL HISTORY: Ankle pain.     TECHNIQUE: Multiplanar, multisequence MR imaging of the right ankle  was obtained using standard sequences in 3 orthogonal planes without  the use of intravenous or intra-articular gadolinium contrast.     FINDINGS:     Small amount of fluid at the right ankle joint.     No marrow signal abnormalities in the right ankle to suggest fracture,  osteonecrosis, or marrow infiltration.     The Achilles tendon is intact. The plantar fascia is unremarkable.  Normal fat in the sinus tarsi.     No osteochondral lesion. The deep and superficial fibers of the  deltoid ligamentous complex are intact. The plantar musculature is  intact.      The anterior extensor tendons are intact. The Lisfranc ligament is  unremarkable. The medial flexor and lateral peroneal tendons are  intact.     The anterior and posterior syndesmotic ligaments, inferior transverse,  anterior and posterior talofibular, and calcaneofibular ligaments are  intact.                                                                      IMPRESSION:  1. No bone marrow signal abnormalities in the right ankle to suggest  fracture, osteonecrosis, marrow infiltration, or an osteochondral  lesion.  2. The tendinous and ligamentous structures about the right ankle are  intact.  3. No significant right ankle joint effusion.     Assessment:  1. Injury of right ankle, subsequent encounter      Reassuring MRI, discussed weaning to ankle brace, start PT.    Plan:  - Today's Plan of Care:  Rehab: Physical Therapy: [facility] -  [phone]  Gradually wean out of walking boot to ankle brace  Continue home exercise program    Follow Up: 6 - 8 weeks or as needed    Concerning signs and symptoms were reviewed.  The patient expressed understanding of this management plan and all questions were answered at this time.    Jessica Jaramillo MD CAQ  Primary Care Sports Medicine  [facility]

## 2019-04-25 NOTE — PATIENT INSTRUCTIONS
Plan:  - Today's Plan of Care:  Rehab: Physical Therapy: [facility] - [phone]  Gradually wean out of walking boot to ankle brace  Continue home exercise program    Follow Up: 6 - 8 weeks or as needed    If you have any further questions for your physician or physician s care team you can call [phone] and use option 3 to leave a voice message. Calls received during business hours will be returned same day.

## 2019-04-29 ENCOUNTER — THERAPY VISIT (OUTPATIENT)
Dept: CHIROPRACTIC MEDICINE | Facility: CLINIC | Age: 49
End: 2019-04-29
Payer: COMMERCIAL

## 2019-04-29 DIAGNOSIS — M62.838 SPASM OF MUSCLE: ICD-10-CM

## 2019-04-29 DIAGNOSIS — M99.03 SEGMENTAL DYSFUNCTION OF LUMBAR REGION: ICD-10-CM

## 2019-04-29 DIAGNOSIS — M99.05 SEGMENTAL DYSFUNCTION OF PELVIC REGION: Primary | ICD-10-CM

## 2019-04-29 DIAGNOSIS — M99.02 THORACIC SEGMENT DYSFUNCTION: ICD-10-CM

## 2019-04-29 DIAGNOSIS — M54.50 LUMBAGO: ICD-10-CM

## 2019-04-29 PROCEDURE — 97810 ACUP 1/> WO ESTIM 1ST 15 MIN: CPT | Performed by: CHIROPRACTOR

## 2019-04-29 PROCEDURE — 98941 CHIROPRACT MANJ 3-4 REGIONS: CPT | Mod: AT | Performed by: CHIROPRACTOR

## 2019-04-29 NOTE — PROGRESS NOTES
Visit #:  7    Subjective:  Velma Diaz is a 48 year old female who is seen in f/u up for:        Segmental dysfunction of pelvic region  Lumbago  Segmental dysfunction of lumbar region  Spasm of muscle  Thoracic segment dysfunction.     Since last visit on 4/22/2019,  Velma Diaz reports:    Area of chief complaint:  Lumbar :  Symptoms are graded at 5/10. The quality is described as stiff, achey, dull.  Motion has increased but not normal. Patient feels that her low back is stiff and sore.  She continues to think this is due to having to walk in a ankle boot as she sprained her ankle.  This has affected her gait. She will be starting PT for this later on this week.      Objective:  The following was observed:    P: palpatory tenderness Piriformis R>>L  A: static palpation demonstrates intersegmental asymmetry , thoracic, lumbar, pelvis  R: motion palpation notes restricted motion, T10, T11 , T12 , L4 , L5  and PSIS Right   T: hypertonicity at: Piriformis R>>L    Segmental spinal dysfunction/restrictions found at:  T10, T11 , T12 , L4 , L5  and PSIS Right       Assessment:    Diagnoses:      1. Segmental dysfunction of pelvic region    2. Lumbago    3. Segmental dysfunction of lumbar region    4. Spasm of muscle    5. Thoracic segment dysfunction        Patient's condition:  Patient had restrictions pre-manipulation    Treatment effectiveness:  Post manipulation there is better intersegmental movement and Patient claims to feel looser post manipulation      Procedures:  CMT:  36030 Chiropractic manipulative treatment 3-4 regions performed   Thoracic: Activator, T10, T11, T12, Prone  Lumbar: Activator, L4, L5, Prone  Pelvis: Drop Table, PSIS Right , Prone    Modalities:  32058: Acupuncture, for 15 minutes:  Points: B25, B27, GV3, K3, B62, SI3  Ahsi point in piriformis  For 15 minutes    Therapeutic procedures:  None    Response to Treatment  Reduction in symptoms as reported by patient    Prognosis:  Good    Progress towards Goals: Patient is making progress towards the goal.     Recommendations:    Instructions:  ice 20 minutes every other hour as needed    Follow-up:    Return to care in one week.

## 2019-05-01 ENCOUNTER — THERAPY VISIT (OUTPATIENT)
Dept: PHYSICAL THERAPY | Facility: CLINIC | Age: 49
End: 2019-05-01
Attending: PEDIATRICS
Payer: COMMERCIAL

## 2019-05-01 DIAGNOSIS — M25.571 PAIN IN JOINT, ANKLE AND FOOT, RIGHT: ICD-10-CM

## 2019-05-01 DIAGNOSIS — S99.911D INJURY OF RIGHT ANKLE, SUBSEQUENT ENCOUNTER: ICD-10-CM

## 2019-05-01 PROCEDURE — 97110 THERAPEUTIC EXERCISES: CPT | Mod: GP | Performed by: PHYSICAL THERAPIST

## 2019-05-01 PROCEDURE — 97161 PT EVAL LOW COMPLEX 20 MIN: CPT | Mod: GP | Performed by: PHYSICAL THERAPIST

## 2019-05-01 NOTE — PROGRESS NOTES
[facility] Initial Evaluation  Subjective:  The history is provided by the patient.   Velma Diaz is a 48 year old female with a right ankle condition.  Condition occurred with:  A fall/slip.  Condition occurred: in the community.  This is a new condition  Pt slipped on the ice and twisted her ankle into inversion about 3 weeks ago  .    Patient reports pain:  Anterior, lateral and medial.  Radiates to:  Knee.  Pain is described as aching and is intermittent and reported as 10/10 (at worst).  Associated symptoms:  Loss of motion/stiffness and loss of strength. Worse during: no pattern.  Symptoms are exacerbated by walking, standing, weight bearing, descending stairs, ascending stairs and bending/squatting and relieved by ice, NSAID's and rest.  Since onset symptoms are gradually improving.  Special tests:  MRI (no acute findings).      General health as reported by patient is fair.  Pertinent medical history includes:  Chemical dependency, depression, smoking, mental illness and fibromyalgia.  Medical allergies: no.  Other surgeries include:  None reported.  Current medications:  Anti-inflammatory and anti-depressants.  Current occupation is None.        Barriers include:  None as reported by patient.    Red flags:  None as reported by patient.                        Objective:        Flexibility/Screens:       Lower Extremity:      Decreased right lower extremity flexibility:  Gastroc and Soleus          Ankle/Foot Evaluation  ROM:  PROM is not assessed (d/t pt arriving late, will assess next session).  AROM:    Dorsiflexion: Left:    Right:   0  Plantarflexion: Left:     Right:  WNL  Inversion: Left:      Right:  15  Eversion:     Right:  10      PROM:                Pain: in all planes    Strength is not assessed (d/t pain).      PALPATION:     Right ankle tenderness present at:   gastroc/soleus; plantar fascia; anterior talofibular ligament and anterior tibiofibular ligament  Right ankle  tenderness not present at:  medial malleolus or lateral malleolus  EDEMA: normal                                                        Knee Evaluation:  ROM:  AROM: normal                              General     ROS    Assessment/Plan:    Patient is a 48 year old female with right side ankle complaints.    Patient has the following significant findings with corresponding treatment plan.                Diagnosis 1:  R ankle pain  Pain -  hot/cold therapy, manual therapy, self management, education and home program  Decreased ROM/flexibility - manual therapy, therapeutic exercise and home program  Decreased strength - therapeutic exercise, therapeutic activities and home program    Therapy Evaluation Codes:   1) History comprised of:   Personal factors that impact the plan of care:      None.    Comorbidity factors that impact the plan of care are:      None.     Medications impacting care: None.  2) Examination of Body Systems comprised of:   Body structures and functions that impact the plan of care:      Ankle, Hip and Knee.   Activity limitations that impact the plan of care are:      Lifting, Sitting, Squatting/kneeling, Stairs, Standing, Walking and Sleeping.  3) Clinical presentation characteristics are:   Stable/Uncomplicated.  4) Decision-Making    Low complexity using standardized patient assessment instrument and/or measureable assessment of functional outcome.  Cumulative Therapy Evaluation is: Low complexity.    Previous and current functional limitations:  (See Goal Flow Sheet for this information)    Short term and Long term goals: (See Goal Flow Sheet for this information)     Communication ability:  Patient appears to be able to clearly communicate and understand verbal and written communication and follow directions correctly.  Treatment Explanation - The following has been discussed with the patient:   RX ordered/plan of care  Anticipated outcomes  Possible risks and side effects  This patient  would benefit from PT intervention to resume normal activities.   Rehab potential is good.    Frequency:  1 X week, once daily  Duration:  for 6 weeks  Discharge Plan:  Achieve all LTG.  Independent in home treatment program.  Reach maximal therapeutic benefit.    Please refer to the daily flowsheet for treatment today, total treatment time and time spent performing 1:1 timed codes.

## 2019-05-06 ENCOUNTER — THERAPY VISIT (OUTPATIENT)
Dept: PHYSICAL THERAPY | Facility: CLINIC | Age: 49
End: 2019-05-06
Payer: COMMERCIAL

## 2019-05-06 ENCOUNTER — THERAPY VISIT (OUTPATIENT)
Dept: CHIROPRACTIC MEDICINE | Facility: CLINIC | Age: 49
End: 2019-05-06
Payer: COMMERCIAL

## 2019-05-06 DIAGNOSIS — M25.571 PAIN IN JOINT, ANKLE AND FOOT, RIGHT: ICD-10-CM

## 2019-05-06 DIAGNOSIS — M62.838 SPASM OF MUSCLE: ICD-10-CM

## 2019-05-06 DIAGNOSIS — M99.03 SEGMENTAL DYSFUNCTION OF LUMBAR REGION: ICD-10-CM

## 2019-05-06 DIAGNOSIS — M99.02 THORACIC SEGMENT DYSFUNCTION: ICD-10-CM

## 2019-05-06 DIAGNOSIS — M54.50 LUMBAGO: ICD-10-CM

## 2019-05-06 DIAGNOSIS — M99.05 SEGMENTAL DYSFUNCTION OF PELVIC REGION: Primary | ICD-10-CM

## 2019-05-06 PROCEDURE — 97810 ACUP 1/> WO ESTIM 1ST 15 MIN: CPT | Mod: GY | Performed by: CHIROPRACTOR

## 2019-05-06 PROCEDURE — 97110 THERAPEUTIC EXERCISES: CPT | Mod: GP | Performed by: PHYSICAL THERAPIST

## 2019-05-06 PROCEDURE — 98941 CHIROPRACT MANJ 3-4 REGIONS: CPT | Mod: AT | Performed by: CHIROPRACTOR

## 2019-05-06 PROCEDURE — 97530 THERAPEUTIC ACTIVITIES: CPT | Mod: GP | Performed by: PHYSICAL THERAPIST

## 2019-05-06 PROCEDURE — 97116 GAIT TRAINING THERAPY: CPT | Mod: GP | Performed by: PHYSICAL THERAPIST

## 2019-05-06 NOTE — PROGRESS NOTES
"Subjective:  HPI                    Objective:  System    Ankle/Foot Evaluation  ROM:    AROM:    Dorsiflexion:  Left:   5 in knee flexion, -3 in knee extension  Right:   -12 in knee flexion, -15 in knee extension  Plantarflexion:  Left:  65    Right:  57 negative  Inversion:  Left:  22     Right:  31 negativ  Eversion:  9     Right:  5 positive      PROM:    Dorsiflexion:  Left:    9 in knee flexion, 4 in knee extension     Right:   -6 in knee flexion \"stabbing,\"  -8 in knee extension   Plantarflexion: Left:        Right:  63 negative              Strength:    Dorsiflexion:  Left: 5-/5      Pain:-   Right: 3/5    Pain:+  Plantarflexion: Left: 5-/5    Pain:-   Right: 4/5   Pain:-  Inversion:Left: 5-/5   Pain:-     Right: 4/5   Pain:+  Eversion:Left: 5-/5   Pain:-  Right: 4/5   Pain:+                                                                              General     ROS    Assessment/Plan:        "

## 2019-05-06 NOTE — PROGRESS NOTES
Visit #:  8    Subjective:  Velma Diaz is a 48 year old female who is seen in f/u up for:        Segmental dysfunction of pelvic region  Lumbago  Segmental dysfunction of lumbar region  Spasm of muscle  Thoracic segment dysfunction.     Since last visit on 4/29/2019,  Velma Diaz reports:    Area of chief complaint:  Lumbar :  Symptoms are graded at 4/10. The quality is described as stiff, achey, dull.  Motion has increased but not normal. Patient feels that her low back is stiff and sore.  She continues to think this is due to having to walk in a ankle boot as she sprained her ankle.  She went to the park this past weekend and walked around, which may have caused some low back stiffness.  Overall she is feeling better.  She also started PT today for her ankle.     Objective:  The following was observed:    P: palpatory tenderness Piriformis R>>L  A: static palpation demonstrates intersegmental asymmetry , thoracic, lumbar, pelvis  R: motion palpation notes restricted motion, T10, T11 , T12 , L4 , L5  and PSIS Right   T: hypertonicity at: Piriformis R>>L    Segmental spinal dysfunction/restrictions found at:  T10, T11 , T12 , L4 , L5  and PSIS Right       Assessment:    Diagnoses:      1. Segmental dysfunction of pelvic region    2. Lumbago    3. Segmental dysfunction of lumbar region    4. Spasm of muscle    5. Thoracic segment dysfunction        Patient's condition:  Patient had restrictions pre-manipulation    Treatment effectiveness:  Post manipulation there is better intersegmental movement and Patient claims to feel looser post manipulation      Procedures:  CMT:  12279 Chiropractic manipulative treatment 3-4 regions performed   Thoracic: Activator, T10, T11, T12, Prone  Lumbar: Activator, L4, L5, Prone  Pelvis: Drop Table, PSIS Right , Prone    Modalities:  05064: Acupuncture, for 15 minutes:  Points: B25, B27, GV3, K3, B62, SI3  Ahsi point in piriformis  For 15 minutes    Therapeutic  procedures:  None    Response to Treatment  Reduction in symptoms as reported by patient    Prognosis: Good    Progress towards Goals: Patient is making progress towards the goal.     Recommendations:    Instructions:  ice 20 minutes every other hour as needed    Follow-up:    Return to care in one week.

## 2019-05-09 ENCOUNTER — MYC REFILL (OUTPATIENT)
Dept: FAMILY MEDICINE | Facility: CLINIC | Age: 49
End: 2019-05-09

## 2019-05-09 DIAGNOSIS — G89.4 CHRONIC PAIN SYNDROME: ICD-10-CM

## 2019-05-09 RX ORDER — OXYCODONE AND ACETAMINOPHEN 10; 325 MG/1; MG/1
1 TABLET ORAL EVERY 6 HOURS PRN
Qty: 90 TABLET | Refills: 0 | Status: SHIPPED | OUTPATIENT
Start: 2019-05-09 | End: 2019-06-03

## 2019-05-09 NOTE — TELEPHONE ENCOUNTER
Requested Prescriptions   Pending Prescriptions Disp Refills     oxyCODONE-acetaminophen (PERCOCET)  MG per tablet 90 tablet 0     Sig: Take 1 tablet by mouth every 6 hours as needed for moderate to severe pain       There is no refill protocol information for this order        Last refill 4/10/19 # 90  Last OV 12/20/18 for medication.    Routing refill request to provider for review/approval because:  Drug not on the [facility] refill protocol     Marisela Shea RN [facility] Triage.

## 2019-05-13 ENCOUNTER — THERAPY VISIT (OUTPATIENT)
Dept: PHYSICAL THERAPY | Facility: CLINIC | Age: 49
End: 2019-05-13
Payer: COMMERCIAL

## 2019-05-13 ENCOUNTER — THERAPY VISIT (OUTPATIENT)
Dept: CHIROPRACTIC MEDICINE | Facility: CLINIC | Age: 49
End: 2019-05-13
Payer: COMMERCIAL

## 2019-05-13 DIAGNOSIS — M99.03 SEGMENTAL DYSFUNCTION OF LUMBAR REGION: ICD-10-CM

## 2019-05-13 DIAGNOSIS — M99.02 THORACIC SEGMENT DYSFUNCTION: ICD-10-CM

## 2019-05-13 DIAGNOSIS — M62.838 SPASM OF MUSCLE: ICD-10-CM

## 2019-05-13 DIAGNOSIS — M25.571 PAIN IN JOINT, ANKLE AND FOOT, RIGHT: ICD-10-CM

## 2019-05-13 DIAGNOSIS — M99.05 SEGMENTAL DYSFUNCTION OF PELVIC REGION: Primary | ICD-10-CM

## 2019-05-13 DIAGNOSIS — M54.50 LUMBAGO: ICD-10-CM

## 2019-05-13 PROCEDURE — 97530 THERAPEUTIC ACTIVITIES: CPT | Mod: GP | Performed by: PHYSICAL THERAPIST

## 2019-05-13 PROCEDURE — 97110 THERAPEUTIC EXERCISES: CPT | Mod: GP | Performed by: PHYSICAL THERAPIST

## 2019-05-13 PROCEDURE — 97810 ACUP 1/> WO ESTIM 1ST 15 MIN: CPT | Mod: GY | Performed by: CHIROPRACTOR

## 2019-05-13 PROCEDURE — 98941 CHIROPRACT MANJ 3-4 REGIONS: CPT | Mod: AT | Performed by: CHIROPRACTOR

## 2019-05-13 NOTE — PROGRESS NOTES
Visit #:  9    Subjective:  Velma Diaz is a 48 year old female who is seen in f/u up for:        Segmental dysfunction of pelvic region  Lumbago  Segmental dysfunction of lumbar region  Spasm of muscle  Thoracic segment dysfunction.     Since last visit on 5/6/2019,  Velma Diaz reports:    Area of chief complaint:  Lumbar :  Symptoms are graded at 4/10. The quality is described as stiff, achey, dull.  Motion has increased but not normal. Patient feels that her low back is stiff and sore.  Mireille reports that she is still feeling stiff and sore in her low back and feels this is due to her walking awkwardly.  Her ankle is improving and it is getting easier to walk.  Overall she is feeling improved.      Objective:  The following was observed:    P: palpatory tenderness Piriformis R>>L  A: static palpation demonstrates intersegmental asymmetry , thoracic, lumbar, pelvis  R: motion palpation notes restricted motion, T10, T11 , T12 , L4 , L5  and PSIS Right   T: hypertonicity at: Piriformis R>>L    Segmental spinal dysfunction/restrictions found at:  T10, T11 , T12 , L4 , L5  and PSIS Right       Assessment:    Diagnoses:      1. Segmental dysfunction of pelvic region    2. Lumbago    3. Segmental dysfunction of lumbar region    4. Spasm of muscle    5. Thoracic segment dysfunction        Patient's condition:  Patient had restrictions pre-manipulation    Treatment effectiveness:  Post manipulation there is better intersegmental movement and Patient claims to feel looser post manipulation      Procedures:  CMT:  80043 Chiropractic manipulative treatment 3-4 regions performed   Thoracic: Activator, T10, T11, T12, Prone  Lumbar: Activator, L4, L5, Prone  Pelvis: Drop Table, PSIS Right , Prone    Modalities:  19641: Acupuncture, for 15 minutes:  Points: B25, B27, GV3, K3, B62, SI3  Ahsi point in piriformis  For 15 minutes    Therapeutic procedures:  None    Response to Treatment  Reduction in symptoms as reported by  patient    Prognosis: Good    Progress towards Goals: Patient is making progress towards the goal.     Recommendations:    Instructions:  ice 20 minutes every other hour as needed    Follow-up:    Return to care in one week.

## 2019-05-13 NOTE — PROGRESS NOTES
"Subjective:  HPI                    Objective:  System    Physical Exam    General     ROS    Assessment/Plan:    SUBJECTIVE  Subjective: Pt reports continued progress.  Likes walking more smoothly.  Feels best first thing in the morning and uses brace as the day goes along.   Current Pain level: 3/10   Changes in function:  Yes (See Goal flowsheet attached for changes in current functional level)     Adverse reaction to treatment or activity:  None    OBJECTIVE  Objective: Pt ambulates without device or brace, mildly antalgic.  No discomfort resisted ankle motions all directions.  \"A little\" tenderness to palpation R ATF only.     ASSESSMENT  Velma continues to require intervention to meet STG and LTG's: PT  Patient is progressing as expected.  Response to therapy has shown an improvement in  function  Progress made towards STG/LTG?  Yes (See Goal flowsheet attached for updates on achievement of STG and LTG)    PLAN  Current treatment program is being advanced to more complex exercises.    PTA/ATC plan:  N/A    Please refer to the daily flowsheet for treatment today, total treatment time and time spent performing 1:1 timed codes.        "

## 2019-05-16 ENCOUNTER — ANCILLARY PROCEDURE (OUTPATIENT)
Dept: GENERAL RADIOLOGY | Facility: CLINIC | Age: 49
End: 2019-05-16
Attending: FAMILY MEDICINE
Payer: COMMERCIAL

## 2019-05-16 ENCOUNTER — OFFICE VISIT (OUTPATIENT)
Dept: FAMILY MEDICINE | Facility: CLINIC | Age: 49
End: 2019-05-16
Payer: COMMERCIAL

## 2019-05-16 VITALS
WEIGHT: 238 LBS | BODY MASS INDEX: 42.17 KG/M2 | DIASTOLIC BLOOD PRESSURE: 73 MMHG | HEART RATE: 90 BPM | HEIGHT: 63 IN | OXYGEN SATURATION: 97 % | TEMPERATURE: 97.3 F | SYSTOLIC BLOOD PRESSURE: 112 MMHG

## 2019-05-16 DIAGNOSIS — M17.11 PRIMARY OSTEOARTHRITIS OF RIGHT KNEE: ICD-10-CM

## 2019-05-16 DIAGNOSIS — M25.561 ACUTE PAIN OF RIGHT KNEE: ICD-10-CM

## 2019-05-16 DIAGNOSIS — M25.561 ACUTE PAIN OF RIGHT KNEE: Primary | ICD-10-CM

## 2019-05-16 PROCEDURE — 73562 X-RAY EXAM OF KNEE 3: CPT | Mod: RT

## 2019-05-16 PROCEDURE — 99214 OFFICE O/P EST MOD 30 MIN: CPT | Performed by: FAMILY MEDICINE

## 2019-05-16 RX ORDER — PREDNISONE 20 MG/1
TABLET ORAL
Qty: 10 TABLET | Refills: 0 | Status: SHIPPED | OUTPATIENT
Start: 2019-05-16 | End: 2019-06-12

## 2019-05-16 ASSESSMENT — PAIN SCALES - GENERAL: PAINLEVEL: EXTREME PAIN (8)

## 2019-05-16 ASSESSMENT — MIFFLIN-ST. JEOR: SCORE: 1678.69

## 2019-05-16 NOTE — PROGRESS NOTES
SUBJECTIVE:   Velma Diaz is a 48 year old female who presents to clinic today for the following   health issues:  SUBJECTIVE:  Velma Diaz is a 48 year old female who sustained a right knee injury 2-3 days ago. Mechanism of injury: over use, swelling. Immediate symptoms: delayed pain, delayed swelling. Symptoms have been gradual since that time. Prior history of related problems: many years ago, during HS has an Arthroscopy of the right knee.    OBJECTIVE:  Vital signs as noted above.  Appearance: in no apparent distress.  Knee exam: soft tissue tenderness over lateral aspect, mild swelling and tenderness, over knee cap..  X-ray: no fracture or dislocation noted, soft tissue swelling.    ASSESSMENT:  (M25.561) Acute pain of right knee  (primary encounter diagnosis)    Plan: XR Knee Right 3 Views, predniSONE (DELTASONE)         20 MG tablet, nabumetone (RELAFEN) 750 MG         tablet, PHYSICAL THERAPY REFERRAL, order for         DME  (M17.11) Primary osteoarthritis of right knee  Plan: XR Knee Right 3 Views, predniSONE (DELTASONE)         20 MG tablet, nabumetone (RELAFEN) 750 MG         tablet      PLAN:  NSAID, ice suggested  see primary care physician in follow up  Knee Brace  See orders in [facility]      Joint Pain    Onset: Tuesday    Description:   Location: right knee  Character: Dull ache, stabbing,Burning and tight    Intensity: severe, 8/10    Progression of Symptoms: worse, intermittent    Accompanying Signs & Symptoms:  Other symptoms: radiation of pain to thighs    History:   Previous similar pain: no       Precipitating factors:   Trauma or overuse: no     Alleviating factors:  Improved by: ice    Therapies Tried and outcome: oils and norco; did not do anything      Olivier Muñoz MD.

## 2019-05-20 ENCOUNTER — THERAPY VISIT (OUTPATIENT)
Dept: PHYSICAL THERAPY | Facility: CLINIC | Age: 49
End: 2019-05-20
Payer: COMMERCIAL

## 2019-05-20 ENCOUNTER — THERAPY VISIT (OUTPATIENT)
Dept: CHIROPRACTIC MEDICINE | Facility: CLINIC | Age: 49
End: 2019-05-20
Payer: COMMERCIAL

## 2019-05-20 DIAGNOSIS — M99.02 THORACIC SEGMENT DYSFUNCTION: ICD-10-CM

## 2019-05-20 DIAGNOSIS — M99.05 SEGMENTAL DYSFUNCTION OF PELVIC REGION: Primary | ICD-10-CM

## 2019-05-20 DIAGNOSIS — M54.50 LUMBAGO: ICD-10-CM

## 2019-05-20 DIAGNOSIS — M99.03 SEGMENTAL DYSFUNCTION OF LUMBAR REGION: ICD-10-CM

## 2019-05-20 DIAGNOSIS — M62.838 SPASM OF MUSCLE: ICD-10-CM

## 2019-05-20 DIAGNOSIS — M25.571 PAIN IN JOINT, ANKLE AND FOOT, RIGHT: ICD-10-CM

## 2019-05-20 PROCEDURE — 97110 THERAPEUTIC EXERCISES: CPT | Mod: GP | Performed by: PHYSICAL THERAPIST

## 2019-05-20 PROCEDURE — 98941 CHIROPRACT MANJ 3-4 REGIONS: CPT | Mod: AT | Performed by: CHIROPRACTOR

## 2019-05-20 PROCEDURE — 97810 ACUP 1/> WO ESTIM 1ST 15 MIN: CPT | Performed by: CHIROPRACTOR

## 2019-05-20 PROCEDURE — 97530 THERAPEUTIC ACTIVITIES: CPT | Mod: GP | Performed by: PHYSICAL THERAPIST

## 2019-05-20 NOTE — PROGRESS NOTES
Visit #:  10    Subjective:  Velma Diaz is a 48 year old female who is seen in f/u up for:        Segmental dysfunction of pelvic region  Lumbago  Segmental dysfunction of lumbar region  Spasm of muscle  Thoracic segment dysfunction.     Since last visit on 5/13/2019,  Velma Diaz reports:    Area of chief complaint:  Lumbar :  Symptoms are graded at 2/10. The quality is described as stiff, and achey.  Motion has increased but not normal. Patient feels that her low back is stiff and sore, but has improved.  She is walking better as her ankle is improving.  Overall she is feeling improved.      Objective:  The following was observed:    P: palpatory tenderness Piriformis R>>L  A: static palpation demonstrates intersegmental asymmetry , thoracic, lumbar, pelvis  R: motion palpation notes restricted motion, T10, T11 , T12 , L4 , L5  and PSIS Right   T: hypertonicity at: Piriformis R>>L    Segmental spinal dysfunction/restrictions found at:  T10, T11 , T12 , L4 , L5  and PSIS Right       Assessment:    Diagnoses:      1. Segmental dysfunction of pelvic region    2. Lumbago    3. Segmental dysfunction of lumbar region    4. Spasm of muscle    5. Thoracic segment dysfunction        Patient's condition:  Patient had restrictions pre-manipulation    Treatment effectiveness:  Post manipulation there is better intersegmental movement and Patient claims to feel looser post manipulation      Procedures:  CMT:  97032 Chiropractic manipulative treatment 3-4 regions performed   Thoracic: Activator, T10, T11, T12, Prone  Lumbar: Activator, L4, L5, Prone  Pelvis: Drop Table, PSIS Right , Prone    Modalities:  70540: Acupuncture, for 15 minutes:  Points: B25, B27, GV3, K3, B62, SI3  Ahsi point in piriformis  For 15 minutes    Therapeutic procedures:  None    Response to Treatment  Reduction in symptoms as reported by patient    Prognosis: Good    Progress towards Goals: Patient is making progress towards the  goal.     Recommendations:    Instructions:  ice 20 minutes every other hour as needed    Follow-up:    Return to care in one week.

## 2019-05-23 DIAGNOSIS — M54.5 CHRONIC LOW BACK PAIN, UNSPECIFIED BACK PAIN LATERALITY, WITH SCIATICA PRESENCE UNSPECIFIED: ICD-10-CM

## 2019-05-23 DIAGNOSIS — G89.29 CHRONIC LOW BACK PAIN, UNSPECIFIED BACK PAIN LATERALITY, WITH SCIATICA PRESENCE UNSPECIFIED: ICD-10-CM

## 2019-05-23 RX ORDER — METHOCARBAMOL 750 MG/1
TABLET, FILM COATED ORAL
Qty: 60 TABLET | Refills: 1 | Status: SHIPPED | OUTPATIENT
Start: 2019-05-23 | End: 2019-06-29

## 2019-05-23 NOTE — TELEPHONE ENCOUNTER
Requested Prescriptions   Pending Prescriptions Disp Refills     methocarbamol (ROBAXIN) 750 MG tablet [Pharmacy Med Name: METHOCARBAMOL 750 MG TABLET] 60 tablet 1     Sig: TAKE 1 TABLETS (750 MG) BY MOUTH 3 TIMES DAILY AS NEEDED FOR MUSCLE SPASMS       There is no refill protocol information for this order   Last Written Prescription Date:  4-18-19  Last Fill Quantity: 60,  # refills: 1   Last office visit: 5/16/2019 with prescribing provider:  12-20-18   Future Office Visit:   Next 5 appointments (look out 90 days)    May 28, 2019  9:30 AM CDT  Return Visit with SERVANDO Yan  [facility] ([facility] [address] [id number] [phone]   Jun 14, 2019 10:30 AM CDT  Return Visit with Joaquín Burger MD  [facility] [address] [place] [id number] [phone]

## 2019-05-27 DIAGNOSIS — G25.81 RESTLESS LEGS SYNDROME (RLS): ICD-10-CM

## 2019-05-28 ENCOUNTER — OFFICE VISIT (OUTPATIENT)
Dept: PSYCHOLOGY | Facility: CLINIC | Age: 49
End: 2019-05-28
Payer: COMMERCIAL

## 2019-05-28 DIAGNOSIS — F33.1 MAJOR DEPRESSIVE DISORDER, RECURRENT EPISODE, MODERATE (H): Primary | ICD-10-CM

## 2019-05-28 DIAGNOSIS — F41.1 GAD (GENERALIZED ANXIETY DISORDER): ICD-10-CM

## 2019-05-28 PROCEDURE — 90834 PSYTX W PT 45 MINUTES: CPT | Performed by: SOCIAL WORKER
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
## 2019-05-28 NOTE — TELEPHONE ENCOUNTER
Requested Prescriptions   Pending Prescriptions Disp Refills     rOPINIRole (REQUIP) 0.25 MG tablet [Pharmacy Med Name: ROPINIROLE HCL 0.25 MG TABLET] 60 tablet 5     Sig: TAKE 1 TABLET (0.25 MG) BY MOUTH 2 TIMES DAILY   Last Written Prescription Date:  11/20/18  Last Fill Quantity: 60,  # refills: 5   Last office visit: 5/16/2019 with prescribing provider:     Future Office Visit:   Next 5 appointments (look out 90 days)    Jun 14, 2019 10:30 AM CDT  Return Visit with Joaquín Burger MD  [facility] [address] [place] [address]  [phone]   Jul 08, 2019 10:00 AM CDT  Return Visit with Antonio Rios [facility] ([facility]) [address] [facility] [address]  [phone]   Jul 29, 2019 10:00 AM CDT  [facility]  [facility] ([facility]) [address] [facility] [address]  [phone]             Antiparkinson's Agents Protocol Failed - 5/27/2019 12:00 PM        Failed - ALT on record in past 12 months         Recent Labs   Lab Test 12/11/14  1831   ALT 23             Passed - Blood pressure under 140/90 in past 12 months     BP Readings from Last 3 Encounters:   05/16/19 112/73   04/25/19 126/82   04/22/19 122/82                 Passed - CBC on record in past 12 months     Recent Labs   Lab Test 10/30/18  1040   WBC 7.2   RBC 4.45   HGB 14.1   HCT 41.9                    Passed - Serum Creatinine on file in past 12 months     Recent Labs   Lab Test 10/30/18  1040   CR 0.74             Passed - Medication is active on med list        Passed - Patient is age 18 or older        Passed - No active pregnancy on record        Passed - No positive pregnancy test in the past 12 months        Passed - Recent (6 mo) or future (30 days) visit within the authorizing provider's  "specialty     Patient had office visit in the last 6 months or has a visit in the next 30 days with authorizing provider or within the authorizing provider's specialty.  See \"Patient Info\" tab in inbasket, or \"Choose Columns\" in Meds & Orders section of the refill encounter.              "

## 2019-05-29 ENCOUNTER — THERAPY VISIT (OUTPATIENT)
Dept: PHYSICAL THERAPY | Facility: CLINIC | Age: 49
End: 2019-05-29
Payer: COMMERCIAL

## 2019-05-29 ENCOUNTER — THERAPY VISIT (OUTPATIENT)
Dept: CHIROPRACTIC MEDICINE | Facility: CLINIC | Age: 49
End: 2019-05-29
Payer: COMMERCIAL

## 2019-05-29 DIAGNOSIS — M54.50 LOW BACK PAIN: ICD-10-CM

## 2019-05-29 DIAGNOSIS — M99.05 SEGMENTAL DYSFUNCTION OF PELVIC REGION: Primary | ICD-10-CM

## 2019-05-29 DIAGNOSIS — M99.03 SEGMENTAL DYSFUNCTION OF LUMBAR REGION: ICD-10-CM

## 2019-05-29 DIAGNOSIS — M25.571 PAIN IN JOINT, ANKLE AND FOOT, RIGHT: ICD-10-CM

## 2019-05-29 DIAGNOSIS — M99.02 THORACIC SEGMENT DYSFUNCTION: ICD-10-CM

## 2019-05-29 DIAGNOSIS — M62.838 SPASM OF MUSCLE: ICD-10-CM

## 2019-05-29 PROCEDURE — 97810 ACUP 1/> WO ESTIM 1ST 15 MIN: CPT | Performed by: CHIROPRACTOR

## 2019-05-29 PROCEDURE — 98941 CHIROPRACT MANJ 3-4 REGIONS: CPT | Mod: AT | Performed by: CHIROPRACTOR

## 2019-05-29 PROCEDURE — 97112 NEUROMUSCULAR REEDUCATION: CPT | Mod: GP | Performed by: PHYSICAL THERAPIST

## 2019-05-29 PROCEDURE — 97110 THERAPEUTIC EXERCISES: CPT | Mod: GP | Performed by: PHYSICAL THERAPIST

## 2019-05-29 NOTE — PROGRESS NOTES
Visit #:  11    Subjective:  Velma Diaz is a 48 year old female who is seen in f/u up for:        Segmental dysfunction of pelvic region  Lumbago  Segmental dysfunction of lumbar region  Spasm of muscle  Thoracic segment dysfunction.     Since last visit on 5/20/2019,  Velma Diaz reports:    Area of chief complaint:  Lumbar :  Symptoms are graded at 2/10. The quality is described as stiff, and achey.  Motion has increased but not normal. Patient feels that her low back is stiff and sore, but has improved.  She continues to walk better as her ankle is improving, which is helping with her low back.  Overall she is feeling improved.      Objective:  The following was observed:    P: palpatory tenderness Piriformis R>>L  A: static palpation demonstrates intersegmental asymmetry , thoracic, lumbar, pelvis  R: motion palpation notes restricted motion, T10, T11 , T12 , L4 , L5  and PSIS Right   T: hypertonicity at: Piriformis R>>L    Segmental spinal dysfunction/restrictions found at:  T10, T11 , T12 , L4 , L5  and PSIS Right       Assessment:    Diagnoses:      1. Segmental dysfunction of pelvic region    2. Lumbago    3. Segmental dysfunction of lumbar region    4. Spasm of muscle    5. Thoracic segment dysfunction        Patient's condition:  Patient had restrictions pre-manipulation    Treatment effectiveness:  Post manipulation there is better intersegmental movement and Patient claims to feel looser post manipulation      Procedures:  CMT:  18897 Chiropractic manipulative treatment 3-4 regions performed   Thoracic: Activator, T10, T11, T12, Prone  Lumbar: Activator, L4, L5, Prone  Pelvis: Drop Table, PSIS Right , Prone    Modalities:  91653: Acupuncture, for 15 minutes:  Points: B25, B27, GV3, K3, B62, SI3  Ahsi point in piriformis  For 15 minutes    Therapeutic procedures:  None    Response to Treatment  Reduction in symptoms as reported by patient    Prognosis: Good    Progress towards Goals: Patient is  making progress towards the goal.     Recommendations:    Instructions:  ice 20 minutes every other hour as needed    Follow-up:    Return to care in one week.

## 2019-05-29 NOTE — PROGRESS NOTES
"Subjective:  HPI                    Objective:  System    Physical Exam    General     ROS    Assessment/Plan:    SUBJECTIVE  Subjective: Pt reports she continues to progress, rated as \"80-90%\" to date.  Feeling better with walking, although can be tough on uneven surfaces.   Current pain level: 7/10, which she reports is largely her chronic pain baseline.  Changes in function:  Yes (See Goal flowsheet attached for changes in current functional level)     Adverse reaction to treatment or activity:  None    OBJECTIVE  Objective: Tender to palpation R ATF only.  Discomfort PROM end range eversion but not inversion, DF, PF.  Pt ambulates without gross asymmetry.  SLS eyes open L 8 seconds, R 5 seconds.     ASSESSMENT  Velma continues to require intervention to meet STG and LTG's: PT  Patient is progressing as expected.  Response to therapy has shown an improvement in  function  Progress made towards STG/LTG?  Yes (See Goal flowsheet attached for updates on achievement of STG and LTG)    PLAN  Current treatment program is being advanced to more complex exercises.    PTA/ATC plan:  N/A    Please refer to the daily flowsheet for treatment today, total treatment time and time spent performing 1:1 timed codes.        "

## 2019-05-30 RX ORDER — ROPINIROLE 0.25 MG/1
0.25 TABLET, FILM COATED ORAL 2 TIMES DAILY
Qty: 60 TABLET | Refills: 5 | Status: SHIPPED | OUTPATIENT
Start: 2019-05-30 | End: 2019-10-31

## 2019-06-03 DIAGNOSIS — G89.4 CHRONIC PAIN SYNDROME: ICD-10-CM

## 2019-06-03 RX ORDER — OXYCODONE AND ACETAMINOPHEN 10; 325 MG/1; MG/1
1 TABLET ORAL EVERY 6 HOURS PRN
Qty: 90 TABLET | Refills: 0 | Status: SHIPPED | OUTPATIENT
Start: 2019-06-03 | End: 2019-07-08

## 2019-06-03 NOTE — TELEPHONE ENCOUNTER
Reason for Call:  Medication or medication refill:    Do you use a [facility]?  Name of the pharmacy and phone number for the current request:  [facility] [place]     Name of the medication requested: oxycodone    Other request: Patient states she would like to  the script on June 11, 2019, if possible as she will be in clinic with family.    Can we leave a detailed message on this number? YES    Phone number patient can be reached at: Home number on file [phone] (home)    Best Time: anytime    Call taken on 6/3/2019 at 2:27 PM by Whit Thomas

## 2019-06-03 NOTE — TELEPHONE ENCOUNTER
Requested Prescriptions   Pending Prescriptions Disp Refills     oxyCODONE-acetaminophen (PERCOCET)  MG per tablet 90 tablet 0     Sig: Take 1 tablet by mouth every 6 hours as needed for moderate to severe pain       There is no refill protocol information for this order         Last Written Prescription Date:  5-9-19  Last Fill Quantity: 90,   # refills: 0  Last Office Visit: 2-15-19  Future Office visit:    Next 5 appointments (look out 90 days)    Jun 14, 2019 10:30 AM CDT  Return Visit with Joaquín Burger MD  [facility] [address] [place] [address]  [phone]   Jul 08, 2019 10:00 AM CDT  Return Visit with SERVANDO Yan  [facility] ([facility]) [address] [place] [address]  [phone]   Jul 29, 2019 10:00 AM CDT  Return Visit with SERVANDO Yan  [facility] ([facility]) [address] [facility] [address]  [phone]           Routing refill request to provider for review/approval because:  Drug not on the [facility], [facility] or [facility] refill protocol or controlled substance

## 2019-06-04 ENCOUNTER — MYC MEDICAL ADVICE (OUTPATIENT)
Dept: FAMILY MEDICINE | Facility: CLINIC | Age: 49
End: 2019-06-04

## 2019-06-09 ENCOUNTER — TRANSFERRED RECORDS (OUTPATIENT)
Dept: HEALTH INFORMATION MANAGEMENT | Facility: CLINIC | Age: 49
End: 2019-06-09

## 2019-06-10 ENCOUNTER — TRANSFERRED RECORDS (OUTPATIENT)
Dept: HEALTH INFORMATION MANAGEMENT | Facility: CLINIC | Age: 49
End: 2019-06-10

## 2019-06-11 ENCOUNTER — TELEPHONE (OUTPATIENT)
Dept: FAMILY MEDICINE | Facility: CLINIC | Age: 49
End: 2019-06-11

## 2019-06-11 ENCOUNTER — PATIENT OUTREACH (OUTPATIENT)
Dept: CARE COORDINATION | Facility: CLINIC | Age: 49
End: 2019-06-11

## 2019-06-11 DIAGNOSIS — Z76.89 HEALTH CARE HOME: Primary | ICD-10-CM

## 2019-06-11 ASSESSMENT — ACTIVITIES OF DAILY LIVING (ADL): DEPENDENT_IADLS:: INDEPENDENT

## 2019-06-11 NOTE — LETTER
Health Care Home - Access Care Plan    About Me:    Patient Name:  Velma Diaz    YOB: 1970  Age:                      48 year old   [facility] MRN:     [id number] Telephone Information:   Home Phone [phone]   Mobile [phone]   Home Phone [phone]       Address:  [facility] [address] [facility] [address] Email address:  [email or web address]      Emergency Contact(s)   Name Relationship Lgl Grd Work Phone Home Phone Mobile Phone   1. FEDE PORTILLO Other   [phone]   2. NSC Other                 Health Maintenance: Routine Health maintenance Reviewed: Due/Overdue   Health Maintenance Due   Topic Date Due     ROSE MARIE ASSESSMENT  10/23/2015     PHQ-9  02/10/2016         My Access Plan  Medical Emergency 911   Questions or concerns during clinic hours Primary Clinic Line, I will call the clinic directly: [facility] - [phone]   24 Hour Appointment Line [phone] or  [phone]) (toll free)   24 Hour Nurse Line [phone] (toll free)   Questions or concerns outside clinic hours 24 Hour Appointment Line, I will call the after-hours on-call line:   [facility] [phone] or [phone]) (toll-free)   Preferred Urgent Care     Preferred Hospital [facility]  [phone]   Preferred Pharmacy WRITTEN PRESCRIPTION REQUESTED     Behavioral Health Crisis Line The National Suicide Prevention Lifeline at 1-976.262.2494 or 911                     My Care Team Members  Patient Care Team       Relationship Specialty Notifications Start End    Srinivasa Hunter MD PCP - General Family Practice  1/3/14     Merged    Phone: [phone] Fax: [phone]         [address] [facility] [address]    Celsa Tena, OT Occupational Therapist Occupational Therapy  4/7/15     [facility]    Phone: [phone] Fax: [phone]         [facility] [address] [facility] [address] [address]     Debra Hicks MD MD Neurosurgery  4/10/15     Mekhi Sanchez MD Referring Physician Neurology  6/12/15     referred to ortho hand center    Phone: [phone] Fax: [phone]         [address] [facility] [id number] [facility] [address]    Gus Donato MD MD Hand Surgery  6/12/15     Phone: [phone] Fax: [phone]         [facility] [place] [address] [facility] [address]    Family Katrina Paulino Psychologist   11/3/15     Sees Patient weekly for therapy    Phone: [phone]         [facility] [address] [facility] [address] [place] [address]    Srinivasa Hunter MD Assigned PCP   4/26/19     Phone: [phone] Fax: [phone]         [address] [facility] [address]           My Medical and Care Information  Problem List   Patient Active Problem List   Diagnosis     History of cleft palate with cleft lip     MAYA (obstructive sleep apnea)/Hypoventilation Syndrome     Major depressive disorder, recurrent episode, moderate (H)     Paresthesias     Health Care Home     Vitamin D deficiency     Morbid obesity with BMI of 40.0-44.9, adult (H)     Hyperlipidemia with target LDL less than 130     Intervertebral disc prolapse with impingement     Nonallopathic lesion of lumbar region     Chronic pain syndrome     Restless legs syndrome (RLS)     Insomnia     Migraine     Chronic bilateral back pain, unspecified back location     Chronic pain of left knee     ROSE MARIE (generalized anxiety disorder)     Idiopathic peripheral neuropathy     Pain in joint, ankle and foot, right      Current Medications and Allergies:  See printed Medication Report

## 2019-06-11 NOTE — TELEPHONE ENCOUNTER
I do not have any availability today, but I could see her at 12:20 PM tomorrow or 10:20 AM on Thursday.

## 2019-06-11 NOTE — TELEPHONE ENCOUNTER
Reason for Call:  Same Day Appointment, Requested Provider:  Srinivasa Hunter MD    PCP: Srinivasa Hunter    Reason for visit: Hospital follow up for dizziness, cold sweats, hot flashes    Duration of symptoms: 3 days    Have you been treated for this in the past? Yes - seen at [facility] 6/9 and 6/10    Additional comments: Patient scheduled soonest available with Dr. Hunter for 6/17. However, patient asked if Dr. Hunter could work her into his schedule because she is still having symptoms. She stated she feels dizzy constantly. Patient feels she should not have been released from the hospital yesterday. Please call back to discuss.    Can we leave a detailed message on this number? YES    Phone number patient can be reached at: Home number on file [phone] (home)    Best Time: anytime    Call taken on 6/11/2019 at 8:03 AM by Afua Sarkar

## 2019-06-11 NOTE — PROGRESS NOTES
Clinic Care Coordination Contact  Clinic Care Coordination Contact  OUTREACH    Referral Information:  [facility]    Primary Diagnosis: Other (include Comment box)(chest wall pain)    .Clinic Care Coordination post hospital - primary care RN CC     [place] Utilization: The patient uses the [facility] system, and the [facility] system specifically [facility].  Clinic Utilization  Difficulty keeping appointments:: No  Compliance Concerns: No  No-Show Concerns: No  No PCP office visit in Past Year: No  Utilization    Last refreshed: 6/11/2019 11:02 AM:  Hospital Admissions 0           Last refreshed: 6/11/2019 11:02 AM:  ED Visits 0           Last refreshed: 6/11/2019 11:02 AM:  No Show Count (past year) 4              Current as of: 6/11/2019 11:02 AM              Clinical Concerns:  Current Medical Concerns: The patient was recently seen in the emergency room at [facility] 2 days in a row for chest wall pain.  She did state that she thought that the results of her orthostatic hypotension test were positive.  The RN CC explained that quite often dehydration is part of the issue and encouraged the patient to drink frequently especially with the warmer weather.  Patient stated understanding the RN CC question how the patient is treating the pain in her chest wall that she is rating at a 4/10 at rest.  The patient stated that she had taken 1 dose of Tylenol and has not been using it since.  And that she has not tried ice or heat yet to treat the pain in her chest wall.  Patient states that getting up to walk to the bathroom or anywhere else is extremely painful.  She does have a follow-up appointment with her PCP coming up this week.  Patient Active Problem List   Diagnosis     History of cleft palate with cleft lip     MAYA (obstructive sleep apnea)/Hypoventilation Syndrome     Major depressive disorder, recurrent episode, moderate (H)     Paresthesias     Health Care Home      Vitamin D deficiency     Morbid obesity with BMI of 40.0-44.9, adult (H)     Hyperlipidemia with target LDL less than 130     Intervertebral disc prolapse with impingement     Nonallopathic lesion of lumbar region     Chronic pain syndrome     Restless legs syndrome (RLS)     Insomnia     Migraine     Chronic bilateral back pain, unspecified back location     Chronic pain of left knee     ROSE MARIE (generalized anxiety disorder)     Idiopathic peripheral neuropathy     Pain in joint, ankle and foot, right     Current Behavioral Concerns: History of anxiety and depression.  No indication of problems at this time.    Education Provided to patient: offerings of care coordination.   Pain  Pain (GOAL):: Yes  Type: Acute (<3mo)  Location of chronic pain:: chest wall  Progression: Constant  Description of pain: Sharp  Chronic pain severity:: 4  Limitation of routine activities due to chronic pain:: Yes  Description: Unable to perform most daily activities (chores, hobbies, social activities, driving)  Alleviating Factors: Rest  Aggravating Factors: Activity  Health Maintenance Reviewed: Due/Overdue   Health Maintenance Due   Topic Date Due     ROSE MARIE ASSESSMENT  10/23/2015     PHQ-9  02/10/2016     Clinical Pathway: None    Medication Management:  Patient is knowledgeable on medications and is adherent.  No financial concerns reported at this time.  The patient sets up her own medications.  No changes at this time and no questions or concerns.     Functional Status:  Dependent ADLs:: Independent  Dependent IADLs:: Independent  Bed or wheelchair confined:: No  Mobility Status: Independent  Fallen 2 or more times in the past year?: Yes  Any fall with injury in the past year?: No    Living Situation:  Current living arrangement:: Other  Type of residence:: Private home - stairs    Diet/Exercise/Sleep:  Diet:: Regular  Inadequate nutrition (GOAL):: No  Food Insecurity: No  Tube Feeding: No  Exercise:: Currently not  exercising  Inadequate activity/exercise (GOAL):: No  Significant changes in sleep pattern (GOAL): No    Transportation:  Transportation concerns (GOAL):: No  Transportation means:: Regular car     Psychosocial:  Zoroastrian or spiritual beliefs that impact treatment:: No  Mental health DX:: Yes  Mental health DX how managed:: Medication  Informal Support system:: Friends, Family, Parent     Financial/Insurance:   Financial/Insurance concerns (GOAL):: No     Resources and Interventions:  Current Resources:    ;   Community Resources: PCA  Supplies used at home:: None  Equipment Currently Used at Home: cane, straight, walker, standard    Advance Care Plan/Directive  Advanced Care Plans/Directives on file:: No  Advanced Care Plan/Directive Status: Considering Options    Referrals Placed: None     Goals:         Patient/Caregiver understanding: The patient has some understanding of the disease process.  Although she has pertinent questions regarding the process.    Outreach Frequency: 2 weeks  Future Appointments              Tomorrow Srinivasa Hunter MD [facility]    In 3 days Joaquín Burger MD [facility]    In 3 weeks Antonio Rios [facility]    In 1 month Antonio Rios [facility]          Plan: 1.  The patient will treat the pain with scheduled Tylenol and other modalities such as ice and heat.  2.  The patient will monitor symptoms for any increase in the pain and report changes to the provider.  3.  The patient will follow up with the providers as they have recommended.  4.  Care Coordination to remain available for the patient to contact in the event of future needs.          Brian Howard MSN, RN, PHN, CCM   Primary Care Clinical RN Care Coordinator  [facility]   [email or web address]  Office:  [phone]

## 2019-06-11 NOTE — LETTER
[place] CARE COORDINATION  [address]  [facility] [address]    June 11, 2019    Velma WANDA Diaz  [address] [address]      Dear Velma,    I am a clinic care coordinator who works with Srinivasa Hunter MD at [facility]. I wanted to thank you for spending the time to talk with me.  I wanted to introduce myself and provide you with my contact information so that you can call me with questions or concerns about your health care. Below is a description of clinic care coordination and how I can further assist you.     The clinic care coordinator is a registered nurse and/or  who understand the health care system. The goal of clinic care coordination is to help you manage your health and improve access to the [facility] in the most efficient manner. The registered nurse can assist you in meeting your health care goals by providing education, coordinating services, and strengthening the communication among your providers. The  can assist you with financial, behavioral, psychosocial, chemical dependency, counseling, and/or psychiatric resources.    Please feel free to contact me at [phone], with any questions or concerns. We at [facility] are focused on providing you with the highest-quality healthcare experience possible and that all starts with you.     Sincerely,     Brian Howard MSN, RN, PHN, [facility]   Primary Care Clinical RN Care Coordinator  [facility]   [email or web address]  Office: [phone]    Enclosed: I have enclosed a copy of a 24 Hour Access Plan. This has helpful phone numbers for you to call when needed. Please keep this in an easy to access place to use as needed.

## 2019-06-11 NOTE — TELEPHONE ENCOUNTER
Routing to PCP to review and advise.    Ly Sandoval RN  [facility]

## 2019-06-11 NOTE — TELEPHONE ENCOUNTER
Attempt # 1  Called patient at home number.[phone]  Was call answered?  Yes, scheduled for 12:20 tomorrow per Dale. Cancelled appointment for 6/17 per patient request.        Ly Sandoval RN  [facility]

## 2019-06-12 ENCOUNTER — OFFICE VISIT (OUTPATIENT)
Dept: FAMILY MEDICINE | Facility: CLINIC | Age: 49
End: 2019-06-12
Payer: COMMERCIAL

## 2019-06-12 VITALS
BODY MASS INDEX: 41.27 KG/M2 | WEIGHT: 233 LBS | TEMPERATURE: 98.2 F | DIASTOLIC BLOOD PRESSURE: 87 MMHG | HEART RATE: 68 BPM | OXYGEN SATURATION: 99 % | SYSTOLIC BLOOD PRESSURE: 135 MMHG

## 2019-06-12 DIAGNOSIS — F41.1 GAD (GENERALIZED ANXIETY DISORDER): ICD-10-CM

## 2019-06-12 DIAGNOSIS — F33.1 MAJOR DEPRESSIVE DISORDER, RECURRENT EPISODE, MODERATE (H): Chronic | ICD-10-CM

## 2019-06-12 DIAGNOSIS — R07.89 ATYPICAL CHEST PAIN: Primary | ICD-10-CM

## 2019-06-12 PROCEDURE — 99214 OFFICE O/P EST MOD 30 MIN: CPT | Performed by: FAMILY MEDICINE

## 2019-06-12 ASSESSMENT — ANXIETY QUESTIONNAIRES
IF YOU CHECKED OFF ANY PROBLEMS ON THIS QUESTIONNAIRE, HOW DIFFICULT HAVE THESE PROBLEMS MADE IT FOR YOU TO DO YOUR WORK, TAKE CARE OF THINGS AT HOME, OR GET ALONG WITH OTHER PEOPLE: NOT DIFFICULT AT ALL
7. FEELING AFRAID AS IF SOMETHING AWFUL MIGHT HAPPEN: NOT AT ALL
5. BEING SO RESTLESS THAT IT IS HARD TO SIT STILL: NOT AT ALL
2. NOT BEING ABLE TO STOP OR CONTROL WORRYING: SEVERAL DAYS
GAD7 TOTAL SCORE: 5
1. FEELING NERVOUS, ANXIOUS, OR ON EDGE: SEVERAL DAYS
3. WORRYING TOO MUCH ABOUT DIFFERENT THINGS: SEVERAL DAYS
6. BECOMING EASILY ANNOYED OR IRRITABLE: SEVERAL DAYS

## 2019-06-12 ASSESSMENT — PATIENT HEALTH QUESTIONNAIRE - PHQ9
5. POOR APPETITE OR OVEREATING: SEVERAL DAYS
SUM OF ALL RESPONSES TO PHQ QUESTIONS 1-9: 7

## 2019-06-12 NOTE — PROGRESS NOTES
"Subjective     Velma Diaz is a 48 year old female who presents to clinic today for the following health issues:    HPI   ED/UC Followup:    Facility:  [place]  Date of visit: 6/9/19 and 6/10/19  Reason for visit: Chest pain and dizziness  Current Status: Still really dizzy and can't do much activity. Still has some left sided chest pain.      She was seen both Sunday and Monday of these last few days in the ER for a variety of symptoms including dizziness and left-sided chest pain.  She had fairly extensive testing done including EKG, lab work, CT to rule out pulmonary embolism, etc.  Her chest discomfort was felt to be muscular and some of her symptoms were felt to be anxiety related.  She does admit increased stress in her life.  Her son is living with her now.  She takes care of her \"grandson\", Dev.  She has a new boyfriend.  She does have chronic pain and neuropathy issues as per her chart.  She has been on medication in the past for anxiety and depression, but she is not on any SSRI or other specific meds for those conditions currently, other than her chronic pain meds which include nortriptyline and Lyrica.  She does plan to meet with her therapist, Antonio, in our clinic in the near future.    Patient Active Problem List   Diagnosis     History of cleft palate with cleft lip     MAYA (obstructive sleep apnea)/Hypoventilation Syndrome     Major depressive disorder, recurrent episode, moderate (H)     Paresthesias     Health Care Home     Vitamin D deficiency     Morbid obesity with BMI of 40.0-44.9, adult (H)     Hyperlipidemia with target LDL less than 130     Intervertebral disc prolapse with impingement     Nonallopathic lesion of lumbar region     Chronic pain syndrome     Restless legs syndrome (RLS)     Insomnia     Migraine     Chronic bilateral back pain, unspecified back location     Chronic pain of left knee     ROSE MARIE (generalized anxiety disorder)     Idiopathic peripheral neuropathy     Pain in joint, " ankle and foot, right     Past Surgical History:   Procedure Laterality Date     ANKLE SURGERY      Left for torn tendons & loose bone chips     ARTHROSCOPY KNEE  1986    Right knee     BACK SURGERY       Basal cell carcinoma  2011    Removal from the chest     BIOPSY       C VAGINAL HYSTERECTOMY       CARPAL TUNNEL RELEASE RT/LT  ~    Bilateral     COLONOSCOPY  2016     COSMETIC SURGERY       cysto with right ureteroscopy, stone manipulation, double J stent removal  10/04/2018    Dr. Cisneros -- removal of right kidney stone     cysto, right retrograde pyelogram, right ureteral stent Right 2018    for obstructing right kidney stone     DECOMPRESSION CUBITAL TUNNEL Left 2015    Procedure: DECOMPRESSION CUBITAL TUNNEL;  Surgeon: Gus Donato MD;  [facility]     ENT SURGERY      Tonsillectomy and Adenoids     GYN SURGERY      Hysterectomy     HC REPAIR PERONEAL TENDONS       ORTHOPEDIC SURGERY  ,     Bilateral CTR     PE TUBES      yearly times 10 years     REPAIR CLEFT PALATE CHILD      Age 3 months & afterwards (multiple surgeries)     SOFT TISSUE SURGERY         Social History     Tobacco Use     Smoking status: Former Smoker     Packs/day: 0.50     Years: 15.00     Pack years: 7.50     Types: Cigarettes     Last attempt to quit: 2015     Years since quittin.3     Smokeless tobacco: Never Used   Substance Use Topics     Alcohol use: No     Alcohol/week: 0.0 oz     Comment: Quit in      Family History   Problem Relation Age of Onset     Alzheimer Disease Paternal Grandmother 60     Cerebrovascular Disease Paternal Grandmother      Neurologic Disorder Sister 20        migraines     Depression/Anxiety Sister      Depression Sister      Neurologic Disorder Son 14        migraines     Asthma Son      Heart Disease Mother      Osteoporosis Mother      Obesity Mother      Cancer Father      Alcohol/Drug Father      Other Cancer Father          saliva glands & skin CA      Hypertension Father      Diabetes Maternal Grandmother      Breast Cancer Maternal Grandmother      Obesity Maternal Grandmother      Other Cancer Maternal Grandfather         skin cancer     Cancer - colorectal Other         Aunt     Asthma Daughter      Asthma Daughter      Asthma Son      Thyroid Disease Sister      Colon Cancer Other      Obesity Sister      Glaucoma No family hx of      Macular Degeneration No family hx of          Current Outpatient Medications   Medication Sig Dispense Refill     acetaminophen (TYLENOL) 325 MG tablet Take 2 tablets (650 mg) by mouth every 4 hours as needed for other (mild pain) 100 tablet 0     ferrous sulfate (FEROSUL) 325 (65 Fe) MG tablet TAKE 1 TABLET (325 MG) BY MOUTH DAILY (WITH BREAKFAST) 90 tablet 1     furosemide (LASIX) 20 MG tablet Take 1 tablet (20 mg) by mouth daily as needed 30 tablet 11     methocarbamol (ROBAXIN) 750 MG tablet TAKE 1 TABLETS (750 MG) BY MOUTH 3 TIMES DAILY AS NEEDED FOR MUSCLE SPASMS 60 tablet 1     Multiple Vitamins-Minerals (MULTI FOR HER PO) Take by mouth daily        nortriptyline (PAMELOR) 10 MG capsule Take 2 capsules (20 mg) by mouth At Bedtime 60 capsule 1     nystatin (MYCOSTATIN) 609714 UNIT/GM POWD Apply to affected area twice daily as needed 60 g 2     ORDER FOR DME Respironics REMSTAR 60 Series Auto [id number] H2O, Airfit P10 nasal pillow mask w/xsmall pillows       oxyCODONE-acetaminophen (PERCOCET)  MG per tablet Take 1 tablet by mouth every 6 hours as needed for moderate to severe pain 90 tablet 0     pregabalin (LYRICA) 225 MG capsule TAKE 1 CAPSULE BY MOUTH TWICE A DAY 60 capsule 5     rOPINIRole (REQUIP) 0.25 MG tablet TAKE 1 TABLET (0.25 MG) BY MOUTH 2 TIMES DAILY 60 tablet 5     sodium chloride (OCEAN) 0.65 % nasal spray Inhale 2 sprays in each nostril twice daily until your follow up appointment.       SUMAtriptan (IMITREX) 100 MG tablet Take 1 tablet (100 mg) by mouth at onset of headache  "for migraine May repeat in 2 hours if needed: max 2/day 9 tablet 2     nabumetone (RELAFEN) 750 MG tablet Take 1 tablet (750 mg) by mouth 2 times daily (Patient not taking: Reported on 6/12/2019) 60 tablet 0     No Known Allergies      Reviewed and updated as needed this visit by Provider         Review of Systems   ROS COMP: Constitutional, HEENT, cardiovascular, pulmonary, gi and gu systems are negative, except as otherwise noted.      Objective    LMP  (LMP Unknown)   Body mass index is 41.27 kg/m .   /87 (BP Location: Right arm, Patient Position: Chair, Cuff Size: Adult Large)   Pulse 68   Temp 98.2  F (36.8  C) (Oral)   Wt 105.7 kg (233 lb)   LMP  (LMP Unknown)   SpO2 99%   BMI 41.27 kg/m      Physical Exam   GENERAL: alert, no distress and over weight  RESP: lungs clear to auscultation - no rales, rhonchi or wheezes  CV: regular rate and rhythm, normal S1 S2, no S3 or S4, no murmur, click or rub  CHEST: She does have some discomfort to palpation over the left upper chest which reproduces her chest pain and also occurs with deep breathing  PSYCH: mentation appears normal, affect normal/bright.  She scored a 7 on the PHQ 9 and a 5 on the ROSE MARIE 7    Diagnostic Test Results:  Both of her ER notes were reviewed as well as various test results        Assessment & Plan       ICD-10-CM    1. Atypical chest pain R07.89    2. Major depressive disorder, recurrent episode, moderate (H) F33.1    3. ROSE MARIE (generalized anxiety disorder) F41.1         BMI:   Estimated body mass index is 41.27 kg/m  as calculated from the following:    Height as of 5/16/19: 1.6 m (5' 3\").    Weight as of this encounter: 105.7 kg (233 lb).     I gave her reassurance that her current chest discomfort is almost certainly muscular  No cardiac or pulmonary source identified for this  We discussed her underlying anxiety and depression and she will continue with counseling for that  We could consider further pharmacotherapy as well, but she " is already on a number of baseline medicines for other conditions, so we will hold off on that for now    Return for a recheck if symptoms worsen or fail to improve.    Srinivasa Hunter MD  [facility]

## 2019-06-13 ASSESSMENT — ANXIETY QUESTIONNAIRES: GAD7 TOTAL SCORE: 5

## 2019-06-13 NOTE — PROGRESS NOTES
ESTABLISHED PATIENT NEUROLOGY NOTE    DATE OF VISIT: 6/14/2019  CLINIC LOCATION: [facility]  MRN: [id number]  PATIENT NAME: Velma Diaz  YOB: 1970    PCP: Srinivasa Hunter MD    REASON FOR VISIT:   Chief Complaint   Patient presents with     Follow Up     neuropathy is worse. Hand are worse then feet     SUBJECTIVE:                                                      HISTORY OF PRESENT ILLNESS: Patient is here for follow up regarding presumed small fiber peripheral polyneuropathy.  Last visit was on 03/15/2019.  At that time no medication changes were made, but the plan was to possibly increase nortriptyline dose to 20 mg at bedtime if pain persists.  Please refer to my initial/other prior notes for further information.  The patient is accompanied by her close friend, Jonathan.    Since the last visit, the patient reports some improvement of her bilateral feet pain, but feels that her hands are still hurting. She increased her nortriptyline dose to 20 mg at bedtime after contacting me via My Chart in April 2019.  She also takes 225 mg of Lyrica twice daily.  No significant side effects.  No interval development of new focal neurological symptoms.    On review of systems, patient endorses recent evaluation for left chest wall pain, but no other active complaints. Medications, allergies, family and social history were also reviewed. There are no changes reported by patient.  REVIEW OF SYSTEMS:                                                    10-system review was completed. Pertinent positives are included in HPI. The remainder of ROS is negative.  EXAM:                                                    Physical Exam:   Vitals: /82 (BP Location: Right arm, Patient Position: Sitting, Cuff Size: Adult Large)   Pulse 88   Temp 98.2  F (36.8  C) (Oral)   Resp 16   Wt 104.8 kg (231 lb)   LMP  (LMP Unknown)   SpO2 96%   BMI 40.92 kg/m      General: pt is in NAD,  cooperative.  Skin: normal turgor, moist mucous membranes, no lesions/rashes noticed.  HEENT: ATNC, white sclera, normal conjunctiva.  Respiratory: Symmetric lung excursion, no accessory respiratory muscle use.  Abdomen: Non distended.  Neurological: awake, cooperative, follows commands, no aphasia or dysarthria noted, cranial nerves II-XII: no ptosis, extraocular motility is full, face is symmetric, tongue is midline, equally moves all extremities, there is reduced sensation in both feet and distal lower extremities, no dysmetria, casual gait is normal.  ASSESSMENT AND PLAN:                                                    Assessment: 48-year-old female patient with chronic pain syndrome and presumed small fiber peripheral polyneuropathy presents for follow-up.  She reports pain improvement in her feet, but continues to have hand pain.  She is on 20 mg of nortriptyline at bedtime and 225 mg of Lyrica twice daily without significant side effects.    We reviewed the current symptoms, available treatment options, and the plan.  The dose of nortriptyline could be further increased.  Alternative options include Effexor, Cymbalta, alpha lipoic acid, and topical treatments (capsaicin cream and lidocaine ointment).  We decided to further increase nortriptyline.    Diagnoses:    ICD-10-CM    1. Idiopathic peripheral neuropathy G60.9 nortriptyline (PAMELOR) 10 MG capsule     Plan: At today's visit we thoroughly discussed current symptoms, available treatment options, and the plan.  We decided to slightly increase nortriptyline without other medication changes.    Next follow-up appointment is in the next 3 months or earlier if needed.    Total Time: 25 minutes with > 50% spent counseling the patient on stated above assessment and recommendations.    Joaquín Burger MD  / Neurology

## 2019-06-14 ENCOUNTER — OFFICE VISIT (OUTPATIENT)
Dept: NEUROLOGY | Facility: CLINIC | Age: 49
End: 2019-06-14
Payer: COMMERCIAL

## 2019-06-14 VITALS
SYSTOLIC BLOOD PRESSURE: 136 MMHG | HEART RATE: 88 BPM | TEMPERATURE: 98.2 F | WEIGHT: 231 LBS | DIASTOLIC BLOOD PRESSURE: 82 MMHG | OXYGEN SATURATION: 96 % | BODY MASS INDEX: 40.92 KG/M2 | RESPIRATION RATE: 16 BRPM

## 2019-06-14 DIAGNOSIS — G60.9 IDIOPATHIC PERIPHERAL NEUROPATHY: ICD-10-CM

## 2019-06-14 PROCEDURE — 99214 OFFICE O/P EST MOD 30 MIN: CPT | Performed by: PSYCHIATRY & NEUROLOGY

## 2019-06-14 RX ORDER — NORTRIPTYLINE HCL 10 MG
30 CAPSULE ORAL AT BEDTIME
Qty: 90 CAPSULE | Refills: 3 | Status: SHIPPED | OUTPATIENT
Start: 2019-06-14 | End: 2019-09-16

## 2019-06-14 NOTE — PATIENT INSTRUCTIONS
AFTER VISIT SUMMARY (AVS):    At today's visit we thoroughly discussed current symptoms, available treatment options, and the plan.  We decided to slightly increase nortriptyline without other medication changes.    Next follow-up appointment is in the next 3 months or earlier if needed.    Please do not hesitate to call me with any questions or concerns.    Thanks.

## 2019-06-14 NOTE — LETTER
6/14/2019         RE: Velma Diaz  [address]  [place] [address]        Dear Colleague,    Thank you for referring your patient, Velma Diaz, to the [facility]. Please see a copy of my visit note below.    ESTABLISHED PATIENT NEUROLOGY NOTE    DATE OF VISIT: 6/14/2019  CLINIC LOCATION: [facility]  MRN: [id number]  PATIENT NAME: Velma Diaz  YOB: 1970    PCP: Srinivasa Hunter MD    REASON FOR VISIT:   Chief Complaint   Patient presents with     Follow Up     neuropathy is worse. Hand are worse then feet     SUBJECTIVE:                                                      HISTORY OF PRESENT ILLNESS: Patient is here for follow up regarding presumed small fiber peripheral polyneuropathy.  Last visit was on 03/15/2019.  At that time no medication changes were made, but the plan was to possibly increase nortriptyline dose to 20 mg at bedtime if pain persists.  Please refer to my initial/other prior notes for further information.  The patient is accompanied by her close friend, Jontahan.    Since the last visit, the patient reports some improvement of her bilateral feet pain, but feels that her hands are still hurting. She increased her nortriptyline dose to 20 mg at bedtime after contacting me via My Chart in April 2019.  She also takes 225 mg of Lyrica twice daily.  No significant side effects.  No interval development of new focal neurological symptoms.    On review of systems, patient endorses recent evaluation for left chest wall pain, but no other active complaints. Medications, allergies, family and social history were also reviewed. There are no changes reported by patient.  REVIEW OF SYSTEMS:                                                    10-system review was completed. Pertinent positives are included in HPI. The remainder of ROS is negative.  EXAM:                                                    Physical Exam:   Vitals: /82 (BP  Location: Right arm, Patient Position: Sitting, Cuff Size: Adult Large)   Pulse 88   Temp 98.2  F (36.8  C) (Oral)   Resp 16   Wt 104.8 kg (231 lb)   LMP  (LMP Unknown)   SpO2 96%   BMI 40.92 kg/m       General: pt is in NAD, cooperative.  Skin: normal turgor, moist mucous membranes, no lesions/rashes noticed.  HEENT: ATNC, white sclera, normal conjunctiva.  Respiratory: Symmetric lung excursion, no accessory respiratory muscle use.  Abdomen: Non distended.  Neurological: awake, cooperative, follows commands, no aphasia or dysarthria noted, cranial nerves II-XII: no ptosis, extraocular motility is full, face is symmetric, tongue is midline, equally moves all extremities, there is reduced sensation in both feet and distal lower extremities, no dysmetria, casual gait is normal.  ASSESSMENT AND PLAN:                                                    Assessment: 48-year-old female patient with chronic pain syndrome and presumed small fiber peripheral polyneuropathy presents for follow-up.  She reports pain in prominence her feet, but continues to have hand pain.  She is on 20 mg of nortriptyline at bedtime and 225 mg of Lyrica twice daily without significant side effects.    We reviewed the current symptoms, available treatment options, and the plan.  The dose of nortriptyline could be further increased.  Alternative options include Effexor, Cymbalta, alpha lipoic acid, and topical treatments (capsaicin cream and lidocaine ointment).  We decided to further increase nortriptyline.    Diagnoses:    ICD-10-CM    1. Idiopathic peripheral neuropathy G60.9 nortriptyline (PAMELOR) 10 MG capsule     Plan: At today's visit we thoroughly discussed current symptoms, available treatment options, and the plan.  We decided to slightly increase nortriptyline without other medication changes.    Next follow-up appointment is in the next 3 months or earlier if needed.    Total Time: 25 minutes with > 50% spent counseling the  patient on stated above assessment and recommendations.    Joaquín Burger MD  / Neurology      Again, thank you for allowing me to participate in the care of your patient.        Sincerely,        Joaquín Burger MD

## 2019-06-19 ENCOUNTER — THERAPY VISIT (OUTPATIENT)
Dept: CHIROPRACTIC MEDICINE | Facility: CLINIC | Age: 49
End: 2019-06-19
Payer: COMMERCIAL

## 2019-06-19 DIAGNOSIS — M62.838 SPASM OF MUSCLE: ICD-10-CM

## 2019-06-19 DIAGNOSIS — M99.05 SEGMENTAL DYSFUNCTION OF PELVIC REGION: Primary | ICD-10-CM

## 2019-06-19 DIAGNOSIS — M54.50 LUMBAGO: ICD-10-CM

## 2019-06-19 DIAGNOSIS — M99.02 THORACIC SEGMENT DYSFUNCTION: ICD-10-CM

## 2019-06-19 DIAGNOSIS — M99.03 SEGMENTAL DYSFUNCTION OF LUMBAR REGION: ICD-10-CM

## 2019-06-19 PROCEDURE — 97810 ACUP 1/> WO ESTIM 1ST 15 MIN: CPT | Performed by: CHIROPRACTOR

## 2019-06-19 PROCEDURE — 98941 CHIROPRACT MANJ 3-4 REGIONS: CPT | Mod: AT | Performed by: CHIROPRACTOR

## 2019-06-19 NOTE — PROGRESS NOTES
Visit #:  12    Subjective:  Velma Diaz is a 48 year old female who is seen in f/u up for:        Segmental dysfunction of pelvic region  Lumbago  Segmental dysfunction of lumbar region  Spasm of muscle  Thoracic segment dysfunction.     Since last visit on 5/29/2019,  Velma Diaz reports:    Area of chief complaint:  Lumbar :  Symptoms are graded at 4/10. The quality is described as stiff, and achey.  Motion has increased but not normal. Patient feels that her low back is stiff and sore, but has improved.  She continues to walk better as her ankle is improving, which is helping with her low back.  She did have a couple of trips to the ER with what she thought was a MI but turned out to be a panic attack.  She has been going through some family stress.        Objective:  The following was observed:    P: palpatory tenderness Piriformis R>>L  A: static palpation demonstrates intersegmental asymmetry , thoracic, lumbar, pelvis  R: motion palpation notes restricted motion, T10, T11 , T12 , L4 , L5  and PSIS Right   T: hypertonicity at: Piriformis R>>L    Segmental spinal dysfunction/restrictions found at:  T10, T11 , T12 , L4 , L5  and PSIS Right       Assessment:    Diagnoses:      1. Segmental dysfunction of pelvic region    2. Lumbago    3. Segmental dysfunction of lumbar region    4. Spasm of muscle    5. Thoracic segment dysfunction        Patient's condition:  Patient had restrictions pre-manipulation    Treatment effectiveness:  Post manipulation there is better intersegmental movement and Patient claims to feel looser post manipulation      Procedures:  CMT:  25648 Chiropractic manipulative treatment 3-4 regions performed   Thoracic: Activator, T10, T11, T12, Prone  Lumbar: Activator, L4, L5, Prone  Pelvis: Drop Table, PSIS Right , Prone    Modalities:  49329: Acupuncture, for 15 minutes:  Points: B25, B27, GV3, K3, B62, SI3  Ahsi point in piriformis  For 15 minutes    Therapeutic  procedures:  None    Response to Treatment  Reduction in symptoms as reported by patient    Prognosis: Good    Progress towards Goals: Patient is making progress towards the goal.     Recommendations:    Instructions:  ice 20 minutes every other hour as needed    Follow-up:    Return to care in one week.

## 2019-06-29 DIAGNOSIS — M54.5 CHRONIC LOW BACK PAIN, UNSPECIFIED BACK PAIN LATERALITY, WITH SCIATICA PRESENCE UNSPECIFIED: ICD-10-CM

## 2019-06-29 DIAGNOSIS — G89.29 CHRONIC LOW BACK PAIN, UNSPECIFIED BACK PAIN LATERALITY, WITH SCIATICA PRESENCE UNSPECIFIED: ICD-10-CM

## 2019-07-01 ENCOUNTER — THERAPY VISIT (OUTPATIENT)
Dept: CHIROPRACTIC MEDICINE | Facility: CLINIC | Age: 49
End: 2019-07-01
Payer: COMMERCIAL

## 2019-07-01 DIAGNOSIS — M54.50 LUMBAGO: ICD-10-CM

## 2019-07-01 DIAGNOSIS — M99.02 THORACIC SEGMENT DYSFUNCTION: ICD-10-CM

## 2019-07-01 DIAGNOSIS — M99.05 SEGMENTAL DYSFUNCTION OF PELVIC REGION: Primary | ICD-10-CM

## 2019-07-01 DIAGNOSIS — M62.838 SPASM OF MUSCLE: ICD-10-CM

## 2019-07-01 DIAGNOSIS — M99.03 SEGMENTAL DYSFUNCTION OF LUMBAR REGION: ICD-10-CM

## 2019-07-01 PROCEDURE — 98941 CHIROPRACT MANJ 3-4 REGIONS: CPT | Mod: AT | Performed by: CHIROPRACTOR

## 2019-07-01 PROCEDURE — 97810 ACUP 1/> WO ESTIM 1ST 15 MIN: CPT | Performed by: CHIROPRACTOR

## 2019-07-01 RX ORDER — METHOCARBAMOL 750 MG/1
TABLET, FILM COATED ORAL
Qty: 60 TABLET | Refills: 1 | Status: SHIPPED | OUTPATIENT
Start: 2019-07-01 | End: 2019-09-01

## 2019-07-01 NOTE — TELEPHONE ENCOUNTER
Requested Prescriptions   Pending Prescriptions Disp Refills     methocarbamol (ROBAXIN) 750 MG tablet [Pharmacy Med Name: METHOCARBAMOL 750 MG TABLET] 60 tablet 1     Sig: TAKE 1 TABLETS (750 MG) BY MOUTH 3 TIMES DAILY AS NEEDED FOR MUSCLE SPASMS       There is no refill protocol information for this order   Last Written Prescription Date:  5-23-19  Last Fill Quantity: 60,  # refills: 1   Last office visit: 6/12/2019 with prescribing provider:  6-12-19   Future Office Visit:   Next 5 appointments (look out 90 days)    Jul 08, 2019 10:00 AM CDT  Return Visit with Antonio Rios [facility] ([facility]) [address] [place] [id number]  [phone]   Jul 29, 2019 10:00 AM CDT  Return Visit with Antonio Rios [facility] ([facility]) [address] [facility] [id number]  [phone]   Sep 12, 2019 10:30 AM CDT  Return Visit with Joaquín Burger MD  [facility] ([facility]) [address] [place] [id number] [phone]

## 2019-07-01 NOTE — PROGRESS NOTES
Visit #:  13    Subjective:  Velma Diaz is a 48 year old female who is seen in f/u up for:        Segmental dysfunction of pelvic region  Lumbago  Segmental dysfunction of lumbar region  Spasm of muscle  Thoracic segment dysfunction.     Since last visit on 6/19/2019,  Velma Diaz reports:    Area of chief complaint:  Lumbar :  Symptoms are graded at 4/10. The quality is described as stiff, and achey.  Motion has increased but not normal. Patient feels that her low back is stiff and sore, but is feeling better.  She is having stiffness in other joints but feels this is an arthritis flare.She has new orthopedic shoes which have helped her a lot.    Objective:  The following was observed:    P: palpatory tenderness Piriformis R>>L  A: static palpation demonstrates intersegmental asymmetry , thoracic, lumbar, pelvis  R: motion palpation notes restricted motion, T10, T11 , T12 , L4 , L5  and PSIS Right   T: hypertonicity at: Piriformis R>>L    Segmental spinal dysfunction/restrictions found at:  T10, T11 , T12 , L4 , L5  and PSIS Right       Assessment:    Diagnoses:      1. Segmental dysfunction of pelvic region    2. Lumbago    3. Segmental dysfunction of lumbar region    4. Spasm of muscle    5. Thoracic segment dysfunction        Patient's condition:  Patient had restrictions pre-manipulation    Treatment effectiveness:  Post manipulation there is better intersegmental movement and Patient claims to feel looser post manipulation      Procedures:  CMT:  91768 Chiropractic manipulative treatment 3-4 regions performed   Thoracic: Activator, T10, T11, T12, Prone  Lumbar: Activator, L4, L5, Prone  Pelvis: Drop Table, PSIS Right , Prone    Modalities:  68779: Acupuncture, for 15 minutes:  Points: B25, B27, GV3, K3, B62, SI3  Ahsi point in piriformis  For 15 minutes    Therapeutic procedures:  None    Response to Treatment  Reduction in symptoms as reported by patient    Prognosis: Good    Progress towards Goals:  Patient is making progress towards the goal.     Recommendations:    Instructions:  ice 20 minutes every other hour as needed    Follow-up:    Return to care in one week.

## 2019-07-08 ENCOUNTER — OFFICE VISIT (OUTPATIENT)
Dept: PSYCHOLOGY | Facility: CLINIC | Age: 49
End: 2019-07-08
Payer: COMMERCIAL

## 2019-07-08 ENCOUNTER — TELEPHONE (OUTPATIENT)
Dept: FAMILY MEDICINE | Facility: CLINIC | Age: 49
End: 2019-07-08

## 2019-07-08 DIAGNOSIS — F33.1 MAJOR DEPRESSIVE DISORDER, RECURRENT EPISODE, MODERATE (H): Primary | ICD-10-CM

## 2019-07-08 DIAGNOSIS — F41.1 GAD (GENERALIZED ANXIETY DISORDER): ICD-10-CM

## 2019-07-08 DIAGNOSIS — G89.4 CHRONIC PAIN SYNDROME: ICD-10-CM

## 2019-07-08 PROCEDURE — 90834 PSYTX W PT 45 MINUTES: CPT | Performed by: SOCIAL WORKER

## 2019-07-08 RX ORDER — OXYCODONE AND ACETAMINOPHEN 10; 325 MG/1; MG/1
1 TABLET ORAL EVERY 6 HOURS PRN
Qty: 90 TABLET | Refills: 0 | Status: SHIPPED | OUTPATIENT
Start: 2019-07-08 | End: 2019-08-06

## 2019-07-08 ASSESSMENT — PATIENT HEALTH QUESTIONNAIRE - PHQ9
5. POOR APPETITE OR OVEREATING: SEVERAL DAYS
SUM OF ALL RESPONSES TO PHQ QUESTIONS 1-9: 10

## 2019-07-08 ASSESSMENT — ANXIETY QUESTIONNAIRES
GAD7 TOTAL SCORE: 7
2. NOT BEING ABLE TO STOP OR CONTROL WORRYING: SEVERAL DAYS
IF YOU CHECKED OFF ANY PROBLEMS ON THIS QUESTIONNAIRE, HOW DIFFICULT HAVE THESE PROBLEMS MADE IT FOR YOU TO DO YOUR WORK, TAKE CARE OF THINGS AT HOME, OR GET ALONG WITH OTHER PEOPLE: SOMEWHAT DIFFICULT
7. FEELING AFRAID AS IF SOMETHING AWFUL MIGHT HAPPEN: NOT AT ALL
1. FEELING NERVOUS, ANXIOUS, OR ON EDGE: MORE THAN HALF THE DAYS
5. BEING SO RESTLESS THAT IT IS HARD TO SIT STILL: NOT AT ALL
6. BECOMING EASILY ANNOYED OR IRRITABLE: MORE THAN HALF THE DAYS
3. WORRYING TOO MUCH ABOUT DIFFERENT THINGS: SEVERAL DAYS

## 2019-07-08 NOTE — TELEPHONE ENCOUNTER
Reason for Call:  Medication or medication refill:    Do you use a [facility]?  Name of the pharmacy and phone number for the current request:  [facility] [address], [place] [address]    Name of the medication requested: oxyCODONE-acetaminophen (PERCOCET)  MG per tablet    Other request: Patient would like to  at [facility]      Can we leave a detailed message on this number? YES    Phone number patient can be reached at: Cell number on file:    Telephone Information:   Mobile [phone]       Best Time: Anytime    Call taken on 7/8/2019 at 9:51 AM by Rosalinda Lopez

## 2019-07-08 NOTE — TELEPHONE ENCOUNTER
Patient was last seen 6/12/19.   Last Rx was 6/3/19 for 90 tabs.      Routing refill request to provider for review/approval because:  Drug not on the G refill protocol   Judi Yeh RN  [facility]
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
## 2019-07-09 ASSESSMENT — ANXIETY QUESTIONNAIRES: GAD7 TOTAL SCORE: 7

## 2019-07-11 ENCOUNTER — OFFICE VISIT (OUTPATIENT)
Dept: FAMILY MEDICINE | Facility: CLINIC | Age: 49
End: 2019-07-11
Payer: COMMERCIAL

## 2019-07-11 VITALS
TEMPERATURE: 98.3 F | DIASTOLIC BLOOD PRESSURE: 80 MMHG | BODY MASS INDEX: 41.98 KG/M2 | SYSTOLIC BLOOD PRESSURE: 118 MMHG | HEART RATE: 86 BPM | WEIGHT: 237 LBS | OXYGEN SATURATION: 97 %

## 2019-07-11 DIAGNOSIS — L98.9 SKIN LESION OF LEFT ARM: ICD-10-CM

## 2019-07-11 DIAGNOSIS — B37.2 CANDIDAL INTERTRIGO: ICD-10-CM

## 2019-07-11 DIAGNOSIS — M77.11 RIGHT TENNIS ELBOW: ICD-10-CM

## 2019-07-11 DIAGNOSIS — D23.9 INTRADERMAL NEVUS: Primary | ICD-10-CM

## 2019-07-11 PROCEDURE — 99000 SPECIMEN HANDLING OFFICE-LAB: CPT | Performed by: FAMILY MEDICINE

## 2019-07-11 PROCEDURE — 11300 SHAVE SKIN LESION 0.5 CM/<: CPT | Performed by: FAMILY MEDICINE

## 2019-07-11 PROCEDURE — 88305 TISSUE EXAM BY PATHOLOGIST: CPT | Performed by: FAMILY MEDICINE

## 2019-07-11 PROCEDURE — 99213 OFFICE O/P EST LOW 20 MIN: CPT | Mod: 25 | Performed by: FAMILY MEDICINE

## 2019-07-11 RX ORDER — NYSTATIN 100000 [USP'U]/G
POWDER TOPICAL
Qty: 60 G | Refills: 2 | Status: SHIPPED | OUTPATIENT
Start: 2019-07-11 | End: 2020-09-01

## 2019-07-11 ASSESSMENT — PAIN SCALES - GENERAL: PAINLEVEL: WORST PAIN (10)

## 2019-07-11 NOTE — PROGRESS NOTES
Subjective     Velma Diaz is a 48 year old female who presents to clinic today for the following health issues:    HPI   Spot on her left arm that was black and now red and did bleed.  Check moles on her back that itch and bleeding at times.  Would like to discuss right elbow pain. 10/10 at times.    She noticed a spot on her left forearm a couple of weeks ago.  He developed a hard black area on it.  She went swimming in the black area came off and then it bled.  She was concerned about it.    She also has 3 raised moles on her lower back.  They rub on her clothing.  She wonders if they could be removed.    She also requests a refill on the nystatin powder that she has used under her breasts and her abdominal pannus.  With the heat and humidity, she has been getting a yeast rash in those areas again.    She is also been having pain over the right lateral elbow.  There are certain movements and activities that make it especially painful.  No recent injury to it.    Patient Active Problem List   Diagnosis     History of cleft palate with cleft lip     MAYA (obstructive sleep apnea)/Hypoventilation Syndrome     Major depressive disorder, recurrent episode, moderate (H)     Paresthesias     Health Care Home     Vitamin D deficiency     Morbid obesity with BMI of 40.0-44.9, adult (H)     Hyperlipidemia with target LDL less than 130     Intervertebral disc prolapse with impingement     Nonallopathic lesion of lumbar region     Chronic pain syndrome     Restless legs syndrome (RLS)     Insomnia     Migraine     Chronic bilateral back pain, unspecified back location     Chronic pain of left knee     ROSE MARIE (generalized anxiety disorder)     Idiopathic peripheral neuropathy     Pain in joint, ankle and foot, right     Past Surgical History:   Procedure Laterality Date     ANKLE SURGERY  7/13    Left for torn tendons & loose bone chips     ARTHROSCOPY KNEE  1986    Right knee     BACK SURGERY       Basal cell carcinoma  2011     Removal from the chest     BIOPSY       C VAGINAL HYSTERECTOMY       CARPAL TUNNEL RELEASE RT/LT  ~    Bilateral     COLONOSCOPY  2016     COSMETIC SURGERY       cysto with right ureteroscopy, stone manipulation, double J stent removal  10/04/2018    Dr. Cisneros -- removal of right kidney stone     cysto, right retrograde pyelogram, right ureteral stent Right 2018    for obstructing right kidney stone     DECOMPRESSION CUBITAL TUNNEL Left 2015    Procedure: DECOMPRESSION CUBITAL TUNNEL;  Surgeon: Gus Donato MD;  [facility]     ENT SURGERY      Tonsillectomy and Adenoids     GYN SURGERY      Hysterectomy     HC REPAIR PERONEAL TENDONS       ORTHOPEDIC SURGERY  ,     Bilateral CTR     PE TUBES      yearly times 10 years     REPAIR CLEFT PALATE CHILD      Age 3 months & afterwards (multiple surgeries)     SOFT TISSUE SURGERY         Social History     Tobacco Use     Smoking status: Former Smoker     Packs/day: 0.50     Years: 15.00     Pack years: 7.50     Types: Cigarettes     Last attempt to quit: 2015     Years since quittin.3     Smokeless tobacco: Never Used   Substance Use Topics     Alcohol use: No     Alcohol/week: 0.0 oz     Comment: Quit in      Family History   Problem Relation Age of Onset     Alzheimer Disease Paternal Grandmother 60     Cerebrovascular Disease Paternal Grandmother      Neurologic Disorder Sister 20        migraines     Depression/Anxiety Sister      Depression Sister      Neurologic Disorder Son 14        migraines     Asthma Son      Heart Disease Mother      Osteoporosis Mother      Obesity Mother      Cancer Father      Alcohol/Drug Father      Other Cancer Father         saliva glands & skin CA      Hypertension Father      Diabetes Maternal Grandmother      Breast Cancer Maternal Grandmother      Obesity Maternal Grandmother      Other Cancer Maternal Grandfather         skin cancer     Cancer -  colorectal Other         Aunt     Asthma Daughter      Asthma Daughter      Asthma Son      Thyroid Disease Sister      Colon Cancer Other      Obesity Sister      Glaucoma No family hx of      Macular Degeneration No family hx of          Current Outpatient Medications   Medication Sig Dispense Refill     acetaminophen (TYLENOL) 325 MG tablet Take 2 tablets (650 mg) by mouth every 4 hours as needed for other (mild pain) 100 tablet 0     ferrous sulfate (FEROSUL) 325 (65 Fe) MG tablet TAKE 1 TABLET (325 MG) BY MOUTH DAILY (WITH BREAKFAST) 90 tablet 1     furosemide (LASIX) 20 MG tablet Take 1 tablet (20 mg) by mouth daily as needed 30 tablet 11     methocarbamol (ROBAXIN) 750 MG tablet TAKE 1 TABLETS (750 MG) BY MOUTH 3 TIMES DAILY AS NEEDED FOR MUSCLE SPASMS 60 tablet 1     Multiple Vitamins-Minerals (MULTI FOR HER PO) Take by mouth daily        nortriptyline (PAMELOR) 10 MG capsule Take 3 capsules (30 mg) by mouth At Bedtime 90 capsule 3       2     ORDER FOR DME Respironics REMSTAR 60 Series Auto [id number] H2O, Airfit P10 nasal pillow mask w/xsmall pillows       oxyCODONE-acetaminophen (PERCOCET)  MG per tablet Take 1 tablet by mouth every 6 hours as needed for moderate to severe pain 90 tablet 0     pregabalin (LYRICA) 225 MG capsule TAKE 1 CAPSULE BY MOUTH TWICE A DAY 60 capsule 5     rOPINIRole (REQUIP) 0.25 MG tablet TAKE 1 TABLET (0.25 MG) BY MOUTH 2 TIMES DAILY 60 tablet 5     sodium chloride (OCEAN) 0.65 % nasal spray Inhale 2 sprays in each nostril twice daily until your follow up appointment.       SUMAtriptan (IMITREX) 100 MG tablet Take 1 tablet (100 mg) by mouth at onset of headache for migraine May repeat in 2 hours if needed: max 2/day 9 tablet 2     nabumetone (RELAFEN) 750 MG tablet Take 1 tablet (750 mg) by mouth 2 times daily (Patient not taking: Reported on 7/11/2019) 60 tablet 0     No Known Allergies      Reviewed and updated as needed this visit by Provider         Review of Systems  "  ROS COMP: Constitutional, HEENT, cardiovascular, pulmonary, gi and gu systems are negative, except as otherwise noted.      Objective    /80 (BP Location: Right arm, Patient Position: Chair, Cuff Size: Adult Large)   Pulse 86   Temp 98.3  F (36.8  C) (Oral)   Wt 107.5 kg (237 lb)   LMP  (LMP Unknown)   SpO2 97%   BMI 41.98 kg/m    Body mass index is 41.98 kg/m .  Physical Exam   GENERAL: alert, no distress and obese  MS: She has tenderness over the right lateral epicondyle and the proximal extensor tendons of the right forearm.  She has discomfort there with resistance to wrist extension.  No swelling or erythema or warmth.  SKIN: She has an excoriated papular skin lesion on the flexor side of the left forearm.  It's about 3 mm in diameter.  No signs of skin cancer or infection there.  She has 3 similar appearing raised pink-colored fleshy roughly 3 mm diameter growths on the lower back.  These all look like intradermal nevi.  She would like to have them removed.  After discussion with the patient, they were cleansed, anesthetized, and then excised.  A Hyfrecator was used to obtain hemostasis.  I sent the middle one of the 3 into pathology to confirm its benign nature, but I am quite confident all 3 of these are benign, so I did not send all of them in.    Diagnostic Test Results:  none         Assessment & Plan       ICD-10-CM    1. Intradermal nevus D23.9 SHAV SKIN LESION TRUNK/ARM/LEG <=0.5 CM     Surgical pathology exam     HANDLING CHARGE TO REF LAB   2. Skin lesion of left arm L98.9    3. Right tennis elbow M77.11    4. Candidal intertrigo B37.2 nystatin (MYCOSTATIN) 800943 UNIT/GM external powder        BMI:   Estimated body mass index is 41.98 kg/m  as calculated from the following:    Height as of 5/16/19: 1.6 m (5' 3\").    Weight as of this encounter: 107.5 kg (237 lb).   Weight management plan: Discussed healthy diet and exercise guidelines    Routine wound care for the lower back removal " sites  I recommended bacitracin ointment and a bandage over the left forearm skin lesion  This certainly looks benign, and I suspect it will heal up fine if she stops picking at it  I think the black hard area was likely a scab, but it is gone now  Reviewed conservative care for tennis elbow including relative rest, ice, stretching, and possible use of a brace  I refilled her nystatin powder    Return for a recheck if symptoms worsen or fail to improve.    Srinivasa Hunter MD  [facility]

## 2019-07-15 ENCOUNTER — TRANSFERRED RECORDS (OUTPATIENT)
Dept: HEALTH INFORMATION MANAGEMENT | Facility: CLINIC | Age: 49
End: 2019-07-15

## 2019-07-15 ENCOUNTER — THERAPY VISIT (OUTPATIENT)
Dept: CHIROPRACTIC MEDICINE | Facility: CLINIC | Age: 49
End: 2019-07-15
Payer: COMMERCIAL

## 2019-07-15 DIAGNOSIS — M99.02 THORACIC SEGMENT DYSFUNCTION: ICD-10-CM

## 2019-07-15 DIAGNOSIS — M62.838 SPASM OF MUSCLE: ICD-10-CM

## 2019-07-15 DIAGNOSIS — M99.05 SEGMENTAL DYSFUNCTION OF PELVIC REGION: Primary | ICD-10-CM

## 2019-07-15 DIAGNOSIS — M99.03 SEGMENTAL DYSFUNCTION OF LUMBAR REGION: ICD-10-CM

## 2019-07-15 DIAGNOSIS — M54.50 LUMBAGO: ICD-10-CM

## 2019-07-15 PROCEDURE — 98941 CHIROPRACT MANJ 3-4 REGIONS: CPT | Mod: AT | Performed by: CHIROPRACTOR

## 2019-07-15 PROCEDURE — 97810 ACUP 1/> WO ESTIM 1ST 15 MIN: CPT | Performed by: CHIROPRACTOR

## 2019-07-15 NOTE — PROGRESS NOTES
Visit #:  14    Subjective:  Velma Diaz is a 48 year old female who is seen in f/u up for:        Segmental dysfunction of pelvic region  Lumbago  Segmental dysfunction of lumbar region  Spasm of muscle  Thoracic segment dysfunction.     Since last visit on 7/1/2019,  Velma Diaz reports:    Area of chief complaint:  Lumbar :  Symptoms are graded at 4/10. The quality is described as stiff, and achey.  Motion has increased but not normal. Patient feels that her low back is stiff and sore, but is feeling better.  She is having some difficulty in sitting but that is due to some moles that were removed last week.  Other than that her low back overall is feeling a little better.    Objective:  The following was observed:    P: palpatory tenderness Piriformis R>>L  A: static palpation demonstrates intersegmental asymmetry , thoracic, lumbar, pelvis  R: motion palpation notes restricted motion, T10, T11 , T12 , L4 , L5  and PSIS Right   T: hypertonicity at: Piriformis R>>L    Segmental spinal dysfunction/restrictions found at:  T10, T11 , T12 , L4 , L5  and PSIS Right       Assessment:    Diagnoses:      1. Segmental dysfunction of pelvic region    2. Lumbago    3. Segmental dysfunction of lumbar region    4. Spasm of muscle    5. Thoracic segment dysfunction        Patient's condition:  Patient had restrictions pre-manipulation    Treatment effectiveness:  Post manipulation there is better intersegmental movement and Patient claims to feel looser post manipulation      Procedures:  CMT:  20598 Chiropractic manipulative treatment 3-4 regions performed   Thoracic: Activator, T10, T11, T12, Prone  Lumbar: Activator, L4, L5, Prone  Pelvis: Drop Table, PSIS Right , Prone    Modalities:  23907: Acupuncture, for 15 minutes:  Points: B25, B27, GV3, K3, B62, SI3  Ahsi point in piriformis  For 15 minutes    Therapeutic procedures:  None    Response to Treatment  Reduction in symptoms as reported by patient    Prognosis:  Good    Progress towards Goals: Patient is making progress towards the goal.     Recommendations:    Instructions:  ice 20 minutes every other hour as needed    Follow-up:    Return to care in one week.

## 2019-07-16 ENCOUNTER — PATIENT OUTREACH (OUTPATIENT)
Dept: CARE COORDINATION | Facility: CLINIC | Age: 49
End: 2019-07-16

## 2019-07-16 DIAGNOSIS — Z76.89 HEALTH CARE HOME: Primary | ICD-10-CM

## 2019-07-16 LAB — COPATH REPORT: NORMAL

## 2019-07-16 NOTE — PROGRESS NOTES
Clinic Care Coordination Contact  [facility]/Voicemail       Clinical Data: Care Coordinator Outreach  Outreach attempted x 1.  Left message on voicemail with call back information and requested return call.  Plan: Care Coordinator mailed out care coordination introduction letter on 6-11-19. Care Coordinator will try to reach patient again in 1-2 business days.        Brian Howard MSN, RN, PHN, CCM   Primary Care Clinical RN Care Coordinator  [facility]   [email or web address]  Office: [phone]

## 2019-07-17 NOTE — RESULT ENCOUNTER NOTE
Mireille,  The growth removed from your lower back was confirmed to be a benign fleshy mole, as expected.    Srinivasa Hunter MD

## 2019-07-23 NOTE — PROGRESS NOTES
Clinic Care Coordination Contact  [facility]/Voicemail       Clinical Data: Care Coordinator Outreach  Outreach attempted x 3.  Left message on voicemail with call back information and requested return call.  Plan: Care Coordinator mailed out care coordination introduction letter on 6/11/19. Care Coordinator will do no further outreaches at this time.          Brian Howard MSN, RN, PHN, [facility]   Primary Care Clinical RN Care Coordinator  [facility]   [email or web address]  Office: [phone]

## 2019-07-29 ENCOUNTER — THERAPY VISIT (OUTPATIENT)
Dept: CHIROPRACTIC MEDICINE | Facility: CLINIC | Age: 49
End: 2019-07-29
Payer: COMMERCIAL

## 2019-07-29 ENCOUNTER — OFFICE VISIT (OUTPATIENT)
Dept: PSYCHOLOGY | Facility: CLINIC | Age: 49
End: 2019-07-29
Payer: COMMERCIAL

## 2019-07-29 DIAGNOSIS — F33.1 MAJOR DEPRESSIVE DISORDER, RECURRENT EPISODE, MODERATE (H): Primary | ICD-10-CM

## 2019-07-29 DIAGNOSIS — M99.05 SEGMENTAL DYSFUNCTION OF PELVIC REGION: Primary | ICD-10-CM

## 2019-07-29 DIAGNOSIS — F41.1 GAD (GENERALIZED ANXIETY DISORDER): ICD-10-CM

## 2019-07-29 DIAGNOSIS — M54.50 LUMBAGO: ICD-10-CM

## 2019-07-29 DIAGNOSIS — M99.02 THORACIC SEGMENT DYSFUNCTION: ICD-10-CM

## 2019-07-29 DIAGNOSIS — M62.838 SPASM OF MUSCLE: ICD-10-CM

## 2019-07-29 DIAGNOSIS — M99.03 SEGMENTAL DYSFUNCTION OF LUMBAR REGION: ICD-10-CM

## 2019-07-29 PROCEDURE — 90834 PSYTX W PT 45 MINUTES: CPT | Performed by: SOCIAL WORKER

## 2019-07-29 PROCEDURE — 98941 CHIROPRACT MANJ 3-4 REGIONS: CPT | Mod: AT | Performed by: CHIROPRACTOR

## 2019-07-29 PROCEDURE — 97810 ACUP 1/> WO ESTIM 1ST 15 MIN: CPT | Performed by: CHIROPRACTOR
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
## 2019-07-29 NOTE — PROGRESS NOTES
Visit #:  15    Subjective:  Velma Diaz is a 48 year old female who is seen in f/u up for:        Segmental dysfunction of pelvic region  Lumbago  Segmental dysfunction of lumbar region  Spasm of muscle  Thoracic segment dysfunction.     Since last visit on 7/15/2019,  Velma Diaz reports:    Area of chief complaint:  Lumbar :  Symptoms are graded at 6/10. The quality is described as stiff, and achey.  Motion has increased but not normal. Patient feels that her low back is stiff and sore, but is feeling better.  She was able to travel since her last visit by car for a quick vacation.  She was able to participate in most activities to some degree.  She is feeling a little stiff and sore as a result.    Objective:  The following was observed:    P: palpatory tenderness Piriformis R>>L  A: static palpation demonstrates intersegmental asymmetry , thoracic, lumbar, pelvis  R: motion palpation notes restricted motion, T10, T11 , T12 , L4 , L5  and PSIS Right   T: hypertonicity at: Piriformis R>>L    Segmental spinal dysfunction/restrictions found at:  T10, T11 , T12 , L4 , L5  and PSIS Right       Assessment:    Diagnoses:      1. Segmental dysfunction of pelvic region    2. Lumbago    3. Segmental dysfunction of lumbar region    4. Spasm of muscle    5. Thoracic segment dysfunction        Patient's condition:  Patient had restrictions pre-manipulation    Treatment effectiveness:  Post manipulation there is better intersegmental movement and Patient claims to feel looser post manipulation      Procedures:  CMT:  69870 Chiropractic manipulative treatment 3-4 regions performed   Thoracic: Activator, T10, T11, T12, Prone  Lumbar: Activator, L4, L5, Prone  Pelvis: Drop Table, PSIS Right , Prone    Modalities:  47555: Acupuncture, for 15 minutes:  Points: B25, B27, GV3, K3, B62, SI3  Ahsi point in piriformis  For 15 minutes    Therapeutic procedures:  None    Response to Treatment  Reduction in symptoms as reported by  patient    Prognosis: Good    Progress towards Goals: Patient is making progress towards the goal.     Recommendations:    Instructions:  ice 20 minutes every other hour as needed    Follow-up:    Return to care in one week.

## 2019-08-05 PROBLEM — M25.571 PAIN IN JOINT, ANKLE AND FOOT, RIGHT: Status: RESOLVED | Noted: 2019-05-06 | Resolved: 2019-08-05

## 2019-08-05 NOTE — PROGRESS NOTES
"Subjective:  HPI                    Objective:  System    Physical Exam    General     ROS    Assessment/Plan:    DISCHARGE REPORT    Progress reporting period is from 05/01/2019 to 08/05/2019.   Patient has not returned to therapy so current subjective and objective findings are unknown.  The subjective and objective information are from the last visit (05/29/2019) with this patient.    SUBJECTIVE  Subjective: Pt reports she continues to progress, rated as \"80-90%\" to date.  Feeling better with walking, although can be tough on uneven surfaces.   Current Pain level: 7/10(which she reports is largely her chronic pain baseline)   Initial Pain level: 10/10     OBJECTIVE  Objective: Tender to palpation R ATF only.  Discomfort PROM end range eversion but not inversion, DF, PF.  Pt ambulates without gross asymmetry.  SLS eyes open L 8 seconds, R 5 seconds.      ASSESSMENT/PLAN  Updated problem list and treatment plan: Diagnosis 1:  Ankle pain -- home program  STG/LTGs have been met or progress has been made towards goals:  N/A  Assessment of Progress: The patient has not returned to therapy. Current status is unknown.  Self Management Plans:  Patient has been instructed in a home treatment program.  Velma continues to require the following intervention to meet STG and LTG's: PT intervention is no longer required to meet STG/LTG.    Recommendations:  Pt last seen in PT 05/29/2019.  She has since cancelled f/u without rescheduling.  Discharge to [facility].  "

## 2019-08-06 ENCOUNTER — TELEPHONE (OUTPATIENT)
Dept: FAMILY MEDICINE | Facility: CLINIC | Age: 49
End: 2019-08-06

## 2019-08-06 ENCOUNTER — MYC REFILL (OUTPATIENT)
Dept: FAMILY MEDICINE | Facility: CLINIC | Age: 49
End: 2019-08-06

## 2019-08-06 DIAGNOSIS — G89.4 CHRONIC PAIN SYNDROME: ICD-10-CM

## 2019-08-06 NOTE — TELEPHONE ENCOUNTER
Forms received from: [employer]   Phone number listed: [phone]   Fax listed: [phone]  Date received: 08/06/2019  Form description: PT  Once forms are completed, please return to [employer] via fax.  Is patient requesting to be contacted when forms are completed: NA    Form placed: In provider's basket  Nydia Tapia

## 2019-08-07 ENCOUNTER — MYC REFILL (OUTPATIENT)
Dept: FAMILY MEDICINE | Facility: CLINIC | Age: 49
End: 2019-08-07

## 2019-08-07 DIAGNOSIS — G89.4 CHRONIC PAIN SYNDROME: ICD-10-CM

## 2019-08-07 RX ORDER — OXYCODONE AND ACETAMINOPHEN 10; 325 MG/1; MG/1
1 TABLET ORAL EVERY 6 HOURS PRN
Qty: 90 TABLET | Refills: 0 | Status: CANCELLED | OUTPATIENT
Start: 2019-08-07

## 2019-08-08 NOTE — TELEPHONE ENCOUNTER
Last Rx was  90 tabs on 7/8/19.  Last seen 7/11/19.       Overview Signed 8/15/2016  3:28 PM by Srinivasa Hunter MD   She takes up to 90 oxycodone tablets per month for her chronic pain     Routing refill request to provider for review/approval because:  Drug not on the [facility] refill protocol     Judi Yeh RN  [facility]

## 2019-08-08 NOTE — TELEPHONE ENCOUNTER
Patient states she will be out of medication 8/10/2019 and would like to  refill in office ASAP.  Patient would like to be notified when process complete.     Tanya Multani

## 2019-08-09 RX ORDER — OXYCODONE AND ACETAMINOPHEN 10; 325 MG/1; MG/1
1 TABLET ORAL EVERY 6 HOURS PRN
Qty: 90 TABLET | Refills: 0 | Status: SHIPPED | OUTPATIENT
Start: 2019-08-09 | End: 2019-09-06

## 2019-08-09 NOTE — TELEPHONE ENCOUNTER
Patient notified that script is available to  at the [facility] .

## 2019-08-12 ENCOUNTER — THERAPY VISIT (OUTPATIENT)
Dept: CHIROPRACTIC MEDICINE | Facility: CLINIC | Age: 49
End: 2019-08-12
Payer: COMMERCIAL

## 2019-08-12 ENCOUNTER — OFFICE VISIT (OUTPATIENT)
Dept: FAMILY MEDICINE | Facility: CLINIC | Age: 49
End: 2019-08-12
Payer: COMMERCIAL

## 2019-08-12 VITALS
SYSTOLIC BLOOD PRESSURE: 118 MMHG | WEIGHT: 239.4 LBS | HEART RATE: 92 BPM | BODY MASS INDEX: 42.42 KG/M2 | RESPIRATION RATE: 18 BRPM | HEIGHT: 63 IN | DIASTOLIC BLOOD PRESSURE: 76 MMHG | OXYGEN SATURATION: 97 % | TEMPERATURE: 97 F

## 2019-08-12 DIAGNOSIS — M54.50 LUMBAGO: ICD-10-CM

## 2019-08-12 DIAGNOSIS — M99.03 SEGMENTAL DYSFUNCTION OF LUMBAR REGION: ICD-10-CM

## 2019-08-12 DIAGNOSIS — M99.02 THORACIC SEGMENT DYSFUNCTION: ICD-10-CM

## 2019-08-12 DIAGNOSIS — M62.838 SPASM OF MUSCLE: ICD-10-CM

## 2019-08-12 DIAGNOSIS — M99.05 SEGMENTAL DYSFUNCTION OF PELVIC REGION: Primary | ICD-10-CM

## 2019-08-12 DIAGNOSIS — G95.9 CERVICAL MYELOPATHY (H): Primary | ICD-10-CM

## 2019-08-12 PROCEDURE — 97810 ACUP 1/> WO ESTIM 1ST 15 MIN: CPT | Performed by: CHIROPRACTOR

## 2019-08-12 PROCEDURE — 98941 CHIROPRACT MANJ 3-4 REGIONS: CPT | Mod: AT | Performed by: CHIROPRACTOR

## 2019-08-12 PROCEDURE — 99213 OFFICE O/P EST LOW 20 MIN: CPT | Performed by: NURSE PRACTITIONER

## 2019-08-12 RX ORDER — METHYLPREDNISOLONE 4 MG
TABLET, DOSE PACK ORAL
Qty: 21 TABLET | Refills: 0 | Status: SHIPPED | OUTPATIENT
Start: 2019-08-12 | End: 2019-10-28

## 2019-08-12 ASSESSMENT — MIFFLIN-ST. JEOR: SCORE: 1685.04

## 2019-08-12 ASSESSMENT — PAIN SCALES - GENERAL: PAINLEVEL: MODERATE PAIN (5)

## 2019-08-12 NOTE — PROGRESS NOTES
Subjective     Velma Diaz is a 48 year old female who presents to clinic today for the following health issues:    HPI     Chief Complaint   Patient presents with     Musculoskeletal Problem     was seen for the rt arm pain in July but it has gone up to the neck and down all the way to the finger.     Patient notes she was initially diagnosed with tennis elbow.  She now has pain radiating from right neck down right arm and into her right 4th and 5th fingers.  She notes weakness and has been dropping objects.  She denies fever, injury.  She had similar symptoms in the past with her left arm.  She has tried her neck traction machine and chiropractic care without improvement.  She chronically is on percocet for pain.          Patient Active Problem List   Diagnosis     History of cleft palate with cleft lip     MAYA (obstructive sleep apnea)/Hypoventilation Syndrome     Major depressive disorder, recurrent episode, moderate (H)     Paresthesias     Health Care Home     Vitamin D deficiency     Morbid obesity with BMI of 40.0-44.9, adult (H)     Hyperlipidemia with target LDL less than 130     Intervertebral disc prolapse with impingement     Nonallopathic lesion of lumbar region     Chronic pain syndrome     Restless legs syndrome (RLS)     Insomnia     Migraine     Chronic bilateral back pain, unspecified back location     Chronic pain of left knee     ROSE MARIE (generalized anxiety disorder)     Idiopathic peripheral neuropathy     Past Surgical History:   Procedure Laterality Date     ANKLE SURGERY  7/13    Left for torn tendons & loose bone chips     ARTHROSCOPY KNEE  1986    Right knee     BACK SURGERY       Basal cell carcinoma  2011    Removal from the chest     BIOPSY       C VAGINAL HYSTERECTOMY  2011     CARPAL TUNNEL RELEASE RT/LT  ~2010    Bilateral     COLONOSCOPY  2016     COSMETIC SURGERY       cysto with right ureteroscopy, stone manipulation, double J stent removal  10/04/2018    Dr. Cisneros -- removal  of right kidney stone     cysto, right retrograde pyelogram, right ureteral stent Right 2018    for obstructing right kidney stone     DECOMPRESSION CUBITAL TUNNEL Left 2015    Procedure: DECOMPRESSION CUBITAL TUNNEL;  Surgeon: Gus Donato MD;  [facility]     ENT SURGERY      Tonsillectomy and Adenoids     GYN SURGERY      Hysterectomy     HC REPAIR PERONEAL TENDONS       ORTHOPEDIC SURGERY  ,     Bilateral CTR     PE TUBES      yearly times 10 years     REPAIR CLEFT PALATE CHILD      Age 3 months & afterwards (multiple surgeries)     SOFT TISSUE SURGERY         Social History     Tobacco Use     Smoking status: Former Smoker     Packs/day: 0.50     Years: 15.00     Pack years: 7.50     Types: Cigarettes     Last attempt to quit: 2015     Years since quittin.4     Smokeless tobacco: Never Used   Substance Use Topics     Alcohol use: No     Alcohol/week: 0.0 oz     Comment: Quit in      Family History   Problem Relation Age of Onset     Alzheimer Disease Paternal Grandmother 60     Cerebrovascular Disease Paternal Grandmother      Neurologic Disorder Sister 20        migraines     Depression/Anxiety Sister      Depression Sister      Neurologic Disorder Son 14        migraines     Asthma Son      Heart Disease Mother      Osteoporosis Mother      Obesity Mother      Cancer Father      Alcohol/Drug Father      Other Cancer Father         saliva glands & skin CA      Hypertension Father      Diabetes Maternal Grandmother      Breast Cancer Maternal Grandmother      Obesity Maternal Grandmother      Other Cancer Maternal Grandfather         skin cancer     Cancer - colorectal Other         Aunt     Asthma Daughter      Asthma Daughter      Asthma Son      Thyroid Disease Sister      Colon Cancer Other      Obesity Sister      Glaucoma No family hx of      Macular Degeneration No family hx of          Current Outpatient Medications   Medication Sig  Dispense Refill     acetaminophen (TYLENOL) 325 MG tablet Take 2 tablets (650 mg) by mouth every 4 hours as needed for other (mild pain) 100 tablet 0     ferrous sulfate (FEROSUL) 325 (65 Fe) MG tablet TAKE 1 TABLET (325 MG) BY MOUTH DAILY (WITH BREAKFAST) 30 tablet 5     furosemide (LASIX) 20 MG tablet Take 1 tablet (20 mg) by mouth daily as needed 30 tablet 11     methocarbamol (ROBAXIN) 750 MG tablet TAKE 1 TABLETS (750 MG) BY MOUTH 3 TIMES DAILY AS NEEDED FOR MUSCLE SPASMS 60 tablet 1     methylPREDNISolone (MEDROL DOSEPAK) 4 MG tablet therapy pack Follow Package Directions 21 tablet 0     Multiple Vitamins-Minerals (MULTI FOR HER PO) Take by mouth daily        nortriptyline (PAMELOR) 10 MG capsule Take 3 capsules (30 mg) by mouth At Bedtime 90 capsule 3     nystatin (MYCOSTATIN) 627809 UNIT/GM external powder Apply to affected area twice daily as needed 60 g 2     ORDER FOR DME Respironics REMSTAR 60 Series Auto [id number] H2O, Airfit P10 nasal pillow mask w/xsmall pillows       oxyCODONE-acetaminophen (PERCOCET)  MG per tablet Take 1 tablet by mouth every 6 hours as needed for moderate to severe pain 90 tablet 0     pregabalin (LYRICA) 225 MG capsule TAKE 1 CAPSULE BY MOUTH TWICE A DAY 60 capsule 5     rOPINIRole (REQUIP) 0.25 MG tablet TAKE 1 TABLET (0.25 MG) BY MOUTH 2 TIMES DAILY 60 tablet 5     sodium chloride (OCEAN) 0.65 % nasal spray Inhale 2 sprays in each nostril twice daily until your follow up appointment.       SUMAtriptan (IMITREX) 100 MG tablet Take 1 tablet (100 mg) by mouth at onset of headache for migraine May repeat in 2 hours if needed: max 2/day 9 tablet 2     nabumetone (RELAFEN) 750 MG tablet Take 1 tablet (750 mg) by mouth 2 times daily (Patient not taking: Reported on 7/11/2019) 60 tablet 0     No Known Allergies  BP Readings from Last 3 Encounters:   08/12/19 118/76   07/11/19 118/80   06/14/19 136/82    Wt Readings from Last 3 Encounters:   08/12/19 108.6 kg (239 lb 6.4 oz)  "  07/11/19 107.5 kg (237 lb)   06/14/19 104.8 kg (231 lb)                    Reviewed and updated as needed this visit by Provider         Review of Systems   ROS COMP: Constitutional, HEENT, cardiovascular, pulmonary, gi and gu systems are negative, except as otherwise noted.      Objective    /76   Pulse 92   Temp 97  F (36.1  C) (Oral)   Resp 18   Ht 1.6 m (5' 3\")   Wt 108.6 kg (239 lb 6.4 oz)   LMP  (LMP Unknown)   SpO2 97%   BMI 42.41 kg/m    Body mass index is 42.41 kg/m .  Physical Exam   GENERAL: healthy, alert and no distress  RESP: lungs clear to auscultation - no rales, rhonchi or wheezes  CV: regular rate and rhythm, normal S1 S2, no S3 or S4, no murmur, click or rub, no peripheral edema and peripheral pulses strong  MS: Neck: Tenderness to right paracervical muscles; full range of motion; Pain with right lateral rotation and bending; 4/5 strength noted to right , full range of motion of right shoulder      Diagnostic Test Results:  none         Assessment & Plan     1. Cervical myelopathy (H)  Given weakness, will have patient undergo MRI.  She will trial medrol dosepak for pain.  - MR Cervical Spine w/o Contrast; Future  - methylPREDNISolone (MEDROL DOSEPAK) 4 MG tablet therapy pack; Follow Package Directions  Dispense: 21 tablet; Refill: 0             Return for Pending test results. We will call with results..    CONNOR Pitts [facility]    "

## 2019-08-12 NOTE — PROGRESS NOTES
Visit #:  16    Subjective:  Velma Diaz is a 48 year old female who is seen in f/u up for:        Segmental dysfunction of pelvic region  Lumbago  Segmental dysfunction of lumbar region  Spasm of muscle  Thoracic segment dysfunction.     Since last visit on 7/29/2019,  Velma Diaz reports:    Area of chief complaint:  Lumbar :  Symptoms are graded at 5/10. The quality is described as stiff, and achey.  Motion has increased but not normal. Patient feels that her low back is stiff and sore, but is feeling better.  She has started going to [employer] and they have been doing some myofascial work which is helping with the reduction in symptoms.  Overall she is feeling improved.      Objective:  The following was observed:    P: palpatory tenderness Piriformis R>>L  A: static palpation demonstrates intersegmental asymmetry , thoracic, lumbar, pelvis  R: motion palpation notes restricted motion, T10, T11 , T12 , L4 , L5  and PSIS Right   T: hypertonicity at: Piriformis R>>L    Segmental spinal dysfunction/restrictions found at:  T10, T11 , T12 , L4 , L5  and PSIS Right       Assessment:    Diagnoses:      1. Segmental dysfunction of pelvic region    2. Lumbago    3. Segmental dysfunction of lumbar region    4. Spasm of muscle    5. Thoracic segment dysfunction        Patient's condition:  Patient had restrictions pre-manipulation    Treatment effectiveness:  Post manipulation there is better intersegmental movement and Patient claims to feel looser post manipulation      Procedures:  CMT:  32065 Chiropractic manipulative treatment 3-4 regions performed   Thoracic: Activator, T10, T11, T12, Prone  Lumbar: Activator, L4, L5, Prone  Pelvis: Drop Table, PSIS Right , Prone    Modalities:  75702: Acupuncture, for 15 minutes:  Points: B25, B27, GV3, K3, B62, SI3  Ahsi point in piriformis  For 15 minutes    Therapeutic procedures:  None    Response to Treatment  Reduction in symptoms as reported by patient    Prognosis:  Good    Progress towards Goals: Patient is making progress towards the goal.     Recommendations:    Instructions:  ice 20 minutes every other hour as needed    Follow-up:    Return to care in one week.

## 2019-08-13 ENCOUNTER — ANCILLARY PROCEDURE (OUTPATIENT)
Dept: MRI IMAGING | Facility: CLINIC | Age: 49
End: 2019-08-13
Attending: NURSE PRACTITIONER
Payer: COMMERCIAL

## 2019-08-13 DIAGNOSIS — G95.9 CERVICAL MYELOPATHY (H): ICD-10-CM

## 2019-08-13 PROCEDURE — 72141 MRI NECK SPINE W/O DYE: CPT | Mod: TC

## 2019-08-14 DIAGNOSIS — R29.898 RIGHT ARM WEAKNESS: Primary | ICD-10-CM

## 2019-08-14 DIAGNOSIS — M79.621 PAIN OF RIGHT UPPER ARM: ICD-10-CM

## 2019-08-15 ENCOUNTER — MYC MEDICAL ADVICE (OUTPATIENT)
Dept: FAMILY MEDICINE | Facility: CLINIC | Age: 49
End: 2019-08-15

## 2019-08-21 NOTE — TELEPHONE ENCOUNTER
EMG order already in chart. She can schedule the EMG at the [facility] or another /[facility] location but usnure what number to give patient  Please assist and write back to her with info    Mary Carmichael RN    
Left patient VM to return call to pink team  Teri Contreras CMA on 8/15/2019 at 1:34 PM      
[facility]    
Quality 110: Preventive Care And Screening: Influenza Immunization: Influenza Immunization previously received during influenza season
Detail Level: Detailed

## 2019-08-26 ENCOUNTER — OFFICE VISIT (OUTPATIENT)
Dept: PSYCHOLOGY | Facility: CLINIC | Age: 49
End: 2019-08-26
Payer: COMMERCIAL

## 2019-08-26 ENCOUNTER — THERAPY VISIT (OUTPATIENT)
Dept: CHIROPRACTIC MEDICINE | Facility: CLINIC | Age: 49
End: 2019-08-26
Payer: COMMERCIAL

## 2019-08-26 DIAGNOSIS — M54.50 LUMBAGO: ICD-10-CM

## 2019-08-26 DIAGNOSIS — M62.838 SPASM OF MUSCLE: ICD-10-CM

## 2019-08-26 DIAGNOSIS — F33.1 MAJOR DEPRESSIVE DISORDER, RECURRENT EPISODE, MODERATE (H): Primary | ICD-10-CM

## 2019-08-26 DIAGNOSIS — M99.03 SEGMENTAL DYSFUNCTION OF LUMBAR REGION: ICD-10-CM

## 2019-08-26 DIAGNOSIS — F41.1 GAD (GENERALIZED ANXIETY DISORDER): ICD-10-CM

## 2019-08-26 DIAGNOSIS — M99.02 THORACIC SEGMENT DYSFUNCTION: ICD-10-CM

## 2019-08-26 DIAGNOSIS — M99.05 SEGMENTAL DYSFUNCTION OF PELVIC REGION: Primary | ICD-10-CM

## 2019-08-26 PROCEDURE — 97810 ACUP 1/> WO ESTIM 1ST 15 MIN: CPT | Performed by: CHIROPRACTOR

## 2019-08-26 PROCEDURE — 90834 PSYTX W PT 45 MINUTES: CPT | Performed by: SOCIAL WORKER

## 2019-08-26 PROCEDURE — 98941 CHIROPRACT MANJ 3-4 REGIONS: CPT | Mod: AT | Performed by: CHIROPRACTOR

## 2019-08-26 ASSESSMENT — ANXIETY QUESTIONNAIRES
5. BEING SO RESTLESS THAT IT IS HARD TO SIT STILL: NOT AT ALL
IF YOU CHECKED OFF ANY PROBLEMS ON THIS QUESTIONNAIRE, HOW DIFFICULT HAVE THESE PROBLEMS MADE IT FOR YOU TO DO YOUR WORK, TAKE CARE OF THINGS AT HOME, OR GET ALONG WITH OTHER PEOPLE: SOMEWHAT DIFFICULT
2. NOT BEING ABLE TO STOP OR CONTROL WORRYING: SEVERAL DAYS
6. BECOMING EASILY ANNOYED OR IRRITABLE: MORE THAN HALF THE DAYS
7. FEELING AFRAID AS IF SOMETHING AWFUL MIGHT HAPPEN: SEVERAL DAYS
GAD7 TOTAL SCORE: 7
3. WORRYING TOO MUCH ABOUT DIFFERENT THINGS: SEVERAL DAYS
1. FEELING NERVOUS, ANXIOUS, OR ON EDGE: SEVERAL DAYS

## 2019-08-26 ASSESSMENT — PATIENT HEALTH QUESTIONNAIRE - PHQ9
SUM OF ALL RESPONSES TO PHQ QUESTIONS 1-9: 8
5. POOR APPETITE OR OVEREATING: SEVERAL DAYS
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
## 2019-08-26 NOTE — PROGRESS NOTES
Visit #:  17    Subjective:  Velma Diaz is a 48 year old female who is seen in f/u up for:        Segmental dysfunction of pelvic region  Lumbago  Segmental dysfunction of lumbar region  Spasm of muscle  Thoracic segment dysfunction.     Since last visit on 8/12/2019,  Velma Diaz reports:    Area of chief complaint:  Lumbar :  Symptoms are graded at 6/10. The quality is described as stiff, and achey.  Motion has increased but not normal. Patient feels that her low back is stiff and sore, but is feeling better, overall.  She did have a flare up yesterday but she blamed that on walking a lot on Saturday and may have over done it.  She still continues to feel overall she is improving.      Objective:  The following was observed:    P: palpatory tenderness Piriformis R>>L  A: static palpation demonstrates intersegmental asymmetry , thoracic, lumbar, pelvis  R: motion palpation notes restricted motion, T10, T11 , T12 , L4 , L5  and PSIS Right   T: hypertonicity at: Piriformis R>>L    Segmental spinal dysfunction/restrictions found at:  T10, T11 , T12 , L4 , L5  and PSIS Right       Assessment:    Diagnoses:      1. Segmental dysfunction of pelvic region    2. Lumbago    3. Segmental dysfunction of lumbar region    4. Spasm of muscle    5. Thoracic segment dysfunction        Patient's condition:  Patient had restrictions pre-manipulation    Treatment effectiveness:  Post manipulation there is better intersegmental movement and Patient claims to feel looser post manipulation      Procedures:  CMT:  82800 Chiropractic manipulative treatment 3-4 regions performed   Thoracic: Activator, T10, T11, T12, Prone  Lumbar: Activator, L4, L5, Prone  Pelvis: Drop Table, PSIS Right , Prone    Modalities:  79254: Acupuncture, for 15 minutes:  Points: B25, B27, GV3, K3, B62, SI3  Ahsi point in piriformis  For 15 minutes    Therapeutic procedures:  None    Response to Treatment  Reduction in symptoms as reported by  patient    Prognosis: Good    Progress towards Goals: Patient is making progress towards the goal.     Recommendations:    Instructions:  ice 20 minutes every other hour as needed    Follow-up:    Return to care in one week.

## 2019-08-27 ASSESSMENT — ANXIETY QUESTIONNAIRES: GAD7 TOTAL SCORE: 7

## 2019-09-01 DIAGNOSIS — G89.29 CHRONIC LOW BACK PAIN, UNSPECIFIED BACK PAIN LATERALITY, WITH SCIATICA PRESENCE UNSPECIFIED: ICD-10-CM

## 2019-09-01 DIAGNOSIS — M54.5 CHRONIC LOW BACK PAIN, UNSPECIFIED BACK PAIN LATERALITY, WITH SCIATICA PRESENCE UNSPECIFIED: ICD-10-CM

## 2019-09-03 RX ORDER — METHOCARBAMOL 750 MG/1
TABLET, FILM COATED ORAL
Qty: 60 TABLET | Refills: 2 | Status: SHIPPED | OUTPATIENT
Start: 2019-09-03 | End: 2019-10-28

## 2019-09-03 NOTE — TELEPHONE ENCOUNTER
Requested Prescriptions   Pending Prescriptions Disp Refills     methocarbamol (ROBAXIN) 750 MG tablet [Pharmacy Med Name: METHOCARBAMOL 750 MG TABLET] 60 tablet 1     Sig: TAKE 1 TABLETS (750 MG) BY MOUTH 3 TIMES DAILY AS NEEDED FOR MUSCLE SPASMS       There is no refill protocol information for this order         Last Written Prescription Date:  7/1/19  Last Fill Quantity: 60,   # refills: 1  Last Office Visit: 8/12/19  Future Office visit:    Next 5 appointments (look out 90 days)    Sep 12, 2019 10:30 AM CDT  Return Visit with Joaquín Buregr MD  [facility] ([facility]) [address] [place] [phone]  [phone]   Sep 24, 2019  3:00 PM CDT  Return Visit with Antonio Rios [facility] ([facility]) [address] [place] [phone]  [phone]   Oct 16, 2019  9:30 AM CDT  Return Visit with SERVANDO Yan  [facility] ([facility]) [address] [place] [phone]  [phone]   Oct 31, 2019  9:20 AM CDT  PHYSICAL with Srinivasa Hunter MD  [facility] ([facility]) [address] [place] [phone]  [phone]           Routing refill request to provider for review/approval because:  Drug not on the [facility] P or  Health refill protocol or controlled substance

## 2019-09-06 ENCOUNTER — MYC REFILL (OUTPATIENT)
Dept: FAMILY MEDICINE | Facility: CLINIC | Age: 49
End: 2019-09-06

## 2019-09-06 DIAGNOSIS — G89.4 CHRONIC PAIN SYNDROME: ICD-10-CM

## 2019-09-09 ENCOUNTER — THERAPY VISIT (OUTPATIENT)
Dept: CHIROPRACTIC MEDICINE | Facility: CLINIC | Age: 49
End: 2019-09-09
Payer: COMMERCIAL

## 2019-09-09 DIAGNOSIS — M99.05 SEGMENTAL DYSFUNCTION OF PELVIC REGION: Primary | ICD-10-CM

## 2019-09-09 DIAGNOSIS — M62.838 SPASM OF MUSCLE: ICD-10-CM

## 2019-09-09 DIAGNOSIS — M54.50 LUMBAGO: ICD-10-CM

## 2019-09-09 DIAGNOSIS — M99.03 SEGMENTAL DYSFUNCTION OF LUMBAR REGION: ICD-10-CM

## 2019-09-09 DIAGNOSIS — M99.02 THORACIC SEGMENT DYSFUNCTION: ICD-10-CM

## 2019-09-09 PROCEDURE — 97810 ACUP 1/> WO ESTIM 1ST 15 MIN: CPT | Performed by: CHIROPRACTOR

## 2019-09-09 PROCEDURE — 98941 CHIROPRACT MANJ 3-4 REGIONS: CPT | Mod: AT | Performed by: CHIROPRACTOR

## 2019-09-09 NOTE — PROGRESS NOTES
Visit #:  18    Subjective:  Velma Diaz is a 48 year old female who is seen in f/u up for:        Segmental dysfunction of pelvic region  Lumbago  Segmental dysfunction of lumbar region  Spasm of muscle  Thoracic segment dysfunction.     Since last visit on 8/26/2019,  Velma Diaz reports:    Area of chief complaint:  Lumbar :  Symptoms are graded at 3/10. The quality is described as stiff, and achey.  Motion has increased but not normal. Patient feels that her low back is stiff and sore, but is feeling better, overall.  She continues to do the exercises and stretches prescribed by her physical therapist.  She also started a job where she is on her feet 3 hours a day and so far is handling that.  Overall she is feeling improved.      Objective:  The following was observed:    P: palpatory tenderness Piriformis R>>L  A: static palpation demonstrates intersegmental asymmetry , thoracic, lumbar, pelvis  R: motion palpation notes restricted motion, T10, T11 , T12 , L4 , L5  and PSIS Right   T: hypertonicity at: Piriformis R>>L    Segmental spinal dysfunction/restrictions found at:  T10, T11 , T12 , L4 , L5  and PSIS Right       Assessment:    Diagnoses:      1. Segmental dysfunction of pelvic region    2. Lumbago    3. Segmental dysfunction of lumbar region    4. Spasm of muscle    5. Thoracic segment dysfunction        Patient's condition:  Patient had restrictions pre-manipulation    Treatment effectiveness:  Post manipulation there is better intersegmental movement and Patient claims to feel looser post manipulation      Procedures:  CMT:  43831 Chiropractic manipulative treatment 3-4 regions performed   Thoracic: Activator, T10, T11, T12, Prone  Lumbar: Activator, L4, L5, Prone  Pelvis: Drop Table, PSIS Right , Prone    Modalities:  44334: Acupuncture, for 15 minutes:  Points: B25, B27, GV3, K3, B62, SI3  Ahsi point in piriformis  For 15 minutes    Therapeutic procedures:  None    Response to  Treatment  Reduction in symptoms as reported by patient    Prognosis: Good    Progress towards Goals: Patient is making progress towards the goal.     Recommendations:    Instructions:  ice 20 minutes every other hour as needed    Follow-up:    Return to care in one week.

## 2019-09-10 RX ORDER — OXYCODONE AND ACETAMINOPHEN 10; 325 MG/1; MG/1
1 TABLET ORAL EVERY 6 HOURS PRN
Qty: 90 TABLET | Refills: 0 | Status: SHIPPED | OUTPATIENT
Start: 2019-09-10 | End: 2019-10-08

## 2019-09-10 NOTE — TELEPHONE ENCOUNTER
Patient notified that script is available to  at the [facility] .

## 2019-09-10 NOTE — TELEPHONE ENCOUNTER
Requested Prescriptions   Pending Prescriptions Disp Refills     oxyCODONE-acetaminophen (PERCOCET)  MG per tablet 90 tablet 0     Sig: Take 1 tablet by mouth every 6 hours as needed for moderate to severe pain       There is no refill protocol information for this order        Last Written Prescription Date:  8/9/2019  Last Fill Quantity: 90,  # refills: 0   Last office visit: 8/12/2019 with prescribing provider:     Future Office Visit:   Next 5 appointments (look out 90 days)    Sep 12, 2019 10:30 AM CDT  Return Visit with Joaquín Burger MD  [facility] ([facility]) [address] [place] [phone]  [phone]   Sep 24, 2019  3:00 PM CDT  Return Visit with Antonio Rios [facility] ([facility]) [address] [place] [phone]  [phone]   Oct 16, 2019  9:30 AM CDT  [facility]  [facility] ([facility]) [address] [place] [phone]  [phone]   Oct 31, 2019  9:20 AM CDT  PHYSICAL with Srinivasa Hunter MD  [facility] ([facility]) [address] [place] [phone]  [phone]         Routing refill request to provider for review/approval because:  Drug not on the [facility] refill protocol       Ly Sandoval RN  [facility]

## 2019-09-12 ENCOUNTER — TELEPHONE (OUTPATIENT)
Dept: NEUROLOGY | Facility: CLINIC | Age: 49
End: 2019-09-12

## 2019-09-12 DIAGNOSIS — G60.9 IDIOPATHIC PERIPHERAL NEUROPATHY: ICD-10-CM

## 2019-09-12 NOTE — TELEPHONE ENCOUNTER
"Requested Prescriptions   Pending Prescriptions Disp Refills     nortriptyline (PAMELOR) 10 MG capsule 90 capsule 3     Sig: Take 3 capsules (30 mg) by mouth At Bedtime  Last Written Prescription Date:  6/14/2019  Last Fill Quantity: 90 capsule,  # refills: 3   Last Office Visit: 9/12/2019   Future Office Visit:    Next 5 appointments (look out 90 days)    Sep 24, 2019  3:00 PM CDT  Return Visit with SERVANDO Yan  [facility]) [address] [place] [phone]  [phone]   Oct 16, 2019  9:30 AM CDT  Return Visit with Antonio Rios [facility] ([facility]) [address] [place] [phone]  [phone]   Oct 31, 2019  9:20 AM CDT  PHYSICAL with Srinivasa Hunter MD  [facility] ([facility]) [address] [place] [phone]  [phone]              Tricyclic Agents ( Annual appt and no PHQ9) Passed - 9/12/2019  2:33 PM        Passed - Blood Pressure under 140/90 in past 12 mos     BP Readings from Last 3 Encounters:   08/12/19 118/76   07/11/19 118/80   06/14/19 136/82           Passed - Recent (12 mo) or future (30 days) visit within authorizing provider's specialty     Patient had office visit in the last 12 months or has a visit in the next 30 days with authorizing provider or within the authorizing provider's specialty.  See \"Patient Info\" tab in inbasket, or \"Choose Columns\" in Meds & Orders section of the refill encounter.            Passed - Medication is active on med list        Passed - Patient is age 18 or older        Passed - Patient is not pregnant        Passed - No positive pregnancy test on record in past 12 mos          "

## 2019-09-16 ENCOUNTER — TRANSFERRED RECORDS (OUTPATIENT)
Dept: HEALTH INFORMATION MANAGEMENT | Facility: CLINIC | Age: 49
End: 2019-09-16

## 2019-09-16 RX ORDER — NORTRIPTYLINE HCL 10 MG
30 CAPSULE ORAL AT BEDTIME
Qty: 30 CAPSULE | Refills: 5 | Status: SHIPPED | OUTPATIENT
Start: 2019-09-16 | End: 2019-12-05

## 2019-09-17 ENCOUNTER — TRANSFERRED RECORDS (OUTPATIENT)
Dept: HEALTH INFORMATION MANAGEMENT | Facility: CLINIC | Age: 49
End: 2019-09-17

## 2019-09-20 ENCOUNTER — OFFICE VISIT (OUTPATIENT)
Dept: FAMILY MEDICINE | Facility: CLINIC | Age: 49
End: 2019-09-20
Payer: COMMERCIAL

## 2019-09-20 VITALS
BODY MASS INDEX: 40.74 KG/M2 | OXYGEN SATURATION: 97 % | WEIGHT: 230 LBS | HEART RATE: 76 BPM | SYSTOLIC BLOOD PRESSURE: 115 MMHG | DIASTOLIC BLOOD PRESSURE: 79 MMHG | TEMPERATURE: 98.4 F

## 2019-09-20 DIAGNOSIS — R20.2 PARESTHESIAS: Primary | ICD-10-CM

## 2019-09-20 DIAGNOSIS — G89.4 CHRONIC PAIN SYNDROME: Chronic | ICD-10-CM

## 2019-09-20 DIAGNOSIS — F41.1 GAD (GENERALIZED ANXIETY DISORDER): ICD-10-CM

## 2019-09-20 DIAGNOSIS — G60.9 IDIOPATHIC PERIPHERAL NEUROPATHY: ICD-10-CM

## 2019-09-20 PROCEDURE — 99214 OFFICE O/P EST MOD 30 MIN: CPT | Performed by: FAMILY MEDICINE

## 2019-09-20 RX ORDER — METOCLOPRAMIDE 10 MG/1
10 TABLET ORAL
COMMUNITY
End: 2019-12-05

## 2019-09-20 RX ORDER — HYDROXYZINE PAMOATE 25 MG/1
25 CAPSULE ORAL 3 TIMES DAILY PRN
COMMUNITY
End: 2019-12-05

## 2019-09-20 ASSESSMENT — PAIN SCALES - GENERAL: PAINLEVEL: SEVERE PAIN (7)

## 2019-09-20 NOTE — PROGRESS NOTES
Subjective     Velma Diaz is a 48 year old female who presents to clinic today for the following health issues:    HPI   ED/UC Followup:    Facility:  [facility]  Date of visit: 9/17/19  Reason for visit: Right sided numbness  Current Status: Intermittent numbness on right side, feels sore, tired. Noticed after she came back from the ED that her back side is bruised and not sure what this is from, she did have a massage on Saturday and not sure if this is what caused it.     America Smallwood MA    She had an intensive deep tissue massage on Saturday, September 14.  It felt good at the time, but the next day she developed a lot of pain and stiffness.  She was seen in the ER on September 16.  Lab work and other testing was unremarkable.  She was sent home.  She returned the next day with some right sided numbness and ongoing tiredness.  She has noticed some bruising over the buttocks.  She notes that the massage therapist on Saturday really worked her muscles hard and used his elbows to get into the deep tissues.  She does have baseline neuropathy and various pain issues and is seeing neurology next month for EMG studies for further evaluation of that.  She is already taking Lyrica and various other medications as listed below.  She did have a brain MRI on Tuesday and various lab tests on Monday and Tuesday which were generally unremarkable and reassuring.    Patient Active Problem List   Diagnosis     History of cleft palate with cleft lip     MAYA (obstructive sleep apnea)/Hypoventilation Syndrome     Major depressive disorder, recurrent episode, moderate (H)     Paresthesias     Health Care Home     Vitamin D deficiency     Morbid obesity with BMI of 40.0-44.9, adult (H)     Hyperlipidemia with target LDL less than 130     Intervertebral disc prolapse with impingement     Nonallopathic lesion of lumbar region     Chronic pain syndrome     Restless legs syndrome (RLS)     Insomnia     Migraine      Chronic bilateral back pain, unspecified back location     Chronic pain of left knee     ROSE MARIE (generalized anxiety disorder)     Idiopathic peripheral neuropathy     Past Surgical History:   Procedure Laterality Date     ANKLE SURGERY      Left for torn tendons & loose bone chips     ARTHROSCOPY KNEE  1986    Right knee     BACK SURGERY       Basal cell carcinoma      Removal from the chest     BIOPSY       C VAGINAL HYSTERECTOMY       CARPAL TUNNEL RELEASE RT/LT  ~    Bilateral     COLONOSCOPY  2016     COSMETIC SURGERY       cysto with right ureteroscopy, stone manipulation, double J stent removal  10/04/2018    Dr. Cisneros -- removal of right kidney stone     cysto, right retrograde pyelogram, right ureteral stent Right 2018    for obstructing right kidney stone     DECOMPRESSION CUBITAL TUNNEL Left 2015    Procedure: DECOMPRESSION CUBITAL TUNNEL;  Surgeon: Gus Donato MD;  [facility]     ENT SURGERY      Tonsillectomy and Adenoids     GYN SURGERY      Hysterectomy     HC REPAIR PERONEAL TENDONS       ORTHOPEDIC SURGERY  ,     Bilateral CTR     PE TUBES      yearly times 10 years     REPAIR CLEFT PALATE CHILD      Age 3 months & afterwards (multiple surgeries)     SOFT TISSUE SURGERY         Social History     Tobacco Use     Smoking status: Former Smoker     Packs/day: 0.50     Years: 15.00     Pack years: 7.50     Types: Cigarettes     Last attempt to quit: 2015     Years since quittin.5     Smokeless tobacco: Never Used   Substance Use Topics     Alcohol use: No     Alcohol/week: 0.0 oz     Comment: Quit in      Family History   Problem Relation Age of Onset     Alzheimer Disease Paternal Grandmother 60     Cerebrovascular Disease Paternal Grandmother      Neurologic Disorder Sister 20        migraines     Depression/Anxiety Sister      Depression Sister      Neurologic Disorder Son 14        migraines     Asthma Son       Heart Disease Mother      Osteoporosis Mother      Obesity Mother      Cancer Father      Alcohol/Drug Father      Other Cancer Father         saliva glands & skin CA      Hypertension Father      Diabetes Maternal Grandmother      Breast Cancer Maternal Grandmother      Obesity Maternal Grandmother      Other Cancer Maternal Grandfather         skin cancer     Cancer - colorectal Other         Aunt     Asthma Daughter      Asthma Daughter      Asthma Son      Thyroid Disease Sister      Colon Cancer Other      Obesity Sister      Glaucoma No family hx of      Macular Degeneration No family hx of          Current Outpatient Medications   Medication Sig Dispense Refill     acetaminophen (TYLENOL) 325 MG tablet Take 2 tablets (650 mg) by mouth every 4 hours as needed for other (mild pain) 100 tablet 0     ferrous sulfate (FEROSUL) 325 (65 Fe) MG tablet TAKE 1 TABLET (325 MG) BY MOUTH DAILY (WITH BREAKFAST) 30 tablet 5     furosemide (LASIX) 20 MG tablet Take 1 tablet (20 mg) by mouth daily as needed 30 tablet 11     methocarbamol (ROBAXIN) 750 MG tablet TAKE 1 TABLETS (750 MG) BY MOUTH 3 TIMES DAILY AS NEEDED FOR MUSCLE SPASMS 60 tablet 2     Multiple Vitamins-Minerals (MULTI FOR HER PO) Take by mouth daily        nortriptyline (PAMELOR) 10 MG capsule Take 3 capsules (30 mg) by mouth At Bedtime 30 capsule 5     nystatin (MYCOSTATIN) 585720 UNIT/GM external powder Apply to affected area twice daily as needed 60 g 2     ORDER FOR DME Respironics REMSTAR 60 Series Auto [id number] H2O, Airfit P10 nasal pillow mask w/xsmall pillows       oxyCODONE-acetaminophen (PERCOCET)  MG per tablet Take 1 tablet by mouth every 6 hours as needed for moderate to severe pain 90 tablet 0     pregabalin (LYRICA) 225 MG capsule TAKE 1 CAPSULE BY MOUTH TWICE A DAY 60 capsule 5     rOPINIRole (REQUIP) 0.25 MG tablet TAKE 1 TABLET (0.25 MG) BY MOUTH 2 TIMES DAILY 60 tablet 5     sodium chloride (OCEAN) 0.65 % nasal spray Inhale 2 sprays in  "each nostril twice daily until your follow up appointment.       SUMAtriptan (IMITREX) 100 MG tablet Take 1 tablet (100 mg) by mouth at onset of headache for migraine May repeat in 2 hours if needed: max 2/day 9 tablet 2     hydrOXYzine (VISTARIL) 25 MG capsule Take 25 mg by mouth 3 times daily as needed for itching       methylPREDNISolone (MEDROL DOSEPAK) 4 MG tablet therapy pack Follow Package Directions (Patient not taking: Reported on 9/20/2019) 21 tablet 0     metoclopramide (REGLAN) 10 MG tablet Take 10 mg by mouth 4 times daily (before meals and nightly)       nabumetone (RELAFEN) 750 MG tablet Take 1 tablet (750 mg) by mouth 2 times daily (Patient not taking: Reported on 7/11/2019) 60 tablet 0     No Known Allergies    Reviewed and updated as needed this visit by Provider         Review of Systems   ROS COMP: Constitutional, HEENT, cardiovascular, pulmonary, gi and gu systems are negative, except as otherwise noted.      Objective    LMP  (LMP Unknown)   Body mass index is 40.74 kg/m .   /79 (BP Location: Right arm, Patient Position: Chair, Cuff Size: Adult Large)   Pulse 76   Temp 98.4  F (36.9  C) (Oral)   Wt 104.3 kg (230 lb)   LMP  (LMP Unknown)   SpO2 97%   BMI 40.74 kg/m      Physical Exam   GENERAL: alert, no distress and obese  MS: no gross musculoskeletal defects noted, no edema  SKIN: She does have some resolving ecchymoses on her buttocks that are now yellowish to green in color  NEURO: Grossly normal strength and tone, mentation intact and speech normal    Diagnostic Test Results:  Both of her ER notes were reviewed as well as her various test results        Assessment & Plan       ICD-10-CM    1. Paresthesias R20.2    2. Idiopathic peripheral neuropathy G60.9    3. ROSE MARIE (generalized anxiety disorder) F41.1    4. Chronic pain syndrome G89.4         BMI:   Estimated body mass index is 40.74 kg/m  as calculated from the following:    Height as of 8/12/19: 1.6 m (5' 3\").    Weight as of " this encounter: 104.3 kg (230 lb).   Weight management plan: Discussed healthy diet and exercise guidelines    We discussed the above items  She will stay on her same baseline medications  I reassured about her various test results  I encouraged her to remain physically active and to try to work on general conditioning  She will be following up with neurology next month and I will be seeing her at the end of next month for an annual physical    Return in about 6 weeks (around 10/31/2019) for Physical Exam.    Srinivasa Hunter MD  [facility]

## 2019-09-23 ENCOUNTER — THERAPY VISIT (OUTPATIENT)
Dept: CHIROPRACTIC MEDICINE | Facility: CLINIC | Age: 49
End: 2019-09-23
Payer: COMMERCIAL

## 2019-09-23 DIAGNOSIS — M54.50 LUMBAGO: ICD-10-CM

## 2019-09-23 DIAGNOSIS — M62.838 SPASM OF MUSCLE: ICD-10-CM

## 2019-09-23 DIAGNOSIS — M99.02 THORACIC SEGMENT DYSFUNCTION: ICD-10-CM

## 2019-09-23 DIAGNOSIS — M99.01 CERVICAL SEGMENT DYSFUNCTION: ICD-10-CM

## 2019-09-23 DIAGNOSIS — M99.05 SEGMENTAL DYSFUNCTION OF PELVIC REGION: Primary | ICD-10-CM

## 2019-09-23 DIAGNOSIS — M99.03 SEGMENTAL DYSFUNCTION OF LUMBAR REGION: ICD-10-CM

## 2019-09-23 PROCEDURE — 97810 ACUP 1/> WO ESTIM 1ST 15 MIN: CPT | Mod: GY | Performed by: CHIROPRACTOR

## 2019-09-23 PROCEDURE — 98941 CHIROPRACT MANJ 3-4 REGIONS: CPT | Mod: AT | Performed by: CHIROPRACTOR

## 2019-09-24 ENCOUNTER — OFFICE VISIT (OUTPATIENT)
Dept: PSYCHOLOGY | Facility: CLINIC | Age: 49
End: 2019-09-24
Payer: COMMERCIAL

## 2019-09-24 ENCOUNTER — TELEPHONE (OUTPATIENT)
Dept: FAMILY MEDICINE | Facility: CLINIC | Age: 49
End: 2019-09-24

## 2019-09-24 DIAGNOSIS — F41.1 GAD (GENERALIZED ANXIETY DISORDER): ICD-10-CM

## 2019-09-24 DIAGNOSIS — F33.1 MAJOR DEPRESSIVE DISORDER, RECURRENT EPISODE, MODERATE (H): Primary | ICD-10-CM

## 2019-09-24 PROCEDURE — 90834 PSYTX W PT 45 MINUTES: CPT | Performed by: SOCIAL WORKER
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
## 2019-09-24 NOTE — TELEPHONE ENCOUNTER
Forms received from: [facility] [employer]   Phone number listed: [phone]   Fax listed: [phone]  Date received: 09/24/2019  Form description: Prescription and CMN-cpap supplies  Once forms are completed, please return to [facility] via fax.  Is patient requesting to be contacted when forms are completed: NA   Form placed: In provider's basket  Nydia Tapia

## 2019-09-26 ENCOUNTER — TELEPHONE (OUTPATIENT)
Dept: FAMILY MEDICINE | Facility: CLINIC | Age: 49
End: 2019-09-26

## 2019-09-26 NOTE — TELEPHONE ENCOUNTER
Forms received from: [employer]    Phone number listed:    Fax listed: [phone]  Date received: 09/26/2019  Form description: physical therapy order  Once forms are completed, please return to [employer]  via fax.  Form placed: in providers folder  Yanelis Salazar

## 2019-09-26 NOTE — TELEPHONE ENCOUNTER
Requested information faxed to number provided.  [facility]  Team 3 Coordinator

## 2019-09-29 ENCOUNTER — MYC MEDICAL ADVICE (OUTPATIENT)
Dept: FAMILY MEDICINE | Facility: CLINIC | Age: 49
End: 2019-09-29

## 2019-09-30 NOTE — TELEPHONE ENCOUNTER
"See [employer] message for details:  \"The pharmacy has been waiting on a reauthorization for Lyrica for over a week. I have gone a few days without it now & can feel it. Can we get this taken care of ASAP?\"    Routed to [facility] Med pool for follow up.   Mervat Klein RN on 9/30/2019 at 7:21 AM        "

## 2019-10-01 NOTE — TELEPHONE ENCOUNTER
Prior Authorization Approval    Authorization Effective Date: 8/31/2019  Authorization Expiration Date: 9/30/2020  Medication: pregabalin (LYRICA) 225 MG capsule  Approved Dose/Quantity:    Reference #: [id number]   Insurance Company: [employer] - Phone [phone] Fax [phone]  Expected CoPay:       CoPay Card Available:      Foundation Assistance Needed:    Which Pharmacy is filling the prescription (Not needed for infusion/clinic administered): [id number] - [place], [place] - [address]  Pharmacy Notified: Yes  Patient Notified: Yes **Instructed pharmacy to notify patient when script is ready to /ship.**

## 2019-10-02 ENCOUNTER — OFFICE VISIT (OUTPATIENT)
Dept: NEUROLOGY | Facility: CLINIC | Age: 49
End: 2019-10-02
Payer: COMMERCIAL

## 2019-10-02 DIAGNOSIS — R29.898 RIGHT ARM WEAKNESS: ICD-10-CM

## 2019-10-02 DIAGNOSIS — M79.621 PAIN OF RIGHT UPPER ARM: ICD-10-CM

## 2019-10-02 NOTE — LETTER
10/2/2019       RE: Velma Diaz  [address]  [place] [facility] [address]     Dear Colleague,    Thank you for referring your patient, Velma Diaz, to the [facility] EMG at [facility]. Please see a copy of my visit note below.        [facility]  Electrodiagnostic Laboratory    Nerve Conduction & EMG Report    Patient: Velma Diaz YOB: 1970  Patient ID: [id number] Age: 48 Years 11 Months  Gender: Female      History & Examination:  48 year old woman with pain in right arm. Also reports numbness in right hand. Eval for radiculopathy vs focal neuropathy.     Techniques:  Sensory and motor conduction studies were done with surface recording electrodes. EMG was done with a concentric needle electrode.     Results:  Nerve conduction studies:  1. Right median-D2, ulnar-D5, and radial sensory responses are normal.   2. Right median-APB and ulnar-ADM motor responses are normal.     Needle EMG of selected proximal and distal right upper limb muscles was performed as tabulated below. No abnormal spontaneous activity was observed in the sampled muscles. Motor unit potential morphology and recruitment patterns were normal.     Interpretation:  This is a normal study. There is no electrophysiologic evidence of a foal neuropathy or cervical radiculopathy affecting the right upper limb on the basis of thi study.     Hema Alfaro MD  Department of Neurology    Sensory NCS      Nerve / Sites Rec. Site Onset Peak NP Amp Ref. PP Amp Dist Jaren Ref. Temp     ms ms  V  V  V cm m/s m/s  C   R MEDIAN - Dig II Anti      Wrist Dig II 2.19 2.92 40.8 10.0 109.5 14 64.0 48.0 32.7   R ULNAR - Dig V Anti      Wrist Dig V 1.88 2.50 40.8 8.0 75.2 12.5 66.7 48.0 33.4       Motor NCS      Nerve / Sites Rec. Site Lat Ref. Amp Ref. Rel Amp Dist Jaren Ref. Dur. Area Temp.     ms ms mV mV % cm m/s m/s ms %  C   R MEDIAN - APB      Wrist APB 3.07 4.40 12.2 5.0 100 8   5.47 100 33.6      Elbow  APB 6.51  11.7  96.2 19 55.3 48.0 5.31 97.5 33.6   R ULNAR - ADM      Wrist ADM 2.03 3.50 11.5 5.0 100 8   5.57 100 33.8      B.Elbow ADM 5.42  10.4  90.8 20 59.1 48.0 5.16 90.4 33.9      A.Elbow ADM 7.19  10.4  90.1 9 50.8 48.0 5.36 89.2 33.9   R ULNAR - FDI      Wrist FDI 2.55  12.5  100    3.75 100 31.5      B.Elbow FDI 5.78  10.2  82.2 19 58.8  4.17 94.8 31.5      A.Elbow FDI 7.60  8.9  71.1 10 54.9  4.01 83.9 31.5       EMG Summary Table     Spontaneous MUAP Recruitment    IA Fib/PSW Fasc H.F. Amp Dur. PPP Pattern   R. DELTOID N None None None N N None Normal   R. BICEPS N None None None N N None Normal   R. TRICEPS N None None None N N None Normal   R. PRON TERES N None None None N N None Normal   R. FIRST D INTEROSS N None None None N N None Normal                    Again, thank you for allowing me to participate in the care of your patient.      Sincerely,    Hema Alfaro MD

## 2019-10-02 NOTE — PROGRESS NOTES
[facility]  Electrodiagnostic Laboratory    Nerve Conduction & EMG Report          Patient: Velma Diaz YOB: 1970  Patient ID: [id number] Age: 48 Years 11 Months  Gender: Female      History & Examination:  48 year old woman with pain in right arm. Also reports numbness in right hand. Eval for radiculopathy vs focal neuropathy.     Techniques:  Sensory and motor conduction studies were done with surface recording electrodes. EMG was done with a concentric needle electrode.     Results:  Nerve conduction studies:  1. Right median-D2, ulnar-D5, and radial sensory responses are normal.   2. Right median-APB and ulnar-ADM motor responses are normal.     Needle EMG of selected proximal and distal right upper limb muscles was performed as tabulated below. No abnormal spontaneous activity was observed in the sampled muscles. Motor unit potential morphology and recruitment patterns were normal.     Interpretation:  This is a normal study. There is no electrophysiologic evidence of a foal neuropathy or cervical radiculopathy affecting the right upper limb on the basis of thi study.       Hema Alfaro MD  Department of Neurology        Sensory NCS      Nerve / Sites Rec. Site Onset Peak NP Amp Ref. PP Amp Dist Jaren Ref. Temp     ms ms  V  V  V cm m/s m/s  C   R MEDIAN - Dig II Anti      Wrist Dig II 2.19 2.92 40.8 10.0 109.5 14 64.0 48.0 32.7   R ULNAR - Dig V Anti      Wrist Dig V 1.88 2.50 40.8 8.0 75.2 12.5 66.7 48.0 33.4       Motor NCS      Nerve / Sites Rec. Site Lat Ref. Amp Ref. Rel Amp Dist Jaren Ref. Dur. Area Temp.     ms ms mV mV % cm m/s m/s ms %  C   R MEDIAN - APB      Wrist APB 3.07 4.40 12.2 5.0 100 8   5.47 100 33.6      Elbow APB 6.51  11.7  96.2 19 55.3 48.0 5.31 97.5 33.6   R ULNAR - ADM      Wrist ADM 2.03 3.50 11.5 5.0 100 8   5.57 100 33.8      B.Elbow ADM 5.42  10.4  90.8 20 59.1 48.0 5.16 90.4 33.9      A.Elbow ADM 7.19  10.4  90.1 9 50.8 48.0 5.36 89.2 33.9   R ULNAR  - FDI      Wrist FDI 2.55  12.5  100    3.75 100 31.5      B.Elbow FDI 5.78  10.2  82.2 19 58.8  4.17 94.8 31.5      A.Elbow FDI 7.60  8.9  71.1 10 54.9  4.01 83.9 31.5       EMG Summary Table     Spontaneous MUAP Recruitment    IA Fib/PSW Fasc H.F. Amp Dur. PPP Pattern   R. DELTOID N None None None N N None Normal   R. BICEPS N None None None N N None Normal   R. TRICEPS N None None None N N None Normal   R. PRON TERES N None None None N N None Normal   R. FIRST D INTEROSS N None None None N N None Normal

## 2019-10-07 ENCOUNTER — THERAPY VISIT (OUTPATIENT)
Dept: CHIROPRACTIC MEDICINE | Facility: CLINIC | Age: 49
End: 2019-10-07
Payer: COMMERCIAL

## 2019-10-07 DIAGNOSIS — M99.05 SEGMENTAL DYSFUNCTION OF PELVIC REGION: Primary | ICD-10-CM

## 2019-10-07 DIAGNOSIS — M99.03 SEGMENTAL DYSFUNCTION OF LUMBAR REGION: ICD-10-CM

## 2019-10-07 DIAGNOSIS — M54.50 CHRONIC LOW BACK PAIN: ICD-10-CM

## 2019-10-07 DIAGNOSIS — G89.29 CHRONIC LOW BACK PAIN: ICD-10-CM

## 2019-10-07 DIAGNOSIS — M99.02 THORACIC SEGMENT DYSFUNCTION: ICD-10-CM

## 2019-10-07 DIAGNOSIS — M62.838 SPASM OF MUSCLE: ICD-10-CM

## 2019-10-07 PROCEDURE — 98941 CHIROPRACT MANJ 3-4 REGIONS: CPT | Mod: AT | Performed by: CHIROPRACTOR

## 2019-10-07 PROCEDURE — 97810 ACUP 1/> WO ESTIM 1ST 15 MIN: CPT | Performed by: CHIROPRACTOR

## 2019-10-07 NOTE — PROGRESS NOTES
Visit #:  20    Subjective:  Velma Diaz is a 48 year old female who is seen in f/u up for:        Segmental dysfunction of pelvic region  Lumbago  Segmental dysfunction of lumbar region  Spasm of muscle  Thoracic segment dysfunction.     Since last visit on 9/9/2019,  Velma Diaz reports:    Area of chief complaint:  Lumbar :  Symptoms are graded at 7/10. The quality is described as stiff, and achey.  Motion has decreased. Patient feels that her low back is stiff and sore.  Mireille reports that she was feeling pretty better than she did last visit but is still having some right sided low back pain with some radicular symptoms.  She has not done anything out of the ordinary.    Objective:  The following was observed:    P: palpatory tenderness Piriformis R>>L  A: static palpation demonstrates intersegmental asymmetry , thoracic, lumbar, pelvis  R: motion palpation notes restricted motion,  T10, T11 , T12 , L4 , L5  and PSIS Right   T: hypertonicity at: Piriformis R>>L    Segmental spinal dysfunction/restrictions found at:  C1, C2, T10, T11 , T12 , L4 , L5  and PSIS Right       Assessment:    Diagnoses:      1. Segmental dysfunction of pelvic region    2. Lumbago    3. Segmental dysfunction of lumbar region    4. Spasm of muscle    5. Thoracic segment dysfunction        Patient's condition:  Patient had restrictions pre-manipulation    Treatment effectiveness:  Post manipulation there is better intersegmental movement and Patient claims to feel looser post manipulation      Procedures:  CMT:  48481 Chiropractic manipulative treatment 3-4 regions performed   Thoracic: Activator, T10, T11, T12, Prone  Lumbar: Activator, L4, L5, Prone  Pelvis: Drop Table, PSIS Right , Prone    Modalities:  52096: Acupuncture, for 15 minutes:  Points: B25, B27, GV3, K3, B62, SI3  Ahsi point in piriformis  For 15 minutes    Therapeutic procedures:  None    Response to Treatment  Reduction in symptoms as reported by  patient    Prognosis: Good    Progress towards Goals: Patient is making progress towards the goal.     Recommendations:    Instructions:  ice 20 minutes every other hour as needed    Follow-up:    Return to care in one week.

## 2019-10-08 ENCOUNTER — MYC REFILL (OUTPATIENT)
Dept: FAMILY MEDICINE | Facility: CLINIC | Age: 49
End: 2019-10-08

## 2019-10-08 DIAGNOSIS — G89.4 CHRONIC PAIN SYNDROME: ICD-10-CM

## 2019-10-10 DIAGNOSIS — G89.29 CHRONIC LOW BACK PAIN: ICD-10-CM

## 2019-10-10 DIAGNOSIS — G89.29 CHRONIC BILATERAL BACK PAIN, UNSPECIFIED BACK LOCATION: Primary | Chronic | ICD-10-CM

## 2019-10-10 DIAGNOSIS — M54.9 CHRONIC BILATERAL BACK PAIN, UNSPECIFIED BACK LOCATION: Primary | Chronic | ICD-10-CM

## 2019-10-10 DIAGNOSIS — M54.50 CHRONIC LOW BACK PAIN: ICD-10-CM

## 2019-10-10 RX ORDER — OXYCODONE AND ACETAMINOPHEN 10; 325 MG/1; MG/1
1 TABLET ORAL EVERY 6 HOURS PRN
Qty: 90 TABLET | Refills: 0 | Status: SHIPPED | OUTPATIENT
Start: 2019-10-10 | End: 2019-11-04

## 2019-10-10 NOTE — TELEPHONE ENCOUNTER
Requested Prescriptions   Pending Prescriptions Disp Refills     oxyCODONE-acetaminophen (PERCOCET)  MG per tablet 90 tablet 0     Sig: Take 1 tablet by mouth every 6 hours as needed for moderate to severe pain       There is no refill protocol information for this order        I send Mychart response to patient to choose a pharmacy so we can e-prescribe this.    Await response then route to Dr. Hunter.    Judi Yeh RN  [facility]

## 2019-10-10 NOTE — TELEPHONE ENCOUNTER
Pharmacy selected by patient.  Routed to provider to address percocet refill.      Judi Yeh RN  [facility]

## 2019-10-11 RX ORDER — METHOCARBAMOL 750 MG/1
TABLET, FILM COATED ORAL
Qty: 60 TABLET | Refills: 1 | Status: SHIPPED | OUTPATIENT
Start: 2019-10-11 | End: 2019-11-29

## 2019-10-11 NOTE — TELEPHONE ENCOUNTER
Requested Prescriptions   Pending Prescriptions Disp Refills     methocarbamol (ROBAXIN) 750 MG tablet [Pharmacy Med Name: METHOCARBAMOL 750 MG TABLET] 60 tablet 1     Sig: TAKE 1 TABLETS (750 MG) BY MOUTH 3 TIMES DAILY AS NEEDED FOR MUSCLE SPASMS       There is no refill protocol information for this order   Last Written Prescription Date:  9-3-19  Last Fill Quantity: 60,  # refills: 2   Last office visit: 9/20/2019 with prescribing provider:     Future Office Visit:   Next 5 appointments (look out 90 days)    Oct 16, 2019  9:30 AM CDT  Return Visit with Antonio Rios [facility] [address] [place] [id number]  [phone]   Oct 31, 2019  9:20 AM CDT  [facility] [address] [place] [id number]  [phone]   Nov 14, 2019  9:00 AM CST  [facility]  [facility] ([facility]) [address] [place] [id number]  [phone]

## 2019-10-11 NOTE — TELEPHONE ENCOUNTER
RN replied to patient via Mychart. See message for details.     Mervat Klein RN, BSN, PHN  [facility]: [place]

## 2019-10-11 NOTE — TELEPHONE ENCOUNTER
Routing refill request to provider for review/approval because:  Drug not on the [facility] refill protocol     Mervat Klein RN, BSN, PHN  [facility]: [place]

## 2019-10-15 ENCOUNTER — OFFICE VISIT (OUTPATIENT)
Dept: NEUROLOGY | Facility: CLINIC | Age: 49
End: 2019-10-15
Payer: COMMERCIAL

## 2019-10-15 VITALS
SYSTOLIC BLOOD PRESSURE: 122 MMHG | TEMPERATURE: 97.9 F | OXYGEN SATURATION: 99 % | DIASTOLIC BLOOD PRESSURE: 80 MMHG | HEIGHT: 63 IN | BODY MASS INDEX: 41.82 KG/M2 | WEIGHT: 236 LBS | HEART RATE: 89 BPM

## 2019-10-15 DIAGNOSIS — R20.2 PARESTHESIA OF RIGHT ARM: Primary | ICD-10-CM

## 2019-10-15 DIAGNOSIS — M79.621 PAIN OF RIGHT UPPER ARM: ICD-10-CM

## 2019-10-15 PROCEDURE — 99214 OFFICE O/P EST MOD 30 MIN: CPT | Performed by: PSYCHIATRY & NEUROLOGY

## 2019-10-15 ASSESSMENT — MIFFLIN-ST. JEOR: SCORE: 1669.62

## 2019-10-15 NOTE — LETTER
10/15/2019         RE: Velma Diaz  [address]  [place] [address]        Dear Colleague,    Thank you for referring your patient, Velma Diaz, to the [facility]. Please see a copy of my visit note below.    ESTABLISHED PATIENT NEUROLOGY NOTE    DATE OF VISIT: 10/15/2019  CLINIC LOCATION: [facility]  MRN: [id number]  PATIENT NAME: Velma Diaz  YOB: 1970    PCP: Srinivasa Hunter MD    REASON FOR VISIT:   Chief Complaint   Patient presents with     Follow Up     EMG     SUBJECTIVE:                                                      HISTORY OF PRESENT ILLNESS: Patient with presumed small fiber peripheral polyneuropathy presents to discuss right arm paresthesias and pain.  Last visit was on 06/14/2019.  At that time nortriptyline dose was slightly increased. Please refer to my initial/other prior notes for further information.  The patient is accompanied by her male friend, who participates in interview.    Since the last visit, the patient reports that she developed a significant right arm numbness, tingling, and pain several months ago requiring 2 emergency room visits and additional evaluation at the primary care office.  She continues to experience these symptoms at the present time along with increased burning sensation in both legs.  She feels that her right elbow is affected the most.  Denies any additional symptoms.    Cervical spine MRI from 08/13/2019 demonstrated multilevel degenerative disc disease, most pronounced at C5-6 level with minimal indentation of ventral cord without cord signal change.  Moderate left neuroforaminal stenosis, progressed from prior study, was noted.    EMG from 10/2/2019 was normal with no evidence of focal neuropathy or cervical radiculopathy affecting the right limb.  She had several previous studies.  EMG from 06/19/2018 demonstrated right ulnar motor neuropathy.  EMG of left lower extremity from 09/11/2018  "was normal.  EMG from 03/13/2019 was normal.    According to available medical records on 09/16/2019 she developed headache, which was evaluated in the emergency department ([facility]).  Then on 09/17/2019 she developed dizziness along with associated right-sided numbness.  Laboratory evaluation demonstrated unremarkable CBC, BMP, and magnesium.  Brain MRI without contrast was negative for acute intracranial pathology.  Several non-specific tiny foci of T2 hyperintensity are noted.    On review of systems, patient endorses no additional active complaints. Medications, allergies, family and social history were also reviewed. There are no changes reported by patient.  REVIEW OF SYSTEMS:                                                    10-system review was completed. Pertinent positives are included in HPI. The remainder of ROS is negative.  EXAM:                                                    Physical Exam:   Vitals: /80 (BP Location: Left arm, Patient Position: Sitting, Cuff Size: Adult Regular)   Pulse 89   Temp 97.9  F (36.6  C) (Oral)   Ht 1.6 m (5' 3\")   Wt 107 kg (236 lb)   LMP  (LMP Unknown)   SpO2 99%   BMI 41.81 kg/m       General: pt is in NAD, cooperative.  Skin: normal turgor, moist mucous membranes, no lesions/rashes noticed.  HEENT: ATNC, white sclera, normal conjunctiva.  Respiratory: Symmetric lung excursion, no accessory respiratory muscle use.  Abdomen: Non distended.  Neurological: awake, cooperative, follows commands, no aphasia or dysarthria noted, cranial nerves II-XII: no ptosis, extraocular motility is full, face is symmetric, tongue is midline, equally moves all extremities, no dysmetria bilaterally, casual gait is normal.  DATA:   EMG/Imaging: I personally reviewed cervical spine MRI images, tabulated data from EMG report, and [facility] notes, as detailed in the history of present illness.  ASSESSMENT AND PLAN:                                                  "   Assessment: 48-year-old female patient with chronic pain syndrome and presumed small fiber peripheral polyneuropathy presents for follow-up.  She is on 30 mg of nortriptyline and 225 mg of Lyrica twice daily for pain control.  She is most concerned about persistent right arm paresthesias and pain that started several months ago.    We had a detailed discussion with the patient and her friend regarding her ongoing symptoms of the right upper extremity.  Her recent EMG was unrevealing.  Cervical spine MRI demonstrated moderate left neuroforaminal stenosis at C5-6 level, but no explanations for her right arm symptoms.  Brain MRI was also unrevealing, according to report from [facility].  I discussed with the patient that I do not see any neurological explanations for her ongoing right upper extremity pain that might be related to musculoskeletal pathology, fibromyalgia, or rheumatological conditions.  I advised the patient to return to her primary care provider for ongoing evaluation.    I also reviewed with the patient that it is not entirely clear to me whether she has a small fiber neuropathy or not, but nortriptyline could be continued along with Lyrica by her primary care provider, if the patient feels that it is helpful for her chronic pain syndrome.    Diagnoses:    ICD-10-CM    1. Paresthesia of right arm R20.2    2. Pain of right upper arm M79.621      Plan: At today's visit we thoroughly discussed current symptoms, evaluation results, and the plan.  We discussed that her recent EMG, cervical spine MRI, and brain MRI did not show any explanations for ongoing right arm numbness, tingling, and pain.  Based on this information, I do not think that her current symptoms are caused by neurological conditions.  I advised the patient to return back to her primary care provider to continue the evaluation.    No new medications.  We discussed that her primary care provider could take over nortriptyline if it  helps chronic pain.    Next follow-up appointment is on as-needed basis.    Total Time: 26 minutes with > 50% spent counseling the patient and her friend on stated above assessment and recommendations.    Joaquín Burger MD  / Neurology      Again, thank you for allowing me to participate in the care of your patient.        Sincerely,        Joaquín Burger MD

## 2019-10-15 NOTE — PATIENT INSTRUCTIONS
AFTER VISIT SUMMARY (AVS):    At today's visit we thoroughly discussed current symptoms, evaluation results, and the plan.  We discussed that your recent EMG, cervical spine MRI, and brain MRI did not have any explanations for ongoing right arm numbness, tingling, and pain.  Based on this information, I do not think that your current symptoms are caused by neurological conditions.  I would advise you to return back to your primary care provider to continue your evaluation.    No new medications.  We discussed that your primary care provider could take over nortriptyline if it helps chronic pain.    Next follow-up appointment is on as-needed basis.    Please do not hesitate to call me with any questions or concerns.    Thanks.

## 2019-10-15 NOTE — PROGRESS NOTES
ESTABLISHED PATIENT NEUROLOGY NOTE    DATE OF VISIT: 10/15/2019  CLINIC LOCATION: [facility]  MRN: [id number]  PATIENT NAME: Velma Diaz  YOB: 1970    PCP: Srinivasa Hunter MD    REASON FOR VISIT:   Chief Complaint   Patient presents with     Follow Up     EMG     SUBJECTIVE:                                                      HISTORY OF PRESENT ILLNESS: Patient with presumed small fiber peripheral polyneuropathy presents to discuss right arm paresthesias and pain.  Last visit was on 06/14/2019.  At that time nortriptyline dose was slightly increased. Please refer to my initial/other prior notes for further information.  The patient is accompanied by her male friend, who participates in interview.    Since the last visit, the patient reports that she developed a significant right arm numbness, tingling, and pain several months ago requiring 2 emergency room visits and additional evaluation at the primary care office.  She continues to experience these symptoms at the present time along with increased burning sensation in both legs.  She feels that her right elbow is affected the most.  Denies any additional symptoms.    Cervical spine MRI from 08/13/2019 demonstrated multilevel degenerative disc disease, most pronounced at C5-6 level with minimal indentation of ventral cord without cord signal change.  Moderate left neuroforaminal stenosis, progressed from prior study, was noted.    EMG from 10/2/2019 was normal with no evidence of focal neuropathy or cervical radiculopathy affecting the right limb.  She had several previous studies.  EMG from 06/19/2018 demonstrated right ulnar motor neuropathy.  EMG of left lower extremity from 09/11/2018 was normal.  EMG from 03/13/2019 was normal.    According to available medical records on 09/16/2019 she developed headache, which was evaluated in the emergency department ([facility]).  Then on 09/17/2019 she developed dizziness along  "with associated right-sided numbness.  Laboratory evaluation demonstrated unremarkable CBC, BMP, and magnesium.  Brain MRI without contrast was negative for acute intracranial pathology.  Several non-specific tiny foci of T2 hyperintensity are noted.    On review of systems, patient endorses no additional active complaints. Medications, allergies, family and social history were also reviewed. There are no changes reported by patient.  REVIEW OF SYSTEMS:                                                    10-system review was completed. Pertinent positives are included in HPI. The remainder of ROS is negative.  EXAM:                                                    Physical Exam:   Vitals: /80 (BP Location: Left arm, Patient Position: Sitting, Cuff Size: Adult Regular)   Pulse 89   Temp 97.9  F (36.6  C) (Oral)   Ht 1.6 m (5' 3\")   Wt 107 kg (236 lb)   LMP  (LMP Unknown)   SpO2 99%   BMI 41.81 kg/m      General: pt is in NAD, cooperative.  Skin: normal turgor, moist mucous membranes, no lesions/rashes noticed.  HEENT: ATNC, white sclera, normal conjunctiva.  Respiratory: Symmetric lung excursion, no accessory respiratory muscle use.  Abdomen: Non distended.  Neurological: awake, cooperative, follows commands, no aphasia or dysarthria noted, cranial nerves II-XII: no ptosis, extraocular motility is full, face is symmetric, tongue is midline, equally moves all extremities, no dysmetria bilaterally, casual gait is normal.  DATA:   EMG/Imaging: I personally reviewed cervical spine MRI images, tabulated data from EMG report, and [facility] notes, as detailed in the history of present illness.  ASSESSMENT AND PLAN:                                                    Assessment: 48-year-old female patient with chronic pain syndrome and presumed small fiber peripheral polyneuropathy presents for follow-up.  She is on 30 mg of nortriptyline and 225 mg of Lyrica twice daily for pain control.  She is most " concerned about persistent right arm paresthesias and pain that started several months ago.    We had a detailed discussion with the patient and her friend regarding her ongoing symptoms of the right upper extremity.  Her recent EMG was unrevealing.  Cervical spine MRI demonstrated moderate left neuroforaminal stenosis at C5-6 level, but no explanations for her right arm symptoms.  Brain MRI was also unrevealing, according to report from [facility].  I discussed with the patient that I do not see any neurological explanations for her ongoing right upper extremity pain that might be related to musculoskeletal pathology, fibromyalgia, or rheumatological conditions.  I advised the patient to return to her primary care provider for ongoing evaluation.    I also reviewed with the patient that it is not entirely clear to me whether she has a small fiber neuropathy or not, but nortriptyline could be continued along with Lyrica by her primary care provider, if the patient feels that it is helpful for her chronic pain syndrome.    Diagnoses:    ICD-10-CM    1. Paresthesia of right arm R20.2    2. Pain of right upper arm M79.621      Plan: At today's visit we thoroughly discussed current symptoms, evaluation results, and the plan.  We discussed that her recent EMG, cervical spine MRI, and brain MRI did not show any explanations for ongoing right arm numbness, tingling, and pain.  Based on this information, I do not think that her current symptoms are caused by neurological conditions.  I advised the patient to return back to her primary care provider to continue the evaluation.    No new medications.  We discussed that her primary care provider could take over nortriptyline if it helps chronic pain.    Next follow-up appointment is on as-needed basis.    Total Time: 26 minutes with > 50% spent counseling the patient and her friend on stated above assessment and recommendations.    Joaquín Burger MD  HP/HW  Neurology

## 2019-10-16 ENCOUNTER — OFFICE VISIT (OUTPATIENT)
Dept: PSYCHOLOGY | Facility: CLINIC | Age: 49
End: 2019-10-16
Payer: COMMERCIAL

## 2019-10-16 DIAGNOSIS — F41.1 GAD (GENERALIZED ANXIETY DISORDER): ICD-10-CM

## 2019-10-16 DIAGNOSIS — F33.1 MAJOR DEPRESSIVE DISORDER, RECURRENT EPISODE, MODERATE (H): Primary | ICD-10-CM

## 2019-10-16 PROCEDURE — 90834 PSYTX W PT 45 MINUTES: CPT | Performed by: SOCIAL WORKER

## 2019-10-16 ASSESSMENT — ANXIETY QUESTIONNAIRES
5. BEING SO RESTLESS THAT IT IS HARD TO SIT STILL: NOT AT ALL
3. WORRYING TOO MUCH ABOUT DIFFERENT THINGS: SEVERAL DAYS
GAD7 TOTAL SCORE: 5
6. BECOMING EASILY ANNOYED OR IRRITABLE: SEVERAL DAYS
1. FEELING NERVOUS, ANXIOUS, OR ON EDGE: SEVERAL DAYS
7. FEELING AFRAID AS IF SOMETHING AWFUL MIGHT HAPPEN: NOT AT ALL
2. NOT BEING ABLE TO STOP OR CONTROL WORRYING: SEVERAL DAYS
IF YOU CHECKED OFF ANY PROBLEMS ON THIS QUESTIONNAIRE, HOW DIFFICULT HAVE THESE PROBLEMS MADE IT FOR YOU TO DO YOUR WORK, TAKE CARE OF THINGS AT HOME, OR GET ALONG WITH OTHER PEOPLE: NOT DIFFICULT AT ALL

## 2019-10-16 ASSESSMENT — PATIENT HEALTH QUESTIONNAIRE - PHQ9
5. POOR APPETITE OR OVEREATING: SEVERAL DAYS
SUM OF ALL RESPONSES TO PHQ QUESTIONS 1-9: 7
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
## 2019-10-17 ASSESSMENT — ANXIETY QUESTIONNAIRES: GAD7 TOTAL SCORE: 5

## 2019-10-25 ENCOUNTER — TRANSFERRED RECORDS (OUTPATIENT)
Dept: HEALTH INFORMATION MANAGEMENT | Facility: CLINIC | Age: 49
End: 2019-10-25

## 2019-10-28 ENCOUNTER — OFFICE VISIT (OUTPATIENT)
Dept: FAMILY MEDICINE | Facility: CLINIC | Age: 49
End: 2019-10-28
Payer: COMMERCIAL

## 2019-10-28 VITALS
SYSTOLIC BLOOD PRESSURE: 116 MMHG | HEART RATE: 89 BPM | TEMPERATURE: 98.2 F | DIASTOLIC BLOOD PRESSURE: 81 MMHG | OXYGEN SATURATION: 95 % | WEIGHT: 237 LBS | BODY MASS INDEX: 41.98 KG/M2

## 2019-10-28 DIAGNOSIS — M54.9 CHRONIC BILATERAL BACK PAIN, UNSPECIFIED BACK LOCATION: Chronic | ICD-10-CM

## 2019-10-28 DIAGNOSIS — G89.4 CHRONIC PAIN SYNDROME: Chronic | ICD-10-CM

## 2019-10-28 DIAGNOSIS — G89.29 CHRONIC BILATERAL BACK PAIN, UNSPECIFIED BACK LOCATION: Chronic | ICD-10-CM

## 2019-10-28 DIAGNOSIS — M79.89 LEG SWELLING: ICD-10-CM

## 2019-10-28 DIAGNOSIS — M25.551 HIP PAIN, RIGHT: Primary | ICD-10-CM

## 2019-10-28 PROCEDURE — 99214 OFFICE O/P EST MOD 30 MIN: CPT | Performed by: FAMILY MEDICINE

## 2019-10-28 NOTE — PROGRESS NOTES
Subjective     Velma Diaz is a 49 year old female who presents to clinic today for the following health issues:    HPI   ED/UC Followup:    Facility:  [facility]  Date of visit: 10/25/19  Reason for visit: Right hip pain  Current Status: Still in pain. Pain when bending at the hip.      Patient was seen last week in the ER because of right hip pain.  No specific injury or trauma.  It is still hurting quite a bit.  She has missed work since last Thursday, October 24 because of it.  She does not feel ready to return to work.  She has a history of chronic pain.  She has had numerous back and arm and leg symptoms over the years.  She has been seen by neurology in recent months and was not felt to have any specific neurologic cause for her pain.  She has been meeting with physical therapy and also chiropractic for care of her chronic pain issues as well.  She is on baseline medications as below.  She is using a walker now to help with ambulation.  She has an appointment to see me in 3 days from now for an annual physical and fasting lab work.    Patient Active Problem List   Diagnosis     History of cleft palate with cleft lip     MAYA (obstructive sleep apnea)/Hypoventilation Syndrome     Major depressive disorder, recurrent episode, moderate (H)     Paresthesias     Health Care Home     Vitamin D deficiency     Morbid obesity with BMI of 40.0-44.9, adult (H)     Hyperlipidemia with target LDL less than 130     Intervertebral disc prolapse with impingement     Nonallopathic lesion of lumbar region     Chronic pain syndrome     Restless legs syndrome (RLS)     Insomnia     Migraine     Chronic bilateral back pain, unspecified back location     Chronic pain of left knee     ROSE MARIE (generalized anxiety disorder)     Idiopathic peripheral neuropathy     Past Surgical History:   Procedure Laterality Date     ANKLE SURGERY  7/13    Left for torn tendons & loose bone chips     ARTHROSCOPY KNEE  1986    Right knee     BACK SURGERY        Basal cell carcinoma  2011    Removal from the chest     BIOPSY       C VAGINAL HYSTERECTOMY  2011     CARPAL TUNNEL RELEASE RT/LT  ~    Bilateral     COLONOSCOPY  2016     COSMETIC SURGERY       cysto with right ureteroscopy, stone manipulation, double J stent removal  10/04/2018    Dr. Cisneros -- removal of right kidney stone     cysto, right retrograde pyelogram, right ureteral stent Right 2018    for obstructing right kidney stone     DECOMPRESSION CUBITAL TUNNEL Left 2015    Procedure: DECOMPRESSION CUBITAL TUNNEL;  Surgeon: Gus Donato MD;  [facility]     ENT SURGERY      Tonsillectomy and Adenoids     GYN SURGERY      Hysterectomy     HC REPAIR PERONEAL TENDONS       ORTHOPEDIC SURGERY  ,     Bilateral CTR     PE TUBES      yearly times 10 years     REPAIR CLEFT PALATE CHILD      Age 3 months & afterwards (multiple surgeries)     SOFT TISSUE SURGERY         Social History     Tobacco Use     Smoking status: Former Smoker     Packs/day: 0.50     Years: 15.00     Pack years: 7.50     Types: Cigarettes     Last attempt to quit: 2015     Years since quittin.6     Smokeless tobacco: Never Used   Substance Use Topics     Alcohol use: No     Alcohol/week: 0.0 standard drinks     Comment: Quit in      Family History   Problem Relation Age of Onset     Alzheimer Disease Paternal Grandmother 60     Cerebrovascular Disease Paternal Grandmother      Neurologic Disorder Sister 20        migraines     Depression/Anxiety Sister      Depression Sister      Neurologic Disorder Son 14        migraines     Asthma Son      Heart Disease Mother      Osteoporosis Mother      Obesity Mother      Cancer Father      Alcohol/Drug Father      Other Cancer Father         saliva glands & skin CA      Hypertension Father      Diabetes Maternal Grandmother      Breast Cancer Maternal Grandmother      Obesity Maternal Grandmother      Other Cancer Maternal  Grandfather         skin cancer     Cancer - colorectal Other         Aunt     Asthma Daughter      Asthma Daughter      Asthma Son      Thyroid Disease Sister      Colon Cancer Other      Obesity Sister      Glaucoma No family hx of      Macular Degeneration No family hx of          Current Outpatient Medications   Medication Sig Dispense Refill     acetaminophen (TYLENOL) 325 MG tablet Take 2 tablets (650 mg) by mouth every 4 hours as needed for other (mild pain) 100 tablet 0     ferrous sulfate (FEROSUL) 325 (65 Fe) MG tablet TAKE 1 TABLET (325 MG) BY MOUTH DAILY (WITH BREAKFAST) 30 tablet 5     methocarbamol (ROBAXIN) 750 MG tablet TAKE 1 TABLETS (750 MG) BY MOUTH 3 TIMES DAILY AS NEEDED FOR MUSCLE SPASMS 60 tablet 1     Multiple Vitamins-Minerals (MULTI FOR HER PO) Take by mouth daily        nortriptyline (PAMELOR) 10 MG capsule Take 3 capsules (30 mg) by mouth At Bedtime 30 capsule 5     nystatin (MYCOSTATIN) 737586 UNIT/GM external powder Apply to affected area twice daily as needed 60 g 2     ORDER FOR DME Respironics REMSTAR 60 Series Auto [id number] H2O, Airfit P10 nasal pillow mask w/xsmall pillows       oxyCODONE-acetaminophen (PERCOCET)  MG per tablet Take 1 tablet by mouth every 6 hours as needed for moderate to severe pain 90 tablet 0     pregabalin (LYRICA) 225 MG capsule TAKE 1 CAPSULE BY MOUTH TWICE A DAY 60 capsule 5     rOPINIRole (REQUIP) 0.25 MG tablet TAKE 1 TABLET (0.25 MG) BY MOUTH 2 TIMES DAILY 60 tablet 5     sodium chloride (OCEAN) 0.65 % nasal spray Inhale 2 sprays in each nostril twice daily until your follow up appointment.       SUMAtriptan (IMITREX) 100 MG tablet Take 1 tablet (100 mg) by mouth at onset of headache for migraine May repeat in 2 hours if needed: max 2/day 9 tablet 2     furosemide (LASIX) 20 MG tablet Take 1 tablet (20 mg) by mouth daily as needed 30 tablet 11     hydrOXYzine (VISTARIL) 25 MG capsule Take 25 mg by mouth 3 times daily as needed for itching        "metoclopramide (REGLAN) 10 MG tablet Take 10 mg by mouth 4 times daily (before meals and nightly)       No Known Allergies      Reviewed and updated as needed this visit by Provider         Review of Systems   ROS COMP: Constitutional, HEENT, cardiovascular, pulmonary, gi and gu systems are negative, except as otherwise noted.      Objective    LMP  (LMP Unknown)   Body mass index is 41.98 kg/m .   /81 (BP Location: Right arm, Patient Position: Sitting, Cuff Size: Adult Large)   Pulse 89   Temp 98.2  F (36.8  C) (Oral)   Wt 107.5 kg (237 lb)   LMP  (LMP Unknown)   SpO2 95%   BMI 41.98 kg/m      Physical Exam   GENERAL: alert, no distress and obese  MS: She has pain to palpation over the lateral right hip but also pain over the right groin area, which is actually worse than over the lateral hip.  She has discomfort in the hip with flexion and extension as well as external rotation and internal rotation.    Diagnostic Test Results:  Her ER report was obtained and reviewed, as well as her right hip x-ray results which were negative for any apparent bony pathology.        Assessment & Plan       ICD-10-CM    1. Hip pain, right M25.551    2. Chronic bilateral back pain, unspecified back location M54.9     G89.29    3. Chronic pain syndrome G89.4         BMI:   Estimated body mass index is 41.98 kg/m  as calculated from the following:    Height as of 10/15/19: 1.6 m (5' 3\").    Weight as of this encounter: 107.5 kg (237 lb).   Weight management plan: Discussed healthy diet and exercise guidelines    She is having acute right hip pain superimposed on chronic back and other chronic pain  Her right hip and pelvic x-ray is negative  I recommended relative rest, ice, and use of the walker over the next few days  She was given a note for work as requested  We will plan to see her back in 3 days as scheduled for the annual physical and fasting lab work  Discussed eventually getting her back into physical therapy and " chiropractic care for ongoing management of her chronic musculoskeletal pain syndrome  She will continue her same baseline meds in the meantime  Discussed the importance of eventual weight loss and getting her overall health improved which I think would also help her chronic aches and pain significantly as well    Return in about 3 days (around 10/31/2019) for Physical Exam, Lab Work.    Srinivasa Hunter MD  [facility]

## 2019-10-28 NOTE — LETTER
[address] [address]  Phone: [phone]  Fax: [phone]    October 28, 2019        Velma Diaz  [facility] [address]  [place] [address]          To whom it may concern:    RE: Velma Kendrick has been dealing with right hip pain this last week and has missed work starting on Thursday, October 24.  She was seen today and will be seen again on Thursday, October 31 and will need to remain off work until at least that timeframe.  We will reassess her on that day.    Thank you for your consideration.      Sincerely,        Srinivasa Hunter MD

## 2019-10-28 NOTE — TELEPHONE ENCOUNTER
"Requested Prescriptions   Pending Prescriptions Disp Refills     furosemide (LASIX) 20 MG tablet [Pharmacy Med Name: FUROSEMIDE 20 MG TABLET] 30 tablet 11     Sig: TAKE 1 TABLET BY MOUTH EVERY DAY   Last Written Prescription Date:  11-12-18  Last Fill Quantity: 30,  # refills: 11   Last office visit: 10/28/2019 with prescribing provider:  10-28-19   Future Office Visit:   Next 5 appointments (look out 90 days)    Oct 31, 2019  9:20 AM CDT  PHYSICAL with Srinivasa Hunter MD  [facility] ([facility] [address] [place] [id number]  [phone]   Nov 14, 2019  9:00 AM CST  Return Visit with Antonio Rios [address] [address]  [phone]   Dec 18, 2019  8:30 AM CST  Return Visit with Antonio Rios [address] [address]  [phone]             Diuretics (Including Combos) Protocol Passed - 10/28/2019  3:42 PM        Passed - Blood pressure under 140/90 in past 12 months     BP Readings from Last 3 Encounters:   10/28/19 116/81   10/15/19 122/80   09/20/19 115/79                 Passed - Recent (12 mo) or future (30 days) visit within the authorizing provider's specialty     Patient has had an office visit with the authorizing provider or a provider within the authorizing providers department within the previous 12 mos or has a future within next 30 days. See \"Patient Info\" tab in inbasket, or \"Choose Columns\" in Meds & Orders section of the refill encounter.              Passed - Medication is active on med list        Passed - Patient is age 18 or older        Passed - No active pregancy on record        Passed - Normal serum creatinine on file in past 12 months     Recent Labs   Lab Test 10/30/18  1040   CR 0.74              " Passed - Normal serum potassium on file in past 12 months     Recent Labs   Lab Test 10/30/18  1040   POTASSIUM 4.0                    Passed - Normal serum sodium on file in past 12 months     Recent Labs   Lab Test 10/30/18  1040                 Passed - No positive pregnancy test in past 12 months

## 2019-10-29 RX ORDER — FUROSEMIDE 20 MG
TABLET ORAL
Qty: 30 TABLET | Refills: 11 | Status: SHIPPED | OUTPATIENT
Start: 2019-10-29 | End: 2020-10-08

## 2019-10-29 NOTE — TELEPHONE ENCOUNTER
Routing to PCP to review and advise.    patient has physical scheduled 10/31/2019 - would you like to hold refills until appointment?          Ly Sandoval RN  [facility]

## 2019-10-31 ENCOUNTER — OFFICE VISIT (OUTPATIENT)
Dept: FAMILY MEDICINE | Facility: CLINIC | Age: 49
End: 2019-10-31
Payer: COMMERCIAL

## 2019-10-31 VITALS
HEART RATE: 81 BPM | WEIGHT: 237 LBS | BODY MASS INDEX: 41.98 KG/M2 | DIASTOLIC BLOOD PRESSURE: 80 MMHG | SYSTOLIC BLOOD PRESSURE: 114 MMHG | TEMPERATURE: 98.5 F

## 2019-10-31 DIAGNOSIS — F33.1 MAJOR DEPRESSIVE DISORDER, RECURRENT EPISODE, MODERATE (H): Chronic | ICD-10-CM

## 2019-10-31 DIAGNOSIS — M25.551 HIP PAIN, RIGHT: ICD-10-CM

## 2019-10-31 DIAGNOSIS — G25.81 RESTLESS LEGS SYNDROME (RLS): ICD-10-CM

## 2019-10-31 DIAGNOSIS — Z00.00 ROUTINE GENERAL MEDICAL EXAMINATION AT A HEALTH CARE FACILITY: Primary | ICD-10-CM

## 2019-10-31 DIAGNOSIS — E66.01 MORBID OBESITY WITH BMI OF 40.0-44.9, ADULT (H): Chronic | ICD-10-CM

## 2019-10-31 DIAGNOSIS — G89.4 CHRONIC PAIN SYNDROME: ICD-10-CM

## 2019-10-31 DIAGNOSIS — E78.5 HYPERLIPIDEMIA WITH TARGET LDL LESS THAN 130: ICD-10-CM

## 2019-10-31 DIAGNOSIS — Z23 NEED FOR PROPHYLACTIC VACCINATION AND INOCULATION AGAINST INFLUENZA: ICD-10-CM

## 2019-10-31 DIAGNOSIS — G43.909 MIGRAINE WITHOUT STATUS MIGRAINOSUS, NOT INTRACTABLE, UNSPECIFIED MIGRAINE TYPE: Chronic | ICD-10-CM

## 2019-10-31 DIAGNOSIS — G47.33 OSA (OBSTRUCTIVE SLEEP APNEA): Chronic | ICD-10-CM

## 2019-10-31 LAB
ANION GAP SERPL CALCULATED.3IONS-SCNC: 5 MMOL/L (ref 3–14)
BUN SERPL-MCNC: 10 MG/DL (ref 7–30)
CALCIUM SERPL-MCNC: 9.3 MG/DL (ref 8.5–10.1)
CHLORIDE SERPL-SCNC: 106 MMOL/L (ref 94–109)
CHOLEST SERPL-MCNC: 237 MG/DL
CO2 SERPL-SCNC: 24 MMOL/L (ref 20–32)
CREAT SERPL-MCNC: 0.67 MG/DL (ref 0.52–1.04)
ERYTHROCYTE [DISTWIDTH] IN BLOOD BY AUTOMATED COUNT: 12.4 % (ref 10–15)
GFR SERPL CREATININE-BSD FRML MDRD: >90 ML/MIN/{1.73_M2}
GLUCOSE SERPL-MCNC: 92 MG/DL (ref 70–99)
HBA1C MFR BLD: 5.1 % (ref 0–5.6)
HCT VFR BLD AUTO: 41.6 % (ref 35–47)
HDLC SERPL-MCNC: 38 MG/DL
HGB BLD-MCNC: 13.5 G/DL (ref 11.7–15.7)
LDLC SERPL CALC-MCNC: ABNORMAL MG/DL
LDLC SERPL DIRECT ASSAY-MCNC: 132 MG/DL
MCH RBC QN AUTO: 30.8 PG (ref 26.5–33)
MCHC RBC AUTO-ENTMCNC: 32.5 G/DL (ref 31.5–36.5)
MCV RBC AUTO: 95 FL (ref 78–100)
NONHDLC SERPL-MCNC: 199 MG/DL
PLATELET # BLD AUTO: 315 10E9/L (ref 150–450)
POTASSIUM SERPL-SCNC: 4.3 MMOL/L (ref 3.4–5.3)
RBC # BLD AUTO: 4.39 10E12/L (ref 3.8–5.2)
SODIUM SERPL-SCNC: 135 MMOL/L (ref 133–144)
TRIGL SERPL-MCNC: 447 MG/DL
WBC # BLD AUTO: 6.8 10E9/L (ref 4–11)

## 2019-10-31 PROCEDURE — 83036 HEMOGLOBIN GLYCOSYLATED A1C: CPT | Performed by: FAMILY MEDICINE

## 2019-10-31 PROCEDURE — 90686 IIV4 VACC NO PRSV 0.5 ML IM: CPT | Performed by: FAMILY MEDICINE

## 2019-10-31 PROCEDURE — 99396 PREV VISIT EST AGE 40-64: CPT | Mod: 25 | Performed by: FAMILY MEDICINE

## 2019-10-31 PROCEDURE — 99214 OFFICE O/P EST MOD 30 MIN: CPT | Mod: 25 | Performed by: FAMILY MEDICINE

## 2019-10-31 PROCEDURE — G0008 ADMIN INFLUENZA VIRUS VAC: HCPCS | Performed by: FAMILY MEDICINE

## 2019-10-31 PROCEDURE — 80048 BASIC METABOLIC PNL TOTAL CA: CPT | Performed by: FAMILY MEDICINE

## 2019-10-31 PROCEDURE — 80061 LIPID PANEL: CPT | Performed by: FAMILY MEDICINE

## 2019-10-31 PROCEDURE — 83721 ASSAY OF BLOOD LIPOPROTEIN: CPT | Mod: 59 | Performed by: FAMILY MEDICINE

## 2019-10-31 PROCEDURE — 85027 COMPLETE CBC AUTOMATED: CPT | Performed by: FAMILY MEDICINE

## 2019-10-31 PROCEDURE — 36415 COLL VENOUS BLD VENIPUNCTURE: CPT | Performed by: FAMILY MEDICINE

## 2019-10-31 RX ORDER — SUMATRIPTAN 100 MG/1
100 TABLET, FILM COATED ORAL
Qty: 9 TABLET | Refills: 2 | Status: SHIPPED | OUTPATIENT
Start: 2019-10-31 | End: 2023-11-20

## 2019-10-31 RX ORDER — ROPINIROLE 0.25 MG/1
0.25 TABLET, FILM COATED ORAL 2 TIMES DAILY
Qty: 60 TABLET | Refills: 5 | Status: SHIPPED | OUTPATIENT
Start: 2019-10-31 | End: 2020-04-21

## 2019-10-31 RX ORDER — PREGABALIN 225 MG/1
CAPSULE ORAL
Qty: 60 CAPSULE | Refills: 5 | Status: SHIPPED | OUTPATIENT
Start: 2019-10-31 | End: 2020-04-21

## 2019-10-31 ASSESSMENT — ENCOUNTER SYMPTOMS
WEAKNESS: 1
PARESTHESIAS: 1
HEARTBURN: 0
DIARRHEA: 0
NERVOUS/ANXIOUS: 1
JOINT SWELLING: 1
CHILLS: 0
FREQUENCY: 0
HEMATOCHEZIA: 0
HEMATURIA: 0
ARTHRALGIAS: 1
HEADACHES: 0
DYSURIA: 0
SORE THROAT: 0
DIZZINESS: 1
ABDOMINAL PAIN: 0
EYE PAIN: 0
FEVER: 0
SHORTNESS OF BREATH: 0
PALPITATIONS: 0
MYALGIAS: 1
BREAST MASS: 0
COUGH: 0
NAUSEA: 0
CONSTIPATION: 0

## 2019-10-31 ASSESSMENT — PATIENT HEALTH QUESTIONNAIRE - PHQ9
10. IF YOU CHECKED OFF ANY PROBLEMS, HOW DIFFICULT HAVE THESE PROBLEMS MADE IT FOR YOU TO DO YOUR WORK, TAKE CARE OF THINGS AT HOME, OR GET ALONG WITH OTHER PEOPLE: SOMEWHAT DIFFICULT
SUM OF ALL RESPONSES TO PHQ QUESTIONS 1-9: 11
SUM OF ALL RESPONSES TO PHQ QUESTIONS 1-9: 11
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
## 2019-11-01 NOTE — RESULT ENCOUNTER NOTE
Mireille,  Your lipid values are still on the high side, similar to the past, but the rest of your lab results look good including electrolytes, kidney tests, blood counts, and diabetes testing.  Continued efforts at healthy eating and getting more exercise would be helpful for your lipids.    Srinivasa Hunter MD

## 2019-11-02 ASSESSMENT — PATIENT HEALTH QUESTIONNAIRE - PHQ9: SUM OF ALL RESPONSES TO PHQ QUESTIONS 1-9: 11

## 2019-11-04 ENCOUNTER — THERAPY VISIT (OUTPATIENT)
Dept: CHIROPRACTIC MEDICINE | Facility: CLINIC | Age: 49
End: 2019-11-04
Payer: COMMERCIAL

## 2019-11-04 ENCOUNTER — MYC REFILL (OUTPATIENT)
Dept: FAMILY MEDICINE | Facility: CLINIC | Age: 49
End: 2019-11-04

## 2019-11-04 DIAGNOSIS — G89.29 CHRONIC LOW BACK PAIN: ICD-10-CM

## 2019-11-04 DIAGNOSIS — G89.4 CHRONIC PAIN SYNDROME: ICD-10-CM

## 2019-11-04 DIAGNOSIS — M99.05 SEGMENTAL DYSFUNCTION OF PELVIC REGION: Primary | ICD-10-CM

## 2019-11-04 DIAGNOSIS — M62.838 SPASM OF MUSCLE: ICD-10-CM

## 2019-11-04 DIAGNOSIS — M99.03 SEGMENTAL DYSFUNCTION OF LUMBAR REGION: ICD-10-CM

## 2019-11-04 DIAGNOSIS — M99.02 THORACIC SEGMENT DYSFUNCTION: ICD-10-CM

## 2019-11-04 DIAGNOSIS — M54.50 CHRONIC LOW BACK PAIN: ICD-10-CM

## 2019-11-04 PROCEDURE — 98941 CHIROPRACT MANJ 3-4 REGIONS: CPT | Mod: AT | Performed by: CHIROPRACTOR

## 2019-11-04 PROCEDURE — 97810 ACUP 1/> WO ESTIM 1ST 15 MIN: CPT | Mod: GY | Performed by: CHIROPRACTOR

## 2019-11-04 RX ORDER — OXYCODONE AND ACETAMINOPHEN 10; 325 MG/1; MG/1
1 TABLET ORAL EVERY 6 HOURS PRN
Qty: 90 TABLET | Refills: 0 | Status: SHIPPED | OUTPATIENT
Start: 2019-11-04 | End: 2019-12-05

## 2019-11-04 NOTE — PROGRESS NOTES
Visit #:  21    Subjective:  Velma Diaz is a 48 year old female who is seen in f/u up for:        Segmental dysfunction of pelvic region  Lumbago  Segmental dysfunction of lumbar region  Spasm of muscle  Thoracic segment dysfunction.     Since last visit on 10/7/2019,  Velma Diaz reports:    Area of chief complaint:  Lumbar :  Symptoms are graded at 7/10. The quality is described as stiff, and achey.  Motion has decreased. Patient feels that her low back is stiff and sore.  Mireille reports that she was feeling pretty better than she did last visit but started having some back and hip pain last week.  Her symptoms became very interns and she ended up going to the ED who gave her some pain medication. She has not done anything out of the ordinary.    Objective:  The following was observed:    P: palpatory tenderness Piriformis R>>L  A: static palpation demonstrates intersegmental asymmetry , thoracic, lumbar, pelvis  R: motion palpation notes restricted motion,  T10, T11 , T12 , L4 , L5  and PSIS Right   T: hypertonicity at: Piriformis R>>L    Segmental spinal dysfunction/restrictions found at:  C1, C2, T10, T11 , T12 , L4 , L5  and PSIS Right       Assessment:    Diagnoses:      1. Segmental dysfunction of pelvic region    2. Lumbago    3. Segmental dysfunction of lumbar region    4. Spasm of muscle    5. Thoracic segment dysfunction        Patient's condition:  Patient had restrictions pre-manipulation    Treatment effectiveness:  Post manipulation there is better intersegmental movement and Patient claims to feel looser post manipulation      Procedures:  CMT:  69500 Chiropractic manipulative treatment 3-4 regions performed   Thoracic: Activator, T10, T11, T12, Prone  Lumbar: Activator, L4, L5, Prone  Pelvis: Drop Table, PSIS Right , Prone    Modalities:  89040: Acupuncture, for 15 minutes:  Points: B25, B27, GV3, K3, B62, SI3  Ahsi point in piriformis  For 15 minutes    Therapeutic  procedures:  None    Response to Treatment  Reduction in symptoms as reported by patient    Prognosis: Good    Progress towards Goals: Patient is making progress towards the goal.     Recommendations:    Instructions:  ice 20 minutes every other hour as needed    Follow-up:    Return to care in one week.

## 2019-11-11 ENCOUNTER — THERAPY VISIT (OUTPATIENT)
Dept: CHIROPRACTIC MEDICINE | Facility: CLINIC | Age: 49
End: 2019-11-11
Payer: COMMERCIAL

## 2019-11-11 DIAGNOSIS — M99.03 SEGMENTAL DYSFUNCTION OF LUMBAR REGION: ICD-10-CM

## 2019-11-11 DIAGNOSIS — M62.838 SPASM OF MUSCLE: ICD-10-CM

## 2019-11-11 DIAGNOSIS — M99.02 THORACIC SEGMENT DYSFUNCTION: ICD-10-CM

## 2019-11-11 DIAGNOSIS — M99.05 SEGMENTAL DYSFUNCTION OF PELVIC REGION: Primary | ICD-10-CM

## 2019-11-11 DIAGNOSIS — M54.6 PAIN IN THORACIC SPINE: ICD-10-CM

## 2019-11-11 DIAGNOSIS — M54.50 CHRONIC LOW BACK PAIN: ICD-10-CM

## 2019-11-11 DIAGNOSIS — G89.29 CHRONIC LOW BACK PAIN: ICD-10-CM

## 2019-11-11 PROCEDURE — 97810 ACUP 1/> WO ESTIM 1ST 15 MIN: CPT | Performed by: CHIROPRACTOR

## 2019-11-11 PROCEDURE — 98941 CHIROPRACT MANJ 3-4 REGIONS: CPT | Mod: AT | Performed by: CHIROPRACTOR

## 2019-11-11 NOTE — PROGRESS NOTES
Visit #:  22    Subjective:  Velma Diaz is a 48 year old female who is seen in f/u up for:        Segmental dysfunction of pelvic region  Lumbago  Segmental dysfunction of lumbar region  Spasm of muscle  Thoracic segment dysfunction.     Since last visit on 11/4/2019,  Velma Diaz reports:    Area of chief complaint:  Lumbar :  Symptoms are graded at 7/10. The quality is described as stiff, and achey.  Motion has decreased. Patient feels that her low back is stiff and sore.  Mireille reports that she was feeling pretty better than she did last visit but is feeling a little sore after having a massage done this morning.  She also mentions that her upper back is feeling stiff and sore insidiously.  This started 2 days ago.  She may have slept awkwardly but she is unsure.    Objective:  The following was observed:    P: palpatory tenderness Piriformis R>>L  A: static palpation demonstrates intersegmental asymmetry , thoracic, lumbar, pelvis  R: motion palpation notes restricted motion,  T5, T6, T7, T10, T11 , T12 , L4 , L5  and PSIS Right   T: hypertonicity at: Piriformis R>>L    Segmental spinal dysfunction/restrictions found at:  T5, T6, T7,  T10, T11 , T12 , L4 , L5  and PSIS Right       Assessment:    Diagnoses:      1. Segmental dysfunction of pelvic region    2. Lumbago    3. Segmental dysfunction of lumbar region    4. Spasm of muscle    5. Thoracic segment dysfunction        Patient's condition:  Patient had restrictions pre-manipulation    Treatment effectiveness:  Post manipulation there is better intersegmental movement and Patient claims to feel looser post manipulation      Procedures:  CMT:  39885 Chiropractic manipulative treatment 3-4 regions performed   Thoracic: Activator, T5, T6, T7, T10, T11, T12, Prone  Lumbar: Activator, L4, L5, Prone  Pelvis: Drop Table, PSIS Right , Prone    Modalities:  62815: Acupuncture, for 15 minutes:  Points: B25, B27, GV3, K3, B62, SI3  Ahsi point in piriformis  For  15 minutes    Therapeutic procedures:  None    Response to Treatment  Reduction in symptoms as reported by patient    Prognosis: Good    Progress towards Goals: Patient is making progress towards the goal.     Recommendations:    Instructions:  ice 20 minutes every other hour as needed    Follow-up:    Return to care in one week.

## 2019-11-14 ENCOUNTER — ANCILLARY PROCEDURE (OUTPATIENT)
Dept: MAMMOGRAPHY | Facility: CLINIC | Age: 49
End: 2019-11-14
Attending: FAMILY MEDICINE
Payer: COMMERCIAL

## 2019-11-14 ENCOUNTER — OFFICE VISIT (OUTPATIENT)
Dept: PSYCHOLOGY | Facility: CLINIC | Age: 49
End: 2019-11-14
Payer: COMMERCIAL

## 2019-11-14 DIAGNOSIS — F41.1 GAD (GENERALIZED ANXIETY DISORDER): ICD-10-CM

## 2019-11-14 DIAGNOSIS — F33.1 MAJOR DEPRESSIVE DISORDER, RECURRENT EPISODE, MODERATE (H): Primary | ICD-10-CM

## 2019-11-14 DIAGNOSIS — Z12.31 VISIT FOR SCREENING MAMMOGRAM: ICD-10-CM

## 2019-11-14 PROCEDURE — 77067 SCR MAMMO BI INCL CAD: CPT | Mod: TC

## 2019-11-14 PROCEDURE — 90834 PSYTX W PT 45 MINUTES: CPT | Performed by: SOCIAL WORKER
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
## 2019-11-18 ENCOUNTER — THERAPY VISIT (OUTPATIENT)
Dept: CHIROPRACTIC MEDICINE | Facility: CLINIC | Age: 49
End: 2019-11-18
Payer: COMMERCIAL

## 2019-11-18 DIAGNOSIS — M54.50 CHRONIC LOW BACK PAIN: ICD-10-CM

## 2019-11-18 DIAGNOSIS — M99.05 SEGMENTAL DYSFUNCTION OF PELVIC REGION: Primary | ICD-10-CM

## 2019-11-18 DIAGNOSIS — M62.838 SPASM OF MUSCLE: ICD-10-CM

## 2019-11-18 DIAGNOSIS — M99.02 THORACIC SEGMENT DYSFUNCTION: ICD-10-CM

## 2019-11-18 DIAGNOSIS — M99.03 SEGMENTAL DYSFUNCTION OF LUMBAR REGION: ICD-10-CM

## 2019-11-18 DIAGNOSIS — G89.29 CHRONIC LOW BACK PAIN: ICD-10-CM

## 2019-11-18 PROCEDURE — 98941 CHIROPRACT MANJ 3-4 REGIONS: CPT | Mod: AT | Performed by: CHIROPRACTOR

## 2019-11-18 NOTE — PROGRESS NOTES
Visit #:  23    Subjective:  Velma Diaz is a 48 year old female who is seen in f/u up for:        Segmental dysfunction of pelvic region  Lumbago  Segmental dysfunction of lumbar region  Spasm of muscle  Thoracic segment dysfunction.     Since last visit on 11/11/2019,  Velma Diaz reports:    Area of chief complaint:  Lumbar :  Symptoms are graded at 6/10. The quality is described as stiff, and achey.  Motion has been about the same.  Patient feels that her low back is stiff and sore.    Even though she is having some symptoms, she does feel better this week.    Objective:  The following was observed:    P: palpatory tenderness Piriformis R>>L  A: static palpation demonstrates intersegmental asymmetry , thoracic, lumbar, pelvis  R: motion palpation notes restricted motion,  T5, T6, T7, T10, T11 , T12 , L4 , L5  and PSIS Right   T: hypertonicity at: Piriformis R>>L    Segmental spinal dysfunction/restrictions found at:  T5, T6, T7,  T10, T11 , T12 , L4 , L5  and PSIS Right       Assessment:    Diagnoses:      1. Segmental dysfunction of pelvic region    2. Lumbago    3. Segmental dysfunction of lumbar region    4. Spasm of muscle    5. Thoracic segment dysfunction        Patient's condition:  Patient had restrictions pre-manipulation    Treatment effectiveness:  Post manipulation there is better intersegmental movement and Patient claims to feel looser post manipulation      Procedures:  CMT:  91291 Chiropractic manipulative treatment 3-4 regions performed   Thoracic: Activator, T5, T6, T7, T10, T11, T12, Prone  Lumbar: Activator, L4, L5, Prone  Pelvis: Drop Table, PSIS Right , Prone    Modalities:  13730: Acupuncture, for 15 minutes:  Points: B25, B27, GV3, K3, B62, SI3  Ahsi point in piriformis  For 15 minutes    Therapeutic procedures:  None    Response to Treatment  Reduction in symptoms as reported by patient    Prognosis: Good    Progress towards Goals: Patient is making progress towards the  goal.     Recommendations:    Instructions:  ice 20 minutes every other hour as needed    Follow-up:    Return to care in one week.

## 2019-11-25 ENCOUNTER — THERAPY VISIT (OUTPATIENT)
Dept: CHIROPRACTIC MEDICINE | Facility: CLINIC | Age: 49
End: 2019-11-25
Payer: COMMERCIAL

## 2019-11-25 DIAGNOSIS — M99.03 SEGMENTAL DYSFUNCTION OF LUMBAR REGION: ICD-10-CM

## 2019-11-25 DIAGNOSIS — M99.02 THORACIC SEGMENT DYSFUNCTION: ICD-10-CM

## 2019-11-25 DIAGNOSIS — M62.838 SPASM OF MUSCLE: ICD-10-CM

## 2019-11-25 DIAGNOSIS — M54.50 LOW BACK PAIN: ICD-10-CM

## 2019-11-25 DIAGNOSIS — M99.05 SEGMENTAL DYSFUNCTION OF PELVIC REGION: Primary | ICD-10-CM

## 2019-11-25 PROCEDURE — 98941 CHIROPRACT MANJ 3-4 REGIONS: CPT | Mod: AT | Performed by: CHIROPRACTOR

## 2019-11-25 PROCEDURE — 97810 ACUP 1/> WO ESTIM 1ST 15 MIN: CPT | Performed by: CHIROPRACTOR

## 2019-11-25 NOTE — PROGRESS NOTES
Visit #:  24    Subjective:  Velma Diaz is a 48 year old female who is seen in f/u up for:        Segmental dysfunction of pelvic region  Lumbago  Segmental dysfunction of lumbar region  Spasm of muscle  Thoracic segment dysfunction.     Since last visit on 11/18/2019,  Velma Diaz reports:    Area of chief complaint:  Lumbar :  Symptoms are graded at 5/10. The quality is described as stiff, and achey.  Motion has improved but not normal.  Patient feels that her low back is stiff and sore.    Even though she is having some symptoms, she does feel better this week.  Overall she feels that she is improving.    Objective:  The following was observed:    P: palpatory tenderness Piriformis R>>L  A: static palpation demonstrates intersegmental asymmetry , thoracic, lumbar, pelvis  R: motion palpation notes restricted motion,  T5, T6, T7, T10, T11 , T12 , L4 , L5  and PSIS Right   T: hypertonicity at: Piriformis R>>L    Segmental spinal dysfunction/restrictions found at:  T5, T6, T7,  T10, T11 , T12 , L4 , L5  and PSIS Right       Assessment:    Diagnoses:      1. Segmental dysfunction of pelvic region    2. Lumbago    3. Segmental dysfunction of lumbar region    4. Spasm of muscle    5. Thoracic segment dysfunction        Patient's condition:  Patient had restrictions pre-manipulation    Treatment effectiveness:  Post manipulation there is better intersegmental movement and Patient claims to feel looser post manipulation      Procedures:  CMT:  52832 Chiropractic manipulative treatment 3-4 regions performed   Thoracic: Activator, T5, T6, T7, T10, T11, T12, Prone  Lumbar: Activator, L4, L5, Prone  Pelvis: Drop Table, PSIS Right , Prone    Modalities:  92429: Acupuncture, for 15 minutes:  Points: B25, B27, GV3, K3, B62, SI3  Ahsi point in piriformis  For 15 minutes    Therapeutic procedures:  None    Response to Treatment  Reduction in symptoms as reported by patient    Prognosis: Good    Progress towards Goals:  Patient is making progress towards the goal.     Recommendations:    Instructions:  ice 20 minutes every other hour as needed    Follow-up:    Return to care in one week.

## 2019-11-29 DIAGNOSIS — M54.9 CHRONIC BILATERAL BACK PAIN, UNSPECIFIED BACK LOCATION: Chronic | ICD-10-CM

## 2019-11-29 DIAGNOSIS — G89.29 CHRONIC BILATERAL BACK PAIN, UNSPECIFIED BACK LOCATION: Chronic | ICD-10-CM

## 2019-11-29 RX ORDER — METHOCARBAMOL 750 MG/1
TABLET, FILM COATED ORAL
Qty: 60 TABLET | Refills: 1 | Status: SHIPPED | OUTPATIENT
Start: 2019-11-29 | End: 2020-01-07

## 2019-11-29 NOTE — TELEPHONE ENCOUNTER
Requested Prescriptions   Pending Prescriptions Disp Refills     methocarbamol (ROBAXIN) 750 MG tablet [Pharmacy Med Name: METHOCARBAMOL 750 MG TABLET] 60 tablet 1     Sig: TAKE 1 TABLETS (750 MG) BY MOUTH 3 TIMES DAILY AS NEEDED FOR MUSCLE SPASMS       There is no refill protocol information for this order   Last Written Prescription Date:  10-11-19  Last Fill Quantity: 60,  # refills: 1   Last office visit: 10/31/2019 with prescribing provider:  10-31-19   Future Office Visit:   Next 5 appointments (look out 90 days)    Dec 05, 2019 11:40 AM CST  SHORT with Srinivasa Hunter MD  [facility] ([facility] [address] [place] [id number]  [phone]   Dec 18, 2019  8:30 AM CST  Return Visit with Antonio Rios [facility] ([facility]) [address] [place] [id number]  [phone]   Jan 16, 2020  9:00 AM CST  Return Visit with Antonio Rios [facility] ([facility]) [address] [place] [id number]  [phone]

## 2019-12-02 ENCOUNTER — THERAPY VISIT (OUTPATIENT)
Dept: CHIROPRACTIC MEDICINE | Facility: CLINIC | Age: 49
End: 2019-12-02
Payer: COMMERCIAL

## 2019-12-02 ENCOUNTER — OFFICE VISIT (OUTPATIENT)
Dept: OPTOMETRY | Facility: CLINIC | Age: 49
End: 2019-12-02
Payer: COMMERCIAL

## 2019-12-02 DIAGNOSIS — M99.02 THORACIC SEGMENT DYSFUNCTION: ICD-10-CM

## 2019-12-02 DIAGNOSIS — M99.03 SEGMENTAL DYSFUNCTION OF LUMBAR REGION: ICD-10-CM

## 2019-12-02 DIAGNOSIS — M54.50 LUMBAGO: ICD-10-CM

## 2019-12-02 DIAGNOSIS — M62.838 SPASM OF MUSCLE: ICD-10-CM

## 2019-12-02 DIAGNOSIS — M99.05 SEGMENTAL DYSFUNCTION OF PELVIC REGION: Primary | ICD-10-CM

## 2019-12-02 DIAGNOSIS — Z01.00 ENCOUNTER FOR EXAMINATION OF EYES AND VISION WITHOUT ABNORMAL FINDINGS: Primary | ICD-10-CM

## 2019-12-02 DIAGNOSIS — H52.4 PRESBYOPIA: ICD-10-CM

## 2019-12-02 DIAGNOSIS — H52.01 HYPERMETROPIA, RIGHT: ICD-10-CM

## 2019-12-02 PROCEDURE — 92015 DETERMINE REFRACTIVE STATE: CPT | Performed by: OPTOMETRIST

## 2019-12-02 PROCEDURE — 97810 ACUP 1/> WO ESTIM 1ST 15 MIN: CPT | Performed by: CHIROPRACTOR

## 2019-12-02 PROCEDURE — 92014 COMPRE OPH EXAM EST PT 1/>: CPT | Performed by: OPTOMETRIST

## 2019-12-02 PROCEDURE — 98941 CHIROPRACT MANJ 3-4 REGIONS: CPT | Mod: AT | Performed by: CHIROPRACTOR

## 2019-12-02 ASSESSMENT — SLIT LAMP EXAM - LIDS
COMMENTS: NORMAL
COMMENTS: NORMAL

## 2019-12-02 ASSESSMENT — VISUAL ACUITY
OD_CC: 20/20
OS_SC: 20/20
OD_CC+: -1
OD_CC: 20/30
CORRECTION_TYPE: GLASSES
OS_SC: 20/120
OD_SC: 20/40
OS_CC: 20/20
OS_CC: 20/30
OD_SC: 20/200
METHOD: SNELLEN - LINEAR

## 2019-12-02 ASSESSMENT — CONF VISUAL FIELD
OD_NORMAL: 1
OS_NORMAL: 1

## 2019-12-02 ASSESSMENT — REFRACTION_WEARINGRX
OD_ADD: +1.50
OD_SPHERE: +0.25
OS_ADD: +1.50
OS_SPHERE: +0.25
OD_CYLINDER: SPHERE
SPECS_TYPE: BIFOCAL
OS_CYLINDER: SPHERE

## 2019-12-02 ASSESSMENT — REFRACTION_MANIFEST
OS_SPHERE: PLANO
OS_CYLINDER: SPHERE
OD_CYLINDER: SPHERE
OS_ADD: +1.75
OD_ADD: +1.75
OD_SPHERE: +0.50

## 2019-12-02 ASSESSMENT — TONOMETRY
IOP_METHOD: APPLANATION
OS_IOP_MMHG: 19
OD_IOP_MMHG: 18

## 2019-12-02 ASSESSMENT — EXTERNAL EXAM - LEFT EYE: OS_EXAM: NORMAL

## 2019-12-02 ASSESSMENT — EXTERNAL EXAM - RIGHT EYE: OD_EXAM: NORMAL

## 2019-12-02 ASSESSMENT — CUP TO DISC RATIO
OS_RATIO: 0.1
OD_RATIO: 0.1

## 2019-12-02 NOTE — LETTER
12/2/2019         RE: Velma Diaz  [address]  [place] [address]        Dear Colleague,    Thank you for referring your patient, Velma Diaz, to the [facility]. Please see a copy of my visit note below.    Chief Complaint   Patient presents with     Annual Eye Exam      Accompanied by self  Last Eye Exam: 1 year ago  Dilated Previously: Yes    What are you currently using to see?  Glasses- 1+ years old       Distance Vision Acuity: Noticed gradual change in both eyes    Near Vision Acuity: Not satisfied     Eye Comfort: dry and itchy during allergy season  Do you use eye drops? : No  Occupation or Hobbies: unemployed    Rosa Slade, Optometric Tech          Medical, surgical and family histories reviewed and updated 12/2/2019.       OBJECTIVE: See Ophthalmology exam    ASSESSMENT:    ICD-10-CM    1. Encounter for examination of eyes and vision without abnormal findings Z01.00 EYE EXAM (SIMPLE-NONBILLABLE)   2. Hypermetropia, right H52.01 EYE EXAM (SIMPLE-NONBILLABLE)     REFRACTION   3. Presbyopia H52.4 EYE EXAM (SIMPLE-NONBILLABLE)     REFRACTION      PLAN:     Patient Instructions   You have allergies that are affecting your eyes.  This can cause itching and tearing of the eyes.  I recommend ketotifen drops (Alaway or Zaditor) to be used 2x daily in each eye. These drops are available over the counter. Cold compresses can also make things more comfortable.  Try not to rub the eyes.      Velma was advised of today's exam findings.  Optional to fill new glasses prescription, mild change  Copy of glasses Rx provided today.  Return in 1-2 years for eye exam, or sooner if needed.    The effects of the dilating drops last for 4- 6 hours.  You will be more sensitive to light and vision will be blurry up close.  Mydriatic sunglasses were given if needed.      Wade Larsen O.D.  [facility]  [address] [place] [address] [address]  [place]  [id number] [phone]            Again, thank you for allowing me to participate in the care of your patient.        Sincerely,        Wade Larsen OD

## 2019-12-02 NOTE — PROGRESS NOTES
Visit #:  25    Subjective:  Velma Diaz is a 48 year old female who is seen in f/u up for:        Segmental dysfunction of pelvic region  Lumbago  Segmental dysfunction of lumbar region  Spasm of muscle  Thoracic segment dysfunction.     Since last visit on 11/25/2019,  Velma Diaz reports:    Area of chief complaint:  Lumbar :  Symptoms are graded at 5/10. The quality is described as stiff, and achey.  Motion has improved but not normal.  Patient feels that her low back is stiff and sore.   She is feeling better but still has some burning down her legs, but feels a little improved.  She will be seeing her primary in a couple of days.      Objective:  The following was observed:    P: palpatory tenderness Piriformis R>>L  A: static palpation demonstrates intersegmental asymmetry , thoracic, lumbar, pelvis  R: motion palpation notes restricted motion,  T5, T6, T7, T10, T11 , T12 , L4 , L5  and PSIS Right   T: hypertonicity at: Piriformis R>>L    Segmental spinal dysfunction/restrictions found at:  T5, T6, T7,  T10, T11 , T12 , L4 , L5  and PSIS Right       Assessment:    Diagnoses:      1. Segmental dysfunction of pelvic region    2. Lumbago    3. Segmental dysfunction of lumbar region    4. Spasm of muscle    5. Thoracic segment dysfunction        Patient's condition:  Patient had restrictions pre-manipulation    Treatment effectiveness:  Post manipulation there is better intersegmental movement and Patient claims to feel looser post manipulation      Procedures:  CMT:  56128 Chiropractic manipulative treatment 3-4 regions performed   Thoracic: Activator, T5, T6, T7, T10, T11, T12, Prone  Lumbar: Activator, L4, L5, Prone  Pelvis: Drop Table, PSIS Right , Prone    Modalities:  30358: Acupuncture, for 15 minutes:  Points: B25, B27, GV3, K3, B62, SI3  Ahsi point in piriformis  For 15 minutes    Therapeutic procedures:  None    Response to Treatment  Reduction in symptoms as reported by patient    Prognosis:  Good    Progress towards Goals: Patient is making progress towards the goal.     Recommendations:    Instructions:  ice 20 minutes every other hour as needed    Follow-up:    Return to care in one week.

## 2019-12-02 NOTE — PATIENT INSTRUCTIONS
You have allergies that are affecting your eyes.  This can cause itching and tearing of the eyes.  I recommend ketotifen drops (Alaway or Zaditor) to be used 2x daily in each eye. These drops are available over the counter. Cold compresses can also make things more comfortable.  Try not to rub the eyes.      Velma was advised of today's exam findings.  Optional to fill new glasses prescription, mild change  Copy of glasses Rx provided today.  Return in 1-2 years for eye exam, or sooner if needed.    The effects of the dilating drops last for 4- 6 hours.  You will be more sensitive to light and vision will be blurry up close.  Mydriatic sunglasses were given if needed.      Wade Larsen O.D.  [facility] [place] [place]  [id number]    [phone]

## 2019-12-02 NOTE — PROGRESS NOTES
Chief Complaint   Patient presents with     Annual Eye Exam      Accompanied by self  Last Eye Exam: 1 year ago  Dilated Previously: Yes    What are you currently using to see?  Glasses- 1+ years old       Distance Vision Acuity: Noticed gradual change in both eyes    Near Vision Acuity: Not satisfied     Eye Comfort: dry and itchy during allergy season  Do you use eye drops? : No  Occupation or Hobbies: unemployed    Rosa Slade, Optometric Tech          Medical, surgical and family histories reviewed and updated 12/2/2019.       OBJECTIVE: See Ophthalmology exam    ASSESSMENT:    ICD-10-CM    1. Encounter for examination of eyes and vision without abnormal findings Z01.00 EYE EXAM (SIMPLE-NONBILLABLE)   2. Hypermetropia, right H52.01 EYE EXAM (SIMPLE-NONBILLABLE)     REFRACTION   3. Presbyopia H52.4 EYE EXAM (SIMPLE-NONBILLABLE)     REFRACTION      PLAN:     Patient Instructions   You have allergies that are affecting your eyes.  This can cause itching and tearing of the eyes.  I recommend ketotifen drops (Alaway or Zaditor) to be used 2x daily in each eye. These drops are available over the counter. Cold compresses can also make things more comfortable.  Try not to rub the eyes.      Velma was advised of today's exam findings.  Optional to fill new glasses prescription, mild change  Copy of glasses Rx provided today.  Return in 1-2 years for eye exam, or sooner if needed.    The effects of the dilating drops last for 4- 6 hours.  You will be more sensitive to light and vision will be blurry up close.  Mydriatic sunglasses were given if needed.      Wade Larsen O.D.  [facility] [place]  [address] [place] [address]  [place]  [id number]    [phone]

## 2019-12-05 ENCOUNTER — OFFICE VISIT (OUTPATIENT)
Dept: FAMILY MEDICINE | Facility: CLINIC | Age: 49
End: 2019-12-05
Payer: COMMERCIAL

## 2019-12-05 VITALS
HEART RATE: 78 BPM | SYSTOLIC BLOOD PRESSURE: 123 MMHG | BODY MASS INDEX: 42.16 KG/M2 | DIASTOLIC BLOOD PRESSURE: 82 MMHG | TEMPERATURE: 98.4 F | OXYGEN SATURATION: 96 % | WEIGHT: 238 LBS

## 2019-12-05 DIAGNOSIS — G60.9 IDIOPATHIC PERIPHERAL NEUROPATHY: ICD-10-CM

## 2019-12-05 DIAGNOSIS — M25.551 HIP PAIN, RIGHT: Primary | ICD-10-CM

## 2019-12-05 DIAGNOSIS — G89.4 CHRONIC PAIN SYNDROME: ICD-10-CM

## 2019-12-05 DIAGNOSIS — M25.521 RIGHT ELBOW PAIN: ICD-10-CM

## 2019-12-05 PROCEDURE — 99214 OFFICE O/P EST MOD 30 MIN: CPT | Performed by: FAMILY MEDICINE

## 2019-12-05 RX ORDER — OXYCODONE AND ACETAMINOPHEN 10; 325 MG/1; MG/1
1 TABLET ORAL EVERY 6 HOURS PRN
Qty: 90 TABLET | Refills: 0 | Status: SHIPPED | OUTPATIENT
Start: 2019-12-05 | End: 2020-01-06

## 2019-12-05 RX ORDER — NORTRIPTYLINE HCL 10 MG
30 CAPSULE ORAL AT BEDTIME
Qty: 90 CAPSULE | Refills: 5 | Status: SHIPPED | OUTPATIENT
Start: 2019-12-05 | End: 2020-04-21

## 2019-12-05 ASSESSMENT — PAIN SCALES - GENERAL: PAINLEVEL: SEVERE PAIN (7)

## 2019-12-05 NOTE — PROGRESS NOTES
Subjective     Velma Diaz is a 49 year old female who presents to clinic today for the following health issues:    HPI   Follow-up on pain issues. More in the right hip. Referral for MRI.  Refill PERCOCET    She continues to have ongoing chronic pain issues.  She is seeing Dr. Boyd of chiropractic medicine weekly now for low back issues.  She is still having ongoing right hip pain.  She was seen in the ER in October for that.  A plain film x-ray was unremarkable.  She is seeing physical therapy and they wondered about having her do an MRI for that.  She is also having ongoing right elbow pain.  She had a brain MRI, cervical MRI, and EMG by neurology to evaluate her right arm symptoms, but those tests were all unremarkable.  She is taking nortriptyline and Lyrica for chronic pain as well as oxycodone and muscle relaxers.  She takes Requip at night to help with sleep.  She expresses a desire to see a different main neurologist, perhaps Dr. Alfaro or Dr. Sanchez, who she has seen in the past.    Patient Active Problem List   Diagnosis     History of cleft palate with cleft lip     MAYA (obstructive sleep apnea)/Hypoventilation Syndrome     Major depressive disorder, recurrent episode, moderate (H)     Paresthesias     Health Care Home     Vitamin D deficiency     Morbid obesity with BMI of 40.0-44.9, adult (H)     Hyperlipidemia with target LDL less than 130     Intervertebral disc prolapse with impingement     Nonallopathic lesion of lumbar region     Chronic pain syndrome     Restless legs syndrome (RLS)     Insomnia     Migraine     Chronic bilateral back pain, unspecified back location     Chronic pain of left knee     ROSE MARIE (generalized anxiety disorder)     Idiopathic peripheral neuropathy     Past Surgical History:   Procedure Laterality Date     ANKLE SURGERY  7/13    Left for torn tendons & loose bone chips     ARTHROSCOPY KNEE  1986    Right knee     BACK SURGERY       Basal cell carcinoma  2011     Removal from the chest     BIOPSY       C VAGINAL HYSTERECTOMY       CARPAL TUNNEL RELEASE RT/LT  ~    Bilateral     COLONOSCOPY  2016     COSMETIC SURGERY       cysto with right ureteroscopy, stone manipulation, double J stent removal  10/04/2018    Dr. Cisneros -- removal of right kidney stone     cysto, right retrograde pyelogram, right ureteral stent Right 2018    for obstructing right kidney stone     DECOMPRESSION CUBITAL TUNNEL Left 2015    Procedure: DECOMPRESSION CUBITAL TUNNEL;  Surgeon: Gus Donato MD;  Location: [facility]     ENT SURGERY      Tonsillectomy and Adenoids     GYN SURGERY      Hysterectomy     HC REPAIR PERONEAL TENDONS       ORTHOPEDIC SURGERY  ,     Bilateral CTR     PE TUBES      yearly times 10 years     REPAIR CLEFT PALATE CHILD      Age 3 months & afterwards (multiple surgeries)     SOFT TISSUE SURGERY         Social History     Tobacco Use     Smoking status: Former Smoker     Packs/day: 0.50     Years: 15.00     Pack years: 7.50     Types: Cigarettes     Last attempt to quit: 2015     Years since quittin.7     Smokeless tobacco: Never Used   Substance Use Topics     Alcohol use: No     Alcohol/week: 0.0 standard drinks     Comment: Quit in      Family History   Problem Relation Age of Onset     Alzheimer Disease Paternal Grandmother 60     Cerebrovascular Disease Paternal Grandmother      Neurologic Disorder Sister 20        migraines     Depression/Anxiety Sister      Depression Sister      Neurologic Disorder Son 14        migraines     Asthma Son      Heart Disease Mother      Osteoporosis Mother      Obesity Mother      Cancer Father      Alcohol/Drug Father      Other Cancer Father         saliva glands & skin CA      Hypertension Father      Diabetes Maternal Grandmother      Breast Cancer Maternal Grandmother      Obesity Maternal Grandmother      Other Cancer Maternal Grandfather         skin cancer      Cancer - colorectal Other         Aunt     Asthma Daughter      Asthma Daughter      Asthma Son      Thyroid Disease Sister      Colon Cancer Other      Obesity Sister      Glaucoma No family hx of      Macular Degeneration No family hx of          Current Outpatient Medications   Medication Sig Dispense Refill     acetaminophen (TYLENOL) 325 MG tablet Take 2 tablets (650 mg) by mouth every 4 hours as needed for other (mild pain) 100 tablet 0     ferrous sulfate (FEROSUL) 325 (65 Fe) MG tablet TAKE 1 TABLET (325 MG) BY MOUTH DAILY (WITH BREAKFAST) 30 tablet 5     furosemide (LASIX) 20 MG tablet TAKE 1 TABLET BY MOUTH EVERY DAY 30 tablet 11     methocarbamol (ROBAXIN) 750 MG tablet TAKE 1 TABLETS (750 MG) BY MOUTH 3 TIMES DAILY AS NEEDED FOR MUSCLE SPASMS 60 tablet 1     Multiple Vitamins-Minerals (MULTI FOR HER PO) Take by mouth daily        nortriptyline (PAMELOR) 10 MG capsule Take 3 capsules (30 mg) by mouth At Bedtime 90 capsule 5     nystatin (MYCOSTATIN) 248779 UNIT/GM external powder Apply to affected area twice daily as needed 60 g 2     ORDER FOR DME Respironics REMSTAR 60 Series Auto ERCS0do H2O, Airfit P10 nasal pillow mask w/xsmall pillows       oxyCODONE-acetaminophen (PERCOCET)  MG per tablet Take 1 tablet by mouth every 6 hours as needed for moderate to severe pain 90 tablet 0     pregabalin (LYRICA) 225 MG capsule TAKE 1 CAPSULE BY MOUTH TWICE A DAY 60 capsule 5     rOPINIRole (REQUIP) 0.25 MG tablet Take 1 tablet (0.25 mg) by mouth 2 times daily 60 tablet 5     sodium chloride (OCEAN) 0.65 % nasal spray Inhale 2 sprays in each nostril twice daily until your follow up appointment.       SUMAtriptan (IMITREX) 100 MG tablet Take 1 tablet (100 mg) by mouth at onset of headache for migraine May repeat in 2 hours if needed: max 2/day 9 tablet 2     No Known Allergies      Reviewed and updated as needed this visit by Provider         Review of Systems   ROS COMP: Constitutional, HEENT,  "cardiovascular, pulmonary, gi and gu systems are negative, except as otherwise noted.      Objective    Wt 108 kg (238 lb)   LMP  (LMP Unknown)   BMI 42.16 kg/m    Body mass index is 42.16 kg/m .   /82 (BP Location: Right arm, Patient Position: Sitting, Cuff Size: Adult Large)   Pulse 78   Temp 98.4  F (36.9  C) (Oral)   Wt 108 kg (238 lb)   LMP  (LMP Unknown)   SpO2 96%   BMI 42.16 kg/m        Physical Exam   GENERAL: alert, no distress and overweight  MS: She has pain overlying the right lateral hip and also pain to palpation into the right groin area.  She has discomfort in the hip with range of motion of the hip, especially flexion and/or external rotation.  Not much pain with internal rotation.  She has some discomfort to palpation of the right elbow, especially over the medial epicondyle.    Diagnostic Test Results:  Labs reviewed in [facility]  Prior chiropractic and neurology notes were reviewed along with EMG results and imaging results        Assessment & Plan       ICD-10-CM    1. Hip pain, right M25.551    2. Right elbow pain M25.521    3. Chronic pain syndrome G89.4 oxyCODONE-acetaminophen (PERCOCET)  MG per tablet   4. Idiopathic peripheral neuropathy G60.9 nortriptyline (PAMELOR) 10 MG capsule        BMI:   Estimated body mass index is 42.16 kg/m  as calculated from the following:    Height as of 10/15/19: 1.6 m (5' 3\").    Weight as of this encounter: 108 kg (238 lb).   Weight management plan: Discussed healthy diet and exercise guidelines    She continues to struggle with a variety of chronic pain issues, as she has done for years  She seems to be on appropriate medications for chronic pain  She has various localizing symptoms currently including her back, right hip, and right elbow  She will continue to see chiropractic for her back  She may have some right greater trochanteric bursitus and/or right medial epicondylitis, but she has other more involved neurologic type symptoms that " would not typically be accounted for by those specific diagnoses  She plans to see a new neurologist, so I suggested she do so and see what, today would have about her various ailments  In the meantime, I will refill her meds as above and encourage her to remain physically active with pool therapy, etc.    Return for a recheck if symptoms worsen or fail to improve.    Srinivasa Hunter MD  [facility]

## 2019-12-16 ENCOUNTER — THERAPY VISIT (OUTPATIENT)
Dept: CHIROPRACTIC MEDICINE | Facility: CLINIC | Age: 49
End: 2019-12-16
Payer: COMMERCIAL

## 2019-12-16 DIAGNOSIS — M62.838 SPASM OF MUSCLE: ICD-10-CM

## 2019-12-16 DIAGNOSIS — M99.03 SEGMENTAL DYSFUNCTION OF LUMBAR REGION: ICD-10-CM

## 2019-12-16 DIAGNOSIS — M99.02 THORACIC SEGMENT DYSFUNCTION: ICD-10-CM

## 2019-12-16 DIAGNOSIS — G89.29 CHRONIC LOW BACK PAIN: ICD-10-CM

## 2019-12-16 DIAGNOSIS — M99.05 SEGMENTAL DYSFUNCTION OF PELVIC REGION: Primary | ICD-10-CM

## 2019-12-16 DIAGNOSIS — M54.50 CHRONIC LOW BACK PAIN: ICD-10-CM

## 2019-12-16 PROCEDURE — 98941 CHIROPRACT MANJ 3-4 REGIONS: CPT | Mod: AT | Performed by: CHIROPRACTOR

## 2019-12-16 PROCEDURE — 97810 ACUP 1/> WO ESTIM 1ST 15 MIN: CPT | Performed by: CHIROPRACTOR

## 2019-12-16 NOTE — PROGRESS NOTES
Visit #:  26    Subjective:  Velma Diaz is a 48 year old female who is seen in f/u up for:        Segmental dysfunction of pelvic region  Lumbago  Segmental dysfunction of lumbar region  Spasm of muscle  Thoracic segment dysfunction.     Since last visit on 12/2/2019,  Velma Diaz reports:    Area of chief complaint:  Lumbar :  Symptoms are graded at 5/10. The quality is described as stiff, and achey.  Motion has improved but not normal.  Patient feels that her low back is stiff and sore.   Mireille reports that her low back is not feeling too bad but she is still having pain and numbness going down both legs.  She will be seeing a new neurologist early next year.      Objective:  The following was observed:    P: palpatory tenderness Piriformis R>>L  A: static palpation demonstrates intersegmental asymmetry , thoracic, lumbar, pelvis  R: motion palpation notes restricted motion,  T5, T6, T7, T10, T11 , T12 , L4 , L5  and PSIS Right   T: hypertonicity at: Piriformis R>>L    Segmental spinal dysfunction/restrictions found at:  T5, T6, T7,  T10, T11 , T12 , L4 , L5  and PSIS Right       Assessment:    Diagnoses:      1. Segmental dysfunction of pelvic region    2. Lumbago    3. Segmental dysfunction of lumbar region    4. Spasm of muscle    5. Thoracic segment dysfunction        Patient's condition:  Patient had restrictions pre-manipulation    Treatment effectiveness:  Post manipulation there is better intersegmental movement and Patient claims to feel looser post manipulation      Procedures:  CMT:  [id number] Chiropractic manipulative treatment 3-4 regions performed   Thoracic: Activator, T5, T6, T7, T10, T11, T12, Prone  Lumbar: Activator, L4, L5, Prone  Pelvis: Drop Table, PSIS Right , Prone    Modalities:  86199: Acupuncture, for 15 minutes:  Points: B25, B27, GV3, K3, B62, SI3  Ahsi point in piriformis  For 15 minutes    Therapeutic procedures:  None    Response to Treatment  Reduction in symptoms as reported by  patient    Prognosis: Good    Progress towards Goals: Patient is making progress towards the goal.     Recommendations:    Instructions:  ice 20 minutes every other hour as needed    Follow-up:    Return to care in one week.

## 2019-12-18 ENCOUNTER — OFFICE VISIT (OUTPATIENT)
Dept: PSYCHOLOGY | Facility: CLINIC | Age: 49
End: 2019-12-18
Payer: COMMERCIAL

## 2019-12-18 DIAGNOSIS — F33.1 MAJOR DEPRESSIVE DISORDER, RECURRENT EPISODE, MODERATE (H): Primary | ICD-10-CM

## 2019-12-18 DIAGNOSIS — F41.1 GAD (GENERALIZED ANXIETY DISORDER): ICD-10-CM

## 2019-12-18 PROCEDURE — 90834 PSYTX W PT 45 MINUTES: CPT | Performed by: SOCIAL WORKER

## 2019-12-18 ASSESSMENT — ANXIETY QUESTIONNAIRES
1. FEELING NERVOUS, ANXIOUS, OR ON EDGE: SEVERAL DAYS
5. BEING SO RESTLESS THAT IT IS HARD TO SIT STILL: NOT AT ALL
7. FEELING AFRAID AS IF SOMETHING AWFUL MIGHT HAPPEN: SEVERAL DAYS
6. BECOMING EASILY ANNOYED OR IRRITABLE: SEVERAL DAYS
2. NOT BEING ABLE TO STOP OR CONTROL WORRYING: SEVERAL DAYS
IF YOU CHECKED OFF ANY PROBLEMS ON THIS QUESTIONNAIRE, HOW DIFFICULT HAVE THESE PROBLEMS MADE IT FOR YOU TO DO YOUR WORK, TAKE CARE OF THINGS AT HOME, OR GET ALONG WITH OTHER PEOPLE: SOMEWHAT DIFFICULT
3. WORRYING TOO MUCH ABOUT DIFFERENT THINGS: NOT AT ALL
GAD7 TOTAL SCORE: 4

## 2019-12-18 ASSESSMENT — PATIENT HEALTH QUESTIONNAIRE - PHQ9
5. POOR APPETITE OR OVEREATING: NOT AT ALL
SUM OF ALL RESPONSES TO PHQ QUESTIONS 1-9: 9
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
## 2019-12-19 ASSESSMENT — ANXIETY QUESTIONNAIRES: GAD7 TOTAL SCORE: 4

## 2019-12-23 ENCOUNTER — THERAPY VISIT (OUTPATIENT)
Dept: CHIROPRACTIC MEDICINE | Facility: CLINIC | Age: 49
End: 2019-12-23
Payer: COMMERCIAL

## 2019-12-23 DIAGNOSIS — M99.02 THORACIC SEGMENT DYSFUNCTION: ICD-10-CM

## 2019-12-23 DIAGNOSIS — M62.838 SPASM OF MUSCLE: ICD-10-CM

## 2019-12-23 DIAGNOSIS — M54.50 CHRONIC LOW BACK PAIN: ICD-10-CM

## 2019-12-23 DIAGNOSIS — M99.05 SEGMENTAL DYSFUNCTION OF PELVIC REGION: Primary | ICD-10-CM

## 2019-12-23 DIAGNOSIS — G89.29 CHRONIC LOW BACK PAIN: ICD-10-CM

## 2019-12-23 DIAGNOSIS — M99.03 SEGMENTAL DYSFUNCTION OF LUMBAR REGION: ICD-10-CM

## 2019-12-23 PROCEDURE — 97810 ACUP 1/> WO ESTIM 1ST 15 MIN: CPT | Mod: GY | Performed by: CHIROPRACTOR

## 2019-12-23 PROCEDURE — 98941 CHIROPRACT MANJ 3-4 REGIONS: CPT | Mod: AT | Performed by: CHIROPRACTOR

## 2019-12-23 NOTE — PROGRESS NOTES
Visit #:  27    Subjective:  Velma Diaz is a 48 year old female who is seen in f/u up for:        Segmental dysfunction of pelvic region  Lumbago  Segmental dysfunction of lumbar region  Spasm of muscle  Thoracic segment dysfunction.     Since last visit on 12/16/2019,  Velma Diaz reports:    Area of chief complaint:  Lumbar :  Symptoms are graded at 5/10. The quality is described as stiff, and achey.  Motion has improved but not normal.  Patient feels that her low back is stiff and sore.   Mireille reports that her low back is feeling better but she is still having pain and numbness going down both legs.       Objective:  The following was observed:    P: palpatory tenderness Piriformis R>>L  A: static palpation demonstrates intersegmental asymmetry , thoracic, lumbar, pelvis  R: motion palpation notes restricted motion,  T5, T6, T7, T10, T11 , T12 , L4 , L5  and PSIS Right   T: hypertonicity at: Piriformis R>>L    Segmental spinal dysfunction/restrictions found at:  T5, T6, T7,  T10, T11 , T12 , L4 , L5  and PSIS Right       Assessment:    Diagnoses:      1. Segmental dysfunction of pelvic region    2. Lumbago    3. Segmental dysfunction of lumbar region    4. Spasm of muscle    5. Thoracic segment dysfunction        Patient's condition:  Patient had restrictions pre-manipulation    Treatment effectiveness:  Post manipulation there is better intersegmental movement and Patient claims to feel looser post manipulation      Procedures:  CMT:  63171 Chiropractic manipulative treatment 3-4 regions performed   Thoracic: Activator, T5, T6, T7, T10, T11, T12, Prone  Lumbar: Activator, L4, L5, Prone  Pelvis: Drop Table, PSIS Right , Prone    Modalities:  37281: Acupuncture, for 15 minutes:  Points: B25, B27, GV3, K3, B62, SI3  Ahsi point in piriformis  For 15 minutes    Therapeutic procedures:  None    Response to Treatment  Reduction in symptoms as reported by patient    Prognosis: Good    Progress towards Goals:  Patient is making progress towards the goal.     Recommendations:    Instructions:  ice 20 minutes every other hour as needed    Follow-up:    Return to care in one week.

## 2019-12-26 ENCOUNTER — TELEPHONE (OUTPATIENT)
Dept: FAMILY MEDICINE | Facility: CLINIC | Age: 49
End: 2019-12-26

## 2019-12-26 DIAGNOSIS — J10.1 INFLUENZA B: Primary | ICD-10-CM

## 2019-12-26 RX ORDER — OSELTAMIVIR PHOSPHATE 75 MG/1
75 CAPSULE ORAL 2 TIMES DAILY
Qty: 10 CAPSULE | Refills: 0 | Status: SHIPPED | OUTPATIENT
Start: 2019-12-26 | End: 2020-04-21

## 2019-12-26 NOTE — TELEPHONE ENCOUNTER
RN called patient and relayed provider's message. Patient verbalized understanding.     Mervat Klein RN, BSN, PHN  [facility]: [place]

## 2019-12-26 NOTE — TELEPHONE ENCOUNTER
Reason for call:  Patient reporting a symptom    Symptom or request:  Patient woke up today and feels like she was run over by a [place] train. Body aches, stuffy nose.  Patients grandson was diagnosed yesterday with Influenza B (at hospital).  Patient is wanting to know if Dr Hunter would prescribe Tamiflu for her. Patient has full custody of grandson and he lives with her.     Duration (how long have symptoms been present):   She has had a cough for a couple of days prior to today waking up feeling really bad.    Have you been treated for this before? No    Additional comments:     Phone Number patient can be reached at:  Home number on file [phone] (home)    Best Time:  any    Can we leave a detailed message on this number:  YES    Call taken on 12/26/2019 at 9:21 AM by Germaine Rivera

## 2020-01-06 ENCOUNTER — MYC REFILL (OUTPATIENT)
Dept: FAMILY MEDICINE | Facility: CLINIC | Age: 50
End: 2020-01-06

## 2020-01-06 ENCOUNTER — THERAPY VISIT (OUTPATIENT)
Dept: CHIROPRACTIC MEDICINE | Facility: CLINIC | Age: 50
End: 2020-01-06
Payer: COMMERCIAL

## 2020-01-06 DIAGNOSIS — M99.02 THORACIC SEGMENT DYSFUNCTION: ICD-10-CM

## 2020-01-06 DIAGNOSIS — G89.29 CHRONIC LOW BACK PAIN: ICD-10-CM

## 2020-01-06 DIAGNOSIS — M62.838 SPASM OF MUSCLE: ICD-10-CM

## 2020-01-06 DIAGNOSIS — M99.03 SEGMENTAL DYSFUNCTION OF LUMBAR REGION: ICD-10-CM

## 2020-01-06 DIAGNOSIS — M54.50 CHRONIC LOW BACK PAIN: ICD-10-CM

## 2020-01-06 DIAGNOSIS — G89.4 CHRONIC PAIN SYNDROME: ICD-10-CM

## 2020-01-06 DIAGNOSIS — M99.05 SEGMENTAL DYSFUNCTION OF PELVIC REGION: Primary | ICD-10-CM

## 2020-01-06 PROCEDURE — 98941 CHIROPRACT MANJ 3-4 REGIONS: CPT | Mod: AT | Performed by: CHIROPRACTOR

## 2020-01-06 PROCEDURE — 97810 ACUP 1/> WO ESTIM 1ST 15 MIN: CPT | Mod: GY | Performed by: CHIROPRACTOR

## 2020-01-06 NOTE — PROGRESS NOTES
Visit #:  28    Subjective:  Velma Diaz is a 48 year old female who is seen in f/u up for:        Segmental dysfunction of pelvic region  Lumbago  Segmental dysfunction of lumbar region  Spasm of muscle  Thoracic segment dysfunction.     Since last visit on 12/23/2019,  Velma Diaz reports:    Area of chief complaint:  Lumbar :  Symptoms are graded at 5/10. The quality is described as stiff, and achey.  Motion has improved but not normal.  Patient feels that her low back is stiff and sore.   Mireille reports that her low back is feeling stiff and sore.  Since her last visit she came down with influenza B.  She reports that she has recovered but is feeling a little tired.      Objective:  The following was observed:    P: palpatory tenderness Piriformis R>>L  A: static palpation demonstrates intersegmental asymmetry , thoracic, lumbar, pelvis  R: motion palpation notes restricted motion,  T5, T6, T7, T10, T11 , T12 , L4 , L5  and PSIS Right   T: hypertonicity at: Piriformis R>>L    Segmental spinal dysfunction/restrictions found at:  T5, T6, T7,  T10, T11 , T12 , L4 , L5  and PSIS Right       Assessment:    Diagnoses:      1. Segmental dysfunction of pelvic region    2. Lumbago    3. Segmental dysfunction of lumbar region    4. Spasm of muscle    5. Thoracic segment dysfunction        Patient's condition:  Patient had restrictions pre-manipulation    Treatment effectiveness:  Post manipulation there is better intersegmental movement and Patient claims to feel looser post manipulation      Procedures:  CMT:  77582 Chiropractic manipulative treatment 3-4 regions performed   Thoracic: Activator, T5, T6, T7, T10, T11, T12, Prone  Lumbar: Activator, L4, L5, Prone  Pelvis: Drop Table, PSIS Right , Prone    Modalities:  52457: Acupuncture, for 15 minutes:  Points: B25, B27, GV3, K3, B62, SI3  Ahsi point in piriformis  For 15 minutes    Therapeutic procedures:  None    Response to Treatment  Reduction in symptoms as  reported by patient    Prognosis: Good    Progress towards Goals: Patient is making progress towards the goal.     Recommendations:    Instructions:  ice 20 minutes every other hour as needed    Follow-up:    Return to care in one week.

## 2020-01-07 DIAGNOSIS — M54.9 CHRONIC BILATERAL BACK PAIN, UNSPECIFIED BACK LOCATION: Chronic | ICD-10-CM

## 2020-01-07 DIAGNOSIS — G89.29 CHRONIC BILATERAL BACK PAIN, UNSPECIFIED BACK LOCATION: Chronic | ICD-10-CM

## 2020-01-07 RX ORDER — METHOCARBAMOL 750 MG/1
TABLET, FILM COATED ORAL
Qty: 60 TABLET | Refills: 1 | Status: SHIPPED | OUTPATIENT
Start: 2020-01-07 | End: 2020-02-17

## 2020-01-07 RX ORDER — OXYCODONE AND ACETAMINOPHEN 10; 325 MG/1; MG/1
1 TABLET ORAL EVERY 6 HOURS PRN
Qty: 90 TABLET | Refills: 0 | Status: SHIPPED | OUTPATIENT
Start: 2020-01-07 | End: 2020-02-04

## 2020-01-07 NOTE — TELEPHONE ENCOUNTER
Requested Prescriptions   Pending Prescriptions Disp Refills     oxyCODONE-acetaminophen (PERCOCET)  MG per tablet 90 tablet 0     Sig: Take 1 tablet by mouth every 6 hours as needed for moderate to severe pain       There is no refill protocol information for this order        Last refill 12/5/19 # 90  Last OV 12/5/19.    Routing refill request to provider for review/approval because:  Drug not on the [facility] refill protocol     Marisela Shea RN,BSN  [facility]

## 2020-01-07 NOTE — TELEPHONE ENCOUNTER
Requested Prescriptions   Pending Prescriptions Disp Refills     methocarbamol (ROBAXIN) 750 MG tablet [Pharmacy Med Name: METHOCARBAMOL 750 MG TABLET] 60 tablet 1     Sig: TAKE 1 TABLETS (750 MG) BY MOUTH 3 TIMES DAILY AS NEEDED FOR MUSCLE SPASMS       There is no refill protocol information for this order         Last Written Prescription Date:  11/29/19  Last Fill Quantity: 60,   # refills: 1  Last Office Visit: 12/5/19  Future Office visit:    Next 5 appointments (look out 90 days)    Jan 16, 2020  9:00 AM CST  Return Visit with Antonio Rios [facility] [address] [place] [id number]  [phone]   Feb 11, 2020  9:30 AM CST  Return Visit with Antonio Rios [facility] ([facility]) [address] [place] [id number]  [phone]           Routing refill request to provider for review/approval because:  Drug not on the [facility], [facility] or [facility] refill protocol or controlled substance

## 2020-01-13 ENCOUNTER — THERAPY VISIT (OUTPATIENT)
Dept: CHIROPRACTIC MEDICINE | Facility: CLINIC | Age: 50
End: 2020-01-13
Payer: COMMERCIAL

## 2020-01-13 DIAGNOSIS — M99.02 THORACIC SEGMENT DYSFUNCTION: ICD-10-CM

## 2020-01-13 DIAGNOSIS — M99.05 SEGMENTAL DYSFUNCTION OF PELVIC REGION: Primary | ICD-10-CM

## 2020-01-13 DIAGNOSIS — M54.50 CHRONIC LOW BACK PAIN: ICD-10-CM

## 2020-01-13 DIAGNOSIS — G89.29 CHRONIC LOW BACK PAIN: ICD-10-CM

## 2020-01-13 DIAGNOSIS — M62.838 SPASM OF MUSCLE: ICD-10-CM

## 2020-01-13 DIAGNOSIS — M99.03 SEGMENTAL DYSFUNCTION OF LUMBAR REGION: ICD-10-CM

## 2020-01-13 PROCEDURE — 97810 ACUP 1/> WO ESTIM 1ST 15 MIN: CPT | Mod: GY | Performed by: CHIROPRACTOR

## 2020-01-13 PROCEDURE — 98941 CHIROPRACT MANJ 3-4 REGIONS: CPT | Mod: AT | Performed by: CHIROPRACTOR

## 2020-01-13 NOTE — PROGRESS NOTES
Visit #:  29    Subjective:  Velma Diaz is a 48 year old female who is seen in f/u up for:        Segmental dysfunction of pelvic region  Lumbago  Segmental dysfunction of lumbar region  Spasm of muscle  Thoracic segment dysfunction.     Since last visit on 1/6/2020,  Velma Diaz reports:    Area of chief complaint:  Lumbar :  Symptoms are graded at 4/10. The quality is described as stiff, and achey.  Motion has improved but not normal.  Patient feels that her low back is stiff and sore.   Mireille reports that her low back is feeling better but is still a little stiff and sore.  She has recovered from the flu and in general is doing better.      Objective:  The following was observed:    P: palpatory tenderness Piriformis R>>L  A: static palpation demonstrates intersegmental asymmetry , thoracic, lumbar, pelvis  R: motion palpation notes restricted motion,  T5, T6, T7, T10, T11 , T12 , L4 , L5  and PSIS Right   T: hypertonicity at: Piriformis R>>L    Segmental spinal dysfunction/restrictions found at:  T5, T6, T7,  T10, T11 , T12 , L4 , L5  and PSIS Right       Assessment:    Diagnoses:      1. Segmental dysfunction of pelvic region    2. Lumbago    3. Segmental dysfunction of lumbar region    4. Spasm of muscle    5. Thoracic segment dysfunction        Patient's condition:  Patient had restrictions pre-manipulation    Treatment effectiveness:  Post manipulation there is better intersegmental movement and Patient claims to feel looser post manipulation      Procedures:  CMT:  96688 Chiropractic manipulative treatment 3-4 regions performed   Thoracic: Activator, T5, T6, T7, T10, T11, T12, Prone  Lumbar: Activator, L4, L5, Prone  Pelvis: Drop Table, PSIS Right , Prone    Modalities:  30891: Acupuncture, for 15 minutes:  Points: B25, B27, GV3, K3, B62, SI3  Ahsi point in piriformis  For 15 minutes    Therapeutic procedures:  None    Response to Treatment  Reduction in symptoms as reported by patient    Prognosis:  Good    Progress towards Goals: Patient is making progress towards the goal.     Recommendations:    Instructions:  ice 20 minutes every other hour as needed    Follow-up:    Return to care in one week.

## 2020-01-15 ENCOUNTER — APPOINTMENT (OUTPATIENT)
Dept: OPTOMETRY | Facility: CLINIC | Age: 50
End: 2020-01-15
Payer: COMMERCIAL

## 2020-01-15 PROCEDURE — 92341 FIT SPECTACLES BIFOCAL: CPT | Performed by: STUDENT IN AN ORGANIZED HEALTH CARE EDUCATION/TRAINING PROGRAM

## 2020-01-16 ENCOUNTER — OFFICE VISIT (OUTPATIENT)
Dept: PSYCHOLOGY | Facility: CLINIC | Age: 50
End: 2020-01-16
Payer: COMMERCIAL

## 2020-01-16 DIAGNOSIS — F41.1 GAD (GENERALIZED ANXIETY DISORDER): ICD-10-CM

## 2020-01-16 DIAGNOSIS — F33.1 MAJOR DEPRESSIVE DISORDER, RECURRENT EPISODE, MODERATE (H): Primary | ICD-10-CM

## 2020-01-16 PROCEDURE — 90834 PSYTX W PT 45 MINUTES: CPT | Performed by: SOCIAL WORKER
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
## 2020-01-20 ENCOUNTER — THERAPY VISIT (OUTPATIENT)
Dept: CHIROPRACTIC MEDICINE | Facility: CLINIC | Age: 50
End: 2020-01-20
Payer: COMMERCIAL

## 2020-01-20 DIAGNOSIS — M99.02 THORACIC SEGMENT DYSFUNCTION: ICD-10-CM

## 2020-01-20 DIAGNOSIS — M99.05 SEGMENTAL DYSFUNCTION OF PELVIC REGION: Primary | ICD-10-CM

## 2020-01-20 DIAGNOSIS — M54.50 CHRONIC LOW BACK PAIN: ICD-10-CM

## 2020-01-20 DIAGNOSIS — G89.29 CHRONIC LOW BACK PAIN: ICD-10-CM

## 2020-01-20 DIAGNOSIS — M99.03 SEGMENTAL DYSFUNCTION OF LUMBAR REGION: ICD-10-CM

## 2020-01-20 DIAGNOSIS — M62.838 SPASM OF MUSCLE: ICD-10-CM

## 2020-01-20 PROCEDURE — 98941 CHIROPRACT MANJ 3-4 REGIONS: CPT | Mod: AT | Performed by: CHIROPRACTOR

## 2020-01-20 PROCEDURE — 97810 ACUP 1/> WO ESTIM 1ST 15 MIN: CPT | Performed by: CHIROPRACTOR

## 2020-01-20 NOTE — PROGRESS NOTES
Visit #: 3 in 2020    Subjective:  Velma Diaz is a 48 year old female who is seen in f/u up for:        Segmental dysfunction of pelvic region  Lumbago  Segmental dysfunction of lumbar region  Spasm of muscle  Thoracic segment dysfunction.     Since last visit on 1/13/2020,  Velma Diaz reports:    Area of chief complaint:  Lumbar :  Symptoms are graded at 6/10. The quality is described as stiff, and achey.  Motion has improved but not normal.  Patient feels that her low back is feeling very stiff and sore.   Mireille reports that her low back was feeling better but she shoveled a minimal amount of snow, which really flared up her low back this past weekend.      Objective:  The following was observed:    P: palpatory tenderness Piriformis R>>L  A: static palpation demonstrates intersegmental asymmetry , thoracic, lumbar, pelvis  R: motion palpation notes restricted motion,  T5, T6, T7, T10, T11 , T12 , L4 , L5  and PSIS Right   T: hypertonicity at: Piriformis R>>L    Segmental spinal dysfunction/restrictions found at:  T5, T6, T7,  T10, T11 , T12 , L4 , L5  and PSIS Right       Assessment:    Diagnoses:      1. Segmental dysfunction of pelvic region    2. Lumbago    3. Segmental dysfunction of lumbar region    4. Spasm of muscle    5. Thoracic segment dysfunction        Patient's condition:  Patient had restrictions pre-manipulation    Treatment effectiveness:  Post manipulation there is better intersegmental movement and Patient claims to feel looser post manipulation      Procedures:  CMT:  44486 Chiropractic manipulative treatment 3-4 regions performed   Thoracic: Activator, T5, T6, T7, T10, T11, T12, Prone  Lumbar: Activator, L4, L5, Prone  Pelvis: Drop Table, PSIS Right , Prone    Modalities:  33223: Acupuncture, for 15 minutes:  Points: B25, B27, GV3, K3, B62, SI3  Ahsi point in piriformis  For 15 minutes    Therapeutic procedures:  None    Response to Treatment  Reduction in symptoms as reported by  patient    Prognosis: Good    Progress towards Goals: Patient is making progress towards the goal.     Recommendations:    Instructions:  ice 20 minutes every other hour as needed    Follow-up:    Return to care in one week.

## 2020-01-21 DIAGNOSIS — G25.81 RESTLESS LEGS SYNDROME (RLS): ICD-10-CM

## 2020-01-21 NOTE — TELEPHONE ENCOUNTER
"Requested Prescriptions   Pending Prescriptions Disp Refills     ferrous sulfate (FEROSUL) 325 (65 Fe) MG tablet [Pharmacy Med Name: FERROUS SULFATE 325 MG TABLET] 30 tablet 5     Sig: TAKE 1 TABLET (325 MG) BY MOUTH DAILY (WITH BREAKFAST)   Last Written Prescription Date:  8/5/19  Last Fill Quantity: 30,  # refills: 5   Last office visit: 12/5/2019 with prescribing provider:     Future Office Visit:   Next 5 appointments (look out 90 days)    Feb 11, 2020  9:30 AM CST  Return Visit with Antonio Rios[address] [address]  [phone]   Mar 18, 2020  9:30 AM CDT  Return Visit with Antonio Rios [facility] ([address] [address]  [phone]             Iron Supplements Passed - 1/21/2020 10:21 AM        Passed - Patient is 12 years of age or older        Passed - Recent (12 mo) or future (30 days) visit within the authorizing provider's specialty     Patient has had an office visit with the authorizing provider or a provider within the authorizing providers department within the previous 12 mos or has a future within next 30 days. See \"Patient Info\" tab in inbasket, or \"Choose Columns\" in Meds & Orders section of the refill encounter.              Passed - Hgb OR Hct on record within the past 12 mos.     Patient need only have had a HGB or HCT on file in the past 12 mos. That result does not need to be normal.    Recent Labs   Lab Test 10/31/19  1002 10/30/18  1040 03/01/18  1419   HGB 13.5 14.1 13.3     Recent Labs   Lab Test 10/31/19  1002 10/30/18  1040 03/01/18  1419   HCT 41.6 41.9 39.5       Please verify a HGB or HCT has been checked SINCE THE LAST DOSE CHANGE.            Passed - Medication is active on med list          "

## 2020-01-22 RX ORDER — FERROUS SULFATE 325(65) MG
325 TABLET ORAL
Qty: 30 TABLET | Refills: 5 | Status: SHIPPED | OUTPATIENT
Start: 2020-01-22 | End: 2020-07-07

## 2020-01-22 NOTE — TELEPHONE ENCOUNTER
Prescription approved per [facility] Refill Protocol.    Mervat Klein, RN, BSN, PHN  [facility] [place]

## 2020-01-27 ENCOUNTER — THERAPY VISIT (OUTPATIENT)
Dept: CHIROPRACTIC MEDICINE | Facility: CLINIC | Age: 50
End: 2020-01-27
Payer: COMMERCIAL

## 2020-01-27 DIAGNOSIS — M62.838 SPASM OF MUSCLE: ICD-10-CM

## 2020-01-27 DIAGNOSIS — G89.29 CHRONIC LOW BACK PAIN: ICD-10-CM

## 2020-01-27 DIAGNOSIS — M54.50 CHRONIC LOW BACK PAIN: ICD-10-CM

## 2020-01-27 DIAGNOSIS — M99.03 SEGMENTAL DYSFUNCTION OF LUMBAR REGION: ICD-10-CM

## 2020-01-27 DIAGNOSIS — M99.05 SEGMENTAL DYSFUNCTION OF PELVIC REGION: Primary | ICD-10-CM

## 2020-01-27 DIAGNOSIS — M99.02 THORACIC SEGMENT DYSFUNCTION: ICD-10-CM

## 2020-01-27 PROCEDURE — 97810 ACUP 1/> WO ESTIM 1ST 15 MIN: CPT | Mod: GY | Performed by: CHIROPRACTOR

## 2020-01-27 PROCEDURE — 98941 CHIROPRACT MANJ 3-4 REGIONS: CPT | Mod: AT | Performed by: CHIROPRACTOR

## 2020-01-27 NOTE — PROGRESS NOTES
Visit #: 4 in 2020    Subjective:  Velma Diaz is a 48 year old female who is seen in f/u up for:        Segmental dysfunction of pelvic region  Lumbago  Segmental dysfunction of lumbar region  Spasm of muscle  Thoracic segment dysfunction.     Since last visit on 1/20/2020,  Velma Diaz reports:    Area of chief complaint:  Lumbar :  Symptoms are graded at 4/10. The quality is described as stiff, and achey.  Motion has improved but not normal.  Patient feels that her low back is feeling very stiff and sore but slightly improved.  Mireille does report that she is having an different feeling in her toes.  She will be seeing a neurologist in 2 days.     Objective:  The following was observed:    P: palpatory tenderness Piriformis R>>L  A: static palpation demonstrates intersegmental asymmetry , thoracic, lumbar, pelvis  R: motion palpation notes restricted motion,  T5, T6, T7, T10, T11 , T12 , L4 , L5  and PSIS Right   T: hypertonicity at: Piriformis R>>L    Segmental spinal dysfunction/restrictions found at:  T5, T6, T7,  T10, T11 , T12 , L4 , L5  and PSIS Right       Assessment:    Diagnoses:      1. Segmental dysfunction of pelvic region    2. Lumbago    3. Segmental dysfunction of lumbar region    4. Spasm of muscle    5. Thoracic segment dysfunction        Patient's condition:  Patient had restrictions pre-manipulation    Treatment effectiveness:  Post manipulation there is better intersegmental movement and Patient claims to feel looser post manipulation      Procedures:  CMT:  09517 Chiropractic manipulative treatment 3-4 regions performed   Thoracic: Activator, T5, T6, T7, T10, T11, T12, Prone  Lumbar: Activator, L4, L5, Prone  Pelvis: Drop Table, PSIS Right , Prone    Modalities:  77604: Acupuncture, for 15 minutes:  Points: B25, B27, GV3, K3, B62, SI3  Ahsi point in piriformis  For 15 minutes    Therapeutic procedures:  None    Response to Treatment  Reduction in symptoms as reported by  patient    Prognosis: Good    Progress towards Goals: Patient is making progress towards the goal.     Recommendations:    Instructions:  ice 20 minutes every other hour as needed    Follow-up:    Return to care in one week.

## 2020-01-29 ENCOUNTER — OFFICE VISIT (OUTPATIENT)
Dept: NEUROLOGY | Facility: CLINIC | Age: 50
End: 2020-01-29

## 2020-01-29 VITALS
DIASTOLIC BLOOD PRESSURE: 85 MMHG | OXYGEN SATURATION: 94 % | BODY MASS INDEX: 42.9 KG/M2 | HEART RATE: 90 BPM | WEIGHT: 242.2 LBS | SYSTOLIC BLOOD PRESSURE: 138 MMHG

## 2020-01-29 DIAGNOSIS — G62.9 SMALL FIBER NEUROPATHY: ICD-10-CM

## 2020-01-29 DIAGNOSIS — G62.9 SMALL FIBER NEUROPATHY: Primary | ICD-10-CM

## 2020-01-29 LAB
CRP SERPL-MCNC: <2.9 MG/L (ref 0–8)
ENA SS-A IGG SER IA-ACNC: <0.2 AI (ref 0–0.9)
ENA SS-B IGG SER IA-ACNC: <0.2 AI (ref 0–0.9)
HCV AB SERPL QL IA: NONREACTIVE
MISCELLANEOUS TEST: NORMAL

## 2020-01-29 ASSESSMENT — ENCOUNTER SYMPTOMS
PANIC: 0
HEADACHES: 1
MUSCLE CRAMPS: 1
HEMOPTYSIS: 0
HEMATURIA: 0
DECREASED APPETITE: 1
FLANK PAIN: 0
DISTURBANCES IN COORDINATION: 1
WHEEZING: 0
LEG PAIN: 1
LOSS OF CONSCIOUSNESS: 0
DYSURIA: 0
DEPRESSION: 1
HALLUCINATIONS: 0
TROUBLE SWALLOWING: 0
STIFFNESS: 1
COUGH: 1
SINUS CONGESTION: 1
LIGHT-HEADEDNESS: 1
NECK PAIN: 1
COUGH DISTURBING SLEEP: 0
EXERCISE INTOLERANCE: 0
FATIGUE: 1
SORE THROAT: 1
HYPERTENSION: 0
HYPOTENSION: 0
DIFFICULTY URINATING: 1
NECK MASS: 0
ORTHOPNEA: 0
SHORTNESS OF BREATH: 0
TASTE DISTURBANCE: 0
SMELL DISTURBANCE: 1
TREMORS: 0
SNORES LOUDLY: 1
POLYPHAGIA: 0
NERVOUS/ANXIOUS: 1
MUSCLE WEAKNESS: 1
INCREASED ENERGY: 1
SPEECH CHANGE: 0
FEVER: 0
WEIGHT LOSS: 0
DIZZINESS: 1
POSTURAL DYSPNEA: 0
JOINT SWELLING: 1
BACK PAIN: 1
SINUS PAIN: 1
SLEEP DISTURBANCES DUE TO BREATHING: 0
CHILLS: 1
DECREASED CONCENTRATION: 1
MEMORY LOSS: 1
INSOMNIA: 1
MYALGIAS: 1
SPUTUM PRODUCTION: 0
TINGLING: 1
SEIZURES: 0
POLYDIPSIA: 0
PALPITATIONS: 0
WEAKNESS: 1
ALTERED TEMPERATURE REGULATION: 1
DYSPNEA ON EXERTION: 1
NIGHT SWEATS: 1
ARTHRALGIAS: 1
HOARSE VOICE: 1
SYNCOPE: 0
WEIGHT GAIN: 0
PARALYSIS: 0
NUMBNESS: 1

## 2020-01-29 ASSESSMENT — PAIN SCALES - GENERAL: PAINLEVEL: SEVERE PAIN (6)

## 2020-01-29 NOTE — PROGRESS NOTES
Service Date: 2020      MD Joaquín Rodriguez MD      RE: Velma Diaz   MRN: [id number]   : 1970      Dear Doctors:      I had the pleasure to see Velma Diaz at the [facility].  I have seen her a few times in the EMG lab, performing studies requested by Dr. Burger and other neurologists in the past 3 years, but I have not seen her in clinic since 2015. At that time I evaluated her for ulnar neuropathy at the elbow at the request of her neurologist, Dr. Hicks. Mireille had then mentioned a long standing history of neuropathy at her feet that began after her pregnancy in , so it has been there for 18 years.  It is characterized by burning, tingling and numbness sensations, which are gradually progressing.  When I saw her in , she was feeling abnormal from her toes to the level of the mid-calf of the right, and a little lower on the left leg. Over the past 5 years, those dysesthesias have progressed and are felt up to the hip joint bilaterally. She recently began experiencing numb and burning sensation in the entire right arm, but not the left. She also has some tingling on the right side of her face.  The sensory symptoms seem to be constant, often waking her up at night.   No good explanation has been found to date.  The unilateral right upper extremity symptoms recently raised concerns about cervical radiculopathy or brachial plexopathy, yet her cervical spine MRI done in 2019, which I reviewed, did not provide any satisfactory explanation for those symptoms and an EMG done by Dr. Alfaro on 10/02/2019 showed no evidence of right-sided cervical radiculopathy, brachial plexopathy or focal neuropathy.  The patient has undergone carpal tunnel surgery release twice.  Her right arm symptoms do not seem to be positional and they are not affected by turning the head right or left or upside down.  They sometimes are aggravated by sneezing  "which provokes a whole body \"jolt\" sensation transiently.     Of note, she has had numerous EMG studies over the past 3 years.  One done in 03/2019 for the burning pain in the lower extremities was completely normal with no evidence of large fiber polyneuropathy or lumbosacral radiculopathy.  In terms of serologic workup for neuropathy causes, she had some by Dr. Hicks and Dr. Burger over the past years including the following:  Angiotensin converting enzyme, vitamin B12 levels, vitamin B1, TSH and repeated hemoglobin A1c's.  Those were all negative or normal.  She also has negative HIV and HTLV serologies in 2018 and 2014 respectively.      PAST MEDICAL HISTORY, ALLERGIES, FAMILY HISTORY, SOCIAL HISTORY, REVIEW OF SYSTEMS, CURRENT MEDICATIONS: As outlined in Epic record.      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  She does have a family history of similar neuropathy affecting her sister, but no other family member. The patient does not use illicit drugs and currently does not smoke.  She has no history of cancer, radiation or chemotherapy.  She is not diabetic.  She has not had recent bariatric surgery. She reports intermittent joint pains, night sweat, and occasional lip sores, but otherwise an extensive review of constitutional and rheumatologic symptoms is negative. Her neuropathic pain is currently treated with pregabalin 225 bid, nortriptyline 30 at bedtime, and oxycodone 4 times a day. She has not previously tried Cymbalta, tramadol, or topical lidocaine.     PHYSICAL EXAMINATION:   VITAL SIGNS:  Blood pressure is 138/85.  Pulse 90 and regular.  O2 sat 94% on room air.  Weight is 109.9 kg.  She endorsed severe pain, 6/10, in her upper legs.   NEUROLOGIC:  She is awake, alert, oriented x3.  Cranial nerves II-XII are normal with the exception of some dysesthesia on the right V1 and V2 " "distribution of trigeminal nerve without necessarily sensory loss. Strength is 5/5 in upper and lower extremities proximally and distally.  I did not detect any focal weakness, atrophy or fasciculations.  Her reflexes are 2+ in biceps and brachioradialis, trace in the triceps (she is quite obese) and 1+ at the lower extremities.  She needs Jendrassik maneuver to get them, but both knee and ankle jerks are obtainable with the facilitation maneuvers.  Toes are downgoing.  Vibration is abnormal.  It is felt for about 6-7 seconds at the right great toe, 4 on the left, about 6-7 seconds at both medial malleoli, 10 at the right index finger, 11 at the left.  Position sensation appeared normal in the toes.  Pinprick is clearly abnormal to the level of the knees bilaterally.  It is also abnormal in the entire right arm where there is decreased sensation to sharp and touch compared to the left.  This sensation is more preserved on the left side.  Finger-to-nose is done without dysmetria.  She was able to rise from a chair without problems.  She is a bit hesitant with tandem gait but she can take 5 steps without interruptions and she does not fall.  Romberg is equivocal or negative.      IMPRESSION:  Small fiber neuropathy, cause unknown.      I spent about 30 minutes with Ms. Diaz of which more than half was counseling.  I am quite confident clinically that she has a small fiber neuropathy as her sensory exam is consistent with this diagnosis. Recent electrodiagnostic studies have not shown any large fiber involvement, I really wonder whether her persistent right arm paresthesias over the past year are explained by this, since no alternative explanation was apparent following MRI imaging of the cervical spine and EMG.    Since the SFN began at a young age (32, after her pregnancy), I think she merits a more aggressive investigation before labeling it as \"idiopathic neuropathy\". First, I will request a skin biopsy from two " sites to confirm the diagnosis. Second, I ordered numerous labs today to evaluate for connective tissue disorders, infectious diseases such as Lyme and hepatitis C, celiac disease, and genetic testing through the [facility] Comprehensive Neuropathy Panel (given her positive family history) as well as TS-HDS and FGFR3 antibodies. I did not order paraneoplastic antibodies because her time course is too indolent/slow for a paraneoplastic disorder, in my opinion.     Managing her neuropathic pain will not be easy.  She is already on pregabalin, nortriptyline, and narcotics. In those cases I usually refer my patients to the Pain Clinic and that is what I am going to do.      I told her that if we cannot find a definite explanation following extensive serologic investigations, then I would not plan to continue following her in the Neuromuscular Clinic. Idiopathic small fiber neuropathy is not a life threatening disorder and all that can be offered is reassurance and symptomatic management of pain, which is something a General Neurologist, together with a Pain Clinic, should be able to do.      Sincerely,       Mekhi Torres MD      cc:   Srinivasa Hunter MD   [facility]    [address] [place] [address]      Joaquín Burger MD   [facility]    [address] [place] [address]         MEKHI TORRES MD             D: 2020   T: 2020   MT: RIANA      Name:     ARVIN NASH   MRN:      [id number]        Account:      [id number]   :      1970           Service Date: 2020      Document: [id number]

## 2020-01-29 NOTE — LETTER
2020       RE: Velma Diaz  [address]  [place] [address]     Dear Colleague,    Thank you for referring your patient, Velma Diaz, to the [facility] at [facility]. Please see a copy of my visit note below.    Service Date: 2020      MD Joaquín Rodriguez MD      RE: Velma Diaz   MRN: [id number]   : 1970      Dear Doctors:      I had the pleasure to see Velma Diaz at the [facility].  I have seen her a few times in the EMG lab, performing studies requested by Dr. Burger and other neurologists in the past 3 years, but I have not seen her in clinic since 2015. At that time I evaluated her for ulnar neuropathy at the elbow at the request of her neurologist, Dr. Hicks. Mireille had then mentioned a long standing history of neuropathy at her feet that began after her pregnancy in , so it has been there for 18 years.  It is characterized by burning, tingling and numbness sensations, which are gradually progressing.  When I saw her in , she was feeling abnormal from her toes to the level of the mid-calf of the right, and a little lower on the left leg. Over the past 5 years, those dysesthesias have progressed and are felt up to the hip joint bilaterally. She recently began experiencing numb and burning sensation in the entire right arm, but not the left. She also has some tingling on the right side of her face.  The sensory symptoms seem to be constant, often waking her up at night.   No good explanation has been found to date.  The unilateral right upper extremity symptoms recently raised concerns about cervical radiculopathy or brachial plexopathy, yet her cervical spine MRI done in 2019, which I reviewed, did not provide any satisfactory explanation for those symptoms and an EMG done by Dr. Alfaro on 10/02/2019 showed no evidence of right-sided cervical radiculopathy,  "brachial plexopathy or focal neuropathy.  The patient has undergone carpal tunnel surgery release twice.  Her right arm symptoms do not seem to be positional and they are not affected by turning the head right or left or upside down.  They sometimes are aggravated by sneezing which provokes a whole body \"jolt\" sensation transiently.     Of note, she has had numerous EMG studies over the past 3 years.  One done in 03/2019 for the burning pain in the lower extremities was completely normal with no evidence of large fiber polyneuropathy or lumbosacral radiculopathy.  In terms of serologic workup for neuropathy causes, she had some by Dr. Hicks and Dr. Burger over the past years including the following:  Angiotensin converting enzyme, vitamin B12 levels, vitamin B1, TSH and repeated hemoglobin A1c's.  Those were all negative or normal.  She also has negative HIV and HTLV serologies in 2018 and 2014 respectively.      PAST MEDICAL HISTORY, ALLERGIES, FAMILY HISTORY, SOCIAL HISTORY, REVIEW OF SYSTEMS, CURRENT MEDICATIONS: As outlined in Epic record.      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  She does have a family history of similar neuropathy affecting her sister, but no other family member. The patient does not use illicit drugs and currently does not smoke.  She has no history of cancer, radiation or chemotherapy.  She is not diabetic.  She has not had recent bariatric surgery. She reports intermittent joint pains, night sweat, and occasional lip sores, but otherwise an extensive review of constitutional and rheumatologic symptoms is negative. Her neuropathic pain is currently treated with pregabalin 225 bid, nortriptyline 30 at bedtime, and oxycodone 4 times a day. She has not previously tried Cymbalta, tramadol, or topical lidocaine.     PHYSICAL EXAMINATION:   VITAL SIGNS:  Blood pressure is " 138/85.  Pulse 90 and regular.  O2 sat 94% on room air.  Weight is 109.9 kg.  She endorsed severe pain, 6/10, in her upper legs.   NEUROLOGIC:  She is awake, alert, oriented x3.  Cranial nerves II-XII are normal with the exception of some dysesthesia on the right V1 and V2 distribution of trigeminal nerve without necessarily sensory loss. Strength is 5/5 in upper and lower extremities proximally and distally.  I did not detect any focal weakness, atrophy or fasciculations.  Her reflexes are 2+ in biceps and brachioradialis, trace in the triceps (she is quite obese) and 1+ at the lower extremities.  She needs Jendrassik maneuver to get them, but both knee and ankle jerks are obtainable with the facilitation maneuvers.  Toes are downgoing.  Vibration is abnormal.  It is felt for about 6-7 seconds at the right great toe, 4 on the left, about 6-7 seconds at both medial malleoli, 10 at the right index finger, 11 at the left.  Position sensation appeared normal in the toes.  Pinprick is clearly abnormal to the level of the knees bilaterally.  It is also abnormal in the entire right arm where there is decreased sensation to sharp and touch compared to the left.  This sensation is more preserved on the left side.  Finger-to-nose is done without dysmetria.  She was able to rise from a chair without problems.  She is a bit hesitant with tandem gait but she can take 5 steps without interruptions and she does not fall.  Romberg is equivocal or negative.      IMPRESSION:  Small fiber neuropathy, cause unknown.      I spent about 30 minutes with Ms. Diaz of which more than half was counseling.  I am quite confident clinically that she has a small fiber neuropathy as her sensory exam is consistent with this diagnosis. Recent electrodiagnostic studies have not shown any large fiber involvement, I really wonder whether her persistent right arm paresthesias over the past year are explained by this, since no alternative explanation  "was apparent following MRI imaging of the cervical spine and EMG.    Since the SFN began at a young age (32, after her pregnancy), I think she merits a more aggressive investigation before labeling it as \"idiopathic neuropathy\". First, I will request a skin biopsy from two sites to confirm the diagnosis. Second, I ordered numerous labs today to evaluate for connective tissue disorders, infectious diseases such as Lyme and hepatitis C, celiac disease, and genetic testing through the [facility] Comprehensive Neuropathy Panel (given her positive family history) as well as TS-HDS and FGFR3 antibodies. I did not order paraneoplastic antibodies because her time course is too indolent/slow for a paraneoplastic disorder, in my opinion.     Managing her neuropathic pain will not be easy.  She is already on pregabalin, nortriptyline, and narcotics. In those cases I usually refer my patients to the Pain Clinic and that is what I am going to do.      I told her that if we cannot find a definite explanation following extensive serologic investigations, then I would not plan to continue following her in the Neuromuscular Clinic. Idiopathic small fiber neuropathy is not a life threatening disorder and all that can be offered is reassurance and symptomatic management of pain, which is something a General Neurologist, together with a Pain Clinic, should be able to do.      Sincerely,       Mekhi Sanchez MD      cc:   Srinivasa Hunter MD   [facility]    [address] [place] [address]      Joaquín Burger MD   [facility]    [address] [place] [address]      D: 2020   T: 2020   MT: RIANA      Name:     ARVIN NASH   MRN:      [id number]        Account:      [id number]   :      1970           Service Date: 2020      Document: [id number]        "

## 2020-01-29 NOTE — NURSING NOTE
Chief Complaint   Patient presents with     RECHECK     [facility] RETURN - FOLLOW UP       Harsha Camacho, EMT

## 2020-01-30 LAB
ANA SER QL IF: NEGATIVE
ANCA AB PATTERN SER IF-IMP: NORMAL
B BURGDOR IGG+IGM SER QL: 0.04 (ref 0–0.89)
C-ANCA TITR SER IF: NORMAL {TITER}
GLIADIN IGA SER-ACNC: 2 U/ML
GLIADIN IGG SER-ACNC: <1 U/ML
RHEUMATOID FACT SER NEPH-ACNC: <20 IU/ML (ref 0–20)
TTG IGA SER-ACNC: 1 U/ML
TTG IGG SER-ACNC: <1 U/ML

## 2020-02-03 ENCOUNTER — THERAPY VISIT (OUTPATIENT)
Dept: CHIROPRACTIC MEDICINE | Facility: CLINIC | Age: 50
End: 2020-02-03
Payer: COMMERCIAL

## 2020-02-03 DIAGNOSIS — M54.50 CHRONIC LOW BACK PAIN: ICD-10-CM

## 2020-02-03 DIAGNOSIS — G89.29 CHRONIC LOW BACK PAIN: ICD-10-CM

## 2020-02-03 DIAGNOSIS — M62.838 SPASM OF MUSCLE: ICD-10-CM

## 2020-02-03 DIAGNOSIS — M99.03 SEGMENTAL DYSFUNCTION OF LUMBAR REGION: ICD-10-CM

## 2020-02-03 DIAGNOSIS — M99.05 SEGMENTAL DYSFUNCTION OF PELVIC REGION: Primary | ICD-10-CM

## 2020-02-03 LAB
C4 SERPL-MCNC: 43 MG/DL (ref 13–39)
IGA SERPL-MCNC: 302 MG/DL (ref 84–499)
IGG SERPL-MCNC: 958 MG/DL (ref 610–1616)
IGM SERPL-MCNC: 103 MG/DL (ref 35–242)
PROT PATTERN SERPL IFE-IMP: NORMAL

## 2020-02-03 PROCEDURE — 98941 CHIROPRACT MANJ 3-4 REGIONS: CPT | Mod: AT | Performed by: CHIROPRACTOR

## 2020-02-03 PROCEDURE — 97810 ACUP 1/> WO ESTIM 1ST 15 MIN: CPT | Performed by: CHIROPRACTOR

## 2020-02-03 NOTE — PROGRESS NOTES
Visit #: 4 in 2020    Subjective:  Velma Diaz is a 48 year old female who is seen in f/u up for:        Segmental dysfunction of pelvic region  Lumbago  Segmental dysfunction of lumbar region  Spasm of muscle  Thoracic segment dysfunction.     Since last visit on 1/20/2020,  Velma Diaz reports:    Area of chief complaint:  Lumbar :  Symptoms are graded at 4/10. The quality is described as stiff, and achey.  Motion has improved but not normal.  Patient feels that her low back is feeling very stiff and sore but slightly improved.  Mireille does report that she has been to her neurologist and is under going some testing .  [facility] has also [facility].    Objective:  The following was observed:    P: palpatory tenderness Piriformis R>>L  A: static palpation demonstrates intersegmental asymmetry , thoracic, lumbar, pelvis  R: motion palpation notes restricted motion,  T5, T6, T7, T10, T11 , T12 , L4 , L5  and PSIS Right   T: hypertonicity at: Piriformis R>>L    Segmental spinal dysfunction/restrictions found at:  T5, T6, T7,  T10, T11 , T12 , L4 , L5  and PSIS Right       Assessment:    Diagnoses:      1. Segmental dysfunction of pelvic region    2. Lumbago    3. Segmental dysfunction of lumbar region    4. Spasm of muscle    5. Thoracic segment dysfunction        Patient's condition:  Patient had restrictions pre-manipulation    Treatment effectiveness:  Post manipulation there is better intersegmental movement and Patient claims to feel looser post manipulation      Procedures:  CMT:  43178 Chiropractic manipulative treatment 3-4 regions performed   Thoracic: Activator, T5, T6, T7, T10, T11, T12, Prone  Lumbar: Activator, L4, L5, Prone  Pelvis: Drop Table, PSIS Right , Prone    Modalities:  03882: Acupuncture, for 15 minutes:  Points: B25, B27, GV3, K3, B62, SI3  Ahsi point in piriformis  For 15 minutes    Therapeutic procedures:  None    Response to Treatment  Reduction in symptoms as  reported by patient    Prognosis: Good    Progress towards Goals: Patient is making progress towards the goal.     Recommendations:    Instructions:  ice 20 minutes every other hour as needed    Follow-up:    Return to care in one week.

## 2020-02-04 ENCOUNTER — MYC REFILL (OUTPATIENT)
Dept: FAMILY MEDICINE | Facility: CLINIC | Age: 50
End: 2020-02-04

## 2020-02-04 DIAGNOSIS — G89.4 CHRONIC PAIN SYNDROME: ICD-10-CM

## 2020-02-04 LAB — CRYOGLOB SER QL: NEGATIVE %

## 2020-02-05 RX ORDER — OXYCODONE AND ACETAMINOPHEN 10; 325 MG/1; MG/1
1 TABLET ORAL EVERY 6 HOURS PRN
Qty: 90 TABLET | Refills: 0 | Status: SHIPPED | OUTPATIENT
Start: 2020-02-05 | End: 2020-03-04

## 2020-02-05 NOTE — TELEPHONE ENCOUNTER
Requested Prescriptions   Pending Prescriptions Disp Refills     oxyCODONE-acetaminophen (PERCOCET)  MG per tablet 90 tablet 0     Sig: Take 1 tablet by mouth every 6 hours as needed for moderate to severe pain       There is no refill protocol information for this order        Last refill 1/7/20 # 90  Last OV 12/5/19    Routing refill request to provider for review/approval because:  Drug not on the [facility] refill protocol

## 2020-02-10 ENCOUNTER — THERAPY VISIT (OUTPATIENT)
Dept: CHIROPRACTIC MEDICINE | Facility: CLINIC | Age: 50
End: 2020-02-10
Payer: COMMERCIAL

## 2020-02-10 DIAGNOSIS — M99.03 SEGMENTAL DYSFUNCTION OF LUMBAR REGION: ICD-10-CM

## 2020-02-10 DIAGNOSIS — M99.05 SEGMENTAL DYSFUNCTION OF PELVIC REGION: Primary | ICD-10-CM

## 2020-02-10 DIAGNOSIS — G89.29 CHRONIC LOW BACK PAIN: ICD-10-CM

## 2020-02-10 DIAGNOSIS — M99.02 THORACIC SEGMENT DYSFUNCTION: ICD-10-CM

## 2020-02-10 DIAGNOSIS — M54.50 CHRONIC LOW BACK PAIN: ICD-10-CM

## 2020-02-10 DIAGNOSIS — M62.838 SPASM OF MUSCLE: ICD-10-CM

## 2020-02-10 PROCEDURE — 97810 ACUP 1/> WO ESTIM 1ST 15 MIN: CPT | Mod: GY | Performed by: CHIROPRACTOR

## 2020-02-10 PROCEDURE — 98941 CHIROPRACT MANJ 3-4 REGIONS: CPT | Mod: AT | Performed by: CHIROPRACTOR

## 2020-02-10 NOTE — PROGRESS NOTES
Visit #: 5 in 2020    Subjective:  Velma Diaz is a 48 year old female who is seen in f/u up for:        Segmental dysfunction of pelvic region  Lumbago  Segmental dysfunction of lumbar region  Spasm of muscle  Thoracic segment dysfunction.     Since last visit on 2/3/2020,  Velma Diaz reports:    Area of chief complaint:  Lumbar :  Symptoms are graded at 7/10. The quality is described as stiff, and achey.  Motion has improved until a couple of days ago when she started having a lot of back spasms and pain in her low back but not normal. She has not done anything out of the ordinary.  She feels that her low back has flared up this past weekend.    Objective:  The following was observed:    P: palpatory tenderness Piriformis R>>L  A: static palpation demonstrates intersegmental asymmetry , thoracic, lumbar, pelvis  R: motion palpation notes restricted motion,  T5, T6, T7, T10, T11 , T12 , L4 , L5  and PSIS Right   T: hypertonicity at: Piriformis R>>L    Segmental spinal dysfunction/restrictions found at:  T5, T6, T7,  T10, T11 , T12 , L4 , L5  and PSIS Right       Assessment:    Diagnoses:      1. Segmental dysfunction of pelvic region    2. Lumbago    3. Segmental dysfunction of lumbar region    4. Spasm of muscle    5. Thoracic segment dysfunction        Patient's condition:  Patient had restrictions pre-manipulation    Treatment effectiveness:  Post manipulation there is better intersegmental movement and Patient claims to feel looser post manipulation      Procedures:  CMT:  10279 Chiropractic manipulative treatment 3-4 regions performed   Thoracic: Activator, T5, T6, T7, T10, T11, T12, Prone  Lumbar: Activator, L4, L5, Prone  Pelvis: Drop Table, PSIS Right , Prone    Modalities:  93800: Acupuncture, for 15 minutes:  Points: B25, B27, GV3, K3, B62, SI3  Ahsi point in piriformis  For 15 minutes    Therapeutic procedures:  None    Response to Treatment  Reduction in symptoms as reported by  patient    Prognosis: Good    Progress towards Goals: Patient is making progress towards the goal.     Recommendations:    Instructions:  ice 20 minutes every other hour as needed    Follow-up:    Return to care in one week.

## 2020-02-11 ENCOUNTER — OFFICE VISIT (OUTPATIENT)
Dept: PSYCHOLOGY | Facility: CLINIC | Age: 50
End: 2020-02-11
Payer: COMMERCIAL

## 2020-02-11 DIAGNOSIS — F33.1 MAJOR DEPRESSIVE DISORDER, RECURRENT EPISODE, MODERATE (H): Primary | ICD-10-CM

## 2020-02-11 DIAGNOSIS — F41.1 GAD (GENERALIZED ANXIETY DISORDER): ICD-10-CM

## 2020-02-11 PROCEDURE — 90834 PSYTX W PT 45 MINUTES: CPT | Performed by: SOCIAL WORKER

## 2020-02-11 ASSESSMENT — ANXIETY QUESTIONNAIRES
IF YOU CHECKED OFF ANY PROBLEMS ON THIS QUESTIONNAIRE, HOW DIFFICULT HAVE THESE PROBLEMS MADE IT FOR YOU TO DO YOUR WORK, TAKE CARE OF THINGS AT HOME, OR GET ALONG WITH OTHER PEOPLE: SOMEWHAT DIFFICULT
3. WORRYING TOO MUCH ABOUT DIFFERENT THINGS: SEVERAL DAYS
5. BEING SO RESTLESS THAT IT IS HARD TO SIT STILL: NOT AT ALL
GAD7 TOTAL SCORE: 7
7. FEELING AFRAID AS IF SOMETHING AWFUL MIGHT HAPPEN: SEVERAL DAYS
2. NOT BEING ABLE TO STOP OR CONTROL WORRYING: SEVERAL DAYS
1. FEELING NERVOUS, ANXIOUS, OR ON EDGE: SEVERAL DAYS
6. BECOMING EASILY ANNOYED OR IRRITABLE: MORE THAN HALF THE DAYS

## 2020-02-11 ASSESSMENT — PATIENT HEALTH QUESTIONNAIRE - PHQ9
SUM OF ALL RESPONSES TO PHQ QUESTIONS 1-9: 11
5. POOR APPETITE OR OVEREATING: SEVERAL DAYS
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
## 2020-02-12 ASSESSMENT — ANXIETY QUESTIONNAIRES: GAD7 TOTAL SCORE: 7

## 2020-02-17 ENCOUNTER — THERAPY VISIT (OUTPATIENT)
Dept: CHIROPRACTIC MEDICINE | Facility: CLINIC | Age: 50
End: 2020-02-17
Payer: COMMERCIAL

## 2020-02-17 DIAGNOSIS — M99.02 THORACIC SEGMENT DYSFUNCTION: ICD-10-CM

## 2020-02-17 DIAGNOSIS — M54.16 LUMBAR BACK PAIN WITH RADICULOPATHY AFFECTING RIGHT LOWER EXTREMITY: ICD-10-CM

## 2020-02-17 DIAGNOSIS — M99.03 SEGMENTAL DYSFUNCTION OF LUMBAR REGION: ICD-10-CM

## 2020-02-17 DIAGNOSIS — M62.830 LUMBAR PARASPINAL MUSCLE SPASM: ICD-10-CM

## 2020-02-17 DIAGNOSIS — M99.05 SEGMENTAL DYSFUNCTION OF PELVIC REGION: Primary | ICD-10-CM

## 2020-02-17 PROCEDURE — 97810 ACUP 1/> WO ESTIM 1ST 15 MIN: CPT | Performed by: CHIROPRACTOR

## 2020-02-17 PROCEDURE — 98941 CHIROPRACT MANJ 3-4 REGIONS: CPT | Mod: AT | Performed by: CHIROPRACTOR

## 2020-02-17 NOTE — PROGRESS NOTES
Visit #: 5 in 2020    Subjective:  Velma Diaz is a 48 year old female who is seen in f/u up for:        Segmental dysfunction of pelvic region  Lumbago  Segmental dysfunction of lumbar region  Spasm of muscle  Thoracic segment dysfunction.     Since last visit on 2/10/2020,  Velma Diaz reports:    Area of chief complaint:  Lumbar :  Symptoms are graded at 7/10. The quality is described as stiff, and achey.  Motion has improved but not normal.  She is feeling less pain and is able to walk around a little more comfortably.  She is still having difficultly sleeping.  She is able to 2-3 hours before waking up in pain.  Prior to this episode she was able to sleep 4-5 hours comfortably.     Objective:  The following was observed:    P: palpatory tenderness Piriformis R>>L  A: static palpation demonstrates intersegmental asymmetry , thoracic, lumbar, pelvis  R: motion palpation notes restricted motion,  T10, T11 , T12 , L4 , L5  and PSIS Right   T: hypertonicity at: Piriformis R>>L    Segmental spinal dysfunction/restrictions found at:   T10, T11 , T12 , L4 , L5  and PSIS Right       Assessment:    Diagnoses:      1. Segmental dysfunction of pelvic region    2. Lumbago    3. Segmental dysfunction of lumbar region    4. Spasm of muscle    5. Thoracic segment dysfunction        Patient's condition:  Patient had restrictions pre-manipulation    Treatment effectiveness:  Post manipulation there is better intersegmental movement and Patient claims to feel looser post manipulation      Procedures:  CMT:  01848 Chiropractic manipulative treatment 3-4 regions performed   Thoracic: Activator, T10, T11, T12, Prone  Lumbar: Activator, L4, L5, Prone  Pelvis: Drop Table, PSIS Right , Prone    Modalities:  81029: Acupuncture, for 15 minutes:  Points: B25, B27, GV3, K3, B62, SI3  Ahsi point in piriformis  For 15 minutes    Therapeutic procedures:  None    Response to Treatment  Reduction in symptoms as reported by  patient    Prognosis: Good    Progress towards Goals: Patient is making progress towards the goal.     Recommendations:    Instructions:  ice 20 minutes every other hour as needed    Follow-up:    Return to care in one week.

## 2020-02-25 LAB — LAB SCANNED RESULT: ABNORMAL

## 2020-03-02 ENCOUNTER — THERAPY VISIT (OUTPATIENT)
Dept: CHIROPRACTIC MEDICINE | Facility: CLINIC | Age: 50
End: 2020-03-02
Payer: COMMERCIAL

## 2020-03-02 DIAGNOSIS — M62.830 LUMBAR PARASPINAL MUSCLE SPASM: ICD-10-CM

## 2020-03-02 DIAGNOSIS — M99.03 SEGMENTAL DYSFUNCTION OF LUMBAR REGION: ICD-10-CM

## 2020-03-02 DIAGNOSIS — G89.29 CHRONIC LOW BACK PAIN: ICD-10-CM

## 2020-03-02 DIAGNOSIS — M54.50 CHRONIC LOW BACK PAIN: ICD-10-CM

## 2020-03-02 DIAGNOSIS — M99.02 THORACIC SEGMENT DYSFUNCTION: ICD-10-CM

## 2020-03-02 DIAGNOSIS — M99.05 SEGMENTAL DYSFUNCTION OF PELVIC REGION: Primary | ICD-10-CM

## 2020-03-02 PROCEDURE — 97810 ACUP 1/> WO ESTIM 1ST 15 MIN: CPT | Performed by: CHIROPRACTOR

## 2020-03-02 PROCEDURE — 98941 CHIROPRACT MANJ 3-4 REGIONS: CPT | Mod: AT | Performed by: CHIROPRACTOR

## 2020-03-02 NOTE — PROGRESS NOTES
Visit #: 6 in 2020    Subjective:  Velma Diaz is a 48 year old female who is seen in f/u up for:        Segmental dysfunction of pelvic region  Lumbago  Segmental dysfunction of lumbar region  Spasm of muscle  Thoracic segment dysfunction.     Since last visit on 2/17/2020,  Velma Diaz reports:    Area of chief complaint:  Lumbar :  Symptoms are graded at 9/10. The quality is described as stiff, and achey.  Motion has decreased.  She is  In a lot of pain today.  She moved this past weekend that that has really flared up her back.  She is having some intense pain in her low back.     Objective:  The following was observed:    P: palpatory tenderness Piriformis R>>L  A: static palpation demonstrates intersegmental asymmetry , thoracic, lumbar, pelvis  R: motion palpation notes restricted motion,  T10, T11 , T12 , L4 , L5  and PSIS Right   T: hypertonicity at: Piriformis R>>L    Segmental spinal dysfunction/restrictions found at:   T10, T11 , T12 , L4 , L5  and PSIS Right       Assessment:    Diagnoses:      1. Segmental dysfunction of pelvic region    2. Lumbago    3. Segmental dysfunction of lumbar region    4. Spasm of muscle    5. Thoracic segment dysfunction        Patient's condition:  Patient had restrictions pre-manipulation    Treatment effectiveness:  Post manipulation there is better intersegmental movement and Patient claims to feel looser post manipulation      Procedures:  CMT:  95185 Chiropractic manipulative treatment 3-4 regions performed   Thoracic: Activator, T10, T11, T12, Prone  Lumbar: Activator, L4, L5, Prone  Pelvis: Activator, PSIS Right , Prone    Modalities:  27134: Acupuncture, for 15 minutes:  Points: B25, B27, GV3, K3, B62, SI3  Ahsi point in piriformis  For 15 minutes    Therapeutic procedures:  None    Response to Treatment  Reduction in symptoms as reported by patient    Prognosis: Good    Progress towards Goals: Patient is making progress towards the  goal.     Recommendations:    Instructions:  ice 20 minutes every other hour as needed    Follow-up:    Return to care in one week.

## 2020-03-03 ENCOUNTER — TELEPHONE (OUTPATIENT)
Dept: NEUROLOGY | Facility: CLINIC | Age: 50
End: 2020-03-03

## 2020-03-03 ENCOUNTER — OFFICE VISIT (OUTPATIENT)
Dept: NEUROLOGY | Facility: CLINIC | Age: 50
End: 2020-03-03
Attending: PSYCHIATRY & NEUROLOGY

## 2020-03-03 DIAGNOSIS — G62.9 SMALL FIBER NEUROPATHY: ICD-10-CM

## 2020-03-03 NOTE — PROGRESS NOTES
Procedure note: Skin biopsy. Indication: sensory disturbance, skin. After obtaining informed consent, 3 mm PUNCH skin biopsies were performed over the extensor digitorum brevis muscle of the right foot, and the right medial thigh, about 10 cm proximally to the medial knee joint. 1% lidocaine anesthesia and betadine sterile prep were used. The patient tolerated the procedure well. Wound care and patient education were provided by Kristy Behling, REEGT. After completion of the procedure, both the assistant and the performing physician verified the presence of one skin punch in each of the two vials submitted to the Lab. Mekhi Sanchez MD

## 2020-03-03 NOTE — LETTER
3/3/2020       RE: Velma Diaz  [address]  [place] [address]     Dear Colleague,    Thank you for referring your patient, Velma Diaz, to the [facility]. Please see a copy of my visit note below.    Procedure note: Skin biopsy. Indication: sensory disturbance, skin. After obtaining informed consent, 3 mm PUNCH skin biopsies were performed over the extensor digitorum brevis muscle of the right foot, and the right medial thigh, about 10 cm proximally to the medial knee joint. 1% lidocaine anesthesia and betadine sterile prep were used. The patient tolerated the procedure well. Wound care and patient education were provided by Kristy Behling, REEGT. After completion of the procedure, both the assistant and the performing physician verified the presence of one skin punch in each of the two vials submitted to the Lab. Mekhi Sanchez MD

## 2020-03-04 ENCOUNTER — MYC REFILL (OUTPATIENT)
Dept: FAMILY MEDICINE | Facility: CLINIC | Age: 50
End: 2020-03-04

## 2020-03-04 DIAGNOSIS — G89.4 CHRONIC PAIN SYNDROME: ICD-10-CM

## 2020-03-05 RX ORDER — OXYCODONE AND ACETAMINOPHEN 10; 325 MG/1; MG/1
1 TABLET ORAL EVERY 6 HOURS PRN
Qty: 90 TABLET | Refills: 0 | Status: SHIPPED | OUTPATIENT
Start: 2020-03-05 | End: 2020-04-06

## 2020-03-05 NOTE — TELEPHONE ENCOUNTER
Requested Prescriptions   Pending Prescriptions Disp Refills     oxyCODONE-acetaminophen (PERCOCET)  MG per tablet 90 tablet 0     Sig: Take 1 tablet by mouth every 6 hours as needed for moderate to severe pain       There is no refill protocol information for this order        Last Written Prescription Date:  2/5/2020  Last Fill Quantity: 90,  # refills: 0   Last office visit: 12/5/2019 with prescribing provider:  0   Future Office Visit:   Next 5 appointments (look out 90 days)    Mar 18, 2020  9:30 AM CDT  Return Visit with SERVANDO Yan  [facility] [address] [id number] [phone]         Routing refill request to provider for review/approval because:  Drug not on the [facility] refill protocol       Ly Sandoval RN  [facility]

## 2020-03-09 ENCOUNTER — THERAPY VISIT (OUTPATIENT)
Dept: CHIROPRACTIC MEDICINE | Facility: CLINIC | Age: 50
End: 2020-03-09
Payer: COMMERCIAL

## 2020-03-09 DIAGNOSIS — G89.29 CHRONIC LOW BACK PAIN: ICD-10-CM

## 2020-03-09 DIAGNOSIS — M62.838 SPASM OF MUSCLE: ICD-10-CM

## 2020-03-09 DIAGNOSIS — M99.02 SEGMENTAL DYSFUNCTION OF THORACIC REGION: ICD-10-CM

## 2020-03-09 DIAGNOSIS — M99.05 SEGMENTAL DYSFUNCTION OF PELVIC REGION: Primary | ICD-10-CM

## 2020-03-09 DIAGNOSIS — M99.03 SEGMENTAL DYSFUNCTION OF LUMBAR REGION: ICD-10-CM

## 2020-03-09 DIAGNOSIS — M54.50 CHRONIC LOW BACK PAIN: ICD-10-CM

## 2020-03-09 PROCEDURE — 97810 ACUP 1/> WO ESTIM 1ST 15 MIN: CPT | Performed by: CHIROPRACTOR

## 2020-03-09 PROCEDURE — 98941 CHIROPRACT MANJ 3-4 REGIONS: CPT | Mod: AT | Performed by: CHIROPRACTOR

## 2020-03-09 NOTE — PROGRESS NOTES
Visit #: 7 in 2020    Subjective:  Velma Diaz is a 48 year old female who is seen in f/u up for:        Segmental dysfunction of pelvic region  Lumbago  Segmental dysfunction of lumbar region  Spasm of muscle  Thoracic segment dysfunction.     Since last visit on 3/2/2020,  Velma Diaz reports:    Area of chief complaint:  Lumbar :  Symptoms are graded at 6/10. The quality is described as stiff, and achey.  Motion has increased but not normal.  Mireille reports that she is still having some lo back pain but the pain has reduced.  She is able to do more.  She continues to unpack and set up her home from the move but overall feels that she is improving.    Objective:  The following was observed:    P: palpatory tenderness Piriformis R>>L  A: static palpation demonstrates intersegmental asymmetry , thoracic, lumbar, pelvis  R: motion palpation notes restricted motion,  T10, T11 , T12 , L4 , L5  and PSIS Right   T: hypertonicity at: Piriformis R>>L    Segmental spinal dysfunction/restrictions found at:   T10, T11 , T12 , L4 , L5  and PSIS Right       Assessment:    Diagnoses:      1. Segmental dysfunction of pelvic region    2. Lumbago    3. Segmental dysfunction of lumbar region    4. Spasm of muscle    5. Thoracic segment dysfunction        Patient's condition:  Patient had restrictions pre-manipulation    Treatment effectiveness:  Post manipulation there is better intersegmental movement and Patient claims to feel looser post manipulation      Procedures:  CMT:  64751 Chiropractic manipulative treatment 3-4 regions performed   Thoracic: Activator, T10, T11, T12, Prone  Lumbar: Activator, L4, L5, Prone  Pelvis: Activator, PSIS Right , Prone    Modalities:  81020: Acupuncture, for 15 minutes:  Points: B25, B27, GV3, K3, B62, SI3  Ahsi point in piriformis  For 15 minutes    Therapeutic procedures:  None    Response to Treatment  Reduction in symptoms as reported by patient    Prognosis: Good    Progress towards  Goals: Patient is making progress towards the goal.     Recommendations:    Instructions:  ice 20 minutes every other hour as needed    Follow-up:    Return to care in one week.

## 2020-03-16 ENCOUNTER — THERAPY VISIT (OUTPATIENT)
Dept: CHIROPRACTIC MEDICINE | Facility: CLINIC | Age: 50
End: 2020-03-16
Payer: COMMERCIAL

## 2020-03-16 DIAGNOSIS — M62.838 SPASM OF MUSCLE: ICD-10-CM

## 2020-03-16 DIAGNOSIS — M99.05 SEGMENTAL DYSFUNCTION OF PELVIC REGION: Primary | ICD-10-CM

## 2020-03-16 DIAGNOSIS — M54.50 CHRONIC LOW BACK PAIN: ICD-10-CM

## 2020-03-16 DIAGNOSIS — G89.29 CHRONIC LOW BACK PAIN: ICD-10-CM

## 2020-03-16 DIAGNOSIS — M99.03 SEGMENTAL DYSFUNCTION OF LUMBAR REGION: ICD-10-CM

## 2020-03-16 DIAGNOSIS — M99.02 THORACIC SEGMENT DYSFUNCTION: ICD-10-CM

## 2020-03-16 PROCEDURE — 97810 ACUP 1/> WO ESTIM 1ST 15 MIN: CPT | Performed by: CHIROPRACTOR

## 2020-03-16 PROCEDURE — 98941 CHIROPRACT MANJ 3-4 REGIONS: CPT | Mod: AT | Performed by: CHIROPRACTOR

## 2020-03-16 NOTE — PROGRESS NOTES
Visit #: 8 in 2020    Subjective:  Velma Diaz is a 48 year old female who is seen in f/u up for:        Segmental dysfunction of pelvic region  Lumbago  Segmental dysfunction of lumbar region  Spasm of muscle  Thoracic segment dysfunction.     Since last visit on 3/9/2020,  Velma Diaz reports:    Area of chief complaint:  Lumbar :  Symptoms are graded at 4/10. The quality is described as stiff, and achey.  Motion has increased but not normal.  Mireille reports that she is still having some low back pain but the pain has reduced.  She is able to do more.  She is having little to no radicular symptoms and is mainly feeling low back pain    Objective:  The following was observed:    P: palpatory tenderness Piriformis R>>L  A: static palpation demonstrates intersegmental asymmetry , thoracic, lumbar, pelvis  R: motion palpation notes restricted motion,  T10, T11 , T12 , L4 , L5  and PSIS Right   T: hypertonicity at: Piriformis R>>L    Segmental spinal dysfunction/restrictions found at:   T10, T11 , T12 , L4 , L5  and PSIS Right       Assessment:    Diagnoses:      1. Segmental dysfunction of pelvic region    2. Lumbago    3. Segmental dysfunction of lumbar region    4. Spasm of muscle    5. Thoracic segment dysfunction        Patient's condition:  Patient had restrictions pre-manipulation    Treatment effectiveness:  Post manipulation there is better intersegmental movement and Patient claims to feel looser post manipulation      Procedures:  CMT:  40332 Chiropractic manipulative treatment 3-4 regions performed   Thoracic: Activator, T10, T11, T12, Prone  Lumbar: Activator, L4, L5, Prone  Pelvis: Activator, PSIS Right , Prone    Modalities:  92039: Acupuncture, for 15 minutes:  Points: B25, B27, GV3, K3, B62, SI3  Ahsi point in piriformis  For 15 minutes    Therapeutic procedures:  None    Response to Treatment  Reduction in symptoms as reported by patient    Prognosis: Good    Progress towards Goals: Patient is  making progress towards the goal.     Recommendations:    Instructions:  ice 20 minutes every other hour as needed    Follow-up:    Return to care in one week.

## 2020-03-24 ENCOUNTER — THERAPY VISIT (OUTPATIENT)
Dept: PSYCHOLOGY | Facility: CLINIC | Age: 50
End: 2020-03-24

## 2020-03-24 ENCOUNTER — OFFICE VISIT (OUTPATIENT)
Dept: PSYCHOLOGY | Facility: CLINIC | Age: 50
End: 2020-03-24
Payer: COMMERCIAL

## 2020-03-24 DIAGNOSIS — F33.1 MAJOR DEPRESSIVE DISORDER, RECURRENT EPISODE, MODERATE (H): Primary | ICD-10-CM

## 2020-03-24 DIAGNOSIS — F41.1 GAD (GENERALIZED ANXIETY DISORDER): ICD-10-CM

## 2020-03-24 PROCEDURE — 98967 PH1 ASSMT&MGMT NQHP 11-20: CPT | Performed by: SOCIAL WORKER

## 2020-03-27 LAB — LAB SCANNED RESULT: NORMAL

## 2020-04-06 ENCOUNTER — MYC REFILL (OUTPATIENT)
Dept: FAMILY MEDICINE | Facility: CLINIC | Age: 50
End: 2020-04-06

## 2020-04-06 DIAGNOSIS — G89.4 CHRONIC PAIN SYNDROME: ICD-10-CM

## 2020-04-08 RX ORDER — OXYCODONE AND ACETAMINOPHEN 10; 325 MG/1; MG/1
1 TABLET ORAL EVERY 6 HOURS PRN
Qty: 90 TABLET | Refills: 0 | Status: SHIPPED | OUTPATIENT
Start: 2020-04-08 | End: 2020-05-04

## 2020-04-08 NOTE — TELEPHONE ENCOUNTER
Requested Prescriptions   Pending Prescriptions Disp Refills     oxyCODONE-acetaminophen (PERCOCET)  MG per tablet 90 tablet 0     Sig: Take 1 tablet by mouth every 6 hours as needed for moderate to severe pain       There is no refill protocol information for this order        Last refill 3/5/20  Last OV 12/5/19    Routing refill request to provider for review/approval because:  Drug not on the [facility] refill protocol   Marisela Shea RN,BSN  [facility]

## 2020-04-16 DIAGNOSIS — G89.29 CHRONIC BILATERAL BACK PAIN, UNSPECIFIED BACK LOCATION: Chronic | ICD-10-CM

## 2020-04-16 DIAGNOSIS — M54.9 CHRONIC BILATERAL BACK PAIN, UNSPECIFIED BACK LOCATION: Chronic | ICD-10-CM

## 2020-04-16 NOTE — TELEPHONE ENCOUNTER
Requested Prescriptions   Pending Prescriptions Disp Refills     methocarbamol (ROBAXIN) 750 MG tablet 60 tablet 1       There is no refill protocol information for this order   Last Written Prescription Date:  2-  Last Fill Quantity: 60,  # refills: 1   Last office visit: 12/5/2019 with prescribing provider:     Future Office Visit:

## 2020-04-20 ENCOUNTER — MYC MEDICAL ADVICE (OUTPATIENT)
Dept: FAMILY MEDICINE | Facility: CLINIC | Age: 50
End: 2020-04-20

## 2020-04-20 RX ORDER — METHOCARBAMOL 750 MG/1
TABLET, FILM COATED ORAL
Qty: 60 TABLET | Refills: 1 | Status: SHIPPED | OUTPATIENT
Start: 2020-04-20 | End: 2020-05-26

## 2020-04-20 NOTE — TELEPHONE ENCOUNTER
Routed message back to patient, if she can send photos via [employer], will plan to schedule phone visit.    Judi Yeh RN  [facility]

## 2020-04-20 NOTE — TELEPHONE ENCOUNTER
Attempted to call patient at home/mobile number, left message on voicemail; patient was instructed to return call to [facility] RN directly on the RN call back line at [phone]   Judi Yeh RN  [facility]

## 2020-04-21 ENCOUNTER — VIRTUAL VISIT (OUTPATIENT)
Dept: FAMILY MEDICINE | Facility: CLINIC | Age: 50
End: 2020-04-21
Payer: COMMERCIAL

## 2020-04-21 DIAGNOSIS — G60.9 IDIOPATHIC PERIPHERAL NEUROPATHY: ICD-10-CM

## 2020-04-21 DIAGNOSIS — G89.4 CHRONIC PAIN SYNDROME: ICD-10-CM

## 2020-04-21 DIAGNOSIS — G25.81 RESTLESS LEGS SYNDROME (RLS): ICD-10-CM

## 2020-04-21 DIAGNOSIS — R21 RASH AND NONSPECIFIC SKIN ERUPTION: Primary | ICD-10-CM

## 2020-04-21 PROCEDURE — 99441 ZZC PHYSICIAN TELEPHONE EVALUATION 5-10 MIN: CPT | Performed by: FAMILY MEDICINE

## 2020-04-21 RX ORDER — NORTRIPTYLINE HCL 10 MG
30 CAPSULE ORAL AT BEDTIME
Qty: 90 CAPSULE | Refills: 5 | Status: SHIPPED | OUTPATIENT
Start: 2020-04-21 | End: 2020-11-11

## 2020-04-21 RX ORDER — ROPINIROLE 0.25 MG/1
0.25 TABLET, FILM COATED ORAL 2 TIMES DAILY
Qty: 60 TABLET | Refills: 5 | Status: SHIPPED | OUTPATIENT
Start: 2020-04-21 | End: 2020-11-03

## 2020-04-21 RX ORDER — PREGABALIN 225 MG/1
CAPSULE ORAL
Qty: 60 CAPSULE | Refills: 5 | Status: SHIPPED | OUTPATIENT
Start: 2020-04-21 | End: 2020-11-11 | Stop reason: ALTCHOICE

## 2020-04-21 NOTE — PROGRESS NOTES
"Velma Diaz is a 49 year old female who is being evaluated via a billable telephone visit.      The patient has been notified of following:     \"This telephone visit will be conducted via a call between you and your physician/provider. We have found that certain health care needs can be provided without the need for a physical exam.  This service lets us provide the care you need with a short phone conversation.  If a prescription is necessary we can send it directly to your pharmacy.  If lab work is needed we can place an order for that and you can then stop by our lab to have the test done at a later time.    Telephone visits are billed at different rates depending on your insurance coverage. During this emergency period, for some insurers they may be billed the same as an in-person visit.  Please reach out to your insurance provider with any questions.    If during the course of the call the physician/provider feels a telephone visit is not appropriate, you will not be charged for this service.\"    Patient has given verbal consent for Telephone visit?  Yes    How would you like to obtain your AVS?     Subjective     Velma Diaz is a 49 year old female who presents to clinic today for the following health issues:    HPI  Bumps on her 3rd and 4th toes left foot. 4th toe is swollen. No drainage. Pain when touched putting a sock on.     In the last couple of weeks she has noticed a few red bumps on the toes of her left foot.  They are not itchy or especially tender, although they are somewhat sensitive when she touches them when she puts her sock on.  There has been no drainage.  No red streaks or fever or other obvious signs of infection.    Patient Active Problem List   Diagnosis     History of cleft palate with cleft lip     MAYA (obstructive sleep apnea)/Hypoventilation Syndrome     Major depressive disorder, recurrent episode, moderate (H)     Paresthesias     Health Care Home     Vitamin D deficiency     " Morbid obesity with BMI of 40.0-44.9, adult (H)     Hyperlipidemia with target LDL less than 130     Intervertebral disc prolapse with impingement     Nonallopathic lesion of lumbar region     Chronic pain syndrome     Restless legs syndrome (RLS)     Insomnia     Migraine     Chronic bilateral back pain, unspecified back location     Chronic pain of left knee     ROSE MARIE (generalized anxiety disorder)     Idiopathic peripheral neuropathy     Past Surgical History:   Procedure Laterality Date     ANKLE SURGERY      Left for torn tendons & loose bone chips     ARTHROSCOPY KNEE      Right knee     BACK SURGERY       Basal cell carcinoma      Removal from the chest     BIOPSY       C VAGINAL HYSTERECTOMY       CARPAL TUNNEL RELEASE RT/LT  ~    Bilateral     COLONOSCOPY  2016     COSMETIC SURGERY       cysto with right ureteroscopy, stone manipulation, double J stent removal  10/04/2018    Dr. Cisneros -- removal of right kidney stone     cysto, right retrograde pyelogram, right ureteral stent Right 2018    for obstructing right kidney stone     DECOMPRESSION CUBITAL TUNNEL Left 2015    Procedure: DECOMPRESSION CUBITAL TUNNEL;  Surgeon: Gus Donato MD;  [facility]     ENT SURGERY      Tonsillectomy and Adenoids     GYN SURGERY      Hysterectomy     HC REPAIR PERONEAL TENDONS       ORTHOPEDIC SURGERY  2011    Bilateral CTR     PE TUBES      yearly times 10 years     REPAIR CLEFT PALATE CHILD      Age 3 months & afterwards (multiple surgeries)     SOFT TISSUE SURGERY         Social History     Tobacco Use     Smoking status: Former Smoker     Packs/day: 0.50     Years: 15.00     Pack years: 7.50     Types: Cigarettes     Last attempt to quit: 2015     Years since quittin.1     Smokeless tobacco: Never Used   Substance Use Topics     Alcohol use: No     Alcohol/week: 0.0 standard drinks     Comment: Quit in      Family History   Problem  Relation Age of Onset     Alzheimer Disease Paternal Grandmother 60     Cerebrovascular Disease Paternal Grandmother      Neurologic Disorder Sister 20        migraines     Depression/Anxiety Sister      Depression Sister      Neurologic Disorder Son 14        migraines     Asthma Son      Heart Disease Mother      Osteoporosis Mother      Obesity Mother      Cancer Father      Alcohol/Drug Father      Other Cancer Father         saliva glands & skin CA      Hypertension Father      Diabetes Maternal Grandmother      Breast Cancer Maternal Grandmother      Obesity Maternal Grandmother      Other Cancer Maternal Grandfather         skin cancer     Cancer - colorectal Other         Aunt     Asthma Daughter      Asthma Daughter      Asthma Son      Thyroid Disease Sister      Colon Cancer Other      Obesity Sister      Glaucoma No family hx of      Macular Degeneration No family hx of          Current Outpatient Medications   Medication Sig Dispense Refill     acetaminophen (TYLENOL) 325 MG tablet Take 2 tablets (650 mg) by mouth every 4 hours as needed for other (mild pain) 100 tablet 0     Cyanocobalamin (B-12 DOTS SL) Place under the tongue daily       ferrous sulfate (FEROSUL) 325 (65 Fe) MG tablet TAKE 1 TABLET (325 MG) BY MOUTH DAILY (WITH BREAKFAST) 30 tablet 5     furosemide (LASIX) 20 MG tablet TAKE 1 TABLET BY MOUTH EVERY DAY 30 tablet 11     methocarbamol (ROBAXIN) 750 MG tablet TAKE 1 TABLETS (750 MG) BY MOUTH 3 TIMES DAILY AS NEEDED FOR MUSCLE SPASMS 60 tablet 1     Multiple Vitamins-Minerals (MULTI FOR HER PO) Take by mouth daily        nortriptyline (PAMELOR) 10 MG capsule Take 3 capsules (30 mg) by mouth At Bedtime 90 capsule 5     nystatin (MYCOSTATIN) 296945 UNIT/GM external powder Apply to affected area twice daily as needed 60 g 2     ORDER FOR DME Respironics REMSTAR 60 Series Auto [id number] H2O, Airfit P10 nasal pillow mask w/xsmall pillows       oxyCODONE-acetaminophen (PERCOCET)  MG per  "tablet Take 1 tablet by mouth every 6 hours as needed for moderate to severe pain 90 tablet 0     pregabalin (LYRICA) 225 MG capsule TAKE 1 CAPSULE BY MOUTH TWICE A DAY 60 capsule 5     rOPINIRole (REQUIP) 0.25 MG tablet Take 1 tablet (0.25 mg) by mouth 2 times daily 60 tablet 5     sodium chloride (OCEAN) 0.65 % nasal spray Inhale 2 sprays in each nostril twice daily until your follow up appointment.       SUMAtriptan (IMITREX) 100 MG tablet Take 1 tablet (100 mg) by mouth at onset of headache for migraine May repeat in 2 hours if needed: max 2/day 9 tablet 2     No Known Allergies    Reviewed and updated as needed this visit by Provider         Review of Systems   ROS COMP: Constitutional, HEENT, cardiovascular, pulmonary, gi and gu systems are negative, except as otherwise noted.       Objective   Reported vitals:  LMP  (LMP Unknown)      This visit is being conducted \"virtually\" via telephone rather than \"in person\" because of the current nationwide COVID-19 pandemic and the desire to limit potential exposures to illness for patients.      Diagnostic Test Results:  none         Assessment/Plan:    ICD-10-CM    1. Rash and nonspecific skin eruption  R21    2. Chronic pain syndrome  G89.4 pregabalin (LYRICA) 225 MG capsule   3. Restless legs syndrome (RLS)  G25.81 rOPINIRole (REQUIP) 0.25 MG tablet   4. Idiopathic peripheral neuropathy  G60.9 nortriptyline (PAMELOR) 10 MG capsule     I suspect this represents a dyshidrotic dermatitis  I suggested using some cortisone cream or perhaps soaking her feet in warm soapy water periodically  Wear white cotton socks and avoid dark socks and dark shoes which may cause the feet to sweat excessively    She also needs some medication refills and those were provided for her as above      Return for a recheck if symptoms worsen or fail to improve.      Phone call duration:  8 minutes    Srinivasa Hunter MD      "

## 2020-04-27 ENCOUNTER — PATIENT OUTREACH (OUTPATIENT)
Dept: CARE COORDINATION | Facility: CLINIC | Age: 50
End: 2020-04-27

## 2020-04-27 DIAGNOSIS — E78.5 HYPERLIPIDEMIA: Primary | ICD-10-CM

## 2020-04-27 NOTE — PROGRESS NOTES
Clinic Care Coordination Contact  [facility]/Voicemail    Clinical Data: Care Coordinator Outreach  Outreach attempted x 1.  Left message on patient's voicemail with call back information and requested return call. CHW will try to reach patient again in 1-2 business days.    Scripting   Hello, my name is Kathy and I am a Community Health Worker with [facility].  I work within the clinic care coordination team alongside Dr. Hunter at the [facility]. This is a courtesy call, I'm just checking in with you during the COVID-19 pandemic to ensure you have all of the support and resources you need at this time.     Do you have any questions you would like to talk to a nurse or  about right now? Happy to set up a call with them    Assessment:   Referral Reason:  Payor request for Proactive Outreach due to COVID-19 risk.  Patient Concerns, if any:    Reason for Referral: Payor request for Proactive Outreach due to COVID-19 risk   Additional pertinent details: See above

## 2020-04-27 NOTE — PROGRESS NOTES
Clinic Care Coordination Contact  Care Team Conversations      Proactive Outreach.  SW placed Care Coordination order for this patient for CHW to call     LUDY Stark, WILEY   [facility]  Care Coordination  [facility]   [phone]  4/27/2020 9:09 AM

## 2020-04-28 ENCOUNTER — MYC MEDICAL ADVICE (OUTPATIENT)
Dept: FAMILY MEDICINE | Facility: CLINIC | Age: 50
End: 2020-04-28

## 2020-04-29 NOTE — PROGRESS NOTES
Clinic Care Coordination Contact  Community Health Worker Initial Outreach    Patient accepts CC: No, patient has no questions at this time.     The Clinic Community Health Worker spoke with the patient today at the request of the PCP to discuss possible Clinic Care Coordination enrollment. The service was described to the patient and immediate needs were discussed. The patient declined enrollment at this time. The PCP is encouraged to refer in the future if the patient's needs change.    Scripting   Hello, my name is Kathy and I am a Community Health Worker with [facility].  I work within the clinic care coordination team alongside Dr. Hunter at the [facility].  I am calling to check in with you during the COVID-19 pandemic to ensure you have all of the support and resources you need at this time.     CHW spoke with patient today. Patient reported she had all the support and resources she needs at this time and had no questions for a nurse or  right now. CHW told patient if she ever has questions for a nurse or  she can call the clinic and we would be happy to set up a call. CHW also told patient she had a Payment pluginhart message from her provider, and relayed the message to patient. CHW suggested calling the clinic for the visit mentioned in the message. Patient said she will do that. CHW said patient can always call clinic with non-emergent questions.    Assessment: No  Referral Reason:  Payor request for Proactive Outreach due to COVID-19 risk.  Patient Concerns, if any: No    Kathy Brock  Community Health Worker   [facility]  [address] [place] [address]  [facility]  [address] [place] [address]  [facility]  [address] [place] [address]  [email or web address]

## 2020-04-30 ENCOUNTER — TELEPHONE (OUTPATIENT)
Dept: FAMILY MEDICINE | Facility: CLINIC | Age: 50
End: 2020-04-30

## 2020-04-30 NOTE — TELEPHONE ENCOUNTER
Reason for call:  Patient reporting a symptom    Symptom or request: Left swollen foot concerns     Duration (how long have symptoms been present):   Swelling been a couple weeks on and off. Just recently got worse with the pain level and extreme swelling.    Have you been treated for this before? Yes    Additional comments: Patient is wondering what she should do to make this better. Patient is wondering if they should go in much quicker. Patient has been taking water pills for this to help which the swelling wont subside even after taking these pills today.    Phone Number patient can be reached at:  Cell number on file:    Telephone Information:   Mobile [phone]       Best Time:  As soon as possible     Can we leave a detailed message on this number:  YES    Call taken on 4/30/2020 at 3:02 PM by Chino Guillen

## 2020-04-30 NOTE — TELEPHONE ENCOUNTER
"I see patient is scheduled to see Dr. Hunter 5/4/20 for \"bumps on toes\".    I called and spoke to patient, she says the left foot is the foot with the bumps on her toes.        It has been swelling more this past week, can feel her toes and denies new shortness of breath or chest pain but says her foot and toes get so swollen at times that they hurt very badly.    Denies lump or pain to calf.   Only left side affected.    Advised her to continue the daily lasix as prescribed, limit salt, elevate foot.      Rescheduled to see Dr. Xavier tomorrow.    To ER if foot cold and blue or numb or if gets chest pain or unusually short of breath.    Judi Yeh RN  [facility]          "

## 2020-05-01 ENCOUNTER — OFFICE VISIT (OUTPATIENT)
Dept: FAMILY MEDICINE | Facility: CLINIC | Age: 50
End: 2020-05-01
Payer: COMMERCIAL

## 2020-05-01 ENCOUNTER — ANCILLARY PROCEDURE (OUTPATIENT)
Dept: ULTRASOUND IMAGING | Facility: CLINIC | Age: 50
End: 2020-05-01
Attending: INTERNAL MEDICINE
Payer: COMMERCIAL

## 2020-05-01 VITALS
WEIGHT: 241 LBS | OXYGEN SATURATION: 99 % | BODY MASS INDEX: 42.69 KG/M2 | DIASTOLIC BLOOD PRESSURE: 81 MMHG | HEART RATE: 90 BPM | SYSTOLIC BLOOD PRESSURE: 124 MMHG | TEMPERATURE: 97.8 F

## 2020-05-01 DIAGNOSIS — M79.662 PAIN OF LEFT LOWER LEG: ICD-10-CM

## 2020-05-01 DIAGNOSIS — M79.662 PAIN OF LEFT LOWER LEG: Primary | ICD-10-CM

## 2020-05-01 PROCEDURE — 93971 EXTREMITY STUDY: CPT | Mod: LT

## 2020-05-01 PROCEDURE — 99214 OFFICE O/P EST MOD 30 MIN: CPT | Performed by: INTERNAL MEDICINE

## 2020-05-01 RX ORDER — PREDNISONE 20 MG/1
TABLET ORAL
Qty: 20 TABLET | Refills: 0 | Status: SHIPPED | OUTPATIENT
Start: 2020-05-01 | End: 2020-11-11

## 2020-05-01 NOTE — PROGRESS NOTES
Subjective     Velma Diaz is a 49 year old female who presents to clinic today for the following health issues:    HPI   Swelling on toes on left foot on and off almost 3 weeks but yesterday getting worse and more painful.  Redness on top of both foot but pt mentions she was outside and possibly sunburned.        Patient Active Problem List   Diagnosis     History of cleft palate with cleft lip     MAYA (obstructive sleep apnea)/Hypoventilation Syndrome     Major depressive disorder, recurrent episode, moderate (H)     Paresthesias     Health Care Home     Vitamin D deficiency     Morbid obesity with BMI of 40.0-44.9, adult (H)     Hyperlipidemia with target LDL less than 130     Intervertebral disc prolapse with impingement     Nonallopathic lesion of lumbar region     Chronic pain syndrome     Restless legs syndrome (RLS)     Insomnia     Migraine     Chronic bilateral back pain, unspecified back location     Chronic pain of left knee     ROSE MARIE (generalized anxiety disorder)     Idiopathic peripheral neuropathy     Past Surgical History:   Procedure Laterality Date     ANKLE SURGERY  7/13    Left for torn tendons & loose bone chips     ARTHROSCOPY KNEE  1986    Right knee     BACK SURGERY       Basal cell carcinoma  2011    Removal from the chest     BIOPSY       C VAGINAL HYSTERECTOMY  2011     CARPAL TUNNEL RELEASE RT/LT  ~2010    Bilateral     COLONOSCOPY  2016     COSMETIC SURGERY       cysto with right ureteroscopy, stone manipulation, double J stent removal  10/04/2018    Dr. Cisneros -- removal of right kidney stone     cysto, right retrograde pyelogram, right ureteral stent Right 09/2018    for obstructing right kidney stone     DECOMPRESSION CUBITAL TUNNEL Left 8/28/2015    Procedure: DECOMPRESSION CUBITAL TUNNEL;  Surgeon: Gus Donato MD;  [facility]     ENT SURGERY  1975    Tonsillectomy and Adenoids     GYN SURGERY  2011    Hysterectomy     HC REPAIR PERONEAL TENDONS  7/13      ORTHOPEDIC SURGERY  2011    Bilateral CTR     PE TUBES      yearly times 10 years     REPAIR CLEFT PALATE CHILD      Age 3 months & afterwards (multiple surgeries)     SOFT TISSUE SURGERY  2013       Social History     Tobacco Use     Smoking status: Former Smoker     Packs/day: 0.50     Years: 15.00     Pack years: 7.50     Types: Cigarettes     Last attempt to quit: 2015     Years since quittin.1     Smokeless tobacco: Never Used   Substance Use Topics     Alcohol use: No     Alcohol/week: 0.0 standard drinks     Comment: Quit in      Family History   Problem Relation Age of Onset     Alzheimer Disease Paternal Grandmother 60     Cerebrovascular Disease Paternal Grandmother      Neurologic Disorder Sister 20        migraines     Depression/Anxiety Sister      Depression Sister      Neurologic Disorder Son 14        migraines     Asthma Son      Heart Disease Mother      Osteoporosis Mother      Obesity Mother      Cancer Father      Alcohol/Drug Father      Other Cancer Father         saliva glands & skin CA      Hypertension Father      Diabetes Maternal Grandmother      Breast Cancer Maternal Grandmother      Obesity Maternal Grandmother      Other Cancer Maternal Grandfather         skin cancer     Cancer - colorectal Other         Aunt     Asthma Daughter      Asthma Daughter      Asthma Son      Thyroid Disease Sister      Colon Cancer Other      Obesity Sister      Glaucoma No family hx of      Macular Degeneration No family hx of          Current Outpatient Medications   Medication Sig Dispense Refill     acetaminophen (TYLENOL) 325 MG tablet Take 2 tablets (650 mg) by mouth every 4 hours as needed for other (mild pain) 100 tablet 0     Cyanocobalamin (B-12 DOTS SL) Place under the tongue daily       ferrous sulfate (FEROSUL) 325 (65 Fe) MG tablet TAKE 1 TABLET (325 MG) BY MOUTH DAILY (WITH BREAKFAST) 30 tablet 5     furosemide (LASIX) 20 MG tablet TAKE 1 TABLET BY MOUTH EVERY  DAY 30 tablet 11     methocarbamol (ROBAXIN) 750 MG tablet TAKE 1 TABLETS (750 MG) BY MOUTH 3 TIMES DAILY AS NEEDED FOR MUSCLE SPASMS 60 tablet 1     Multiple Vitamins-Minerals (MULTI FOR HER PO) Take by mouth daily        nortriptyline (PAMELOR) 10 MG capsule Take 3 capsules (30 mg) by mouth At Bedtime 90 capsule 5     nystatin (MYCOSTATIN) 738629 UNIT/GM external powder Apply to affected area twice daily as needed 60 g 2     ORDER FOR [facility] Respironics REMSTAR 60 Series Auto [id number] H2O, Airfit P10 nasal pillow mask w/xsmall pillows       oxyCODONE-acetaminophen (PERCOCET)  MG per tablet Take 1 tablet by mouth every 6 hours as needed for moderate to severe pain 90 tablet 0     predniSONE (DELTASONE) 20 MG tablet Take 3 tabs by mouth daily x 3 days, then 2 tabs daily x 3 days, then 1 tab daily x 3 days, then 1/2 tab daily x 3 days. 20 tablet 0     pregabalin (LYRICA) 225 MG capsule TAKE 1 CAPSULE BY MOUTH TWICE A DAY 60 capsule 5     rOPINIRole (REQUIP) 0.25 MG tablet Take 1 tablet (0.25 mg) by mouth 2 times daily 60 tablet 5     sodium chloride (OCEAN) 0.65 % nasal spray Inhale 2 sprays in each nostril twice daily until your follow up appointment.       SUMAtriptan (IMITREX) 100 MG tablet Take 1 tablet (100 mg) by mouth at onset of headache for migraine May repeat in 2 hours if needed: max 2/day 9 tablet 2     No Known Allergies  Recent Labs   Lab Test 10/31/19  1002 02/28/19  1023 10/30/18  1040  10/18/17  1048  10/26/15  1407  03/24/15  0915  12/11/14  1831 12/08/14  1119 10/16/14  0803   A1C 5.1 5.1  --   --   --   --   --   --   --   --   --   --  5.5   LDL Cannot estimate LDL when triglyceride exceeds 400 mg/dL  132*  --  102*  --  153*   < > 145*  --   --   --   --   --  Cannot estimate LDL when triglyceride exceeds 400 mg/dL  113   HDL 38*  --  38*  --  52   < > 39*  --   --   --   --   --  40*   TRIG 447*  --  357*  --  172*   < > 222*  --   --   --   --   --  403*   ALT  --   --   --   --   --   --    --   --   --   --  23 27  --    CR 0.67  --  0.74   < > 0.73   < > 0.90   < >  --    < > 0.87  --  0.85   GFRESTIMATED >90  --  84   < > 86   < > 68   < >  --    < > 71  --  73   GFRESTBLACK >90  --  >90   < > >90   < > 82   < >  --    < > 86  --  88   POTASSIUM 4.3  --  4.0   < > 3.9   < > 4.2   < >  --    < > 2.9*  --  4.7   TSH  --   --   --   --   --   --  1.29  --  1.66  --   --   --   --     < > = values in this interval not displayed.      BP Readings from Last 3 Encounters:   05/01/20 124/81   01/29/20 138/85   12/05/19 123/82    Wt Readings from Last 3 Encounters:   05/01/20 109.3 kg (241 lb)   01/29/20 109.9 kg (242 lb 3.2 oz)   12/05/19 108 kg (238 lb)                      Reviewed and updated as needed this visit by Provider         Review of Systems   ROS COMP: Constitutional, HEENT, cardiovascular, pulmonary, gi and gu systems are negative, except as otherwise noted.      Objective    /81 (BP Location: Left arm, Patient Position: Chair, Cuff Size: Adult Large)   Pulse 90   Temp 97.8  F (36.6  C) (Oral)   Wt 109.3 kg (241 lb)   LMP  (LMP Unknown)   SpO2 99%   BMI 42.69 kg/m    Body mass index is 42.69 kg/m .  Physical Exam   GENERAL: healthy, alert and no distress  EYES: Eyes grossly normal to inspection, PERRL and conjunctivae and sclerae normal  HENT: ear canals and TM's normal, nose and mouth without ulcers or lesions  NECK: no adenopathy, no asymmetry, masses, or scars and thyroid normal to palpation  RESP: lungs clear to auscultation - no rales, rhonchi or wheezes  CV: regular rate and rhythm, normal S1 S2, no S3 or S4, no murmur, click or rub, no peripheral edema and peripheral pulses strong  ABDOMEN: soft, nontender, no hepatosplenomegaly, no masses and bowel sounds normal  MS: patient with redness and swelling eminating from the great toe  Pain in the calf.  Non symmetrical swelling     NEURO: Normal strength and tone, mentation intact and speech normal  BACK: no CVA tenderness, no  paralumbar tenderness  PSYCH: mentation appears normal, affect normal/bright  LYMPH: no cervical, supraclavicular, axillary, or inguinal adenopathy    Diagnostic Test Results:  Labs reviewed in Epic  Results for orders placed or performed in visit on 05/01/20   US Lower Extremity Venous Duplex Left     Status: None    Narrative    ULTRASOUND VENOUS LOWER EXTREMITY UNILATERAL LEFT    5/1/2020 11:01 AM     HISTORY: Pain of left lower leg    COMPARISON: None.    TECHNIQUE: Ultrasound gray scale, Color Doppler flow, and spectral  Doppler waveform analysis performed.    FINDINGS:   The left common femoral, superficial femoral, popliteal and  segmentally visualized calf veins are patent and fully compressible  and demonstrate normal venous Doppler flow. The visualized greater  saphenous vein is negative for thrombus.      Impression    IMPRESSION:   No DVT demonstrated.    CARIN MUNGUIA MD           Assessment & Plan       ICD-10-CM    1. Pain of left lower leg  M79.662 US Lower Extremity Venous Duplex Left     predniSONE (DELTASONE) 20 MG tablet        It looks like gout, no signs of cellulitis  Risk for dvt.  Hold antibiotics  dvt to rule out clot  Steroid burst   If worsening switch to antibiotic treatment    Compounded by side effects to medications      Regular exercise    No follow-ups on file.    Axel Xavier MD  [facility]

## 2020-05-04 ENCOUNTER — MYC REFILL (OUTPATIENT)
Dept: FAMILY MEDICINE | Facility: CLINIC | Age: 50
End: 2020-05-04

## 2020-05-04 DIAGNOSIS — G89.4 CHRONIC PAIN SYNDROME: ICD-10-CM

## 2020-05-05 NOTE — TELEPHONE ENCOUNTER
Requested Prescriptions   Pending Prescriptions Disp Refills     oxyCODONE-acetaminophen (PERCOCET)  MG per tablet 90 tablet 0     Sig: Take 1 tablet by mouth every 6 hours as needed for moderate to severe pain       There is no refill protocol information for this order        Last refill 4/8/20  Last OV 5/1/20    Routing refill request to provider for review/approval because:  Drug not on the [facility] refill protocol   Marisela WILLSN,RN  [facility]

## 2020-05-06 RX ORDER — OXYCODONE AND ACETAMINOPHEN 10; 325 MG/1; MG/1
1 TABLET ORAL EVERY 6 HOURS PRN
Qty: 90 TABLET | Refills: 0 | Status: SHIPPED | OUTPATIENT
Start: 2020-05-06 | End: 2020-06-03

## 2020-05-07 LAB
LOCATION PERFORMED: NORMAL
RESULT: NORMAL
SEND OUTS MISC TEST CODE: NORMAL
SEND OUTS MISC TEST SPECIMEN: NORMAL
TEST NAME: NORMAL

## 2020-05-18 ENCOUNTER — MYC MEDICAL ADVICE (OUTPATIENT)
Dept: FAMILY MEDICINE | Facility: CLINIC | Age: 50
End: 2020-05-18

## 2020-05-18 DIAGNOSIS — B37.2 INTERTRIGINOUS CANDIDIASIS: Primary | ICD-10-CM

## 2020-05-18 DIAGNOSIS — G47.00 INSOMNIA, UNSPECIFIED TYPE: ICD-10-CM

## 2020-05-18 RX ORDER — KETOCONAZOLE 20 MG/G
CREAM TOPICAL DAILY
Qty: 60 G | Refills: 1 | Status: SHIPPED | OUTPATIENT
Start: 2020-05-18 | End: 2020-05-18

## 2020-05-18 RX ORDER — KETOCONAZOLE 20 MG/G
CREAM TOPICAL
Qty: 60 G | Refills: 1 | Status: SHIPPED | OUTPATIENT
Start: 2020-05-18 | End: 2020-09-22

## 2020-05-21 ENCOUNTER — VIRTUAL VISIT (OUTPATIENT)
Dept: PSYCHOLOGY | Facility: CLINIC | Age: 50
End: 2020-05-21
Payer: COMMERCIAL

## 2020-05-21 DIAGNOSIS — F33.1 MAJOR DEPRESSIVE DISORDER, RECURRENT EPISODE, MODERATE (H): Primary | ICD-10-CM

## 2020-05-21 DIAGNOSIS — F41.1 GAD (GENERALIZED ANXIETY DISORDER): ICD-10-CM

## 2020-05-21 PROCEDURE — 90834 PSYTX W PT 45 MINUTES: CPT | Mod: 95 | Performed by: SOCIAL WORKER

## 2020-05-21 ASSESSMENT — PATIENT HEALTH QUESTIONNAIRE - PHQ9
5. POOR APPETITE OR OVEREATING: MORE THAN HALF THE DAYS
SUM OF ALL RESPONSES TO PHQ QUESTIONS 1-9: 14

## 2020-05-21 ASSESSMENT — ANXIETY QUESTIONNAIRES
GAD7 TOTAL SCORE: 8
IF YOU CHECKED OFF ANY PROBLEMS ON THIS QUESTIONNAIRE, HOW DIFFICULT HAVE THESE PROBLEMS MADE IT FOR YOU TO DO YOUR WORK, TAKE CARE OF THINGS AT HOME, OR GET ALONG WITH OTHER PEOPLE: SOMEWHAT DIFFICULT
1. FEELING NERVOUS, ANXIOUS, OR ON EDGE: MORE THAN HALF THE DAYS
6. BECOMING EASILY ANNOYED OR IRRITABLE: MORE THAN HALF THE DAYS
2. NOT BEING ABLE TO STOP OR CONTROL WORRYING: SEVERAL DAYS
5. BEING SO RESTLESS THAT IT IS HARD TO SIT STILL: NOT AT ALL
3. WORRYING TOO MUCH ABOUT DIFFERENT THINGS: NOT AT ALL
7. FEELING AFRAID AS IF SOMETHING AWFUL MIGHT HAPPEN: SEVERAL DAYS
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
## 2020-05-22 ASSESSMENT — ANXIETY QUESTIONNAIRES: GAD7 TOTAL SCORE: 8

## 2020-05-24 DIAGNOSIS — G89.29 CHRONIC BILATERAL BACK PAIN, UNSPECIFIED BACK LOCATION: Chronic | ICD-10-CM

## 2020-05-24 DIAGNOSIS — M54.9 CHRONIC BILATERAL BACK PAIN, UNSPECIFIED BACK LOCATION: Chronic | ICD-10-CM

## 2020-05-26 RX ORDER — METHOCARBAMOL 750 MG/1
TABLET, FILM COATED ORAL
Qty: 60 TABLET | Refills: 1 | Status: SHIPPED | OUTPATIENT
Start: 2020-05-26 | End: 2020-07-03

## 2020-05-26 NOTE — TELEPHONE ENCOUNTER
Routing refill request to provider for review/approval because:  Drug not on the [facility] refill protocol   Marisela YOUNG,RN  [facility]

## 2020-05-28 ENCOUNTER — TRANSFERRED RECORDS (OUTPATIENT)
Dept: HEALTH INFORMATION MANAGEMENT | Facility: CLINIC | Age: 50
End: 2020-05-28

## 2020-05-29 ENCOUNTER — MYC MEDICAL ADVICE (OUTPATIENT)
Dept: FAMILY MEDICINE | Facility: CLINIC | Age: 50
End: 2020-05-29

## 2020-05-29 NOTE — TELEPHONE ENCOUNTER
Copied issue for patient into phone encounter for him (this is grandmother's chart).    Judi Yeh RN  [facility]

## 2020-06-03 ENCOUNTER — MYC REFILL (OUTPATIENT)
Dept: FAMILY MEDICINE | Facility: CLINIC | Age: 50
End: 2020-06-03

## 2020-06-03 DIAGNOSIS — G89.4 CHRONIC PAIN SYNDROME: ICD-10-CM

## 2020-06-03 RX ORDER — OXYCODONE AND ACETAMINOPHEN 10; 325 MG/1; MG/1
1 TABLET ORAL EVERY 6 HOURS PRN
Qty: 90 TABLET | Refills: 0 | Status: SHIPPED | OUTPATIENT
Start: 2020-06-03 | End: 2020-07-01

## 2020-06-04 ENCOUNTER — THERAPY VISIT (OUTPATIENT)
Dept: CHIROPRACTIC MEDICINE | Facility: CLINIC | Age: 50
End: 2020-06-04
Payer: COMMERCIAL

## 2020-06-04 DIAGNOSIS — M99.03 SEGMENTAL DYSFUNCTION OF LUMBAR REGION: ICD-10-CM

## 2020-06-04 DIAGNOSIS — M62.830 LUMBAR PARASPINAL MUSCLE SPASM: ICD-10-CM

## 2020-06-04 DIAGNOSIS — M99.05 SEGMENTAL DYSFUNCTION OF PELVIC REGION: Primary | ICD-10-CM

## 2020-06-04 DIAGNOSIS — M99.02 THORACIC SEGMENT DYSFUNCTION: ICD-10-CM

## 2020-06-04 DIAGNOSIS — G89.29 CHRONIC LOW BACK PAIN: ICD-10-CM

## 2020-06-04 DIAGNOSIS — M54.50 CHRONIC LOW BACK PAIN: ICD-10-CM

## 2020-06-04 DIAGNOSIS — M54.16 LUMBAR RADICULOPATHY: ICD-10-CM

## 2020-06-04 PROCEDURE — 98941 CHIROPRACT MANJ 3-4 REGIONS: CPT | Mod: AT | Performed by: CHIROPRACTOR

## 2020-06-04 PROCEDURE — 97810 ACUP 1/> WO ESTIM 1ST 15 MIN: CPT | Performed by: CHIROPRACTOR

## 2020-06-04 NOTE — PROGRESS NOTES
Visit #: 9 in 2020    Subjective:  Velma Diaz is a 48 year old female who is seen in f/u up for:        Segmental dysfunction of pelvic region  Lumbago  Segmental dysfunction of lumbar region  Spasm of muscle  Thoracic segment dysfunction.     Since last visit on 3/16/2020,  Velma Diaz reports:    Area of chief complaint:  Lumbar :  Symptoms are graded at 6/10. The quality is described as stiff, and achey.  Motion has decreased in the past month.  Mireille reports that she is still having someintense low back pain and leg pain.  She is able to do sleep comfortably and is forced to sleep in a recliner.  She has been given some medication for gout which seems to be helping a little but overall she is feeling that she is in some intense pain.    Objective:  The following was observed:    P: palpatory tenderness Piriformis R>>L  A: static palpation demonstrates intersegmental asymmetry , thoracic, lumbar, pelvis  R: motion palpation notes restricted motion,  T10, T11 , T12 , L4 , L5  and PSIS Right   T: hypertonicity at: Piriformis R>>L    Segmental spinal dysfunction/restrictions found at:   T10, T11 , T12 , L4 , L5  and PSIS Right       Assessment:    Diagnoses:      1. Segmental dysfunction of pelvic region    2. Lumbago    3. Segmental dysfunction of lumbar region    4. Spasm of muscle    5. Thoracic segment dysfunction        Patient's condition:  Patient had restrictions pre-manipulation    Treatment effectiveness:  Post manipulation there is better intersegmental movement and Patient claims to feel looser post manipulation      Procedures:  CMT:  10774 Chiropractic manipulative treatment 3-4 regions performed   Thoracic: Activator, T10, T11, T12, Prone  Lumbar: Activator, L4, L5, Prone  Pelvis: Activator, PSIS Right , Prone    Modalities:  20516: Acupuncture, for 15 minutes:  Points: B25, B27, GV3, K3, B62, SI3  Ahsi point in piriformis  For 15 minutes    Therapeutic procedures:  None    Response to  Treatment  Reduction in symptoms as reported by patient    Prognosis: Good    Progress towards Goals: Patient is making progress towards the goal.     Recommendations:    Instructions:  ice 20 minutes every other hour as needed    Follow-up:    Return to care in one week.

## 2020-06-08 ENCOUNTER — VIRTUAL VISIT (OUTPATIENT)
Dept: PSYCHOLOGY | Facility: CLINIC | Age: 50
End: 2020-06-08
Payer: COMMERCIAL

## 2020-06-08 ENCOUNTER — THERAPY VISIT (OUTPATIENT)
Dept: CHIROPRACTIC MEDICINE | Facility: CLINIC | Age: 50
End: 2020-06-08
Payer: COMMERCIAL

## 2020-06-08 DIAGNOSIS — M99.05 SEGMENTAL DYSFUNCTION OF PELVIC REGION: Primary | ICD-10-CM

## 2020-06-08 DIAGNOSIS — F33.1 MAJOR DEPRESSIVE DISORDER, RECURRENT EPISODE, MODERATE (H): Primary | ICD-10-CM

## 2020-06-08 DIAGNOSIS — M99.03 SEGMENTAL DYSFUNCTION OF LUMBAR REGION: ICD-10-CM

## 2020-06-08 DIAGNOSIS — M99.02 THORACIC SEGMENT DYSFUNCTION: ICD-10-CM

## 2020-06-08 DIAGNOSIS — M54.42 CHRONIC LEFT-SIDED LOW BACK PAIN WITH LEFT-SIDED SCIATICA: ICD-10-CM

## 2020-06-08 DIAGNOSIS — G89.29 CHRONIC LEFT-SIDED LOW BACK PAIN WITH LEFT-SIDED SCIATICA: ICD-10-CM

## 2020-06-08 DIAGNOSIS — M62.838 SPASM OF MUSCLE: ICD-10-CM

## 2020-06-08 DIAGNOSIS — F41.1 GAD (GENERALIZED ANXIETY DISORDER): ICD-10-CM

## 2020-06-08 DIAGNOSIS — M54.2 CERVICALGIA: ICD-10-CM

## 2020-06-08 DIAGNOSIS — M99.01 CERVICAL SEGMENT DYSFUNCTION: ICD-10-CM

## 2020-06-08 PROCEDURE — 90834 PSYTX W PT 45 MINUTES: CPT | Mod: 95 | Performed by: SOCIAL WORKER

## 2020-06-08 PROCEDURE — 97810 ACUP 1/> WO ESTIM 1ST 15 MIN: CPT | Performed by: CHIROPRACTOR

## 2020-06-08 PROCEDURE — 98941 CHIROPRACT MANJ 3-4 REGIONS: CPT | Mod: AT | Performed by: CHIROPRACTOR

## 2020-06-08 NOTE — PROGRESS NOTES
Visit #: 10 in 2020    Subjective:  Velma Diaz is a 48 year old female who is seen in f/u up for:        Segmental dysfunction of pelvic region  Lumbago  Segmental dysfunction of lumbar region  Spasm of muscle  Thoracic segment dysfunction.     Since last visit on 6/4/2020,  Velma Diaz reports:    Area of chief complaint:  Lumbar :  Symptoms are graded at 6/10. The quality is described as stiff, and achey.  Motion has  Increased but not normal.  Mireille reports that she is having some neck pain in addition to her low back pain.  She took some essential oils and reports that she is feeling a little better but is still having some symptoms    Objective:  The following was observed:    P: palpatory tenderness Piriformis R>>L, traps  A: static palpation demonstrates intersegmental asymmetry ,cervical,  thoracic, lumbar, pelvis  R: motion palpation notes restricted motion,  C6, C&, T1, T2, T10, T11 , T12 , L4 , L5  and PSIS Right   T: hypertonicity at: Piriformis R>>L    Segmental spinal dysfunction/restrictions found at:   C6, C7, T1, T2, T10, T11 , T12 , L4 , L5  and PSIS Right       Assessment:    Diagnoses:      1. Segmental dysfunction of pelvic region    2. Lumbago    3. Segmental dysfunction of lumbar region    4. Spasm of muscle    5. Thoracic segment dysfunction   Neck pain  Cervical segment dysfunction       Patient's condition:  Patient had restrictions pre-manipulation    Treatment effectiveness:  Post manipulation there is better intersegmental movement and Patient claims to feel looser post manipulation      Procedures:  CMT:  88486 Chiropractic manipulative treatment 3-4 regions performed   Cervical: Activator: C6, C7, Prone  Thoracic: Activator, T1, T2, T10, T11, T12, Prone  Lumbar: Activator, L4, L5, Prone  Pelvis: Activator, PSIS Right , Prone    Modalities:  37877: Acupuncture, for 15 minutes:  Points: GV20, GV16, LI4, SI15, GB36, GB20, GB39  B25, B27, GV3, K3, B62, SI3  Ahsi point in traps,  piriformis  For 15 minutes        Therapeutic procedures:  None    Response to Treatment  Reduction in symptoms as reported by patient    Prognosis: Good    Progress towards Goals: Patient is making progress towards the goal.     Recommendations:    Instructions:  ice 20 minutes every other hour as needed    Follow-up:    Return to care in one week.
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
## 2020-06-09 ENCOUNTER — VIRTUAL VISIT (OUTPATIENT)
Dept: ANESTHESIOLOGY | Facility: CLINIC | Age: 50
End: 2020-06-09
Attending: PSYCHIATRY & NEUROLOGY

## 2020-06-09 ENCOUNTER — TRANSFERRED RECORDS (OUTPATIENT)
Dept: HEALTH INFORMATION MANAGEMENT | Facility: CLINIC | Age: 50
End: 2020-06-09

## 2020-06-09 DIAGNOSIS — G60.9 IDIOPATHIC PERIPHERAL NEUROPATHY: Primary | ICD-10-CM

## 2020-06-09 ASSESSMENT — ENCOUNTER SYMPTOMS
NIGHT SWEATS: 0
JOINT SWELLING: 1
SINUS CONGESTION: 1
SORE THROAT: 0
MYALGIAS: 1
ARTHRALGIAS: 1
SEIZURES: 0
INCREASED ENERGY: 0
LIGHT-HEADEDNESS: 1
DECREASED CONCENTRATION: 0
MEMORY LOSS: 0
LEG PAIN: 1
NUMBNESS: 1
SYNCOPE: 0
CHILLS: 0
ALTERED TEMPERATURE REGULATION: 1
FEVER: 0
HYPOTENSION: 0
INSOMNIA: 1
DECREASED APPETITE: 0
SLEEP DISTURBANCES DUE TO BREATHING: 0
HOARSE VOICE: 0
EXERCISE INTOLERANCE: 0
NECK PAIN: 1
WEIGHT GAIN: 0
POLYDIPSIA: 0
STIFFNESS: 1
PALPITATIONS: 0
BACK PAIN: 1
SINUS PAIN: 1
TASTE DISTURBANCE: 0
PANIC: 1
SPEECH CHANGE: 0
DIZZINESS: 1
NECK MASS: 0
DEPRESSION: 1
TROUBLE SWALLOWING: 0
NERVOUS/ANXIOUS: 1
LOSS OF CONSCIOUSNESS: 0
FATIGUE: 1
TREMORS: 0
SMELL DISTURBANCE: 0
HYPERTENSION: 0
POLYPHAGIA: 0
WEIGHT LOSS: 0
WEAKNESS: 1
MUSCLE CRAMPS: 1
TINGLING: 1
HALLUCINATIONS: 0
DISTURBANCES IN COORDINATION: 0
MUSCLE WEAKNESS: 1
PARALYSIS: 0
HEADACHES: 1
ORTHOPNEA: 0

## 2020-06-09 ASSESSMENT — PAIN SCALES - GENERAL: PAINLEVEL: EXTREME PAIN (9)

## 2020-06-09 NOTE — LETTER
"6/9/2020       RE: Velma Diaz  [address]  [place] [address]     Dear Colleague,    Thank you for referring your patient, Velma Diaz, to the [facility] at [facility]. Please see a copy of my visit note below.    Velma Diaz is a 49 year old female who is being evaluated via a billable video visit.      The patient has been notified of following:     \"This video visit will be conducted via a call between you and your physician/provider. We have found that certain health care needs can be provided without the need for an in-person physical exam.  This service lets us provide the care you need with a video conversation.  If a prescription is necessary we can send it directly to your pharmacy.  If lab work is needed we can place an order for that and you can then stop by our lab to have the test done at a later time.    Video visits are billed at different rates depending on your insurance coverage.  Please reach out to your insurance provider with any questions.    If during the course of the call the physician/provider feels a video visit is not appropriate, you will not be charged for this service.\"    Patient has given verbal consent for Video visit? Yes    How would you like to obtain your AVS? MyChart    Patient would like the video invitation sent by: Text to cell phone: [phone]    Will anyone else be joining your video visit? Jing Sears CMA            Velma Diaz is a 49 year old female who is being evaluated via a billable video visit.      The patient has been notified of following:     \"This video visit will be conducted via a call between you and your physician/provider. We have found that certain health care needs can be provided without the need for an in-person physical exam.  This service lets us provide the care you need with a video conversation.  If a prescription is necessary we " "can send it directly to your pharmacy.  If lab work is needed we can place an order for that and you can then stop by our lab to have the test done at a later time.    Video visits are billed at different rates depending on your insurance coverage.  Please reach out to your insurance provider with any questions.    If during the course of the call the physician/provider feels a video visit is not appropriate, you will not be charged for this service.\"    Patient has given verbal consent for Video visit? Yes      Video-Visit Details    Type of service:  Video Visit    Video Start Time: 9:25 AM  Video End Time: 9:43 AM    Originating Location (pt. Location): Home    Distant Location (provider location):  [facility]     Platform used for Video Visit: Federica Carballo MD        Pain Clinic New Patient Consult Note:    Referring Provider: aDniel   Primary care provider: Srinivasa Huntertonya Diaz is a 49 year old y.o. old female who presents to the pain clinic with pain in all extremities.    HPI:  Patient Supplied Answers To the [facility] Pain Questionnaire  [facility] -  Patient Entered Questionnaire/Answers 6/9/2020   What number best describes your pain right now:  0 = No pain  to  10 = Worst pain imaginable 9   How would you describe the pain? burning, sharp, numbness, dull, aching, throbbing   Which of the following worsen your pain? standing, walking, exercise, coughing / sneezing   Which of the following improve or reduce your pain?  lying down, medication, relaxation   What number best describes your average pain for the past week:  0 = No pain  to  10 = Worst pain imaginable 7   What number best describes your LOWEST pain in past 24 hours:  0 = No pain  to  10 = Worst pain imaginable 4   What number best describes your WORST pain in past 24 hours:  0 = No pain  to  10 = Worst pain imaginable 10   When is your pain worst? Constant   What non-medicine treatments " have you already had for your pain? pain clinic, physical therapy, relaxation training, acupuncture, chiropractic care, TENS (electrical stimulator), spine injections (shots), counseling   Have you tried treating your pain with medication?  Yes   Are you currently taking medications for your pain? Yes     The visit was completed via video visit.  The patient was noted to be sitting in her recliner chair comfortably.  She reported the progress of her pain over the years and stated that her daughter is 17 years old now.  She also describes moderate to severe pain with reduced quality of life.  She detailed poor sleep.  She is unable to do anything around the house and finds herself sitting in a chair all day.  She is able to ambulate around the house with support as she has a fear of falling.  She states that she has had falls in the past.  She reports that her boyfriend is her PCA.  She states that her right lower extremity is weaker than her left and symptoms on her right lower extremity are worse and she describes severe burning pain in the area.    She has tried multiple medications and combinations over the years with her neurologist.  She feels that she is having more breakthrough pain currently and is not satisfied with the pain control as she feels her pain is worsening.  Currently she takes up to 3 to 4 tablets of oxycodone per day to help her with her pain.    The patient states that her sister experiences similar symptoms and has a spinal cord stimulator implanted 1-1/2 years ago.  The patient herself has not been in touch with her sister however her mother reports that the implant has been helpful.    The patient regularly follows up with her chiropractor for upper extremity and lower extremity pain and finds the sessions somewhat helpful    Interval history: Mireille had then mentioned a long standing history of neuropathy at her feet that began after her pregnancy in 2002, so it has been there for 18 years.   "It is characterized by burning, tingling and numbness sensations, which are gradually progressing.  When I saw her in 2015, she was feeling abnormal from her toes to the level of the mid-calf of the right, and a little lower on the left leg. Over the past 5 years, those dysesthesias have progressed and are felt up to the hip joint bilaterally. She recently began experiencing numb and burning sensation in the entire right arm, but not the left. She also has some tingling on the right side of her face.  The sensory symptoms seem to be constant, often waking her up at night.   No good explanation has been found to date.    The unilateral right upper extremity symptoms recently raised concerns about cervical radiculopathy or brachial plexopathy, yet her cervical spine MRI done in 08/2019, which I reviewed, did not provide any satisfactory explanation for those symptoms and an EMG done by Dr. Alfaro on 10/02/2019 showed no evidence of right-sided cervical radiculopathy, brachial plexopathy or focal neuropathy.  The patient has undergone carpal tunnel surgery release twice.  Her right arm symptoms do not seem to be positional and they are not affected by turning the head right or left or upside down.  They sometimes are aggravated by sneezing which provokes a whole body \"jolt\" sensation transiently.Mireille had then mentioned a long standing history of neuropathy at her feet that began after her pregnancy in 2002, so it has been there for 18 years.  It is characterized by burning, tingling and numbness sensations, which are gradually progressing.  When I saw her in 2015, she was feeling abnormal from her toes to the level of the mid-calf of the right, and a little lower on the left leg. Over the past 5 years, those dysesthesias have progressed and are felt up to the hip joint bilaterally. She recently began experiencing numb and burning sensation in the entire right arm, but not the left. She also has some tingling on the right " "side of her face.  The sensory symptoms seem to be constant, often waking her up at night.   No good explanation has been found to date.  The unilateral right upper extremity symptoms recently raised concerns about cervical radiculopathy or brachial plexopathy, yet her cervical spine MRI done in 08/2019, which I reviewed, did not provide any satisfactory explanation for those symptoms and an EMG done by Dr. Alfaro on 10/02/2019 showed no evidence of right-sided cervical radiculopathy, brachial plexopathy or focal neuropathy.  The patient has undergone carpal tunnel surgery release twice.  Her right arm symptoms do not seem to be positional and they are not affected by turning the head right or left or upside down.  They sometimes are aggravated by sneezing which provokes a whole body \"jolt\" sensation transiently.      Tests/Imaging reviewed with the patient:    MRI results for the lumbar and thoracic spine reviewed in the chart    Significant Medical History:   Past Medical History:   Diagnosis Date     Chronic pain syndrome 11/20/2015    She takes up to 90 oxycodone tablets per month for her chronic pain      Depressive disorder 2000     History of cleft palate with cleft lip     cleft lip and palate, and has had 27 operations per the patient     Hyperlipidemia with target LDL less than 130 10/26/2015     Morbid obesity with BMI of 40.0-44.9, adult (H) 10/26/2015     Neuropathy      MAYA (obstructive sleep apnea)/Hypoventilation Syndrome 2/24/2014    Study Date: 2/13/2014- (245.1 lbs) Sleep Associated Hypoventilation  suggested with baseline PCO2 42 mmHg, maximum PCO2 55 mmHg. Lowest oxygen saturation 85.0% Apnea/Hypopnea Index 5.5 events per hour.  REM AHI 37.2.  The supine AHI is 13.8. RDI 6.7 Sleep study 3/2014 (245#)- CPAP 6 cm effective, Transcutaneous CO2 Monitoring (TCM) within normal limits.          Skin cancer of anterior chest 2011    Basal cell on chest     TMJ (temporomandibular joint disorder)       "     Past Surgical History:  Past Surgical History:   Procedure Laterality Date     ANKLE SURGERY  7/13    Left for torn tendons & loose bone chips     ARTHROSCOPY KNEE  1986    Right knee     BACK SURGERY       Basal cell carcinoma  2011    Removal from the chest     BIOPSY       C VAGINAL HYSTERECTOMY  2011     CARPAL TUNNEL RELEASE RT/LT  ~2010    Bilateral     COLONOSCOPY  2016     COSMETIC SURGERY       cysto with right ureteroscopy, stone manipulation, double J stent removal  10/04/2018    Dr. Cisneros -- removal of right kidney stone     cysto, right retrograde pyelogram, right ureteral stent Right 09/2018    for obstructing right kidney stone     DECOMPRESSION CUBITAL TUNNEL Left 8/28/2015    Procedure: DECOMPRESSION CUBITAL TUNNEL;  Surgeon: Gus Donato MD;  [facility]     ENT SURGERY  1975    Tonsillectomy and Adenoids     GYN SURGERY  2011    Hysterectomy     HC REPAIR PERONEAL TENDONS  7/13     ORTHOPEDIC SURGERY  2011, 2011    Bilateral CTR     PE TUBES      yearly times 10 years     REPAIR CLEFT PALATE CHILD      Age 3 months & afterwards (multiple surgeries)     SOFT TISSUE SURGERY  2013          Family History:  Family History   Problem Relation Age of Onset     Alzheimer Disease Paternal Grandmother 60     Cerebrovascular Disease Paternal Grandmother      Neurologic Disorder Sister 20        migraines     Depression/Anxiety Sister      Depression Sister      Neurologic Disorder Son 14        migraines     Asthma Son      Heart Disease Mother      Osteoporosis Mother      Obesity Mother      Cancer Father      Alcohol/Drug Father      Other Cancer Father         saliva glands & skin CA      Hypertension Father      Diabetes Maternal Grandmother      Breast Cancer Maternal Grandmother      Obesity Maternal Grandmother      Other Cancer Maternal Grandfather         skin cancer     Cancer - colorectal Other         Aunt     Asthma Daughter      Asthma Daughter      Asthma Son      Thyroid  Disease Sister      Colon Cancer Other      Obesity Sister      Glaucoma No family hx of      Macular Degeneration No family hx of           Social History:  Social History     Socioeconomic History     Marital status:      Spouse name: Not on file     Number of children: 3     Years of education: 12     Highest education level: Not on file   Occupational History     Occupation: Personal Care      Comment: Unemployed. 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   Social Needs     Financial resource strain: Not on file     Food insecurity     Worry: Not on file     Inability: Not on file     Transportation needs     Medical: Not on file     Non-medical: Not on file   Tobacco Use     Smoking status: Former Smoker     Packs/day: 0.50     Years: 15.00     Pack years: 7.50     Types: Cigarettes     Last attempt to quit: 2015     Years since quittin.3     Smokeless tobacco: Never Used   Substance and Sexual Activity     Alcohol use: No     Alcohol/week: 0.0 standard drinks     Comment: Quit in      Drug use: No     Sexual activity: Yes     Partners: Male     Birth control/protection: Female Surgical   Lifestyle     Physical activity     Days per week: Not on file     Minutes per session: Not on file     Stress: Not on file   Relationships     Social connections     Talks on phone: Not on file     Gets together: Not on file     Attends [employer] service: Not on file     Active member of club or organization: Not on file     Attends meetings of clubs or organizations: Not on file     Relationship status: Not on file     Intimate partner violence     Fear of current or ex partner: Not on file     Emotionally abused: Not on file     Physically abused: Not on file     Forced sexual activity: Not on file   Other Topics Concern     Parent/sibling w/ CABG, MI or angioplasty before 65F 55M? No   Social History Narrative     Not on file     Social History     Social History Narrative      Not on file          Allergies:  No Known Allergies    Current Medications:   Current Outpatient Medications   Medication Sig Dispense Refill     acetaminophen (TYLENOL) 325 MG tablet Take 2 tablets (650 mg) by mouth every 4 hours as needed for other (mild pain) 100 tablet 0     Cyanocobalamin (B-12 DOTS SL) Place under the tongue daily       diphenhydrAMINE (BENADRYL) 25 MG tablet Take 1 tablet (25 mg) by mouth nightly as needed to help with sleep 30 tablet 1     ferrous sulfate (FEROSUL) 325 (65 Fe) MG tablet TAKE 1 TABLET (325 MG) BY MOUTH DAILY (WITH BREAKFAST) 30 tablet 5     furosemide (LASIX) 20 MG tablet TAKE 1 TABLET BY MOUTH EVERY DAY 30 tablet 11     ketoconazole (NIZORAL) 2 % external cream Apply topically twice daily to the reddened area underneath your skin folds as needed until the rash clears. 60 g 1     methocarbamol (ROBAXIN) 750 MG tablet TAKE 1 TABLETS (750 MG) BY MOUTH 3 TIMES DAILY AS NEEDED FOR MUSCLE SPASMS 60 tablet 1     Multiple Vitamins-Minerals (MULTI FOR HER PO) Take by mouth daily        nortriptyline (PAMELOR) 10 MG capsule Take 3 capsules (30 mg) by mouth At Bedtime 90 capsule 5     nystatin (MYCOSTATIN) 479626 UNIT/GM external powder Apply to affected area twice daily as needed 60 g 2     ORDER FOR [employer] REMSTAR 60 Series Auto [id number] H2O, Airfit P10 nasal pillow mask w/xsmall pillows       oxyCODONE-acetaminophen (PERCOCET)  MG per tablet Take 1 tablet by mouth every 6 hours as needed for moderate to severe pain 90 tablet 0     predniSONE (DELTASONE) 20 MG tablet Take 3 tabs by mouth daily x 3 days, then 2 tabs daily x 3 days, then 1 tab daily x 3 days, then 1/2 tab daily x 3 days. 20 tablet 0     pregabalin (LYRICA) 225 MG capsule TAKE 1 CAPSULE BY MOUTH TWICE A DAY 60 capsule 5     rOPINIRole (REQUIP) 0.25 MG tablet Take 1 tablet (0.25 mg) by mouth 2 times daily 60 tablet 5     sodium chloride (OCEAN) 0.65 % nasal spray Inhale 2 sprays in each nostril twice daily  until your follow up appointment.       SUMAtriptan (IMITREX) 100 MG tablet Take 1 tablet (100 mg) by mouth at onset of headache for migraine May repeat in 2 hours if needed: max 2/day 9 tablet 2          Current Pain Medications:  Medications related to Pain Management (From now, onward)    None         Blood thinner:    None          Review of Systems:  Review of Systems   All other systems reviewed and are negative.        Physical Exam:, Obese    There were no vitals filed for this visit.    General Appearance: No distress, seated comfortably, obese  Mood: Euthymic, tearful  HE ENT: Non constricted pupils  Respiratory: Non labored breathing    Skin: No rashes over exposed skin      Laboratory results:  Recent Labs   Lab Test 10/31/19  1002 10/30/18  1040    141   POTASSIUM 4.3 4.0   CHLORIDE 106 107   CO2 24 24   ANIONGAP 5 10   GLC 92 94   BUN 10 8   CR 0.67 0.74   DENISE 9.3 9.0       CBC RESULTS:   Recent Labs   Lab Test 10/31/19  1002   WBC 6.8   RBC 4.39   HGB 13.5   HCT 41.6   MCV 95   MCH 30.8   MCHC 32.5   RDW 12.4            Imaging:       ASSESSMENT AND PLAN:     Encounter Diagnosis:    1.  Neuropathic pain  2.  Small fiber neuropathy awaiting biopsy results    Velma Diaz is a 49 year old y.o. old female who presents to the pain clinic with complex chronic pain in all her extremities    Patient's history, physical exam and imaging are suggestive of their pain is resulting from possible small fiber neuropathy awaiting biopsy results.     I have summarized the patient history, discussed their clinical findings and differential diagnosis with the patient. I have discussed anatomy and possible sources of the pain using models and/or pictures(diagrams). I have discussed multi- disciplinary pain management options with the patient as pertaining to their case as detailed above. All questions and concerns were answered to the best of my ability.     RECOMMENDATIONS:     1. Medications:.  The  patient's medications are managed through the neurology clinic and I have not made any changes to the medications. Over the years she has tried multiple Neuramate pathic medications without effective pain contro    2. Procedure: I have recommended a spinal cord stimulator trial to the patient.  We will provide her with more patient education material.    The possible risks involved with interventional treatment are as following but not limited to- no pain control, worsened pain, stroke, seizure, spinal headache, allergic reactions, introduction of infection or bleeding which may lead to emergent spine surgery, nerve damage, paralysis or even death.    3.  I have referred the patient to pain psychology for evaluation of spinal cord stimulator trial and implant    4.  Her prescription drug monitoring program results are consistent.  She has been on opioids long-term and I would recommend routine urine drug screening.    Follow up: The patient will contact us when she is ready to schedule a spinal cord stimulator trial      AVS sent to pt's Marcy.   Alexandrea Rooney LPN     Referrals:    NEUROPSYCHOLOGY Referral placed for further evaluation for Spinal Cord Stimulation Treatment planning. Please call to schedule your evaluation   [facility]: Adult Neuropsychology Clinic [facility] [phone].    Treatment planning:    Please contact the clinic directly if you have not heard from our office- within 1 week after your Neuropsychology evaluation. [phone], Please ask to speak with the nursing staff.     We asked that our [employer] Rep reach out to you for further education on Spinal cord stimulation therapy.      Recommended Follow up:   As indicated after your Neuropsychology evaluation.         To speak with a nurse, schedule/reschedule/cancel a clinic appointment, or request a medication refill call: [phone]    You can also reach us by [employer]: [email or web address]                              Again, thank you for allowing me to participate in the care of your patient.      Sincerely,    Annel Carballo MD

## 2020-06-09 NOTE — LETTER
"6/9/2020    RE: Velma Diaz  [address]  [place] [facility] [place] [address]     Velma Diaz is a 49 year old female who is being evaluated via a billable video visit.      The patient has been notified of following:     \"This video visit will be conducted via a call between you and your physician/provider. We have found that certain health care needs can be provided without the need for an in-person physical exam.  This service lets us provide the care you need with a video conversation.  If a prescription is necessary we can send it directly to your pharmacy.  If lab work is needed we can place an order for that and you can then stop by our lab to have the test done at a later time.    Video visits are billed at different rates depending on your insurance coverage.  Please reach out to your insurance provider with any questions.    If during the course of the call the physician/provider feels a video visit is not appropriate, you will not be charged for this service.\"    Patient has given verbal consent for Video visit? Yes    How would you [facility] to [facility]? [facility]    Patient would like the video invitation sent by: Text to cell phone: [phone]    Will anyone else be joining your video visit? Jing Sears, CHRISTOPHER            Velma Diaz is a 49 year old female who is being evaluated via a billable video visit.      The patient has been notified of following:     \"This video visit will be conducted via a call between you and your physician/provider. We have found that certain health care needs can be provided without the need for an in-person physical exam.  This service lets us provide the care you need with a video conversation.  If a prescription is necessary we can send it directly to your pharmacy.  If lab work is needed we can place an order for that and you can then stop by our lab to have the test done at a later time.    Video visits are billed at different rates depending on your " "insurance coverage.  Please reach out to your insurance provider with any questions.    If during the course of the call the physician/provider feels a video visit is not appropriate, you will not be charged for this service.\"    Patient has given verbal consent for Video visit? Yes      Video-Visit Details    Type of service:  Video Visit    Video Start Time: 9:25 AM  Video End Time: 9:43 AM    Originating Location (pt. Location): Home    Distant Location (provider location):  [facility]     Platform used for Video Visit: Federica Carballo MD        Pain Clinic New Patient Consult Note:    Referring Provider: Daniel   Primary care provider: Srinivasa Huntertonya Diaz is a 49 year old y.o. old female who presents to the pain clinic with pain in all extremities.    HPI:  Patient Supplied Answers To the [facility] Pain Questionnaire  [facility] -  Patient Entered Questionnaire/Answers 6/9/2020   What number best describes your pain right now:  0 = No pain  to  10 = Worst pain imaginable 9   How would you describe the pain? burning, sharp, numbness, dull, aching, throbbing   Which of the following worsen your pain? standing, walking, exercise, coughing / sneezing   Which of the following improve or reduce your pain?  lying down, medication, relaxation   What number best describes your average pain for the past week:  0 = No pain  to  10 = Worst pain imaginable 7   What number best describes your LOWEST pain in past 24 hours:  0 = No pain  to  10 = Worst pain imaginable 4   What number best describes your WORST pain in past 24 hours:  0 = No pain  to  10 = Worst pain imaginable 10   When is your pain worst? Constant   What non-medicine treatments have you already had for your pain? pain clinic, physical therapy, relaxation training, acupuncture, chiropractic care, TENS (electrical stimulator), spine injections (shots), counseling   Have you tried treating your pain with " medication?  Yes   Are you currently taking medications for your pain? Yes     The visit was completed via video visit.  The patient was noted to be sitting in her recliner chair comfortably.  She reported the progress of her pain over the years and stated that her daughter is 17 years old now.  She also describes moderate to severe pain with reduced quality of life.  She detailed poor sleep.  She is unable to do anything around the house and finds herself sitting in a chair all day.  She is able to ambulate around the house with support as she has a fear of falling.  She states that she has had falls in the past.  She reports that her boyfriend is her PCA.  She states that her right lower extremity is weaker than her left and symptoms on her right lower extremity are worse and she describes severe burning pain in the area.    She has tried multiple medications and combinations over the years with her neurologist.  She feels that she is having more breakthrough pain currently and is not satisfied with the pain control as she feels her pain is worsening.  Currently she takes up to 3 to 4 tablets of oxycodone per day to help her with her pain.    The patient states that her sister experiences similar symptoms and has a spinal cord stimulator implanted 1-1/2 years ago.  The patient herself has not been in touch with her sister however her mother reports that the implant has been helpful.    The patient regularly follows up with her chiropractor for upper extremity and lower extremity pain and finds the sessions somewhat helpful    Interval history: Mireille had then mentioned a long standing history of neuropathy at her feet that began after her pregnancy in 2002, so it has been there for 18 years.  It is characterized by burning, tingling and numbness sensations, which are gradually progressing.  When I saw her in 2015, she was feeling abnormal from her toes to the level of the mid-calf of the right, and a little lower on  "the left leg. Over the past 5 years, those dysesthesias have progressed and are felt up to the hip joint bilaterally. She recently began experiencing numb and burning sensation in the entire right arm, but not the left. She also has some tingling on the right side of her face.  The sensory symptoms seem to be constant, often waking her up at night.   No good explanation has been found to date.  The unilateral right upper extremity symptoms recently raised concerns about cervical radiculopathy or brachial plexopathy, yet her cervical spine MRI done in 08/2019, which I reviewed, did not provide any satisfactory explanation for those symptoms and an EMG done by Dr. Alfaro on 10/02/2019 showed no evidence of right-sided cervical radiculopathy, brachial plexopathy or focal neuropathy.  The patient has undergone carpal tunnel surgery release twice.  Her right arm symptoms do not seem to be positional and they are not affected by turning the head right or left or upside down.  They sometimes are aggravated by sneezing which provokes a whole body \"jolt\" sensation transiently.Mireille had then mentioned a long standing history of neuropathy at her feet that began after her pregnancy in 2002, so it has been there for 18 years.  It is characterized by burning, tingling and numbness sensations, which are gradually progressing.  When I saw her in 2015, she was feeling abnormal from her toes to the level of the mid-calf of the right, and a little lower on the left leg. Over the past 5 years, those dysesthesias have progressed and are felt up to the hip joint bilaterally. She recently began experiencing numb and burning sensation in the entire right arm, but not the left. She also has some tingling on the right side of her face.  The sensory symptoms seem to be constant, often waking her up at night.   No good explanation has been found to date.  The unilateral right upper extremity symptoms recently raised concerns about cervical " "radiculopathy or brachial plexopathy, yet her cervical spine MRI done in 08/2019, which I reviewed, did not provide any satisfactory explanation for those symptoms and an EMG done by Dr. Alfaro on 10/02/2019 showed no evidence of right-sided cervical radiculopathy, brachial plexopathy or focal neuropathy.  The patient has undergone carpal tunnel surgery release twice.  Her right arm symptoms do not seem to be positional and they are not affected by turning the head right or left or upside down.  They sometimes are aggravated by sneezing which provokes a whole body \"jolt\" sensation transiently.      Tests/Imaging reviewed with the patient:    MRI results for the lumbar and thoracic spine reviewed in the chart    Significant Medical History:   Past Medical History:   Diagnosis Date     Chronic pain syndrome 11/20/2015    She takes up to 90 oxycodone tablets per month for her chronic pain      Depressive disorder 2000     History of cleft palate with cleft lip     cleft lip and palate, and has had 27 operations per the patient     Hyperlipidemia with target LDL less than 130 10/26/2015     Morbid obesity with BMI of 40.0-44.9, adult (H) 10/26/2015     Neuropathy      MAYA (obstructive sleep apnea)/Hypoventilation Syndrome 2/24/2014    Study Date: 2/13/2014- (245.1 lbs) Sleep Associated Hypoventilation  suggested with baseline PCO2 42 mmHg, maximum PCO2 55 mmHg. Lowest oxygen saturation 85.0% Apnea/Hypopnea Index 5.5 events per hour.  REM AHI 37.2.  The supine AHI is 13.8. RDI 6.7 Sleep study 3/2014 (245#)- CPAP 6 cm effective, Transcutaneous CO2 Monitoring (TCM) within normal limits.          Skin cancer of anterior chest 2011    Basal cell on chest     TMJ (temporomandibular joint disorder)           Past Surgical History:  Past Surgical History:   Procedure Laterality Date     ANKLE SURGERY  7/13    Left for torn tendons & loose bone chips     ARTHROSCOPY KNEE  1986    Right knee     BACK SURGERY       Basal cell " carcinoma  2011    Removal from the chest     BIOPSY       C VAGINAL HYSTERECTOMY  2011     CARPAL TUNNEL RELEASE RT/LT  ~2010    Bilateral     COLONOSCOPY  2016     COSMETIC SURGERY       cysto with right ureteroscopy, stone manipulation, double J stent removal  10/04/2018    Dr. Cisneros -- removal of right kidney stone     cysto, right retrograde pyelogram, right ureteral stent Right 09/2018    for obstructing right kidney stone     DECOMPRESSION CUBITAL TUNNEL Left 8/28/2015    Procedure: DECOMPRESSION CUBITAL TUNNEL;  Surgeon: Gus Donato MD;  [facility]     ENT SURGERY  1975    Tonsillectomy and Adenoids     GYN SURGERY  2011    Hysterectomy     HC REPAIR PERONEAL TENDONS  7/13     ORTHOPEDIC SURGERY  2011, 2011    Bilateral CTR     PE TUBES      yearly times 10 years     REPAIR CLEFT PALATE CHILD      Age 3 months & afterwards (multiple surgeries)     SOFT TISSUE SURGERY  2013          Family History:  Family History   Problem Relation Age of Onset     Alzheimer Disease Paternal Grandmother 60     Cerebrovascular Disease Paternal Grandmother      Neurologic Disorder Sister 20        migraines     Depression/Anxiety Sister      Depression Sister      Neurologic Disorder Son 14        migraines     Asthma Son      Heart Disease Mother      Osteoporosis Mother      Obesity Mother      Cancer Father      Alcohol/Drug Father      Other Cancer Father         saliva glands & skin CA      Hypertension Father      Diabetes Maternal Grandmother      Breast Cancer Maternal Grandmother      Obesity Maternal Grandmother      Other Cancer Maternal Grandfather         skin cancer     Cancer - colorectal Other         Aunt     Asthma Daughter      Asthma Daughter      Asthma Son      Thyroid Disease Sister      Colon Cancer Other      Obesity Sister      Glaucoma No family hx of      Macular Degeneration No family hx of           Social History:  Social History     Socioeconomic History     Marital status:       Spouse name: Not on file     Number of children: 3     Years of education: 12     Highest education level: Not on file   Occupational History     Occupation: Personal Care      Comment: Unemployed. 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   Social Needs     Financial resource strain: Not on file     Food insecurity     Worry: Not on file     Inability: Not on file     Transportation needs     Medical: Not on file     Non-medical: Not on file   Tobacco Use     Smoking status: Former Smoker     Packs/day: 0.50     Years: 15.00     Pack years: 7.50     Types: Cigarettes     Last attempt to quit: 2015     Years since quittin.3     Smokeless tobacco: Never Used   Substance and Sexual Activity     Alcohol use: No     Alcohol/week: 0.0 standard drinks     Comment: Quit in      Drug use: No     Sexual activity: Yes     Partners: Male     Birth control/protection: Female Surgical   Lifestyle     Physical activity     Days per week: Not on file     Minutes per session: Not on file     Stress: Not on file   Relationships     Social connections     Talks on phone: Not on file     Gets together: Not on file     Attends Church service: Not on file     Active member of club or organization: Not on file     Attends meetings of clubs or organizations: Not on file     Relationship status: Not on file     Intimate partner violence     Fear of current or ex partner: Not on file     Emotionally abused: Not on file     Physically abused: Not on file     Forced sexual activity: Not on file   Other Topics Concern     Parent/sibling w/ CABG, MI or angioplasty before 65F 55M? No   Social History Narrative     Not on file     Social History     Social History Narrative     Not on file          Allergies:  No Known Allergies    Current Medications:   Current Outpatient Medications   Medication Sig Dispense Refill     acetaminophen (TYLENOL) 325 MG tablet Take 2 tablets (650 mg) by mouth  every 4 hours as needed for other (mild pain) 100 tablet 0     Cyanocobalamin (B-12 DOTS SL) Place under the tongue daily       diphenhydrAMINE (BENADRYL) 25 MG tablet Take 1 tablet (25 mg) by mouth nightly as needed to help with sleep 30 tablet 1     ferrous sulfate (FEROSUL) 325 (65 Fe) MG tablet TAKE 1 TABLET (325 MG) BY MOUTH DAILY (WITH BREAKFAST) 30 tablet 5     furosemide (LASIX) 20 MG tablet TAKE 1 TABLET BY MOUTH EVERY DAY 30 tablet 11     ketoconazole (NIZORAL) 2 % external cream Apply topically twice daily to the reddened area underneath your skin folds as needed until the rash clears. 60 g 1     methocarbamol (ROBAXIN) 750 MG tablet TAKE 1 TABLETS (750 MG) BY MOUTH 3 TIMES DAILY AS NEEDED FOR MUSCLE SPASMS 60 tablet 1     Multiple Vitamins-Minerals (MULTI FOR HER PO) Take by mouth daily        nortriptyline (PAMELOR) 10 MG capsule Take 3 capsules (30 mg) by mouth At Bedtime 90 capsule 5     nystatin (MYCOSTATIN) 921507 UNIT/GM external powder Apply to affected area twice daily as needed 60 g 2     ORDER FOR DME Respironics REMSTAR 60 Series Auto NQVE1pc H2O, Airfit P10 nasal pillow mask w/xsmall pillows       oxyCODONE-acetaminophen (PERCOCET)  MG per tablet Take 1 tablet by mouth every 6 hours as needed for moderate to severe pain 90 tablet 0     predniSONE (DELTASONE) 20 MG tablet Take 3 tabs by mouth daily x 3 days, then 2 tabs daily x 3 days, then 1 tab daily x 3 days, then 1/2 tab daily x 3 days. 20 tablet 0     pregabalin (LYRICA) 225 MG capsule TAKE 1 CAPSULE BY MOUTH TWICE A DAY 60 capsule 5     rOPINIRole (REQUIP) 0.25 MG tablet Take 1 tablet (0.25 mg) by mouth 2 times daily 60 tablet 5     sodium chloride (OCEAN) 0.65 % nasal spray Inhale 2 sprays in each nostril twice daily until your follow up appointment.       SUMAtriptan (IMITREX) 100 MG tablet Take 1 tablet (100 mg) by mouth at onset of headache for migraine May repeat in 2 hours if needed: max 2/day 9 tablet 2          Current Pain  Medications:  Medications related to Pain Management (From now, onward)    None         Blood thinner:    None          Review of Systems:  Review of Systems   All other systems reviewed and are negative.        Physical Exam:, Obese    There were no vitals filed for this visit.    General Appearance: No distress, seated comfortably, obese  Mood: Euthymic, tearful  HE ENT: Non constricted pupils  Respiratory: Non labored breathing    Skin: No rashes over exposed skin      Laboratory results:  Recent Labs   Lab Test 10/31/19  1002 10/30/18  1040    141   POTASSIUM 4.3 4.0   CHLORIDE 106 107   CO2 24 24   ANIONGAP 5 10   GLC 92 94   BUN 10 8   CR 0.67 0.74   DENISE 9.3 9.0       CBC RESULTS:   Recent Labs   Lab Test 10/31/19  1002   WBC 6.8   RBC 4.39   HGB 13.5   HCT 41.6   MCV 95   MCH 30.8   MCHC 32.5   RDW 12.4            Imaging:       ASSESSMENT AND PLAN:     Encounter Diagnosis:    1.  Neuropathic pain  2.  Small fiber neuropathy awaiting biopsy results      Velma Diaz is a 49 year old y.o. old female who presents to the pain clinic with complex chronic pain in all her extremities    Patient's history, physical exam and imaging are suggestive of their pain is resulting from possible small fiber neuropathy awaiting biopsy results.     I have summarized the patient history, discussed their clinical findings and differential diagnosis with the patient. I have discussed anatomy and possible sources of the pain using models and/or pictures(diagrams). I have discussed multi- disciplinary pain management options with the patient as pertaining to their case as detailed above. All questions and concerns were answered to the best of my ability.     RECOMMENDATIONS:     1. Medications:.  The patient's medications are managed through the neurology clinic and I have not made any changes to the medications. Over the years she has tried multiple Neuramate pathic medications without effective pain contro    2.  Procedure: I have recommended a spinal cord stimulator trial to the patient.  We will provide her with more patient education material.    The possible risks involved with interventional treatment are as following but not limited to- no pain control, worsened pain, stroke, seizure, spinal headache, allergic reactions, introduction of infection or bleeding which may lead to emergent spine surgery, nerve damage, paralysis or even death.    3.  I have referred the patient to pain psychology for evaluation of spinal cord stimulator trial and implant    4.  Her prescription drug monitoring program results are consistent.  She has been on opioids long-term and I would recommend routine urine drug screening.    Follow up: The patient will contact us when she is ready to schedule a spinal cord stimulator trial    AVS sent to pt's Marcy.   Alexandrea Rooney LPN     Referrals:    NEUROPSYCHOLOGY Referral placed for further evaluation for Spinal Cord Stimulation Treatment planning. Please call to schedule your evaluation   [facility]: Adult Neuropsychology Clinic [facility] [phone].    Treatment planning:    Please contact the clinic directly if you have not heard from our office- within 1 week after your Neuropsychology evaluation. [phone], Please ask to speak with the nursing staff.     We asked that our [employer] Rep reach out to you for further education on Spinal cord stimulation therapy.    Recommended Follow up:   As indicated after your Neuropsychology evaluation.     To speak with a nurse, schedule/reschedule/cancel a clinic appointment, or request a medication refill call: [phone]    You can also reach us by Marcy: [email or web address] [email or web address]

## 2020-06-09 NOTE — PROGRESS NOTES
"Velma Diaz is a 49 year old female who is being evaluated via a billable video visit.      The patient has been notified of following:     \"This video visit will be conducted via a call between you and your physician/provider. We have found that certain health care needs can be provided without the need for an in-person physical exam.  This service lets us provide the care you need with a video conversation.  If a prescription is necessary we can send it directly to your pharmacy.  If lab work is needed we can place an order for that and you can then stop by our lab to have the test done at a later time.    Video visits are billed at different rates depending on your insurance coverage.  Please reach out to your insurance provider with any questions.    If during the course of the call the physician/provider feels a video visit is not appropriate, you will not be charged for this service.\"    Patient has given verbal consent for Video visit? Yes      Video-Visit Details    Type of service:  Video Visit    Video Start Time: 9:25 AM  Video End Time: 9:43 AM    Originating Location (pt. Location): Home    Distant Location (provider location):  [facility]     Platform used for Video Visit: Federica Carballo MD        Pain Clinic New Patient Consult Note:    Referring Provider: Daniel   Primary care provider: Srinivasa Hunter    Velma Diaz is a 49 year old y.o. old female who presents to the pain clinic with pain in all extremities.    HPI:  Patient Supplied Answers To the [facility] Pain Questionnaire  [facility] -  Patient Entered Questionnaire/Answers 6/9/2020   What number best describes your pain right now:  0 = No pain  to  10 = Worst pain imaginable 9   How would you describe the pain? burning, sharp, numbness, dull, aching, throbbing   Which of the following worsen your pain? standing, walking, exercise, coughing / sneezing   Which of the following improve or reduce " your pain?  lying down, medication, relaxation   What number best describes your average pain for the past week:  0 = No pain  to  10 = Worst pain imaginable 7   What number best describes your LOWEST pain in past 24 hours:  0 = No pain  to  10 = Worst pain imaginable 4   What number best describes your WORST pain in past 24 hours:  0 = No pain  to  10 = Worst pain imaginable 10   When is your pain worst? Constant   What non-medicine treatments have you already had for your pain? pain clinic, physical therapy, relaxation training, acupuncture, chiropractic care, TENS (electrical stimulator), spine injections (shots), counseling   Have you tried treating your pain with medication?  Yes   Are you currently taking medications for your pain? Yes     The visit was completed via video visit.  The patient was noted to be sitting in her recliner chair comfortably.  She reported the progress of her pain over the years and stated that her daughter is 17 years old now.  She also describes moderate to severe pain with reduced quality of life.  She detailed poor sleep.  She is unable to do anything around the house and finds herself sitting in a chair all day.  She is able to ambulate around the house with support as she has a fear of falling.  She states that she has had falls in the past.  She reports that her boyfriend is her PCA.  She states that her right lower extremity is weaker than her left and symptoms on her right lower extremity are worse and she describes severe burning pain in the area.    She has tried multiple medications and combinations over the years with her neurologist.  She feels that she is having more breakthrough pain currently and is not satisfied with the pain control as she feels her pain is worsening.  Currently she takes up to 3 to 4 tablets of oxycodone per day to help her with her pain.    The patient states that her sister experiences similar symptoms and has a spinal cord stimulator implanted  "1-1/2 years ago.  The patient herself has not been in touch with her sister however her mother reports that the implant has been helpful.    The patient regularly follows up with her chiropractor for upper extremity and lower extremity pain and finds the sessions somewhat helpful    Interval history: Mireille had then mentioned a long standing history of neuropathy at her feet that began after her pregnancy in 2002, so it has been there for 18 years.  It is characterized by burning, tingling and numbness sensations, which are gradually progressing.  When I saw her in 2015, she was feeling abnormal from her toes to the level of the mid-calf of the right, and a little lower on the left leg. Over the past 5 years, those dysesthesias have progressed and are felt up to the hip joint bilaterally. She recently began experiencing numb and burning sensation in the entire right arm, but not the left. She also has some tingling on the right side of her face.  The sensory symptoms seem to be constant, often waking her up at night.   No good explanation has been found to date.  The unilateral right upper extremity symptoms recently raised concerns about cervical radiculopathy or brachial plexopathy, yet her cervical spine MRI done in 08/2019, which I reviewed, did not provide any satisfactory explanation for those symptoms and an EMG done by Dr. Alfaro on 10/02/2019 showed no evidence of right-sided cervical radiculopathy, brachial plexopathy or focal neuropathy.  The patient has undergone carpal tunnel surgery release twice.  Her right arm symptoms do not seem to be positional and they are not affected by turning the head right or left or upside down.  They sometimes are aggravated by sneezing which provokes a whole body \"jolt\" sensation transiently.Mireille had then mentioned a long standing history of neuropathy at her feet that began after her pregnancy in 2002, so it has been there for 18 years.  It is characterized by burning, " "tingling and numbness sensations, which are gradually progressing.  When I saw her in 2015, she was feeling abnormal from her toes to the level of the mid-calf of the right, and a little lower on the left leg. Over the past 5 years, those dysesthesias have progressed and are felt up to the hip joint bilaterally. She recently began experiencing numb and burning sensation in the entire right arm, but not the left. She also has some tingling on the right side of her face.  The sensory symptoms seem to be constant, often waking her up at night.   No good explanation has been found to date.  The unilateral right upper extremity symptoms recently raised concerns about cervical radiculopathy or brachial plexopathy, yet her cervical spine MRI done in 08/2019, which I reviewed, did not provide any satisfactory explanation for those symptoms and an EMG done by Dr. Alfaro on 10/02/2019 showed no evidence of right-sided cervical radiculopathy, brachial plexopathy or focal neuropathy.  The patient has undergone carpal tunnel surgery release twice.  Her right arm symptoms do not seem to be positional and they are not affected by turning the head right or left or upside down.  They sometimes are aggravated by sneezing which provokes a whole body \"jolt\" sensation transiently.      Tests/Imaging reviewed with the patient:    MRI results for the lumbar and thoracic spine reviewed in the chart    Significant Medical History:   Past Medical History:   Diagnosis Date     Chronic pain syndrome 11/20/2015    She takes up to 90 oxycodone tablets per month for her chronic pain      Depressive disorder 2000     History of cleft palate with cleft lip     cleft lip and palate, and has had 27 operations per the patient     Hyperlipidemia with target LDL less than 130 10/26/2015     Morbid obesity with BMI of 40.0-44.9, adult (H) 10/26/2015     Neuropathy      MAYA (obstructive sleep apnea)/Hypoventilation Syndrome 2/24/2014    Study Date: " 2/13/2014- (245.1 lbs) Sleep Associated Hypoventilation  suggested with baseline PCO2 42 mmHg, maximum PCO2 55 mmHg. Lowest oxygen saturation 85.0% Apnea/Hypopnea Index 5.5 events per hour.  REM AHI 37.2.  The supine AHI is 13.8. RDI 6.7 Sleep study 3/2014 (245#)- CPAP 6 cm effective, Transcutaneous CO2 Monitoring (TCM) within normal limits.          Skin cancer of anterior chest 2011    Basal cell on chest     TMJ (temporomandibular joint disorder)           Past Surgical History:  Past Surgical History:   Procedure Laterality Date     ANKLE SURGERY  7/13    Left for torn tendons & loose bone chips     ARTHROSCOPY KNEE  1986    Right knee     BACK SURGERY       Basal cell carcinoma  2011    Removal from the chest     BIOPSY       C VAGINAL HYSTERECTOMY  2011     CARPAL TUNNEL RELEASE RT/LT  ~2010    Bilateral     COLONOSCOPY  2016     COSMETIC SURGERY       cysto with right ureteroscopy, stone manipulation, double J stent removal  10/04/2018    Dr. Cisneros -- removal of right kidney stone     cysto, right retrograde pyelogram, right ureteral stent Right 09/2018    for obstructing right kidney stone     DECOMPRESSION CUBITAL TUNNEL Left 8/28/2015    Procedure: DECOMPRESSION CUBITAL TUNNEL;  Surgeon: Gus Donato MD;  [facility]: [facility]     ENT SURGERY  1975    Tonsillectomy and Adenoids     GYN SURGERY  2011    Hysterectomy     HC REPAIR PERONEAL TENDONS  7/13     ORTHOPEDIC SURGERY  2011, 2011    Bilateral CTR     PE TUBES      yearly times 10 years     REPAIR CLEFT PALATE CHILD      Age 3 months & afterwards (multiple surgeries)     SOFT TISSUE SURGERY  2013          Family History:  Family History   Problem Relation Age of Onset     Alzheimer Disease Paternal Grandmother 60     Cerebrovascular Disease Paternal Grandmother      Neurologic Disorder Sister 20        migraines     Depression/Anxiety Sister      Depression Sister      Neurologic Disorder Son 14        migraines     Asthma Son      Heart Disease  Mother      Osteoporosis Mother      Obesity Mother      Cancer Father      Alcohol/Drug Father      Other Cancer Father         saliva glands & skin CA      Hypertension Father      Diabetes Maternal Grandmother      Breast Cancer Maternal Grandmother      Obesity Maternal Grandmother      Other Cancer Maternal Grandfather         skin cancer     Cancer - colorectal Other         Aunt     Asthma Daughter      Asthma Daughter      Asthma Son      Thyroid Disease Sister      Colon Cancer Other      Obesity Sister      Glaucoma No family hx of      Macular Degeneration No family hx of           Social History:  Social History     Socioeconomic History     Marital status:      Spouse name: Not on file     Number of children: 3     Years of education: 12     Highest education level: Not on file   Occupational History     Occupation: Personal Care      Comment: Unemployed. 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   Social Needs     Financial resource strain: Not on file     Food insecurity     Worry: Not on file     Inability: Not on file     Transportation needs     Medical: Not on file     Non-medical: Not on file   Tobacco Use     Smoking status: Former Smoker     Packs/day: 0.50     Years: 15.00     Pack years: 7.50     Types: Cigarettes     Last attempt to quit: 2015     Years since quittin.3     Smokeless tobacco: Never Used   Substance and Sexual Activity     Alcohol use: No     Alcohol/week: 0.0 standard drinks     Comment: Quit in      Drug use: No     Sexual activity: Yes     Partners: Male     Birth control/protection: Female Surgical   Lifestyle     Physical activity     Days per week: Not on file     Minutes per session: Not on file     Stress: Not on file   Relationships     Social connections     Talks on phone: Not on file     Gets together: Not on file     Attends Restoration service: Not on file     Active member of club or organization: Not on file      Attends meetings of clubs or organizations: Not on file     Relationship status: Not on file     Intimate partner violence     Fear of current or ex partner: Not on file     Emotionally abused: Not on file     Physically abused: Not on file     Forced sexual activity: Not on file   Other Topics Concern     Parent/sibling w/ CABG, MI or angioplasty before 65F 55M? No   Social History Narrative     Not on file     Social History     Social History Narrative     Not on file          Allergies:  No Known Allergies    Current Medications:   Current Outpatient Medications   Medication Sig Dispense Refill     acetaminophen (TYLENOL) 325 MG tablet Take 2 tablets (650 mg) by mouth every 4 hours as needed for other (mild pain) 100 tablet 0     Cyanocobalamin (B-12 DOTS SL) Place under the tongue daily       diphenhydrAMINE (BENADRYL) 25 MG tablet Take 1 tablet (25 mg) by mouth nightly as needed to help with sleep 30 tablet 1     ferrous sulfate (FEROSUL) 325 (65 Fe) MG tablet TAKE 1 TABLET (325 MG) BY MOUTH DAILY (WITH BREAKFAST) 30 tablet 5     furosemide (LASIX) 20 MG tablet TAKE 1 TABLET BY MOUTH EVERY DAY 30 tablet 11     ketoconazole (NIZORAL) 2 % external cream Apply topically twice daily to the reddened area underneath your skin folds as needed until the rash clears. 60 g 1     methocarbamol (ROBAXIN) 750 MG tablet TAKE 1 TABLETS (750 MG) BY MOUTH 3 TIMES DAILY AS NEEDED FOR MUSCLE SPASMS 60 tablet 1     Multiple Vitamins-Minerals (MULTI FOR HER PO) Take by mouth daily        nortriptyline (PAMELOR) 10 MG capsule Take 3 capsules (30 mg) by mouth At Bedtime 90 capsule 5     nystatin (MYCOSTATIN) 907912 UNIT/GM external powder Apply to affected area twice daily as needed 60 g 2     ORDER FOR DME Respironics REMSTAR 60 Series Auto [id number] H2O, Airfit P10 nasal pillow mask w/xsmall pillows       oxyCODONE-acetaminophen (PERCOCET)  MG per tablet Take 1 tablet by mouth every 6 hours as needed for moderate to severe  pain 90 tablet 0     predniSONE (DELTASONE) 20 MG tablet Take 3 tabs by mouth daily x 3 days, then 2 tabs daily x 3 days, then 1 tab daily x 3 days, then 1/2 tab daily x 3 days. 20 tablet 0     pregabalin (LYRICA) 225 MG capsule TAKE 1 CAPSULE BY MOUTH TWICE A DAY 60 capsule 5     rOPINIRole (REQUIP) 0.25 MG tablet Take 1 tablet (0.25 mg) by mouth 2 times daily 60 tablet 5     sodium chloride (OCEAN) 0.65 % nasal spray Inhale 2 sprays in each nostril twice daily until your follow up appointment.       SUMAtriptan (IMITREX) 100 MG tablet Take 1 tablet (100 mg) by mouth at onset of headache for migraine May repeat in 2 hours if needed: max 2/day 9 tablet 2          Current Pain Medications:  Medications related to Pain Management (From now, onward)    None         Blood thinner:    None          Review of Systems:  Review of Systems   All other systems reviewed and are negative.        Physical Exam:, Obese    There were no vitals filed for this visit.    General Appearance: No distress, seated comfortably, obese  Mood: Euthymic, tearful  HE ENT: Non constricted pupils  Respiratory: Non labored breathing    Skin: No rashes over exposed skin      Laboratory results:  Recent Labs   Lab Test 10/31/19  1002 10/30/18  1040    141   POTASSIUM 4.3 4.0   CHLORIDE 106 107   CO2 24 24   ANIONGAP 5 10   GLC 92 94   BUN 10 8   CR 0.67 0.74   DENISE 9.3 9.0       CBC RESULTS:   Recent Labs   Lab Test 10/31/19  1002   WBC 6.8   RBC 4.39   HGB 13.5   HCT 41.6   MCV 95   MCH 30.8   MCHC 32.5   RDW 12.4            Imaging:       ASSESSMENT AND PLAN:     Encounter Diagnosis:    1.  Neuropathic pain  2.  Small fiber neuropathy awaiting biopsy results      Velma Diaz is a 49 year old y.o. old female who presents to the pain clinic with complex chronic pain in all her extremities    Patient's history, physical exam and imaging are suggestive of their pain is resulting from possible small fiber neuropathy awaiting biopsy  results.     I have summarized the patient history, discussed their clinical findings and differential diagnosis with the patient. I have discussed anatomy and possible sources of the pain using models and/or pictures(diagrams). I have discussed multi- disciplinary pain management options with the patient as pertaining to their case as detailed above. All questions and concerns were answered to the best of my ability.     RECOMMENDATIONS:     1. Medications:.  The patient's medications are managed through the neurology clinic and I have not made any changes to the medications. Over the years she has tried multiple Neuramate pathic medications without effective pain contro    2. Procedure: I have recommended a spinal cord stimulator trial to the patient.  We will provide her with more patient education material.    The possible risks involved with interventional treatment are as following but not limited to- no pain control, worsened pain, stroke, seizure, spinal headache, allergic reactions, introduction of infection or bleeding which may lead to emergent spine surgery, nerve damage, paralysis or even death.    3.  I have referred the patient to pain psychology for evaluation of spinal cord stimulator trial and implant    4.  Her prescription drug monitoring program results are consistent.  She has been on opioids long-term and I would recommend routine urine drug screening.    Follow up: The patient will contact us when she is ready to schedule a spinal cord stimulator trial

## 2020-06-09 NOTE — PROGRESS NOTES
"Velma Diaz is a 49 year old female who is being evaluated via a billable video visit.      The patient has been notified of following:     \"This video visit will be conducted via a call between you and your physician/provider. We have found that certain health care needs can be provided without the need for an in-person physical exam.  This service lets us provide the care you need with a video conversation.  If a prescription is necessary we can send it directly to your pharmacy.  If lab work is needed we can place an order for that and you can then stop by our lab to have the test done at a later time.    Video visits are billed at different rates depending on your insurance coverage.  Please reach out to your insurance provider with any questions.    If during the course of the call the physician/provider feels a video visit is not appropriate, you will not be charged for this service.\"    Patient has given verbal consent for Video visit? Yes    How would you like to obtain your AVS? Marcy    Patient would like the video invitation sent by: Text to cell phone: [phone]    Will anyone else be joining your video visit? No      Bethany Sears CMA          "

## 2020-06-10 NOTE — PROGRESS NOTES
AVS sent to pt's Marcy.   Alexandrea Rooney LPN     Referrals:    NEUROPSYCHOLOGY Referral placed for further evaluation for Spinal Cord Stimulation Treatment planning. Please call to schedule your evaluation   P: [facility] [place] [phone].    Treatment planning:    Please contact the clinic directly if you have not heard from our office- within 1 week after your Neuropsychology evaluation. [phone], Please ask to speak with the nursing staff.     We asked that our [employer] Rep reach out to you for further education on Spinal cord stimulation therapy.      Recommended Follow up:   As indicated after your Neuropsychology evaluation.         To speak with a nurse, schedule/reschedule/cancel a clinic appointment, or request a medication refill call: [phone]    You can also reach us by 24Symbols: [email or web address]

## 2020-06-10 NOTE — PATIENT INSTRUCTIONS
Referrals:    NEUROPSYCHOLOGY Referral placed for further evaluation for Spinal Cord Stimulation Treatment planning. Please call to schedule your evaluation   [facility]: [facility] - [place] [phone].    Treatment planning:    Please contact the clinic directly if you have not heard from our office- within 1 week after your Neuropsychology evaluation. [phone], Please ask to speak with the nursing staff.     We asked that our [employer] Rep reach out to you for further education on Spinal cord stimulation therapy.      Recommended Follow up:   As indicated after your Neuropsychology evaluation.         To speak with a nurse, schedule/reschedule/cancel a clinic appointment, or request a medication refill call: [phone]    You can also reach us by netFactor: [email or web address]

## 2020-06-17 ENCOUNTER — THERAPY VISIT (OUTPATIENT)
Dept: CHIROPRACTIC MEDICINE | Facility: CLINIC | Age: 50
End: 2020-06-17
Payer: COMMERCIAL

## 2020-06-17 DIAGNOSIS — G89.29 CHRONIC LOW BACK PAIN: ICD-10-CM

## 2020-06-17 DIAGNOSIS — M99.05 SEGMENTAL DYSFUNCTION OF PELVIC REGION: Primary | ICD-10-CM

## 2020-06-17 DIAGNOSIS — M99.03 SEGMENTAL DYSFUNCTION OF LUMBAR REGION: ICD-10-CM

## 2020-06-17 DIAGNOSIS — M62.838 SPASM OF MUSCLE: ICD-10-CM

## 2020-06-17 DIAGNOSIS — M99.02 THORACIC SEGMENT DYSFUNCTION: ICD-10-CM

## 2020-06-17 DIAGNOSIS — M54.50 CHRONIC LOW BACK PAIN: ICD-10-CM

## 2020-06-17 PROCEDURE — 98941 CHIROPRACT MANJ 3-4 REGIONS: CPT | Mod: AT | Performed by: CHIROPRACTOR

## 2020-06-17 PROCEDURE — 97810 ACUP 1/> WO ESTIM 1ST 15 MIN: CPT | Performed by: CHIROPRACTOR

## 2020-06-17 NOTE — PROGRESS NOTES
Visit #: 11 in 2020    Subjective:  Velma Diaz is a 48 year old female who is seen in f/u up for:        Segmental dysfunction of pelvic region  Lumbago  Segmental dysfunction of lumbar region  Spasm of muscle  Thoracic segment dysfunction.     Since last visit on 6/8/2020,  Velma Diaz reports:    Area of chief complaint:  Lumbar :  Symptoms are graded at 4/10. The quality is described as stiff, and achey.  Motion has  Increased but not normal.  Mireille reports that her low back is a little stiff and sore and she is having some symptoms down her left leg.  Overall she is feeling better but does mention that she was in the ED a few days ago for neck and left arm pain.  She was given a 10 course of prednisone.  Since that time she has been feeling a little improved in her low back.    Objective:  The following was observed:    P: palpatory tenderness Piriformis R>>L, traps  A: static palpation demonstrates intersegmental asymmetry   thoracic, lumbar, pelvis  R: motion palpation notes restricted motion,   T10, T11 , T12 , L4 , L5  and PSIS Right   T: hypertonicity at: Piriformis R>>L    Segmental spinal dysfunction/restrictions found at:   T10, T11 , T12 , L4 , L5  and PSIS Right       Assessment:    Diagnoses:      1. Segmental dysfunction of pelvic region    2. Lumbago    3. Segmental dysfunction of lumbar region    4. Spasm of muscle    5. Thoracic segment dysfunction        Patient's condition:  Patient had restrictions pre-manipulation    Treatment effectiveness:  Post manipulation there is better intersegmental movement and Patient claims to feel looser post manipulation      Procedures:  CMT:  04914 Chiropractic manipulative treatment 3-4 regions performed   Thoracic: Activator, T10, T11, T12, Prone  Lumbar: Activator, L4, L5, Prone  Pelvis: Activator, PSIS Right , Prone    Modalities:  22957: Acupuncture, for 15 minutes:  Points: B25, B27, GV3, K3, B62, SI3  Ahsi point in piriformis  For 15  minutes    Therapeutic procedures:  None    Response to Treatment  Reduction in symptoms as reported by patient    Prognosis: Good    Progress towards Goals: Patient is making progress towards the goal.     Recommendations:    Instructions:  ice 20 minutes every other hour as needed    Follow-up:    Return to care in one week.

## 2020-06-24 ENCOUNTER — THERAPY VISIT (OUTPATIENT)
Dept: CHIROPRACTIC MEDICINE | Facility: CLINIC | Age: 50
End: 2020-06-24
Payer: COMMERCIAL

## 2020-06-24 ENCOUNTER — OFFICE VISIT (OUTPATIENT)
Dept: PODIATRY | Facility: CLINIC | Age: 50
End: 2020-06-24
Payer: COMMERCIAL

## 2020-06-24 ENCOUNTER — ANCILLARY PROCEDURE (OUTPATIENT)
Dept: GENERAL RADIOLOGY | Facility: CLINIC | Age: 50
End: 2020-06-24
Attending: PODIATRIST
Payer: COMMERCIAL

## 2020-06-24 VITALS
SYSTOLIC BLOOD PRESSURE: 138 MMHG | DIASTOLIC BLOOD PRESSURE: 78 MMHG | BODY MASS INDEX: 42.51 KG/M2 | HEART RATE: 88 BPM | WEIGHT: 240 LBS

## 2020-06-24 DIAGNOSIS — M72.2 PLANTAR FASCIITIS: Primary | ICD-10-CM

## 2020-06-24 DIAGNOSIS — M79.673 PAIN OF FOOT, UNSPECIFIED LATERALITY: ICD-10-CM

## 2020-06-24 DIAGNOSIS — M79.672 PAIN, FOOT, LEFT, CHRONIC: ICD-10-CM

## 2020-06-24 DIAGNOSIS — M99.05 SEGMENTAL DYSFUNCTION OF PELVIC REGION: Primary | ICD-10-CM

## 2020-06-24 DIAGNOSIS — M99.03 SEGMENTAL DYSFUNCTION OF LUMBAR REGION: ICD-10-CM

## 2020-06-24 DIAGNOSIS — M99.02 THORACIC SEGMENT DYSFUNCTION: ICD-10-CM

## 2020-06-24 DIAGNOSIS — M62.838 SPASM OF MUSCLE: ICD-10-CM

## 2020-06-24 DIAGNOSIS — M54.50 CHRONIC LOW BACK PAIN: ICD-10-CM

## 2020-06-24 DIAGNOSIS — G89.29 CHRONIC LOW BACK PAIN: ICD-10-CM

## 2020-06-24 DIAGNOSIS — G89.29 PAIN, FOOT, LEFT, CHRONIC: ICD-10-CM

## 2020-06-24 PROBLEM — J34.89 NASAL SEPTAL PERFORATION: Status: ACTIVE | Noted: 2020-06-24

## 2020-06-24 PROBLEM — N13.8 URINARY TRACT OBSTRUCTION DUE TO KIDNEY STONE: Status: ACTIVE | Noted: 2018-09-28

## 2020-06-24 PROBLEM — N20.0 URINARY TRACT OBSTRUCTION DUE TO KIDNEY STONE: Status: ACTIVE | Noted: 2018-09-28

## 2020-06-24 PROBLEM — Q35.9 CLEFT PALATE: Status: ACTIVE | Noted: 2020-06-24

## 2020-06-24 PROBLEM — M54.16 ACUTE RIGHT LUMBAR RADICULOPATHY: Status: ACTIVE | Noted: 2018-06-05

## 2020-06-24 PROBLEM — Q67.4: Status: ACTIVE | Noted: 2020-06-24

## 2020-06-24 PROBLEM — J34.3 HYPERTROPHY OF BOTH INFERIOR NASAL TURBINATES: Status: ACTIVE | Noted: 2020-06-24

## 2020-06-24 PROBLEM — M25.551 RIGHT HIP PAIN: Status: ACTIVE | Noted: 2018-03-14

## 2020-06-24 PROCEDURE — 73630 X-RAY EXAM OF FOOT: CPT | Mod: LT

## 2020-06-24 PROCEDURE — 20550 NJX 1 TENDON SHEATH/LIGAMENT: CPT | Performed by: PODIATRIST

## 2020-06-24 PROCEDURE — 98941 CHIROPRACT MANJ 3-4 REGIONS: CPT | Mod: AT | Performed by: CHIROPRACTOR

## 2020-06-24 PROCEDURE — 99213 OFFICE O/P EST LOW 20 MIN: CPT | Mod: 25 | Performed by: PODIATRIST

## 2020-06-24 PROCEDURE — 97810 ACUP 1/> WO ESTIM 1ST 15 MIN: CPT | Performed by: CHIROPRACTOR

## 2020-06-24 RX ORDER — OXYMETAZOLINE HCL 0.05 %
SPRAY, NON-AEROSOL (ML) NASAL
COMMUNITY
Start: 2020-05-29 | End: 2023-11-20

## 2020-06-24 RX ORDER — DIPHENHYDRAMINE HYDROCHLORIDE 25 MG/1
CAPSULE ORAL
COMMUNITY
Start: 2020-06-13 | End: 2020-11-11

## 2020-06-24 NOTE — PROGRESS NOTES
Visit #: 12 in 2020    Subjective:  Velma Diaz is a 48 year old female who is seen in f/u up for:        Segmental dysfunction of pelvic region  Lumbago  Segmental dysfunction of lumbar region  Spasm of muscle  Thoracic segment dysfunction.     Since last visit on 6/17/2020,  Velma Diaz reports:    Area of chief complaint:  Lumbar :  Symptoms are graded at 4/10. The quality is described as stiff, and achey.  Motion has increased but not normal.  Mireille reports that her low back is feeling better.  She is having less pain and is just feeling a little stiff.  She continue to have some left leg issues.  She is just finishing her course of prednisone today.  Mireille has been scheduled for a neuropsychologist.  This is in an effort to have a spinal stimulator.    Objective:  The following was observed:    P: palpatory tenderness Piriformis R>>L, traps  A: static palpation demonstrates intersegmental asymmetry   thoracic, lumbar, pelvis  R: motion palpation notes restricted motion,   T10, T11 , T12 , L4 , L5  and PSIS Right   T: hypertonicity at: Piriformis R>>L    Segmental spinal dysfunction/restrictions found at:   T10, T11 , T12 , L4 , L5  and PSIS Right       Assessment:    Diagnoses:      1. Segmental dysfunction of pelvic region    2. Lumbago    3. Segmental dysfunction of lumbar region    4. Spasm of muscle    5. Thoracic segment dysfunction        Patient's condition:  Patient had restrictions pre-manipulation    Treatment effectiveness:  Post manipulation there is better intersegmental movement and Patient claims to feel looser post manipulation      Procedures:  CMT:  67483 Chiropractic manipulative treatment 3-4 regions performed   Thoracic: Activator, T10, T11, T12, Prone  Lumbar: Activator, L4, L5, Prone  Pelvis: Activator, PSIS Right , Prone    Modalities:  34042: Acupuncture, for 15 minutes:  Points: B25, B27, GV3, K3, B62, SI3  Ahsi point in piriformis  For 15 minutes    Therapeutic  procedures:  None    Response to Treatment  Reduction in symptoms as reported by patient    Prognosis: Good    Progress towards Goals: Patient is making progress towards the goal.     Recommendations:    Instructions:  ice 20 minutes every other hour as needed    Follow-up:    Return to care in one week.

## 2020-06-24 NOTE — PATIENT INSTRUCTIONS
We wish you continued good healing. If you have any questions or concerns, please do not hesitate to contact us at [phone]    Please remember to call and schedule a follow up appointment if one was recommended at your earliest convenience.   PODIATRY CLINIC HOURS  TELEPHONE NUMBER    Dr. Axel Juarez D.P.M [facility]    Clinics:  [facility]    Dahlia Batres [facility] [address]   Tuesday 1PM-6PM  [place]  Wednesday 7AM-2PM  [facility]  Thursday 10AM-6PM  [place]  Friday 7AM-3PM  [place]  Specialty schedulers:   [phone] to make an appointment with any Specialty Provider.        Urgent Care locations:    [facility] Monday-Friday 5 pm - 9 pm. Saturday-Sunday 9 am -5pm    Monday-Friday 11 am - 9 pm Saturday 9 am - 5 pm     Monday-Sunday 12 noon-8PM [phone]     If you need a medication refill, please contact us you may need lab work and/or a follow up visit prior to your refill (i.e. Antifungal medications).    [employer] (secure e-mail communication and access to your chart) to send a message or to make an appointment.    If MRI needed please call [place] Morales at [phone]

## 2020-06-24 NOTE — LETTER
6/24/2020         RE: Velma Diaz  [address]  [place] [address]        Dear Colleague,    Thank you for referring your patient, Velma Diaz, to the [facility]. Please see a copy of my visit note below.    Subjective:    Patient is seen today with a 2 month(s) hx of left heel pain.  Points to the plantarmedial calcaneal tubercle.  Most painful upon rising in a.m. or after prolonged sitting.  Aggravated by activity and relieved by rest.  Has tried changing shoewear, OTC inserts, without much success.  Denies erythema, edema, ecchymosis, numbness, loss of strength.  Patient states she is walking abnormally and this is starting to bother her back.    ROS: See above     No Known Allergies    Current Outpatient Medications   Medication Sig Dispense Refill     acetaminophen (TYLENOL) 325 MG tablet Take 2 tablets (650 mg) by mouth every 4 hours as needed for other (mild pain) 100 tablet 0     BANOPHEN 25 MG capsule        [employer] NASAL SPRAY 0.05 % nasal spray PLEASE SEE ATTACHED FOR DETAILED DIRECTIONS       Cyanocobalamin (B-12 DOTS SL) Place under the tongue daily       diphenhydrAMINE (BENADRYL) 25 MG tablet Take 1 tablet (25 mg) by mouth nightly as needed to help with sleep 30 tablet 1     ferrous sulfate (FEROSUL) 325 (65 Fe) MG tablet TAKE 1 TABLET (325 MG) BY MOUTH DAILY (WITH BREAKFAST) 30 tablet 5     furosemide (LASIX) 20 MG tablet TAKE 1 TABLET BY MOUTH EVERY DAY 30 tablet 11     ketoconazole (NIZORAL) 2 % external cream Apply topically twice daily to the reddened area underneath your skin folds as needed until the rash clears. 60 g 1     methocarbamol (ROBAXIN) 750 MG tablet TAKE 1 TABLETS (750 MG) BY MOUTH 3 TIMES DAILY AS NEEDED FOR MUSCLE SPASMS 60 tablet 1     Multiple Vitamins-Minerals (MULTI FOR HER PO) Take by mouth daily        nortriptyline (PAMELOR) 10 MG capsule Take 3 capsules (30 mg) by mouth At Bedtime 90 capsule 5     nystatin (MYCOSTATIN) 628387 UNIT/GM external  powder Apply to affected area twice daily as needed 60 g 2     ORDER FOR DME Respironics REMSTAR 60 Series Auto [id number] H2O, Airfit P10 nasal pillow mask w/xsmall pillows       oxyCODONE-acetaminophen (PERCOCET)  MG per tablet Take 1 tablet by mouth every 6 hours as needed for moderate to severe pain 90 tablet 0     predniSONE (DELTASONE) 20 MG tablet Take 3 tabs by mouth daily x 3 days, then 2 tabs daily x 3 days, then 1 tab daily x 3 days, then 1/2 tab daily x 3 days. 20 tablet 0     pregabalin (LYRICA) 225 MG capsule TAKE 1 CAPSULE BY MOUTH TWICE A DAY 60 capsule 5     rOPINIRole (REQUIP) 0.25 MG tablet Take 1 tablet (0.25 mg) by mouth 2 times daily 60 tablet 5     SUMAtriptan (IMITREX) 100 MG tablet Take 1 tablet (100 mg) by mouth at onset of headache for migraine May repeat in 2 hours if needed: max 2/day 9 tablet 2       Patient Active Problem List   Diagnosis     History of cleft palate with cleft lip     MAYA (obstructive sleep apnea)/Hypoventilation Syndrome     Major depressive disorder, recurrent episode, moderate (H)     Paresthesias     Health Care Home     Vitamin D deficiency     Morbid obesity with BMI of 40.0-44.9, adult (H)     Hyperlipidemia with target LDL less than 130     Intervertebral disc prolapse with impingement     Nonallopathic lesion of lumbar region     Chronic pain syndrome     Restless legs syndrome (RLS)     Insomnia     Migraine     Chronic bilateral back pain, unspecified back location     Chronic pain of left knee     ROSE MARIE (generalized anxiety disorder)     Idiopathic peripheral neuropathy     Urinary tract obstruction due to kidney stone     SI (sacroiliac) joint dysfunction     Right hip pain     Nasal septal perforation     Acute right lumbar radiculopathy     Hypertrophy of both inferior nasal turbinates     Congenital nasal septum deviation     Cleft palate       Past Medical History:   Diagnosis Date     Chronic pain syndrome 11/20/2015    She takes up to 90 oxycodone  tablets per month for her chronic pain      Depressive disorder 2000     History of cleft palate with cleft lip     cleft lip and palate, and has had 27 operations per the patient     Hyperlipidemia with target LDL less than 130 10/26/2015     Morbid obesity with BMI of 40.0-44.9, adult (H) 10/26/2015     Neuropathy      MAYA (obstructive sleep apnea)/Hypoventilation Syndrome 2/24/2014    Study Date: 2/13/2014- (245.1 lbs) Sleep Associated Hypoventilation  suggested with baseline PCO2 42 mmHg, maximum PCO2 55 mmHg. Lowest oxygen saturation 85.0% Apnea/Hypopnea Index 5.5 events per hour.  REM AHI 37.2.  The supine AHI is 13.8. RDI 6.7 Sleep study 3/2014 (245#)- CPAP 6 cm effective, Transcutaneous CO2 Monitoring (TCM) within normal limits.          Skin cancer of anterior chest 2011    Basal cell on chest     TMJ (temporomandibular joint disorder)        Past Surgical History:   Procedure Laterality Date     ANKLE SURGERY  7/13    Left for torn tendons & loose bone chips     ARTHROSCOPY KNEE  1986    Right knee     BACK SURGERY       Basal cell carcinoma  2011    Removal from the chest     BIOPSY       C VAGINAL HYSTERECTOMY  2011     CARPAL TUNNEL RELEASE RT/LT  ~2010    Bilateral     COLONOSCOPY  2016     COSMETIC SURGERY       cysto with right ureteroscopy, stone manipulation, double J stent removal  10/04/2018    Dr. Cisneros -- removal of right kidney stone     cysto, right retrograde pyelogram, right ureteral stent Right 09/2018    for obstructing right kidney stone     DECOMPRESSION CUBITAL TUNNEL Left 8/28/2015    Procedure: DECOMPRESSION CUBITAL TUNNEL;  Surgeon: Gus Donato MD;  Location: [facility]     ENT SURGERY  1975    Tonsillectomy and Adenoids     GYN SURGERY  2011    Hysterectomy     HC REPAIR PERONEAL TENDONS  7/13     ORTHOPEDIC SURGERY  2011, 2011    Bilateral CTR     PE TUBES      yearly times 10 years     REPAIR CLEFT PALATE CHILD      Age 3 months & afterwards (multiple surgeries)     SOFT  TISSUE SURGERY  2013       Family History   Problem Relation Age of Onset     Alzheimer Disease Paternal Grandmother 60     Cerebrovascular Disease Paternal Grandmother      Neurologic Disorder Sister 20        migraines     Depression/Anxiety Sister      Depression Sister      Neurologic Disorder Son 14        migraines     Asthma Son      Heart Disease Mother      Osteoporosis Mother      Obesity Mother      Cancer Father      Alcohol/Drug Father      Other Cancer Father         saliva glands & skin CA      Hypertension Father      Diabetes Maternal Grandmother      Breast Cancer Maternal Grandmother      Obesity Maternal Grandmother      Other Cancer Maternal Grandfather         skin cancer     Cancer - colorectal Other         Aunt     Asthma Daughter      Asthma Daughter      Asthma Son      Thyroid Disease Sister      Colon Cancer Other      Obesity Sister      Glaucoma No family hx of      Macular Degeneration No family hx of        Social History     Tobacco Use     Smoking status: Former Smoker     Packs/day: 0.50     Years: 15.00     Pack years: 7.50     Types: Cigarettes     Last attempt to quit: 2015     Years since quittin.3     Smokeless tobacco: Never Used   Substance Use Topics     Alcohol use: No     Alcohol/week: 0.0 standard drinks     Comment: Quit in          Objective:    Vitals: /78   Pulse 88   Wt 108.9 kg (240 lb)   LMP  (LMP Unknown)   BMI 42.51 kg/m    BMI: Body mass index is 42.51 kg/m .  Height: Data Unavailable    Constitutional/ general:  Pt is in no apparent distress, appears well-nourished.  Cooperative with history and physical exam.     Psych:  The patient answered questions appropriately.  Normal affect.  Seems to have reasonable expectations, in terms of treatment.     Eyes:  Visual scanning/ tracking without deficit.     Ears:  Response to auditory stimuli is normal.  No hearing aid devices.  Auricles in proper alignment.     Lymphatic:   Popliteal lymph nodes not enlarged.     Lungs:  Non labored breathing, non labored speech. No cough.  No audible wheezing. Even, quiet breathing.       Vascular:  Pedal pulses are palpable bilaterally for both the DP and PT arteries.  CFT < 3 sec.  No edema.  Pedal hair growth noted.     Neuro:  Alert and oriented x 3. Coordinated gait.  Light touch sensation is intact to the L4, L5, S1 distributions. No obvious deficits.  No evidence of neurological-based weakness, spasticity, or contracture in the lower extremities.     Derm: Normal texture and turgor.  No erythema, ecchymosis, or cyanosis.  No open lesions.     Musculoskeletal:    Lower extremity muscle strength is normal.  Patient is ambulatory without an assistive device or brace.  No gross deformities.      Normal arch with weightbearing.   ROM is within normal limits.  Negative tinel's sign.  No side to side compression pain of the calcaneus.  Pain upon palpation to the left plantarmedial  of the calcaneus.  No erythema, edema, ecchymosis, or subcutaneous masses noted.  No pain on palpation or stressing any tendons.  Negative Tinel's sign      Radiographic Exam:  X-Ray Findings:  I personally reviewed the films.  Normal    Assessment:  Plantar Fasciitis left foot     Plan:  X-rays taken today.  Discussed etiology and treatment options with the patient.  The potential causes and nature of plantar fasciitis were discussed with the patient.  We reviewed the natural history/prognosis of the condition and risks if left untreated.  These include chronic pain, other sites of pain due to gait changes, and potential plantar fascial rupture.      We discussed possible causes of the condition as it relates to the patients specific situation.      Conservative treatment options were reviewed:  appropriate shoes, avoidance of barefoot walking, inserts/orthoses, stretching, ice, massage, immobilization and NSAIDs.     We also reviewed the options of injection therapy and  surgery.  However, it was made clear that surgery is only considered when conservative therapy fails.  The risks and benefits of injection therapy, and surgery were discussed.  Dispensed handout.       After thorough discussion and answering all questions, the patient elected to modifying activities, supportive shoes, ice, stretching, and not going barefoot.  Good house shoes at all times and I made recommendations.   Risks complications, and efficacy of cortisone injection discussed.  Patient understands there is a risk of plantar fascial rupture.  After written consent, sterile prep, injected heel with 1 cc 1% lidocaine plain and 1 cc kenalog 10 mg.  Return to clinic prn.      Axel Juarez DPM DPM, FACFAS      Again, thank you for allowing me to participate in the care of your patient.        Sincerely,        Axel Juarez DPM

## 2020-06-26 NOTE — PROGRESS NOTES
Subjective:    Patient is seen today with a 2 month(s) hx of left heel pain.  Points to the plantarmedial calcaneal tubercle.  Most painful upon rising in a.m. or after prolonged sitting.  Aggravated by activity and relieved by rest.  Has tried changing shoewear, OTC inserts, without much success.  Denies erythema, edema, ecchymosis, numbness, loss of strength.  Patient states she is walking abnormally and this is starting to bother her back.  Patient also having pain and swelling in her toes.  She points to the left second third toes.    ROS: See above     No Known Allergies    Current Outpatient Medications   Medication Sig Dispense Refill     acetaminophen (TYLENOL) 325 MG tablet Take 2 tablets (650 mg) by mouth every 4 hours as needed for other (mild pain) 100 tablet 0     BANOPHEN 25 MG capsule        [facility] NASAL SPRAY 0.05 % nasal spray PLEASE SEE ATTACHED FOR DETAILED DIRECTIONS       Cyanocobalamin (B-12 DOTS SL) Place under the tongue daily       diphenhydrAMINE (BENADRYL) 25 MG tablet Take 1 tablet (25 mg) by mouth nightly as needed to help with sleep 30 tablet 1     ferrous sulfate (FEROSUL) 325 (65 Fe) MG tablet TAKE 1 TABLET (325 MG) BY MOUTH DAILY (WITH BREAKFAST) 30 tablet 5     furosemide (LASIX) 20 MG tablet TAKE 1 TABLET BY MOUTH EVERY DAY 30 tablet 11     ketoconazole (NIZORAL) 2 % external cream Apply topically twice daily to the reddened area underneath your skin folds as needed until the rash clears. 60 g 1     methocarbamol (ROBAXIN) 750 MG tablet TAKE 1 TABLETS (750 MG) BY MOUTH 3 TIMES DAILY AS NEEDED FOR MUSCLE SPASMS 60 tablet 1     Multiple Vitamins-Minerals (MULTI FOR HER PO) Take by mouth daily        nortriptyline (PAMELOR) 10 MG capsule Take 3 capsules (30 mg) by mouth At Bedtime 90 capsule 5     nystatin (MYCOSTATIN) 018855 UNIT/GM external powder Apply to affected area twice daily as needed 60 g 2     ORDER FOR DME Respironics REMSTAR 60 Series Auto [id number] H2O, Airfit P10 nasal  pillow mask w/xsmall pillows       oxyCODONE-acetaminophen (PERCOCET)  MG per tablet Take 1 tablet by mouth every 6 hours as needed for moderate to severe pain 90 tablet 0     predniSONE (DELTASONE) 20 MG tablet Take 3 tabs by mouth daily x 3 days, then 2 tabs daily x 3 days, then 1 tab daily x 3 days, then 1/2 tab daily x 3 days. 20 tablet 0     pregabalin (LYRICA) 225 MG capsule TAKE 1 CAPSULE BY MOUTH TWICE A DAY 60 capsule 5     rOPINIRole (REQUIP) 0.25 MG tablet Take 1 tablet (0.25 mg) by mouth 2 times daily 60 tablet 5     SUMAtriptan (IMITREX) 100 MG tablet Take 1 tablet (100 mg) by mouth at onset of headache for migraine May repeat in 2 hours if needed: max 2/day 9 tablet 2       Patient Active Problem List   Diagnosis     History of cleft palate with cleft lip     MAAY (obstructive sleep apnea)/Hypoventilation Syndrome     Major depressive disorder, recurrent episode, moderate (H)     Paresthesias     Health Care Home     Vitamin D deficiency     Morbid obesity with BMI of 40.0-44.9, adult (H)     Hyperlipidemia with target LDL less than 130     Intervertebral disc prolapse with impingement     Nonallopathic lesion of lumbar region     Chronic pain syndrome     Restless legs syndrome (RLS)     Insomnia     Migraine     Chronic bilateral back pain, unspecified back location     Chronic pain of left knee     ROSE MARIE (generalized anxiety disorder)     Idiopathic peripheral neuropathy     Urinary tract obstruction due to kidney stone     SI (sacroiliac) joint dysfunction     Right hip pain     Nasal septal perforation     Acute right lumbar radiculopathy     Hypertrophy of both inferior nasal turbinates     Congenital nasal septum deviation     Cleft palate       Past Medical History:   Diagnosis Date     Chronic pain syndrome 11/20/2015    She takes up to 90 oxycodone tablets per month for her chronic pain      Depressive disorder 2000     History of cleft palate with cleft lip     cleft lip and palate, and  has had 27 operations per the patient     Hyperlipidemia with target LDL less than 130 10/26/2015     Morbid obesity with BMI of 40.0-44.9, adult (H) 10/26/2015     Neuropathy      MAYA (obstructive sleep apnea)/Hypoventilation Syndrome 2/24/2014    Study Date: 2/13/2014- (245.1 lbs) Sleep Associated Hypoventilation  suggested with baseline PCO2 42 mmHg, maximum PCO2 55 mmHg. Lowest oxygen saturation 85.0% Apnea/Hypopnea Index 5.5 events per hour.  REM AHI 37.2.  The supine AHI is 13.8. RDI 6.7 Sleep study 3/2014 (245#)- CPAP 6 cm effective, Transcutaneous CO2 Monitoring (TCM) within normal limits.          Skin cancer of anterior chest 2011    Basal cell on chest     TMJ (temporomandibular joint disorder)        Past Surgical History:   Procedure Laterality Date     ANKLE SURGERY  7/13    Left for torn tendons & loose bone chips     ARTHROSCOPY KNEE  1986    Right knee     BACK SURGERY       Basal cell carcinoma  2011    Removal from the chest     BIOPSY       C VAGINAL HYSTERECTOMY  2011     CARPAL TUNNEL RELEASE RT/LT  ~2010    Bilateral     COLONOSCOPY  2016     COSMETIC SURGERY       cysto with right ureteroscopy, stone manipulation, double J stent removal  10/04/2018    Dr. Cisneros -- removal of right kidney stone     cysto, right retrograde pyelogram, right ureteral stent Right 09/2018    for obstructing right kidney stone     DECOMPRESSION CUBITAL TUNNEL Left 8/28/2015    Procedure: DECOMPRESSION CUBITAL TUNNEL;  Surgeon: Gus Donato MD;  [facility]     ENT SURGERY  1975    Tonsillectomy and Adenoids     GYN SURGERY  2011    Hysterectomy     HC REPAIR PERONEAL TENDONS  7/13     ORTHOPEDIC SURGERY  2011, 2011    Bilateral CTR     PE TUBES      yearly times 10 years     REPAIR CLEFT PALATE CHILD      Age 3 months & afterwards (multiple surgeries)     SOFT TISSUE SURGERY  2013       Family History   Problem Relation Age of Onset     Alzheimer Disease Paternal Grandmother 60     Cerebrovascular  Disease Paternal Grandmother      Neurologic Disorder Sister 20        migraines     Depression/Anxiety Sister      Depression Sister      Neurologic Disorder Son 14        migraines     Asthma Son      Heart Disease Mother      Osteoporosis Mother      Obesity Mother      Cancer Father      Alcohol/Drug Father      Other Cancer Father         saliva glands & skin CA      Hypertension Father      Diabetes Maternal Grandmother      Breast Cancer Maternal Grandmother      Obesity Maternal Grandmother      Other Cancer Maternal Grandfather         skin cancer     Cancer - colorectal Other         Aunt     Asthma Daughter      Asthma Daughter      Asthma Son      Thyroid Disease Sister      Colon Cancer Other      Obesity Sister      Glaucoma No family hx of      Macular Degeneration No family hx of        Social History     Tobacco Use     Smoking status: Former Smoker     Packs/day: 0.50     Years: 15.00     Pack years: 7.50     Types: Cigarettes     Last attempt to quit: 2015     Years since quittin.3     Smokeless tobacco: Never Used   Substance Use Topics     Alcohol use: No     Alcohol/week: 0.0 standard drinks     Comment: Quit in          Objective:    Vitals: /78   Pulse 88   Wt 108.9 kg (240 lb)   LMP  (LMP Unknown)   BMI 42.51 kg/m    BMI: Body mass index is 42.51 kg/m .  Height: Data Unavailable    Constitutional/ general:  Pt is in no apparent distress, appears well-nourished.  Cooperative with history and physical exam.     Psych:  The patient answered questions appropriately.  Normal affect.  Seems to have reasonable expectations, in terms of treatment.     Eyes:  Visual scanning/ tracking without deficit.     Ears:  Response to auditory stimuli is normal.  No hearing aid devices.  Auricles in proper alignment.     Lymphatic:  Popliteal lymph nodes not enlarged.     Lungs:  Non labored breathing, non labored speech. No cough.  No audible wheezing. Even, quiet breathing.        Vascular:  Pedal pulses are palpable bilaterally for both the DP and PT arteries.  CFT < 3 sec.  No edema.  Pedal hair growth noted.     Neuro:  Alert and oriented x 3. Coordinated gait.  Light touch sensation is intact to the L4, L5, S1 distributions. No obvious deficits.  No evidence of neurological-based weakness, spasticity, or contracture in the lower extremities.     Derm: Normal texture and turgor.  No erythema, ecchymosis, or cyanosis.  No open lesions.     Musculoskeletal:    Lower extremity muscle strength is normal.  Patient is ambulatory without an assistive device or brace.  No gross deformities.      Normal arch with weightbearing.   ROM is within normal limits.  Negative tinel's sign.  No side to side compression pain of the calcaneus.  Pain upon palpation to the left plantarmedial  of the calcaneus.  No erythema, edema, ecchymosis, or subcutaneous masses noted.  No pain on palpation or stressing any tendons.  Negative Tinel's sign.  Vague pain around left second and third toes and hard to localize.  Slight edema here.     Radiographic Exam:  X-Ray Findings:  I personally reviewed the films.  Normal    Assessment:  Plantar Fasciitis left foot                           Left foot pain    Plan:  X-rays taken today.  Discussed etiology and treatment options with the patient.  The potential causes and nature of plantar fasciitis were discussed with the patient.  We reviewed the natural history/prognosis of the condition and risks if left untreated.  These include chronic pain, other sites of pain due to gait changes, and potential plantar fascial rupture.      We discussed possible causes of the condition as it relates to the patients specific situation.      Conservative treatment options were reviewed:  appropriate shoes, avoidance of barefoot walking, inserts/orthoses, stretching, ice, massage, immobilization and NSAIDs.     We also reviewed the options of injection therapy and surgery.  However, it  was made clear that surgery is only considered when conservative therapy fails.  The risks and benefits of injection therapy, and surgery were discussed.  Dispensed handout.       After thorough discussion and answering all questions, the patient elected to modifying activities, supportive shoes, ice, stretching, and not going barefoot.  Good house shoes at all times and I made recommendations for both problems.   Risks complications, and efficacy of cortisone injection discussed.  Patient understands there is a risk of plantar fascial rupture.  After written consent, sterile prep, injected heel with 1 cc 1% lidocaine plain and 1 cc kenalog 10 mg.  Return to clinic prn.      Axel Juarez DPM DPMARGOT, FACFAS

## 2020-06-29 NOTE — MR AVS SNAPSHOT
MRN:[id number]                      After Visit Summary   9/17/2018    Velma Diaz    MRN: [id number]           Visit Information        Provider Department      9/17/2018 11:00 AM Antonio Rios [facility]      Your next 10 appointments already scheduled     Sep 20, 2018  9:30 AM CDT   [facility]    [address] [place] [address]   [phone]            Sep 20, 2018  3:10 PM CDT   HAZEL Extremity with Michelle Lazaro, PTA   [facility]    [address]  [address]  [facility] [facility] [place] [address]   [phone]            Sep 25, 2018  9:40 AM CDT   HAZEL Extremity with Elfego Rodas, PT   [facility]    [address]  [address]  [facility] [facility] [facility] [place] [address]   [phone]            Oct 08, 2018  9:30 AM CDT   [facility]   [facility]    [address] [address] [phone]            Oct 15, 2018 11:00 AM CDT   Return Visit with Antonio Rios [facility] ([facility])    [address] [address] [phone]            Oct 30, 2018  9:40 AM CDT   PHYSICAL with Srinivasa Hunter MD   [facility]    [address] [address] [phone]              MyChart Information     mAPPnhart gives you secure access to your electronic health record. If you see a primary care provider, you can also send messages to your care team and make appointments. If you have questions, please call your primary care clinic.  If you do not have a primary care provider, please call [phone] and they will assist you.        Care EveryWhere ID      ordered This is your Care EveryWhere ID. This could be used by other organizations to access your [facility] medical records  [id number]        Equal Access to Services     LOLA HERNANDEZ : Katie Dumas, anastacio wong, pavan cook, grayson sauceda. So [facility] [phone].    ATENCIÓN: Si habla español, tiene a dowd disposición servicios gratuitos de asistencia lingüística. Llame al [phone].    We comply with applicable federal civil rights laws and [place] laws. We do not discriminate on the basis of race, color, national origin, age, disability, sex, sexual orientation, or gender identity.

## 2020-07-01 ENCOUNTER — MYC REFILL (OUTPATIENT)
Dept: FAMILY MEDICINE | Facility: CLINIC | Age: 50
End: 2020-07-01

## 2020-07-01 DIAGNOSIS — G89.4 CHRONIC PAIN SYNDROME: ICD-10-CM

## 2020-07-01 RX ORDER — OXYCODONE AND ACETAMINOPHEN 10; 325 MG/1; MG/1
1 TABLET ORAL EVERY 6 HOURS PRN
Qty: 90 TABLET | Refills: 0 | Status: SHIPPED | OUTPATIENT
Start: 2020-07-01 | End: 2020-07-30

## 2020-07-01 NOTE — TELEPHONE ENCOUNTER
Requested Prescriptions   Pending Prescriptions Disp Refills    oxyCODONE-acetaminophen (PERCOCET)  MG per tablet 90 tablet 0     Sig: Take 1 tablet by mouth every 6 hours as needed for moderate to severe pain       There is no refill protocol information for this order        Routing refill request to provider for review/approval because:  Drug not on the [facility] refill protocol   Marisela Shea RN,BSN  [facility]

## 2020-07-02 ENCOUNTER — VIRTUAL VISIT (OUTPATIENT)
Dept: NEUROPSYCHOLOGY | Facility: CLINIC | Age: 50
End: 2020-07-02
Attending: ANESTHESIOLOGY

## 2020-07-02 DIAGNOSIS — F54 PSYCHOSOMATIC FACTOR IN PHYSICAL CONDITION: Primary | ICD-10-CM

## 2020-07-02 DIAGNOSIS — G90.09 NEUROPATHY, PERIPHERAL, AUTONOMIC, IDIOPATHIC: ICD-10-CM

## 2020-07-02 NOTE — PROGRESS NOTES
The patient was seen for psychological evaluation  via telehealth at the request of Annel Carballo MD for the purposes of diagnostic clarification and treatment planning.  42 minutes of video test administration and scoring were provided by this writer.  Please see Dr. Dev Reinoso's report for a full interpretation of the findings.    Video Start Time 1: 1:57PM  Video End Time 1: 2:00PM    Video Start Time 2: 2:43 PM  Video End Time 2: 3:21PM    Haven Yu  Psychometrist

## 2020-07-03 DIAGNOSIS — G89.29 CHRONIC BILATERAL BACK PAIN, UNSPECIFIED BACK LOCATION: Chronic | ICD-10-CM

## 2020-07-03 DIAGNOSIS — M54.9 CHRONIC BILATERAL BACK PAIN, UNSPECIFIED BACK LOCATION: Chronic | ICD-10-CM

## 2020-07-03 RX ORDER — METHOCARBAMOL 750 MG/1
TABLET, FILM COATED ORAL
Qty: 60 TABLET | Refills: 1 | Status: SHIPPED | OUTPATIENT
Start: 2020-07-03 | End: 2020-08-18

## 2020-07-03 NOTE — TELEPHONE ENCOUNTER
Requested Prescriptions   Pending Prescriptions Disp Refills    methocarbamol (ROBAXIN) 750 MG tablet [Pharmacy Med Name: METHOCARBAMOL 750 MG TABLET] 60 tablet 1     Sig: TAKE 1 TABLETS (750 MG) BY MOUTH 3 TIMES DAILY AS NEEDED FOR MUSCLE SPASMS       There is no refill protocol information for this order        Routing refill request to provider for review/approval because:  Drug not on the [facility] refill protocol   Marisela Shea RN,BSN  [facility]
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
## 2020-07-07 ENCOUNTER — MYC MEDICAL ADVICE (OUTPATIENT)
Dept: PODIATRY | Facility: CLINIC | Age: 50
End: 2020-07-07

## 2020-07-07 ENCOUNTER — VIRTUAL VISIT (OUTPATIENT)
Dept: PSYCHOLOGY | Facility: CLINIC | Age: 50
End: 2020-07-07
Payer: COMMERCIAL

## 2020-07-07 DIAGNOSIS — F41.1 GAD (GENERALIZED ANXIETY DISORDER): ICD-10-CM

## 2020-07-07 DIAGNOSIS — F33.1 MAJOR DEPRESSIVE DISORDER, RECURRENT EPISODE, MODERATE (H): Primary | ICD-10-CM

## 2020-07-07 PROCEDURE — 90834 PSYTX W PT 45 MINUTES: CPT | Mod: 95 | Performed by: SOCIAL WORKER

## 2020-07-07 ASSESSMENT — ANXIETY QUESTIONNAIRES
GAD7 TOTAL SCORE: 6
5. BEING SO RESTLESS THAT IT IS HARD TO SIT STILL: NOT AT ALL
IF YOU CHECKED OFF ANY PROBLEMS ON THIS QUESTIONNAIRE, HOW DIFFICULT HAVE THESE PROBLEMS MADE IT FOR YOU TO DO YOUR WORK, TAKE CARE OF THINGS AT HOME, OR GET ALONG WITH OTHER PEOPLE: SOMEWHAT DIFFICULT
3. WORRYING TOO MUCH ABOUT DIFFERENT THINGS: SEVERAL DAYS
1. FEELING NERVOUS, ANXIOUS, OR ON EDGE: MORE THAN HALF THE DAYS
2. NOT BEING ABLE TO STOP OR CONTROL WORRYING: SEVERAL DAYS
7. FEELING AFRAID AS IF SOMETHING AWFUL MIGHT HAPPEN: NOT AT ALL
6. BECOMING EASILY ANNOYED OR IRRITABLE: SEVERAL DAYS

## 2020-07-07 ASSESSMENT — PATIENT HEALTH QUESTIONNAIRE - PHQ9
5. POOR APPETITE OR OVEREATING: SEVERAL DAYS
SUM OF ALL RESPONSES TO PHQ QUESTIONS 1-9: 9
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
## 2020-07-08 ENCOUNTER — THERAPY VISIT (OUTPATIENT)
Dept: CHIROPRACTIC MEDICINE | Facility: CLINIC | Age: 50
End: 2020-07-08
Payer: COMMERCIAL

## 2020-07-08 DIAGNOSIS — G89.29 CHRONIC LOW BACK PAIN: ICD-10-CM

## 2020-07-08 DIAGNOSIS — M54.50 CHRONIC LOW BACK PAIN: ICD-10-CM

## 2020-07-08 DIAGNOSIS — M99.03 SEGMENTAL DYSFUNCTION OF LUMBAR REGION: ICD-10-CM

## 2020-07-08 DIAGNOSIS — M62.838 SPASM OF MUSCLE: ICD-10-CM

## 2020-07-08 DIAGNOSIS — M99.02 THORACIC SEGMENT DYSFUNCTION: ICD-10-CM

## 2020-07-08 DIAGNOSIS — M99.05 SEGMENTAL DYSFUNCTION OF PELVIC REGION: Primary | ICD-10-CM

## 2020-07-08 PROCEDURE — 97810 ACUP 1/> WO ESTIM 1ST 15 MIN: CPT | Performed by: CHIROPRACTOR

## 2020-07-08 PROCEDURE — 98941 CHIROPRACT MANJ 3-4 REGIONS: CPT | Mod: AT | Performed by: CHIROPRACTOR

## 2020-07-08 ASSESSMENT — ANXIETY QUESTIONNAIRES: GAD7 TOTAL SCORE: 6

## 2020-07-08 NOTE — PROGRESS NOTES
Visit #: 13 in 2020    Subjective:  Velma Diaz is a 48 year old female who is seen in f/u up for:        Segmental dysfunction of pelvic region  Lumbago  Segmental dysfunction of lumbar region  Spasm of muscle  Thoracic segment dysfunction.     Since last visit on 6/24/2020,  Velma Diaz reports:    Area of chief complaint:  Lumbar :  Symptoms are graded at 3/10. The quality is described as stiff, and achey.  Motion has increased but not normal.  Mireille reports that her low back is feeling better.  She is having less pain and is just feeling a little stiff.  She continue to have some foot issue and will be getting an MRI scheduled.  She reports that she had a neuropsychology appointment for a spinal stimulator and will be going to a pain doctor for the next step .      Objective:  The following was observed:    P: palpatory tenderness Piriformis R>>L,   A: static palpation demonstrates intersegmental asymmetry   thoracic, lumbar, pelvis  R: motion palpation notes restricted motion,   T10, T11 , T12 , L4 , L5  and PSIS Right   T: hypertonicity at: Piriformis R>>L    Segmental spinal dysfunction/restrictions found at:   T10, T11 , T12 , L4 , L5  and PSIS Right       Assessment:    Diagnoses:      1. Segmental dysfunction of pelvic region    2. Lumbago    3. Segmental dysfunction of lumbar region    4. Spasm of muscle    5. Thoracic segment dysfunction        Patient's condition:  Patient had restrictions pre-manipulation    Treatment effectiveness:  Post manipulation there is better intersegmental movement and Patient claims to feel looser post manipulation      Procedures:  CMT:  17438 Chiropractic manipulative treatment 3-4 regions performed   Thoracic: Activator, T10, T11, T12, Prone  Lumbar: Activator, L4, L5, Prone  Pelvis: Activator, PSIS Right , Prone    Modalities:  91504: Acupuncture, for 15 minutes:  Points: B25, B27, GV3, K3, B62, SI3  Ahsi point in piriformis  For 15 minutes    Therapeutic  procedures:  None    Response to Treatment  Reduction in symptoms as reported by patient    Prognosis: Good    Progress towards Goals: Patient is making progress towards the goal.     Recommendations:    Instructions:  ice 20 minutes every other hour as needed    Follow-up:    Return to care in one week.

## 2020-07-09 ENCOUNTER — TELEPHONE (OUTPATIENT)
Dept: ANESTHESIOLOGY | Facility: CLINIC | Age: 50
End: 2020-07-09

## 2020-07-09 NOTE — TELEPHONE ENCOUNTER
[facility] Call Center    Phone Message    May a detailed message be left on voicemail: yes     Reason for Call: pt reporting to provider that pt accomplished the psychologist's appt and pt also spoke with [employer] rep--what is the next step regarding the spine stimulator?  Please call pt. Thank you.      Action Taken: Message routed to:  [facility]  Pain Clinic    Travel Screening: Not Applicable

## 2020-07-10 NOTE — TELEPHONE ENCOUNTER
Voice message left for pt. Pt advised that Dr. Carballo will review evaluation and clinic staff will follow up with her next week to discuss next steps. Clinic number left for pt to call if needed.     JOHANN CurtisN, RN

## 2020-07-11 ENCOUNTER — HOSPITAL ENCOUNTER (OUTPATIENT)
Dept: MRI IMAGING | Facility: CLINIC | Age: 50
Discharge: HOME | End: 2020-07-11
Attending: PODIATRIST | Admitting: PODIATRIST
Payer: COMMERCIAL

## 2020-07-11 DIAGNOSIS — M79.673 PAIN OF FOOT, UNSPECIFIED LATERALITY: ICD-10-CM

## 2020-07-11 PROCEDURE — 73718 MRI LOWER EXTREMITY W/O DYE: CPT | Mod: LT

## 2020-07-22 ENCOUNTER — THERAPY VISIT (OUTPATIENT)
Dept: CHIROPRACTIC MEDICINE | Facility: CLINIC | Age: 50
End: 2020-07-22
Payer: COMMERCIAL

## 2020-07-22 DIAGNOSIS — M99.02 THORACIC SEGMENT DYSFUNCTION: ICD-10-CM

## 2020-07-22 DIAGNOSIS — M99.05 SEGMENTAL DYSFUNCTION OF PELVIC REGION: Primary | ICD-10-CM

## 2020-07-22 DIAGNOSIS — M62.838 SPASM OF MUSCLE: ICD-10-CM

## 2020-07-22 DIAGNOSIS — M99.03 SEGMENTAL DYSFUNCTION OF LUMBAR REGION: ICD-10-CM

## 2020-07-22 DIAGNOSIS — G89.29 CHRONIC LOW BACK PAIN: ICD-10-CM

## 2020-07-22 DIAGNOSIS — M54.50 CHRONIC LOW BACK PAIN: ICD-10-CM

## 2020-07-22 PROCEDURE — 98941 CHIROPRACT MANJ 3-4 REGIONS: CPT | Mod: AT | Performed by: CHIROPRACTOR

## 2020-07-22 PROCEDURE — 97810 ACUP 1/> WO ESTIM 1ST 15 MIN: CPT | Performed by: CHIROPRACTOR

## 2020-07-22 NOTE — PROGRESS NOTES
Visit #: 14 in 2020    Subjective:  Velma Diaz is a 48 year old female who is seen in f/u up for:        Segmental dysfunction of pelvic region  Lumbago  Segmental dysfunction of lumbar region  Spasm of muscle  Thoracic segment dysfunction.     Since last visit on 7/8/2020,  Velma Diaz reports:    Area of chief complaint:  Lumbar :  Symptoms are graded at 3/10. The quality is described as stiff, and achey.  Motion has increased but not normal.  Mireille reports that her low back is feeling better.  She is having less pain and is just feeling a little stiff.  She managed to go away for the weekend and was having difficulty sleep in the bed, that she was not use to. Apart from that she has been doing better.    Objective:  The following was observed:    P: palpatory tenderness Piriformis R>>L,   A: static palpation demonstrates intersegmental asymmetry   thoracic, lumbar, pelvis  R: motion palpation notes restricted motion,   T10, T11 , T12 , L4 , L5  and PSIS Right   T: hypertonicity at: Piriformis R>>L    Segmental spinal dysfunction/restrictions found at:   T10, T11 , T12 , L4 , L5  and PSIS Right       Assessment:    Diagnoses:      1. Segmental dysfunction of pelvic region    2. Lumbago    3. Segmental dysfunction of lumbar region    4. Spasm of muscle    5. Thoracic segment dysfunction        Patient's condition:  Patient had restrictions pre-manipulation    Treatment effectiveness:  Post manipulation there is better intersegmental movement and Patient claims to feel looser post manipulation      Procedures:  CMT:  57601 Chiropractic manipulative treatment 3-4 regions performed   Thoracic: Activator, T10, T11, T12, Prone  Lumbar: Activator, L4, L5, Prone  Pelvis: Activator, PSIS Right , Prone    Modalities:  49747: Acupuncture, for 15 minutes:  Points: B25, B27, GV3, K3, B62, SI3  Ahsi point in piriformis  For 15 minutes    Therapeutic procedures:  None    Response to Treatment  Reduction in symptoms as  reported by patient    Prognosis: Good    Progress towards Goals: Patient is making progress towards the goal.     Recommendations:    Instructions:  ice 20 minutes every other hour as needed    Follow-up:    Return to care in one week.

## 2020-07-29 ENCOUNTER — THERAPY VISIT (OUTPATIENT)
Dept: CHIROPRACTIC MEDICINE | Facility: CLINIC | Age: 50
End: 2020-07-29
Payer: COMMERCIAL

## 2020-07-29 DIAGNOSIS — M54.50 CHRONIC LOW BACK PAIN: ICD-10-CM

## 2020-07-29 DIAGNOSIS — M99.02 THORACIC SEGMENT DYSFUNCTION: ICD-10-CM

## 2020-07-29 DIAGNOSIS — M99.05 SEGMENTAL DYSFUNCTION OF PELVIC REGION: Primary | ICD-10-CM

## 2020-07-29 DIAGNOSIS — M99.03 SEGMENTAL DYSFUNCTION OF LUMBAR REGION: ICD-10-CM

## 2020-07-29 DIAGNOSIS — G89.29 CHRONIC LOW BACK PAIN: ICD-10-CM

## 2020-07-29 DIAGNOSIS — M62.838 SPASM OF MUSCLE: ICD-10-CM

## 2020-07-29 PROCEDURE — 97810 ACUP 1/> WO ESTIM 1ST 15 MIN: CPT | Performed by: CHIROPRACTOR

## 2020-07-29 PROCEDURE — 98941 CHIROPRACT MANJ 3-4 REGIONS: CPT | Mod: AT | Performed by: CHIROPRACTOR

## 2020-07-29 NOTE — PROGRESS NOTES
Visit #: 15 in 2020    Subjective:  Velma Diaz is a 48 year old female who is seen in f/u up for:        Segmental dysfunction of pelvic region  Lumbago  Segmental dysfunction of lumbar region  Spasm of muscle  Thoracic segment dysfunction.     Since last visit on 7/22/2020,  Velma Diaz reports:    Area of chief complaint:  Lumbar :  Symptoms are graded at 3/10. The quality is described as stiff, and achey.  Motion has increased but not normal.  Mireille reports that her low back is feeling better.  She is having some pain and stiffness in her right piriformis region.  She is walking a lot more and her knee feel a little stiff.  Overall she is feeling better.     Objective:  The following was observed:    P: palpatory tenderness Piriformis R>>L,   A: static palpation demonstrates intersegmental asymmetry   thoracic, lumbar, pelvis  R: motion palpation notes restricted motion,   T10, T11 , T12 , L4 , L5  and PSIS Right   T: hypertonicity at: Piriformis R>>L    Segmental spinal dysfunction/restrictions found at:   T10, T11 , T12 , L4 , L5  and PSIS Right       Assessment:    Diagnoses:      1. Segmental dysfunction of pelvic region    2. Lumbago    3. Segmental dysfunction of lumbar region    4. Spasm of muscle    5. Thoracic segment dysfunction        Patient's condition:  Patient had restrictions pre-manipulation    Treatment effectiveness:  Post manipulation there is better intersegmental movement and Patient claims to feel looser post manipulation      Procedures:  CMT:  61643 Chiropractic manipulative treatment 3-4 regions performed   Thoracic: Activator, T10, T11, T12, Prone  Lumbar: Activator, L4, L5, Prone  Pelvis: Activator, PSIS Right , Prone    Modalities:  78851: Acupuncture, for 15 minutes:  Points: B25, B27, GV3, K3, B62, SI3  Ahsi point in piriformis  For 15 minutes    Therapeutic procedures:  None    Response to Treatment  Reduction in symptoms as reported by patient    Prognosis: Good    Progress  towards Goals: Patient is making progress towards the goal.     Recommendations:    Instructions:  ice 20 minutes every other hour as needed    Follow-up:    Return to care in one week.

## 2020-07-30 ENCOUNTER — MYC REFILL (OUTPATIENT)
Dept: FAMILY MEDICINE | Facility: CLINIC | Age: 50
End: 2020-07-30

## 2020-07-30 DIAGNOSIS — G89.4 CHRONIC PAIN SYNDROME: ICD-10-CM

## 2020-07-30 RX ORDER — OXYCODONE AND ACETAMINOPHEN 10; 325 MG/1; MG/1
1 TABLET ORAL EVERY 6 HOURS PRN
Qty: 90 TABLET | Refills: 0 | Status: SHIPPED | OUTPATIENT
Start: 2020-07-30 | End: 2020-08-26

## 2020-07-30 NOTE — TELEPHONE ENCOUNTER
Requested Prescriptions   Pending Prescriptions Disp Refills    oxyCODONE-acetaminophen (PERCOCET)  MG per tablet 90 tablet 0     Sig: Take 1 tablet by mouth every 6 hours as needed for moderate to severe pain       There is no refill protocol information for this order        Routing refill request to provider for review/approval because:  Drug not on the [facility] refill protocol   Marisela Shea RN,BSN  [facility]

## 2020-08-05 ENCOUNTER — VIRTUAL VISIT (OUTPATIENT)
Dept: PSYCHOLOGY | Facility: CLINIC | Age: 50
End: 2020-08-05
Payer: COMMERCIAL

## 2020-08-05 ENCOUNTER — THERAPY VISIT (OUTPATIENT)
Dept: CHIROPRACTIC MEDICINE | Facility: CLINIC | Age: 50
End: 2020-08-05
Payer: COMMERCIAL

## 2020-08-05 DIAGNOSIS — F41.1 GAD (GENERALIZED ANXIETY DISORDER): ICD-10-CM

## 2020-08-05 DIAGNOSIS — M62.838 SPASM OF MUSCLE: ICD-10-CM

## 2020-08-05 DIAGNOSIS — F33.1 MAJOR DEPRESSIVE DISORDER, RECURRENT EPISODE, MODERATE (H): Primary | ICD-10-CM

## 2020-08-05 DIAGNOSIS — M99.03 SEGMENTAL DYSFUNCTION OF LUMBAR REGION: ICD-10-CM

## 2020-08-05 DIAGNOSIS — G89.29 CHRONIC LOW BACK PAIN: ICD-10-CM

## 2020-08-05 DIAGNOSIS — M54.50 CHRONIC LOW BACK PAIN: ICD-10-CM

## 2020-08-05 DIAGNOSIS — M99.02 THORACIC SEGMENT DYSFUNCTION: ICD-10-CM

## 2020-08-05 DIAGNOSIS — M99.05 SEGMENTAL DYSFUNCTION OF PELVIC REGION: Primary | ICD-10-CM

## 2020-08-05 PROCEDURE — 97810 ACUP 1/> WO ESTIM 1ST 15 MIN: CPT | Performed by: CHIROPRACTOR

## 2020-08-05 PROCEDURE — 98941 CHIROPRACT MANJ 3-4 REGIONS: CPT | Mod: AT | Performed by: CHIROPRACTOR

## 2020-08-05 PROCEDURE — 90834 PSYTX W PT 45 MINUTES: CPT | Mod: 95 | Performed by: SOCIAL WORKER
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
## 2020-08-05 NOTE — PROGRESS NOTES
Visit #: 16 in 2020    Subjective:  Velma Diaz is a 48 year old female who is seen in f/u up for:        Segmental dysfunction of pelvic region  Lumbago  Segmental dysfunction of lumbar region  Spasm of muscle  Thoracic segment dysfunction.     Since last visit on 7/29/2020,  Velma Diaz reports:    Area of chief complaint:  Lumbar :  Symptoms are graded at 3/10. The quality is described as stiff, and achey.  Motion has increased but not normal.  Mireille reports that her low back continues to feeling better.  She is having some pain and stiffness but overall is doing better.  Mireille does report that her knees are feeling very stiff and sore but she has been  walking a lot more.   Overall she is feeling better.     Objective:  The following was observed:    P: palpatory tenderness Piriformis R>>L,   A: static palpation demonstrates intersegmental asymmetry   thoracic, lumbar, pelvis  R: motion palpation notes restricted motion,   T10, T11 , T12 , L4 , L5  and PSIS Right   T: hypertonicity at: Piriformis R>>L    Segmental spinal dysfunction/restrictions found at:   T10, T11 , T12 , L4 , L5  and PSIS Right       Assessment:    Diagnoses:      1. Segmental dysfunction of pelvic region    2. Lumbago    3. Segmental dysfunction of lumbar region    4. Spasm of muscle    5. Thoracic segment dysfunction        Patient's condition:  Patient had restrictions pre-manipulation    Treatment effectiveness:  Post manipulation there is better intersegmental movement and Patient claims to feel looser post manipulation      Procedures:  CMT:  09489 Chiropractic manipulative treatment 3-4 regions performed   Thoracic: Activator, T10, T11, T12, Prone  Lumbar: Activator, L4, L5, Prone  Pelvis: Activator, PSIS Right , Prone    Modalities:  67095: Acupuncture, for 15 minutes:  Points: B25, B27, GV3, K3, B62, SI3  Ahsi point in piriformis  For 15 minutes    Therapeutic procedures:  None    Response to Treatment  Reduction in symptoms as  reported by patient    Prognosis: Good    Progress towards Goals: Patient is making progress towards the goal.     Recommendations:    Instructions:  ice 20 minutes every other hour as needed    Follow-up:    Return to care in one week.

## 2020-08-10 ENCOUNTER — TRANSFERRED RECORDS (OUTPATIENT)
Dept: HEALTH INFORMATION MANAGEMENT | Facility: CLINIC | Age: 50
End: 2020-08-10

## 2020-08-10 LAB
CREAT SERPL-MCNC: 0.82 MG/DL (ref 0.57–1.11)
GFR SERPL CREATININE-BSD FRML MDRD: >60 ML/MIN/1.73M2
GLUCOSE SERPL-MCNC: 88 MG/DL (ref 65–100)
POTASSIUM SERPL-SCNC: 4 MMOL/L (ref 3.5–5)

## 2020-08-11 ENCOUNTER — PATIENT OUTREACH (OUTPATIENT)
Dept: CARE COORDINATION | Facility: CLINIC | Age: 50
End: 2020-08-11

## 2020-08-11 DIAGNOSIS — I10 HTN (HYPERTENSION): Primary | ICD-10-CM

## 2020-08-11 NOTE — LETTER
[facility]ARE COORDINATION  [address] [address]    August 12, 2020    Velma Diaz  [address]  [place] [address]      Dear Velma,    I am a clinic community health worker who works with Srinivasa Hunter MD at [facility]. I have been trying to reach you recently to introduce Clinic Care Coordination and to see if there was anything I could assist you with.  Below is a description of clinic care coordination and how I can further assist you.      The clinic care coordination team is made up of a registered nurse,  and community health worker who understand the health care system. The goal of clinic care coordination is to help you manage your health and improve access to the health care system in the most efficient manner. The team can assist you in meeting your health care goals by providing education, coordinating services, strengthening the communication among your providers and supporting you with any resource needs.    Please feel free to contact the Community Health Worker at [phone] with any questions or concerns. We are focused on providing you with the highest-quality healthcare experience possible and that all starts with you.     Sincerely,     Kathy Brock, MPH  Community Health Worker   Office: [phone]  [facility] [place], [facility], and [facility]  [address], [place] [address]  [facility]  [address] [place] [address]  [facility]  [address], [place] [address]  [email or web address]

## 2020-08-11 NOTE — PROGRESS NOTES
Clinic Care Coordination Contact  [facility]/Voicemail     Clinical Data: CHW Outreach  Outreach attempted x 1.  Left message on patient's voicemail with call back information and requested return call.  Plan: CHW will try to reach patient again in 1-2 business days.    Kathy Brock, MPH  Community Health Worker   Office: [phone]  [facility] [place], [facility], and [facility]  [address], [place] [address]  [facility]  [address] [place] [address]  [facility]  [address], [place] [address]  [email or web address]    Reason for Referral: Care Transition: ED to outpatient  Additional pertinent details: Hypertension and nausea    Notes:  -Saw you were in the ER and wanted to see if you had questions for CC RN at this time    -Want me to help schedule a follow up with your PCP?

## 2020-08-12 NOTE — PROGRESS NOTES
Clinic Care Coordination Contact  [facility]/Voicemail    Clinical Data: CHW Outreach  Outreach attempted x 2.  Left message on patient's voicemail with call back information and requested return call.  Plan: CHW will send care coordination introduction letter with CHW contact information and explanation of care coordination services via [employer]. CHW will do no further outreaches at this time.    Reason for Referral: Care Transition: ED to outpatient  Additional pertinent details: Hypertension and nausea    Notes:  -Saw you were in the ER and wanted to see if you had questions for CC RN at this time    -Want me to help schedule a follow up with your PCP?

## 2020-08-13 LAB — LAB SCANNED RESULT: NORMAL

## 2020-08-15 DIAGNOSIS — M54.9 CHRONIC BILATERAL BACK PAIN, UNSPECIFIED BACK LOCATION: Chronic | ICD-10-CM

## 2020-08-15 DIAGNOSIS — G47.00 INSOMNIA, UNSPECIFIED TYPE: ICD-10-CM

## 2020-08-15 DIAGNOSIS — G89.29 CHRONIC BILATERAL BACK PAIN, UNSPECIFIED BACK LOCATION: Chronic | ICD-10-CM

## 2020-08-18 RX ORDER — DIPHENHYDRAMINE HYDROCHLORIDE 25 MG/1
CAPSULE ORAL
Qty: 30 CAPSULE | Refills: 0 | Status: SHIPPED | OUTPATIENT
Start: 2020-08-18 | End: 2023-11-20

## 2020-08-18 RX ORDER — METHOCARBAMOL 750 MG/1
TABLET, FILM COATED ORAL
Qty: 60 TABLET | Refills: 1 | Status: SHIPPED | OUTPATIENT
Start: 2020-08-18 | End: 2020-09-24

## 2020-08-18 NOTE — TELEPHONE ENCOUNTER
Routing refill request to provider for review/approval because:  Drug not on the [facility] refill protocol - Methocarbamol    JOHANN CatalanN, RN

## 2020-08-19 ENCOUNTER — THERAPY VISIT (OUTPATIENT)
Dept: CHIROPRACTIC MEDICINE | Facility: CLINIC | Age: 50
End: 2020-08-19
Payer: COMMERCIAL

## 2020-08-19 DIAGNOSIS — M62.838 SPASM OF MUSCLE: ICD-10-CM

## 2020-08-19 DIAGNOSIS — M99.03 SEGMENTAL DYSFUNCTION OF LUMBAR REGION: ICD-10-CM

## 2020-08-19 DIAGNOSIS — M99.02 THORACIC SEGMENT DYSFUNCTION: ICD-10-CM

## 2020-08-19 DIAGNOSIS — M99.05 SEGMENTAL DYSFUNCTION OF PELVIC REGION: Primary | ICD-10-CM

## 2020-08-19 DIAGNOSIS — M54.50 CHRONIC LOW BACK PAIN: ICD-10-CM

## 2020-08-19 DIAGNOSIS — G89.29 CHRONIC LOW BACK PAIN: ICD-10-CM

## 2020-08-19 PROCEDURE — 98941 CHIROPRACT MANJ 3-4 REGIONS: CPT | Mod: AT | Performed by: CHIROPRACTOR

## 2020-08-19 PROCEDURE — 97810 ACUP 1/> WO ESTIM 1ST 15 MIN: CPT | Performed by: CHIROPRACTOR

## 2020-08-19 NOTE — PROGRESS NOTES
Visit #: 18 in 2020    Subjective:  Velma Diaz is a 48 year old female who is seen in f/u up for:        Segmental dysfunction of pelvic region  Lumbago  Segmental dysfunction of lumbar region  Spasm of muscle  Thoracic segment dysfunction.     Since last visit on 8/5/2020,  Velma Diaz reports:    Area of chief complaint:  Lumbar :  Symptoms are graded at 3/10. The quality is described as stiff, and achey.  Motion has increased but not normal.  Mireille reports that her low back continues to feeling better.  She is having some pain and stiffness but overall is doing better.  Mireille does report that her knees are  Still feeling very stiff and sore but again she attributes this to walking around more and being able to do more as her back is not as painful.   Overall she is feeling better.     Objective:  The following was observed:    P: palpatory tenderness Piriformis R>>L,   A: static palpation demonstrates intersegmental asymmetry   thoracic, lumbar, pelvis  R: motion palpation notes restricted motion,   T10, T11 , T12 , L4 , L5  and PSIS Right   T: hypertonicity at: Piriformis R>>L    Segmental spinal dysfunction/restrictions found at:   T10, T11 , T12 , L4 , L5  and PSIS Right       Assessment:    Diagnoses:      1. Segmental dysfunction of pelvic region    2. Lumbago    3. Segmental dysfunction of lumbar region    4. Spasm of muscle    5. Thoracic segment dysfunction        Patient's condition:  Patient had restrictions pre-manipulation    Treatment effectiveness:  Post manipulation there is better intersegmental movement and Patient claims to feel looser post manipulation      Procedures:  CMT:  70957 Chiropractic manipulative treatment 3-4 regions performed   Thoracic: Activator, T10, T11, T12, Prone  Lumbar: Activator, L4, L5, Prone  Pelvis: Activator, PSIS Right , Prone    Modalities:  43310: Acupuncture, for 15 minutes:  Points: B25, B27, GV3, K3, B62, SI3  Ahsi point in piriformis  For 15  minutes    Therapeutic procedures:  None    Response to Treatment  Reduction in symptoms as reported by patient    Prognosis: Good    Progress towards Goals: Patient is making progress towards the goal.     Recommendations:    Instructions:  ice 20 minutes every other hour as needed    Follow-up:    Return to care in one week.

## 2020-08-26 ENCOUNTER — MYC REFILL (OUTPATIENT)
Dept: FAMILY MEDICINE | Facility: CLINIC | Age: 50
End: 2020-08-26

## 2020-08-26 ENCOUNTER — THERAPY VISIT (OUTPATIENT)
Dept: CHIROPRACTIC MEDICINE | Facility: CLINIC | Age: 50
End: 2020-08-26
Payer: COMMERCIAL

## 2020-08-26 DIAGNOSIS — M99.02 THORACIC SEGMENT DYSFUNCTION: ICD-10-CM

## 2020-08-26 DIAGNOSIS — M99.03 SEGMENTAL DYSFUNCTION OF LUMBAR REGION: ICD-10-CM

## 2020-08-26 DIAGNOSIS — M99.05 SEGMENTAL DYSFUNCTION OF PELVIC REGION: Primary | ICD-10-CM

## 2020-08-26 DIAGNOSIS — G89.29 CHRONIC LOW BACK PAIN: ICD-10-CM

## 2020-08-26 DIAGNOSIS — M62.838 SPASM OF MUSCLE: ICD-10-CM

## 2020-08-26 DIAGNOSIS — M54.50 CHRONIC LOW BACK PAIN: ICD-10-CM

## 2020-08-26 DIAGNOSIS — G89.4 CHRONIC PAIN SYNDROME: ICD-10-CM

## 2020-08-26 PROCEDURE — 97810 ACUP 1/> WO ESTIM 1ST 15 MIN: CPT | Performed by: CHIROPRACTOR

## 2020-08-26 PROCEDURE — 98941 CHIROPRACT MANJ 3-4 REGIONS: CPT | Mod: AT | Performed by: CHIROPRACTOR

## 2020-08-26 RX ORDER — OXYCODONE AND ACETAMINOPHEN 10; 325 MG/1; MG/1
1 TABLET ORAL EVERY 6 HOURS PRN
Qty: 90 TABLET | Refills: 0 | Status: SHIPPED | OUTPATIENT
Start: 2020-08-26 | End: 2020-09-22

## 2020-08-26 NOTE — PROGRESS NOTES
Visit #: 19 in 2020    Subjective:  Velma Diaz is a 48 year old female who is seen in f/u up for:        Segmental dysfunction of pelvic region  Lumbago  Segmental dysfunction of lumbar region  Spasm of muscle  Thoracic segment dysfunction.     Since last visit on 8/5/2020,  Velma Diaz reports:    Area of chief complaint:  Lumbar :  Symptoms are graded at 3/10. The quality is described as stiff, and achey.  Motion has increased but not normal.  Mireille reports that her low back continues to feeling better.  She is having some pain and stiffness but overall is doing better.  She does note that her back is most sore first thing in the morning and then as she moves around her back begins to loosed up.  Her knees are also feeling better this week.     Objective:  The following was observed:    P: palpatory tenderness Piriformis R>>L,   A: static palpation demonstrates intersegmental asymmetry   thoracic, lumbar, pelvis  R: motion palpation notes restricted motion,   T10, T11 , T12 , L4 , L5  and PSIS Right   T: hypertonicity at: Piriformis R>>L    Segmental spinal dysfunction/restrictions found at:   T10, T11 , T12 , L4 , L5  and PSIS Right       Assessment:    Diagnoses:      1. Segmental dysfunction of pelvic region    2. Lumbago    3. Segmental dysfunction of lumbar region    4. Spasm of muscle    5. Thoracic segment dysfunction        Patient's condition:  Patient had restrictions pre-manipulation    Treatment effectiveness:  Post manipulation there is better intersegmental movement and Patient claims to feel looser post manipulation      Procedures:  CMT:  94133 Chiropractic manipulative treatment 3-4 regions performed   Thoracic: Activator, T10, T11, T12, Prone  Lumbar: Activator, L4, L5, Prone  Pelvis: Activator, PSIS Right , Prone    Modalities:  50171: Acupuncture, for 15 minutes:  Points: B25, B27, GV3, K3, B62, SI3  Ahsi point in piriformis  For 15 minutes    Therapeutic procedures:  None    Response to  Treatment  Reduction in symptoms as reported by patient    Prognosis: Good    Progress towards Goals: Patient is making progress towards the goal.     Recommendations:    Instructions:  ice 20 minutes every other hour as needed    Follow-up:    Return to care in two weeks

## 2020-08-26 NOTE — TELEPHONE ENCOUNTER
Requested Prescriptions   Pending Prescriptions Disp Refills    oxyCODONE-acetaminophen (PERCOCET)  MG per tablet 90 tablet 0     Sig: Take 1 tablet by mouth every 6 hours as needed for moderate to severe pain       There is no refill protocol information for this order        Routing refill request to provider for review/approval because:  Drug not on the [facility] refill protocol   Marisela Shea RN,BSN  [facility]

## 2020-09-01 ENCOUNTER — TELEPHONE (OUTPATIENT)
Dept: ANESTHESIOLOGY | Facility: CLINIC | Age: 50
End: 2020-09-01

## 2020-09-03 ENCOUNTER — TELEPHONE (OUTPATIENT)
Dept: ANESTHESIOLOGY | Facility: CLINIC | Age: 50
End: 2020-09-03

## 2020-09-08 ENCOUNTER — VIRTUAL VISIT (OUTPATIENT)
Dept: PSYCHOLOGY | Facility: CLINIC | Age: 50
End: 2020-09-08
Payer: COMMERCIAL

## 2020-09-08 DIAGNOSIS — F33.1 MAJOR DEPRESSIVE DISORDER, RECURRENT EPISODE, MODERATE (H): Primary | ICD-10-CM

## 2020-09-08 DIAGNOSIS — F41.1 GAD (GENERALIZED ANXIETY DISORDER): ICD-10-CM

## 2020-09-08 PROCEDURE — 90834 PSYTX W PT 45 MINUTES: CPT | Mod: 95 | Performed by: SOCIAL WORKER

## 2020-09-08 ASSESSMENT — ANXIETY QUESTIONNAIRES
GAD7 TOTAL SCORE: 5
1. FEELING NERVOUS, ANXIOUS, OR ON EDGE: MORE THAN HALF THE DAYS
2. NOT BEING ABLE TO STOP OR CONTROL WORRYING: SEVERAL DAYS
5. BEING SO RESTLESS THAT IT IS HARD TO SIT STILL: NOT AT ALL
7. FEELING AFRAID AS IF SOMETHING AWFUL MIGHT HAPPEN: NOT AT ALL
IF YOU CHECKED OFF ANY PROBLEMS ON THIS QUESTIONNAIRE, HOW DIFFICULT HAVE THESE PROBLEMS MADE IT FOR YOU TO DO YOUR WORK, TAKE CARE OF THINGS AT HOME, OR GET ALONG WITH OTHER PEOPLE: SOMEWHAT DIFFICULT
3. WORRYING TOO MUCH ABOUT DIFFERENT THINGS: SEVERAL DAYS
6. BECOMING EASILY ANNOYED OR IRRITABLE: SEVERAL DAYS

## 2020-09-08 ASSESSMENT — PATIENT HEALTH QUESTIONNAIRE - PHQ9
5. POOR APPETITE OR OVEREATING: NOT AT ALL
SUM OF ALL RESPONSES TO PHQ QUESTIONS 1-9: 4
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
## 2020-09-09 ENCOUNTER — THERAPY VISIT (OUTPATIENT)
Dept: CHIROPRACTIC MEDICINE | Facility: CLINIC | Age: 50
End: 2020-09-09
Payer: COMMERCIAL

## 2020-09-09 DIAGNOSIS — G89.29 CHRONIC LOW BACK PAIN: ICD-10-CM

## 2020-09-09 DIAGNOSIS — M62.838 SPASM OF MUSCLE: ICD-10-CM

## 2020-09-09 DIAGNOSIS — M99.05 SEGMENTAL DYSFUNCTION OF PELVIC REGION: Primary | ICD-10-CM

## 2020-09-09 DIAGNOSIS — M99.02 THORACIC SEGMENT DYSFUNCTION: ICD-10-CM

## 2020-09-09 DIAGNOSIS — M54.50 CHRONIC LOW BACK PAIN: ICD-10-CM

## 2020-09-09 DIAGNOSIS — M99.03 SEGMENTAL DYSFUNCTION OF LUMBAR REGION: ICD-10-CM

## 2020-09-09 PROCEDURE — 98941 CHIROPRACT MANJ 3-4 REGIONS: CPT | Mod: AT | Performed by: CHIROPRACTOR

## 2020-09-09 PROCEDURE — 97810 ACUP 1/> WO ESTIM 1ST 15 MIN: CPT | Performed by: CHIROPRACTOR

## 2020-09-09 ASSESSMENT — ANXIETY QUESTIONNAIRES: GAD7 TOTAL SCORE: 5

## 2020-09-09 NOTE — PROGRESS NOTES
Visit #: 20 in 2020    Subjective:  Velma Diaz is a 48 year old female who is seen in f/u up for:        Segmental dysfunction of pelvic region  Lumbago  Segmental dysfunction of lumbar region  Spasm of muscle  Thoracic segment dysfunction.     Since last visit on 8/26/2020,  Velma Diaz reports:    Area of chief complaint:  Lumbar :  Symptoms are graded at 5/10. The quality is described as stiff, and achey.  Motion has increased but not normal.  Mireille reports that her low back continues to feeling better.  She is having some pain and stiffness but overall is doing better.  Despite her last visit being 2-3 weeks ago, her low back has not gotten any worse.  Overall she is feeling pretty good and is having manageable symptoms in her low back.    Objective:  The following was observed:    P: palpatory tenderness Piriformis R>>L,   A: static palpation demonstrates intersegmental asymmetry   thoracic, lumbar, pelvis  R: motion palpation notes restricted motion,   T10, T11 , T12 , L4 , L5  and PSIS Right   T: hypertonicity at: Piriformis R>>L    Segmental spinal dysfunction/restrictions found at:   T10, T11 , T12 , L4 , L5  and PSIS Right       Assessment:    Diagnoses:      1. Segmental dysfunction of pelvic region    2. Lumbago    3. Segmental dysfunction of lumbar region    4. Spasm of muscle    5. Thoracic segment dysfunction        Patient's condition:  Patient had restrictions pre-manipulation    Treatment effectiveness:  Post manipulation there is better intersegmental movement and Patient claims to feel looser post manipulation      Procedures:  CMT:  63150 Chiropractic manipulative treatment 3-4 regions performed   Thoracic: Activator, T10, T11, T12, Prone  Lumbar: Activator, L4, L5, Prone  Pelvis: Activator, PSIS Right , Prone    Modalities:  55352: Acupuncture, for 15 minutes:  Points: B25, B27, GV3, K3, B62, SI3  Ahsi point in piriformis  For 15 minutes    Therapeutic procedures:  None    Response to  Treatment  Reduction in symptoms as reported by patient    Prognosis: Good    Progress towards Goals: Patient is making progress towards the goal.     Recommendations:    Instructions:  ice 20 minutes every other hour as needed    Follow-up:    Return to care in two weeks

## 2020-09-20 DIAGNOSIS — B37.2 INTERTRIGINOUS CANDIDIASIS: ICD-10-CM

## 2020-09-22 ENCOUNTER — MYC REFILL (OUTPATIENT)
Dept: FAMILY MEDICINE | Facility: CLINIC | Age: 50
End: 2020-09-22

## 2020-09-22 DIAGNOSIS — G89.4 CHRONIC PAIN SYNDROME: ICD-10-CM

## 2020-09-22 RX ORDER — OXYCODONE AND ACETAMINOPHEN 10; 325 MG/1; MG/1
1 TABLET ORAL EVERY 6 HOURS PRN
Qty: 90 TABLET | Refills: 0 | Status: SHIPPED | OUTPATIENT
Start: 2020-09-22 | End: 2020-10-19

## 2020-09-22 RX ORDER — KETOCONAZOLE 20 MG/G
CREAM TOPICAL
Qty: 60 G | Refills: 1 | Status: SHIPPED | OUTPATIENT
Start: 2020-09-22 | End: 2021-11-12

## 2020-09-22 NOTE — TELEPHONE ENCOUNTER
Routing refill request to provider for review/approval because:  Drug interaction warning - override needed.     Mervat Klein RN, BSN, PHN  [facility]: [place]

## 2020-09-22 NOTE — TELEPHONE ENCOUNTER
Requested Prescriptions   Pending Prescriptions Disp Refills    oxyCODONE-acetaminophen (PERCOCET)  MG per tablet 90 tablet 0     Sig: Take 1 tablet by mouth every 6 hours as needed for moderate to severe pain       There is no refill protocol information for this order        Routing refill request to provider for review/approval because:  Drug not on [place] [facility] [place]   Marisela Shea RN,BSN  [facility]

## 2020-09-23 ENCOUNTER — THERAPY VISIT (OUTPATIENT)
Dept: CHIROPRACTIC MEDICINE | Facility: CLINIC | Age: 50
End: 2020-09-23
Payer: COMMERCIAL

## 2020-09-23 DIAGNOSIS — M99.05 SEGMENTAL DYSFUNCTION OF PELVIC REGION: Primary | ICD-10-CM

## 2020-09-23 DIAGNOSIS — G89.29 CHRONIC LOW BACK PAIN: ICD-10-CM

## 2020-09-23 DIAGNOSIS — M99.03 SEGMENTAL DYSFUNCTION OF LUMBAR REGION: ICD-10-CM

## 2020-09-23 DIAGNOSIS — M54.50 CHRONIC LOW BACK PAIN: ICD-10-CM

## 2020-09-23 DIAGNOSIS — M99.02 THORACIC SEGMENT DYSFUNCTION: ICD-10-CM

## 2020-09-23 DIAGNOSIS — M62.838 SPASM OF MUSCLE: ICD-10-CM

## 2020-09-23 PROCEDURE — 98941 CHIROPRACT MANJ 3-4 REGIONS: CPT | Mod: AT | Performed by: CHIROPRACTOR

## 2020-09-23 PROCEDURE — 97810 ACUP 1/> WO ESTIM 1ST 15 MIN: CPT | Performed by: CHIROPRACTOR

## 2020-09-23 NOTE — PROGRESS NOTES
Visit #: 21 in 2020    Subjective:  Velma Diaz is a 48 year old female who is seen in f/u up for:        Segmental dysfunction of pelvic region  Lumbago  Segmental dysfunction of lumbar region  Spasm of muscle  Thoracic segment dysfunction.     Since last visit on 89/9/2020,  Velma Diaz reports:    Area of chief complaint:  Lumbar :  Symptoms are graded at 2/10. The quality is described as stiff, and achey.  Motion has increased but not normal.  Mireille reports that her low back continues to feeling better.  She is having some pain and stiffness but overall is doing better. Since her last visit she has started water exercise and this is helping.  She continues to deal with left leg swelling, especially if she is not moving and has to sit or stand for extended periods of time.    Objective:  The following was observed:    P: palpatory tenderness Piriformis R>>L,   A: static palpation demonstrates intersegmental asymmetry   thoracic, lumbar, pelvis  R: motion palpation notes restricted motion,   T10, T11 , T12 , L4 , L5  and PSIS Right   T: hypertonicity at: Piriformis R>>L    Segmental spinal dysfunction/restrictions found at:   T10, T11 , T12 , L4 , L5  and PSIS Right       Assessment:    Diagnoses:      1. Segmental dysfunction of pelvic region    2. Lumbago    3. Segmental dysfunction of lumbar region    4. Spasm of muscle    5. Thoracic segment dysfunction        Patient's condition:  Patient had restrictions pre-manipulation    Treatment effectiveness:  Post manipulation there is better intersegmental movement and Patient claims to feel looser post manipulation      Procedures:  CMT:  86822 Chiropractic manipulative treatment 3-4 regions performed   Thoracic: Activator, T10, T11, T12, Prone  Lumbar: Activator, L4, L5, Prone  Pelvis: Activator, PSIS Right , Prone    Modalities:  47622: Acupuncture, for 15 minutes:  Points: B25, B27, GV3, K3, B62, SI3  Ahsi point in piriformis  For 15 minutes    Therapeutic  procedures:  None    Response to Treatment  Reduction in symptoms as reported by patient    Prognosis: Good    Progress towards Goals: Patient is making progress towards the goal.     Recommendations:    Instructions:  ice 20 minutes every other hour as needed    Follow-up:    Return to care in two weeks

## 2020-09-24 DIAGNOSIS — M54.9 CHRONIC BILATERAL BACK PAIN, UNSPECIFIED BACK LOCATION: Chronic | ICD-10-CM

## 2020-09-24 DIAGNOSIS — G89.29 CHRONIC BILATERAL BACK PAIN, UNSPECIFIED BACK LOCATION: Chronic | ICD-10-CM

## 2020-09-24 RX ORDER — METHOCARBAMOL 750 MG/1
TABLET, FILM COATED ORAL
Qty: 60 TABLET | Refills: 1 | Status: SHIPPED | OUTPATIENT
Start: 2020-09-24 | End: 2020-11-11

## 2020-09-24 NOTE — TELEPHONE ENCOUNTER
Requested Prescriptions   Pending Prescriptions Disp Refills    methocarbamol (ROBAXIN) 750 MG tablet 60 tablet 1       There is no refill protocol information for this order        Routing refill request to provider for review/approval because:  D[place] [facility] [place]   Marisela Shea RN,BSN  [facility]

## 2020-10-05 DIAGNOSIS — M79.89 LEG SWELLING: ICD-10-CM

## 2020-10-07 ENCOUNTER — THERAPY VISIT (OUTPATIENT)
Dept: CHIROPRACTIC MEDICINE | Facility: CLINIC | Age: 50
End: 2020-10-07
Payer: COMMERCIAL

## 2020-10-07 ENCOUNTER — VIRTUAL VISIT (OUTPATIENT)
Dept: PSYCHOLOGY | Facility: CLINIC | Age: 50
End: 2020-10-07
Payer: COMMERCIAL

## 2020-10-07 DIAGNOSIS — M62.838 SPASM OF MUSCLE: ICD-10-CM

## 2020-10-07 DIAGNOSIS — G89.29 CHRONIC LOW BACK PAIN: ICD-10-CM

## 2020-10-07 DIAGNOSIS — M99.05 SEGMENTAL DYSFUNCTION OF PELVIC REGION: Primary | ICD-10-CM

## 2020-10-07 DIAGNOSIS — M54.50 CHRONIC LOW BACK PAIN: ICD-10-CM

## 2020-10-07 DIAGNOSIS — M99.03 SEGMENTAL DYSFUNCTION OF LUMBAR REGION: ICD-10-CM

## 2020-10-07 DIAGNOSIS — M99.02 THORACIC SEGMENT DYSFUNCTION: ICD-10-CM

## 2020-10-07 DIAGNOSIS — F33.1 MAJOR DEPRESSIVE DISORDER, RECURRENT EPISODE, MODERATE (H): Primary | ICD-10-CM

## 2020-10-07 DIAGNOSIS — F41.1 GAD (GENERALIZED ANXIETY DISORDER): ICD-10-CM

## 2020-10-07 PROCEDURE — 97810 ACUP 1/> WO ESTIM 1ST 15 MIN: CPT | Performed by: CHIROPRACTOR

## 2020-10-07 PROCEDURE — 90834 PSYTX W PT 45 MINUTES: CPT | Mod: 95 | Performed by: SOCIAL WORKER

## 2020-10-07 PROCEDURE — 98941 CHIROPRACT MANJ 3-4 REGIONS: CPT | Mod: AT | Performed by: CHIROPRACTOR
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
## 2020-10-07 NOTE — PROGRESS NOTES
Visit #: 22 in 2020    Subjective:  Velma Diaz is a 48 year old female who is seen in f/u up for:        Segmental dysfunction of pelvic region  Lumbago  Segmental dysfunction of lumbar region  Spasm of muscle  Thoracic segment dysfunction.     Since last visit on 9/23/2020,  Velma Diaz reports:    Area of chief complaint:  Lumbar :  Symptoms are graded at 5/10. The quality is described as stiff, and achey.  Motion has increased but not normal.  Mireille reports that her low back continues to feeling better until she had to go on a last minute trip to [place] for 5 days.  The long drive, different bed and lifting of boxes in [place], flared up her back a little bit.  Despite this her low back is having a little increase in pain/stiffness.    Objective:  The following was observed:    P: palpatory tenderness Piriformis R>>L,   A: static palpation demonstrates intersegmental asymmetry   thoracic, lumbar, pelvis  R: motion palpation notes restricted motion,   T10, T11 , T12 , L4 , L5  and PSIS Right   T: hypertonicity at: Piriformis R>>L    Segmental spinal dysfunction/restrictions found at:   T10, T11 , T12 , L4 , L5  and PSIS Right       Assessment:    Diagnoses:      1. Segmental dysfunction of pelvic region    2. Lumbago    3. Segmental dysfunction of lumbar region    4. Spasm of muscle    5. Thoracic segment dysfunction        Patient's condition:  Patient had restrictions pre-manipulation    Treatment effectiveness:  Post manipulation there is better intersegmental movement and Patient claims to feel looser post manipulation      Procedures:  CMT:  58196 Chiropractic manipulative treatment 3-4 regions performed   Thoracic: Activator, T10, T11, T12, Prone  Lumbar: Activator, L4, L5, Prone  Pelvis: Activator, PSIS Right , Prone    Modalities:  80731: Acupuncture, for 15 minutes:  Points: B25, B27, GV3, K3, B62, SI3  Ahsi point in piriformis  For 15 minutes    Therapeutic procedures:  None    Response to  Treatment  Reduction in symptoms as reported by patient    Prognosis: Good    Progress towards Goals: Patient is making progress towards the goal.     Recommendations:    Instructions:  ice 20 minutes every other hour as needed    Follow-up:    Return to care in two weeks

## 2020-10-08 RX ORDER — FUROSEMIDE 20 MG
TABLET ORAL
Qty: 30 TABLET | Refills: 2 | Status: SHIPPED | OUTPATIENT
Start: 2020-10-08 | End: 2020-11-11

## 2020-10-08 NOTE — TELEPHONE ENCOUNTER
"Requested Prescriptions   Pending Prescriptions Disp Refills     furosemide (LASIX) 20 MG tablet [Pharmacy Med Name: FUROSEMIDE 20 MG TABLET] 30 tablet 5     Sig: TAKE 1 TABLET BY MOUTH EVERY DAY       Diuretics (Including Combos) Protocol Passed - 10/5/2020  3:15 PM        Passed - Blood pressure under 140/90 in past 12 months     BP Readings from Last 3 Encounters:   06/24/20 138/78   05/01/20 124/81   01/29/20 138/85                 Passed - Recent (12 mo) or future (30 days) visit within the authorizing provider's specialty     Patient has had an office visit with the authorizing provider or a provider within the authorizing providers department within the previous 12 mos or has a future within next 30 days. See \"Patient Info\" tab in inbasket, or \"Choose Columns\" in Meds & Orders section of the refill encounter.              Passed - Medication is active on med list        Passed - Patient is age 18 or older        Passed - No active pregancy on record        Passed - Normal serum creatinine on file in past 12 months     Recent Labs   Lab Test 08/10/20   CR 0.82              Passed - Normal serum potassium on file in past 12 months     Recent Labs   Lab Test 08/10/20   POTASSIUM 4.0                    Passed - Normal serum sodium on file in past 12 months     Recent Labs   Lab Test 10/31/19  1002                 Passed - No positive pregnancy test in past 12 months             Prescription approved per [facility] Refill Protocol.    Zina Raymond RN    "

## 2020-10-13 ENCOUNTER — TRANSFERRED RECORDS (OUTPATIENT)
Dept: HEALTH INFORMATION MANAGEMENT | Facility: CLINIC | Age: 50
End: 2020-10-13

## 2020-10-19 ENCOUNTER — MYC REFILL (OUTPATIENT)
Dept: FAMILY MEDICINE | Facility: CLINIC | Age: 50
End: 2020-10-19

## 2020-10-19 DIAGNOSIS — G89.4 CHRONIC PAIN SYNDROME: ICD-10-CM

## 2020-10-19 RX ORDER — OXYCODONE AND ACETAMINOPHEN 10; 325 MG/1; MG/1
1 TABLET ORAL EVERY 6 HOURS PRN
Qty: 90 TABLET | Refills: 0 | Status: SHIPPED | OUTPATIENT
Start: 2020-10-19 | End: 2020-11-20

## 2020-10-21 ENCOUNTER — THERAPY VISIT (OUTPATIENT)
Dept: CHIROPRACTIC MEDICINE | Facility: CLINIC | Age: 50
End: 2020-10-21
Payer: COMMERCIAL

## 2020-10-21 DIAGNOSIS — M99.02 THORACIC SEGMENT DYSFUNCTION: ICD-10-CM

## 2020-10-21 DIAGNOSIS — G89.29 CHRONIC LOW BACK PAIN: ICD-10-CM

## 2020-10-21 DIAGNOSIS — M62.838 SPASM OF MUSCLE: ICD-10-CM

## 2020-10-21 DIAGNOSIS — M54.50 CHRONIC LOW BACK PAIN: ICD-10-CM

## 2020-10-21 DIAGNOSIS — M99.05 SEGMENTAL DYSFUNCTION OF PELVIC REGION: Primary | ICD-10-CM

## 2020-10-21 DIAGNOSIS — M99.03 SEGMENTAL DYSFUNCTION OF LUMBAR REGION: ICD-10-CM

## 2020-10-21 PROCEDURE — 97810 ACUP 1/> WO ESTIM 1ST 15 MIN: CPT | Performed by: CHIROPRACTOR

## 2020-10-21 PROCEDURE — 98941 CHIROPRACT MANJ 3-4 REGIONS: CPT | Mod: AT | Performed by: CHIROPRACTOR

## 2020-10-21 NOTE — PROGRESS NOTES
Visit #: 23 in 2020    Subjective:  Velma Diaz is a 48 year old female who is seen in f/u up for:        Segmental dysfunction of pelvic region  Lumbago  Segmental dysfunction of lumbar region  Spasm of muscle  Thoracic segment dysfunction.     Since last visit on 20/7/2020,  Velma Diaz reports:    Area of chief complaint:  Lumbar :  Symptoms are graded at 5/10. The quality is described as stiff, and achey.  Motion has been about the samel.  Mireille reports that her low back continues to feeling better.  She did have some increased pain getting out of bed this morning but she states that this has occurred just this morning.  The long drive, different bed and lifting of boxes in [place], Overall he low back is feeling better.  She still is having symptoms but appear to be much more manageable.    Objective:  The following was observed:    P: palpatory tenderness Piriformis R>>L,   A: static palpation demonstrates intersegmental asymmetry   thoracic, lumbar, pelvis  R: motion palpation notes restricted motion,   T10, T11 , T12 , L4 , L5  and PSIS Right   T: hypertonicity at: Piriformis R>>L    Segmental spinal dysfunction/restrictions found at:   T10, T11 , T12 , L4 , L5  and PSIS Right       Assessment:    Diagnoses:      1. Segmental dysfunction of pelvic region    2. Lumbago    3. Segmental dysfunction of lumbar region    4. Spasm of muscle    5. Thoracic segment dysfunction        Patient's condition:  Patient had restrictions pre-manipulation    Treatment effectiveness:  Post manipulation there is better intersegmental movement and Patient claims to feel looser post manipulation      Procedures:  CMT:  48707 Chiropractic manipulative treatment 3-4 regions performed   Thoracic: Activator, T10, T11, T12, Prone  Lumbar: Activator, L4, L5, Prone  Pelvis: Activator, PSIS Right , Prone    Modalities:  95877: Acupuncture, for 15 minutes:  Points: B25, B27, GV3, K3, B62, SI3  Ahsi point in piriformis  For 15  minutes    Therapeutic procedures:  None    Response to Treatment  Reduction in symptoms as reported by patient    Prognosis: Good    Progress towards Goals: Patient is making progress towards the goal.     Recommendations:    Instructions:  ice 20 minutes every other hour as needed    Follow-up:    Return to care in two weeks

## 2020-10-25 NOTE — PROGRESS NOTES
Pain Clinic Follow Up Note    Referring Provider:   Mekhi Ryder*   Primary care provider:  Srinivasa Hunter    CC:  Polyneuropathy  -----------------------------------------------------------------------------  HPI:  Velma Diaz is a 50 year old female with PMH of MAYA, depression, chronic pain syndrome who presents to the pain clinic multiple pain complaints today.  Patient is referred from Dr. Sanchez for presumed small fiber neuropathy.  In summary, patient began after pregnancy in 2002.  Characterizes burning, tingling, numbness sensations which have been slowly but gradually progressive over the last 18 years.  She reports sensation into the right anterior thigh, right medial calf, and left lateral foot.  Dysesthesias are present at all time and are associated with allodynia.  Patient was last seen by Dr. Carballo on 6/9/2020, with plans for potential neuromodulation work-up. Upon psychology evaluation, patient appeared to have complex mental health contributing to current pain picture.    Additionally of note, patient was seen at [facility] ED on 10/13/2020 and diagnosed with right lateral epicondylitis treated conservatively with NSAIDs and ibuprofen.  This has been getting somewhat better.  Pain is localized at the medial aspect of the elbow with some associated ulnar neuritis and dysesthesias within the ulnar nerve distribution on the right.  Patient has painful resisted wrist pronation, with wrist supination not being as painful.    Patient Supplied Answers To the [facility] Pain Questionnaire  [facility] -  Patient Entered Questionnaire/Answers 10/26/2020   What number best describes your pain right now:  0 = No pain  to  10 = Worst pain imaginable 6   How would you describe the pain? burning, sharp, numbness, dull, aching   Which of the following worsen your pain? standing, walking, exercise   Which of the following improve or reduce your pain?  lying down, relaxation   What number best describes  your average pain for the past week:  0 = No pain  to  10 = Worst pain imaginable 6   What number best describes your LOWEST pain in past 24 hours:  0 = No pain  to  10 = Worst pain imaginable 4   What number best describes your WORST pain in past 24 hours:  0 = No pain  to  10 = Worst pain imaginable 10   When is your pain worst? Night   What non-medicine treatments have you already had for your pain? acupuncture, chiropractic care, TENS (electrical stimulator)   Have you tried treating your pain with medication?  No   Are you currently taking medications for your pain? No         Imaging reviewed with the patient:  No images are attached to the encounter.   Current Pain Medications:  Tylenol, methocarbamol, oxycodone, pregabalin, nortriptyline    Past Pain Medications:  Topical capsaicin  * She has not previously tried duloxetine    Significant Medical History:   Past Medical History:   Diagnosis Date     Chronic pain syndrome 11/20/2015    She takes up to 90 oxycodone tablets per month for her chronic pain      Depressive disorder 2000     History of cleft palate with cleft lip     cleft lip and palate, and has had 27 operations per the patient     Hyperlipidemia with target LDL less than 130 10/26/2015     Morbid obesity with BMI of 40.0-44.9, adult (H) 10/26/2015     Neuropathy      MAYA (obstructive sleep apnea)/Hypoventilation Syndrome 2/24/2014    Study Date: 2/13/2014- (245.1 lbs) Sleep Associated Hypoventilation  suggested with baseline PCO2 42 mmHg, maximum PCO2 55 mmHg. Lowest oxygen saturation 85.0% Apnea/Hypopnea Index 5.5 events per hour.  REM AHI 37.2.  The supine AHI is 13.8. RDI 6.7 Sleep study 3/2014 (245#)- CPAP 6 cm effective, Transcutaneous CO2 Monitoring (TCM) within normal limits.          Skin cancer of anterior chest 2011    Basal cell on chest     TMJ (temporomandibular joint disorder)         Past Surgical History:  Past Surgical History:   Procedure Laterality Date     ANKLE SURGERY  7/13     Left for torn tendons & loose bone chips     ARTHROSCOPY KNEE  1986    Right knee     BACK SURGERY       Basal cell carcinoma  2011    Removal from the chest     BIOPSY       C VAGINAL HYSTERECTOMY  2011     CARPAL TUNNEL RELEASE RT/LT  ~2010    Bilateral     COLONOSCOPY  2016     COSMETIC SURGERY       cysto with right ureteroscopy, stone manipulation, double J stent removal  10/04/2018    Dr. Cisneros -- removal of right kidney stone     cysto, right retrograde pyelogram, right ureteral stent Right 09/2018    for obstructing right kidney stone     DECOMPRESSION CUBITAL TUNNEL Left 8/28/2015    Procedure: DECOMPRESSION CUBITAL TUNNEL;  Surgeon: Gus Donato MD;  Location: [facility]     ENT SURGERY  1975    Tonsillectomy and Adenoids     GYN SURGERY  2011    Hysterectomy     HC REPAIR PERONEAL TENDONS  7/13     ORTHOPEDIC SURGERY  2011, 2011    Bilateral CTR     PE TUBES      yearly times 10 years     REPAIR CLEFT PALATE CHILD      Age 3 months & afterwards (multiple surgeries)     SOFT TISSUE SURGERY  2013       Family History:  Family History   Problem Relation Age of Onset     Alzheimer Disease Paternal Grandmother 60     Cerebrovascular Disease Paternal Grandmother      Neurologic Disorder Sister 20        migraines     Depression/Anxiety Sister      Depression Sister      Neurologic Disorder Son 14        migraines     Asthma Son      Heart Disease Mother      Osteoporosis Mother      Obesity Mother      Cancer Father      Alcohol/Drug Father      Other Cancer Father         saliva glands & skin CA      Hypertension Father      Diabetes Maternal Grandmother      Breast Cancer Maternal Grandmother      Obesity Maternal Grandmother      Other Cancer Maternal Grandfather         skin cancer     Cancer - colorectal Other         Aunt     Asthma Daughter      Asthma Daughter      Asthma Son      Thyroid Disease Sister      Colon Cancer Other      Obesity Sister      Glaucoma No family hx of      Macular  Degeneration No family hx of         Social History:  Social History     Socioeconomic History     Marital status:      Spouse name: Not on file     Number of children: 3     Years of education: 12     Highest education level: Not on file   Occupational History     Occupation: Personal Care      Comment: Unemployed. 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   Social Needs     Financial resource strain: Not on file     Food insecurity     Worry: Not on file     Inability: Not on file     Transportation needs     Medical: Not on file     Non-medical: Not on file   Tobacco Use     Smoking status: Former Smoker     Packs/day: 0.50     Years: 15.00     Pack years: 7.50     Types: Cigarettes     Quit date: 2015     Years since quittin.6     Smokeless tobacco: Never Used   Substance and Sexual Activity     Alcohol use: No     Alcohol/week: 0.0 standard drinks     Comment: Quit in      Drug use: No     Sexual activity: Yes     Partners: Male     Birth control/protection: Female Surgical   Lifestyle     Physical activity     Days per week: Not on file     Minutes per session: Not on file     Stress: Not on file   Relationships     Social connections     Talks on phone: Not on file     Gets together: Not on file     Attends Mandaeism service: Not on file     Active member of club or organization: Not on file     Attends meetings of clubs or organizations: Not on file     Relationship status: Not on file     Intimate partner violence     Fear of current or ex partner: Not on file     Emotionally abused: Not on file     Physically abused: Not on file     Forced sexual activity: Not on file   Other Topics Concern     Parent/sibling w/ CABG, MI or angioplasty before 65F 55M? No   Social History Narrative     Not on file     Social History     Social History Narrative     Not on file        Allergies:  No Known Allergies    Current Medications:   Current Outpatient Medications    Medication Sig Dispense Refill     acetaminophen (TYLENOL) 325 MG tablet Take 2 tablets (650 mg) by mouth every 4 hours as needed for other (mild pain) 100 tablet 0     BANOPHEN 25 MG capsule TAKE 1 TABLET (25 MG) BY MOUTH NIGHTLY AS NEEDED TO HELP WITH SLEEP 30 capsule 0     BANOPHEN 25 MG capsule        [facility] NASAL SPRAY 0.05 % nasal spray PLEASE SEE ATTACHED FOR DETAILED DIRECTIONS       Cyanocobalamin (B-12 DOTS SL) Place under the tongue daily       ferrous sulfate (FEROSUL) 325 (65 Fe) MG tablet TAKE 1 TABLET BY MOUTH EVERY DAY WITH BREAKFAST 30 tablet 2     furosemide (LASIX) 20 MG tablet TAKE 1 TABLET BY MOUTH EVERY DAY 30 tablet 2     ketoconazole (NIZORAL) 2 % external cream PLEASE SEE ATTACHED FOR DETAILED DIRECTIONS 60 g 1     methocarbamol (ROBAXIN) 750 MG tablet TAKE 1 TABLETS (750 MG) BY MOUTH 3 TIMES DAILY AS NEEDED FOR MUSCLE SPASMS 60 tablet 1     Multiple Vitamins-Minerals (MULTI FOR HER PO) Take by mouth daily        nortriptyline (PAMELOR) 10 MG capsule Take 3 capsules (30 mg) by mouth At Bedtime 90 capsule 5     nystatin (MYCOSTATIN) 038989 UNIT/GM external powder APPLY TO AFFECTED AREA TWICE A DAY AS NEEDED 30 g 0     ORDER FOR DME Respironics REMSTAR 60 Series Auto [id number] H2O, Airfit P10 nasal pillow mask w/xsmall pillows       oxyCODONE-acetaminophen (PERCOCET)  MG per tablet Take 1 tablet by mouth every 6 hours as needed for moderate to severe pain 90 tablet 0     predniSONE (DELTASONE) 20 MG tablet Take 3 tabs by mouth daily x 3 days, then 2 tabs daily x 3 days, then 1 tab daily x 3 days, then 1/2 tab daily x 3 days. 20 tablet 0     pregabalin (LYRICA) 225 MG capsule TAKE 1 CAPSULE BY MOUTH TWICE A DAY 60 capsule 5     rOPINIRole (REQUIP) 0.25 MG tablet Take 1 tablet (0.25 mg) by mouth 2 times daily 60 tablet 5     SUMAtriptan (IMITREX) 100 MG tablet Take 1 tablet (100 mg) by mouth at onset of headache for migraine May repeat in 2 hours if needed: max 2/day 9 tablet 2       Review  "of Systems:  Answers for HPI/ROS submitted by the patient on 10/26/2020   General Symptoms: No  Skin Symptoms: No  HENT Symptoms: No  EYE SYMPTOMS: No  HEART SYMPTOMS: No  LUNG SYMPTOMS: No  INTESTINAL SYMPTOMS: No  URINARY SYMPTOMS: No  GYNECOLOGIC SYMPTOMS: No  BREAST SYMPTOMS: No  SKELETAL SYMPTOMS: No  BLOOD SYMPTOMS: No  NERVOUS SYSTEM SYMPTOMS: No  MENTAL HEALTH SYMPTOMS: No    -----------------------------------------------------------------------------  OBJECTIVE:    Physical Exam:   Vitals:    10/26/20 1023   BP: 118/84   Pulse: 82   Resp: 16   Weight: 109.3 kg (241 lb)   Height: 1.6 m (5' 3\")     GEN:  NAD, seated comfortably  HEENT: No scleral injection, pupils not constricted  RESP:  Non-labored breathing  ABD:  Soft, non-distended, non-TTP  EXT:  No edema in bilateral LE  SKIN:  No rashes over exposed skin  PSYCH: Appropriate mood and congruent affect  NEURO/MSK:   -MSE:  Alert and oriented, pleasant and logical in conversation  -Sensation:  Patchy dysesthesias located at the right anterior thigh (not lateral femoral cutaneous nerve distribution, right medial lower leg, and left lateral foot.   -Strength:  Scored: _/5 Right Left   Hip Flexion 5 5   Knee Extension 5 5   Ankle Dorsiflexion 5 5   EHL 5 5   Ankle Plantarflexion 5 5   Toe Flexion 5 5   * Pain-limited    - Reflexes:  Scored: _/4 Right Left   Patellar 1 1   Achilles 0 0     -Babinski: Downgoing bilaterally  -Tone:  No increase in tone in the bilateral lower extremities    Laboratory results:  Recent Labs   Lab Test 08/10/20 10/31/19  1002 10/30/18  1040   NA  --  135 141   POTASSIUM 4.0 4.3 4.0   CHLORIDE  --  106 107   CO2  --  24 24   ANIONGAP  --  5 10   GLC 88 92 94   BUN  --  10 8   CR 0.82 0.67 0.74   DENISE  --  9.3 9.0     Recent Labs   Lab Test 10/31/19  1002   WBC 6.8   RBC 4.39   HGB 13.5   HCT 41.6   MCV 95   MCH 30.8   MCHC 32.5   RDW 12.4          Imaging:  No new pertinent " imaging    -----------------------------------------------------------------------------  ASSESSMENT AND PLAN:    Velma Diaz is a 50 year old female with PMH of MAYA, depression, chronic pain syndrome who presents to the pain clinic patchy lower extremity neuropathic pain and right medial epicondylitis. Work-up for polyneuropathy has been extensive including skin biopsy negative for small fiber neuropathy (managed by Dr. Sanchez).  Given neuropathic pain symptoms, patient went work-up for spinal cord stimulator but was found to have significant psychologic comorbidities that make SCS a less than ideal option for her.  Additionally, upon physical examination today (previous pain clinic note from video visit), dysesthesias are in patchy distribution throughout the right anterior thigh, right medial calf, and left lateral foot- which does not fit any peripheral nerve distribution/pattern, which would be more appropriate for SCS placement.    Encounter Diagnosis:   1. Chronic pain syndrome  2. Polyneuropathy    RECOMMENDATIONS:     # Medications:   -No new medications at this time  -Agree with consideration of starting duloxetine per neurology    # Procedure:   No further interventions at this time. Patient was considered for SCS and underwent psychology evaluation, but ultimately deemed not a good candidate at this time.    Follow up: PRN    This patient was seen and staffed with Dr. Carballo.    Pedro Kemp, DO  Pain Medicine Fellow      ATTENDING ATTESTATION  I saw the patient along with the pain medicine fellow Dr. Pedro Kemp. Please see his note above for full details. I have edited his note and agree with it. I was involved in gathering history, physical examination and development of the plan of care.

## 2020-10-26 ENCOUNTER — OFFICE VISIT (OUTPATIENT)
Dept: ANESTHESIOLOGY | Facility: CLINIC | Age: 50
End: 2020-10-26
Payer: COMMERCIAL

## 2020-10-26 VITALS
WEIGHT: 241 LBS | RESPIRATION RATE: 16 BRPM | DIASTOLIC BLOOD PRESSURE: 84 MMHG | SYSTOLIC BLOOD PRESSURE: 118 MMHG | HEART RATE: 82 BPM | BODY MASS INDEX: 42.7 KG/M2 | HEIGHT: 63 IN

## 2020-10-26 DIAGNOSIS — M79.2 NEUROPATHIC PAIN: Primary | ICD-10-CM

## 2020-10-26 PROCEDURE — 99203 OFFICE O/P NEW LOW 30 MIN: CPT | Mod: GC | Performed by: ANESTHESIOLOGY

## 2020-10-26 ASSESSMENT — PAIN SCALES - GENERAL: PAINLEVEL: SEVERE PAIN (6)

## 2020-10-26 ASSESSMENT — MIFFLIN-ST. JEOR: SCORE: 1682.3

## 2020-10-26 NOTE — LETTER
10/26/2020       RE: Velma Diaz  [address]  [place] [facility] [address]     Dear Colleague,    Thank you for referring your patient, Velma Diaz, to the [facility] FOR COMPREHENSIVE PA[facility]NAGEMENT [place] at [facility]. Please see a copy of my visit note below.    Pain Clinic Follow Up Note    Referring Provider:   Mekhi Ryder*   Primary care provider:  Srinivasa Hunter    CC:  Polyneuropathy  -----------------------------------------------------------------------------  HPI:  Velma Diaz is a 50 year old female with PMH of MAYA, depression, chronic pain syndrome who presents to the pain clinic multiple pain complaints today.  Patient is referred from Dr. Sanchez for presumed small fiber neuropathy.  In summary, patient began after pregnancy in 2002.  Characterizes burning, tingling, numbness sensations which have been slowly but gradually progressive over the last 18 years.  She reports sensation into the right anterior thigh, right medial calf, and left lateral foot.  Dysesthesias are present at all time and are associated with allodynia.  Patient was last seen by Dr. Cabrallo on 6/9/2020, with plans for potential neuromodulation work-up. Upon psychology evaluation, patient appeared to have complex mental health contributing to current pain picture.    Additionally of note, patient was seen at [facility] ED on 10/13/2020 and diagnosed with right lateral epicondylitis treated conservatively with NSAIDs and ibuprofen.  This has been getting somewhat better.  Pain is localized at the medial aspect of the elbow with some associated ulnar neuritis and dysesthesias within the ulnar nerve distribution on the right.  Patient has painful resisted wrist pronation, with wrist supination not being as painful.    Patient Supplied Answers To the [facility] Pain Questionnaire  [facility] Pain -  Patient Entered Questionnaire/Answers 10/26/2020   What number best  describes your pain right now:  0 = No pain  to  10 = Worst pain imaginable 6   How would you describe the pain? burning, sharp, numbness, dull, aching   Which of the following worsen your pain? standing, walking, exercise   Which of the following improve or reduce your pain?  lying down, relaxation   What number best describes your average pain for the past week:  0 = No pain  to  10 = Worst pain imaginable 6   What number best describes your LOWEST pain in past 24 hours:  0 = No pain  to  10 = Worst pain imaginable 4   What number best describes your WORST pain in past 24 hours:  0 = No pain  to  10 = Worst pain imaginable 10   When is your pain worst? Night   What non-medicine treatments have you already had for your pain? acupuncture, chiropractic care, TENS (electrical stimulator)   Have you tried treating your pain with medication?  No   Are you currently taking medications for your pain? No         Imaging reviewed with the patient:  No images are attached to the encounter.   Current Pain Medications:  Tylenol, methocarbamol, oxycodone, pregabalin, nortriptyline    Past Pain Medications:  Topical capsaicin  * She has not previously tried duloxetine    Significant Medical History:   Past Medical History:   Diagnosis Date     Chronic pain syndrome 11/20/2015    She takes up to 90 oxycodone tablets per month for her chronic pain      Depressive disorder 2000     History of cleft palate with cleft lip     cleft lip and palate, and has had 27 operations per the patient     Hyperlipidemia with target LDL less than 130 10/26/2015     Morbid obesity with BMI of 40.0-44.9, adult (H) 10/26/2015     Neuropathy      MAYA (obstructive sleep apnea)/Hypoventilation Syndrome 2/24/2014    Study Date: 2/13/2014- (245.1 lbs) Sleep Associated Hypoventilation  suggested with baseline PCO2 42 mmHg, maximum PCO2 55 mmHg. Lowest oxygen saturation 85.0% Apnea/Hypopnea Index 5.5 events per hour.  REM AHI 37.2.  The supine AHI is 13.8.  RDI 6.7 Sleep study 3/2014 (245#)- CPAP 6 cm effective, Transcutaneous CO2 Monitoring (TCM) within normal limits.          Skin cancer of anterior chest 2011    Basal cell on chest     TMJ (temporomandibular joint disorder)         Past Surgical History:  Past Surgical History:   Procedure Laterality Date     ANKLE SURGERY  7/13    Left for torn tendons & loose bone chips     ARTHROSCOPY KNEE  1986    Right knee     BACK SURGERY       Basal cell carcinoma  2011    Removal from the chest     BIOPSY       C VAGINAL HYSTERECTOMY  2011     CARPAL TUNNEL RELEASE RT/LT  ~2010    Bilateral     COLONOSCOPY  2016     COSMETIC SURGERY       cysto with right ureteroscopy, stone manipulation, double J stent removal  10/04/2018    Dr. Cisneros -- removal of right kidney stone     cysto, right retrograde pyelogram, right ureteral stent Right 09/2018    for obstructing right kidney stone     DECOMPRESSION CUBITAL TUNNEL Left 8/28/2015    Procedure: DECOMPRESSION CUBITAL TUNNEL;  Surgeon: Gus Donato MD;  [facility]     ENT SURGERY  1975    Tonsillectomy and Adenoids     GYN SURGERY  2011    Hysterectomy     HC REPAIR PERONEAL TENDONS  7/13     ORTHOPEDIC SURGERY  2011, 2011    Bilateral CTR     PE TUBES      yearly times 10 years     REPAIR CLEFT PALATE CHILD      Age 3 months & afterwards (multiple surgeries)     SOFT TISSUE SURGERY  2013       Family History:  Family History   Problem Relation Age of Onset     Alzheimer Disease Paternal Grandmother 60     Cerebrovascular Disease Paternal Grandmother      Neurologic Disorder Sister 20        migraines     Depression/Anxiety Sister      Depression Sister      Neurologic Disorder Son 14        migraines     Asthma Son      Heart Disease Mother      Osteoporosis Mother      Obesity Mother      Cancer Father      Alcohol/Drug Father      Other Cancer Father         saliva glands & skin CA      Hypertension Father      Diabetes Maternal Grandmother      Breast Cancer  Maternal Grandmother      Obesity Maternal Grandmother      Other Cancer Maternal Grandfather         skin cancer     Cancer - colorectal Other         Aunt     Asthma Daughter      Asthma Daughter      Asthma Son      Thyroid Disease Sister      Colon Cancer Other      Obesity Sister      Glaucoma No family hx of      Macular Degeneration No family hx of         Social History:  Social History     Socioeconomic History     Marital status:      Spouse name: Not on file     Number of children: 3     Years of education: 12     Highest education level: Not on file   Occupational History     Occupation: Personal Care      Comment: Unemployed. 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   Social Needs     Financial resource strain: Not on file     Food insecurity     Worry: Not on file     Inability: Not on file     Transportation needs     Medical: Not on file     Non-medical: Not on file   Tobacco Use     Smoking status: Former Smoker     Packs/day: 0.50     Years: 15.00     Pack years: 7.50     Types: Cigarettes     Quit date: 2015     Years since quittin.6     Smokeless tobacco: Never Used   Substance and Sexual Activity     Alcohol use: No     Alcohol/week: 0.0 standard drinks     Comment: Quit in      Drug use: No     Sexual activity: Yes     Partners: Male     Birth control/protection: Female Surgical   Lifestyle     Physical activity     Days per week: Not on file     Minutes per session: Not on file     Stress: Not on file   Relationships     Social connections     Talks on phone: Not on file     Gets together: Not on file     Attends Scientologist service: Not on file     Active member of club or organization: Not on file     Attends meetings of clubs or organizations: Not on file     Relationship status: Not on file     Intimate partner violence     Fear of current or ex partner: Not on file     Emotionally abused: Not on file     Physically abused: Not on file     Forced  sexual activity: Not on file   Other Topics Concern     Parent/sibling w/ CABG, MI or angioplasty before 65F 55M? No   Social History Narrative     Not on file     Social History     Social History Narrative     Not on file        Allergies:  No Known Allergies    Current Medications:   Current Outpatient Medications   Medication Sig Dispense Refill     acetaminophen (TYLENOL) 325 MG tablet Take 2 tablets (650 mg) by mouth every 4 hours as needed for other (mild pain) 100 tablet 0     BANOPHEN 25 MG capsule TAKE 1 TABLET (25 MG) BY MOUTH NIGHTLY AS NEEDED TO HELP WITH SLEEP 30 capsule 0     BANOPHEN 25 MG capsule        [employer] NASAL SPRAY 0.05 % nasal spray PLEASE SEE ATTACHED FOR DETAILED DIRECTIONS       Cyanocobalamin (B-12 DOTS SL) Place under the tongue daily       ferrous sulfate (FEROSUL) 325 (65 Fe) MG tablet TAKE 1 TABLET BY MOUTH EVERY DAY WITH BREAKFAST 30 tablet 2     furosemide (LASIX) 20 MG tablet TAKE 1 TABLET BY MOUTH EVERY DAY 30 tablet 2     ketoconazole (NIZORAL) 2 % external cream PLEASE SEE ATTACHED FOR DETAILED DIRECTIONS 60 g 1     methocarbamol (ROBAXIN) 750 MG tablet TAKE 1 TABLETS (750 MG) BY MOUTH 3 TIMES DAILY AS NEEDED FOR MUSCLE SPASMS 60 tablet 1     Multiple Vitamins-Minerals (MULTI FOR HER PO) Take by mouth daily        nortriptyline (PAMELOR) 10 MG capsule Take 3 capsules (30 mg) by mouth At Bedtime 90 capsule 5     nystatin (MYCOSTATIN) 223789 UNIT/GM external powder APPLY TO AFFECTED AREA TWICE A DAY AS NEEDED 30 g 0     ORDER FOR DME Respironics REMSTAR 60 Series Auto [id number] H2O, Airfit P10 nasal pillow mask w/xsmall pillows       oxyCODONE-acetaminophen (PERCOCET)  MG per tablet Take 1 tablet by mouth every 6 hours as needed for moderate to severe pain 90 tablet 0     predniSONE (DELTASONE) 20 MG tablet Take 3 tabs by mouth daily x 3 days, then 2 tabs daily x 3 days, then 1 tab daily x 3 days, then 1/2 tab daily x 3 days. 20 tablet 0     pregabalin (LYRICA) 225 MG capsule  "TAKE 1 CAPSULE BY MOUTH TWICE A DAY 60 capsule 5     rOPINIRole (REQUIP) 0.25 MG tablet Take 1 tablet (0.25 mg) by mouth 2 times daily 60 tablet 5     SUMAtriptan (IMITREX) 100 MG tablet Take 1 tablet (100 mg) by mouth at onset of headache for migraine May repeat in 2 hours if needed: max 2/day 9 tablet 2       Review of Systems:  Answers for HPI/ROS submitted by the patient on 10/26/2020   General Symptoms: No  Skin Symptoms: No  HENT Symptoms: No  EYE SYMPTOMS: No  HEART SYMPTOMS: No  LUNG SYMPTOMS: No  INTESTINAL SYMPTOMS: No  URINARY SYMPTOMS: No  GYNECOLOGIC SYMPTOMS: No  BREAST SYMPTOMS: No  SKELETAL SYMPTOMS: No  BLOOD SYMPTOMS: No  NERVOUS SYSTEM SYMPTOMS: No  MENTAL HEALTH SYMPTOMS: No    -----------------------------------------------------------------------------  OBJECTIVE:    Physical Exam:   Vitals:    10/26/20 1023   BP: 118/84   Pulse: 82   Resp: 16   Weight: 109.3 kg (241 lb)   Height: 1.6 m (5' 3\")     GEN:  NAD, seated comfortably  HEENT: No scleral injection, pupils not constricted  RESP:  Non-labored breathing  ABD:  Soft, non-distended, non-TTP  EXT:  No edema in bilateral LE  SKIN:  No rashes over exposed skin  PSYCH: Appropriate mood and congruent affect  NEURO/MSK:   -MSE:  Alert and oriented, pleasant and logical in conversation  -Sensation:  Patchy dysesthesias located at the right anterior thigh (not lateral femoral cutaneous nerve distribution, right medial lower leg, and left lateral foot.   -Strength:  Scored: _/5 Right Left   Hip Flexion 5 5   Knee Extension 5 5   Ankle Dorsiflexion 5 5   EHL 5 5   Ankle Plantarflexion 5 5   Toe Flexion 5 5   * Pain-limited    - Reflexes:  Scored: _/4 Right Left   Patellar 1 1   Achilles 0 0     -Babinski: Downgoing bilaterally  -Tone:  No increase in tone in the bilateral lower extremities    Laboratory results:  Recent Labs   Lab Test 08/10/20 10/31/19  1002 10/30/18  1040   NA  --  135 141   POTASSIUM 4.0 4.3 4.0   CHLORIDE  --  106 107   CO2  --  " 24 24   ANIONGAP  --  5 10   GLC 88 92 94   BUN  --  10 8   CR 0.82 0.67 0.74   DENISE  --  9.3 9.0     Recent Labs   Lab Test 10/31/19  1002   WBC 6.8   RBC 4.39   HGB 13.5   HCT 41.6   MCV 95   MCH 30.8   MCHC 32.5   RDW 12.4          Imaging:  No new pertinent imaging    -----------------------------------------------------------------------------  ASSESSMENT AND PLAN:    Velma Diaz is a 50 year old female with PMH of MAYA, depression, chronic pain syndrome who presents to the pain clinic patchy lower extremity neuropathic pain and right medial epicondylitis. Work-up for polyneuropathy has been extensive including skin biopsy negative for small fiber neuropathy (managed by Dr. Sanchez).  Given neuropathic pain symptoms, patient went work-up for spinal cord stimulator but was found to have significant psychologic comorbidities that make SCS a less than ideal option for her.  Additionally, upon physical examination today (previous pain clinic note from video visit), dysesthesias are in patchy distribution throughout the right anterior thigh, right medial calf, and left lateral foot- which does not fit any peripheral nerve distribution/pattern, which would be more appropriate for SCS placement.    Encounter Diagnosis:   1. Chronic pain syndrome  2. Polyneuropathy    RECOMMENDATIONS:     # Medications:   -No new medications at this time  -Agree with consideration of starting duloxetine per neurology    # Procedure:   No further interventions at this time. Patient was considered for SCS and underwent psychology evaluation, but ultimately deemed not a good candidate at this time.    Follow up: PRN    This patient was seen and staffed with Dr. Carballo.    Pedro Kemp, DO  Pain Medicine Fellow      ATTENDING ATTESTATION  I saw the patient along with the pain medicine fellow Dr. Pedro Kemp. Please see his note above for full details. I have edited his note and agree with it. I was involved in gathering  history, physical examination and development of the plan of care.

## 2020-10-26 NOTE — PATIENT INSTRUCTIONS
Treatment planning:    Recommend follow up with neurologist       Recommended Follow up:      As needed in pain clinic        Please call [phone], option #1 to schedule your clinic appointment if you don't already have an appointment scheduled.        To speak with a nurse, schedule/reschedule/cancel a clinic appointment, or request a medication refill call: [phone], option #1.    You can also reach us by [employer]: [email or web address]

## 2020-10-26 NOTE — NURSING NOTE
AVS given and reviewed with pt.  Pt verbalized understanding and declined any questions.     JOHANN CurtisN, RN

## 2020-10-28 ENCOUNTER — VIRTUAL VISIT (OUTPATIENT)
Dept: NEUROLOGY | Facility: CLINIC | Age: 50
End: 2020-10-28
Payer: COMMERCIAL

## 2020-10-28 DIAGNOSIS — E66.01 MORBID OBESITY WITH BMI OF 40.0-44.9, ADULT (H): ICD-10-CM

## 2020-10-28 DIAGNOSIS — M79.7 FIBROMYALGIA: Primary | ICD-10-CM

## 2020-10-28 DIAGNOSIS — G57.11 MERALGIA PARESTHETICA OF RIGHT SIDE: ICD-10-CM

## 2020-10-28 DIAGNOSIS — G56.21 ULNAR NEUROPATHY AT ELBOW OF RIGHT UPPER EXTREMITY: ICD-10-CM

## 2020-10-28 DIAGNOSIS — F33.1 MAJOR DEPRESSIVE DISORDER, RECURRENT EPISODE, MODERATE (H): ICD-10-CM

## 2020-10-28 PROCEDURE — 99212 OFFICE O/P EST SF 10 MIN: CPT | Mod: 95 | Performed by: PSYCHIATRY & NEUROLOGY

## 2020-10-28 NOTE — PROGRESS NOTES
Service Date: 10/28/2020      2020        Srinivasa Hunter MD   [facility]    [address] [place] [address]      Annel Carballo MD    [facility]    Pain Clinic       RE: Velma Diaz   MRN: [id number]    : 1970           Dear Elena Hunter and Haris:        I had the pleasure to evaluate Mireille Diaz via billable video visit today.     I last met Mireille in 2020.  Based on her symptoms of widespread body pain and paresthesias predominantly affecting her hands and feet, I previously felt she had small fiber neuropathy.  Repeated EMGs showed no evidence of large fiber peripheral nerve dysfunction.  However, we performed a skin biopsy in 2020, which, somewhat to my surprise, was entirely normal.  The values of epidermal nerve fiber density in the foot and the thigh were robust: Thigh 43.18 per mm, with 5th percentile value of 18.1, and foot 25.49 with 5th percentile of 12.2.    In addition, Mireille has complaints of tingling and numbness that is more localized in the right 4th and 5th digits of the hand, and pain shooting from the elbow to the hand.  She was recently diagnosed with golfer's elbow and ordered physical therapy, but the lingering concern remains whether she has a right ulnar neuropathy at the elbow.  She had an EMG done last year by my colleague, Dr. Alfaro, that did not show any evidence of this diagnosis. However, she did have a prior history of left ulnar neuropathy at the elbow status post surgical ulnar nerve release that was helpful.    In addition, she complains of burning pain in her thighs, right much more than left, in the anterior and perhaps to some degree the lateral thigh, worse when standing or walking.  When I first saw her in  I felt that this was due to meralgia paresthetica and recommended weight loss.  Unfortunately, she has not lost any weight, and in fact, she has gained further during the last year due to sedentary  lifestyle.   Dr. Carballo proposed her to start Cymbalta for neuropathic pain and this has not been done yet.  She has had a very extensive serological investigation by myself, and Neurologists Dr. Hicks and Dr. Burger.  The lab tests were all negative, including vitamin B1, B12 levels, TSH, repeated hemoglobin A1c's, angiotensin converting enzyme, HIV, HTLV serologies and also hepatitis C serologies, OTTONIEL, ANCA, tissue transglutaminase antibodies for celiac disease, complement C4, CRP, cryoglobulins, SSA and SSB, Lyme serologies, rheumatoid factor and immunofixation.  She even had genetic testing for small fiber neuropathy through [employer] that was negative and also antibodies ordered at [facility], which showed a weakly positive TS-HDS titer at 14,000.  FGFR3 antibodies were negative.      PHYSICAL EXAMINATION:  Not repeated today.      IMPRESSION:     1. Fibromyalgia.   2. No evidence of a generalized small or large-fiber neuropathy.   3. Right ulnar neuropathy at the elbow and right meralgia paresthetica are still possible.      I discussed the above diagnoses with Mireille.  I want to make clear that she does not have small fiber neuropathy.  This is out of the question with a negative skin biopsy.  She previously had MRI imaging of the brain and cervical spine and they did not provide an explanation for her presentation.  Therefore, I cannot think anything other than fibromyalgia that can explain this widespread pain and sensory complaints.  I explained to her what fibromyalgia is.  I recommended this is managed through the Pain Clinic and I concur with Dr. Carballo's plan to start Cymbalta.      She still seems to have quite localized sensory complaints over the territory of the right ulnar nerve and possibly the right lateral femoral cutaneous nerve (meralgia).  Electrodiagnostic studies have failed to confirm the diagnosis of right ulnar neuropathy and electrodiagnosis of meralgia  paresthetica is very difficult in obese patients due to difficulty stimulating the LFCN nerve. In those cases, nerve ultrasonography can be helpful. I will schedule her for an ultrasound of those two nerves to see if we can confirm entrapment, because this would affect her long term management.   For the meralgia, I again recommended her to avoid wearing tight underwear and lose weight.  Cymbalta can help this type of neuropathic pain.  For the right ulnar neuropathy, conservative measures can be applied and surgery may be considered if those measures fail and the diagnosis is unequivocally demonstrated on imaging.      Thank you for allowing me to participate in the care of Ms. Diaz.  I will see her in followup as necessary.  Total duration of the video call was 10 minutes, more than half was counseling.         Sincerely,        MD CARA Gomes MD             D: 10/28/2020   T: 10/28/2020   MT: [email or web address]      Name:     ARVIN DIAZ   MRN:      [id number]        Account:      [id number]   :      1970           Service Date: 10/28/2020      Document: [id number]

## 2020-10-28 NOTE — PROGRESS NOTES
"Velma Diaz is a 50 year old female who is being evaluated via a billable video visit.      The patient has been notified of following:     \"This video visit will be conducted via a call between you and your physician/provider. We have found that certain health care needs can be provided without the need for an in-person physical exam.  This service lets us provide the care you need with a video conversation.  If a prescription is necessary we can send it directly to your pharmacy.  If lab work is needed we can place an order for that and you can then stop by our lab to have the test done at a later time.    Video visits are billed at different rates depending on your insurance coverage.  Please reach out to your insurance provider with any questions.    If during the course of the call the physician/provider feels a video visit is not appropriate, you will not be charged for this service.\"    Patient has given verbal consent for Video visit?YES  How would you like to obtain your AVS? Mychart        Video-Visit Details    Type of service:  Video Visit    Video Start Time: 11.18 am  Video End Time: 11.28 am    Originating Location (pt. Location): Home    Distant Location (provider location):  [facility]     Platform used for Video Visit: Federica Carrillo, HARLEY Max elbow    Legs not as intense. Essential oils for burning peppermint    "

## 2020-10-28 NOTE — LETTER
"10/28/2020       RE: Velma Diaz  [address]  [place] [address]     Dear Colleague,    Thank you for referring your patient, Velma Diaz, to the [facility] [place] at [facility]. Please see a copy of my visit note below.    Velma Diaz is a 50 year old female who is being evaluated via a billable video visit.      The patient has been notified of following:     \"This video visit will be conducted via a call between you and your physician/provider. We have found that certain health care needs can be provided without the need for an in-person physical exam.  This service lets us provide the care you need with a video conversation.  If a prescription is necessary we can send it directly to your pharmacy.  If lab work is needed we can place an order for that and you can then stop by our lab to have the test done at a later time.    Video visits are billed at different rates depending on your insurance coverage.  Please reach out to your insurance provider with any questions.    If during the course of the call the physician/provider feels a video visit is not appropriate, you will not be charged for this service.\"    Patient has given verbal consent for Video visit?YES  How would you like to obtain your AVS? Mychart        Video-Visit Details    Type of service:  Video Visit    Video Start Time: 11.18 am  Video End Time: 11.28 am    Originating Location (pt. Location): Home    Distant Location (provider location):  [facility] NEUROLOGY [facility]     Platform used for Video Visit: HARLEY Holt elbow    Legs not as intense. Essential oils for burning peppermint      Service Date: 10/28/2020      October 28, 2020        Srinivasa Hunter MD   [facility]    [address] [place] [address]      Annel Carballo MD    [facility]    Pain Clinic     "   RE: Velma Diaz   MRN: [id number]    : 1970           Dear Elena Hunter and Haris:        I had the pleasure to evaluate Mireille Diaz via billable video visit today.     I last met Mireille in 2020.  Based on her symptoms of widespread body pain and paresthesias predominantly affecting her hands and feet, I previously felt she had small fiber neuropathy.  Repeated EMGs showed no evidence of large fiber peripheral nerve dysfunction.  However, we performed a skin biopsy in 2020, which, somewhat to my surprise, was entirely normal.  The values of epidermal nerve fiber density in the foot and the thigh were robust: Thigh 43.18 per mm, with 5th percentile value of 18.1, and foot 25.49 with 5th percentile of 12.2.    In addition, Mireille has complaints of tingling and numbness that is more localized in the right 4th and 5th digits of the hand, and pain shooting from the elbow to the hand.  She was recently diagnosed with golfer's elbow and ordered physical therapy, but the lingering concern remains whether she has a right ulnar neuropathy at the elbow.  She had an EMG done last year by my colleague, Dr. Alfaro, that did not show any evidence of this diagnosis. However, she did have a prior history of left ulnar neuropathy at the elbow status post surgical ulnar nerve release that was helpful.    In addition, she complains of burning pain in her thighs, right much more than left, in the anterior and perhaps to some degree the lateral thigh, worse when standing or walking.  When I first saw her in  I felt that this was due to meralgia paresthetica and recommended weight loss.  Unfortunately, she has not lost any weight, and in fact, she has gained further during the last year due to sedentary lifestyle.   Dr. Carballo proposed her to start Cymbalta for neuropathic pain and this has not been done yet.  She has had a very extensive serological investigation by myself, and Neurologists Dr. Hicks and Dr. Burger.  The  lab tests were all negative, including vitamin B1, B12 levels, TSH, repeated hemoglobin A1c's, angiotensin converting enzyme, HIV, HTLV serologies and also hepatitis C serologies, OTTONIEL, ANCA, tissue transglutaminase antibodies for celiac disease, complement C4, CRP, cryoglobulins, SSA and SSB, Lyme serologies, rheumatoid factor and immunofixation.  She even had genetic testing for small fiber neuropathy through [employer] that was negative and also antibodies ordered at [facility], which showed a weakly positive TS-HDS titer at 14,000.  FGFR3 antibodies were negative.      PHYSICAL EXAMINATION:  Not repeated today.      IMPRESSION:     1. Fibromyalgia.   2. No evidence of a generalized small or large-fiber neuropathy.   3. Right ulnar neuropathy at the elbow and right meralgia paresthetica are still possible.      I discussed the above diagnoses with Mireille.  I want to make clear that she does not have small fiber neuropathy.  This is out of the question with a negative skin biopsy.  She previously had MRI imaging of the brain and cervical spine and they did not provide an explanation for her presentation.  Therefore, I cannot think anything other than fibromyalgia that can explain this widespread pain and sensory complaints.  I explained to her what fibromyalgia is.  I recommended this is managed through the Pain Clinic and I concur with Dr. Carballo's plan to start Cymbalta.      She still seems to have quite localized sensory complaints over the territory of the right ulnar nerve and possibly the right lateral femoral cutaneous nerve (meralgia).  Electrodiagnostic studies have failed to confirm the diagnosis of right ulnar neuropathy and electrodiagnosis of meralgia paresthetica is very difficult in obese patients due to difficulty stimulating the LFCN nerve. In those cases, nerve ultrasonography can be helpful. I will schedule her for an ultrasound of those two nerves to see if we can confirm  entrapment, because this would affect her long term management.   For the meralgia, I again recommended her to avoid wearing tight underwear and lose weight.  Cymbalta can help this type of neuropathic pain.  For the right ulnar neuropathy, conservative measures can be applied and surgery may be considered if those measures fail and the diagnosis is unequivocally demonstrated on imaging.      Thank you for allowing me to participate in the care of Ms. Diaz.  I will see her in followup as necessary.  Total duration of the video call was 10 minutes, more than half was counseling.         Sincerely,        MD CARA Gomes MD             D: 10/28/2020   T: 10/28/2020   MT: [email or web address]      Name:     ARVIN DIAZ   MRN:      [id number]        Account:      [id number]   :      1970           Service Date: 10/28/2020      Document: [id number]

## 2020-10-28 NOTE — PATIENT INSTRUCTIONS
Your widespread body pain and paresthesias are likely due to fibromyalgia. Neuropathy was actually ruled out with the skin biopsy  This should be managed by Dr Carballo. I will contact her.    I still believe you may have a pinched nerve at the right elbow (ulnar) and the right groin (meralgia paresthetica). Because those need different treatment from the fibromyalgia, I would like to do an ultrasound on those two nerves to try to prove my argument.     Thank you!

## 2020-11-02 ENCOUNTER — DOCUMENTATION ONLY (OUTPATIENT)
Dept: CARE COORDINATION | Facility: CLINIC | Age: 50
End: 2020-11-02

## 2020-11-02 DIAGNOSIS — G25.81 RESTLESS LEGS SYNDROME (RLS): ICD-10-CM

## 2020-11-03 RX ORDER — ROPINIROLE 0.25 MG/1
0.25 TABLET, FILM COATED ORAL 2 TIMES DAILY
Qty: 60 TABLET | Refills: 5 | Status: SHIPPED | OUTPATIENT
Start: 2020-11-03 | End: 2023-01-19

## 2020-11-04 ENCOUNTER — VIRTUAL VISIT (OUTPATIENT)
Dept: PSYCHOLOGY | Facility: CLINIC | Age: 50
End: 2020-11-04
Payer: COMMERCIAL

## 2020-11-04 ENCOUNTER — THERAPY VISIT (OUTPATIENT)
Dept: CHIROPRACTIC MEDICINE | Facility: CLINIC | Age: 50
End: 2020-11-04
Payer: COMMERCIAL

## 2020-11-04 DIAGNOSIS — F41.1 GAD (GENERALIZED ANXIETY DISORDER): ICD-10-CM

## 2020-11-04 DIAGNOSIS — M54.50 CHRONIC LOW BACK PAIN, UNSPECIFIED BACK PAIN LATERALITY, UNSPECIFIED WHETHER SCIATICA PRESENT: ICD-10-CM

## 2020-11-04 DIAGNOSIS — G89.29 CHRONIC LOW BACK PAIN, UNSPECIFIED BACK PAIN LATERALITY, UNSPECIFIED WHETHER SCIATICA PRESENT: ICD-10-CM

## 2020-11-04 DIAGNOSIS — M54.16 LUMBAR RADICULOPATHY: ICD-10-CM

## 2020-11-04 DIAGNOSIS — M99.03 SEGMENTAL DYSFUNCTION OF LUMBAR REGION: ICD-10-CM

## 2020-11-04 DIAGNOSIS — M99.05 SEGMENTAL DYSFUNCTION OF PELVIC REGION: Primary | ICD-10-CM

## 2020-11-04 DIAGNOSIS — M99.02 THORACIC SEGMENT DYSFUNCTION: ICD-10-CM

## 2020-11-04 DIAGNOSIS — F33.1 MAJOR DEPRESSIVE DISORDER, RECURRENT EPISODE, MODERATE (H): Primary | ICD-10-CM

## 2020-11-04 PROCEDURE — 90834 PSYTX W PT 45 MINUTES: CPT | Mod: 95 | Performed by: SOCIAL WORKER

## 2020-11-04 PROCEDURE — 98941 CHIROPRACT MANJ 3-4 REGIONS: CPT | Mod: AT | Performed by: CHIROPRACTOR

## 2020-11-04 PROCEDURE — 97810 ACUP 1/> WO ESTIM 1ST 15 MIN: CPT | Performed by: CHIROPRACTOR

## 2020-11-04 ASSESSMENT — ANXIETY QUESTIONNAIRES
7. FEELING AFRAID AS IF SOMETHING AWFUL MIGHT HAPPEN: NOT AT ALL
2. NOT BEING ABLE TO STOP OR CONTROL WORRYING: SEVERAL DAYS
3. WORRYING TOO MUCH ABOUT DIFFERENT THINGS: SEVERAL DAYS
1. FEELING NERVOUS, ANXIOUS, OR ON EDGE: SEVERAL DAYS
6. BECOMING EASILY ANNOYED OR IRRITABLE: SEVERAL DAYS
5. BEING SO RESTLESS THAT IT IS HARD TO SIT STILL: NOT AT ALL
GAD7 TOTAL SCORE: 4
IF YOU CHECKED OFF ANY PROBLEMS ON THIS QUESTIONNAIRE, HOW DIFFICULT HAVE THESE PROBLEMS MADE IT FOR YOU TO DO YOUR WORK, TAKE CARE OF THINGS AT HOME, OR GET ALONG WITH OTHER PEOPLE: NOT DIFFICULT AT ALL

## 2020-11-04 ASSESSMENT — PATIENT HEALTH QUESTIONNAIRE - PHQ9
SUM OF ALL RESPONSES TO PHQ QUESTIONS 1-9: 4
5. POOR APPETITE OR OVEREATING: NOT AT ALL

## 2020-11-04 NOTE — PROGRESS NOTES
Visit #: 24 in 2020    Subjective:  Velma Diaz is a 48 year old female who is seen in f/u up for:        Segmental dysfunction of pelvic region  Lumbago  Segmental dysfunction of lumbar region  Spasm of muscle  Thoracic segment dysfunction.     Since last visit on 10/21/2020,  Velma Diaz reports:    Area of chief complaint:  Lumbar :  Symptoms are graded at 5/10. The quality is described as stiff, and achey.  Motion has been about the same.  Mireille reports that her low back continues to feeling better, though she did have a couple of episodes of intense pain since her last visit. She has not done anything out of the ordinary and those episodes calmed down.  OVerall she is feeling better than she did but is still having some symptoms.       Objective:  The following was observed:    P: palpatory tenderness Piriformis R>>L,   A: static palpation demonstrates intersegmental asymmetry   thoracic, lumbar, pelvis  R: motion palpation notes restricted motion,   T10, T11 , T12 , L4 , L5  and PSIS Right   T: hypertonicity at: Piriformis R>>L    Segmental spinal dysfunction/restrictions found at:   T10, T11 , T12 , L4 , L5  and PSIS Right       Assessment:    Diagnoses:      1. Segmental dysfunction of pelvic region    2. Lumbago    3. Segmental dysfunction of lumbar region    4. Spasm of muscle    5. Thoracic segment dysfunction        Patient's condition:  Patient had restrictions pre-manipulation    Treatment effectiveness:  Post manipulation there is better intersegmental movement and Patient claims to feel looser post manipulation      Procedures:  CMT:  60632 Chiropractic manipulative treatment 3-4 regions performed   Thoracic: Activator, T10, T11, T12, Prone  Lumbar: Activator, L4, L5, Prone  Pelvis: Activator, PSIS Right , Prone    Modalities:  50874: Acupuncture, for 15 minutes:  Points: B25, B27, GV3, K3, B62, SI3  Ahsi point in piriformis  For 15 minutes    Therapeutic procedures:  None    Response to  Treatment  Reduction in symptoms as reported by patient    Prognosis: Good    Progress towards Goals: Patient is making progress towards the goal.     Recommendations:    Instructions:  ice 20 minutes every other hour as needed    Follow-up:    Return to care in two weeks
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
## 2020-11-05 ASSESSMENT — ANXIETY QUESTIONNAIRES: GAD7 TOTAL SCORE: 4

## 2020-11-11 ENCOUNTER — OFFICE VISIT (OUTPATIENT)
Dept: FAMILY MEDICINE | Facility: CLINIC | Age: 50
End: 2020-11-11
Payer: COMMERCIAL

## 2020-11-11 VITALS
HEART RATE: 89 BPM | TEMPERATURE: 98.4 F | SYSTOLIC BLOOD PRESSURE: 125 MMHG | DIASTOLIC BLOOD PRESSURE: 81 MMHG | BODY MASS INDEX: 42.87 KG/M2 | WEIGHT: 242 LBS | OXYGEN SATURATION: 100 %

## 2020-11-11 DIAGNOSIS — G89.4 CHRONIC PAIN SYNDROME: ICD-10-CM

## 2020-11-11 DIAGNOSIS — G89.29 CHRONIC BILATERAL BACK PAIN, UNSPECIFIED BACK LOCATION: Chronic | ICD-10-CM

## 2020-11-11 DIAGNOSIS — Z83.719 FAMILY HISTORY OF COLONIC POLYPS: ICD-10-CM

## 2020-11-11 DIAGNOSIS — F41.1 GAD (GENERALIZED ANXIETY DISORDER): ICD-10-CM

## 2020-11-11 DIAGNOSIS — Z00.00 ROUTINE GENERAL MEDICAL EXAMINATION AT A HEALTH CARE FACILITY: Primary | ICD-10-CM

## 2020-11-11 DIAGNOSIS — M79.89 LEG SWELLING: ICD-10-CM

## 2020-11-11 DIAGNOSIS — E66.01 MORBID OBESITY WITH BMI OF 40.0-44.9, ADULT (H): Chronic | ICD-10-CM

## 2020-11-11 DIAGNOSIS — M54.9 CHRONIC BILATERAL BACK PAIN, UNSPECIFIED BACK LOCATION: Chronic | ICD-10-CM

## 2020-11-11 DIAGNOSIS — E78.5 HYPERLIPIDEMIA WITH TARGET LDL LESS THAN 130: ICD-10-CM

## 2020-11-11 DIAGNOSIS — G47.33 OSA (OBSTRUCTIVE SLEEP APNEA): Chronic | ICD-10-CM

## 2020-11-11 DIAGNOSIS — G60.9 IDIOPATHIC PERIPHERAL NEUROPATHY: ICD-10-CM

## 2020-11-11 DIAGNOSIS — E55.9 VITAMIN D DEFICIENCY: ICD-10-CM

## 2020-11-11 DIAGNOSIS — F33.1 MAJOR DEPRESSIVE DISORDER, RECURRENT EPISODE, MODERATE (H): Chronic | ICD-10-CM

## 2020-11-11 DIAGNOSIS — Z23 NEED FOR PROPHYLACTIC VACCINATION AND INOCULATION AGAINST INFLUENZA: ICD-10-CM

## 2020-11-11 PROCEDURE — 90471 IMMUNIZATION ADMIN: CPT | Performed by: FAMILY MEDICINE

## 2020-11-11 PROCEDURE — 99396 PREV VISIT EST AGE 40-64: CPT | Mod: 25 | Performed by: FAMILY MEDICINE

## 2020-11-11 PROCEDURE — 90682 RIV4 VACC RECOMBINANT DNA IM: CPT | Performed by: FAMILY MEDICINE

## 2020-11-11 PROCEDURE — 99213 OFFICE O/P EST LOW 20 MIN: CPT | Mod: 25 | Performed by: FAMILY MEDICINE

## 2020-11-11 RX ORDER — METHOCARBAMOL 750 MG/1
TABLET, FILM COATED ORAL
Qty: 60 TABLET | Refills: 1 | Status: SHIPPED | OUTPATIENT
Start: 2020-11-11 | End: 2021-01-10

## 2020-11-11 RX ORDER — FUROSEMIDE 20 MG
20 TABLET ORAL DAILY
Qty: 30 TABLET | Refills: 2 | Status: SHIPPED | OUTPATIENT
Start: 2020-11-11 | End: 2021-05-05

## 2020-11-11 RX ORDER — PREGABALIN 225 MG/1
CAPSULE ORAL
Qty: 60 CAPSULE | Refills: 5 | Status: CANCELLED | OUTPATIENT
Start: 2020-11-11

## 2020-11-11 RX ORDER — DULOXETIN HYDROCHLORIDE 30 MG/1
30 CAPSULE, DELAYED RELEASE ORAL 2 TIMES DAILY
Qty: 60 CAPSULE | Refills: 5 | Status: SHIPPED | OUTPATIENT
Start: 2020-11-11 | End: 2021-07-16

## 2020-11-11 RX ORDER — NORTRIPTYLINE HCL 10 MG
30 CAPSULE ORAL AT BEDTIME
Qty: 90 CAPSULE | Refills: 5 | Status: SHIPPED | OUTPATIENT
Start: 2020-11-11 | End: 2021-06-11

## 2020-11-11 ASSESSMENT — ENCOUNTER SYMPTOMS
HEMATOCHEZIA: 0
PARESTHESIAS: 1
DIARRHEA: 0
FEVER: 0
NERVOUS/ANXIOUS: 1
FREQUENCY: 0
DIZZINESS: 0
HEADACHES: 0
SORE THROAT: 0
BREAST MASS: 0
JOINT SWELLING: 1
PALPITATIONS: 0
ABDOMINAL PAIN: 0
CONSTIPATION: 0
SHORTNESS OF BREATH: 0
HEARTBURN: 0
HEMATURIA: 0
EYE PAIN: 0
ARTHRALGIAS: 1
WEAKNESS: 1
DYSURIA: 0
NAUSEA: 0
CHILLS: 0
COUGH: 0
MYALGIAS: 1

## 2020-11-11 NOTE — PATIENT INSTRUCTIONS
Preventive Health Recommendations  Female Ages 50 - 64    Yearly exam: See your health care provider every year in order to  o Review health changes.   o Discuss preventive care.    o Review your medicines if your doctor has prescribed any.      Get a Pap test every three years (unless you have an abnormal result and your provider advises testing more often).    If you get Pap tests with HPV test, you only need to test every 5 years, unless you have an abnormal result.     You do not need a Pap test if your uterus was removed (hysterectomy) and you have not had cancer.    You should be tested each year for STDs (sexually transmitted diseases) if you're at risk.     Have a mammogram every 1 to 2 years.    Have a colonoscopy at age 50, or have a yearly FIT test (stool test). These exams screen for colon cancer.      Have a cholesterol test every 5 years, or more often if advised.    Have a diabetes test (fasting glucose) every three years. If you are at risk for diabetes, you should have this test more often.     If you are at risk for osteoporosis (brittle bone disease), think about having a bone density scan (DEXA).    Shots: Get a flu shot each year. Get a tetanus shot every 10 years.    Nutrition:     Eat at least 5 servings of fruits and vegetables each day.    Eat whole-grain bread, whole-wheat pasta and brown rice instead of white grains and rice.    Get adequate Calcium and Vitamin D.     Lifestyle    Exercise at least 150 minutes a week (30 minutes a day, 5 days a week). This will help you control your weight and prevent disease.    Limit alcohol to one drink per day.    No smoking.     Wear sunscreen to prevent skin cancer.     See your dentist every six months for an exam and cleaning.    See your eye doctor every 1 to 2 years.      Please wean off your Lyrica by taking 1 capsule/day for 1 week and then stopping it.  During this time, take 1 Cymbalta capsule daily for 1 week and then twice a day  thereafter.
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
## 2020-11-12 ENCOUNTER — MYC MEDICAL ADVICE (OUTPATIENT)
Dept: FAMILY MEDICINE | Facility: CLINIC | Age: 50
End: 2020-11-12

## 2020-11-12 NOTE — TELEPHONE ENCOUNTER
Routed to Dr. Hunter to address mychart concern about cymbalta and nortriptyline.    Judi Yeh RN  [facility]

## 2020-11-15 ENCOUNTER — TRANSFERRED RECORDS (OUTPATIENT)
Dept: HEALTH INFORMATION MANAGEMENT | Facility: CLINIC | Age: 50
End: 2020-11-15

## 2020-11-17 ENCOUNTER — TRANSFERRED RECORDS (OUTPATIENT)
Dept: HEALTH INFORMATION MANAGEMENT | Facility: CLINIC | Age: 50
End: 2020-11-17

## 2020-11-17 LAB
ALT SERPL-CCNC: 23 IU/L (ref 8–45)
AST SERPL-CCNC: 29 IU/L (ref 2–40)
CREAT SERPL-MCNC: 0.8 MG/DL (ref 0.57–1.11)
GFR SERPL CREATININE-BSD FRML MDRD: >60 ML/MIN/1.73M2
GLUCOSE SERPL-MCNC: 100 MG/DL (ref 65–100)
POTASSIUM SERPL-SCNC: 4 MMOL/L (ref 3.5–5)

## 2020-11-20 ENCOUNTER — MYC REFILL (OUTPATIENT)
Dept: FAMILY MEDICINE | Facility: CLINIC | Age: 50
End: 2020-11-20

## 2020-11-20 DIAGNOSIS — G89.4 CHRONIC PAIN SYNDROME: ICD-10-CM

## 2020-11-20 RX ORDER — OXYCODONE AND ACETAMINOPHEN 10; 325 MG/1; MG/1
1 TABLET ORAL EVERY 6 HOURS PRN
Qty: 90 TABLET | Refills: 0 | Status: SHIPPED | OUTPATIENT
Start: 2020-11-20 | End: 2020-12-16

## 2020-11-20 NOTE — TELEPHONE ENCOUNTER
Requested Prescriptions   Pending Prescriptions Disp Refills    oxyCODONE-acetaminophen (PERCOCET)  MG per tablet 90 tablet 0     Sig: Take 1 tablet by mouth every 6 hours as needed for moderate to severe pain       There is no refill protocol information for this order        Routing refill request to provider for review/approval because:  Drug not [place] [facility] [place]   JOHANN MarieeN-RN  [facility]

## 2020-11-25 ENCOUNTER — ANCILLARY PROCEDURE (OUTPATIENT)
Dept: MAMMOGRAPHY | Facility: CLINIC | Age: 50
End: 2020-11-25
Attending: FAMILY MEDICINE
Payer: COMMERCIAL

## 2020-11-25 DIAGNOSIS — Z12.31 VISIT FOR SCREENING MAMMOGRAM: ICD-10-CM

## 2020-11-25 PROCEDURE — 77067 SCR MAMMO BI INCL CAD: CPT | Performed by: RADIOLOGY

## 2020-12-01 ENCOUNTER — VIRTUAL VISIT (OUTPATIENT)
Dept: PSYCHOLOGY | Facility: CLINIC | Age: 50
End: 2020-12-01
Payer: COMMERCIAL

## 2020-12-01 DIAGNOSIS — F41.1 GAD (GENERALIZED ANXIETY DISORDER): ICD-10-CM

## 2020-12-01 DIAGNOSIS — F33.1 MAJOR DEPRESSIVE DISORDER, RECURRENT EPISODE, MODERATE (H): Primary | ICD-10-CM

## 2020-12-01 PROCEDURE — 90834 PSYTX W PT 45 MINUTES: CPT | Mod: 95 | Performed by: SOCIAL WORKER
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
## 2020-12-02 ENCOUNTER — THERAPY VISIT (OUTPATIENT)
Dept: CHIROPRACTIC MEDICINE | Facility: CLINIC | Age: 50
End: 2020-12-02
Payer: COMMERCIAL

## 2020-12-02 DIAGNOSIS — M99.03 SEGMENTAL DYSFUNCTION OF LUMBAR REGION: ICD-10-CM

## 2020-12-02 DIAGNOSIS — M99.05 SEGMENTAL DYSFUNCTION OF PELVIC REGION: Primary | ICD-10-CM

## 2020-12-02 DIAGNOSIS — M99.02 THORACIC SEGMENT DYSFUNCTION: ICD-10-CM

## 2020-12-02 DIAGNOSIS — M54.50 CHRONIC BILATERAL LOW BACK PAIN WITHOUT SCIATICA: ICD-10-CM

## 2020-12-02 DIAGNOSIS — G89.29 CHRONIC BILATERAL LOW BACK PAIN WITHOUT SCIATICA: ICD-10-CM

## 2020-12-02 DIAGNOSIS — M54.16 LUMBAR RADICULOPATHY: ICD-10-CM

## 2020-12-02 DIAGNOSIS — M62.838 SPASM OF MUSCLE: ICD-10-CM

## 2020-12-02 PROCEDURE — 97810 ACUP 1/> WO ESTIM 1ST 15 MIN: CPT | Performed by: CHIROPRACTOR

## 2020-12-02 PROCEDURE — 98941 CHIROPRACT MANJ 3-4 REGIONS: CPT | Mod: AT | Performed by: CHIROPRACTOR

## 2020-12-02 NOTE — PROGRESS NOTES
Visit #: 25 in 2020    Subjective:  Vlema Diaz is a 48 year old female who is seen in f/u up for:        Segmental dysfunction of pelvic region  Lumbago  Segmental dysfunction of lumbar region  Spasm of muscle  Thoracic segment dysfunction.     Since last visit on 11/4/2020,  Velma Diaz reports:    Area of chief complaint:  Lumbar :  Symptoms are graded at 7/10. The quality is described as stiff, and achey.  Motion has been about the same.  Mireille reports that her low back is feeling a little more stiff and sore.  She attributes this to laying around a lot more the past couple of weeks while she was dealing with a flu-like virus.  Her flu symptoms have passed but she is still feeling very stiff and sore in her low back.    Objective:  The following was observed:    P: palpatory tenderness Piriformis R>>L,   A: static palpation demonstrates intersegmental asymmetry   thoracic, lumbar, pelvis  R: motion palpation notes restricted motion,   T10, T11 , T12 , L4 , L5  and PSIS Right   T: hypertonicity at: Piriformis R>>L    Segmental spinal dysfunction/restrictions found at:   T10, T11 , T12 , L4 , L5  and PSIS Right       Assessment:    Diagnoses:      1. Segmental dysfunction of pelvic region    2. Lumbago    3. Segmental dysfunction of lumbar region    4. Spasm of muscle    5. Thoracic segment dysfunction        Patient's condition:  Patient had restrictions pre-manipulation    Treatment effectiveness:  Post manipulation there is better intersegmental movement and Patient claims to feel looser post manipulation      Procedures:  CMT:  84446 Chiropractic manipulative treatment 3-4 regions performed   Thoracic: Activator, T10, T11, T12, Prone  Lumbar: Activator, L4, L5, Prone  Pelvis: Activator, PSIS Right , Prone    Modalities:  80937: Acupuncture, for 15 minutes:  Points: B25, B27, GV3, K3, B62, SI3  Ahsi point in piriformis  For 15 minutes    Therapeutic procedures:  None    Response to Treatment  Reduction in  symptoms as reported by patient    Prognosis: Good    Progress towards Goals: Patient is making progress towards the goal.     Recommendations:    Instructions:  ice 20 minutes every other hour as needed    Follow-up:    Return to care in two weeks

## 2020-12-16 ENCOUNTER — THERAPY VISIT (OUTPATIENT)
Dept: CHIROPRACTIC MEDICINE | Facility: CLINIC | Age: 50
End: 2020-12-16
Payer: COMMERCIAL

## 2020-12-16 ENCOUNTER — MYC REFILL (OUTPATIENT)
Dept: FAMILY MEDICINE | Facility: CLINIC | Age: 50
End: 2020-12-16

## 2020-12-16 DIAGNOSIS — M62.838 SPASM OF MUSCLE: ICD-10-CM

## 2020-12-16 DIAGNOSIS — M54.50 CHRONIC RIGHT-SIDED LOW BACK PAIN WITHOUT SCIATICA: ICD-10-CM

## 2020-12-16 DIAGNOSIS — M99.05 SEGMENTAL DYSFUNCTION OF PELVIC REGION: Primary | ICD-10-CM

## 2020-12-16 DIAGNOSIS — M99.03 SEGMENTAL DYSFUNCTION OF LUMBAR REGION: ICD-10-CM

## 2020-12-16 DIAGNOSIS — G89.4 CHRONIC PAIN SYNDROME: ICD-10-CM

## 2020-12-16 DIAGNOSIS — M99.02 THORACIC SEGMENT DYSFUNCTION: ICD-10-CM

## 2020-12-16 DIAGNOSIS — G89.29 CHRONIC RIGHT-SIDED LOW BACK PAIN WITHOUT SCIATICA: ICD-10-CM

## 2020-12-16 PROCEDURE — 98941 CHIROPRACT MANJ 3-4 REGIONS: CPT | Mod: AT | Performed by: CHIROPRACTOR

## 2020-12-16 PROCEDURE — 97810 ACUP 1/> WO ESTIM 1ST 15 MIN: CPT | Performed by: CHIROPRACTOR

## 2020-12-16 NOTE — PROGRESS NOTES
Visit #: 26 in 2020    Subjective:  Velma Diaz is a 48 year old female who is seen in f/u up for:        Segmental dysfunction of pelvic region  Lumbago  Segmental dysfunction of lumbar region  Spasm of muscle  Thoracic segment dysfunction.     Since last visit on 12/16/2020,  Velma Diaz reports:    Area of chief complaint:  Lumbar :  Symptoms are graded at 7/10. The quality is described as stiff, and achey.  Motion has been about the same.  Mireille reports that her low back is feeling a little more stiff and sore.  She attributes this to spending a lot of time wrapping conor gifts.  She was bend over doing this for extended periods of time.    Objective:  The following was observed:    P: palpatory tenderness Piriformis R>>L,   A: static palpation demonstrates intersegmental asymmetry   thoracic, lumbar, pelvis  R: motion palpation notes restricted motion,   T10, T11 , T12 , L4 , L5  and PSIS Right   T: hypertonicity at: Piriformis R>>L    Segmental spinal dysfunction/restrictions found at:   T10, T11 , T12 , L4 , L5  and PSIS Right       Assessment:    Diagnoses:      1. Segmental dysfunction of pelvic region    2. Lumbago    3. Segmental dysfunction of lumbar region    4. Spasm of muscle    5. Thoracic segment dysfunction        Patient's condition:  Patient had restrictions pre-manipulation    Treatment effectiveness:  Post manipulation there is better intersegmental movement and Patient claims to feel looser post manipulation      Procedures:  CMT:  26986 Chiropractic manipulative treatment 3-4 regions performed   Thoracic: Activator, T10, T11, T12, Prone  Lumbar: Activator, L4, L5, Prone  Pelvis: Activator, PSIS Right , Prone    Modalities:  77563: Acupuncture, for 15 minutes:  Points: B25, B27, GV3, K3, B62, SI3  Ahsi point in piriformis  For 15 minutes    Therapeutic procedures:  None    Response to Treatment  Reduction in symptoms as reported by patient    Prognosis: Good    Progress towards  Goals: Patient is making progress towards the goal.     Recommendations:    Instructions:  ice 20 minutes every other hour as needed    Follow-up:    Return to care in two weeks

## 2020-12-17 RX ORDER — OXYCODONE AND ACETAMINOPHEN 10; 325 MG/1; MG/1
1 TABLET ORAL EVERY 6 HOURS PRN
Qty: 90 TABLET | Refills: 0 | Status: SHIPPED | OUTPATIENT
Start: 2020-12-17 | End: 2021-01-15

## 2020-12-17 NOTE — TELEPHONE ENCOUNTER
Routing refill request to provider for review/approval because:  Drug not on the [facility] refill protocol     Mervat Klein RN, BSN, PHN  [facility]: [place]

## 2020-12-24 DIAGNOSIS — G60.9 IDIOPATHIC PERIPHERAL NEUROPATHY: ICD-10-CM

## 2020-12-24 DIAGNOSIS — G89.4 CHRONIC PAIN SYNDROME: ICD-10-CM

## 2020-12-24 DIAGNOSIS — E55.9 VITAMIN D DEFICIENCY: ICD-10-CM

## 2020-12-24 DIAGNOSIS — E78.5 HYPERLIPIDEMIA WITH TARGET LDL LESS THAN 130: ICD-10-CM

## 2020-12-24 LAB
AMPHETAMINES UR QL: NOT DETECTED NG/ML
ANION GAP SERPL CALCULATED.3IONS-SCNC: 9 MMOL/L (ref 3–14)
BARBITURATES UR QL SCN: NOT DETECTED NG/ML
BENZODIAZ UR QL SCN: NOT DETECTED NG/ML
BUN SERPL-MCNC: 9 MG/DL (ref 7–30)
BUPRENORPHINE UR QL: NOT DETECTED NG/ML
CALCIUM SERPL-MCNC: 8.9 MG/DL (ref 8.5–10.1)
CANNABINOIDS UR QL: ABNORMAL NG/ML
CHLORIDE SERPL-SCNC: 105 MMOL/L (ref 94–109)
CHOLEST SERPL-MCNC: 231 MG/DL
CO2 SERPL-SCNC: 24 MMOL/L (ref 20–32)
COCAINE UR QL SCN: NOT DETECTED NG/ML
CREAT SERPL-MCNC: 0.8 MG/DL (ref 0.52–1.04)
D-METHAMPHET UR QL: NOT DETECTED NG/ML
ERYTHROCYTE [DISTWIDTH] IN BLOOD BY AUTOMATED COUNT: 12.9 % (ref 10–15)
GFR SERPL CREATININE-BSD FRML MDRD: 86 ML/MIN/{1.73_M2}
GLUCOSE SERPL-MCNC: 91 MG/DL (ref 70–99)
HCT VFR BLD AUTO: 40.7 % (ref 35–47)
HDLC SERPL-MCNC: 42 MG/DL
HGB BLD-MCNC: 13.2 G/DL (ref 11.7–15.7)
LDLC SERPL CALC-MCNC: 138 MG/DL
MCH RBC QN AUTO: 30.8 PG (ref 26.5–33)
MCHC RBC AUTO-ENTMCNC: 32.4 G/DL (ref 31.5–36.5)
MCV RBC AUTO: 95 FL (ref 78–100)
METHADONE UR QL SCN: NOT DETECTED NG/ML
NONHDLC SERPL-MCNC: 189 MG/DL
OPIATES UR QL SCN: NOT DETECTED NG/ML
OXYCODONE UR QL SCN: ABNORMAL NG/ML
PCP UR QL SCN: NOT DETECTED NG/ML
PLATELET # BLD AUTO: 365 10E9/L (ref 150–450)
POTASSIUM SERPL-SCNC: 3.9 MMOL/L (ref 3.4–5.3)
PROPOXYPH UR QL: NOT DETECTED NG/ML
RBC # BLD AUTO: 4.29 10E12/L (ref 3.8–5.2)
SODIUM SERPL-SCNC: 138 MMOL/L (ref 133–144)
TRICYCLICS UR QL SCN: ABNORMAL NG/ML
TRIGL SERPL-MCNC: 253 MG/DL
WBC # BLD AUTO: 8.8 10E9/L (ref 4–11)

## 2020-12-24 PROCEDURE — 80306 DRUG TEST PRSMV INSTRMNT: CPT | Performed by: FAMILY MEDICINE

## 2020-12-24 PROCEDURE — 82306 VITAMIN D 25 HYDROXY: CPT | Performed by: FAMILY MEDICINE

## 2020-12-24 PROCEDURE — 85027 COMPLETE CBC AUTOMATED: CPT | Performed by: FAMILY MEDICINE

## 2020-12-24 PROCEDURE — 36415 COLL VENOUS BLD VENIPUNCTURE: CPT | Performed by: FAMILY MEDICINE

## 2020-12-24 PROCEDURE — 80048 BASIC METABOLIC PNL TOTAL CA: CPT | Performed by: FAMILY MEDICINE

## 2020-12-24 PROCEDURE — 80061 LIPID PANEL: CPT | Performed by: FAMILY MEDICINE

## 2020-12-27 LAB — DEPRECATED CALCIDIOL+CALCIFEROL SERPL-MC: 27 UG/L (ref 20–75)

## 2021-01-05 ENCOUNTER — OFFICE VISIT (OUTPATIENT)
Dept: NEUROLOGY | Facility: CLINIC | Age: 51
End: 2021-01-05
Payer: COMMERCIAL

## 2021-01-05 ENCOUNTER — VIRTUAL VISIT (OUTPATIENT)
Dept: PSYCHOLOGY | Facility: CLINIC | Age: 51
End: 2021-01-05
Payer: COMMERCIAL

## 2021-01-05 DIAGNOSIS — F41.1 GAD (GENERALIZED ANXIETY DISORDER): ICD-10-CM

## 2021-01-05 DIAGNOSIS — G56.21 ULNAR NEUROPATHY AT ELBOW OF RIGHT UPPER EXTREMITY: Primary | ICD-10-CM

## 2021-01-05 DIAGNOSIS — G57.11 MERALGIA PARESTHETICA OF RIGHT SIDE: ICD-10-CM

## 2021-01-05 DIAGNOSIS — F33.1 MAJOR DEPRESSIVE DISORDER, RECURRENT EPISODE, MODERATE (H): Primary | ICD-10-CM

## 2021-01-05 PROCEDURE — 90834 PSYTX W PT 45 MINUTES: CPT | Mod: 95 | Performed by: SOCIAL WORKER

## 2021-01-05 PROCEDURE — 99207 US EXTREMITY NON VASCULAR RIGHT: CPT | Performed by: PSYCHIATRY & NEUROLOGY

## 2021-01-05 PROCEDURE — 76882 US LMTD JT/FCL EVL NVASC XTR: CPT | Mod: RT | Performed by: PSYCHIATRY & NEUROLOGY

## 2021-01-05 ASSESSMENT — ANXIETY QUESTIONNAIRES
3. WORRYING TOO MUCH ABOUT DIFFERENT THINGS: NOT AT ALL
2. NOT BEING ABLE TO STOP OR CONTROL WORRYING: NOT AT ALL
GAD7 TOTAL SCORE: 2
6. BECOMING EASILY ANNOYED OR IRRITABLE: NOT AT ALL
7. FEELING AFRAID AS IF SOMETHING AWFUL MIGHT HAPPEN: NOT AT ALL
1. FEELING NERVOUS, ANXIOUS, OR ON EDGE: SEVERAL DAYS
5. BEING SO RESTLESS THAT IT IS HARD TO SIT STILL: SEVERAL DAYS
IF YOU CHECKED OFF ANY PROBLEMS ON THIS QUESTIONNAIRE, HOW DIFFICULT HAVE THESE PROBLEMS MADE IT FOR YOU TO DO YOUR WORK, TAKE CARE OF THINGS AT HOME, OR GET ALONG WITH OTHER PEOPLE: NOT DIFFICULT AT ALL

## 2021-01-05 ASSESSMENT — PATIENT HEALTH QUESTIONNAIRE - PHQ9
SUM OF ALL RESPONSES TO PHQ QUESTIONS 1-9: 3
5. POOR APPETITE OR OVEREATING: NOT AT ALL

## 2021-01-05 NOTE — PROGRESS NOTES
Neuromuscular Ultrasound Report   Patient: Velma Diaz  Patient ID: [id number]  YOB: 1970  Age: 50 years  Referring Physician: Mekhi Sanchez MD    History & Examination:  50 year old woman with chronic numbness and tingling at digits IV-V of the right hand, markedly worsened by excessive elbow flexion. She had similar symptoms in the left hand a few years ago and underwent ulnar nerve transposition on the left, which was successful. EMG study of the right upper extremity done in 10/2019 failed to show evidence of right ulnar neuropathy at the elbow. In addition, she complains of chronic pain and paresthesias at the right lateral thigh. She is obese. Ultrasonographic study requested to evaluate for right ulnar nerve entrapment at the elbow and for right meralgia paresthetica.  Techniques:   Focused ultrasound study of the right ulnar nerve from the wrist to the upper arm, and of the right lateral femoral cutaneous nerve at the ASIS was done with with a 15 Mhz transducer.   Results:   The right ulnar nerve showed mildly increased cross sectional area (CSA), reduced echogenicity and disturbed fascicular architecture at the proximal end of the cubital tunnel (CSA 0,10-0,11 cm2, normal is <0,09 cm2). Elbow flexion caused subluxation over the nerve over the medial epicondyle. No structural lesion compressing the ulnar nerve (fracture, hematoma, abnormal muscle/tendon) was identified. The right ulnar nerve showed normal CSA, echogenicity and morphology at the Guyon's canal, mid-forearm as well as 5 cm proximally to the medial epicondyle.   The right lateral femoral cutaneous nerve was identified in a fascial socket between the lateral border of the sartorius muscle and the tensor fascia latae. It was hypoechoic and enlarged (CSA 0,05 cm2, normal is <0,02 cm2). In contrast, the left lateral femoral cutaneous nerve, identified with the same anatomical landmarks, was normal (CSA 0,02 cm2). Images were  stored and are available for review.   Interpretation:   Abnormal study. There is ultrasonographic evidence of a mild right ulnar neuropathy at the elbow (cubital tunnel syndrome). There is evidence of subluxation of the ulnar nerve caused by excessive elbow flexion. Surgical transposition of the ulnar nerve may work well in such cases when there is pain intractable to non-operative therapies. There is also ultrasonographic evidence of right lateral femoral cutaneous nerve entrapment at the level of the ASIS, also known as meralgia paresthetica.     Ultrasonographer:   Mekhi Sanchez MD
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
## 2021-01-05 NOTE — LETTER
1/5/2021       RE: Velma Diaz  [address]  [place] [address]     Dear Colleague,    Thank you for referring your patient, Velma Diaz, to the [facility] [place] at [facility]. Please see a copy of my visit note below.    Neuromuscular Ultrasound Report   Patient: Velma Diaz  Patient ID: [id number]  YOB: 1970  Age: 50 years  Referring Physician: Mekhi Sanchez MD    History & Examination:  50 year old woman with chronic numbness and tingling at digits IV-V of the right hand, markedly worsened by excessive elbow flexion. She had similar symptoms in the left hand a few years ago and underwent ulnar nerve transposition on the left, which was successful. EMG study of the right upper extremity done in 10/2019 failed to show evidence of right ulnar neuropathy at the elbow. In addition, she complains of chronic pain and paresthesias at the right lateral thigh. She is obese. Ultrasonographic study requested to evaluate for right ulnar nerve entrapment at the elbow and for right meralgia paresthetica.  Techniques:   Focused ultrasound study of the right ulnar nerve from the wrist to the upper arm, and of the right lateral femoral cutaneous nerve at the ASIS was done with with a 15 Mhz transducer.   Results:   The right ulnar nerve showed mildly increased cross sectional area (CSA), reduced echogenicity and disturbed fascicular architecture at the proximal end of the cubital tunnel (CSA 0,10-0,11 cm2, normal is <0,09 cm2). Elbow flexion caused subluxation over the nerve over the medial epicondyle. No structural lesion compressing the ulnar nerve (fracture, hematoma, abnormal muscle/tendon) was identified. The right ulnar nerve showed normal CSA, echogenicity and morphology at the Guyon's canal, mid-forearm as well as 5 cm proximally to the medial epicondyle.   The right lateral femoral cutaneous nerve was identified in a  fascial socket between the lateral border of the sartorius muscle and the tensor fascia latae. It was hypoechoic and enlarged (CSA 0,05 cm2, normal is <0,02 cm2). In contrast, the left lateral femoral cutaneous nerve, identified with the same anatomical landmarks, was normal (CSA 0,02 cm2). Images were stored and are available for review.   Interpretation:   Abnormal study. There is ultrasonographic evidence of a mild right ulnar neuropathy at the elbow (cubital tunnel syndrome). There is evidence of subluxation of the ulnar nerve caused by excessive elbow flexion. Surgical transposition of the ulnar nerve may work well in such cases when there is pain intractable to non-operative therapies. There is also ultrasonographic evidence of right lateral femoral cutaneous nerve entrapment at the level of the ASIS, also known as meralgia paresthetica.     Ultrasonographer:   Mekhi Sanchez MD

## 2021-01-06 ENCOUNTER — THERAPY VISIT (OUTPATIENT)
Dept: CHIROPRACTIC MEDICINE | Facility: CLINIC | Age: 51
End: 2021-01-06
Payer: COMMERCIAL

## 2021-01-06 DIAGNOSIS — M54.50 CHRONIC BILATERAL LOW BACK PAIN, UNSPECIFIED WHETHER SCIATICA PRESENT: ICD-10-CM

## 2021-01-06 DIAGNOSIS — M99.05 SEGMENTAL DYSFUNCTION OF PELVIC REGION: Primary | ICD-10-CM

## 2021-01-06 DIAGNOSIS — M99.02 THORACIC SEGMENT DYSFUNCTION: ICD-10-CM

## 2021-01-06 DIAGNOSIS — G89.29 CHRONIC BILATERAL LOW BACK PAIN, UNSPECIFIED WHETHER SCIATICA PRESENT: ICD-10-CM

## 2021-01-06 DIAGNOSIS — M99.03 SEGMENTAL DYSFUNCTION OF LUMBAR REGION: ICD-10-CM

## 2021-01-06 DIAGNOSIS — M62.838 SPASM OF MUSCLE: ICD-10-CM

## 2021-01-06 DIAGNOSIS — M54.16 LUMBAR RADICULOPATHY: ICD-10-CM

## 2021-01-06 PROCEDURE — 98941 CHIROPRACT MANJ 3-4 REGIONS: CPT | Mod: AT | Performed by: CHIROPRACTOR

## 2021-01-06 ASSESSMENT — ANXIETY QUESTIONNAIRES: GAD7 TOTAL SCORE: 2

## 2021-01-06 NOTE — PROGRESS NOTES
Visit #: 1 in 2021    Subjective:  Velma Diaz is a 48 year old female who is seen in f/u up for:        Segmental dysfunction of pelvic region  Lumbago  Segmental dysfunction of lumbar region  Spasm of muscle  Thoracic segment dysfunction.     Since last visit on 12/16/2020,  Velma Diaz reports:    Area of chief complaint:  Lumbar :  Symptoms are graded at 7/10. The quality is described as stiff, and achey.  Motion has been about the same.  Mireille reports that her low back is still feeling a little stiff and sore.  There has been no new trauma or exacerbation of symptoms.  She does mention that she feels some low back muscle spasms at night when sleeping.      Objective:  The following was observed:    P: palpatory tenderness Piriformis R>>L,   A: static palpation demonstrates intersegmental asymmetry   thoracic, lumbar, pelvis  R: motion palpation notes restricted motion,   T10, T11 , T12 , L4 , L5  and PSIS Right   T: hypertonicity at: Piriformis R>>L    Segmental spinal dysfunction/restrictions found at:   T10, T11 , T12 , L4 , L5  and PSIS Right       Assessment:    Diagnoses:      1. Segmental dysfunction of pelvic region    2. Lumbago    3. Segmental dysfunction of lumbar region    4. Spasm of muscle    5. Thoracic segment dysfunction        Patient's condition:  Patient had restrictions pre-manipulation    Treatment effectiveness:  Post manipulation there is better intersegmental movement and Patient claims to feel looser post manipulation      Procedures:  CMT:  14775 Chiropractic manipulative treatment 3-4 regions performed   Thoracic: Activator, T10, T11, T12, Prone  Lumbar: Activator, L4, L5, Prone  Pelvis: Activator, PSIS Right , Prone    Modalities:  None    Therapeutic procedures:  None    Response to Treatment  Reduction in symptoms as reported by patient    Prognosis: Good    Progress towards Goals: Patient is making progress towards the goal.     Recommendations:    Instructions:  ice 20  minutes every other hour as needed    Follow-up:    Return to care in two weeks

## 2021-01-10 DIAGNOSIS — M54.9 CHRONIC BILATERAL BACK PAIN, UNSPECIFIED BACK LOCATION: Chronic | ICD-10-CM

## 2021-01-10 DIAGNOSIS — G89.29 CHRONIC BILATERAL BACK PAIN, UNSPECIFIED BACK LOCATION: Chronic | ICD-10-CM

## 2021-01-10 RX ORDER — METHOCARBAMOL 750 MG/1
TABLET, FILM COATED ORAL
Qty: 60 TABLET | Refills: 1 | Status: SHIPPED | OUTPATIENT
Start: 2021-01-10 | End: 2021-04-02

## 2021-01-14 ENCOUNTER — TRANSFERRED RECORDS (OUTPATIENT)
Dept: HEALTH INFORMATION MANAGEMENT | Facility: CLINIC | Age: 51
End: 2021-01-14

## 2021-01-15 ENCOUNTER — MYC REFILL (OUTPATIENT)
Dept: FAMILY MEDICINE | Facility: CLINIC | Age: 51
End: 2021-01-15

## 2021-01-15 DIAGNOSIS — G89.4 CHRONIC PAIN SYNDROME: ICD-10-CM

## 2021-01-15 RX ORDER — OXYCODONE AND ACETAMINOPHEN 10; 325 MG/1; MG/1
1 TABLET ORAL EVERY 6 HOURS PRN
Qty: 90 TABLET | Refills: 0 | Status: SHIPPED | OUTPATIENT
Start: 2021-01-15 | End: 2021-02-09

## 2021-01-20 ENCOUNTER — THERAPY VISIT (OUTPATIENT)
Dept: CHIROPRACTIC MEDICINE | Facility: CLINIC | Age: 51
End: 2021-01-20
Payer: COMMERCIAL

## 2021-01-20 DIAGNOSIS — M54.16 LUMBAR RADICULOPATHY: ICD-10-CM

## 2021-01-20 DIAGNOSIS — M99.05 SEGMENTAL DYSFUNCTION OF PELVIC REGION: Primary | ICD-10-CM

## 2021-01-20 DIAGNOSIS — M62.838 SPASM OF MUSCLE: ICD-10-CM

## 2021-01-20 DIAGNOSIS — M99.02 THORACIC SEGMENT DYSFUNCTION: ICD-10-CM

## 2021-01-20 DIAGNOSIS — M99.03 SEGMENTAL DYSFUNCTION OF LUMBAR REGION: ICD-10-CM

## 2021-01-20 PROCEDURE — 98941 CHIROPRACT MANJ 3-4 REGIONS: CPT | Mod: AT | Performed by: CHIROPRACTOR

## 2021-01-20 PROCEDURE — 97810 ACUP 1/> WO ESTIM 1ST 15 MIN: CPT | Performed by: CHIROPRACTOR

## 2021-01-20 NOTE — PROGRESS NOTES
Visit #: 2 in 2021    Subjective:  Velma Diaz is a 48 year old female who is seen in f/u up for:        Segmental dysfunction of pelvic region  Lumbago  Segmental dysfunction of lumbar region  Spasm of muscle  Thoracic segment dysfunction.     Since last visit on 1/6/2021,  Velma Diaz reports:    Area of chief complaint:  Lumbar :  Symptoms are graded at 7/10. The quality is described as stiff, and achey.  Motion has been about the same.  Mireille reports that her low back is still feeling a little stiff and sore. She has been packing up her apartment getting ready to move.  This has caused some pain and tenderness in her lower back.     Objective:  The following was observed:    P: palpatory tenderness Piriformis R>>L,   A: static palpation demonstrates intersegmental asymmetry   thoracic, lumbar, pelvis  R: motion palpation notes restricted motion,   T10, T11 , T12 , L4 , L5  and PSIS Right   T: hypertonicity at: Piriformis R>>L    Segmental spinal dysfunction/restrictions found at:   T10, T11 , T12 , L4 , L5  and PSIS Right       Assessment:    Diagnoses:      1. Segmental dysfunction of pelvic region    2. Lumbago    3. Segmental dysfunction of lumbar region    4. Spasm of muscle    5. Thoracic segment dysfunction        Patient's condition:  Patient had restrictions pre-manipulation    Treatment effectiveness:  Post manipulation there is better intersegmental movement and Patient claims to feel looser post manipulation      Procedures:  CMT:  35643 Chiropractic manipulative treatment 3-4 regions performed   Thoracic: Activator, T10, T11, T12, Prone  Lumbar: Activator, L4, L5, Prone  Pelvis: Activator, PSIS Right , Prone    Modalities:  None    Therapeutic procedures:  None    Response to Treatment  Reduction in symptoms as reported by patient    Prognosis: Good    Progress towards Goals: Patient is making progress towards the goal.     Recommendations:    Instructions:  ice 20 minutes every other hour as  needed    Follow-up:    Return to care in two weeks

## 2021-01-23 ENCOUNTER — MYC MEDICAL ADVICE (OUTPATIENT)
Dept: FAMILY MEDICINE | Facility: CLINIC | Age: 51
End: 2021-01-23

## 2021-01-25 NOTE — TELEPHONE ENCOUNTER
Team- I have checked Dr. Hunter's folder and I do not see this form. Can we please figure out where this is and get it addressed for the patient? Per the note on the telephone encounter on 1/19/21 Dr. Hunter completed these and placed in the bin.   Dilcia Becker PA-C

## 2021-01-25 NOTE — TELEPHONE ENCOUNTER
This needs to be completed today, by any provider  They cannot move in without this form      Please get forms to a provider to fill out today  Fax to number on the form    Call chikis [phone]  Can leave message

## 2021-01-28 ENCOUNTER — THERAPY VISIT (OUTPATIENT)
Dept: CHIROPRACTIC MEDICINE | Facility: CLINIC | Age: 51
End: 2021-01-28
Payer: COMMERCIAL

## 2021-01-28 DIAGNOSIS — M99.02 THORACIC SEGMENT DYSFUNCTION: ICD-10-CM

## 2021-01-28 DIAGNOSIS — M99.05 SEGMENTAL DYSFUNCTION OF PELVIC REGION: Primary | ICD-10-CM

## 2021-01-28 DIAGNOSIS — G89.29 CHRONIC LOW BACK PAIN, UNSPECIFIED BACK PAIN LATERALITY, UNSPECIFIED WHETHER SCIATICA PRESENT: ICD-10-CM

## 2021-01-28 DIAGNOSIS — M54.50 CHRONIC LOW BACK PAIN, UNSPECIFIED BACK PAIN LATERALITY, UNSPECIFIED WHETHER SCIATICA PRESENT: ICD-10-CM

## 2021-01-28 DIAGNOSIS — M54.16 LUMBAR RADICULOPATHY: ICD-10-CM

## 2021-01-28 DIAGNOSIS — M99.03 SEGMENTAL DYSFUNCTION OF LUMBAR REGION: ICD-10-CM

## 2021-01-28 DIAGNOSIS — M62.838 SPASM OF MUSCLE: ICD-10-CM

## 2021-01-28 PROCEDURE — 98941 CHIROPRACT MANJ 3-4 REGIONS: CPT | Mod: AT | Performed by: CHIROPRACTOR

## 2021-01-28 PROCEDURE — 97810 ACUP 1/> WO ESTIM 1ST 15 MIN: CPT | Performed by: CHIROPRACTOR

## 2021-01-28 NOTE — PROGRESS NOTES
Visit #: 3 in 2021    Subjective:  Velma Diaz is a 48 year old female who is seen in f/u up for:        Segmental dysfunction of pelvic region  Lumbago  Segmental dysfunction of lumbar region  Spasm of muscle  Thoracic segment dysfunction.     Since last visit on 1/20/2021,  Velma Diaz reports:    Area of chief complaint:  Lumbar :  Symptoms are graded at 7/10. The quality is described as stiff, and achey.  Motion has been about the same.  Mireille reports that her low back is still feeling a little stiff and sore. She is still packing up her apartment getting ready to move.  This has caused some pain and tenderness in her lower back. She will be making the big move this weekend.    Objective:  The following was observed:    P: palpatory tenderness Piriformis R>>L,   A: static palpation demonstrates intersegmental asymmetry   thoracic, lumbar, pelvis  R: motion palpation notes restricted motion,   T10, T11 , T12 , L4 , L5  and PSIS Right   T: hypertonicity at: Piriformis R>>L    Segmental spinal dysfunction/restrictions found at:   T10, T11 , T12 , L4 , L5  and PSIS Right       Assessment:    Diagnoses:      1. Segmental dysfunction of pelvic region    2. Lumbago    3. Segmental dysfunction of lumbar region    4. Spasm of muscle    5. Thoracic segment dysfunction        Patient's condition:  Patient had restrictions pre-manipulation    Treatment effectiveness:  Post manipulation there is better intersegmental movement and Patient claims to feel looser post manipulation      Procedures:  CMT:  91192 Chiropractic manipulative treatment 3-4 regions performed   Thoracic: Activator, T10, T11, T12, Prone  Lumbar: Activator, L4, L5, Prone  Pelvis: Activator, PSIS Right , Prone    Modalities:  None    Therapeutic procedures:  None    Response to Treatment  Reduction in symptoms as reported by patient    Prognosis: Good    Progress towards Goals: Patient is making progress towards the  goal.     Recommendations:    Instructions:  ice 20 minutes every other hour as needed    Follow-up:    Return to care in two weeks

## 2021-02-02 ENCOUNTER — VIRTUAL VISIT (OUTPATIENT)
Dept: PSYCHOLOGY | Facility: CLINIC | Age: 51
End: 2021-02-02
Payer: COMMERCIAL

## 2021-02-02 DIAGNOSIS — F33.1 MAJOR DEPRESSIVE DISORDER, RECURRENT EPISODE, MODERATE (H): Primary | ICD-10-CM

## 2021-02-02 DIAGNOSIS — F41.1 GAD (GENERALIZED ANXIETY DISORDER): ICD-10-CM

## 2021-02-02 PROCEDURE — 90834 PSYTX W PT 45 MINUTES: CPT | Mod: 95 | Performed by: SOCIAL WORKER
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
## 2021-02-09 ENCOUNTER — MYC REFILL (OUTPATIENT)
Dept: FAMILY MEDICINE | Facility: CLINIC | Age: 51
End: 2021-02-09

## 2021-02-09 DIAGNOSIS — G89.4 CHRONIC PAIN SYNDROME: ICD-10-CM

## 2021-02-10 RX ORDER — OXYCODONE AND ACETAMINOPHEN 10; 325 MG/1; MG/1
1 TABLET ORAL EVERY 6 HOURS PRN
Qty: 90 TABLET | Refills: 0 | Status: SHIPPED | OUTPATIENT
Start: 2021-02-10 | End: 2021-03-08

## 2021-03-02 ENCOUNTER — VIRTUAL VISIT (OUTPATIENT)
Dept: PSYCHOLOGY | Facility: CLINIC | Age: 51
End: 2021-03-02
Payer: COMMERCIAL

## 2021-03-02 DIAGNOSIS — F41.1 GAD (GENERALIZED ANXIETY DISORDER): ICD-10-CM

## 2021-03-02 DIAGNOSIS — F33.1 MAJOR DEPRESSIVE DISORDER, RECURRENT EPISODE, MODERATE (H): Primary | ICD-10-CM

## 2021-03-02 PROCEDURE — 90834 PSYTX W PT 45 MINUTES: CPT | Mod: 95 | Performed by: SOCIAL WORKER

## 2021-03-02 ASSESSMENT — ANXIETY QUESTIONNAIRES
6. BECOMING EASILY ANNOYED OR IRRITABLE: NOT AT ALL
5. BEING SO RESTLESS THAT IT IS HARD TO SIT STILL: SEVERAL DAYS
GAD7 TOTAL SCORE: 1
IF YOU CHECKED OFF ANY PROBLEMS ON THIS QUESTIONNAIRE, HOW DIFFICULT HAVE THESE PROBLEMS MADE IT FOR YOU TO DO YOUR WORK, TAKE CARE OF THINGS AT HOME, OR GET ALONG WITH OTHER PEOPLE: NOT DIFFICULT AT ALL
7. FEELING AFRAID AS IF SOMETHING AWFUL MIGHT HAPPEN: NOT AT ALL
1. FEELING NERVOUS, ANXIOUS, OR ON EDGE: NOT AT ALL
2. NOT BEING ABLE TO STOP OR CONTROL WORRYING: NOT AT ALL
3. WORRYING TOO MUCH ABOUT DIFFERENT THINGS: NOT AT ALL

## 2021-03-02 ASSESSMENT — PATIENT HEALTH QUESTIONNAIRE - PHQ9
SUM OF ALL RESPONSES TO PHQ QUESTIONS 1-9: 4
5. POOR APPETITE OR OVEREATING: NOT AT ALL
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
## 2021-03-03 ASSESSMENT — ANXIETY QUESTIONNAIRES: GAD7 TOTAL SCORE: 1

## 2021-03-08 ENCOUNTER — MYC REFILL (OUTPATIENT)
Dept: FAMILY MEDICINE | Facility: CLINIC | Age: 51
End: 2021-03-08

## 2021-03-08 DIAGNOSIS — G89.4 CHRONIC PAIN SYNDROME: ICD-10-CM

## 2021-03-09 RX ORDER — OXYCODONE AND ACETAMINOPHEN 10; 325 MG/1; MG/1
1 TABLET ORAL EVERY 6 HOURS PRN
Qty: 90 TABLET | Refills: 0 | Status: SHIPPED | OUTPATIENT
Start: 2021-03-09 | End: 2021-04-05

## 2021-03-25 ENCOUNTER — TRANSFERRED RECORDS (OUTPATIENT)
Dept: HEALTH INFORMATION MANAGEMENT | Facility: CLINIC | Age: 51
End: 2021-03-25

## 2021-04-02 ENCOUNTER — MYC MEDICAL ADVICE (OUTPATIENT)
Dept: FAMILY MEDICINE | Facility: CLINIC | Age: 51
End: 2021-04-02

## 2021-04-02 DIAGNOSIS — G89.29 CHRONIC BILATERAL BACK PAIN, UNSPECIFIED BACK LOCATION: Chronic | ICD-10-CM

## 2021-04-02 DIAGNOSIS — M54.9 CHRONIC BILATERAL BACK PAIN, UNSPECIFIED BACK LOCATION: Chronic | ICD-10-CM

## 2021-04-02 RX ORDER — METHOCARBAMOL 750 MG/1
TABLET, FILM COATED ORAL
Qty: 60 TABLET | Refills: 1 | Status: SHIPPED | OUTPATIENT
Start: 2021-04-02 | End: 2021-06-04

## 2021-04-05 ENCOUNTER — MYC REFILL (OUTPATIENT)
Dept: FAMILY MEDICINE | Facility: CLINIC | Age: 51
End: 2021-04-05

## 2021-04-05 DIAGNOSIS — G89.4 CHRONIC PAIN SYNDROME: ICD-10-CM

## 2021-04-05 RX ORDER — OXYCODONE AND ACETAMINOPHEN 10; 325 MG/1; MG/1
1 TABLET ORAL EVERY 6 HOURS PRN
Qty: 90 TABLET | Refills: 0 | Status: SHIPPED | OUTPATIENT
Start: 2021-04-05 | End: 2021-05-01

## 2021-04-06 ENCOUNTER — VIRTUAL VISIT (OUTPATIENT)
Dept: PSYCHOLOGY | Facility: CLINIC | Age: 51
End: 2021-04-06
Payer: COMMERCIAL

## 2021-04-06 DIAGNOSIS — F41.1 GAD (GENERALIZED ANXIETY DISORDER): ICD-10-CM

## 2021-04-06 DIAGNOSIS — F33.1 MAJOR DEPRESSIVE DISORDER, RECURRENT EPISODE, MODERATE (H): Primary | ICD-10-CM

## 2021-04-06 PROCEDURE — 90834 PSYTX W PT 45 MINUTES: CPT | Mod: 95 | Performed by: SOCIAL WORKER
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
## 2021-05-01 ENCOUNTER — MYC REFILL (OUTPATIENT)
Dept: FAMILY MEDICINE | Facility: CLINIC | Age: 51
End: 2021-05-01

## 2021-05-01 DIAGNOSIS — B37.2 CANDIDAL INTERTRIGO: ICD-10-CM

## 2021-05-01 DIAGNOSIS — G89.4 CHRONIC PAIN SYNDROME: ICD-10-CM

## 2021-05-02 ENCOUNTER — MYC MEDICAL ADVICE (OUTPATIENT)
Dept: FAMILY MEDICINE | Facility: CLINIC | Age: 51
End: 2021-05-02

## 2021-05-03 RX ORDER — OXYCODONE AND ACETAMINOPHEN 10; 325 MG/1; MG/1
1 TABLET ORAL EVERY 6 HOURS PRN
Qty: 90 TABLET | Refills: 0 | Status: SHIPPED | OUTPATIENT
Start: 2021-05-03 | End: 2021-05-23

## 2021-05-03 RX ORDER — NYSTATIN 100000 [USP'U]/G
POWDER TOPICAL
Qty: 30 G | Refills: 0 | Status: SHIPPED | OUTPATIENT
Start: 2021-05-03 | End: 2021-07-26

## 2021-05-04 ENCOUNTER — VIRTUAL VISIT (OUTPATIENT)
Dept: PSYCHOLOGY | Facility: CLINIC | Age: 51
End: 2021-05-04
Payer: COMMERCIAL

## 2021-05-04 DIAGNOSIS — F33.1 MAJOR DEPRESSIVE DISORDER, RECURRENT EPISODE, MODERATE (H): Primary | ICD-10-CM

## 2021-05-04 DIAGNOSIS — M79.89 LEG SWELLING: ICD-10-CM

## 2021-05-04 DIAGNOSIS — F41.1 GAD (GENERALIZED ANXIETY DISORDER): ICD-10-CM

## 2021-05-04 PROCEDURE — 90834 PSYTX W PT 45 MINUTES: CPT | Mod: 95 | Performed by: SOCIAL WORKER

## 2021-05-04 NOTE — TELEPHONE ENCOUNTER
Please see NatSent message with pictures attached. Please advise if pt can just monitor for now or if visit is recommended.
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
## 2021-05-05 RX ORDER — FUROSEMIDE 20 MG
20 TABLET ORAL DAILY
Qty: 30 TABLET | Refills: 2 | Status: SHIPPED | OUTPATIENT
Start: 2021-05-05 | End: 2021-10-13

## 2021-05-23 ENCOUNTER — MYC REFILL (OUTPATIENT)
Dept: FAMILY MEDICINE | Facility: CLINIC | Age: 51
End: 2021-05-23

## 2021-05-23 DIAGNOSIS — G89.4 CHRONIC PAIN SYNDROME: ICD-10-CM

## 2021-05-24 RX ORDER — OXYCODONE AND ACETAMINOPHEN 10; 325 MG/1; MG/1
1 TABLET ORAL EVERY 6 HOURS PRN
Qty: 90 TABLET | Refills: 0 | Status: SHIPPED | OUTPATIENT
Start: 2021-05-24 | End: 2021-06-27

## 2021-05-24 NOTE — TELEPHONE ENCOUNTER
Routing refill request to provider for review/approval because:  Drug not on the [facility] refill protocol           Pending Prescriptions:                       Disp   Refills    oxyCODONE-acetaminophen (PERCOCET)  *90 tab*0        Sig: Take 1 tablet by mouth every 6 hours as needed for           moderate to severe pain        Johnathan Boyer RN

## 2021-06-01 ENCOUNTER — VIRTUAL VISIT (OUTPATIENT)
Dept: PSYCHOLOGY | Facility: CLINIC | Age: 51
End: 2021-06-01
Payer: COMMERCIAL

## 2021-06-01 DIAGNOSIS — F41.1 GAD (GENERALIZED ANXIETY DISORDER): ICD-10-CM

## 2021-06-01 DIAGNOSIS — F33.1 MAJOR DEPRESSIVE DISORDER, RECURRENT EPISODE, MODERATE (H): Primary | ICD-10-CM

## 2021-06-01 PROCEDURE — 90834 PSYTX W PT 45 MINUTES: CPT | Mod: 95 | Performed by: SOCIAL WORKER

## 2021-06-01 ASSESSMENT — ANXIETY QUESTIONNAIRES
GAD7 TOTAL SCORE: 0
1. FEELING NERVOUS, ANXIOUS, OR ON EDGE: NOT AT ALL
IF YOU CHECKED OFF ANY PROBLEMS ON THIS QUESTIONNAIRE, HOW DIFFICULT HAVE THESE PROBLEMS MADE IT FOR YOU TO DO YOUR WORK, TAKE CARE OF THINGS AT HOME, OR GET ALONG WITH OTHER PEOPLE: NOT DIFFICULT AT ALL
5. BEING SO RESTLESS THAT IT IS HARD TO SIT STILL: NOT AT ALL
6. BECOMING EASILY ANNOYED OR IRRITABLE: NOT AT ALL
2. NOT BEING ABLE TO STOP OR CONTROL WORRYING: NOT AT ALL
3. WORRYING TOO MUCH ABOUT DIFFERENT THINGS: NOT AT ALL
7. FEELING AFRAID AS IF SOMETHING AWFUL MIGHT HAPPEN: NOT AT ALL

## 2021-06-01 ASSESSMENT — PATIENT HEALTH QUESTIONNAIRE - PHQ9
5. POOR APPETITE OR OVEREATING: NOT AT ALL
SUM OF ALL RESPONSES TO PHQ QUESTIONS 1-9: 3
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
## 2021-06-02 DIAGNOSIS — M54.9 CHRONIC BILATERAL BACK PAIN, UNSPECIFIED BACK LOCATION: Chronic | ICD-10-CM

## 2021-06-02 DIAGNOSIS — G89.29 CHRONIC BILATERAL BACK PAIN, UNSPECIFIED BACK LOCATION: Chronic | ICD-10-CM

## 2021-06-02 ASSESSMENT — ANXIETY QUESTIONNAIRES: GAD7 TOTAL SCORE: 0

## 2021-06-04 RX ORDER — METHOCARBAMOL 750 MG/1
TABLET, FILM COATED ORAL
Qty: 60 TABLET | Refills: 2 | Status: SHIPPED | OUTPATIENT
Start: 2021-06-04 | End: 2021-09-01

## 2021-06-04 NOTE — TELEPHONE ENCOUNTER
Routing refill request to provider for review/approval because:  Drug not on the [facility] refill protocol     Heather PECK RN, BSN  [facility]

## 2021-06-09 DIAGNOSIS — G60.9 IDIOPATHIC PERIPHERAL NEUROPATHY: ICD-10-CM

## 2021-06-11 RX ORDER — NORTRIPTYLINE HCL 10 MG
30 CAPSULE ORAL AT BEDTIME
Qty: 90 CAPSULE | Refills: 0 | Status: SHIPPED | OUTPATIENT
Start: 2021-06-11 | End: 2021-07-26

## 2021-06-11 NOTE — TELEPHONE ENCOUNTER
Prescription approved per [facility] Refill Protocol.    Alexandrea Byrd RN  [facility]

## 2021-06-27 ENCOUNTER — MYC REFILL (OUTPATIENT)
Dept: FAMILY MEDICINE | Facility: CLINIC | Age: 51
End: 2021-06-27

## 2021-06-27 DIAGNOSIS — G89.4 CHRONIC PAIN SYNDROME: ICD-10-CM

## 2021-06-28 RX ORDER — OXYCODONE AND ACETAMINOPHEN 10; 325 MG/1; MG/1
1 TABLET ORAL EVERY 6 HOURS PRN
Qty: 90 TABLET | Refills: 0 | Status: SHIPPED | OUTPATIENT
Start: 2021-06-28 | End: 2021-07-26

## 2021-07-14 DIAGNOSIS — F33.1 MAJOR DEPRESSIVE DISORDER, RECURRENT EPISODE, MODERATE (H): Chronic | ICD-10-CM

## 2021-07-14 DIAGNOSIS — G89.29 CHRONIC BILATERAL BACK PAIN, UNSPECIFIED BACK LOCATION: Chronic | ICD-10-CM

## 2021-07-14 DIAGNOSIS — M54.9 CHRONIC BILATERAL BACK PAIN, UNSPECIFIED BACK LOCATION: Chronic | ICD-10-CM

## 2021-07-14 DIAGNOSIS — F41.1 GAD (GENERALIZED ANXIETY DISORDER): ICD-10-CM

## 2021-07-16 RX ORDER — DULOXETIN HYDROCHLORIDE 30 MG/1
30 CAPSULE, DELAYED RELEASE ORAL 2 TIMES DAILY
Qty: 60 CAPSULE | Refills: 5 | Status: SHIPPED | OUTPATIENT
Start: 2021-07-16 | End: 2021-08-13 | Stop reason: DRUGHIGH

## 2021-07-16 NOTE — TELEPHONE ENCOUNTER
Routing refill request to provider for review/approval because:  Patient needs to be seen because it has been more than 6 months since last office visit.

## 2021-07-26 ENCOUNTER — MYC REFILL (OUTPATIENT)
Dept: FAMILY MEDICINE | Facility: CLINIC | Age: 51
End: 2021-07-26

## 2021-07-26 DIAGNOSIS — B37.2 CANDIDAL INTERTRIGO: ICD-10-CM

## 2021-07-26 DIAGNOSIS — G89.4 CHRONIC PAIN SYNDROME: ICD-10-CM

## 2021-07-26 DIAGNOSIS — G60.9 IDIOPATHIC PERIPHERAL NEUROPATHY: ICD-10-CM

## 2021-07-26 RX ORDER — NORTRIPTYLINE HCL 10 MG
10 CAPSULE ORAL AT BEDTIME
Qty: 90 CAPSULE | Refills: 0 | Status: SHIPPED | OUTPATIENT
Start: 2021-07-26 | End: 2021-08-13

## 2021-07-26 RX ORDER — NYSTATIN 100000 [USP'U]/G
POWDER TOPICAL
Qty: 30 G | Refills: 0 | Status: SHIPPED | OUTPATIENT
Start: 2021-07-26 | End: 2021-09-20

## 2021-07-26 RX ORDER — OXYCODONE AND ACETAMINOPHEN 10; 325 MG/1; MG/1
1 TABLET ORAL EVERY 6 HOURS PRN
Qty: 90 TABLET | Refills: 0 | Status: SHIPPED | OUTPATIENT
Start: 2021-07-26 | End: 2021-08-24

## 2021-07-26 NOTE — TELEPHONE ENCOUNTER
Controlled Substance Refill Request for   oxyCODONE-acetaminophen (PERCOCET)  MG per tablet     Problem List Complete:  Yes   checked in past 3 months?  No, route to RN

## 2021-07-26 NOTE — TELEPHONE ENCOUNTER
Routing refill request to provider for review/approval because:  Drug interaction warning          Pending Prescriptions:                       Disp   Refills    nortriptyline (PAMELOR) 10 MG capsule      90 cap*0        Sig: Take 3 capsules (30 mg) by mouth At Bedtime    Signed Prescriptions:                        Disp   Refills    nystatin (MYCOSTATIN) 808499 UNIT/GM exter*30 g   0        Sig: APPLY TO AFFECTED AREA TWICE A DAY AS NEEDED  Authorizing Provider: YONI FISHER  Ordering User: MISSAEL RAMIREZ RN

## 2021-08-05 ENCOUNTER — VIRTUAL VISIT (OUTPATIENT)
Dept: PSYCHOLOGY | Facility: CLINIC | Age: 51
End: 2021-08-05
Payer: COMMERCIAL

## 2021-08-05 DIAGNOSIS — F33.1 MAJOR DEPRESSIVE DISORDER, RECURRENT EPISODE, MODERATE (H): Primary | ICD-10-CM

## 2021-08-05 DIAGNOSIS — F41.1 GAD (GENERALIZED ANXIETY DISORDER): ICD-10-CM

## 2021-08-05 PROCEDURE — 90834 PSYTX W PT 45 MINUTES: CPT | Mod: 95 | Performed by: SOCIAL WORKER
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
## 2021-08-07 ENCOUNTER — TRANSFERRED RECORDS (OUTPATIENT)
Dept: HEALTH INFORMATION MANAGEMENT | Facility: CLINIC | Age: 51
End: 2021-08-07

## 2021-08-13 ENCOUNTER — OFFICE VISIT (OUTPATIENT)
Dept: FAMILY MEDICINE | Facility: CLINIC | Age: 51
End: 2021-08-13
Payer: COMMERCIAL

## 2021-08-13 VITALS
SYSTOLIC BLOOD PRESSURE: 126 MMHG | HEART RATE: 77 BPM | BODY MASS INDEX: 42.52 KG/M2 | OXYGEN SATURATION: 97 % | HEIGHT: 63 IN | WEIGHT: 240 LBS | DIASTOLIC BLOOD PRESSURE: 82 MMHG | TEMPERATURE: 97.8 F

## 2021-08-13 DIAGNOSIS — G89.4 CHRONIC PAIN SYNDROME: Primary | ICD-10-CM

## 2021-08-13 DIAGNOSIS — G89.29 CHRONIC BILATERAL BACK PAIN, UNSPECIFIED BACK LOCATION: Chronic | ICD-10-CM

## 2021-08-13 DIAGNOSIS — E66.01 MORBID OBESITY WITH BMI OF 40.0-44.9, ADULT (H): ICD-10-CM

## 2021-08-13 DIAGNOSIS — F41.1 GAD (GENERALIZED ANXIETY DISORDER): ICD-10-CM

## 2021-08-13 DIAGNOSIS — F33.1 MAJOR DEPRESSIVE DISORDER, RECURRENT EPISODE, MODERATE (H): Chronic | ICD-10-CM

## 2021-08-13 DIAGNOSIS — G60.9 IDIOPATHIC PERIPHERAL NEUROPATHY: ICD-10-CM

## 2021-08-13 DIAGNOSIS — M54.9 CHRONIC BILATERAL BACK PAIN, UNSPECIFIED BACK LOCATION: Chronic | ICD-10-CM

## 2021-08-13 PROCEDURE — 99214 OFFICE O/P EST MOD 30 MIN: CPT | Performed by: FAMILY MEDICINE

## 2021-08-13 RX ORDER — OXYCODONE AND ACETAMINOPHEN 10; 325 MG/1; MG/1
1 TABLET ORAL EVERY 6 HOURS PRN
Qty: 90 TABLET | Refills: 0 | Status: CANCELLED | OUTPATIENT
Start: 2021-08-13

## 2021-08-13 RX ORDER — DULOXETIN HYDROCHLORIDE 60 MG/1
60 CAPSULE, DELAYED RELEASE ORAL DAILY
Qty: 30 CAPSULE | Refills: 5 | Status: SHIPPED | OUTPATIENT
Start: 2021-08-13 | End: 2021-11-12

## 2021-08-13 RX ORDER — NORTRIPTYLINE HCL 10 MG
30 CAPSULE ORAL AT BEDTIME
Qty: 90 CAPSULE | Refills: 5 | Status: SHIPPED | OUTPATIENT
Start: 2021-08-13 | End: 2021-11-12

## 2021-08-13 ASSESSMENT — ANXIETY QUESTIONNAIRES
2. NOT BEING ABLE TO STOP OR CONTROL WORRYING: NOT AT ALL
GAD7 TOTAL SCORE: 2
5. BEING SO RESTLESS THAT IT IS HARD TO SIT STILL: NOT AT ALL
6. BECOMING EASILY ANNOYED OR IRRITABLE: SEVERAL DAYS
7. FEELING AFRAID AS IF SOMETHING AWFUL MIGHT HAPPEN: NOT AT ALL
1. FEELING NERVOUS, ANXIOUS, OR ON EDGE: NOT AT ALL
IF YOU CHECKED OFF ANY PROBLEMS ON THIS QUESTIONNAIRE, HOW DIFFICULT HAVE THESE PROBLEMS MADE IT FOR YOU TO DO YOUR WORK, TAKE CARE OF THINGS AT HOME, OR GET ALONG WITH OTHER PEOPLE: NOT DIFFICULT AT ALL
3. WORRYING TOO MUCH ABOUT DIFFERENT THINGS: NOT AT ALL

## 2021-08-13 ASSESSMENT — PATIENT HEALTH QUESTIONNAIRE - PHQ9
5. POOR APPETITE OR OVEREATING: SEVERAL DAYS
SUM OF ALL RESPONSES TO PHQ QUESTIONS 1-9: 6

## 2021-08-13 ASSESSMENT — MIFFLIN-ST. JEOR: SCORE: 1677.63

## 2021-08-13 NOTE — PROGRESS NOTES
"    Assessment & Plan       ICD-10-CM    1. Chronic pain syndrome  G89.4 DULoxetine (CYMBALTA) 60 MG capsule   2. Idiopathic peripheral neuropathy  G60.9 nortriptyline (PAMELOR) 10 MG capsule   3. Chronic bilateral back pain, unspecified back location  M54.9     G89.29    4. Major depressive disorder, recurrent episode, moderate (H)  F33.1 DULoxetine (CYMBALTA) 60 MG capsule   5. ROSE MARIE (generalized anxiety disorder)  F41.1 DULoxetine (CYMBALTA) 60 MG capsule   6. Morbid obesity with BMI of 40.0-44.9, adult (H)  E66.01     Z68.41      We discussed the above items  We will continue with her baseline meds  I will change her duloxetine from 30 mg twice a day to 60 mg once a day, however, for insurance coverage purposes  She was encouraged on diet and exercise treatment for weight loss    Discussed preventive health care and I advised her to schedule a screening colonoscopy up near where she lives  I also recommended a Shingrix vaccine and she will likely get that at our pharmacy today on her way out  We could give her the booster dose then in a few months when she returns for her annual physical     BMI:   Estimated body mass index is 42.52 kg/m  as calculated from the following:    Height as of this encounter: 1.6 m (5' 2.99\").    Weight as of this encounter: 108.9 kg (240 lb).   Weight management plan: Discussed healthy diet and exercise guidelines        Return in about 3 months (around 11/13/2021) for Physical Exam, Lab Work, Routine Visit.    Srinivasa Hunter MD  [facility] SADIQ Love is a 50 year old who presents for the following health issues     HPI     Chronic Pain Follow-Up    Where in your body do you have pain? Everywhere  How has your pain affected your ability to work? 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 , PCA  Which of these pain treatments have you tried since your last clinic visit? Chiropractor and Physical Therapy  How well are you " "sleeping? Poor  How has your mood been since your last visit? Better  Have you had a significant life event? Other: Move, Housing  Other aggravating factors: prolonged sitting, prolonged standing, poor posture and sedentary lifestyle  Taking medication as directed? Yes    PHQ-9 SCORE 3/2/2021 6/1/2021 8/13/2021   PHQ-9 Total Score - - -   PHQ-9 Total Score MyChart - - -   PHQ-9 Total Score 4 3 6     ROSE MARIE-7 SCORE 3/2/2021 6/1/2021 8/13/2021   Total Score - - -   Total Score 1 0 2     No flowsheet data found.  Encounter-Level CSA - 08/15/2016:    Controlled Substance Agreement - Scan on 8/24/2016  3:32 PM: CONTROLLED SUBSTANCE AGREEMENT 08/15/16     Encounter-Level CSA - 01/31/2014:    Controlled Substance Agreement - Scan on 10/24/2014 10:57 AM: CONTROLLED MEDICATION AGREEMENT 01/31/14     Patient-Level CSA:    There are no patient-level csa.         How many servings of fruits and vegetables do you eat daily?  4 or more    On average, how many sweetened beverages do you drink each day (Examples: soda, juice, sweet tea, etc.  Do NOT count diet or artificially sweetened beverages)?   0    How many days per week do you exercise enough to make your heart beat faster? 3 or less    How many minutes a day do you exercise enough to make your heart beat faster? 30 - 60    How many days per week do you miss taking your medication? 0    She is very much enjoying living up in [place].  She has found good community there for herself as well as her adopted \"grandson\", Romina.  She is still using oxycodone 3 times a day for her chronic pain.  She has tried to cut back, but then her symptoms will flare the next day if she does so.  She is using nortriptyline 30 mg nightly to help with sleep.  She is on duloxetine 30 mg twice a day, but her insurance is only giving her 34 pills at a time.  She is still meeting with Antonio, her therapist, by telephone for anxiety and depression as well.  See his last note further details.    She is " only using the furosemide once or twice a month.    Patient Active Problem List   Diagnosis     History of cleft palate with cleft lip     MAYA (obstructive sleep apnea)/Hypoventilation Syndrome     Major depressive disorder, recurrent episode, moderate (H)     Paresthesias     Health Care Home     Vitamin D deficiency     Morbid obesity with BMI of 40.0-44.9, adult (H)     Hyperlipidemia with target LDL less than 130     Intervertebral disc prolapse with impingement     Nonallopathic lesion of lumbar region     Chronic pain syndrome     Restless legs syndrome (RLS)     Insomnia     Migraine     Chronic bilateral back pain, unspecified back location     Chronic pain of left knee     ROSE MARIE (generalized anxiety disorder)     Idiopathic peripheral neuropathy     Urinary tract obstruction due to kidney stone     SI (sacroiliac) joint dysfunction     Right hip pain     Nasal septal perforation     Acute right lumbar radiculopathy     Hypertrophy of both inferior nasal turbinates     Congenital nasal septum deviation     Cleft palate     Family history of colonic polyps     Current Outpatient Medications   Medication     DULoxetine (CYMBALTA) 30 MG capsule     furosemide (LASIX) 20 MG tablet     ketoconazole (NIZORAL) 2 % external cream     methocarbamol (ROBAXIN) 750 MG tablet     Multiple Vitamins-Minerals (MULTI FOR HER PO)     nortriptyline (PAMELOR) 10 MG capsule     nystatin (MYCOSTATIN) 817972 UNIT/GM external powder     oxyCODONE-acetaminophen (PERCOCET)  MG per tablet     rOPINIRole (REQUIP) 0.25 MG tablet     acetaminophen (TYLENOL) 325 MG tablet     BANOPHEN 25 MG capsule     [employer] NASAL SPRAY 0.05 % nasal spray     ferrous sulfate (FEROSUL) 325 (65 Fe) MG tablet     ORDER FOR DME     SUMAtriptan (IMITREX) 100 MG tablet     No current facility-administered medications for this visit.         Review of Systems   Constitutional, HEENT, cardiovascular, pulmonary, gi and gu systems are negative, except as  "otherwise noted.      Objective    /82 (BP Location: Left arm, Patient Position: Sitting, Cuff Size: Adult Large)   Pulse 77   Temp 97.8  F (36.6  C) (Oral)   Ht 1.6 m (5' 2.99\")   Wt 108.9 kg (240 lb)   LMP  (LMP Unknown)   SpO2 97%   BMI 42.52 kg/m    Body mass index is 42.52 kg/m .  Physical Exam   GENERAL: alert, no distress and obese  MS: no gross musculoskeletal defects noted, no edema  PSYCH: Affect is pleasant and appropriate.  She seems to be in a good mood.  She scored a 6 on her PHQ-9 and a 2 on her ROSE AMRIE-7    Previous lab results were reviewed.            "

## 2021-08-14 ASSESSMENT — ANXIETY QUESTIONNAIRES: GAD7 TOTAL SCORE: 2

## 2021-08-24 ENCOUNTER — MYC REFILL (OUTPATIENT)
Dept: FAMILY MEDICINE | Facility: CLINIC | Age: 51
End: 2021-08-24

## 2021-08-24 DIAGNOSIS — G89.4 CHRONIC PAIN SYNDROME: ICD-10-CM

## 2021-08-25 RX ORDER — OXYCODONE AND ACETAMINOPHEN 10; 325 MG/1; MG/1
1 TABLET ORAL EVERY 6 HOURS PRN
Qty: 90 TABLET | Refills: 0 | Status: SHIPPED | OUTPATIENT
Start: 2021-08-25 | End: 2021-09-20

## 2021-09-01 DIAGNOSIS — G89.29 CHRONIC BILATERAL BACK PAIN, UNSPECIFIED BACK LOCATION: Chronic | ICD-10-CM

## 2021-09-01 DIAGNOSIS — M54.9 CHRONIC BILATERAL BACK PAIN, UNSPECIFIED BACK LOCATION: Chronic | ICD-10-CM

## 2021-09-01 RX ORDER — METHOCARBAMOL 750 MG/1
TABLET, FILM COATED ORAL
Qty: 60 TABLET | Refills: 2 | Status: SHIPPED | OUTPATIENT
Start: 2021-09-01 | End: 2021-11-30

## 2021-09-01 NOTE — TELEPHONE ENCOUNTER
Routing refill request to provider for review/approval because:  Drug not on the [facility] refill protocol           Pending Prescriptions:                       Disp   Refills    methocarbamol (ROBAXIN) 750 MG tablet      60 tab*2        Sig: TAKE 1 TABLETS (750 MG) BY MOUTH 3 TIMES DAILY AS           NEEDED FOR MUSCLE SPASMS        Johnathan Boyer RN

## 2021-09-16 ENCOUNTER — VIRTUAL VISIT (OUTPATIENT)
Dept: PSYCHOLOGY | Facility: CLINIC | Age: 51
End: 2021-09-16
Payer: COMMERCIAL

## 2021-09-16 DIAGNOSIS — F41.1 GAD (GENERALIZED ANXIETY DISORDER): ICD-10-CM

## 2021-09-16 DIAGNOSIS — F33.1 MAJOR DEPRESSIVE DISORDER, RECURRENT EPISODE, MODERATE (H): Primary | ICD-10-CM

## 2021-09-16 PROCEDURE — 90834 PSYTX W PT 45 MINUTES: CPT | Mod: 95 | Performed by: SOCIAL WORKER
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
## 2021-09-20 ENCOUNTER — MYC REFILL (OUTPATIENT)
Dept: FAMILY MEDICINE | Facility: CLINIC | Age: 51
End: 2021-09-20

## 2021-09-20 DIAGNOSIS — G89.4 CHRONIC PAIN SYNDROME: ICD-10-CM

## 2021-09-20 DIAGNOSIS — B37.2 CANDIDAL INTERTRIGO: ICD-10-CM

## 2021-09-20 RX ORDER — OXYCODONE AND ACETAMINOPHEN 10; 325 MG/1; MG/1
1 TABLET ORAL EVERY 6 HOURS PRN
Qty: 90 TABLET | Refills: 0 | Status: SHIPPED | OUTPATIENT
Start: 2021-09-20 | End: 2021-10-15

## 2021-09-20 RX ORDER — NYSTATIN 100000 [USP'U]/G
POWDER TOPICAL
Qty: 30 G | Refills: 1 | Status: SHIPPED | OUTPATIENT
Start: 2021-09-20 | End: 2022-05-08

## 2021-10-03 ENCOUNTER — HEALTH MAINTENANCE LETTER (OUTPATIENT)
Age: 51
End: 2021-10-03

## 2021-10-15 ENCOUNTER — MYC REFILL (OUTPATIENT)
Dept: FAMILY MEDICINE | Facility: CLINIC | Age: 51
End: 2021-10-15

## 2021-10-15 DIAGNOSIS — G89.4 CHRONIC PAIN SYNDROME: ICD-10-CM

## 2021-10-18 ENCOUNTER — VIRTUAL VISIT (OUTPATIENT)
Dept: PSYCHOLOGY | Facility: CLINIC | Age: 51
End: 2021-10-18
Payer: COMMERCIAL

## 2021-10-18 DIAGNOSIS — F41.1 GAD (GENERALIZED ANXIETY DISORDER): ICD-10-CM

## 2021-10-18 DIAGNOSIS — F33.1 MAJOR DEPRESSIVE DISORDER, RECURRENT EPISODE, MODERATE (H): Primary | ICD-10-CM

## 2021-10-18 PROCEDURE — 90834 PSYTX W PT 45 MINUTES: CPT | Mod: 95 | Performed by: SOCIAL WORKER

## 2021-10-18 RX ORDER — OXYCODONE AND ACETAMINOPHEN 10; 325 MG/1; MG/1
1 TABLET ORAL EVERY 6 HOURS PRN
Qty: 90 TABLET | Refills: 0 | Status: SHIPPED | OUTPATIENT
Start: 2021-10-18 | End: 2021-11-15
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
## 2021-10-19 ENCOUNTER — MYC MEDICAL ADVICE (OUTPATIENT)
Dept: FAMILY MEDICINE | Facility: CLINIC | Age: 51
End: 2021-10-19

## 2021-10-19 DIAGNOSIS — Z12.11 COLON CANCER SCREENING: Primary | ICD-10-CM

## 2021-10-19 NOTE — TELEPHONE ENCOUNTER
Please fax the colonoscopy order in the patient's chart to the following number: [phone] .    Thanks,  Srinivasa Hunter MD

## 2021-10-31 ENCOUNTER — TRANSFERRED RECORDS (OUTPATIENT)
Dept: HEALTH INFORMATION MANAGEMENT | Facility: CLINIC | Age: 51
End: 2021-10-31
Payer: COMMERCIAL

## 2021-11-12 ENCOUNTER — OFFICE VISIT (OUTPATIENT)
Dept: FAMILY MEDICINE | Facility: CLINIC | Age: 51
End: 2021-11-12
Payer: COMMERCIAL

## 2021-11-12 VITALS
DIASTOLIC BLOOD PRESSURE: 86 MMHG | TEMPERATURE: 98.3 F | HEIGHT: 63 IN | HEART RATE: 85 BPM | SYSTOLIC BLOOD PRESSURE: 137 MMHG | WEIGHT: 240 LBS | OXYGEN SATURATION: 97 % | BODY MASS INDEX: 42.52 KG/M2

## 2021-11-12 DIAGNOSIS — F33.1 MAJOR DEPRESSIVE DISORDER, RECURRENT EPISODE, MODERATE (H): Chronic | ICD-10-CM

## 2021-11-12 DIAGNOSIS — Z00.00 ROUTINE GENERAL MEDICAL EXAMINATION AT A HEALTH CARE FACILITY: Primary | ICD-10-CM

## 2021-11-12 DIAGNOSIS — Z12.31 ENCOUNTER FOR SCREENING MAMMOGRAM FOR BREAST CANCER: ICD-10-CM

## 2021-11-12 DIAGNOSIS — Z23 NEED FOR PROPHYLACTIC VACCINATION AND INOCULATION AGAINST INFLUENZA: ICD-10-CM

## 2021-11-12 DIAGNOSIS — F41.1 GAD (GENERALIZED ANXIETY DISORDER): ICD-10-CM

## 2021-11-12 DIAGNOSIS — G89.4 CHRONIC PAIN SYNDROME: Chronic | ICD-10-CM

## 2021-11-12 DIAGNOSIS — E66.01 MORBID OBESITY WITH BODY MASS INDEX OF 40.0-49.9 (H): ICD-10-CM

## 2021-11-12 DIAGNOSIS — B37.2 INTERTRIGINOUS CANDIDIASIS: ICD-10-CM

## 2021-11-12 DIAGNOSIS — Z23 NEED FOR SHINGLES VACCINE: ICD-10-CM

## 2021-11-12 DIAGNOSIS — G60.9 IDIOPATHIC PERIPHERAL NEUROPATHY: ICD-10-CM

## 2021-11-12 PROCEDURE — 99396 PREV VISIT EST AGE 40-64: CPT | Mod: 25 | Performed by: FAMILY MEDICINE

## 2021-11-12 PROCEDURE — 90750 HZV VACC RECOMBINANT IM: CPT | Performed by: FAMILY MEDICINE

## 2021-11-12 PROCEDURE — 90472 IMMUNIZATION ADMIN EACH ADD: CPT | Performed by: FAMILY MEDICINE

## 2021-11-12 PROCEDURE — 99213 OFFICE O/P EST LOW 20 MIN: CPT | Mod: 25 | Performed by: FAMILY MEDICINE

## 2021-11-12 PROCEDURE — 90682 RIV4 VACC RECOMBINANT DNA IM: CPT | Performed by: FAMILY MEDICINE

## 2021-11-12 PROCEDURE — 90471 IMMUNIZATION ADMIN: CPT | Performed by: FAMILY MEDICINE

## 2021-11-12 RX ORDER — DULOXETIN HYDROCHLORIDE 60 MG/1
60 CAPSULE, DELAYED RELEASE ORAL DAILY
Qty: 30 CAPSULE | Refills: 5 | Status: SHIPPED | OUTPATIENT
Start: 2021-11-12 | End: 2022-06-06

## 2021-11-12 RX ORDER — NORTRIPTYLINE HCL 10 MG
30 CAPSULE ORAL AT BEDTIME
Qty: 90 CAPSULE | Refills: 5 | Status: SHIPPED | OUTPATIENT
Start: 2021-11-12 | End: 2022-03-02

## 2021-11-12 RX ORDER — KETOCONAZOLE 20 MG/G
CREAM TOPICAL
Qty: 60 G | Refills: 3 | Status: SHIPPED | OUTPATIENT
Start: 2021-11-12

## 2021-11-12 ASSESSMENT — ENCOUNTER SYMPTOMS
FREQUENCY: 0
HEARTBURN: 0
JOINT SWELLING: 1
HEMATURIA: 0
MYALGIAS: 1
WEAKNESS: 1
EYE PAIN: 0
CONSTIPATION: 0
DIZZINESS: 1
NAUSEA: 0
ARTHRALGIAS: 1
HEADACHES: 0
SORE THROAT: 0
ABDOMINAL PAIN: 0
FEVER: 0
SHORTNESS OF BREATH: 0
PARESTHESIAS: 1
HEMATOCHEZIA: 0
NERVOUS/ANXIOUS: 0
PALPITATIONS: 0
DYSURIA: 0
CHILLS: 0
COUGH: 0
DIARRHEA: 0
BREAST MASS: 0

## 2021-11-12 ASSESSMENT — MIFFLIN-ST. JEOR: SCORE: 1672.63
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
## 2021-11-15 ENCOUNTER — MYC REFILL (OUTPATIENT)
Dept: FAMILY MEDICINE | Facility: CLINIC | Age: 51
End: 2021-11-15
Payer: COMMERCIAL

## 2021-11-15 DIAGNOSIS — G89.4 CHRONIC PAIN SYNDROME: ICD-10-CM

## 2021-11-15 RX ORDER — OXYCODONE AND ACETAMINOPHEN 10; 325 MG/1; MG/1
1 TABLET ORAL EVERY 6 HOURS PRN
Qty: 90 TABLET | Refills: 0 | Status: SHIPPED | OUTPATIENT
Start: 2021-11-15 | End: 2021-12-13

## 2021-11-23 ENCOUNTER — VIRTUAL VISIT (OUTPATIENT)
Dept: PSYCHOLOGY | Facility: CLINIC | Age: 51
End: 2021-11-23
Payer: COMMERCIAL

## 2021-11-23 DIAGNOSIS — F33.1 MAJOR DEPRESSIVE DISORDER, RECURRENT EPISODE, MODERATE (H): Primary | ICD-10-CM

## 2021-11-23 DIAGNOSIS — F41.1 GAD (GENERALIZED ANXIETY DISORDER): ICD-10-CM

## 2021-11-23 PROCEDURE — 90834 PSYTX W PT 45 MINUTES: CPT | Mod: 95 | Performed by: SOCIAL WORKER

## 2021-11-23 ASSESSMENT — PATIENT HEALTH QUESTIONNAIRE - PHQ9: 5. POOR APPETITE OR OVEREATING: SEVERAL DAYS

## 2021-11-23 ASSESSMENT — ANXIETY QUESTIONNAIRES
5. BEING SO RESTLESS THAT IT IS HARD TO SIT STILL: NOT AT ALL
1. FEELING NERVOUS, ANXIOUS, OR ON EDGE: NOT AT ALL
3. WORRYING TOO MUCH ABOUT DIFFERENT THINGS: NOT AT ALL
7. FEELING AFRAID AS IF SOMETHING AWFUL MIGHT HAPPEN: NOT AT ALL
2. NOT BEING ABLE TO STOP OR CONTROL WORRYING: NOT AT ALL
GAD7 TOTAL SCORE: 1
6. BECOMING EASILY ANNOYED OR IRRITABLE: NOT AT ALL
IF YOU CHECKED OFF ANY PROBLEMS ON THIS QUESTIONNAIRE, HOW DIFFICULT HAVE THESE PROBLEMS MADE IT FOR YOU TO DO YOUR WORK, TAKE CARE OF THINGS AT HOME, OR GET ALONG WITH OTHER PEOPLE: NOT DIFFICULT AT ALL
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
## 2021-11-24 ASSESSMENT — ANXIETY QUESTIONNAIRES: GAD7 TOTAL SCORE: 1

## 2021-11-24 ASSESSMENT — PATIENT HEALTH QUESTIONNAIRE - PHQ9: SUM OF ALL RESPONSES TO PHQ QUESTIONS 1-9: 6

## 2021-11-30 ENCOUNTER — HOSPITAL ENCOUNTER (OUTPATIENT)
Dept: MAMMOGRAPHY | Facility: CLINIC | Age: 51
Discharge: HOME | End: 2021-11-30
Attending: FAMILY MEDICINE | Admitting: FAMILY MEDICINE
Payer: COMMERCIAL

## 2021-11-30 ENCOUNTER — LAB (OUTPATIENT)
Dept: LAB | Facility: CLINIC | Age: 51
End: 2021-11-30
Payer: COMMERCIAL

## 2021-11-30 DIAGNOSIS — M54.9 CHRONIC BILATERAL BACK PAIN, UNSPECIFIED BACK LOCATION: Chronic | ICD-10-CM

## 2021-11-30 DIAGNOSIS — Z12.31 ENCOUNTER FOR SCREENING MAMMOGRAM FOR BREAST CANCER: ICD-10-CM

## 2021-11-30 DIAGNOSIS — Z00.00 ROUTINE GENERAL MEDICAL EXAMINATION AT A HEALTH CARE FACILITY: ICD-10-CM

## 2021-11-30 DIAGNOSIS — G89.29 CHRONIC BILATERAL BACK PAIN, UNSPECIFIED BACK LOCATION: Chronic | ICD-10-CM

## 2021-11-30 PROBLEM — R73.01 IMPAIRED FASTING GLUCOSE: Status: ACTIVE | Noted: 2021-11-30

## 2021-11-30 LAB
ANION GAP SERPL CALCULATED.3IONS-SCNC: 5 MMOL/L (ref 3–14)
BUN SERPL-MCNC: 6 MG/DL (ref 7–30)
CALCIUM SERPL-MCNC: 9.2 MG/DL (ref 8.5–10.1)
CHLORIDE BLD-SCNC: 105 MMOL/L (ref 94–109)
CHOLEST SERPL-MCNC: 239 MG/DL
CO2 SERPL-SCNC: 29 MMOL/L (ref 20–32)
CREAT SERPL-MCNC: 0.73 MG/DL (ref 0.52–1.04)
ERYTHROCYTE [DISTWIDTH] IN BLOOD BY AUTOMATED COUNT: 12.4 % (ref 10–15)
FASTING STATUS PATIENT QL REPORTED: YES
GFR SERPL CREATININE-BSD FRML MDRD: >90 ML/MIN/1.73M2
GLUCOSE BLD-MCNC: 102 MG/DL (ref 70–99)
HCT VFR BLD AUTO: 42.6 % (ref 35–47)
HDLC SERPL-MCNC: 41 MG/DL
HGB BLD-MCNC: 13.9 G/DL (ref 11.7–15.7)
LDLC SERPL CALC-MCNC: 140 MG/DL
MCH RBC QN AUTO: 31.1 PG (ref 26.5–33)
MCHC RBC AUTO-ENTMCNC: 32.6 G/DL (ref 31.5–36.5)
MCV RBC AUTO: 95 FL (ref 78–100)
NONHDLC SERPL-MCNC: 198 MG/DL
PLATELET # BLD AUTO: 360 10E3/UL (ref 150–450)
POTASSIUM BLD-SCNC: 4.1 MMOL/L (ref 3.4–5.3)
RBC # BLD AUTO: 4.47 10E6/UL (ref 3.8–5.2)
SODIUM SERPL-SCNC: 139 MMOL/L (ref 133–144)
TRIGL SERPL-MCNC: 290 MG/DL
WBC # BLD AUTO: 7.7 10E3/UL (ref 4–11)

## 2021-11-30 PROCEDURE — 85027 COMPLETE CBC AUTOMATED: CPT

## 2021-11-30 PROCEDURE — 80048 BASIC METABOLIC PNL TOTAL CA: CPT

## 2021-11-30 PROCEDURE — 80061 LIPID PANEL: CPT

## 2021-11-30 PROCEDURE — 36415 COLL VENOUS BLD VENIPUNCTURE: CPT

## 2021-11-30 PROCEDURE — 77067 SCR MAMMO BI INCL CAD: CPT

## 2021-11-30 RX ORDER — METHOCARBAMOL 750 MG/1
TABLET, FILM COATED ORAL
Qty: 60 TABLET | Refills: 2 | Status: SHIPPED | OUTPATIENT
Start: 2021-11-30 | End: 2022-02-28

## 2021-11-30 NOTE — TELEPHONE ENCOUNTER
Routing refill request to provider for review/approval because:  Drug not on the [facility] refill protocol     Pending Prescriptions:                       Disp   Refills    methocarbamol (ROBAXIN) 750 MG tablet      60 tab*2        Sig: TAKE 1 TABLETS (750 MG) BY MOUTH 3 TIMES DAILY AS           NEEDED FOR MUSCLE SPASMS      Naty Morales, RN

## 2021-11-30 NOTE — RESULT ENCOUNTER NOTE
Kate,  Your lab work shows normal electrolytes, kidney tests, and blood counts, but your lipid values and blood sugar are higher than previously.  Healthy eating and regular exercise to achieve weight loss would be appropriate treatment for this.    Srinivasa Hunter MD

## 2021-12-13 ENCOUNTER — MYC REFILL (OUTPATIENT)
Dept: FAMILY MEDICINE | Facility: CLINIC | Age: 51
End: 2021-12-13
Payer: COMMERCIAL

## 2021-12-13 DIAGNOSIS — G89.4 CHRONIC PAIN SYNDROME: ICD-10-CM

## 2021-12-13 RX ORDER — OXYCODONE AND ACETAMINOPHEN 10; 325 MG/1; MG/1
1 TABLET ORAL EVERY 6 HOURS PRN
Qty: 90 TABLET | Refills: 0 | Status: SHIPPED | OUTPATIENT
Start: 2021-12-13 | End: 2022-01-10

## 2021-12-21 ENCOUNTER — VIRTUAL VISIT (OUTPATIENT)
Dept: PSYCHOLOGY | Facility: CLINIC | Age: 51
End: 2021-12-21
Payer: COMMERCIAL

## 2021-12-21 DIAGNOSIS — F41.1 GAD (GENERALIZED ANXIETY DISORDER): ICD-10-CM

## 2021-12-21 DIAGNOSIS — F33.1 MAJOR DEPRESSIVE DISORDER, RECURRENT EPISODE, MODERATE (H): Primary | ICD-10-CM

## 2021-12-21 PROCEDURE — 90834 PSYTX W PT 45 MINUTES: CPT | Mod: 95 | Performed by: SOCIAL WORKER
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
## 2022-01-10 ENCOUNTER — MYC MEDICAL ADVICE (OUTPATIENT)
Dept: FAMILY MEDICINE | Facility: CLINIC | Age: 52
End: 2022-01-10
Payer: COMMERCIAL

## 2022-01-10 ENCOUNTER — MYC REFILL (OUTPATIENT)
Dept: FAMILY MEDICINE | Facility: CLINIC | Age: 52
End: 2022-01-10
Payer: COMMERCIAL

## 2022-01-10 DIAGNOSIS — G89.4 CHRONIC PAIN SYNDROME: ICD-10-CM

## 2022-01-10 RX ORDER — OXYCODONE AND ACETAMINOPHEN 10; 325 MG/1; MG/1
1 TABLET ORAL EVERY 6 HOURS PRN
Qty: 90 TABLET | Refills: 0 | Status: SHIPPED | OUTPATIENT
Start: 2022-01-10 | End: 2022-02-06

## 2022-01-18 ENCOUNTER — VIRTUAL VISIT (OUTPATIENT)
Dept: PSYCHOLOGY | Facility: CLINIC | Age: 52
End: 2022-01-18
Payer: COMMERCIAL

## 2022-01-18 DIAGNOSIS — F33.1 MAJOR DEPRESSIVE DISORDER, RECURRENT EPISODE, MODERATE (H): Primary | ICD-10-CM

## 2022-01-18 DIAGNOSIS — F41.1 GAD (GENERALIZED ANXIETY DISORDER): ICD-10-CM

## 2022-01-18 PROCEDURE — 90834 PSYTX W PT 45 MINUTES: CPT | Mod: 95 | Performed by: SOCIAL WORKER
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
## 2022-01-24 ENCOUNTER — TELEPHONE (OUTPATIENT)
Dept: FAMILY MEDICINE | Facility: CLINIC | Age: 52
End: 2022-01-24
Payer: COMMERCIAL

## 2022-01-24 DIAGNOSIS — G89.4 CHRONIC PAIN SYNDROME: Chronic | ICD-10-CM

## 2022-01-24 DIAGNOSIS — F33.1 MAJOR DEPRESSIVE DISORDER, RECURRENT EPISODE, MODERATE (H): Primary | Chronic | ICD-10-CM

## 2022-01-24 DIAGNOSIS — F41.1 GAD (GENERALIZED ANXIETY DISORDER): ICD-10-CM

## 2022-01-24 NOTE — TELEPHONE ENCOUNTER
Please inform the patient that I generated a letter for her  that is accessible in her chart via "[employer]" in support of her having an emotional support cat .    Srinivasa Hunter MD

## 2022-01-24 NOTE — TELEPHONE ENCOUNTER
Contacted patient and she stated that she already read [employer] message, however, she may be needing to send a form that will need to be faxed back.  Kristine Day RN on 1/24/2022 at 3:01 PM

## 2022-01-24 NOTE — LETTER
[facility]  [address]  [place] [address]  Phone: [phone]    January 24, 2022        Velmazan Diaz  [address] [address]          To whom it may concern:    RE: Velma J Emily    I provide primary care for the above individual.  She has ongoing chronic pain, depression, and anxiety.  We are requesting that she be allowed to have an emotional support cat at her current residence, which I think would be helpful for her above health conditions.    Thank you for your consideration.  Please contact me for questions or concerns.      Sincerely,        Srinivasa Hunter MD

## 2022-01-24 NOTE — TELEPHONE ENCOUNTER
Reason for Call:  Other Letter    Detailed comments: Needs letter for [facility] to have an emotional support cat. Pt states this is urgent    Phone Number Patient can be reached at: Home number on file [phone] (home)    Best Time: any    Can we leave a detailed message on this number? YES    Call taken on 1/24/2022 at 11:21 AM by Cesia Hedrick

## 2022-02-15 ENCOUNTER — TRANSFERRED RECORDS (OUTPATIENT)
Dept: HEALTH INFORMATION MANAGEMENT | Facility: CLINIC | Age: 52
End: 2022-02-15
Payer: COMMERCIAL

## 2022-02-17 ENCOUNTER — VIRTUAL VISIT (OUTPATIENT)
Dept: PSYCHOLOGY | Facility: CLINIC | Age: 52
End: 2022-02-17
Payer: COMMERCIAL

## 2022-02-17 DIAGNOSIS — F33.1 MAJOR DEPRESSIVE DISORDER, RECURRENT EPISODE, MODERATE (H): Primary | ICD-10-CM

## 2022-02-17 DIAGNOSIS — F41.1 GAD (GENERALIZED ANXIETY DISORDER): ICD-10-CM

## 2022-02-17 PROCEDURE — 90834 PSYTX W PT 45 MINUTES: CPT | Mod: 95 | Performed by: SOCIAL WORKER
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
## 2022-02-28 DIAGNOSIS — G60.9 IDIOPATHIC PERIPHERAL NEUROPATHY: ICD-10-CM

## 2022-02-28 DIAGNOSIS — G89.29 CHRONIC BILATERAL BACK PAIN, UNSPECIFIED BACK LOCATION: Chronic | ICD-10-CM

## 2022-02-28 DIAGNOSIS — M54.9 CHRONIC BILATERAL BACK PAIN, UNSPECIFIED BACK LOCATION: Chronic | ICD-10-CM

## 2022-02-28 RX ORDER — METHOCARBAMOL 750 MG/1
TABLET, FILM COATED ORAL
Qty: 60 TABLET | Refills: 2 | Status: SHIPPED | OUTPATIENT
Start: 2022-02-28 | End: 2022-03-30

## 2022-03-02 RX ORDER — NORTRIPTYLINE HCL 10 MG
CAPSULE ORAL
Qty: 270 CAPSULE | Refills: 0 | Status: SHIPPED | OUTPATIENT
Start: 2022-03-02 | End: 2022-04-24

## 2022-03-12 ENCOUNTER — MYC REFILL (OUTPATIENT)
Dept: FAMILY MEDICINE | Facility: CLINIC | Age: 52
End: 2022-03-12
Payer: COMMERCIAL

## 2022-03-12 DIAGNOSIS — G89.4 CHRONIC PAIN SYNDROME: ICD-10-CM

## 2022-03-14 RX ORDER — OXYCODONE AND ACETAMINOPHEN 10; 325 MG/1; MG/1
1 TABLET ORAL EVERY 6 HOURS PRN
Qty: 90 TABLET | Refills: 0 | OUTPATIENT
Start: 2022-03-14

## 2022-03-15 DIAGNOSIS — M79.89 LEG SWELLING: ICD-10-CM

## 2022-03-16 RX ORDER — FUROSEMIDE 20 MG
20 TABLET ORAL DAILY
Qty: 30 TABLET | Refills: 1 | Status: SHIPPED | OUTPATIENT
Start: 2022-03-16 | End: 2022-06-06

## 2022-03-16 NOTE — TELEPHONE ENCOUNTER
Prescription approved per [facility] Protocol.      Jodie Mckeon RN

## 2022-03-24 ENCOUNTER — VIRTUAL VISIT (OUTPATIENT)
Dept: PSYCHOLOGY | Facility: CLINIC | Age: 52
End: 2022-03-24
Payer: COMMERCIAL

## 2022-03-24 DIAGNOSIS — F41.1 GAD (GENERALIZED ANXIETY DISORDER): ICD-10-CM

## 2022-03-24 DIAGNOSIS — F33.1 MAJOR DEPRESSIVE DISORDER, RECURRENT EPISODE, MODERATE (H): Primary | ICD-10-CM

## 2022-03-24 PROCEDURE — 90834 PSYTX W PT 45 MINUTES: CPT | Mod: 95 | Performed by: SOCIAL WORKER

## 2022-03-24 ASSESSMENT — PATIENT HEALTH QUESTIONNAIRE - PHQ9
SUM OF ALL RESPONSES TO PHQ QUESTIONS 1-9: 4
5. POOR APPETITE OR OVEREATING: NOT AT ALL

## 2022-03-24 ASSESSMENT — ANXIETY QUESTIONNAIRES
6. BECOMING EASILY ANNOYED OR IRRITABLE: SEVERAL DAYS
3. WORRYING TOO MUCH ABOUT DIFFERENT THINGS: NOT AT ALL
GAD7 TOTAL SCORE: 1
IF YOU CHECKED OFF ANY PROBLEMS ON THIS QUESTIONNAIRE, HOW DIFFICULT HAVE THESE PROBLEMS MADE IT FOR YOU TO DO YOUR WORK, TAKE CARE OF THINGS AT HOME, OR GET ALONG WITH OTHER PEOPLE: NOT DIFFICULT AT ALL
7. FEELING AFRAID AS IF SOMETHING AWFUL MIGHT HAPPEN: NOT AT ALL
1. FEELING NERVOUS, ANXIOUS, OR ON EDGE: NOT AT ALL
5. BEING SO RESTLESS THAT IT IS HARD TO SIT STILL: NOT AT ALL
2. NOT BEING ABLE TO STOP OR CONTROL WORRYING: NOT AT ALL
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
## 2022-03-25 ASSESSMENT — ANXIETY QUESTIONNAIRES: GAD7 TOTAL SCORE: 1

## 2022-03-30 ENCOUNTER — MYC MEDICAL ADVICE (OUTPATIENT)
Dept: FAMILY MEDICINE | Facility: CLINIC | Age: 52
End: 2022-03-30
Payer: COMMERCIAL

## 2022-03-30 DIAGNOSIS — G89.29 CHRONIC BILATERAL BACK PAIN, UNSPECIFIED BACK LOCATION: Chronic | ICD-10-CM

## 2022-03-30 DIAGNOSIS — M54.9 CHRONIC BILATERAL BACK PAIN, UNSPECIFIED BACK LOCATION: Chronic | ICD-10-CM

## 2022-03-30 RX ORDER — METHOCARBAMOL 750 MG/1
TABLET, FILM COATED ORAL
Qty: 90 TABLET | Refills: 2 | Status: SHIPPED | OUTPATIENT
Start: 2022-03-30 | End: 2022-06-06

## 2022-03-31 ENCOUNTER — MYC REFILL (OUTPATIENT)
Dept: FAMILY MEDICINE | Facility: CLINIC | Age: 52
End: 2022-03-31
Payer: COMMERCIAL

## 2022-03-31 ENCOUNTER — TELEPHONE (OUTPATIENT)
Dept: FAMILY MEDICINE | Facility: CLINIC | Age: 52
End: 2022-03-31
Payer: COMMERCIAL

## 2022-03-31 DIAGNOSIS — G89.4 CHRONIC PAIN SYNDROME: ICD-10-CM

## 2022-03-31 RX ORDER — OXYCODONE AND ACETAMINOPHEN 10; 325 MG/1; MG/1
1 TABLET ORAL EVERY 6 HOURS PRN
Qty: 90 TABLET | Refills: 0 | Status: SHIPPED | OUTPATIENT
Start: 2022-03-31 | End: 2022-04-24

## 2022-03-31 NOTE — TELEPHONE ENCOUNTER
Patient called because she just received a letter from her landlord dated 1/27/22 that her request for an emotional support animal (cat) was denied by Dr. Hunter. See fax below from 2/25/2022          Patient states that Dr. Hunter told her that he would support an emotional support animal (cat). See letter below sent on 01/24/2022        Patient requests that above letter be sent to her [facility] and that a paper copy be mailed to her home address at   [address] [address]      Patient would also like to know if revisions can be made on faxed form that was sent from [facility].    Kristine BORRERO RN on 3/31/2022 at 3:42 PM

## 2022-03-31 NOTE — TELEPHONE ENCOUNTER
She can have the letter sent to her home as requested.  Please advise her that I enjoy pets as much as anyone and I am in support of her having a cat, although I had to answer the questions on the form truthfully, which I did.  Ultimately it is her landlord that has the final say on whether she can have an emotional support animal, not me, so I am sorry if they are not allowing that.  She may have to decide whether to continue living in her current place of residence without a cat versus moving to someplace that does allow pets.    Srinivasa Hunter MD

## 2022-04-01 NOTE — TELEPHONE ENCOUNTER
Called and spoke with patient.  Informed her of the provider's recommendations.  Patient verbalized understanding and has no further questions or concerns at this time.    Team:  Please mail letter to patient's current mailing address.

## 2022-04-19 ENCOUNTER — TELEPHONE (OUTPATIENT)
Dept: BEHAVIORAL HEALTH | Facility: CLINIC | Age: 52
End: 2022-04-19
Payer: COMMERCIAL

## 2022-04-21 ENCOUNTER — VIRTUAL VISIT (OUTPATIENT)
Dept: PSYCHOLOGY | Facility: CLINIC | Age: 52
End: 2022-04-21
Payer: COMMERCIAL

## 2022-04-21 DIAGNOSIS — F33.1 MAJOR DEPRESSIVE DISORDER, RECURRENT EPISODE, MODERATE (H): Primary | ICD-10-CM

## 2022-04-21 DIAGNOSIS — F41.1 GAD (GENERALIZED ANXIETY DISORDER): ICD-10-CM

## 2022-04-21 PROCEDURE — 90834 PSYTX W PT 45 MINUTES: CPT | Mod: 95 | Performed by: SOCIAL WORKER
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
## 2022-04-24 ENCOUNTER — MYC REFILL (OUTPATIENT)
Dept: FAMILY MEDICINE | Facility: CLINIC | Age: 52
End: 2022-04-24
Payer: COMMERCIAL

## 2022-04-24 DIAGNOSIS — G89.4 CHRONIC PAIN SYNDROME: ICD-10-CM

## 2022-04-24 DIAGNOSIS — G60.9 IDIOPATHIC PERIPHERAL NEUROPATHY: ICD-10-CM

## 2022-04-26 RX ORDER — NORTRIPTYLINE HCL 10 MG
CAPSULE ORAL
Qty: 270 CAPSULE | Refills: 0 | Status: SHIPPED | OUTPATIENT
Start: 2022-04-26 | End: 2022-06-06

## 2022-04-26 RX ORDER — OXYCODONE AND ACETAMINOPHEN 10; 325 MG/1; MG/1
1 TABLET ORAL EVERY 6 HOURS PRN
Qty: 90 TABLET | Refills: 0 | Status: SHIPPED | OUTPATIENT
Start: 2022-04-26 | End: 2022-05-18

## 2022-04-26 NOTE — TELEPHONE ENCOUNTER
Routing refill request to provider for review/approval because:  Drug not on the [facility] refill protocol     Requested Prescriptions   Pending Prescriptions Disp Refills    oxyCODONE-acetaminophen (PERCOCET)  MG per tablet 90 tablet 0     Sig: Take 1 tablet by mouth every 6 hours as needed for moderate to severe pain        There is no refill protocol information for this order            Silvana Canales RN   [facility]

## 2022-05-06 DIAGNOSIS — B37.2 CANDIDAL INTERTRIGO: ICD-10-CM

## 2022-05-08 RX ORDER — NYSTATIN 100000 [USP'U]/G
POWDER TOPICAL
Qty: 30 G | Refills: 1 | Status: SHIPPED | OUTPATIENT
Start: 2022-05-08 | End: 2023-01-19

## 2022-05-08 NOTE — TELEPHONE ENCOUNTER
Prescription approved per [facility] Refill Protocol.    Jodie Mckeon RN

## 2022-05-09 ENCOUNTER — TRANSFERRED RECORDS (OUTPATIENT)
Dept: HEALTH INFORMATION MANAGEMENT | Facility: CLINIC | Age: 52
End: 2022-05-09
Payer: COMMERCIAL

## 2022-05-10 PROBLEM — F11.90 CHRONIC, CONTINUOUS USE OF OPIOIDS: Status: ACTIVE | Noted: 2022-05-10

## 2022-05-18 ENCOUNTER — MYC REFILL (OUTPATIENT)
Dept: FAMILY MEDICINE | Facility: CLINIC | Age: 52
End: 2022-05-18
Payer: COMMERCIAL

## 2022-05-18 DIAGNOSIS — G89.4 CHRONIC PAIN SYNDROME: ICD-10-CM

## 2022-05-18 RX ORDER — OXYCODONE AND ACETAMINOPHEN 10; 325 MG/1; MG/1
1 TABLET ORAL EVERY 6 HOURS PRN
Qty: 90 TABLET | Refills: 0 | Status: SHIPPED | OUTPATIENT
Start: 2022-05-18 | End: 2022-06-13

## 2022-05-18 NOTE — TELEPHONE ENCOUNTER
Routing refill request to provider for review/approval because:  Drug not on the [facility] refill protocol     Mervat Klein RN, BSN, PHN  [facility] [place]

## 2022-05-19 ENCOUNTER — VIRTUAL VISIT (OUTPATIENT)
Dept: PSYCHOLOGY | Facility: CLINIC | Age: 52
End: 2022-05-19
Payer: COMMERCIAL

## 2022-05-19 DIAGNOSIS — F41.1 GAD (GENERALIZED ANXIETY DISORDER): ICD-10-CM

## 2022-05-19 DIAGNOSIS — F33.1 MAJOR DEPRESSIVE DISORDER, RECURRENT EPISODE, MODERATE (H): Primary | ICD-10-CM

## 2022-05-19 PROCEDURE — 90834 PSYTX W PT 45 MINUTES: CPT | Mod: 95 | Performed by: SOCIAL WORKER
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
## 2022-05-20 ENCOUNTER — TELEPHONE (OUTPATIENT)
Dept: BEHAVIORAL HEALTH | Facility: CLINIC | Age: 52
End: 2022-05-20
Payer: COMMERCIAL

## 2022-06-04 DIAGNOSIS — F33.1 MAJOR DEPRESSIVE DISORDER, RECURRENT EPISODE, MODERATE (H): Chronic | ICD-10-CM

## 2022-06-04 DIAGNOSIS — F41.1 GAD (GENERALIZED ANXIETY DISORDER): ICD-10-CM

## 2022-06-04 DIAGNOSIS — G60.9 IDIOPATHIC PERIPHERAL NEUROPATHY: ICD-10-CM

## 2022-06-04 DIAGNOSIS — G89.4 CHRONIC PAIN SYNDROME: Chronic | ICD-10-CM

## 2022-06-06 DIAGNOSIS — G89.29 CHRONIC BILATERAL BACK PAIN, UNSPECIFIED BACK LOCATION: Chronic | ICD-10-CM

## 2022-06-06 DIAGNOSIS — M54.9 CHRONIC BILATERAL BACK PAIN, UNSPECIFIED BACK LOCATION: Chronic | ICD-10-CM

## 2022-06-06 DIAGNOSIS — M79.89 LEG SWELLING: ICD-10-CM

## 2022-06-06 RX ORDER — NORTRIPTYLINE HCL 10 MG
CAPSULE ORAL
Qty: 270 CAPSULE | Refills: 0 | Status: SHIPPED | OUTPATIENT
Start: 2022-06-06 | End: 2023-01-19

## 2022-06-06 RX ORDER — FUROSEMIDE 20 MG
20 TABLET ORAL DAILY
Qty: 30 TABLET | Refills: 1 | Status: SHIPPED | OUTPATIENT
Start: 2022-06-06 | End: 2022-11-10

## 2022-06-06 RX ORDER — METHOCARBAMOL 750 MG/1
TABLET, FILM COATED ORAL
Qty: 90 TABLET | Refills: 2 | Status: SHIPPED | OUTPATIENT
Start: 2022-06-06 | End: 2022-10-07

## 2022-06-06 RX ORDER — DULOXETIN HYDROCHLORIDE 60 MG/1
CAPSULE, DELAYED RELEASE ORAL
Qty: 30 CAPSULE | Refills: 5 | Status: SHIPPED | OUTPATIENT
Start: 2022-06-06 | End: 2022-12-14

## 2022-06-06 NOTE — TELEPHONE ENCOUNTER
"Requested Prescriptions   Pending Prescriptions Disp Refills     methocarbamol (ROBAXIN) 750 MG tablet 90 tablet 2     Sig: TAKE 1 TABLETS (750 MG) BY MOUTH 3 TIMES DAILY AS NEEDED FOR MUSCLE SPASMS       There is no refill protocol information for this order      Signed Prescriptions Disp Refills    furosemide (LASIX) 20 MG tablet 30 tablet 1     Sig: Take 1 tablet (20 mg) by mouth daily as needed for leg swelling       Diuretics (Including Combos) Protocol Passed - 6/6/2022 11:57 AM        Passed - Blood pressure under 140/90 in past 12 months     BP Readings from Last 3 Encounters:   11/12/21 137/86   08/13/21 126/82   11/11/20 125/81                 Passed - Recent (12 mo) or future (30 days) visit within the authorizing provider's specialty     Patient has had an office visit with the authorizing provider or a provider within the authorizing providers department within the previous 12 mos or has a future within next 30 days. See \"Patient Info\" tab in inbasket, or \"Choose Columns\" in Meds & Orders section of the refill encounter.              Passed - Medication is active on med list        Passed - Patient is age 18 or older        Passed - No active pregancy on record        Passed - Normal serum creatinine on file in past 12 months     Recent Labs   Lab Test 11/30/21  0802   CR 0.73              Passed - Normal serum potassium on file in past 12 months     Recent Labs   Lab Test 11/30/21  0802   POTASSIUM 4.1                    Passed - Normal serum sodium on file in past 12 months     Recent Labs   Lab Test 11/30/21  0802                 Passed - No positive pregnancy test in past 12 months           Routing refill request to provider for review/approval because:  Drug not on the [facility] refill protocol     Leti Goetz RN  [facility]           "

## 2022-06-06 NOTE — TELEPHONE ENCOUNTER
"Requested Prescriptions   Pending Prescriptions Disp Refills     DULoxetine (CYMBALTA) 60 MG capsule [Pharmacy Med Name: DULOXETINE HCL 60MG CPEP] 30 capsule 5     Sig: TAKE ONE CAPSULE BY MOUTH ONCE DAILY       Serotonin-Norepinephrine Reuptake Inhibitors  Failed - 6/4/2022 10:09 PM        Failed - PHQ-9 score of less than 5 in past 6 months     Please review last PHQ-9 score.           Failed - Recent (6 mo) or future (30 days) visit within the authorizing provider's specialty     Patient had office visit in the last 6 months or has a visit in the next 30 days with authorizing provider or within the authorizing provider's specialty.  See \"Patient Info\" tab in inbasket, or \"Choose Columns\" in Meds & Orders section of the refill encounter.            Passed - Blood pressure under 140/90 in past 12 months     BP Readings from Last 3 Encounters:   11/12/21 137/86   08/13/21 126/82   11/11/20 125/81                 Passed - Medication is active on med list        Passed - Patient is age 18 or older        Passed - No active pregnancy on record        Passed - No positive pregnancy test in past 12 months         Signed Prescriptions Disp Refills    nortriptyline (PAMELOR) 10 MG capsule 270 capsule 0     Sig: TAKE THREE CAPSULES BY MOUTH AT BEDTIME *NEED RECHECK*       Tricyclic Agents ( Annual appt and no PHQ9) Passed - 6/4/2022 10:09 PM        Passed - Blood Pressure under 140/90 in past 12 mos     BP Readings from Last 3 Encounters:   11/12/21 137/86   08/13/21 126/82   11/11/20 125/81                 Passed - Recent (12 mo) or future (30 days) visit within authorizing provider's specialty     Patient has had an office visit with the authorizing provider or a provider within the authorizing providers department within the previous 12 mos or has a future within next 30 days. See \"Patient Info\" tab in inbasket, or \"Choose Columns\" in Meds & Orders section of the refill encounter.              Passed - Medication is " active on med list        Passed - Patient is age 18 or older        Passed - Patient is not pregnant        Passed - No positive pregnancy test on record in past 12 mos           Routing refill request to provider for review/approval because:  Has psych appt 6/23/22      Thanks,  BARBIE Landeros  [facility]

## 2022-06-13 ENCOUNTER — MYC REFILL (OUTPATIENT)
Dept: FAMILY MEDICINE | Facility: CLINIC | Age: 52
End: 2022-06-13
Payer: COMMERCIAL

## 2022-06-13 DIAGNOSIS — G89.4 CHRONIC PAIN SYNDROME: ICD-10-CM

## 2022-06-13 RX ORDER — OXYCODONE AND ACETAMINOPHEN 10; 325 MG/1; MG/1
1 TABLET ORAL EVERY 6 HOURS PRN
Qty: 90 TABLET | Refills: 0 | Status: SHIPPED | OUTPATIENT
Start: 2022-06-13 | End: 2022-07-06

## 2022-06-21 NOTE — TELEPHONE ENCOUNTER
Informed patient that script was placed at the first floor . JEFF Paul   No High Dose Vitamin A Pregnancy And Lactation Text: High dose vitamin A therapy is contraindicated during pregnancy and breast feeding.

## 2022-06-23 ENCOUNTER — VIRTUAL VISIT (OUTPATIENT)
Dept: PSYCHOLOGY | Facility: CLINIC | Age: 52
End: 2022-06-23
Payer: COMMERCIAL

## 2022-06-23 DIAGNOSIS — F41.1 GAD (GENERALIZED ANXIETY DISORDER): ICD-10-CM

## 2022-06-23 DIAGNOSIS — F33.1 MAJOR DEPRESSIVE DISORDER, RECURRENT EPISODE, MODERATE (H): Primary | ICD-10-CM

## 2022-06-23 PROCEDURE — 90834 PSYTX W PT 45 MINUTES: CPT | Mod: 95 | Performed by: SOCIAL WORKER

## 2022-06-23 ASSESSMENT — ANXIETY QUESTIONNAIRES
2. NOT BEING ABLE TO STOP OR CONTROL WORRYING: NOT AT ALL
7. FEELING AFRAID AS IF SOMETHING AWFUL MIGHT HAPPEN: NOT AT ALL
IF YOU CHECKED OFF ANY PROBLEMS ON THIS QUESTIONNAIRE, HOW DIFFICULT HAVE THESE PROBLEMS MADE IT FOR YOU TO DO YOUR WORK, TAKE CARE OF THINGS AT HOME, OR GET ALONG WITH OTHER PEOPLE: NOT DIFFICULT AT ALL
1. FEELING NERVOUS, ANXIOUS, OR ON EDGE: SEVERAL DAYS
6. BECOMING EASILY ANNOYED OR IRRITABLE: SEVERAL DAYS
GAD7 TOTAL SCORE: 2
3. WORRYING TOO MUCH ABOUT DIFFERENT THINGS: NOT AT ALL
GAD7 TOTAL SCORE: 2
5. BEING SO RESTLESS THAT IT IS HARD TO SIT STILL: NOT AT ALL

## 2022-06-23 ASSESSMENT — PATIENT HEALTH QUESTIONNAIRE - PHQ9
5. POOR APPETITE OR OVEREATING: NOT AT ALL
SUM OF ALL RESPONSES TO PHQ QUESTIONS 1-9: 2
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
## 2022-07-06 ENCOUNTER — MYC REFILL (OUTPATIENT)
Dept: FAMILY MEDICINE | Facility: CLINIC | Age: 52
End: 2022-07-06

## 2022-07-06 DIAGNOSIS — G89.4 CHRONIC PAIN SYNDROME: ICD-10-CM

## 2022-07-06 RX ORDER — OXYCODONE AND ACETAMINOPHEN 10; 325 MG/1; MG/1
1 TABLET ORAL EVERY 6 HOURS PRN
Qty: 90 TABLET | Refills: 0 | Status: SHIPPED | OUTPATIENT
Start: 2022-07-06 | End: 2022-07-27

## 2022-07-24 ENCOUNTER — MYC REFILL (OUTPATIENT)
Dept: FAMILY MEDICINE | Facility: CLINIC | Age: 52
End: 2022-07-24

## 2022-07-24 DIAGNOSIS — G89.4 CHRONIC PAIN SYNDROME: ICD-10-CM

## 2022-07-25 RX ORDER — OXYCODONE AND ACETAMINOPHEN 10; 325 MG/1; MG/1
1 TABLET ORAL EVERY 6 HOURS PRN
Qty: 90 TABLET | Refills: 0 | OUTPATIENT
Start: 2022-07-25

## 2022-07-27 ENCOUNTER — MYC REFILL (OUTPATIENT)
Dept: FAMILY MEDICINE | Facility: CLINIC | Age: 52
End: 2022-07-27

## 2022-07-27 ENCOUNTER — MYC MEDICAL ADVICE (OUTPATIENT)
Dept: FAMILY MEDICINE | Facility: CLINIC | Age: 52
End: 2022-07-27

## 2022-07-27 DIAGNOSIS — G89.4 CHRONIC PAIN SYNDROME: ICD-10-CM

## 2022-07-27 RX ORDER — OXYCODONE AND ACETAMINOPHEN 10; 325 MG/1; MG/1
1 TABLET ORAL EVERY 6 HOURS PRN
Qty: 90 TABLET | Refills: 0 | OUTPATIENT
Start: 2022-07-27

## 2022-07-27 RX ORDER — OXYCODONE AND ACETAMINOPHEN 10; 325 MG/1; MG/1
1 TABLET ORAL EVERY 6 HOURS PRN
Qty: 90 TABLET | Refills: 0 | Status: SHIPPED | OUTPATIENT
Start: 2022-07-27 | End: 2022-08-27

## 2022-07-27 NOTE — TELEPHONE ENCOUNTER
Please see pts mychart message below. Pt wants to know what her request for oxyCODONE-acetaminophen (PERCOCET)  MG per tablet being denied. Please advise.    Thuy YANES MA

## 2022-08-27 ENCOUNTER — MYC REFILL (OUTPATIENT)
Dept: FAMILY MEDICINE | Facility: CLINIC | Age: 52
End: 2022-08-27

## 2022-08-27 DIAGNOSIS — G89.4 CHRONIC PAIN SYNDROME: ICD-10-CM

## 2022-08-29 RX ORDER — OXYCODONE AND ACETAMINOPHEN 10; 325 MG/1; MG/1
1 TABLET ORAL EVERY 6 HOURS PRN
Qty: 90 TABLET | Refills: 0 | Status: SHIPPED | OUTPATIENT
Start: 2022-08-29 | End: 2022-09-25

## 2022-09-10 ENCOUNTER — HEALTH MAINTENANCE LETTER (OUTPATIENT)
Age: 52
End: 2022-09-10

## 2022-09-25 ENCOUNTER — MYC REFILL (OUTPATIENT)
Dept: FAMILY MEDICINE | Facility: CLINIC | Age: 52
End: 2022-09-25

## 2022-09-25 DIAGNOSIS — G89.4 CHRONIC PAIN SYNDROME: ICD-10-CM

## 2022-09-26 RX ORDER — OXYCODONE AND ACETAMINOPHEN 10; 325 MG/1; MG/1
1 TABLET ORAL EVERY 6 HOURS PRN
Qty: 90 TABLET | Refills: 0 | Status: SHIPPED | OUTPATIENT
Start: 2022-09-26 | End: 2022-10-25

## 2022-10-07 DIAGNOSIS — G89.29 CHRONIC BILATERAL BACK PAIN, UNSPECIFIED BACK LOCATION: Chronic | ICD-10-CM

## 2022-10-07 DIAGNOSIS — M54.9 CHRONIC BILATERAL BACK PAIN, UNSPECIFIED BACK LOCATION: Chronic | ICD-10-CM

## 2022-10-08 RX ORDER — METHOCARBAMOL 750 MG/1
TABLET, FILM COATED ORAL
Qty: 90 TABLET | Refills: 2 | Status: SHIPPED | OUTPATIENT
Start: 2022-10-08 | End: 2023-01-19

## 2022-10-25 ENCOUNTER — NURSE TRIAGE (OUTPATIENT)
Dept: FAMILY MEDICINE | Facility: CLINIC | Age: 52
End: 2022-10-25

## 2022-10-25 DIAGNOSIS — G89.4 CHRONIC PAIN SYNDROME: ICD-10-CM

## 2022-10-25 RX ORDER — OXYCODONE AND ACETAMINOPHEN 10; 325 MG/1; MG/1
1 TABLET ORAL EVERY 6 HOURS PRN
Qty: 90 TABLET | Refills: 0 | Status: SHIPPED | OUTPATIENT
Start: 2022-10-25 | End: 2022-11-20

## 2022-10-25 NOTE — TELEPHONE ENCOUNTER
Spoke with patient. Patient is concerned regarding symptoms of leg numbness and swelling.    Patient is most concerned with leg numbness. Her leg numbness is mostly occurring on her left leg. Her right leg is numb at baseline, and numbness has been developing for the past month in her left leg. The numbness has been keeping her in her recliner for the past week and a half.     The swelling in her legs normally occur occasionally, however has become a daily occurrence lately. Her left leg is swollen at baseline. Patient describes mild swelling in both legs. Patient has pain in lower back legs and radiates in both legs. Patient denies fever or any redness of her legs. Patient denies history of HF, Kidney disease, liver failure, or cancer. Patient denies chest pain or difficulty breathing.    Per protocol patient should be seen within 2 weeks in clinic. Assisted patient in scheduling appointment with provider on 10/31. Informed patient that she should immediately go to the ER if symptoms worsen or she develops increasing pain. Patient verbalized understanding and has no further questions at this time.    Caron Alonzo RN  [facility]    Reason for Disposition    Mild swelling of both ankles and chronic (unchanged)    Additional Information    Negative: Sounds like a life-threatening emergency to the triager    Negative: Chest pain    Negative: Small area of swelling and followed an insect bite to the area    Negative: Followed a knee injury    Negative: Ankle or foot injury    Negative: Pregnant with leg swelling or edema    Negative: Difficulty breathing at rest    Negative: Entire foot is cool or blue in comparison to other side    Negative: SEVERE swelling (e.g., swelling extends above knee, entire leg is swollen, weeping fluid)    Negative: Thigh or calf pain and only 1 side and present > 1 hour    Negative: Thigh, calf, or ankle swelling in only one leg    Negative: Thigh, calf, or ankle  swelling in both legs, but one side is definitely more swollen (Exception: longstanding difference between legs)    Negative: Cast on leg or ankle and has increasing pain    Negative: Can't walk or can barely stand (new-onset)    Negative: Fever and red area (or area very tender to touch)    Negative: Patient sounds very sick or weak to the triager    Negative: Swelling of face, arm or hands  (Exception: Slight puffiness of fingers during hot weather.)    Negative: Pregnant 20 or more weeks and sudden weight gain (i.e., > 2 lbs, 1 kg in one week)    Negative: MODERATE swelling of both ankles (e.g., swelling extends up to the knees) AND new-onset or worsening    Negative: Difficulty breathing with exertion AND worsening or new-onset    Negative: Looks like a boil, infected sore, deep ulcer, or other infected rash (spreading redness, pus)    Negative: Patient wants to be seen    Negative: MILD swelling of both ankles (i.e., pedal edema) AND new-onset or worsening    Negative: MILD swelling of both ankles (i.e., pedal edema) and caused by hot weather    Negative: Mild swelling of both ankles and varicose veins    Protocols used: LEG SWELLING AND EDEMA-A-OH

## 2022-10-31 ENCOUNTER — OFFICE VISIT (OUTPATIENT)
Dept: FAMILY MEDICINE | Facility: CLINIC | Age: 52
End: 2022-10-31
Payer: COMMERCIAL

## 2022-10-31 VITALS
TEMPERATURE: 97.9 F | WEIGHT: 232.2 LBS | HEIGHT: 63 IN | DIASTOLIC BLOOD PRESSURE: 70 MMHG | SYSTOLIC BLOOD PRESSURE: 114 MMHG | BODY MASS INDEX: 41.14 KG/M2 | HEART RATE: 87 BPM | OXYGEN SATURATION: 98 %

## 2022-10-31 DIAGNOSIS — E66.01 MORBID OBESITY WITH BODY MASS INDEX OF 40.0-49.9 (H): ICD-10-CM

## 2022-10-31 DIAGNOSIS — R20.2 PARESTHESIAS: ICD-10-CM

## 2022-10-31 DIAGNOSIS — G60.9 IDIOPATHIC PERIPHERAL NEUROPATHY: ICD-10-CM

## 2022-10-31 DIAGNOSIS — F33.1 MAJOR DEPRESSIVE DISORDER, RECURRENT EPISODE, MODERATE (H): ICD-10-CM

## 2022-10-31 DIAGNOSIS — M54.9 CHRONIC BILATERAL BACK PAIN, UNSPECIFIED BACK LOCATION: Primary | Chronic | ICD-10-CM

## 2022-10-31 DIAGNOSIS — Z79.899 ENCOUNTER FOR LONG-TERM (CURRENT) USE OF MEDICATIONS: ICD-10-CM

## 2022-10-31 DIAGNOSIS — Z12.31 VISIT FOR SCREENING MAMMOGRAM: ICD-10-CM

## 2022-10-31 DIAGNOSIS — G89.29 CHRONIC BILATERAL BACK PAIN, UNSPECIFIED BACK LOCATION: Primary | Chronic | ICD-10-CM

## 2022-10-31 PROCEDURE — 99214 OFFICE O/P EST MOD 30 MIN: CPT | Performed by: FAMILY MEDICINE

## 2022-10-31 ASSESSMENT — ANXIETY QUESTIONNAIRES
7. FEELING AFRAID AS IF SOMETHING AWFUL MIGHT HAPPEN: NOT AT ALL
8. IF YOU CHECKED OFF ANY PROBLEMS, HOW DIFFICULT HAVE THESE MADE IT FOR YOU TO DO YOUR WORK, TAKE CARE OF THINGS AT HOME, OR GET ALONG WITH OTHER PEOPLE?: SOMEWHAT DIFFICULT
1. FEELING NERVOUS, ANXIOUS, OR ON EDGE: SEVERAL DAYS
2. NOT BEING ABLE TO STOP OR CONTROL WORRYING: SEVERAL DAYS
GAD7 TOTAL SCORE: 7
3. WORRYING TOO MUCH ABOUT DIFFERENT THINGS: SEVERAL DAYS
4. TROUBLE RELAXING: MORE THAN HALF THE DAYS
6. BECOMING EASILY ANNOYED OR IRRITABLE: SEVERAL DAYS
IF YOU CHECKED OFF ANY PROBLEMS ON THIS QUESTIONNAIRE, HOW DIFFICULT HAVE THESE PROBLEMS MADE IT FOR YOU TO DO YOUR WORK, TAKE CARE OF THINGS AT HOME, OR GET ALONG WITH OTHER PEOPLE: SOMEWHAT DIFFICULT
5. BEING SO RESTLESS THAT IT IS HARD TO SIT STILL: SEVERAL DAYS
7. FEELING AFRAID AS IF SOMETHING AWFUL MIGHT HAPPEN: NOT AT ALL
GAD7 TOTAL SCORE: 7

## 2022-10-31 NOTE — PROGRESS NOTES
"  Assessment & Plan       ICD-10-CM    1. Chronic bilateral back pain, unspecified back location  M54.9 Physical Therapy Referral    G89.29 Adult Neurology  Referral      2. Paresthesias  R20.2 Physical Therapy Referral     Adult Neurology  Referral      3. Idiopathic peripheral neuropathy  G60.9       4. Morbid obesity with body mass index of 40.0-49.9 (H)  E66.01       5. Major depressive disorder, recurrent episode, moderate (H)  F33.1       6. Encounter for long-term (current) use of medications  Z79.899       7. Visit for screening mammogram  Z12.31 MA SCREENING DIGITAL BILAT - Future  (s+30)        We discussed options for further evaluation and treatment of her current symptoms above and we elected to refer her back to Dr. Hicks of neurology for his evaluation  In the meantime, I recommended physical therapy in addition to her chiropractic care    We will continue her same baseline meds for now  Plan a tentative recheck in a month or so with her annual physical and follow-up on her baseline health conditions     BMI:   Estimated body mass index is 41.14 kg/m  as calculated from the following:    Height as of this encounter: 1.6 m (5' 2.99\").    Weight as of this encounter: 105.3 kg (232 lb 3.2 oz).   Weight management plan: Discussed healthy diet and exercise guidelines      Return in about 1 month (around 11/30/2022) for Physical Exam, Lab Work, Routine Visit.    Srinivasa Hunter MD  [facility] SADIQ Love is a 52 year old accompanied by her self, presenting for the following health issues:  Leg Problem (Having swelling and numbness in legs  x 1 month)      History of Present Illness       Back Pain:  She presents for follow up of back pain. Patient's back pain is a chronic problem.  Location of back pain:  Right lower back, right upper back, left upper back, right shoulder, right buttock, left buttock, right hip and left hip  Description of back pain: " burning, cramping, dull ache, sharp, shooting and stabbing  Back pain spreads: right buttocks, left buttocks, right thigh, left thigh, right foot, left foot, right shoulder, left shoulder and right side of neck    Since patient first noticed back pain, pain is: rapidly worsening  Does back pain interfere with her job:  Not applicable      Reason for visit:  Increased pain,numbness,tingling,weakness,falls/stumbles    She eats 2-3 servings of fruits and vegetables daily.She consumes 0 sweetened beverage(s) daily.She exercises with enough effort to increase her heart rate 10 to 19 minutes per day.  She exercises with enough effort to increase her heart rate 3 or less days per week.   She is taking medications regularly.  Today's ROSE MARIE-7 Score: 7     She has had chronic pain for years along with ongoing numbness and tingling in her extremities, both upper and lower extremities.  She recently returned from a long trip to [place] (where she had traffic delays around [facility]) and then her left leg symptoms flared up.  She has had some falls and stumbles in the last week or 2.   She has had various MRI scans done of her neck and lower back in the past, as well as EMG studies done.  She has seen Dr. Hicks of neurology in the past for much of this work and he is now located in [place] with [facility].  She is interested in trying to reconnect with him there.  She lives up in [place].  She has been seeing a chiropractor on a weekly basis.  She remains on various baseline medications as below.  She has a physical scheduled with me in mid December.  She also notes she has been having a lot of challenges with her adopted grandson, Romina.  He has had some behavioral challenges.  He has been lying and stealing.  He is on ADD medication.  She thinks this has been adequately a lot of stress to her life.    Patient Active Problem List   Diagnosis     History of cleft palate with cleft lip     MAYA (obstructive sleep  "apnea)/Hypoventilation Syndrome     Major depressive disorder, recurrent episode, moderate (H)     Paresthesias     Health Care Home     Vitamin D deficiency     Morbid obesity with body mass index of 40.0-49.9 (H)     Hyperlipidemia with target LDL less than 130     Intervertebral disc prolapse with impingement     Nonallopathic lesion of lumbar region     Chronic pain syndrome     Restless legs syndrome (RLS)     Insomnia     Migraine     Chronic bilateral back pain, unspecified back location     Chronic pain of left knee     ROSE MARIE (generalized anxiety disorder)     Idiopathic peripheral neuropathy     Urinary tract obstruction due to kidney stone     SI (sacroiliac) joint dysfunction     Right hip pain     Nasal septal perforation     Acute right lumbar radiculopathy     Hypertrophy of both inferior nasal turbinates     Congenital nasal septum deviation     Cleft palate     Family history of colonic polyps     Impaired fasting glucose     Chronic, continuous use of opioids     Current Outpatient Medications   Medication     DULoxetine (CYMBALTA) 60 MG capsule     furosemide (LASIX) 20 MG tablet     ketoconazole (NIZORAL) 2 % external cream     methocarbamol (ROBAXIN) 750 MG tablet     Multiple Vitamins-Minerals (MULTI FOR HER PO)     nortriptyline (PAMELOR) 10 MG capsule     nystatin (NYAMYC) 692568 UNIT/GM external powder     oxyCODONE-acetaminophen (PERCOCET)  MG per tablet     SUMAtriptan (IMITREX) 100 MG tablet     acetaminophen (TYLENOL) 325 MG tablet     BANOPHEN 25 MG capsule     [facility] NASAL SPRAY 0.05 % nasal spray     ferrous sulfate (FEROSUL) 325 (65 Fe) MG tablet     ORDER FOR DME     rOPINIRole (REQUIP) 0.25 MG tablet     No current facility-administered medications for this visit.         Review of Systems   Mainly significant for the above.      Objective    /70   Pulse 87   Temp 97.9  F (36.6  C) (Oral)   Ht 1.6 m (5' 2.99\")   Wt 105.3 kg (232 lb 3.2 oz)   LMP  (LMP Unknown)   SpO2 " 98%   BMI 41.14 kg/m    Body mass index is 41.14 kg/m .  Physical Exam   GENERAL: alert, no distress and obese  NECK: Supple  MS: no gross musculoskeletal defects noted.  No significant pitting edema.  NEURO: She can move her arms and legs without difficulty.  No obvious focal deficits.    Past MRI results and other imaging studies were reviewed.

## 2022-11-07 ENCOUNTER — TRANSFERRED RECORDS (OUTPATIENT)
Dept: HEALTH INFORMATION MANAGEMENT | Facility: CLINIC | Age: 52
End: 2022-11-07

## 2022-11-08 ENCOUNTER — TELEPHONE (OUTPATIENT)
Dept: FAMILY MEDICINE | Facility: CLINIC | Age: 52
End: 2022-11-08

## 2022-11-08 NOTE — TELEPHONE ENCOUNTER
Reason for Call:  Form, our goal is to have forms completed with 72 hours, however, some forms may require a visit or additional information.    Type of letter, form or note:  medical    Who is the form from?: [facility]       Where did the form come from: form was faxed in    What clinic location was the form placed at?: [facility]    Where the form was placed: Given to physician    What number is listed as a contact on the form?:   [phone]  Fax [phone]           Call taken on 11/8/2022 at 10:27 AM by Angelica Giron

## 2022-11-09 ENCOUNTER — TELEPHONE (OUTPATIENT)
Dept: FAMILY MEDICINE | Facility: CLINIC | Age: 52
End: 2022-11-09

## 2022-11-09 NOTE — TELEPHONE ENCOUNTER
Patient will be in town tomorrow morning and would like to  a print out of her neurology referral     Please have it ready for her at the     Mary Cuevas RN  [facility]    
Printed and at the   
03-Aug-2021 15:24

## 2022-11-10 DIAGNOSIS — M79.89 LEG SWELLING: ICD-10-CM

## 2022-11-10 RX ORDER — FUROSEMIDE 20 MG
20 TABLET ORAL DAILY
Qty: 30 TABLET | Refills: 1 | Status: SHIPPED | OUTPATIENT
Start: 2022-11-10 | End: 2023-01-19

## 2022-11-15 ENCOUNTER — VIRTUAL VISIT (OUTPATIENT)
Dept: PSYCHOLOGY | Facility: CLINIC | Age: 52
End: 2022-11-15
Payer: COMMERCIAL

## 2022-11-15 DIAGNOSIS — F41.1 GAD (GENERALIZED ANXIETY DISORDER): ICD-10-CM

## 2022-11-15 DIAGNOSIS — F33.1 MAJOR DEPRESSIVE DISORDER, RECURRENT EPISODE, MODERATE (H): Primary | ICD-10-CM

## 2022-11-15 PROCEDURE — 90834 PSYTX W PT 45 MINUTES: CPT | Mod: 95 | Performed by: SOCIAL WORKER

## 2022-11-15 ASSESSMENT — PATIENT HEALTH QUESTIONNAIRE - PHQ9: SUM OF ALL RESPONSES TO PHQ QUESTIONS 1-9: 7
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
## 2022-11-17 DIAGNOSIS — M79.89 LEG SWELLING: ICD-10-CM

## 2022-11-17 RX ORDER — FUROSEMIDE 20 MG
20 TABLET ORAL DAILY
Qty: 30 TABLET | Refills: 1 | OUTPATIENT
Start: 2022-11-17

## 2022-11-17 NOTE — TELEPHONE ENCOUNTER
Refill request too soon. Refused.     Mervat Patrick, RN, BSN, PHN  [facility]: [place]

## 2022-11-20 ENCOUNTER — MYC REFILL (OUTPATIENT)
Dept: FAMILY MEDICINE | Facility: CLINIC | Age: 52
End: 2022-11-20

## 2022-11-20 DIAGNOSIS — G89.4 CHRONIC PAIN SYNDROME: ICD-10-CM

## 2022-11-21 NOTE — TELEPHONE ENCOUNTER
Controlled Substance Refill Request for oxyCODONE-acetaminophen (PERCOCET)  MG per tablet    Last refill: 10/25/22    Last clinic visit: 10/31/22     Clinic visit frequency required: Q 3 months  Next appt: 12/14/22    Controlled substance agreement on file: No.    Documentation in problem list reviewed:  No    Processing:  Rx to be electronically transmitted to pharmacy by provider    Tanya Contreras CMA

## 2022-11-22 RX ORDER — OXYCODONE AND ACETAMINOPHEN 10; 325 MG/1; MG/1
1 TABLET ORAL EVERY 6 HOURS PRN
Qty: 90 TABLET | Refills: 0 | Status: SHIPPED | OUTPATIENT
Start: 2022-11-22 | End: 2022-12-21

## 2022-12-06 DIAGNOSIS — M79.89 LEG SWELLING: ICD-10-CM

## 2022-12-07 ENCOUNTER — MYC MEDICAL ADVICE (OUTPATIENT)
Dept: FAMILY MEDICINE | Facility: CLINIC | Age: 52
End: 2022-12-07

## 2022-12-07 DIAGNOSIS — E78.5 HYPERLIPIDEMIA WITH TARGET LDL LESS THAN 130: Primary | ICD-10-CM

## 2022-12-07 DIAGNOSIS — F11.90 CHRONIC, CONTINUOUS USE OF OPIOIDS: ICD-10-CM

## 2022-12-07 DIAGNOSIS — R73.01 IMPAIRED FASTING GLUCOSE: ICD-10-CM

## 2022-12-07 RX ORDER — FUROSEMIDE 20 MG
20 TABLET ORAL DAILY
Qty: 30 TABLET | Refills: 1 | OUTPATIENT
Start: 2022-12-07

## 2022-12-07 NOTE — TELEPHONE ENCOUNTER
Refills remain on file. Refused.     Mevrat Patrick, RN, BSN, PHN  [facility]: [place]

## 2022-12-12 PROBLEM — Q35.9 CLEFT PALATE: Status: RESOLVED | Noted: 2020-06-24 | Resolved: 2022-12-12

## 2022-12-12 NOTE — TELEPHONE ENCOUNTER
Dr. Hunter,     Please see APJeTt. Pt has appt 12/14/23. Labs cued, please check.     Thanks,  BARBIE Landeros  [facility]

## 2022-12-13 DIAGNOSIS — F33.1 MAJOR DEPRESSIVE DISORDER, RECURRENT EPISODE, MODERATE (H): Chronic | ICD-10-CM

## 2022-12-13 DIAGNOSIS — G89.4 CHRONIC PAIN SYNDROME: Chronic | ICD-10-CM

## 2022-12-13 DIAGNOSIS — F41.1 GAD (GENERALIZED ANXIETY DISORDER): ICD-10-CM

## 2022-12-14 ENCOUNTER — VIRTUAL VISIT (OUTPATIENT)
Dept: PSYCHOLOGY | Facility: CLINIC | Age: 52
End: 2022-12-14
Payer: COMMERCIAL

## 2022-12-14 DIAGNOSIS — F33.1 MAJOR DEPRESSIVE DISORDER, RECURRENT EPISODE, MODERATE (H): Primary | ICD-10-CM

## 2022-12-14 DIAGNOSIS — F41.1 GAD (GENERALIZED ANXIETY DISORDER): ICD-10-CM

## 2022-12-14 PROCEDURE — 90834 PSYTX W PT 45 MINUTES: CPT | Mod: 95 | Performed by: SOCIAL WORKER

## 2022-12-14 RX ORDER — DULOXETIN HYDROCHLORIDE 60 MG/1
CAPSULE, DELAYED RELEASE ORAL
Qty: 30 CAPSULE | Refills: 5 | Status: SHIPPED | OUTPATIENT
Start: 2022-12-14 | End: 2023-01-19
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
## 2022-12-21 ENCOUNTER — MYC REFILL (OUTPATIENT)
Dept: FAMILY MEDICINE | Facility: CLINIC | Age: 52
End: 2022-12-21

## 2022-12-21 DIAGNOSIS — G89.4 CHRONIC PAIN SYNDROME: ICD-10-CM

## 2022-12-21 RX ORDER — OXYCODONE AND ACETAMINOPHEN 10; 325 MG/1; MG/1
1 TABLET ORAL EVERY 6 HOURS PRN
Qty: 90 TABLET | Refills: 0 | Status: SHIPPED | OUTPATIENT
Start: 2022-12-21 | End: 2023-01-19

## 2023-01-16 ENCOUNTER — VIRTUAL VISIT (OUTPATIENT)
Dept: PSYCHOLOGY | Facility: CLINIC | Age: 53
End: 2023-01-16
Payer: COMMERCIAL

## 2023-01-16 DIAGNOSIS — F41.1 GAD (GENERALIZED ANXIETY DISORDER): ICD-10-CM

## 2023-01-16 DIAGNOSIS — F33.1 MAJOR DEPRESSIVE DISORDER, RECURRENT EPISODE, MODERATE (H): Primary | ICD-10-CM

## 2023-01-16 PROCEDURE — 90834 PSYTX W PT 45 MINUTES: CPT | Mod: 93 | Performed by: SOCIAL WORKER
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
## 2023-01-19 ENCOUNTER — OFFICE VISIT (OUTPATIENT)
Dept: FAMILY MEDICINE | Facility: CLINIC | Age: 53
End: 2023-01-19
Payer: COMMERCIAL

## 2023-01-19 ENCOUNTER — ANCILLARY PROCEDURE (OUTPATIENT)
Dept: MAMMOGRAPHY | Facility: CLINIC | Age: 53
End: 2023-01-19
Attending: FAMILY MEDICINE
Payer: COMMERCIAL

## 2023-01-19 VITALS
HEIGHT: 63 IN | HEART RATE: 76 BPM | BODY MASS INDEX: 42.17 KG/M2 | DIASTOLIC BLOOD PRESSURE: 80 MMHG | WEIGHT: 238 LBS | OXYGEN SATURATION: 97 % | SYSTOLIC BLOOD PRESSURE: 123 MMHG | TEMPERATURE: 98.2 F

## 2023-01-19 DIAGNOSIS — Z79.899 ENCOUNTER FOR LONG-TERM (CURRENT) USE OF MEDICATIONS: ICD-10-CM

## 2023-01-19 DIAGNOSIS — G89.4 CHRONIC PAIN SYNDROME: Chronic | ICD-10-CM

## 2023-01-19 DIAGNOSIS — M79.89 LEG SWELLING: ICD-10-CM

## 2023-01-19 DIAGNOSIS — Z23 NEED FOR PROPHYLACTIC VACCINATION AND INOCULATION AGAINST INFLUENZA: ICD-10-CM

## 2023-01-19 DIAGNOSIS — G47.33 OSA (OBSTRUCTIVE SLEEP APNEA): Chronic | ICD-10-CM

## 2023-01-19 DIAGNOSIS — Z00.00 ROUTINE GENERAL MEDICAL EXAMINATION AT A HEALTH CARE FACILITY: Primary | ICD-10-CM

## 2023-01-19 DIAGNOSIS — G89.29 CHRONIC BILATERAL BACK PAIN, UNSPECIFIED BACK LOCATION: Chronic | ICD-10-CM

## 2023-01-19 DIAGNOSIS — E66.01 MORBID OBESITY WITH BODY MASS INDEX OF 40.0-49.9 (H): ICD-10-CM

## 2023-01-19 DIAGNOSIS — R20.2 PARESTHESIAS: ICD-10-CM

## 2023-01-19 DIAGNOSIS — F33.1 MAJOR DEPRESSIVE DISORDER, RECURRENT EPISODE, MODERATE (H): Chronic | ICD-10-CM

## 2023-01-19 DIAGNOSIS — F11.90 CHRONIC, CONTINUOUS USE OF OPIOIDS: ICD-10-CM

## 2023-01-19 DIAGNOSIS — Z12.31 VISIT FOR SCREENING MAMMOGRAM: ICD-10-CM

## 2023-01-19 DIAGNOSIS — Z83.719 FAMILY HISTORY OF COLONIC POLYPS: ICD-10-CM

## 2023-01-19 DIAGNOSIS — G60.9 IDIOPATHIC PERIPHERAL NEUROPATHY: ICD-10-CM

## 2023-01-19 DIAGNOSIS — F41.1 GAD (GENERALIZED ANXIETY DISORDER): ICD-10-CM

## 2023-01-19 DIAGNOSIS — R73.01 IMPAIRED FASTING GLUCOSE: ICD-10-CM

## 2023-01-19 DIAGNOSIS — M54.9 CHRONIC BILATERAL BACK PAIN, UNSPECIFIED BACK LOCATION: Chronic | ICD-10-CM

## 2023-01-19 DIAGNOSIS — B37.2 CANDIDAL INTERTRIGO: ICD-10-CM

## 2023-01-19 DIAGNOSIS — E78.5 HYPERLIPIDEMIA WITH TARGET LDL LESS THAN 130: ICD-10-CM

## 2023-01-19 LAB
AMPHETAMINES UR QL: NOT DETECTED
ANION GAP SERPL CALCULATED.3IONS-SCNC: 6 MMOL/L (ref 3–14)
BARBITURATES UR QL SCN: NOT DETECTED
BASOPHILS # BLD AUTO: 0.1 10E3/UL (ref 0–0.2)
BASOPHILS NFR BLD AUTO: 1 %
BENZODIAZ UR QL SCN: NOT DETECTED
BUN SERPL-MCNC: 11 MG/DL (ref 7–30)
BUPRENORPHINE UR QL: NOT DETECTED
CALCIUM SERPL-MCNC: 9.5 MG/DL (ref 8.5–10.1)
CANNABINOIDS UR QL: DETECTED
CHLORIDE BLD-SCNC: 107 MMOL/L (ref 94–109)
CHOLEST SERPL-MCNC: 230 MG/DL
CO2 SERPL-SCNC: 29 MMOL/L (ref 20–32)
COCAINE UR QL SCN: NOT DETECTED
CREAT SERPL-MCNC: 0.76 MG/DL (ref 0.52–1.04)
D-METHAMPHET UR QL: NOT DETECTED
EOSINOPHIL # BLD AUTO: 0.2 10E3/UL (ref 0–0.7)
EOSINOPHIL NFR BLD AUTO: 2 %
ERYTHROCYTE [DISTWIDTH] IN BLOOD BY AUTOMATED COUNT: 12.3 % (ref 10–15)
FASTING STATUS PATIENT QL REPORTED: ABNORMAL
GFR SERPL CREATININE-BSD FRML MDRD: >90 ML/MIN/1.73M2
GLUCOSE BLD-MCNC: 98 MG/DL (ref 70–99)
HBA1C MFR BLD: 5.4 % (ref 0–5.6)
HCT VFR BLD AUTO: 40.2 % (ref 35–47)
HDLC SERPL-MCNC: 39 MG/DL
HGB BLD-MCNC: 13.7 G/DL (ref 11.7–15.7)
LDLC SERPL CALC-MCNC: 123 MG/DL
LYMPHOCYTES # BLD AUTO: 3 10E3/UL (ref 0.8–5.3)
LYMPHOCYTES NFR BLD AUTO: 42 %
MCH RBC QN AUTO: 32.3 PG (ref 26.5–33)
MCHC RBC AUTO-ENTMCNC: 34.1 G/DL (ref 31.5–36.5)
MCV RBC AUTO: 95 FL (ref 78–100)
METHADONE UR QL SCN: NOT DETECTED
MONOCYTES # BLD AUTO: 0.5 10E3/UL (ref 0–1.3)
MONOCYTES NFR BLD AUTO: 7 %
NEUTROPHILS # BLD AUTO: 3.4 10E3/UL (ref 1.6–8.3)
NEUTROPHILS NFR BLD AUTO: 48 %
NONHDLC SERPL-MCNC: 191 MG/DL
OPIATES UR QL SCN: DETECTED
OXYCODONE UR QL SCN: DETECTED
PCP UR QL SCN: NOT DETECTED
PLATELET # BLD AUTO: 318 10E3/UL (ref 150–450)
POTASSIUM BLD-SCNC: 3.7 MMOL/L (ref 3.4–5.3)
PROPOXYPH UR QL: NOT DETECTED
RBC # BLD AUTO: 4.24 10E6/UL (ref 3.8–5.2)
SODIUM SERPL-SCNC: 142 MMOL/L (ref 133–144)
TRICYCLICS UR QL SCN: NOT DETECTED
TRIGL SERPL-MCNC: 341 MG/DL
WBC # BLD AUTO: 7.1 10E3/UL (ref 4–11)

## 2023-01-19 PROCEDURE — 80306 DRUG TEST PRSMV INSTRMNT: CPT | Performed by: FAMILY MEDICINE

## 2023-01-19 PROCEDURE — 77067 SCR MAMMO BI INCL CAD: CPT | Mod: TC | Performed by: RADIOLOGY

## 2023-01-19 PROCEDURE — 80048 BASIC METABOLIC PNL TOTAL CA: CPT | Performed by: FAMILY MEDICINE

## 2023-01-19 PROCEDURE — 99396 PREV VISIT EST AGE 40-64: CPT | Mod: 25 | Performed by: FAMILY MEDICINE

## 2023-01-19 PROCEDURE — 90682 RIV4 VACC RECOMBINANT DNA IM: CPT | Performed by: FAMILY MEDICINE

## 2023-01-19 PROCEDURE — 99214 OFFICE O/P EST MOD 30 MIN: CPT | Mod: 25 | Performed by: FAMILY MEDICINE

## 2023-01-19 PROCEDURE — 83036 HEMOGLOBIN GLYCOSYLATED A1C: CPT | Performed by: FAMILY MEDICINE

## 2023-01-19 PROCEDURE — 90471 IMMUNIZATION ADMIN: CPT | Performed by: FAMILY MEDICINE

## 2023-01-19 PROCEDURE — 36415 COLL VENOUS BLD VENIPUNCTURE: CPT | Performed by: FAMILY MEDICINE

## 2023-01-19 PROCEDURE — 85025 COMPLETE CBC W/AUTO DIFF WBC: CPT | Performed by: FAMILY MEDICINE

## 2023-01-19 PROCEDURE — 80061 LIPID PANEL: CPT | Performed by: FAMILY MEDICINE

## 2023-01-19 RX ORDER — NORTRIPTYLINE HCL 10 MG
CAPSULE ORAL
Qty: 270 CAPSULE | Refills: 3 | Status: SHIPPED | OUTPATIENT
Start: 2023-01-19 | End: 2024-02-09

## 2023-01-19 RX ORDER — FUROSEMIDE 20 MG
20 TABLET ORAL DAILY
Qty: 30 TABLET | Refills: 1 | Status: SHIPPED | OUTPATIENT
Start: 2023-01-19 | End: 2023-07-25

## 2023-01-19 RX ORDER — OXYCODONE AND ACETAMINOPHEN 10; 325 MG/1; MG/1
1 TABLET ORAL EVERY 6 HOURS PRN
Qty: 90 TABLET | Refills: 0 | Status: SHIPPED | OUTPATIENT
Start: 2023-01-19 | End: 2023-02-13

## 2023-01-19 RX ORDER — NYSTATIN 100000 [USP'U]/G
POWDER TOPICAL
Qty: 30 G | Refills: 1 | Status: SHIPPED | OUTPATIENT
Start: 2023-01-19 | End: 2024-04-12

## 2023-01-19 RX ORDER — METHOCARBAMOL 750 MG/1
TABLET, FILM COATED ORAL
Qty: 90 TABLET | Refills: 2 | Status: SHIPPED | OUTPATIENT
Start: 2023-01-19 | End: 2023-02-27

## 2023-01-19 RX ORDER — DULOXETIN HYDROCHLORIDE 60 MG/1
60 CAPSULE, DELAYED RELEASE ORAL DAILY
Qty: 30 CAPSULE | Refills: 5 | Status: SHIPPED | OUTPATIENT
Start: 2023-01-19 | End: 2023-06-20

## 2023-01-19 ASSESSMENT — ENCOUNTER SYMPTOMS
CHILLS: 0
NERVOUS/ANXIOUS: 1
FEVER: 0
SORE THROAT: 0
SHORTNESS OF BREATH: 0
MYALGIAS: 1
NAUSEA: 0
WEAKNESS: 1
DYSURIA: 0
JOINT SWELLING: 1
ARTHRALGIAS: 1
DIZZINESS: 0
PARESTHESIAS: 1
HEADACHES: 1
HEARTBURN: 0
FREQUENCY: 0
CONSTIPATION: 0
DIARRHEA: 0
EYE PAIN: 0
PALPITATIONS: 0
COUGH: 0
HEMATURIA: 0
BREAST MASS: 0
HEMATOCHEZIA: 0
ABDOMINAL PAIN: 0

## 2023-01-19 ASSESSMENT — ANXIETY QUESTIONNAIRES
4. TROUBLE RELAXING: SEVERAL DAYS
6. BECOMING EASILY ANNOYED OR IRRITABLE: MORE THAN HALF THE DAYS
7. FEELING AFRAID AS IF SOMETHING AWFUL MIGHT HAPPEN: SEVERAL DAYS
GAD7 TOTAL SCORE: 9
2. NOT BEING ABLE TO STOP OR CONTROL WORRYING: MORE THAN HALF THE DAYS
5. BEING SO RESTLESS THAT IT IS HARD TO SIT STILL: SEVERAL DAYS
1. FEELING NERVOUS, ANXIOUS, OR ON EDGE: SEVERAL DAYS
IF YOU CHECKED OFF ANY PROBLEMS ON THIS QUESTIONNAIRE, HOW DIFFICULT HAVE THESE PROBLEMS MADE IT FOR YOU TO DO YOUR WORK, TAKE CARE OF THINGS AT HOME, OR GET ALONG WITH OTHER PEOPLE: SOMEWHAT DIFFICULT
8. IF YOU CHECKED OFF ANY PROBLEMS, HOW DIFFICULT HAVE THESE MADE IT FOR YOU TO DO YOUR WORK, TAKE CARE OF THINGS AT HOME, OR GET ALONG WITH OTHER PEOPLE?: SOMEWHAT DIFFICULT
3. WORRYING TOO MUCH ABOUT DIFFERENT THINGS: SEVERAL DAYS
GAD7 TOTAL SCORE: 9
7. FEELING AFRAID AS IF SOMETHING AWFUL MIGHT HAPPEN: SEVERAL DAYS
GAD7 TOTAL SCORE: 9

## 2023-01-19 ASSESSMENT — PAIN SCALES - GENERAL: PAINLEVEL: SEVERE PAIN (7)

## 2023-01-19 ASSESSMENT — PATIENT HEALTH QUESTIONNAIRE - PHQ9
SUM OF ALL RESPONSES TO PHQ QUESTIONS 1-9: 11
10. IF YOU CHECKED OFF ANY PROBLEMS, HOW DIFFICULT HAVE THESE PROBLEMS MADE IT FOR YOU TO DO YOUR WORK, TAKE CARE OF THINGS AT HOME, OR GET ALONG WITH OTHER PEOPLE: VERY DIFFICULT
SUM OF ALL RESPONSES TO PHQ QUESTIONS 1-9: 11

## 2023-01-19 NOTE — LETTER
Opioid / Opioid Plus Controlled Substance Agreement    This is an agreement between you and your provider about the safe and appropriate use of controlled substance/opioids prescribed by your care team. Controlled substances are medicines that can cause physical and mental dependence (abuse).    There are strict laws about having and using these medicines. We here at [facility] are committing to working with you in your efforts to get better. To support you in this work, we ll help you schedule regular office appointments for medicine refills. If we must cancel or change your appointment for any reason, we ll make sure you have enough medicine to last until your next appointment.     As a Provider, I will:    Listen carefully to your concerns and treat you with respect.     Recommend a treatment plan that I believe is in your best interest. This plan may involve therapies other than opioid pain medication.     Talk with you often about the possible benefits, and the risk of harm of any medicine that we prescribe for you.     Provide a plan on how to taper (discontinue or go off) using this medicine if the decision is made to stop its use.    As a Patient, I understand that opioid(s):     Are a controlled substance prescribed by my care team to help me function or work and manage my condition(s).     Are strong medicines and can cause serious side effects such as:    Drowsiness, which can seriously affect my driving ability    A lower breathing rate, enough to cause death    Harm to my thinking ability     Depression     Abuse of and addiction to this medicine    Need to be taken exactly as prescribed. Combining opioids with certain medicines or chemicals (such as illegal drugs, sedatives, sleeping pills, and benzodiazepines) can be dangerous or even fatal. If I stop opioids suddenly, I may have severe withdrawal symptoms.    Do not work for all types of pain nor for all patients. If they re not helpful, I may  be asked to stop them.        The risks, benefits and side effects of these medicine(s) were explained to me. I agree that:  1. I will take part in other treatments as advised by my care team. This may be psychiatry or counseling, physical therapy, behavioral therapy, group treatment or a referral to a specialist.     2. I will keep all my appointments. I understand that this is part of the monitoring of opioids. My care team may require an office visit for EVERY opioid/controlled substance refill. If I miss appointments or don t follow instructions, my care team may stop my medicine.    3. I will take my medicines as prescribed. I will not change the dose or schedule unless my care team tells me to. There will be no refills if I run out early.     4. I may be asked to come to the clinic and complete a urine drug test or complete a pill count at any time. If I don t give a urine sample or participate in a pill count, the care team may stop my medicine.    5. I will only receive prescriptions from this clinic for chronic pain. If I am treated by another provider for acute pain issues, I will tell them that I am taking opioid pain medication for chronic pain and that I have a treatment agreement with this provider. I will inform my [facility] care team within one business day if I am given a prescription for any pain medication by another healthcare provider. My [facility] care team can contact other providers and pharmacists about my use of any medicines.    6. It is up to me to make sure that I don t run out of my medicines on weekends or holidays. If my care team is willing to refill my opioid prescription without a visit, I must request refills only during office hours. Refills may take up to 3 business days to process. I will use one pharmacy to fill all my opioid and other controlled substance prescriptions. I will notify the clinic about any changes to my insurance or medication  availability.    7. I am responsible for my prescriptions. If the medicine/prescription is lost, stolen or destroyed, it will not be replaced. I also agree not to share controlled substance medicines with anyone.    8. I am aware I should not use any illegal or recreational drugs. I agree not to drink alcohol unless my care team says I can.       9. If I enroll in the [place] [facility] program, I will tell my care team prior to my next refill.     10. I will tell my care team right away if I become pregnant, have a new medical problem treated outside of my regular clinic, or have a change in my medications.    11. I understand that this medicine can affect my thinking, judgment and reaction time. Alcohol and drugs affect the brain and body, which can affect the safety of my driving. Being under the influence of alcohol or drugs can affect my decision-making, behaviors, personal safety, and the safety of others. Driving while impaired (DWI) can occur if a person is driving, operating, or in physical control of a car, motorcycle, boat, snowmobile, ATV, motorbike, off-road vehicle, or any other motor vehicle ([place] Statute 169A.20). I understand the risk if I choose to drive or operate any vehicle or machinery.    I understand that if I do not follow any of the conditions above, my prescriptions or treatment may be stopped or changed.          Opioids  What You Need to Know    What are opioids?   Opioids are pain medicines that must be prescribed by a doctor. They are also known as narcotics.     Examples are:   1. morphine (MS Contin, Hue)  2. oxycodone (Oxycontin)  3. oxycodone and acetaminophen (Percocet)  4. hydrocodone and acetaminophen (Vicodin, Norco)   5. fentanyl patch (Duragesic)   6. hydromorphone (Dilaudid)   7. methadone  8. codeine (Tylenol #3)     What do opioids do well?   Opioids are best for severe short-term pain such as after a surgery or injury. They may work well for cancer pain. They may  help some people with long-lasting (chronic) pain.     What do opioids NOT do well?   Opioids never get rid of pain entirely, and they don t work well for most patients with chronic pain. Opioids don t reduce swelling, one of the causes of pain.                                    Other ways to manage chronic pain and improve function include:       Treat the health problem that may be causing pain    Anti-inflammation medicines, which reduce swelling and tenderness, such as ibuprofen (Advil, Motrin) or naproxen (Aleve)    Acetaminophen (Tylenol)    Antidepressants and anti-seizure medicines, especially for nerve pain    Topical treatments such as patches or creams    Injections or nerve blocks    Chiropractic or osteopathic treatment    Acupuncture, massage, deep breathing, meditation, visual imagery, aromatherapy    Use heat or ice at the pain site    Physical therapy     Exercise    Stop smoking    Take part in therapy       Risks and side effects     Talk to your doctor before you start or decide to keep taking opioids. Possible side effects include:      Lowering your breathing rate enough to cause death    Overdose, including death, especially if taking higher than prescribed doses    Worse depression symptoms; less pleasure in things you usually enjoy    Feeling tired or sluggish    Slower thoughts or cloudy thinking    Being more sensitive to pain over time; pain is harder to control    Trouble sleeping or restless sleep    Changes in hormone levels (for example, less testosterone)    Changes in sex drive or ability to have sex    Constipation    Unsafe driving    Itching and sweating    Dizziness    Nausea, throwing up and dry mouth    What else should I know about opioids?    Opioids may lead to dependence, tolerance, or addiction.      Dependence means that if you stop or reduce the medicine too quickly, you will have withdrawal symptoms. These include loose poop (diarrhea), jitters, flu-like symptoms,  nervousness and tremors. Dependence is not the same as addiction.                       Tolerance means needing higher doses over time to get the same effect. This may increase the chance of serious side effects.      Addiction is when people improperly use a substance that harms their body, their mind or their relations with others. Use of opiates can cause a relapse of addiction if you have a history of drug or alcohol abuse.      People who have used opioids for a long time may have a lower quality of life, worse depression, higher levels of pain and more visits to doctors.    You can overdose on opioids. Take these steps to lower your risk of overdose:    1. Recognize the signs:  Signs of overdose include decrease or loss of consciousness (blackout), slowed breathing, trouble waking up and blue lips. If someone is worried about overdose, they should call 911.    2. Talk to your doctor about Narcan (naloxone).   If you are at risk for overdose, you may be given a prescription for Narcan. This medicine very quickly reverses the effects of opioids.   If you overdose, a friend or family member can give you Narcan while waiting for the ambulance. They need to know the signs of overdose and how to give Narcan.     3. Don't use alcohol or street drugs.   Taking them with opioids can cause death.    4. Do not take any of these medicines unless your doctor says it s OK. Taking these with opioids can cause death:    Benzodiazepines, such as lorazepam (Ativan), alprazolam (Xanax) or diazepam (Valium)    Muscle relaxers, such as cyclobenzaprine (Flexeril)    Sleeping pills like zolpidem (Ambien)     Other opioids      How to keep you and other people safe while taking opioids:    1. Never share your opioids with others.  Opioid medicines are regulated by the Drug Enforcement Agency ([employer]). Selling or sharing medications is a criminal act.    2. Be sure to store opioids in a secure place, locked up if possible. Young children  can easily swallow them and overdose.    3. When you are traveling with your medicines, keep them in the original bottles. If you use a pill box, be sure you also carry a copy of your medicine list from your clinic or pharmacy.    4. Safe disposal of opioids    Most pharmacies have places to get rid of medicine, called disposal kiosks. Medicine disposal options are also available in every [facility]. Search your county and  medication disposal  to find more options. You can find more details at:  [email or web address]     I agree that my provider, clinic care team, and pharmacy may work with any city, state or federal law enforcement agency that investigates the misuse, sale, or other diversion of my controlled medicine. I will allow my provider to discuss my care with, or share a copy of, this agreement with any other treating provider, pharmacy or emergency room where I receive care.    I have read this agreement and have asked questions about anything I did not understand.    _______________________________________________________  Patient Signature - Velma Diaz _____________________                   Date     _______________________________________________________  Provider Signature - Srinivasa Hunter MD   _____________________                   Date     _______________________________________________________  Witness Signature (required if provider not present while patient signing)   _____________________                   Date

## 2023-01-19 NOTE — PROGRESS NOTES
SUBJECTIVE:   CC: Kate is an 52 year old who presents for a preventive health visit and follow-up on numerous baseline health conditions.     Patient has been advised of split billing requirements and indicates understanding: Yes  Healthy Habits:     Getting at least 3 servings of Calcium per day:  Yes    Bi-annual eye exam:  Yes    Dental care twice a year:  Yes    Sleep apnea or symptoms of sleep apnea:  None    Diet:  Regular (no restrictions)    Frequency of exercise:  2-3 days/week    Duration of exercise:  Less than 15 minutes    Taking medications regularly:  Yes    Barriers to taking medications:  None    Medication side effects:  None    PHQ-2 Total Score: 4    Additional concerns today:  Yes  Imm/Inj  Associated symptoms include arthralgias, congestion, headaches, joint swelling, myalgias and weakness. Pertinent negatives include no abdominal pain, chest pain, chills, coughing, fever, nausea, rash or sore throat.       Other concerns:     Noticed 2 days ago a spot on her back that is itchy.    She remains on various baseline medications as below.  She has chronic back pain and sees a chiropractor every week or 2.  She generally uses 3 oxycodone tablets per day to help control that.  She uses muscle relaxers occasionally.  She takes nortriptyline at night to help with sleep and chronic pain.  She does have lower extremity paresthesias and will be seeing Dr. Hicks of neurology for that in [place] next month.  She has seen him in the past for this as well.  She has been felt to have an idiopathic neuropathy.    She is dealing with some difficult family knows and that her mother was recently diagnosed with breast cancer and a grandchild just  in the last couple of weeks at 5 months of age of probable aspiration.  She therefore is scoring higher on her PHQ-9 now than what she typically would.  She takes duloxetine to help with depression and chronic pain.  She also has had telephone visits with her  therapist on a regular basis.    She does still have some leg swelling at times.  She uses Lasix for that.  She has not been using compression stockings, but would be willing to do so if she had a prescription for them.    Today's PHQ-2 Score:   PHQ-2 (  [employer]) 2023   Q1: Little interest or pleasure in doing things 2   Q2: Feeling down, depressed or hopeless 2   PHQ-2 Score 4   PHQ-2 Total Score (12-17 Years)- Positive if 3 or more points; Administer PHQ-A if positive -   Q1: Little interest or pleasure in doing things More than half the days   Q2: Feeling down, depressed or hopeless More than half the days   PHQ-2 Score 4           Social History     Tobacco Use     Smoking status: Former     Packs/day: 0.50     Years: 15.00     Pack years: 7.50     Types: Cigarettes     Quit date: 2015     Years since quittin.9     Smokeless tobacco: Never   Substance Use Topics     Alcohol use: No     Alcohol/week: 0.0 standard drinks     Comment: Quit in      If you drink alcohol do you typically have >3 drinks per day or >7 drinks per week? No    Alcohol Use 2023   Prescreen: >3 drinks/day or >7 drinks/week? Not Applicable   Prescreen: >3 drinks/day or >7 drinks/week? -       Reviewed orders with patient.  Reviewed health maintenance and updated orders accordingly - Yes  Patient Active Problem List   Diagnosis     History of cleft palate with cleft lip     MAYA (obstructive sleep apnea)/Hypoventilation Syndrome     Major depressive disorder, recurrent episode, moderate (H)     Paresthesias     Health Care Home     Vitamin D deficiency     Morbid obesity with body mass index of 40.0-49.9 (H)     Hyperlipidemia with target LDL less than 130     Intervertebral disc prolapse with impingement     Nonallopathic lesion of lumbar region     Chronic pain syndrome     Restless legs syndrome (RLS)     Insomnia     Migraine     Chronic bilateral back pain, unspecified back location     Chronic pain of  left knee     ROSE MARIE (generalized anxiety disorder)     Idiopathic peripheral neuropathy     Urinary tract obstruction due to kidney stone     SI (sacroiliac) joint dysfunction     Right hip pain     Nasal septal perforation     Acute right lumbar radiculopathy     Hypertrophy of both inferior nasal turbinates     Congenital nasal septum deviation     Family history of colonic polyps     Impaired fasting glucose     Chronic, continuous use of opioids     Past Surgical History:   Procedure Laterality Date     ANKLE SURGERY      Left for torn tendons & loose bone chips     ARTHROSCOPY KNEE  1986    Right knee     BACK SURGERY       Basal cell carcinoma  2011    Removal from the chest     BIOPSY       CARPAL TUNNEL RELEASE RT/LT  ~    Bilateral     COLONOSCOPY  2016     COSMETIC SURGERY       cysto with right ureteroscopy, stone manipulation, double J stent removal  10/04/2018    Dr. Cisneros -- removal of right kidney stone     cysto, right retrograde pyelogram, right ureteral stent Right 2018    for obstructing right kidney stone     DECOMPRESSION CUBITAL TUNNEL Left 2015    Procedure: DECOMPRESSION CUBITAL TUNNEL;  Surgeon: Gus Donato MD;  [facility]: [facility]     ENT SURGERY      Tonsillectomy and Adenoids     GYN SURGERY      Hysterectomy     HC REPAIR PERONEAL TENDONS       ORTHOPEDIC SURGERY  ,     Bilateral CTR     PE TUBES      yearly times 10 years     REPAIR CLEFT PALATE CHILD      Age 3 months & afterwards (multiple surgeries)     SOFT TISSUE SURGERY       [facility] VAGINAL HYSTERECTOMY         Social History     Tobacco Use     Smoking status: Former     Packs/day: 0.50     Years: 15.00     Pack years: 7.50     Types: Cigarettes     Quit date: 2015     Years since quittin.9     Smokeless tobacco: Never   Substance Use Topics     Alcohol use: No     Alcohol/week: 0.0 standard drinks     Comment: Quit in      Family History   Problem Relation  Age of Onset     Alzheimer Disease Paternal Grandmother 60     Cerebrovascular Disease Paternal Grandmother      Neurologic Disorder Sister 20        migraines     Depression/Anxiety Sister      Depression Sister      Neurologic Disorder Son 14        migraines     Asthma Son      Heart Disease Mother      Osteoporosis Mother      Obesity Mother      Cancer Father      Alcohol/Drug Father      Other Cancer Father         saliva glands & skin CA      Hypertension Father      Diabetes Maternal Grandmother      Breast Cancer Maternal Grandmother      Obesity Maternal Grandmother      Other Cancer Maternal Grandfather         skin cancer     Cancer - colorectal Other         Aunt     Breast Cancer Other      Asthma Daughter      Asthma Daughter      Asthma Son      Thyroid Disease Sister      Colon Cancer Other      Colorectal Cancer Other      Obesity Sister      Glaucoma No family hx of      Macular Degeneration No family hx of          Current Outpatient Medications   Medication Sig Dispense Refill     DULoxetine (CYMBALTA) 60 MG capsule Take 1 capsule (60 mg) by mouth daily 30 capsule 5     furosemide (LASIX) 20 MG tablet Take 1 tablet (20 mg) by mouth daily as needed for leg swelling 30 tablet 1     ketoconazole (NIZORAL) 2 % external cream Apply to rash in skin folds twice a day as needed 60 g 3     methocarbamol (ROBAXIN) 750 MG tablet TAKE 1 TABLETS (750 MG) BY MOUTH 3 TIMES DAILY AS NEEDED FOR MUSCLE SPASMS 90 tablet 2     Multiple Vitamins-Minerals (MULTI FOR HER PO) Take by mouth daily        nortriptyline (PAMELOR) 10 MG capsule TAKE THREE CAPSULES BY MOUTH AT BEDTIME 270 capsule 3     nystatin (NYAMYC) 154656 UNIT/GM external powder APPLY TO AFFECTED AREA(S) TWO TIMES A DAY AS NEEDED 30 g 1     oxyCODONE-acetaminophen (PERCOCET)  MG per tablet Take 1 tablet by mouth every 6 hours as needed for moderate to severe pain 90 tablet 0     acetaminophen (TYLENOL) 325 MG tablet Take 2 tablets (650 mg) by  mouth every 4 hours as needed for other (mild pain) 100 tablet 0     BANOPHEN 25 MG capsule TAKE 1 TABLET (25 MG) BY MOUTH NIGHTLY AS NEEDED TO HELP WITH SLEEP 30 capsule 0     [facility] NASAL SPRAY 0.05 % nasal spray PLEASE SEE ATTACHED FOR DETAILED DIRECTIONS       ferrous sulfate (FEROSUL) 325 (65 Fe) MG tablet TAKE 1 TABLET BY MOUTH EVERY DAY WITH BREAKFAST 30 tablet 2     ORDER FOR DME [employer]ics REMSTAR 60 Series Auto [id number] H2O, Airfit P10 nasal pillow mask w/xsmall pillows       SUMAtriptan (IMITREX) 100 MG tablet Take 1 tablet (100 mg) by mouth at onset of headache for migraine May repeat in 2 hours if needed: max 2/day (Patient not taking: Reported on 1/19/2023) 9 tablet 2     No Known Allergies    Breast Cancer Screening:    FHS-7:   Breast CA Risk Assessment (FHS-7) 11/12/2021 11/30/2021 11/30/2021 1/19/2023   Did any of your first-degree relatives have breast or ovarian cancer? No No No Yes   Did any of your relatives have bilateral breast cancer? No No No Unknown   Did any man in your family have breast cancer? No No No No   Did any woman in your family have breast and ovarian cancer? Yes No No No   Did any woman in your family have breast cancer before age 50 y? No No No No   Do you have 2 or more relatives with breast and/or ovarian cancer? Yes Yes Yes Yes   Do you have 2 or more relatives with breast and/or bowel cancer? Yes Yes Yes Yes       Mammogram Screening: Recommended annual mammography  Pertinent mammograms are reviewed under the imaging tab.    History of abnormal Pap smear: Status post benign hysterectomy. Health Maintenance and Surgical History updated.     Reviewed and updated as needed this visit by clinical staff    Allergies               Reviewed and updated as needed this visit by Provider                     Review of Systems   Constitutional: Negative for chills and fever.   HENT: Positive for congestion. Negative for ear pain, hearing loss and sore throat.    Eyes: Negative for pain  "and visual disturbance.   Respiratory: Negative for cough and shortness of breath.    Cardiovascular: Positive for peripheral edema. Negative for chest pain and palpitations.   Gastrointestinal: Negative for abdominal pain, constipation, diarrhea, heartburn, hematochezia and nausea.   Breasts:  Negative for tenderness, breast mass and discharge.   Genitourinary: Positive for pelvic pain. Negative for dysuria, frequency, genital sores, hematuria, urgency, vaginal bleeding and vaginal discharge.   Musculoskeletal: Positive for arthralgias, joint swelling and myalgias.   Skin: Negative for rash.   Neurological: Positive for weakness, headaches and paresthesias. Negative for dizziness.   Psychiatric/Behavioral: Negative for mood changes. The patient is nervous/anxious.           OBJECTIVE:   /80 (BP Location: Right arm, Patient Position: Sitting, Cuff Size: Adult Large)   Pulse 76   Temp 98.2  F (36.8  C) (Oral)   Ht 1.6 m (5' 3\")   Wt 108 kg (238 lb)   LMP  (LMP Unknown)   SpO2 97%   BMI 42.16 kg/m    Physical Exam  GENERAL: alert, no distress and obese  EYES: Eyes grossly normal to inspection, PERRL and conjunctivae and sclerae normal  HENT: Scarring from history of cleft/palate repair, otherwise unremarkable  NECK: no adenopathy, no asymmetry, masses, or scars and thyroid normal to palpation  RESP: lungs clear to auscultation - no rales, rhonchi or wheezes  CV: regular rate and rhythm, normal S1 S2, no S3 or S4, no murmur, click or rub, trace left lower extremity peripheral edema and peripheral pulses intact  ABDOMEN: soft, nontender, no hepatosplenomegaly, no masses   MS: no gross musculoskeletal defects noted  SKIN: Dry eczematous patch on the right upper back, some scattered tattoos, otherwise unremarkable  NEURO: Normal strength and tone, mentation intact and speech normal  PSYCH: mentation appears normal, affect normal/bright.  She scored an 11 today on her PHQ-9, mainly because of recent difficult " family news and events    Diagnostic Test Results:  Labs reviewed in Epic    ASSESSMENT/PLAN:       ICD-10-CM    1. Routine general medical examination at a health care facility  Z00.00       2. Chronic pain syndrome  G89.4 DULoxetine (CYMBALTA) 60 MG capsule     oxyCODONE-acetaminophen (PERCOCET)  MG per tablet      3. Major depressive disorder, recurrent episode, moderate (H)  F33.1 DULoxetine (CYMBALTA) 60 MG capsule      4. ROSE MARIE (generalized anxiety disorder)  F41.1 DULoxetine (CYMBALTA) 60 MG capsule      5. Leg swelling  M79.89 furosemide (LASIX) 20 MG tablet     Compression Sleeve/Stocking Order for DME - ONLY FOR DME      6. Chronic bilateral back pain, unspecified back location  M54.9 methocarbamol (ROBAXIN) 750 MG tablet    G89.29       7. Candidal intertrigo  B37.2 nystatin (NYAMYC) 760335 UNIT/GM external powder      8. Chronic, continuous use of opioids  F11.90 Urine Drugs of Abuse Screen      9. Impaired fasting glucose  R73.01 Hemoglobin A1c     Basic metabolic panel  (Ca, Cl, CO2, Creat, Gluc, K, Na, BUN)     CBC with platelets and differential      10. Hyperlipidemia with target LDL less than 130  E78.5 Lipid panel reflex to direct LDL Fasting      11. Morbid obesity with body mass index of 40.0-49.9 (H)  E66.01       12. Idiopathic peripheral neuropathy  G60.9 nortriptyline (PAMELOR) 10 MG capsule      13. Paresthesias  R20.2       14. Encounter for long-term (current) use of medications  Z79.899       15. Need for prophylactic vaccination and inoculation against influenza  Z23 INFLUENZA QUAD, RECOMBINANT, P-FREE (RIV4) (FLUBLOK)      16. MAYA (obstructive sleep apnea)/Hypoventilation Syndrome  G47.33       17. Family history of colonic polyps  Z83.71         Blood pressure and other vital signs look fine  We discussed the above items  We will check fasting labs today as above  Continue same baseline meds  She will get a mammogram done later this morning  Plan a repeat colonoscopy in 4 years  from now  Continue CPAP for MAYA  We will give her a flu shot today  She declines a COVID-vaccine booster  Follow through with neurology consultation next month as scheduled  Continue ongoing chiropractic care  Continue ongoing counseling/therapy for mental health  I gave her a prescription for compression stockings  We reviewed a chronic pain agreement and she signed off on that  Plan a tentative recheck in about 6 months      COUNSELING:  Reviewed preventive health counseling, as reflected in patient instructions       Regular exercise       Healthy diet/nutrition       Immunizations    Vaccinated for: Influenza              She reports that she quit smoking about 7 years ago. Her smoking use included cigarettes. She has a 7.50 pack-year smoking history. She has never used smokeless tobacco.      Srinivasa Hunter MD  [facility] [place]  Answers for HPI/ROS submitted by the patient on 1/19/2023  If you checked off any problems, how difficult have these problems made it for you to do your work, take care of things at home, or get along with other people?: Very difficult  PHQ9 TOTAL SCORE: 11  ROSE MARIE 7 TOTAL SCORE: 9

## 2023-01-19 NOTE — NURSING NOTE
Pain contract to be scanned into the patient chart given to Chiquis 2nd floor rep.  VENKATESH Durand MA

## 2023-01-20 NOTE — RESULT ENCOUNTER NOTE
Kate,  Your electrolytes, kidney tests, blood counts, and diabetes testing are within normal limits, but your lipid values are still elevated.  Continued efforts at healthy eating and regular exercise to achieve weight loss (as we discussed) would be appropriate treatment for this.    Srinivasa Hunter MD

## 2023-02-13 ENCOUNTER — VIRTUAL VISIT (OUTPATIENT)
Dept: PSYCHOLOGY | Facility: CLINIC | Age: 53
End: 2023-02-13
Payer: COMMERCIAL

## 2023-02-13 DIAGNOSIS — F33.1 MAJOR DEPRESSIVE DISORDER, RECURRENT EPISODE, MODERATE (H): Primary | ICD-10-CM

## 2023-02-13 DIAGNOSIS — F41.1 GAD (GENERALIZED ANXIETY DISORDER): ICD-10-CM

## 2023-02-13 PROCEDURE — 90834 PSYTX W PT 45 MINUTES: CPT | Mod: VID | Performed by: SOCIAL WORKER
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
## 2023-02-15 ENCOUNTER — TRANSFERRED RECORDS (OUTPATIENT)
Dept: FAMILY MEDICINE | Facility: CLINIC | Age: 53
End: 2023-02-15

## 2023-02-15 LAB — TSH SERPL-ACNC: 1.44 UIU/ML (ref 0.47–5)

## 2023-02-25 DIAGNOSIS — M54.9 CHRONIC BILATERAL BACK PAIN, UNSPECIFIED BACK LOCATION: Chronic | ICD-10-CM

## 2023-02-25 DIAGNOSIS — G89.29 CHRONIC BILATERAL BACK PAIN, UNSPECIFIED BACK LOCATION: Chronic | ICD-10-CM

## 2023-02-27 RX ORDER — METHOCARBAMOL 750 MG/1
TABLET, FILM COATED ORAL
Qty: 90 TABLET | Refills: 2 | Status: SHIPPED | OUTPATIENT
Start: 2023-02-27 | End: 2023-06-29

## 2023-03-13 ENCOUNTER — VIRTUAL VISIT (OUTPATIENT)
Dept: PSYCHOLOGY | Facility: CLINIC | Age: 53
End: 2023-03-13
Payer: COMMERCIAL

## 2023-03-13 ENCOUNTER — MYC REFILL (OUTPATIENT)
Dept: FAMILY MEDICINE | Facility: CLINIC | Age: 53
End: 2023-03-13
Payer: COMMERCIAL

## 2023-03-13 DIAGNOSIS — G89.4 CHRONIC PAIN SYNDROME: Chronic | ICD-10-CM

## 2023-03-13 DIAGNOSIS — F33.1 MAJOR DEPRESSIVE DISORDER, RECURRENT EPISODE, MODERATE (H): Primary | ICD-10-CM

## 2023-03-13 DIAGNOSIS — F41.1 GAD (GENERALIZED ANXIETY DISORDER): ICD-10-CM

## 2023-03-13 PROCEDURE — 90834 PSYTX W PT 45 MINUTES: CPT | Mod: VID | Performed by: SOCIAL WORKER

## 2023-03-13 ASSESSMENT — PATIENT HEALTH QUESTIONNAIRE - PHQ9
SUM OF ALL RESPONSES TO PHQ QUESTIONS 1-9: 6
10. IF YOU CHECKED OFF ANY PROBLEMS, HOW DIFFICULT HAVE THESE PROBLEMS MADE IT FOR YOU TO DO YOUR WORK, TAKE CARE OF THINGS AT HOME, OR GET ALONG WITH OTHER PEOPLE: NOT DIFFICULT AT ALL
5. POOR APPETITE OR OVEREATING: NOT AT ALL
SUM OF ALL RESPONSES TO PHQ QUESTIONS 1-9: 6

## 2023-03-13 ASSESSMENT — ANXIETY QUESTIONNAIRES
IF YOU CHECKED OFF ANY PROBLEMS ON THIS QUESTIONNAIRE, HOW DIFFICULT HAVE THESE PROBLEMS MADE IT FOR YOU TO DO YOUR WORK, TAKE CARE OF THINGS AT HOME, OR GET ALONG WITH OTHER PEOPLE: NOT DIFFICULT AT ALL
1. FEELING NERVOUS, ANXIOUS, OR ON EDGE: SEVERAL DAYS
2. NOT BEING ABLE TO STOP OR CONTROL WORRYING: SEVERAL DAYS
7. FEELING AFRAID AS IF SOMETHING AWFUL MIGHT HAPPEN: NOT AT ALL
GAD7 TOTAL SCORE: 2
5. BEING SO RESTLESS THAT IT IS HARD TO SIT STILL: NOT AT ALL
6. BECOMING EASILY ANNOYED OR IRRITABLE: NOT AT ALL
GAD7 TOTAL SCORE: 2
3. WORRYING TOO MUCH ABOUT DIFFERENT THINGS: NOT AT ALL
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
## 2023-03-14 RX ORDER — OXYCODONE AND ACETAMINOPHEN 10; 325 MG/1; MG/1
1 TABLET ORAL EVERY 6 HOURS PRN
Qty: 90 TABLET | Refills: 0 | Status: SHIPPED | OUTPATIENT
Start: 2023-03-14 | End: 2023-04-11

## 2023-04-11 ENCOUNTER — MYC REFILL (OUTPATIENT)
Dept: FAMILY MEDICINE | Facility: CLINIC | Age: 53
End: 2023-04-11
Payer: COMMERCIAL

## 2023-04-11 DIAGNOSIS — G89.4 CHRONIC PAIN SYNDROME: Chronic | ICD-10-CM

## 2023-04-11 RX ORDER — OXYCODONE AND ACETAMINOPHEN 10; 325 MG/1; MG/1
1 TABLET ORAL EVERY 6 HOURS PRN
Qty: 90 TABLET | Refills: 0 | Status: SHIPPED | OUTPATIENT
Start: 2023-04-11 | End: 2023-05-09

## 2023-04-11 NOTE — TELEPHONE ENCOUNTER
Patient is requesting a refill of   oxyCODONE-acetaminophen (PERCOCET)  MG per tablet   Ayden Richard MA

## 2023-04-19 ENCOUNTER — VIRTUAL VISIT (OUTPATIENT)
Dept: PSYCHOLOGY | Facility: CLINIC | Age: 53
End: 2023-04-19
Payer: COMMERCIAL

## 2023-04-19 DIAGNOSIS — F33.1 MAJOR DEPRESSIVE DISORDER, RECURRENT EPISODE, MODERATE (H): Primary | ICD-10-CM

## 2023-04-19 DIAGNOSIS — F41.1 GAD (GENERALIZED ANXIETY DISORDER): ICD-10-CM

## 2023-04-19 PROCEDURE — 90834 PSYTX W PT 45 MINUTES: CPT | Mod: VID | Performed by: SOCIAL WORKER

## 2023-04-19 ASSESSMENT — ANXIETY QUESTIONNAIRES
7. FEELING AFRAID AS IF SOMETHING AWFUL MIGHT HAPPEN: SEVERAL DAYS
8. IF YOU CHECKED OFF ANY PROBLEMS, HOW DIFFICULT HAVE THESE MADE IT FOR YOU TO DO YOUR WORK, TAKE CARE OF THINGS AT HOME, OR GET ALONG WITH OTHER PEOPLE?: NOT DIFFICULT AT ALL
2. NOT BEING ABLE TO STOP OR CONTROL WORRYING: SEVERAL DAYS
4. TROUBLE RELAXING: SEVERAL DAYS
GAD7 TOTAL SCORE: 7
7. FEELING AFRAID AS IF SOMETHING AWFUL MIGHT HAPPEN: SEVERAL DAYS
GAD7 TOTAL SCORE: 7
1. FEELING NERVOUS, ANXIOUS, OR ON EDGE: MORE THAN HALF THE DAYS
IF YOU CHECKED OFF ANY PROBLEMS ON THIS QUESTIONNAIRE, HOW DIFFICULT HAVE THESE PROBLEMS MADE IT FOR YOU TO DO YOUR WORK, TAKE CARE OF THINGS AT HOME, OR GET ALONG WITH OTHER PEOPLE: NOT DIFFICULT AT ALL
5. BEING SO RESTLESS THAT IT IS HARD TO SIT STILL: NOT AT ALL
6. BECOMING EASILY ANNOYED OR IRRITABLE: SEVERAL DAYS
GAD7 TOTAL SCORE: 7
3. WORRYING TOO MUCH ABOUT DIFFERENT THINGS: SEVERAL DAYS

## 2023-04-19 ASSESSMENT — PATIENT HEALTH QUESTIONNAIRE - PHQ9
SUM OF ALL RESPONSES TO PHQ QUESTIONS 1-9: 9
SUM OF ALL RESPONSES TO PHQ QUESTIONS 1-9: 9
10. IF YOU CHECKED OFF ANY PROBLEMS, HOW DIFFICULT HAVE THESE PROBLEMS MADE IT FOR YOU TO DO YOUR WORK, TAKE CARE OF THINGS AT HOME, OR GET ALONG WITH OTHER PEOPLE: SOMEWHAT DIFFICULT
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
## 2023-05-09 ENCOUNTER — MYC REFILL (OUTPATIENT)
Dept: FAMILY MEDICINE | Facility: CLINIC | Age: 53
End: 2023-05-09
Payer: COMMERCIAL

## 2023-05-09 DIAGNOSIS — G89.4 CHRONIC PAIN SYNDROME: Chronic | ICD-10-CM

## 2023-05-09 RX ORDER — OXYCODONE AND ACETAMINOPHEN 10; 325 MG/1; MG/1
1 TABLET ORAL EVERY 6 HOURS PRN
Qty: 90 TABLET | Refills: 0 | Status: SHIPPED | OUTPATIENT
Start: 2023-05-09 | End: 2023-06-05

## 2023-05-17 ENCOUNTER — VIRTUAL VISIT (OUTPATIENT)
Dept: PSYCHOLOGY | Facility: CLINIC | Age: 53
End: 2023-05-17
Payer: COMMERCIAL

## 2023-05-17 DIAGNOSIS — F33.1 MAJOR DEPRESSIVE DISORDER, RECURRENT EPISODE, MODERATE (H): Primary | ICD-10-CM

## 2023-05-17 DIAGNOSIS — F41.1 GAD (GENERALIZED ANXIETY DISORDER): ICD-10-CM

## 2023-05-17 PROCEDURE — 90834 PSYTX W PT 45 MINUTES: CPT | Mod: VID | Performed by: SOCIAL WORKER
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
## 2023-06-05 ENCOUNTER — MYC REFILL (OUTPATIENT)
Dept: FAMILY MEDICINE | Facility: CLINIC | Age: 53
End: 2023-06-05
Payer: COMMERCIAL

## 2023-06-05 DIAGNOSIS — G89.4 CHRONIC PAIN SYNDROME: Chronic | ICD-10-CM

## 2023-06-05 RX ORDER — OXYCODONE AND ACETAMINOPHEN 10; 325 MG/1; MG/1
1 TABLET ORAL EVERY 6 HOURS PRN
Qty: 90 TABLET | Refills: 0 | Status: SHIPPED | OUTPATIENT
Start: 2023-06-05 | End: 2023-07-04

## 2023-06-20 DIAGNOSIS — G89.4 CHRONIC PAIN SYNDROME: Chronic | ICD-10-CM

## 2023-06-20 DIAGNOSIS — F33.1 MAJOR DEPRESSIVE DISORDER, RECURRENT EPISODE, MODERATE (H): Chronic | ICD-10-CM

## 2023-06-20 DIAGNOSIS — F41.1 GAD (GENERALIZED ANXIETY DISORDER): ICD-10-CM

## 2023-06-20 RX ORDER — DULOXETIN HYDROCHLORIDE 60 MG/1
CAPSULE, DELAYED RELEASE ORAL
Qty: 30 CAPSULE | Refills: 5 | Status: SHIPPED | OUTPATIENT
Start: 2023-06-20 | End: 2023-12-27

## 2023-06-21 ENCOUNTER — VIRTUAL VISIT (OUTPATIENT)
Dept: PSYCHOLOGY | Facility: CLINIC | Age: 53
End: 2023-06-21
Payer: COMMERCIAL

## 2023-06-21 DIAGNOSIS — F41.1 GAD (GENERALIZED ANXIETY DISORDER): ICD-10-CM

## 2023-06-21 DIAGNOSIS — F33.1 MAJOR DEPRESSIVE DISORDER, RECURRENT EPISODE, MODERATE (H): Primary | ICD-10-CM

## 2023-06-21 PROCEDURE — 90834 PSYTX W PT 45 MINUTES: CPT | Mod: VID | Performed by: SOCIAL WORKER

## 2023-06-21 ASSESSMENT — ANXIETY QUESTIONNAIRES
GAD7 TOTAL SCORE: 2
1. FEELING NERVOUS, ANXIOUS, OR ON EDGE: SEVERAL DAYS
GAD7 TOTAL SCORE: 2
6. BECOMING EASILY ANNOYED OR IRRITABLE: SEVERAL DAYS
IF YOU CHECKED OFF ANY PROBLEMS ON THIS QUESTIONNAIRE, HOW DIFFICULT HAVE THESE PROBLEMS MADE IT FOR YOU TO DO YOUR WORK, TAKE CARE OF THINGS AT HOME, OR GET ALONG WITH OTHER PEOPLE: NOT DIFFICULT AT ALL
7. FEELING AFRAID AS IF SOMETHING AWFUL MIGHT HAPPEN: NOT AT ALL
3. WORRYING TOO MUCH ABOUT DIFFERENT THINGS: NOT AT ALL
2. NOT BEING ABLE TO STOP OR CONTROL WORRYING: NOT AT ALL
5. BEING SO RESTLESS THAT IT IS HARD TO SIT STILL: NOT AT ALL

## 2023-06-21 ASSESSMENT — PATIENT HEALTH QUESTIONNAIRE - PHQ9
SUM OF ALL RESPONSES TO PHQ QUESTIONS 1-9: 5
10. IF YOU CHECKED OFF ANY PROBLEMS, HOW DIFFICULT HAVE THESE PROBLEMS MADE IT FOR YOU TO DO YOUR WORK, TAKE CARE OF THINGS AT HOME, OR GET ALONG WITH OTHER PEOPLE: NOT DIFFICULT AT ALL
SUM OF ALL RESPONSES TO PHQ QUESTIONS 1-9: 5
5. POOR APPETITE OR OVEREATING: NOT AT ALL
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
## 2023-06-22 NOTE — TELEPHONE ENCOUNTER
Prior Authorization Retail Medication Request    Medication/Dose: Lyrica 225mg  ICD code (if different than what is on RX):  Chronic pain syndrome [G89.4]   Previously Tried and Failed:    Rationale:  PA is about to   PA started in Cover Mymeds: Key: [id number]       Insurance Name:  [employer]  Insurance ID:  [id number]      Pharmacy Information (if different than what is on RX)  Name:  [facility]  Phone:  [phone]   [phone]

## 2023-06-29 DIAGNOSIS — G89.29 CHRONIC BILATERAL BACK PAIN, UNSPECIFIED BACK LOCATION: Chronic | ICD-10-CM

## 2023-06-29 DIAGNOSIS — M54.9 CHRONIC BILATERAL BACK PAIN, UNSPECIFIED BACK LOCATION: Chronic | ICD-10-CM

## 2023-06-29 RX ORDER — METHOCARBAMOL 750 MG/1
TABLET, FILM COATED ORAL
Qty: 90 TABLET | Refills: 2 | Status: SHIPPED | OUTPATIENT
Start: 2023-06-29 | End: 2023-11-21

## 2023-07-04 ENCOUNTER — MYC REFILL (OUTPATIENT)
Dept: FAMILY MEDICINE | Facility: CLINIC | Age: 53
End: 2023-07-04
Payer: COMMERCIAL

## 2023-07-04 DIAGNOSIS — G89.4 CHRONIC PAIN SYNDROME: Chronic | ICD-10-CM

## 2023-07-05 RX ORDER — OXYCODONE AND ACETAMINOPHEN 10; 325 MG/1; MG/1
1 TABLET ORAL EVERY 6 HOURS PRN
Qty: 90 TABLET | Refills: 0 | Status: SHIPPED | OUTPATIENT
Start: 2023-07-05 | End: 2023-07-31

## 2023-07-25 ENCOUNTER — OFFICE VISIT (OUTPATIENT)
Dept: FAMILY MEDICINE | Facility: CLINIC | Age: 53
End: 2023-07-25
Payer: COMMERCIAL

## 2023-07-25 VITALS — DIASTOLIC BLOOD PRESSURE: 89 MMHG | SYSTOLIC BLOOD PRESSURE: 138 MMHG | HEART RATE: 72 BPM | OXYGEN SATURATION: 97 %

## 2023-07-25 DIAGNOSIS — M25.562 LEFT KNEE PAIN, UNSPECIFIED CHRONICITY: Primary | ICD-10-CM

## 2023-07-25 DIAGNOSIS — M79.89 LEG SWELLING: ICD-10-CM

## 2023-07-25 DIAGNOSIS — G60.9 IDIOPATHIC PERIPHERAL NEUROPATHY: ICD-10-CM

## 2023-07-25 LAB — D DIMER PPP FEU-MCNC: <0.27 UG/ML FEU (ref 0–0.5)

## 2023-07-25 PROCEDURE — 36415 COLL VENOUS BLD VENIPUNCTURE: CPT | Performed by: FAMILY MEDICINE

## 2023-07-25 PROCEDURE — 99214 OFFICE O/P EST MOD 30 MIN: CPT | Performed by: FAMILY MEDICINE

## 2023-07-25 PROCEDURE — 85379 FIBRIN DEGRADATION QUANT: CPT | Performed by: FAMILY MEDICINE

## 2023-07-25 RX ORDER — FUROSEMIDE 20 MG
20 TABLET ORAL DAILY
Qty: 30 TABLET | Refills: 1 | Status: SHIPPED | OUTPATIENT
Start: 2023-07-25 | End: 2023-11-06

## 2023-07-25 ASSESSMENT — ANXIETY QUESTIONNAIRES
1. FEELING NERVOUS, ANXIOUS, OR ON EDGE: SEVERAL DAYS
3. WORRYING TOO MUCH ABOUT DIFFERENT THINGS: NOT AT ALL
IF YOU CHECKED OFF ANY PROBLEMS ON THIS QUESTIONNAIRE, HOW DIFFICULT HAVE THESE PROBLEMS MADE IT FOR YOU TO DO YOUR WORK, TAKE CARE OF THINGS AT HOME, OR GET ALONG WITH OTHER PEOPLE: NOT DIFFICULT AT ALL
GAD7 TOTAL SCORE: 2
7. FEELING AFRAID AS IF SOMETHING AWFUL MIGHT HAPPEN: NOT AT ALL
5. BEING SO RESTLESS THAT IT IS HARD TO SIT STILL: NOT AT ALL
2. NOT BEING ABLE TO STOP OR CONTROL WORRYING: NOT AT ALL
6. BECOMING EASILY ANNOYED OR IRRITABLE: SEVERAL DAYS
GAD7 TOTAL SCORE: 2
4. TROUBLE RELAXING: NOT AT ALL

## 2023-07-25 NOTE — RESULT ENCOUNTER NOTE
Kate,  Your D-dimer test is normal/negative, so I do not think we need to do any ultrasound of your left leg.  It would be quite unlikely for you to have a blood clot there.    Srinivasa Hunter MD

## 2023-07-25 NOTE — PROGRESS NOTES
"  Assessment & Plan       ICD-10-CM    1. Left knee pain, unspecified chronicity  M25.562 MR Knee Left w/o Contrast      2. Leg swelling  M79.89 furosemide (LASIX) 20 MG tablet     D dimer, quantitative      3. Idiopathic peripheral neuropathy  G60.9         She may have a left medial collateral ligament tear or perhaps medial meniscal pathology to go along with her tricompartmental arthritis  We discussed options for further evaluation and treatment of her left knee pain and elected to have her undergo an MRI for further evaluation  She can continue with furosemide as needed  I will check a D-dimer to try to help rule out a DVT of her left lower extremity given her ongoing swelling there  If it is positive, then we will do an ultrasound as well    We will determine a further treatment plan based on above results     BMI:   Estimated body mass index is 42.16 kg/m  as calculated from the following:    Height as of 1/19/23: 1.6 m (5' 3\").    Weight as of 1/19/23: 108 kg (238 lb).   Weight management plan: Discussed healthy diet and exercise guidelines        Srinivasa Hunter MD  [facility] SADIQ Love is a 52 year old, presenting for the following health issues:  Pain (Left knee pain wit swelling x 3 to many more months, worsened 1 month ago)        7/25/2023     8:34 AM   Additional Questions   Roomed by cam   Accompanied by none         7/25/2023     8:34 AM   Patient Reported Additional Medications   Patient reports taking the following new medications none     History of Present Illness       Reason for visit:  Left knee pain & weakness  Symptom onset:  More than a month  Symptoms include:  Pain, weakness, feelings of the knee giving out, swelling  Symptom intensity:  Moderate  Symptom progression:  Staying the same  Had these symptoms before:  Yes  Has tried/received treatment for these symptoms:  No  What makes it worse:  Walking, weight bearing, bending too far  What makes it " better:  Staying off it, ice    She eats 2-3 servings of fruits and vegetables daily.She consumes 0 sweetened beverage(s) daily.She exercises with enough effort to increase her heart rate 10 to 19 minutes per day.  She exercises with enough effort to increase her heart rate 3 or less days per week.   She is taking medications regularly.     She has had chronic left knee pain for a number of years.  She tends to have chronic left leg swelling as well.  A couple of months ago she stepped in a small hole and further injured her left knee.  She went to the ER.  X-rays were taken which showed tricompartmental arthritis, but no apparent fracture.  They decided not to do an ultrasound to look for a DVT of the left leg because she has had left leg swelling somewhat chronically.  She has been using her furosemide on a regular basis lately to try to help with the swelling, but the swelling persists.  She is also having increased left knee pain since her injury.  It is hard for her to bend and straighten her knee.  It feels like its not supporting her very well.  She is seeing neurology for her lower extremity neuropathy.  She also sees a chiropractor on a regular basis for back pain.  He has also been trying to help her with her leg pain.      Review of Systems   Noncontributory except as above      Objective    /89 (BP Location: Left arm, Patient Position: Sitting, Cuff Size: Adult Large)   Pulse 72   LMP  (LMP Unknown)   SpO2 97%   There is no height or weight on file to calculate BMI.  Physical Exam   GENERAL: alert, no distress, and obese  MS: She appears to have mild to moderate swelling of the left knee.  She lacks about 10 degrees of extension or so and can only flex her left knee to about 90 degrees.  She has discomfort to palpation over the medial aspect of the left knee and has discomfort there with varus and valgus stressing of the knee.    Her ER note from May was reviewed when she injured her left  knee

## 2023-07-26 ENCOUNTER — VIRTUAL VISIT (OUTPATIENT)
Dept: PSYCHOLOGY | Facility: CLINIC | Age: 53
End: 2023-07-26
Payer: COMMERCIAL

## 2023-07-26 DIAGNOSIS — F41.1 GAD (GENERALIZED ANXIETY DISORDER): ICD-10-CM

## 2023-07-26 DIAGNOSIS — F33.1 MAJOR DEPRESSIVE DISORDER, RECURRENT EPISODE, MODERATE (H): Primary | ICD-10-CM

## 2023-07-26 PROCEDURE — 90834 PSYTX W PT 45 MINUTES: CPT | Mod: VID | Performed by: SOCIAL WORKER

## 2023-07-26 ASSESSMENT — PATIENT HEALTH QUESTIONNAIRE - PHQ9
SUM OF ALL RESPONSES TO PHQ QUESTIONS 1-9: 5
10. IF YOU CHECKED OFF ANY PROBLEMS, HOW DIFFICULT HAVE THESE PROBLEMS MADE IT FOR YOU TO DO YOUR WORK, TAKE CARE OF THINGS AT HOME, OR GET ALONG WITH OTHER PEOPLE: SOMEWHAT DIFFICULT
SUM OF ALL RESPONSES TO PHQ QUESTIONS 1-9: 5
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
## 2023-07-31 ENCOUNTER — MYC REFILL (OUTPATIENT)
Dept: FAMILY MEDICINE | Facility: CLINIC | Age: 53
End: 2023-07-31
Payer: COMMERCIAL

## 2023-07-31 DIAGNOSIS — G89.4 CHRONIC PAIN SYNDROME: Chronic | ICD-10-CM

## 2023-07-31 RX ORDER — OXYCODONE AND ACETAMINOPHEN 10; 325 MG/1; MG/1
1 TABLET ORAL EVERY 6 HOURS PRN
Qty: 90 TABLET | Refills: 0 | Status: SHIPPED | OUTPATIENT
Start: 2023-07-31 | End: 2023-08-28

## 2023-08-08 ENCOUNTER — MYC MEDICAL ADVICE (OUTPATIENT)
Dept: FAMILY MEDICINE | Facility: CLINIC | Age: 53
End: 2023-08-08
Payer: COMMERCIAL

## 2023-08-08 DIAGNOSIS — M25.562 LEFT KNEE PAIN, UNSPECIFIED CHRONICITY: Primary | ICD-10-CM

## 2023-08-09 ENCOUNTER — TELEPHONE (OUTPATIENT)
Dept: FAMILY MEDICINE | Facility: CLINIC | Age: 53
End: 2023-08-09
Payer: COMMERCIAL

## 2023-08-09 NOTE — TELEPHONE ENCOUNTER
Patient Quality Outreach    Patient is due for the following:   Diabetes -  Eye Exam    Next Steps:   See below    Type of outreach:    Phone, spoke to patient/parent. Discussed HM due, had eye exam done at [facility] for vision, LAURENCE Hung on 07/18/23    Next Steps:  Reach out within 90 days via  done .    Max number of attempts reached: Yes. Will try again in 90 days if patient still on fail list.    Questions for provider review:    None           Brenda Durand, [facility]  Chart routed to routing to  to satisfy the HM for the eye exam.

## 2023-08-13 NOTE — TELEPHONE ENCOUNTER
Patient was last seen by alternate provider 8/12/19.    Med not used for depression but she does have depression on problem list.    PHQ-9 score:    PHQ-9 SCORE 8/26/2019   PHQ-9 Total Score -   PHQ-9 Total Score MyChart -   PHQ-9 Total Score 8         Medication is being filled for 1 time refill only due to:  Patient needs to be seen because has physical scheduled for 10/31/19. elevated PHQ9.     Routed to provider, Rx is going to print, not e prescribe for an unknown reason.   Provider to address, print, sign, and have faxed.    Judi Yeh RN  [facility]                     > 10% in 6 months

## 2023-08-23 ENCOUNTER — VIRTUAL VISIT (OUTPATIENT)
Dept: PSYCHOLOGY | Facility: CLINIC | Age: 53
End: 2023-08-23
Payer: COMMERCIAL

## 2023-08-23 DIAGNOSIS — F33.1 MAJOR DEPRESSIVE DISORDER, RECURRENT EPISODE, MODERATE (H): Primary | ICD-10-CM

## 2023-08-23 DIAGNOSIS — F41.1 GAD (GENERALIZED ANXIETY DISORDER): ICD-10-CM

## 2023-08-23 PROCEDURE — 90834 PSYTX W PT 45 MINUTES: CPT | Mod: VID | Performed by: SOCIAL WORKER
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
## 2023-08-25 NOTE — PROGRESS NOTES
Velma Diaz  :  1970  DOS: 2023  MRN: [id number]  PCP: Srinivasa Hunter    Sports Medicine Clinic Visit      HPI  Velma Diaz is a 52 year old female who is seen as a self referral presenting with left knee pain.    - Mechanism of Injury:  Chronic knee pain for years with chronic left leg swelling.  - Prior evaluation:  23 ED visit. Slipped walking earlier that day, twisting her left knee, causing left medial knee pain.  Radiographs did not show fracture but did show an effusion.  She felt pain and stiffness with flexion/extension and instability.  Recommended Tylenol, ice, elevation. Xrays taken. Chronic leg edema, on furosemide, D-dimer negative. MRI on file.  - MRI L knee 2023 shows an extruded medial meniscus body with medial tibial plateau edema and diffuse high-grade partial-thickness cartilage loss in the medial compartment, also significant osteoarthritis in the lateral and patellofemoral compartments.  There is a large knee joint effusion with synovial proliferation.  - XR L knee 2023 shows moderate to advanced joint space narrowing in the medial compartment with advanced osteophytosis of the patellofemoral compartment and a knee joint effusion.  No acute fracture or dislocation.  There is a loose body present in the posterior knee.  Possible cortical irregularity in the posterior tibia on the lateral view.    - Pain Character:  Pain has been present for  over a year with extreme pain since the knee twisting in May 2023 .  Pain is well localized to the left medial joint line without significant radiation.   - Endorses: Chronic left knee swelling, feeling of instability where knee feels like it is going into valgus motion (happens when walking and going down stairs), Numbness into the entire left foot occasionally.  - Denies:  clicking, popping, grinding, radicular shooting pain.   - Alleviating factors:  rest, activity modifications  - Aggravating factors:   walking,  going down stairs, sit to stand transition  - Treatments tried:  Tylenol, ice, elevate, rest    - Patient Goals:  discuss treatment options  - Social History:  currently not working. Does  on occasion    - Pertinent PMH:   Chronic pain syndrome, cLBP with radiculopathy, RLS, possible PPN by clinical diagnosis, but there was a normal EMG on 03/2023.      Review of Systems  Musculoskeletal: as above  Remainder of review of systems is negative including constitutional, CV, pulmonary, GI, Skin and Neurologic except as noted in HPI or medical history.    Past Medical History:   Diagnosis Date    Chronic pain syndrome 11/20/2015    She takes up to 90 oxycodone tablets per month for her chronic pain     Chronic, continuous use of opioids 5/10/2022    Depressive disorder 01/01/2000    Family history of colonic polyps     in mother -- pt should have colonoscopy every 5 years    History of cleft palate with cleft lip     cleft lip and palate, and has had 27 operations per the patient    Hyperlipidemia with target LDL less than 130 10/26/2015    Impaired fasting glucose 11/30/2021    Morbid obesity with BMI of 40.0-44.9, adult (H) 10/26/2015    Neuropathy     MAYA (obstructive sleep apnea)/Hypoventilation Syndrome 02/24/2014    Study Date: 2/13/2014- (245.1 lbs) Sleep Associated Hypoventilation  suggested with baseline PCO2 42 mmHg, maximum PCO2 55 mmHg. Lowest oxygen saturation 85.0% Apnea/Hypopnea Index 5.5 events per hour.  REM AHI 37.2.  The supine AHI is 13.8. RDI 6.7 Sleep study 3/2014 (245#)- CPAP 6 cm effective, Transcutaneous CO2 Monitoring (TCM) within normal limits.         Skin cancer of anterior chest 01/01/2011    Basal cell on chest    TMJ (temporomandibular joint disorder)      Past Surgical History:   Procedure Laterality Date    ANKLE SURGERY  7/13    Left for torn tendons & loose bone chips    ARTHROSCOPY KNEE  1986    Right knee    BACK SURGERY      Basal cell carcinoma  2011    Removal from the chest     BIOPSY      CARPAL TUNNEL RELEASE RT/LT  ~2010    Bilateral    COLONOSCOPY  2016    COSMETIC SURGERY      cysto with right ureteroscopy, stone manipulation, double J stent removal  10/04/2018    Dr. Cisneros -- removal of right kidney stone    cysto, right retrograde pyelogram, right ureteral stent Right 09/2018    for obstructing right kidney stone    DECOMPRESSION CUBITAL TUNNEL Left 8/28/2015    Procedure: DECOMPRESSION CUBITAL TUNNEL;  Surgeon: Gus Donato MD;  [facility]: [facility]    ENT SURGERY  1975    Tonsillectomy and Adenoids    GYN SURGERY  2011    Hysterectomy    HC REPAIR PERONEAL TENDONS  7/13    ORTHOPEDIC SURGERY  2011, 2011    Bilateral CTR    PE TUBES      yearly times 10 years    REPAIR CLEFT PALATE CHILD      Age 3 months & afterwards (multiple surgeries)    SOFT TISSUE SURGERY  2013    ZZC VAGINAL HYSTERECTOMY  2011     Family History   Problem Relation Age of Onset    Alzheimer Disease Paternal Grandmother 60    Cerebrovascular Disease Paternal Grandmother     Neurologic Disorder Sister 20        migraines    Depression/Anxiety Sister     Depression Sister     Neurologic Disorder Son 14        migraines    Asthma Son     Heart Disease Mother     Osteoporosis Mother     Obesity Mother     Cancer Father     Alcohol/Drug Father     Other Cancer Father         saliva glands & skin CA     Hypertension Father     Diabetes Maternal Grandmother     Breast Cancer Maternal Grandmother     Obesity Maternal Grandmother     Other Cancer Maternal Grandfather         skin cancer    Cancer - colorectal Other         Aunt    Breast Cancer Other     Asthma Daughter     Asthma Daughter     Asthma Son     Thyroid Disease Sister     Colon Cancer Other     Colorectal Cancer Other     Obesity Sister     Glaucoma No family hx of     Macular Degeneration No family hx of          Objective  /76   Wt 108 kg (238 lb)   LMP  (LMP Unknown)   BMI 42.16 kg/m      General: healthy, alert and in no acute  distress.    HEENT: no scleral icterus or conjunctival erythema.   Skin: no suspicious lesions or rash. No jaundice.   CV: regular rhythm by palpation, 2+ distal pulses.  Resp: normal respiratory effort without conversational dyspnea.   Psych: normal mood and affect.    Gait: nonantalgic, appropriate coordination and balance.     Neuro:        - Sensation to light touch:    - Intact throughout the BLE including all peripheral nerve distributions.        - MSR:      RLE  LLE  - Patella 2+ 2+  - Achilles 2+ 2+       - Special tests:   - Slump/SLR:  Neg    MSK - Knee:       - Inspection:    - Small knee joint effusion present without surrounding erythema, warmth, ecchymosis, lesion.        - ROM:    - Limited in knee flexion/extension by anteromedial knee pain.       - Palpation:    - TTP at the medial joint line, MCL, lateral joint line, peripatellar borders.   - NTTP elsewhere.        - Strength:  (*antalgic)  RLE LLE  - Hip Flexion  5 5   - Hip Extension 5 5   - Hip Abduction 5 5   - Hip Adduction 5 5  - Knee Flexion  5 5  - Knee Extension 5 5  - Dorsiflexion  5 5  - Plantarflexion 5 5  - Ext. Gokul. Longus 5 5  - Inversion  5 5  - Eversion  5 5       - Special tests:        - Lachman:  Neg         - A/P drawer:  Neg        - Pivot shift:  Neg    - Sumi: Positive for medial compartment click and pain     - Varus stress:  Neg for laxity or pain     - Valgus stress: Positive for pain, no laxity   - Patellar grind: Positive   - Thessaly: Positive for medial compartment click and pain      Radiology  I independently reviewed the available relevant imaging, with the following interpretation:   -XR and MR with interpretation as above in HPI.    EXAM:  XR KNEE LT 2 VIEWS    INDICATION:  Knee pain,Injury    COMPARISON:  No recent relevant comparison peer    FINDINGS:  Advanced degenerative changes are seen in the tricompartmental spaces. Evident  narrowing of the medial joint space is of uncertain significance  on  nonweightbearing x-rays. There is a left knee suprapatellar joint effusion.  Evident cortical irregularity overlying the posterior tibia on the cross-table  lateral view only.    IMPRESSION:  1. No definite fracture; however, recommend additional oblique views of the  knee for further characterization.  2. Advanced tricompartmental degenerative change with medial joint space height  loss of uncertain significance on nonweightbearing views.  3. Suprapatellar knee joint effusion.         MRI KNEE LT WITHOUT CONTRAST     INDICATION:   left knee pain and swelling for months, r/o internal derangement. unspecified   chronicity o/a MR knee left w/o contrast.     TECHNIQUE:   Multiplanar, multisequence magnetic resonance imaging performed without IV   contrast.     COMPARISON:   Left knee radiographs on 05/26/2023     FINDINGS:       Osseous structures:     Alignment is anatomic. No acute fracture. No osseous contusion.Background   marrow signal is within normal limits. No suspicious osseous lesions.         Joint space:     Large joint effusion. There is synovial proliferation. This there is no Baker's   cyst.         Medial Compartment:     Medial meniscus: Mild extrusion of the meniscal body beyond the joint line   which may be related to decreased hoop strength.  Edema along the posterior   aspect of the medial tibial plateau which may be related to instability.     Cartilage:Diffuse high-grade partial thickness cartilage loss.  Cartilage loss   is most significant towards the joint line.  Marrow edema towards the joint   line is nonspecific and may be related to foci of full-thickness chondrosis   however may be secondary to underlying instability.  There are small marginal   osteophytes.         Lateral compartment:     Lateral meniscus: Normal in morphology and signal.     Cartilage:Background heterogeneity and fraying.  Focus of high-grade   partial-thickness cartilage fissuring along the central weight-bearing  aspect   of the lateral femoral condyle.  There are small marginal osteophytes.         Cruciate ligaments:     Anterior cruciate ligament (ACL): Intact.     Posterior cruciate ligament (PCL): Intact.         Collateral Ligaments and Posterolateral Corner:     Medial collateral ligament (MCL): Intact. Bowing of the MCL secondary to the   medial meniscal.     Lateral collateral ligament (LCL) complex: Intact.     Posterolateral corner structures: Intact.         Patellofemoral Compartment:     Cartilage: Diffuse high-grade partial thickness cartilage loss.  Multiple foci   of high-grade partial-thickness cartilage fissuring across the patellofemoral   compartment.  There are small marginal osteophytes.     Patellofemoral tracking: No edema within the superolateral aspect of Hoffa's   fat pad.No lateral patellar tilt.No lateral patellar translation.No patella   quita.Normal patellar and trochlea morphology.Normal tibial-tubercle groove   (TT-TG) distance of 1 cm.         Extensor Mechanism:     Distal quadriceps tendon: No tendinosis or tear.     Patellar tendon: No tendinosis or tear.     Medial patellofemoral ligament (MPFL) and retinaculum: Intact         Soft tissues:     No abnormal soft tissue mass.  Unremarkable appearance of the neurovascular   bundle.     IMPRESSION:   1. Mild extrusion of the meniscal body beyond the joint line without a discrete   tear identified and may be related to decreased hoop strength.   2. Subchondral marrow edema along the medial tibial plateau towards the joint   line and posterior aspect of the medial tibial plateau which is nonspecific and   may be related to chondrosis however may be secondary to instability.   3. Tricompartmental osteoarthritis, with chondrosis most significant within the   medial tibiofemoral compartment.   4. Large knee joint effusion with synovial proliferation.         Assessment  1. Traumatic tear of medial meniscus of knee, left, initial encounter    2.  Primary osteoarthritis of left knee        Plan  Velma Diaz is a 52 year old female that presents with chronic left knee pain.  Known primary osteoarthritis of the knee, worsening pain since an injury several months ago and now experiencing severe anteromedial knee pain with valgus instability.  MRI confirmed an extrusion of medial meniscal body without a discrete tear identified with associated subchondral edema and a large knee joint effusion, and this is in the setting of tricompartmental osteoarthritis with significant medial compartment cartilage damage.     Her most pressing concern is severe anteromedial knee pain with instability of the knee, as she is frequently feeling like she is going to buckle into valgus and does not feel stable on the knee with walking.  Has not been using any knee braces due to poor fit and discomfort with wearing.  We discussed nonoperative and operative considerations for treatment of her pain and instability.  She is currently not experiencing locking symptoms, but does experience mechanical symptoms with severe pain with weightbearing and TTP at the medial joint line and severe pain with valgus stress at the knee.  Primary concern is for this extruded medial meniscus being unstable, which may come from mechanical integrity concern of the meniscus itself even though a discrete tear was not identified on MRI.  She is interested in stabilizing the knee with external knee bracing today and performing an ultrasound-guided aspiration and steroid injection of the knee at our next availability.  She would also like to discuss surgical options for meniscal repair versus arthroplasty as needed.  She is aware that a steroid injection would delay surgery.      Placed an orthopedic  referral today for Dr. Woodson to discuss surgical options, and we will continue nonoperative care in the meantime until she is able to discuss these with him.  Attempted a trial of bracing today,  incorrect sizing available in clinic, would prefer to not fit with an immobilizer at this time as she is able to bear weight.  Branding and sizing information was given to patient today and instructed to look online for a proper size of the suggested hinged knee stability brace.  Also discussed an  brace and our  bracing program, which she may be interested in if she is not able to find the correct size brace through outside source.  We will get her in as soon as possible for the procedure, 9/6/2023.    Discussed the nature of the condition and treatment options and mutually agreed upon the following plan:    - Imaging:          - Reviewed relevant imaging in the chart.  - Reviewed results and images with patient.   - Medications:          - Discussed pharmacologic options for pain relief.   - May use NSAIDs (Ibuprofen, Naproxen) or Acetaminophen (Tylenol) as needed for pain control.   - May also use topical medications such as lidocaine, IcyHot, BioFreeze, or Voltaren gel as needed for pain control.   - Injections:          - Discussed possible injection options and alternatives.    -Recommend an intra-articular knee joint aspiration and corticosteroid injection, which will be done under ultrasound guidance on 9/6/2023.  - Therapy:          - Discussed the benefits of physical therapy vs home exercise program for optimization of range of motion, flexibility, strength, stability and function.   - We will formalize a therapy plan after the injection.  - Modalities:          - May use ice, heat, massage or other modalities as needed.   - Bracing:          - Discussed bracing options and recommend using a hinged knee stability brace.  Sizing recommendations as above.  - Surgery:          - Discussed non-operative and operative treatment options for the patient's condition.   - After discussion, preference was to discuss surgical options further with a surgeon.   - An Orthopedic  referral was  placed today for Dr. Woodson.  - Patient instructed to call the orthopedic  team at [phone] if not contacted within 1-2 days to schedule appointment.   - Activity:     - Encouraged to rest and protect the injured area from further injury.  Avoid exacerbating activities and activities that are high-risk for falls.   - Follow up:          -On 9/6/2023 for ultrasound-guided aspiration and corticosteroid injection of the left knee joint.   - Patient has clinic contact information for questions or concerns.       Pito Basurto DO, MIRI  [facility] Medicine  [facility] - Department of Orthopedic Surgery       Disclaimer:  This note was prepared and written using Dragon Medical dictation software. As a result, there may be errors in the script that have gone undetected. Please consider this when interpreting the information in this note.

## 2023-08-28 ENCOUNTER — MYC REFILL (OUTPATIENT)
Dept: FAMILY MEDICINE | Facility: CLINIC | Age: 53
End: 2023-08-28
Payer: COMMERCIAL

## 2023-08-28 DIAGNOSIS — G89.4 CHRONIC PAIN SYNDROME: Chronic | ICD-10-CM

## 2023-08-29 ENCOUNTER — OFFICE VISIT (OUTPATIENT)
Dept: ORTHOPEDICS | Facility: OTHER | Age: 53
End: 2023-08-29
Payer: COMMERCIAL

## 2023-08-29 VITALS — SYSTOLIC BLOOD PRESSURE: 110 MMHG | WEIGHT: 238 LBS | DIASTOLIC BLOOD PRESSURE: 76 MMHG | BODY MASS INDEX: 42.16 KG/M2

## 2023-08-29 DIAGNOSIS — S83.242A TRAUMATIC TEAR OF MEDIAL MENISCUS OF KNEE, LEFT, INITIAL ENCOUNTER: Primary | ICD-10-CM

## 2023-08-29 DIAGNOSIS — M17.12 PRIMARY OSTEOARTHRITIS OF LEFT KNEE: ICD-10-CM

## 2023-08-29 PROCEDURE — 99204 OFFICE O/P NEW MOD 45 MIN: CPT | Performed by: STUDENT IN AN ORGANIZED HEALTH CARE EDUCATION/TRAINING PROGRAM

## 2023-08-29 RX ORDER — OXYCODONE AND ACETAMINOPHEN 10; 325 MG/1; MG/1
1 TABLET ORAL EVERY 6 HOURS PRN
Qty: 90 TABLET | Refills: 0 | Status: SHIPPED | OUTPATIENT
Start: 2023-08-29 | End: 2023-09-21

## 2023-08-29 ASSESSMENT — PAIN SCALES - GENERAL: PAINLEVEL: MODERATE PAIN (5)

## 2023-08-29 NOTE — PATIENT INSTRUCTIONS
Fransisca Velma Diaz ,     A copy of your assessment and our treatment plan that we discussed together is included below, as written in your medical chart.   If you have any questions, please feel free to call the clinic.     --------------------------------------------------  Velma Diaz is a 52 year old female that presents with chronic left knee pain.  Known primary osteoarthritis of the knee, worsening pain since an injury several months ago and now experiencing severe anteromedial knee pain with valgus instability.  MRI confirmed an extrusion of medial meniscal body without a discrete tear identified with associated subchondral edema and a large knee joint effusion, and this is in the setting of tricompartmental osteoarthritis with significant medial compartment cartilage damage.     Her most pressing concern is severe anteromedial knee pain with instability of the knee, as she is frequently feeling like she is going to buckle into valgus and does not feel stable on the knee with walking.  Has not been using any knee braces due to poor fit and discomfort with wearing.  We discussed nonoperative and operative considerations for treatment of her pain and instability.  She is currently not experiencing locking symptoms, but does experience mechanical symptoms with severe pain with weightbearing and TTP at the medial joint line and severe pain with valgus stress at the knee.  Primary concern is for this extruded medial meniscus being unstable, which may come from mechanical integrity concern of the meniscus itself even though a discrete tear was not identified on MRI.  She is interested in stabilizing the knee with external knee bracing today and performing an ultrasound-guided aspiration and steroid injection of the knee at our next availability.  She would also like to discuss surgical options for meniscal repair versus arthroplasty as needed.  She is aware that a steroid injection would delay surgery.       Placed an orthopedic  referral today for Dr. Woodson to discuss surgical options, and we will continue nonoperative care in the meantime until she is able to discuss these with him.  Attempted a trial of bracing today, incorrect sizing available in clinic, would prefer to not fit with an immobilizer at this time as she is able to bear weight.  Branding and sizing information was given to patient today and instructed to look online for a proper size of the suggested hinged knee stability brace.  Also discussed an  brace and our  bracing program, which she may be interested in if she is not able to find the correct size brace through outside source.  We will get her in as soon as possible for the procedure, 9/6/2023.    Discussed the nature of the condition and treatment options and mutually agreed upon the following plan:    - Imaging:          - Reviewed relevant imaging in the chart.  - Reviewed results and images with patient.   - Medications:          - Discussed pharmacologic options for pain relief.   - May use NSAIDs (Ibuprofen, Naproxen) or Acetaminophen (Tylenol) as needed for pain control.   - May also use topical medications such as lidocaine, IcyHot, BioFreeze, or Voltaren gel as needed for pain control.   - Injections:          - Discussed possible injection options and alternatives.    -Recommend an intra-articular knee joint aspiration and corticosteroid injection, which will be done under ultrasound guidance on 9/6/2023.  - Therapy:          - Discussed the benefits of physical therapy vs home exercise program for optimization of range of motion, flexibility, strength, stability and function.   - We will formalize a therapy plan after the injection.  - Modalities:          - May use ice, heat, massage or other modalities as needed.   - Bracing:          - Discussed bracing options and recommend using a hinged knee stability brace.  Sizing recommendations as above.  -  Surgery:          - Discussed non-operative and operative treatment options for the patient's condition.   - After discussion, preference was to discuss surgical options further with a surgeon.   - An Orthopedic  referral was placed today for Dr. Woodson.  - Patient instructed to call the orthopedic  team at [phone] if not contacted within 1-2 days to schedule appointment.   - Activity:     - Encouraged to rest and protect the injured area from further injury.  Avoid exacerbating activities and activities that are high-risk for falls.   - Follow up:          -On 9/6/2023 for ultrasound-guided aspiration and corticosteroid injection of the left knee joint.   - Patient has clinic contact information for questions or concerns.   --------------------------------------------------    It was a pleasure seeing you today. Thank you for choosing [facility] for your care.       Pito Basurto DO, CAQSM  [facility] - Sports Medicine  [facility] - Department of Orthopedic Surgery     Disclaimer:  This note was prepared and written using Dragon Medical dictation software. As a result, there may be errors in the script that have gone undetected. Please consider this when interpreting the information in this note.

## 2023-08-29 NOTE — LETTER
2023         RE: Velma Diaz  [address]  [place] [address]        Dear Colleague,    Thank you for referring your patient, Velma Diaz, to the [facility] [place]. Please see a copy of my visit note below.    Velma Diaz  :  1970  DOS: 2023  MRN: [id number]  PCP: Srinivasa Hunter    Sports Medicine Clinic Visit      HPI  Velma Diaz is a 52 year old female who is seen as a self referral presenting with left knee pain.    - Mechanism of Injury:  Chronic knee pain for years with chronic left leg swelling.  - Prior evaluation:  23 ED visit. Slipped walking earlier that day, twisting her left knee, causing left medial knee pain.  Radiographs did not show fracture but did show an effusion.  She felt pain and stiffness with flexion/extension and instability.  Recommended Tylenol, ice, elevation. Xrays taken. Chronic leg edema, on furosemide, D-dimer negative. MRI on file.  - MRI L knee 2023 shows an extruded medial meniscus body with medial tibial plateau edema and diffuse high-grade partial-thickness cartilage loss in the medial compartment, also significant osteoarthritis in the lateral and patellofemoral compartments.  There is a large knee joint effusion with synovial proliferation.  - XR L knee 2023 shows moderate to advanced joint space narrowing in the medial compartment with advanced osteophytosis of the patellofemoral compartment and a knee joint effusion.  No acute fracture or dislocation.  There is a loose body present in the posterior knee.  Possible cortical irregularity in the posterior tibia on the lateral view.    - Pain Character:  Pain has been present for  over a year with extreme pain since the knee twisting in May 2023 .  Pain is well localized to the left medial joint line without significant radiation.   - Endorses: Chronic left knee swelling, feeling of instability where knee feels like it is going into valgus  motion (happens when walking and going down stairs), Numbness into the entire left foot occasionally.  - Denies:  clicking, popping, grinding, radicular shooting pain.   - Alleviating factors:  rest, activity modifications  - Aggravating factors:   walking, going down stairs, sit to stand transition  - Treatments tried:  Tylenol, ice, elevate, rest    - Patient Goals:  discuss treatment options  - Social History:  currently not working. Does  on occasion    - Pertinent PMH:   Chronic pain syndrome, cLBP with radiculopathy, RLS, possible PPN by clinical diagnosis, but there was a normal EMG on 03/2023.      Review of Systems  Musculoskeletal: as above  Remainder of review of systems is negative including constitutional, CV, pulmonary, GI, Skin and Neurologic except as noted in HPI or medical history.    Past Medical History:   Diagnosis Date     Chronic pain syndrome 11/20/2015    She takes up to 90 oxycodone tablets per month for her chronic pain      Chronic, continuous use of opioids 5/10/2022     Depressive disorder 01/01/2000     Family history of colonic polyps     in mother -- pt should have colonoscopy every 5 years     History of cleft palate with cleft lip     cleft lip and palate, and has had 27 operations per the patient     Hyperlipidemia with target LDL less than 130 10/26/2015     Impaired fasting glucose 11/30/2021     Morbid obesity with BMI of 40.0-44.9, adult (H) 10/26/2015     Neuropathy      MAYA (obstructive sleep apnea)/Hypoventilation Syndrome 02/24/2014    Study Date: 2/13/2014- (245.1 lbs) Sleep Associated Hypoventilation  suggested with baseline PCO2 42 mmHg, maximum PCO2 55 mmHg. Lowest oxygen saturation 85.0% Apnea/Hypopnea Index 5.5 events per hour.  REM AHI 37.2.  The supine AHI is 13.8. RDI 6.7 Sleep study 3/2014 (245#)- CPAP 6 cm effective, Transcutaneous CO2 Monitoring (TCM) within normal limits.          Skin cancer of anterior chest 01/01/2011    Basal cell on chest     TMJ  (temporomandibular joint disorder)      Past Surgical History:   Procedure Laterality Date     ANKLE SURGERY  7/13    Left for torn tendons & loose bone chips     ARTHROSCOPY KNEE  1986    Right knee     BACK SURGERY       Basal cell carcinoma  2011    Removal from the chest     BIOPSY       CARPAL TUNNEL RELEASE RT/LT  ~2010    Bilateral     COLONOSCOPY  2016     COSMETIC SURGERY       cysto with right ureteroscopy, stone manipulation, double J stent removal  10/04/2018    Dr. Cisneros -- removal of right kidney stone     cysto, right retrograde pyelogram, right ureteral stent Right 09/2018    for obstructing right kidney stone     DECOMPRESSION CUBITAL TUNNEL Left 8/28/2015    Procedure: DECOMPRESSION CUBITAL TUNNEL;  Surgeon: Gus Donato MD;  [facility]     ENT SURGERY  1975    Tonsillectomy and Adenoids     GYN SURGERY  2011    Hysterectomy     HC REPAIR PERONEAL TENDONS  7/13     ORTHOPEDIC SURGERY  2011, 2011    Bilateral CTR     PE TUBES      yearly times 10 years     REPAIR CLEFT PALATE CHILD      Age 3 months & afterwards (multiple surgeries)     SOFT TISSUE SURGERY  2013     ZZC VAGINAL HYSTERECTOMY  2011     Family History   Problem Relation Age of Onset     Alzheimer Disease Paternal Grandmother 60     Cerebrovascular Disease Paternal Grandmother      Neurologic Disorder Sister 20        migraines     Depression/Anxiety Sister      Depression Sister      Neurologic Disorder Son 14        migraines     Asthma Son      Heart Disease Mother      Osteoporosis Mother      Obesity Mother      Cancer Father      Alcohol/Drug Father      Other Cancer Father         saliva glands & skin CA      Hypertension Father      Diabetes Maternal Grandmother      Breast Cancer Maternal Grandmother      Obesity Maternal Grandmother      Other Cancer Maternal Grandfather         skin cancer     Cancer - colorectal Other         Aunt     Breast Cancer Other      Asthma Daughter      Asthma Daughter      Asthma  Son      Thyroid Disease Sister      Colon Cancer Other      Colorectal Cancer Other      Obesity Sister      Glaucoma No family hx of      Macular Degeneration No family hx of          Objective  /76   Wt 108 kg (238 lb)   LMP  (LMP Unknown)   BMI 42.16 kg/m      General: healthy, alert and in no acute distress.    HEENT: no scleral icterus or conjunctival erythema.   Skin: no suspicious lesions or rash. No jaundice.   CV: regular rhythm by palpation, 2+ distal pulses.  Resp: normal respiratory effort without conversational dyspnea.   Psych: normal mood and affect.    Gait: nonantalgic, appropriate coordination and balance.     Neuro:        - Sensation to light touch:    - Intact throughout the BLE including all peripheral nerve distributions.        - MSR:      RLE  LLE  - Patella 2+ 2+  - Achilles 2+ 2+       - Special tests:   - Slump/SLR:  Neg    MSK - Knee:       - Inspection:    - Small knee joint effusion present without surrounding erythema, warmth, ecchymosis, lesion.        - ROM:    - Limited in knee flexion/extension by anteromedial knee pain.       - Palpation:    - TTP at the medial joint line, MCL, lateral joint line, peripatellar borders.   - NTTP elsewhere.        - Strength:  (*antalgic)  RLE LLE  - Hip Flexion  5 5   - Hip Extension 5 5   - Hip Abduction 5 5   - Hip Adduction 5 5  - Knee Flexion  5 5  - Knee Extension 5 5  - Dorsiflexion  5 5  - Plantarflexion 5 5  - Ext. Gokul. Longus 5 5  - Inversion  5 5  - Eversion  5 5       - Special tests:        - Lachman:  Neg         - A/P drawer:  Neg        - Pivot shift:  Neg    - Sumi: Positive for medial compartment click and pain     - Varus stress:  Neg for laxity or pain     - Valgus stress: Positive for pain, no laxity   - Patellar grind: Positive   - Thessaly: Positive for medial compartment click and pain      Radiology  I independently reviewed the available relevant imaging, with the following interpretation:   -XR and MR with  interpretation as above in HPI.    EXAM:  XR KNEE LT 2 VIEWS    INDICATION:  Knee pain,Injury    COMPARISON:  No recent relevant comparison peer    FINDINGS:  Advanced degenerative changes are seen in the tricompartmental spaces. Evident  narrowing of the medial joint space is of uncertain significance on  nonweightbearing x-rays. There is a left knee suprapatellar joint effusion.  Evident cortical irregularity overlying the posterior tibia on the cross-table  lateral view only.    IMPRESSION:  1. No definite fracture; however, recommend additional oblique views of the  knee for further characterization.  2. Advanced tricompartmental degenerative change with medial joint space height  loss of uncertain significance on nonweightbearing views.  3. Suprapatellar knee joint effusion.         MRI KNEE LT WITHOUT CONTRAST     INDICATION:   left knee pain and swelling for months, r/o internal derangement. unspecified   chronicity o/a MR knee left w/o contrast.     TECHNIQUE:   Multiplanar, multisequence magnetic resonance imaging performed without IV   contrast.     COMPARISON:   Left knee radiographs on 05/26/2023     FINDINGS:       Osseous structures:     Alignment is anatomic. No acute fracture. No osseous contusion.Background   marrow signal is within normal limits. No suspicious osseous lesions.         Joint space:     Large joint effusion. There is synovial proliferation. This there is no Baker's   cyst.         Medial Compartment:     Medial meniscus: Mild extrusion of the meniscal body beyond the joint line   which may be related to decreased hoop strength.  Edema along the posterior   aspect of the medial tibial plateau which may be related to instability.     Cartilage:Diffuse high-grade partial thickness cartilage loss.  Cartilage loss   is most significant towards the joint line.  Marrow edema towards the joint   line is nonspecific and may be related to foci of full-thickness chondrosis   however may be  secondary to underlying instability.  There are small marginal   osteophytes.         Lateral compartment:     Lateral meniscus: Normal in morphology and signal.     Cartilage:Background heterogeneity and fraying.  Focus of high-grade   partial-thickness cartilage fissuring along the central weight-bearing aspect   of the lateral femoral condyle.  There are small marginal osteophytes.         Cruciate ligaments:     Anterior cruciate ligament (ACL): Intact.     Posterior cruciate ligament (PCL): Intact.         Collateral Ligaments and Posterolateral Corner:     Medial collateral ligament (MCL): Intact. Bowing of the MCL secondary to the   medial meniscal.     Lateral collateral ligament (LCL) complex: Intact.     Posterolateral corner structures: Intact.         Patellofemoral Compartment:     Cartilage: Diffuse high-grade partial thickness cartilage loss.  Multiple foci   of high-grade partial-thickness cartilage fissuring across the patellofemoral   compartment.  There are small marginal osteophytes.     Patellofemoral tracking: No edema within the superolateral aspect of Hoffa's   fat pad.No lateral patellar tilt.No lateral patellar translation.No patella   quita.Normal patellar and trochlea morphology.Normal tibial-tubercle groove   (TT-TG) distance of 1 cm.         Extensor Mechanism:     Distal quadriceps tendon: No tendinosis or tear.     Patellar tendon: No tendinosis or tear.     Medial patellofemoral ligament (MPFL) and retinaculum: Intact         Soft tissues:     No abnormal soft tissue mass.  Unremarkable appearance of the neurovascular   bundle.     IMPRESSION:   1. Mild extrusion of the meniscal body beyond the joint line without a discrete   tear identified and may be related to decreased hoop strength.   2. Subchondral marrow edema along the medial tibial plateau towards the joint   line and posterior aspect of the medial tibial plateau which is nonspecific and   may be related to chondrosis however  may be secondary to instability.   3. Tricompartmental osteoarthritis, with chondrosis most significant within the   medial tibiofemoral compartment.   4. Large knee joint effusion with synovial proliferation.         Assessment  1. Traumatic tear of medial meniscus of knee, left, initial encounter    2. Primary osteoarthritis of left knee        Plan  Velma Diaz is a 52 year old female that presents with chronic left knee pain.  Known primary osteoarthritis of the knee, worsening pain since an injury several months ago and now experiencing severe anteromedial knee pain with valgus instability.  MRI confirmed an extrusion of medial meniscal body without a discrete tear identified with associated subchondral edema and a large knee joint effusion, and this is in the setting of tricompartmental osteoarthritis with significant medial compartment cartilage damage.     Her most pressing concern is severe anteromedial knee pain with instability of the knee, as she is frequently feeling like she is going to buckle into valgus and does not feel stable on the knee with walking.  Has not been using any knee braces due to poor fit and discomfort with wearing.  We discussed nonoperative and operative considerations for treatment of her pain and instability.  She is currently not experiencing locking symptoms, but does experience mechanical symptoms with severe pain with weightbearing and TTP at the medial joint line and severe pain with valgus stress at the knee.  Primary concern is for this extruded medial meniscus being unstable, which may come from mechanical integrity concern of the meniscus itself even though a discrete tear was not identified on MRI.  She is interested in stabilizing the knee with external knee bracing today and performing an ultrasound-guided aspiration and steroid injection of the knee at our next availability.  She would also like to discuss surgical options for meniscal repair versus arthroplasty  as needed.  She is aware that a steroid injection would delay surgery.      Placed an orthopedic  referral today for Dr. Woodson to discuss surgical options, and we will continue nonoperative care in the meantime until she is able to discuss these with him.  Attempted a trial of bracing today, incorrect sizing available in clinic, would prefer to not fit with an immobilizer at this time as she is able to bear weight.  Branding and sizing information was given to patient today and instructed to look online for a proper size of the suggested hinged knee stability brace.  Also discussed an  brace and our  bracing program, which she may be interested in if she is not able to find the correct size brace through outside source.  We will get her in as soon as possible for the procedure, 9/6/2023.    Discussed the nature of the condition and treatment options and mutually agreed upon the following plan:    - Imaging:          - Reviewed relevant imaging in the chart.  - Reviewed results and images with patient.   - Medications:          - Discussed pharmacologic options for pain relief.   - May use NSAIDs (Ibuprofen, Naproxen) or Acetaminophen (Tylenol) as needed for pain control.   - May also use topical medications such as lidocaine, IcyHot, BioFreeze, or Voltaren gel as needed for pain control.   - Injections:          - Discussed possible injection options and alternatives.    -Recommend an intra-articular knee joint aspiration and corticosteroid injection, which will be done under ultrasound guidance on 9/6/2023.  - Therapy:          - Discussed the benefits of physical therapy vs home exercise program for optimization of range of motion, flexibility, strength, stability and function.   - We will formalize a therapy plan after the injection.  - Modalities:          - May use ice, heat, massage or other modalities as needed.   - Bracing:          - Discussed bracing options and recommend using  a hinged knee stability brace.  Sizing recommendations as above.  - Surgery:          - Discussed non-operative and operative treatment options for the patient's condition.   - After discussion, preference was to discuss surgical options further with a surgeon.   - An Orthopedic  referral was placed today for Dr. Woodson.  - Patient instructed to call the orthopedic  team at [phone] if not contacted within 1-2 days to schedule appointment.   - Activity:     - Encouraged to rest and protect the injured area from further injury.  Avoid exacerbating activities and activities that are high-risk for falls.   - Follow up:          -On 9/6/2023 for ultrasound-guided aspiration and corticosteroid injection of the left knee joint.   - Patient has clinic contact information for questions or concerns.       Pito Basruto DO, CAQSM  [facility] Sports Medicine  [facility] - Department of Orthopedic Surgery       Disclaimer:  This note was prepared and written using Dragon Medical dictation software. As a result, there may be errors in the script that have gone undetected. Please consider this when interpreting the information in this note.       Again, thank you for allowing me to participate in the care of your patient.        Sincerely,        Pito Basurto DO

## 2023-08-30 NOTE — TELEPHONE ENCOUNTER
DIAGNOSIS: (L) Traumatic tear of medial meniscus of knee    APPOINTMENT DATE: 09/21/2023   NOTES STATUS DETAILS   OFFICE NOTE from referring provider Internal 08/29/2023 Dr Basurto [facility]    OFFICE NOTE from other specialist N/A    DISCHARGE SUMMARY from hospital N/A    DISCHARGE REPORT from the ER Care Everywhere 05/26/2023 [facility] ED   OPERATIVE REPORT N/A    MEDICATION LIST N/A    EMG (for Spine) N/A    IMPLANT RECORD/STICKER N/A    LABS     CBC/DIFF N/A    CULTURES N/A    INJECTIONS DONE IN RADIOLOGY N/A    MRI Received 08/01/2023 LFT knee   CT SCAN N/A    XRAYS (IMAGES & REPORTS) Received 05/26/2023 LFT knee   TUMOR     PATHOLOGY  Slides & report N/A

## 2023-09-05 NOTE — PROGRESS NOTES
Velma Diaz  :  1970  DOS: 2023  MRN: [id number]    Sports Medicine Clinic Procedure    Ultrasound Guided Left Intra-Articular Knee Injection, +/- Aspiration    Clinical History: Left medial meniscus tear and left knee osteoarthritis.    Diagnosis: No diagnosis found.    Ultrasound Guided Intra-articular Knee Aspiration + Injection - Left  The knee was prepped and draped in a sterile manner.  Ultrasound identification of the patella, suprapatellar pouch, quadriceps tendon and femur in both long and short axis was obtained. The probe was placed in short axis to the femur.  A 1.5 inch 25-gauge needle was used to inject 3 mL of local anesthetic.  Then a 1.5 inch 18 gauge needle was placed under ultrasound guidance into the superior knee joint using a lateral approach. Approximately 14 mL of clear yellow fluid was aspirated from the knee joint. Then, the aspiration syringe was exchanged for the injection syringe without removing the needle. Then, a mixture of 2 mL of 1% lidocaine, 2 mL of 0.5% bupivacaine and 1 ml kenalog (40mg/ml) was injected without difficulty. The needle was removed and there was good hemostasis without complications.  Patient tolerated procedure well.  There was ultrasound documentation of needle placement and injection.       Large Joint Injection/Arthocentesis: L knee joint    Date/Time: 2023 9:40 AM    Performed by: Pito Basurto DO  Authorized by: Pito Basurto DO    Indications:  Osteoarthritis  Needle Size:  18 G  Guidance: ultrasound    Approach:  Superolateral  Location:  Knee      Medications:  40 mg triamcinolone 40 MG/ML; 2 mL lidocaine 1 %; 2 mL BUPivacaine 0.5 %  Aspirate amount (mL):  14  Aspirate:  Yellow and clear  Outcome:  Tolerated well, no immediate complications  Procedure discussed: discussed risks, benefits, and alternatives    Consent Given by:  Patient  Prep: patient was prepped and draped in usual sterile fashion     Ultrasound images of procedure  were permanently stored.         Impression:  Successful ultrasound-guided left intra-articular knee joint aspiration and injection.    Plan:  - Injection:    - Expectations and goals of the injection were discussed and verbal and written consent was obtained.  - Performed an aspiration and corticosteroid injection of the left intra-articular knee joint today in clinic. Patient tolerated the procedure well without complications.    - Post-procedure instructions:    - Keep the injection site clean and dry.   - Do not submerge the injection site for 24 hours (no baths, pools). Showers are ok.   - Rest the area for 24-48 hours before resuming normal activities. Avoid overexerting the area for the first few weeks.   - It may take 2-3 days to start noticing the effects of the injection and up to 3-4 weeks to feel significant benefits.   - Follow up:          - In 3 months if needed for updates to treatment plan, or sooner for new/worsening symptoms.  - Patient has clinic contact information for questions or concerns.      Pito Basurto DO, DEEPM  [facility] Sports Medicine  [facility] - Department of Orthopedic Surgery     Disclaimer:  This note was prepared and written using Dragon Medical dictation software. As a result, there may be errors in the script that have gone undetected. Please consider this when interpreting the information in this note.

## 2023-09-06 ENCOUNTER — OFFICE VISIT (OUTPATIENT)
Dept: ORTHOPEDICS | Facility: CLINIC | Age: 53
End: 2023-09-06
Payer: COMMERCIAL

## 2023-09-06 DIAGNOSIS — S83.242A TRAUMATIC TEAR OF MEDIAL MENISCUS OF KNEE, LEFT, INITIAL ENCOUNTER: Primary | ICD-10-CM

## 2023-09-06 DIAGNOSIS — M17.12 PRIMARY OSTEOARTHRITIS OF LEFT KNEE: ICD-10-CM

## 2023-09-06 PROCEDURE — 20611 DRAIN/INJ JOINT/BURSA W/US: CPT | Mod: LT | Performed by: STUDENT IN AN ORGANIZED HEALTH CARE EDUCATION/TRAINING PROGRAM

## 2023-09-06 PROCEDURE — 99207 PR DROP WITH A PROCEDURE: CPT | Mod: 25 | Performed by: STUDENT IN AN ORGANIZED HEALTH CARE EDUCATION/TRAINING PROGRAM

## 2023-09-06 RX ADMIN — LIDOCAINE HYDROCHLORIDE 2 ML: 10 INJECTION, SOLUTION INFILTRATION; PERINEURAL at 09:40

## 2023-09-06 RX ADMIN — BUPIVACAINE HYDROCHLORIDE 2 ML: 5 INJECTION, SOLUTION PERINEURAL at 09:40

## 2023-09-06 RX ADMIN — TRIAMCINOLONE ACETONIDE 40 MG: 40 INJECTION, SUSPENSION INTRA-ARTICULAR; INTRAMUSCULAR at 09:40

## 2023-09-06 NOTE — LETTER
2023         RE: Velma Diaz  [address]  [facility] [address]        Dear Colleague,    Thank you for referring your patient, Velma Diaz, to the [facility] [place]. Please see a copy of my visit note below.    Velma Diaz  :  1970  DOS: 2023  MRN: [id number]    Sports Medicine Clinic Procedure    Ultrasound Guided Left Intra-Articular Knee Injection, +/- Aspiration    Clinical History: Left medial meniscus tear and left knee osteoarthritis.    Diagnosis: No diagnosis found.    Ultrasound Guided Intra-articular Knee Aspiration + Injection - Left  The knee was prepped and draped in a sterile manner.  Ultrasound identification of the patella, suprapatellar pouch, quadriceps tendon and femur in both long and short axis was obtained. The probe was placed in short axis to the femur.  A 1.5 inch 25-gauge needle was used to inject 3 mL of local anesthetic.  Then a 1.5 inch 18 gauge needle was placed under ultrasound guidance into the superior knee joint using a lateral approach. Approximately 14 mL of clear yellow fluid was aspirated from the knee joint. Then, the aspiration syringe was exchanged for the injection syringe without removing the needle. Then, a mixture of 2 mL of 1% lidocaine, 2 mL of 0.5% bupivacaine and 1 ml kenalog (40mg/ml) was injected without difficulty. The needle was removed and there was good hemostasis without complications.  Patient tolerated procedure well.  There was ultrasound documentation of needle placement and injection.       Large Joint Injection/Arthocentesis: L knee joint    Date/Time: 2023 9:40 AM    Performed by: Pito Basurto DO  Authorized by: Pito Basurto DO    Indications:  Osteoarthritis  Needle Size:  18 G  Guidance: ultrasound    Approach:  Superolateral  Location:  Knee      Medications:  40 mg triamcinolone 40 MG/ML; 2 mL lidocaine 1 %; 2 mL BUPivacaine 0.5 %  Aspirate amount (mL):  14  Aspirate:   Yellow and clear  Outcome:  Tolerated well, no immediate complications  Procedure discussed: discussed risks, benefits, and alternatives    Consent Given by:  Patient  Prep: patient was prepped and draped in usual sterile fashion     Ultrasound images of procedure were permanently stored.         Impression:  Successful ultrasound-guided left intra-articular knee joint aspiration and injection.    Plan:  - Injection:    - Expectations and goals of the injection were discussed and verbal and written consent was obtained.  - Performed an aspiration and corticosteroid injection of the left intra-articular knee joint today in clinic. Patient tolerated the procedure well without complications.    - Post-procedure instructions:    - Keep the injection site clean and dry.   - Do not submerge the injection site for 24 hours (no baths, pools). Showers are ok.   - Rest the area for 24-48 hours before resuming normal activities. Avoid overexerting the area for the first few weeks.   - It may take 2-3 days to start noticing the effects of the injection and up to 3-4 weeks to feel significant benefits.   - Follow up:          - In 3 months if needed for updates to treatment plan, or sooner for new/worsening symptoms.  - Patient has clinic contact information for questions or concerns.      Pito Basurto DO, CAQSM  [facility]  [facility] - Department of Orthopedic Surgery     Disclaimer:  This note was prepared and written using Dragon Medical dictation software. As a result, there may be errors in the script that have gone undetected. Please consider this when interpreting the information in this note.           Again, thank you for allowing me to participate in the care of your patient.        Sincerely,        Pito Basurto DO

## 2023-09-11 RX ORDER — LIDOCAINE HYDROCHLORIDE 10 MG/ML
2 INJECTION, SOLUTION INFILTRATION; PERINEURAL
Status: DISCONTINUED | OUTPATIENT
Start: 2023-09-06 | End: 2023-11-20

## 2023-09-11 RX ORDER — TRIAMCINOLONE ACETONIDE 40 MG/ML
40 INJECTION, SUSPENSION INTRA-ARTICULAR; INTRAMUSCULAR
Status: DISCONTINUED | OUTPATIENT
Start: 2023-09-06 | End: 2023-11-20

## 2023-09-11 RX ORDER — BUPIVACAINE HYDROCHLORIDE 5 MG/ML
2 INJECTION, SOLUTION PERINEURAL
Status: DISCONTINUED | OUTPATIENT
Start: 2023-09-06 | End: 2023-11-20

## 2023-09-21 ENCOUNTER — OFFICE VISIT (OUTPATIENT)
Dept: ORTHOPEDICS | Facility: CLINIC | Age: 53
End: 2023-09-21
Attending: STUDENT IN AN ORGANIZED HEALTH CARE EDUCATION/TRAINING PROGRAM
Payer: COMMERCIAL

## 2023-09-21 ENCOUNTER — ANCILLARY PROCEDURE (OUTPATIENT)
Dept: GENERAL RADIOLOGY | Facility: CLINIC | Age: 53
End: 2023-09-21
Attending: ORTHOPAEDIC SURGERY
Payer: COMMERCIAL

## 2023-09-21 ENCOUNTER — MYC REFILL (OUTPATIENT)
Dept: FAMILY MEDICINE | Facility: CLINIC | Age: 53
End: 2023-09-21

## 2023-09-21 ENCOUNTER — PRE VISIT (OUTPATIENT)
Dept: ORTHOPEDICS | Facility: CLINIC | Age: 53
End: 2023-09-21

## 2023-09-21 VITALS — WEIGHT: 245 LBS | BODY MASS INDEX: 43.41 KG/M2 | HEIGHT: 63 IN

## 2023-09-21 DIAGNOSIS — M17.12 PRIMARY OSTEOARTHRITIS OF LEFT KNEE: ICD-10-CM

## 2023-09-21 DIAGNOSIS — S83.249A ACUTE MEDIAL MENISCUS TEAR: Primary | ICD-10-CM

## 2023-09-21 DIAGNOSIS — S83.242A TRAUMATIC TEAR OF MEDIAL MENISCUS OF KNEE, LEFT, INITIAL ENCOUNTER: ICD-10-CM

## 2023-09-21 DIAGNOSIS — S83.249A ACUTE MEDIAL MENISCUS TEAR: ICD-10-CM

## 2023-09-21 DIAGNOSIS — G89.4 CHRONIC PAIN SYNDROME: Chronic | ICD-10-CM

## 2023-09-21 PROCEDURE — 73560 X-RAY EXAM OF KNEE 1 OR 2: CPT | Mod: LT | Performed by: RADIOLOGY

## 2023-09-21 PROCEDURE — 99204 OFFICE O/P NEW MOD 45 MIN: CPT | Performed by: ORTHOPAEDIC SURGERY

## 2023-09-21 RX ORDER — OXYCODONE AND ACETAMINOPHEN 10; 325 MG/1; MG/1
1 TABLET ORAL EVERY 6 HOURS PRN
Qty: 90 TABLET | Refills: 0 | Status: SHIPPED | OUTPATIENT
Start: 2023-09-21 | End: 2023-10-23

## 2023-09-21 ASSESSMENT — PAIN SCALES - GENERAL: PAINLEVEL: SEVERE PAIN (7)

## 2023-09-21 NOTE — LETTER
9/21/2023         RE: Velma Diaz  [address]  [facility] [address]        Dear Colleague,    Thank you for referring your patient, Velma Diaz, to the [facility]. Please see a copy of my visit note below.    CHIEF CONCERN: Left knee pain    HISTORY:   52 year old female with left knee pain and swelling for many years. She says her pain recently worsened in the spring of this year after a fall in May 2023. Since then, she has had increased pain, swelling, and instability feeling like her knee is going to give out. She has tried rest, ice, and a steroid injection which gave her minimal relief for a few days.    PAST MEDICAL HISTORY: (Reviewed with the patient and in the EPIC medical record)  Chronic pain, neuropathy, obesity    PAST SURGICAL HISTORY: (Reviewed with the patient and in the EPIC medical record)  Ankle surgery 7/13  Right knee arthroscopy 1986  Bilateral carpal tunnel release 2010  Left cubital tunnel release 2015    MEDICATIONS: (Reviewed with the patient and in the EPIC medical record)    Notable medications include: duloxetine  Furosemide  Robaxin  Nortriptyline  sumatriptan    ALLERGIES: (Reviewed with the patient and in the EPIC medical record)  NKA      SOCIAL HISTORY: (Reviewed with the patient and in the medical record)  --Tobacco: None  --Occupation: disabled,  part-time  --Avocation/Sport:     FAMILY HISTORY: (Reviewed with the patient and in the medical record)  -- No family history of bleeding, clotting, or difficulty with anesthesia    REVIEW OF SYSTEMS: (Reviewed with the patient and on the health intake form)  -- A comprehensive 10 point review of systems was conducted and is negative except as noted in the HPI    EXAM:     General: Awake, Alert and Oriented, No acute Distress. Articulate and Interactive    Body mass index is 43.4 kg/m .    Left Lower extremity :  Skin is Warm and Well perfused, no suggestion of infection  Diffuse swelling  about knee and lower leg  TTP at medial joint line  ROM 5-90  Quads 4/5 Hamstrings 5/5  EHL/FHL/TA/GS 5/5  Sensation intact L3-S1  2+ Dorsalis Pedis Pulse    IMAGING:    Radiographs of the left knee from 9/21/2023 were independently reviewed by me and findings were discussed with the patient today. The imaging demonstrates end-stage medial compartment arthritis.    MRI of the left knee from 8/1/2023 were independently reviewed by me and findings were discussed with the patient today. The imaging demonstrates 1. Mild extrusion of the meniscal body beyond the joint line without a discrete   tear identified and may be related to decreased hoop strength.   2. Subchondral marrow edema along the medial tibial plateau towards the joint   line and posterior aspect of the medial tibial plateau which is nonspecific and   may be related to chondrosis however may be secondary to instability.   3. Tricompartmental osteoarthritis, with chondrosis most significant within the   medial tibiofemoral compartment.   4. Large knee joint effusion with synovial proliferation. .    ASSESSMENT:  Left Knee medial compartment arthritis    PLAN:  I had a long discussion with the patient regarding her knee symptoms at this time she has too much arthritis to warrant arthroscopic intervention.  She feels that she has failed nonsurgical intervention.  At this time I discussed with her that the surgical treatment for her knee would be consideration of arthroplasty.  We discussed the pros cons risk and benefits.  I will refer to one of my arthroplasty colleagues.      Again, thank you for allowing me to participate in the care of your patient.        Sincerely,        Hema Woodson MD

## 2023-09-21 NOTE — NURSING NOTE
Reason For Visit:   Chief Complaint   Patient presents with    Consult     L knee pain        ?  No  Occupation Childcare.  Currently working? Yes.  Work status?  On disability.  Date of injury: 06/2023 - injury has been chronic, increase in symptoms started over summer  Type of injury: Took weird step in May in divet in yard  Date of surgery: No  Type of surgery: N/a.  Smoker: No - Cannabis   Request smoking cessation information: No    Sane Score  Left knee - Affected  Left Knee- 30  Right Knee- 90    Has had multiple falls and feelings of leg/knee giving out on her since the summer.     Jovon Vidal, ATC

## 2023-09-21 NOTE — PROGRESS NOTES
CHIEF CONCERN: Left knee pain    HISTORY:   52 year old female with left knee pain and swelling for many years. She says her pain recently worsened in the spring of this year after a fall in May 2023. Since then, she has had increased pain, swelling, and instability feeling like her knee is going to give out. She has tried rest, ice, and a steroid injection which gave her minimal relief for a few days.    PAST MEDICAL HISTORY: (Reviewed with the patient and in the EPIC medical record)  Chronic pain, neuropathy, obesity    PAST SURGICAL HISTORY: (Reviewed with the patient and in the EPIC medical record)  Ankle surgery 7/13  Right knee arthroscopy 1986  Bilateral carpal tunnel release 2010  Left cubital tunnel release 2015    MEDICATIONS: (Reviewed with the patient and in the EPIC medical record)    Notable medications include: duloxetine  Furosemide  Robaxin  Nortriptyline  sumatriptan    ALLERGIES: (Reviewed with the patient and in the EPIC medical record)  NKA      SOCIAL HISTORY: (Reviewed with the patient and in the medical record)  --Tobacco: None  --Occupation: disabled,  part-time  --Avocation/Sport:     FAMILY HISTORY: (Reviewed with the patient and in the medical record)  -- No family history of bleeding, clotting, or difficulty with anesthesia    REVIEW OF SYSTEMS: (Reviewed with the patient and on the health intake form)  -- A comprehensive 10 point review of systems was conducted and is negative except as noted in the HPI    EXAM:     General: Awake, Alert and Oriented, No acute Distress. Articulate and Interactive    Body mass index is 43.4 kg/m .    Left Lower extremity :  Skin is Warm and Well perfused, no suggestion of infection  Diffuse swelling about knee and lower leg  TTP at medial joint line  ROM 5-90  Quads 4/5 Hamstrings 5/5  EHL/FHL/TA/GS 5/5  Sensation intact L3-S1  2+ Dorsalis Pedis Pulse    IMAGING:    Radiographs of the left knee from 9/21/2023 were independently reviewed by me  and findings were discussed with the patient today. The imaging demonstrates end-stage medial compartment arthritis.    MRI of the left knee from 8/1/2023 were independently reviewed by me and findings were discussed with the patient today. The imaging demonstrates 1. Mild extrusion of the meniscal body beyond the joint line without a discrete   tear identified and may be related to decreased hoop strength.   2. Subchondral marrow edema along the medial tibial plateau towards the joint   line and posterior aspect of the medial tibial plateau which is nonspecific and   may be related to chondrosis however may be secondary to instability.   3. Tricompartmental osteoarthritis, with chondrosis most significant within the   medial tibiofemoral compartment.   4. Large knee joint effusion with synovial proliferation. .    ASSESSMENT:  Left Knee medial compartment arthritis    PLAN:  I had a long discussion with the patient regarding her knee symptoms at this time she has too much arthritis to warrant arthroscopic intervention.  She feels that she has failed nonsurgical intervention.  At this time I discussed with her that the surgical treatment for her knee would be consideration of arthroplasty.  We discussed the pros cons risk and benefits.  I will refer to one of my arthroplasty colleagues.

## 2023-10-02 ENCOUNTER — VIRTUAL VISIT (OUTPATIENT)
Dept: PSYCHOLOGY | Facility: CLINIC | Age: 53
End: 2023-10-02
Payer: COMMERCIAL

## 2023-10-02 DIAGNOSIS — F33.1 MAJOR DEPRESSIVE DISORDER, RECURRENT EPISODE, MODERATE (H): Primary | ICD-10-CM

## 2023-10-02 DIAGNOSIS — F41.1 GAD (GENERALIZED ANXIETY DISORDER): ICD-10-CM

## 2023-10-02 PROCEDURE — 90834 PSYTX W PT 45 MINUTES: CPT | Mod: VID | Performed by: SOCIAL WORKER

## 2023-10-02 ASSESSMENT — PATIENT HEALTH QUESTIONNAIRE - PHQ9
SUM OF ALL RESPONSES TO PHQ QUESTIONS 1-9: 10
10. IF YOU CHECKED OFF ANY PROBLEMS, HOW DIFFICULT HAVE THESE PROBLEMS MADE IT FOR YOU TO DO YOUR WORK, TAKE CARE OF THINGS AT HOME, OR GET ALONG WITH OTHER PEOPLE: VERY DIFFICULT
SUM OF ALL RESPONSES TO PHQ QUESTIONS 1-9: 10
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
## 2023-10-19 NOTE — TELEPHONE ENCOUNTER
DIAGNOSIS: Traumatic tear of medial meniscus of knee, left, initial encounter [S83.242A]; Primary osteoarthritis of left knee [M17.12]    APPOINTMENT DATE: 010//2023   NOTES STATUS DETAILS   OFFICE NOTE from referring provider Internal 09/21/23 Dr. Woodson    08/29/2023 Dr Basurto [facility]    OFFICE NOTE from other specialist N/A     DISCHARGE SUMMARY from hospital N/A     DISCHARGE REPORT from the ER Care Everywhere 05/26/2023 [facility] ED   OPERATIVE REPORT N/A     MEDICATION LIST N/A     EMG (for Spine) N/A     IMPLANT RECORD/STICKER N/A     LABS       CBC/DIFF N/A     CULTURES N/A     INJECTIONS DONE IN RADIOLOGY N/A     MRI Received 08/01/2023 LFT knee   CT SCAN N/A     XRAYS (IMAGES & REPORTS) Received 09/21/23. 05/26/2023 LFT knee   TUMOR       PATHOLOGY  Slides & report N/A

## 2023-10-23 ENCOUNTER — MYC REFILL (OUTPATIENT)
Dept: FAMILY MEDICINE | Facility: CLINIC | Age: 53
End: 2023-10-23
Payer: COMMERCIAL

## 2023-10-23 DIAGNOSIS — G89.4 CHRONIC PAIN SYNDROME: Chronic | ICD-10-CM

## 2023-10-23 RX ORDER — OXYCODONE AND ACETAMINOPHEN 10; 325 MG/1; MG/1
1 TABLET ORAL EVERY 6 HOURS PRN
Qty: 90 TABLET | Refills: 0 | Status: SHIPPED | OUTPATIENT
Start: 2023-10-23 | End: 2023-11-20

## 2023-10-26 DIAGNOSIS — M17.12 PRIMARY OSTEOARTHRITIS OF LEFT KNEE: Primary | ICD-10-CM

## 2023-10-27 ENCOUNTER — OFFICE VISIT (OUTPATIENT)
Dept: ORTHOPEDICS | Facility: CLINIC | Age: 53
End: 2023-10-27
Payer: COMMERCIAL

## 2023-10-27 ENCOUNTER — ANCILLARY PROCEDURE (OUTPATIENT)
Dept: GENERAL RADIOLOGY | Facility: CLINIC | Age: 53
End: 2023-10-27
Attending: ORTHOPAEDIC SURGERY
Payer: COMMERCIAL

## 2023-10-27 ENCOUNTER — PRE VISIT (OUTPATIENT)
Dept: ORTHOPEDICS | Facility: CLINIC | Age: 53
End: 2023-10-27

## 2023-10-27 VITALS — BODY MASS INDEX: 46.07 KG/M2 | WEIGHT: 260 LBS | HEIGHT: 63 IN

## 2023-10-27 DIAGNOSIS — M17.12 PRIMARY OSTEOARTHRITIS OF LEFT KNEE: ICD-10-CM

## 2023-10-27 DIAGNOSIS — S83.242A TRAUMATIC TEAR OF MEDIAL MENISCUS OF KNEE, LEFT, INITIAL ENCOUNTER: ICD-10-CM

## 2023-10-27 PROCEDURE — 73562 X-RAY EXAM OF KNEE 3: CPT | Mod: LT | Performed by: RADIOLOGY

## 2023-10-27 PROCEDURE — 99214 OFFICE O/P EST MOD 30 MIN: CPT | Mod: GC | Performed by: ORTHOPAEDIC SURGERY

## 2023-10-27 RX ORDER — LIDOCAINE 4 G/G
1 PATCH TOPICAL EVERY 24 HOURS
Qty: 15 PATCH | Refills: 3 | Status: SHIPPED | OUTPATIENT
Start: 2023-10-27 | End: 2024-04-02

## 2023-10-27 NOTE — PROGRESS NOTES
Orthopaedic Surgery Clinic Note - New Patient    Chief Complaint:  Left knee pain.    History:  I had the opportunity to meet this pleasant 53-year-old patient today, as a new patient to me, for the above chief complaint of about 5 months duration.  The patient is seen and examined today at the request of Dr. Woodson.  The problems that he began 7 years ago with on and off swelling of the left knee.  This acutely worsened in May of this year when the patient stepped into a small divot in her backyard resulting in an increase in left knee pain.  She has been able to weight-bear on the left lower extremity she does endorse pain and swelling that worsens with activity and is better with rest.  She does note feelings of instability in her left knee.  The pain is rated as 9/10. No brace. Oxycodone and ibuprofen for baseline chronic pain. Uses peppermint and CBD oil on the knee. She did had a left knee corticosteroid injection around 1 month ago with about 25% relief for one day. Denies prior left knee surgery. No formal PT but has been doing home exercises. Uses a cane occasionally.     Past Medical History:  Past Medical History:   Diagnosis Date    Chronic pain syndrome 11/20/2015    She takes up to 90 oxycodone tablets per month for her chronic pain     Chronic, continuous use of opioids 5/10/2022    Depressive disorder 01/01/2000    Family history of colonic polyps     in mother -- pt should have colonoscopy every 5 years    History of cleft palate with cleft lip     cleft lip and palate, and has had 27 operations per the patient    Hyperlipidemia with target LDL less than 130 10/26/2015    Impaired fasting glucose 11/30/2021    Morbid obesity with BMI of 40.0-44.9, adult (H) 10/26/2015    Neuropathy     MAYA (obstructive sleep apnea)/Hypoventilation Syndrome 02/24/2014    Study Date: 2/13/2014- (245.1 lbs) Sleep Associated Hypoventilation  suggested with baseline PCO2 42 mmHg, maximum PCO2 55 mmHg. Lowest oxygen  saturation 85.0% Apnea/Hypopnea Index 5.5 events per hour.  REM AHI 37.2.  The supine AHI is 13.8. RDI 6.7 Sleep study 3/2014 (245#)- CPAP 6 cm effective, Transcutaneous CO2 Monitoring (TCM) within normal limits.         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   Tobacco Use    Smoking status: Former     Packs/day: 0.50     Years: 15.00     Additional pack years: 0.00     Total pack years: 7.50     Types: Cigarettes     Quit date: 2015     Years since quittin.6    Smokeless tobacco: Never   Substance and Sexual Activity    Alcohol use: No     Alcohol/week: 0.0 standard drinks of alcohol     Comment: Quit in     Drug use: No    Sexual activity: Yes     Partners: Male     Birth control/protection: Female Surgical       Quit smoking 20 years ago  Quit alcohol 8 years ago  Uses cannabis currently     Medications:  Current Outpatient Medications   Medication Sig Dispense Refill    diclofenac (VOLTAREN) 1 % topical gel Apply 2 g topically 4 times daily 50 g 3    Lidocaine (LIDOCARE) 4 % Patch Place 1 patch onto the skin every 24 hours To prevent lidocaine toxicity, patient should be patch free for 12 hrs daily. 15 patch 3    acetaminophen (TYLENOL) 325 MG tablet Take 2 tablets (650 mg) by mouth every 4 hours as needed for other (mild pain) 100 tablet 0    BANOPHEN 25 MG capsule TAKE 1 TABLET (25 MG) BY MOUTH NIGHTLY AS NEEDED TO HELP WITH SLEEP 30 capsule 0    [facility] NASAL SPRAY 0.05 % nasal spray PLEASE SEE ATTACHED FOR DETAILED DIRECTIONS      DULoxetine (CYMBALTA) 60 MG capsule TAKE ONE CAPSULE BY MOUTH ONCE DAILY 30 capsule 5    ferrous sulfate (FEROSUL) 325 (65 Fe) MG tablet TAKE 1 TABLET BY MOUTH EVERY DAY WITH BREAKFAST 30  "tablet 2    furosemide (LASIX) 20 MG tablet Take 1 tablet (20 mg) by mouth daily as needed for leg swelling 30 tablet 1    ketoconazole (NIZORAL) 2 % external cream Apply to rash in skin folds twice a day as needed 60 g 3    methocarbamol (ROBAXIN) 750 MG tablet TAKE ONE TABLET BY MOUTH THREE TIMES A DAY AS NEEDED FOR MUSCLE SPASMS 90 tablet 2    Multiple Vitamins-Minerals (MULTI FOR HER PO) Take by mouth daily       nortriptyline (PAMELOR) 10 MG capsule TAKE THREE CAPSULES BY MOUTH AT BEDTIME 270 capsule 3    nystatin (NYAMYC) 283150 UNIT/GM external powder APPLY TO AFFECTED AREA(S) TWO TIMES A DAY AS NEEDED 30 g 1    ORDER FOR DME Respironics REMSTAR 60 Series Auto [id number] H2O, Airfit P10 nasal pillow mask w/xsmall pillows      oxyCODONE-acetaminophen (PERCOCET)  MG per tablet Take 1 tablet by mouth every 6 hours as needed for moderate to severe pain 90 tablet 0    SUMAtriptan (IMITREX) 100 MG tablet Take 1 tablet (100 mg) by mouth at onset of headache for migraine May repeat in 2 hours if needed: max 2/day (Patient not taking: Reported on 7/25/2023) 9 tablet 2       Exam:  Vital Signs:  Vitals:    10/27/23 1352   Weight: 117.9 kg (260 lb)   Height: 1.6 m (5' 2.99\")       General: On examination today, the patient is a pleasant, cooperative, awake and alert adult in no acute distress.  Normal shoewear, removed for examination. No assistive gait devices visible.  Psych: Normal appearing mood and affect.   Pulm: Respirations are nonlabored and regular.  Dermatologic: The skin on the left knee is intact without visible open wounds, blisters, or any skin that appears to be at immediate obvious risk.  Musculoskeletal (focused exam of left lower extremity): Range of motion is 0-80 degrees  Able to do a straight leg raise without a lag.  Quad strength is 5 out of 5.  There are no palpable defects in the extensor mechanism.  Patellar tracking appears normal.  + patellofemoral grind test.  Tender over medial joint " line and patellofemoral joint, nontender to palpation over the lateral joint line, quad and patellar tendons, tibial tubercle, the pes anserinus, Gerdie's tubercle, the tibial tubercle, the proximal fibula, biceps femoris and medial hamstring tendons, and posterior knee capsule.  There are no palpable masses.  The knee is stable to varus and valgus stress at both maximal extension as well as 30 degrees of flexion with 0 laxity.  There is a 0 anterior and 0 posterior drawer at 90.  0 Lachman.  Gentle internal and external rotation of the ipsilateral hip appears smooth and not significantly limited, and does not appear to elicit any pain.  Circulation: Distally in the ipsilateral lower extremity there is a 3/4 easily palpable DP and PT pulse with a warm well-perfused foot.  Neurologic: Light touch sensation is endorsed as intact in all dermatomes of the ipsilateral foot. There is 5/5 tibialis anterior, EHL, EDL, gastroc/soleus, FHL, FDL, PTT, and peroneal strength.     Imaging:  Independent review of imaging studies was performed including 3v xray left knee which demonstrates joint space narrowing particularly of the medial joint space and patellofemoral joint space with osteophyte formation. The relevant findings were discussed with the patient.    Impression:  Symptomatic left knee osteoarthritis, with the worst involvement in the medial and patellofemoral compartments.     Plan:  The diagnosis of left knee osteoarthritis was discussed with the patient, along with the prognosis and treatment options, both nonsurgical and surgical, all in layman's terms.  Full opportunity was given to the patient to participate in the shared decision-making process. A mutual decision was made to continue nonsurgical care for the time being. I discussed that there is a chance surgery may be required in the future as I expect the symptoms may worsen with time.  We discussed the option of a knee arthroscopy and possible partial medial  meniscectomy, which is an option at this time or in the future, though I cautioned that it would be unlikely to make a meaningful long term difference in the knee pain.  We also discussed the option for a total knee arthroplasty should symptoms worsen with time, and given the severity of bone-on-bone arthritis, would likely be a more long-lasting surgical approach to his pain should symptoms worsen in the future. We discussed the importance of weight loss and offered a referral to our nutritionist but the patient states that she will look into this closer to home. We also discussed a referral for PT to work on range of motion particularly of flexion of which she only has around 80 degrees. A referral was given to the patient today. We also discussed the use of lidocaine patches and voltaren gel for pain control.    Signs and symptoms watch out for that should prompt immediate medical attention were discussed.  I discussed that it was very important to seek medical care for worsening of symptoms. I encouraged Kate to follow-up with me in 3 months to discuss progress with weight loss and PT.  All questions that this very pleasant patient had at the time were answered to the best of my ability, and Kate verbalized understanding of and agreement with the treatment plan.     Patient seen and discussed with Dr. Quarles who agrees with the above assessment and plan.    Alen Corbin MD PGY4

## 2023-10-27 NOTE — LETTER
10/27/2023         RE: Velma Diaz  [address]  [facility] [address]        Dear Colleague,    Thank you for referring your patient, Velma Diaz, to the [facility] [place]. Please see a copy of my visit note below.    Orthopaedic Surgery Clinic Note - New Patient    Chief Complaint:  Left knee pain.    History:  I had the opportunity to meet this pleasant 53-year-old patient today, as a new patient to me, for the above chief complaint of about 5 months duration.  The patient is seen and examined today at the request of Dr. Woodson.  The problems that he began 7 years ago with on and off swelling of the left knee.  This acutely worsened in May of this year when the patient stepped into a small divot in her backyard resulting in an increase in left knee pain.  She has been able to weight-bear on the left lower extremity she does endorse pain and swelling that worsens with activity and is better with rest.  She does note feelings of instability in her left knee.  The pain is rated as 9/10. No brace. Oxycodone and ibuprofen for baseline chronic pain. Uses peppermint and CBD oil on the knee. She did had a left knee corticosteroid injection around 1 month ago with about 25% relief for one day. Denies prior left knee surgery. No formal PT but has been doing home exercises. Uses a cane occasionally.     Past Medical History:  Past Medical History:   Diagnosis Date    Chronic pain syndrome 11/20/2015    She takes up to 90 oxycodone tablets per month for her chronic pain     Chronic, continuous use of opioids 5/10/2022    Depressive disorder 01/01/2000    Family history of colonic polyps     in mother -- pt should have colonoscopy every 5 years    History of cleft palate with cleft lip     cleft lip and palate, and has had 27 operations per the patient    Hyperlipidemia with target LDL less than 130 10/26/2015    Impaired fasting glucose 11/30/2021    Morbid obesity with BMI of  40.0-44.9, adult (H) 10/26/2015    Neuropathy     MAYA (obstructive sleep apnea)/Hypoventilation Syndrome 2014    Study Date: 2014- (245.1 lbs) Sleep Associated Hypoventilation  suggested with baseline PCO2 42 mmHg, maximum PCO2 55 mmHg. Lowest oxygen saturation 85.0% Apnea/Hypopnea Index 5.5 events per hour.  REM AHI 37.2.  The supine AHI is 13.8. RDI 6.7 Sleep study 3/2014 (245#)- CPAP 6 cm effective, Transcutaneous CO2 Monitoring (TCM) within normal limits.         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   Tobacco Use    Smoking status: Former     Packs/day: 0.50     Years: 15.00     Additional pack years: 0.00     Total pack years: 7.50     Types: Cigarettes     Quit date: 2015     Years since quittin.6    Smokeless tobacco: Never   Substance and Sexual Activity    Alcohol use: No     Alcohol/week: 0.0 standard drinks of alcohol     Comment: Quit in     Drug use: No    Sexual activity: Yes     Partners: Male     Birth control/protection: Female Surgical       Quit smoking 20 years ago  Quit alcohol 8 years ago  Uses cannabis currently     Medications:  Current Outpatient Medications   Medication Sig Dispense Refill    diclofenac (VOLTAREN) 1 % topical gel Apply 2 g topically 4 times daily 50 g 3    Lidocaine (LIDOCARE) 4 % Patch Place 1 patch onto the skin every 24 hours To prevent lidocaine toxicity, patient should be patch free for 12 hrs daily. 15 patch 3    acetaminophen (TYLENOL) 325 MG tablet Take 2 tablets (650 mg) by mouth every 4 hours as needed for other (mild pain) 100 tablet 0    BANOPHEN 25 MG capsule TAKE 1 TABLET (25 MG) BY MOUTH NIGHTLY AS NEEDED TO HELP WITH SLEEP 30 capsule 0    [facility] NASAL  "SPRAY 0.05 % nasal spray PLEASE SEE ATTACHED FOR DETAILED DIRECTIONS      DULoxetine (CYMBALTA) 60 MG capsule TAKE ONE CAPSULE BY MOUTH ONCE DAILY 30 capsule 5    ferrous sulfate (FEROSUL) 325 (65 Fe) MG tablet TAKE 1 TABLET BY MOUTH EVERY DAY WITH BREAKFAST 30 tablet 2    furosemide (LASIX) 20 MG tablet Take 1 tablet (20 mg) by mouth daily as needed for leg swelling 30 tablet 1    ketoconazole (NIZORAL) 2 % external cream Apply to rash in skin folds twice a day as needed 60 g 3    methocarbamol (ROBAXIN) 750 MG tablet TAKE ONE TABLET BY MOUTH THREE TIMES A DAY AS NEEDED FOR MUSCLE SPASMS 90 tablet 2    Multiple Vitamins-Minerals (MULTI FOR HER PO) Take by mouth daily       nortriptyline (PAMELOR) 10 MG capsule TAKE THREE CAPSULES BY MOUTH AT BEDTIME 270 capsule 3    nystatin (NYAMYC) 472127 UNIT/GM external powder APPLY TO AFFECTED AREA(S) TWO TIMES A DAY AS NEEDED 30 g 1    ORDER FOR [facility] Respironics REMSTAR 60 Series Auto [id number] H2O, Airfit P10 nasal pillow mask w/xsmall pillows      oxyCODONE-acetaminophen (PERCOCET)  MG per tablet Take 1 tablet by mouth every 6 hours as needed for moderate to severe pain 90 tablet 0    SUMAtriptan (IMITREX) 100 MG tablet Take 1 tablet (100 mg) by mouth at onset of headache for migraine May repeat in 2 hours if needed: max 2/day (Patient not taking: Reported on 7/25/2023) 9 tablet 2       Exam:  Vital Signs:  Vitals:    10/27/23 1352   Weight: 117.9 kg (260 lb)   Height: 1.6 m (5' 2.99\")       General: On examination today, the patient is a pleasant, cooperative, awake and alert adult in no acute distress.  Normal shoewear, removed for examination. No assistive gait devices visible.  Psych: Normal appearing mood and affect.   Pulm: Respirations are nonlabored and regular.  Dermatologic: The skin on the left knee is intact without visible open wounds, blisters, or any skin that appears to be at immediate obvious risk.  Musculoskeletal (focused exam of left lower extremity): " Range of motion is 0-80 degrees  Able to do a straight leg raise without a lag.  Quad strength is 5 out of 5.  There are no palpable defects in the extensor mechanism.  Patellar tracking appears normal.  + patellofemoral grind test.  Tender over medial joint line and patellofemoral joint, nontender to palpation over the lateral joint line, quad and patellar tendons, tibial tubercle, the pes anserinus, Gerdie's tubercle, the tibial tubercle, the proximal fibula, biceps femoris and medial hamstring tendons, and posterior knee capsule.  There are no palpable masses.  The knee is stable to varus and valgus stress at both maximal extension as well as 30 degrees of flexion with 0 laxity.  There is a 0 anterior and 0 posterior drawer at 90.  0 Lachman.  Gentle internal and external rotation of the ipsilateral hip appears smooth and not significantly limited, and does not appear to elicit any pain.  Circulation: Distally in the ipsilateral lower extremity there is a 3/4 easily palpable DP and PT pulse with a warm well-perfused foot.  Neurologic: Light touch sensation is endorsed as intact in all dermatomes of the ipsilateral foot. There is 5/5 tibialis anterior, EHL, EDL, gastroc/soleus, FHL, FDL, PTT, and peroneal strength.     Imaging:  Independent review of imaging studies was performed including 3v xray left knee which demonstrates joint space narrowing particularly of the medial joint space and patellofemoral joint space with osteophyte formation. The relevant findings were discussed with the patient.    Impression:  Symptomatic left knee osteoarthritis, with the worst involvement in the medial and patellofemoral compartments.     Plan:  The diagnosis of left knee osteoarthritis was discussed with the patient, along with the prognosis and treatment options, both nonsurgical and surgical, all in layman's terms.  Full opportunity was given to the patient to participate in the shared decision-making process. A mutual  decision was made to continue nonsurgical care for the time being. I discussed that there is a chance surgery may be required in the future as I expect the symptoms may worsen with time.  We discussed the option of a knee arthroscopy and possible partial medial meniscectomy, which is an option at this time or in the future, though I cautioned that it would be unlikely to make a meaningful long term difference in the knee pain.  We also discussed the option for a total knee arthroplasty should symptoms worsen with time, and given the severity of bone-on-bone arthritis, would likely be a more long-lasting surgical approach to his pain should symptoms worsen in the future. We discussed the importance of weight loss and offered a referral to our nutritionist but the patient states that she will look into this closer to home. We also discussed a referral for PT to work on range of motion particularly of flexion of which she only has around 80 degrees. A referral was given to the patient today. We also discussed the use of lidocaine patches and voltaren gel for pain control.    Signs and symptoms watch out for that should prompt immediate medical attention were discussed.  I discussed that it was very important to seek medical care for worsening of symptoms. I encouraged Kate to follow-up with me in 3 months to discuss progress with weight loss and PT.  All questions that this very pleasant patient had at the time were answered to the best of my ability, and Kate verbalized understanding of and agreement with the treatment plan.     Patient seen and discussed with Dr. Quarles who agrees with the above assessment and plan.    Alen Corbin MD PGY4       Attestation signed by Dionisio Quarles MD at 11/5/2023  3:57 PM:  Physician Attestation   I saw this patient with the resident and agree with the resident/fellow's findings and plan of care as documented in the note.    Key findings: 52yo female patient with left knee  osteoarthritis. Nonoperative and operative tx options discussed. She would like a left total knee arthroplasty. I think this is a reasonable plan, and agreed to move forward towards this goal with her; we do need to optimize her medical and orthopaedic status first to make surgery as safe as possible before proceeding.  She verbalized agreement with this plan. All questions were answered. I look forward to seeing her at our next appointment.    Dionisio Quarles MD  Date of Service (when I saw the patient): 10/27/2023.

## 2023-10-27 NOTE — NURSING NOTE
"Reason For Visit:   Chief Complaint   Patient presents with    Consult     New patient consult for left knee arthritis, surgical consult for total knee.  Seen by and referred to us by Dr. Woodson.       ROSA JR: 34.17    Ht 1.6 m (5' 2.99\")   Wt 117.9 kg (260 lb)   LMP  (LMP Unknown)   BMI 46.07 kg/m      Pain Assessment  Patient Currently in Pain: Yes  0-10 Pain Scale: 10 (9-10 out of 10 standing and walking.  Pain decreases while seated.)  Primary Pain Location: Knee (left knee)    Gus Griggs, ATC    "

## 2023-11-05 DIAGNOSIS — M79.89 LEG SWELLING: ICD-10-CM

## 2023-11-06 RX ORDER — FUROSEMIDE 20 MG
TABLET ORAL
Qty: 30 TABLET | Refills: 1 | Status: SHIPPED | OUTPATIENT
Start: 2023-11-06 | End: 2024-02-19

## 2023-11-07 ENCOUNTER — VIRTUAL VISIT (OUTPATIENT)
Dept: PSYCHOLOGY | Facility: CLINIC | Age: 53
End: 2023-11-07
Payer: COMMERCIAL

## 2023-11-07 DIAGNOSIS — F41.1 GAD (GENERALIZED ANXIETY DISORDER): ICD-10-CM

## 2023-11-07 DIAGNOSIS — F33.1 MAJOR DEPRESSIVE DISORDER, RECURRENT EPISODE, MODERATE (H): Primary | ICD-10-CM

## 2023-11-07 PROCEDURE — 90834 PSYTX W PT 45 MINUTES: CPT | Mod: VID | Performed by: SOCIAL WORKER

## 2023-11-07 ASSESSMENT — PATIENT HEALTH QUESTIONNAIRE - PHQ9
SUM OF ALL RESPONSES TO PHQ QUESTIONS 1-9: 10
SUM OF ALL RESPONSES TO PHQ QUESTIONS 1-9: 10
10. IF YOU CHECKED OFF ANY PROBLEMS, HOW DIFFICULT HAVE THESE PROBLEMS MADE IT FOR YOU TO DO YOUR WORK, TAKE CARE OF THINGS AT HOME, OR GET ALONG WITH OTHER PEOPLE: VERY DIFFICULT

## 2023-11-07 NOTE — PROGRESS NOTES
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 develop a list of activities that will imrpove her mood and engage in these activities three times per week.  .        Client has reviewed and agreed to the above plan.      MILI AmesSW

## 2023-11-20 ENCOUNTER — MYC REFILL (OUTPATIENT)
Dept: FAMILY MEDICINE | Facility: CLINIC | Age: 53
End: 2023-11-20
Payer: COMMERCIAL

## 2023-11-20 DIAGNOSIS — G89.4 CHRONIC PAIN SYNDROME: Chronic | ICD-10-CM

## 2023-11-20 DIAGNOSIS — G89.29 CHRONIC BILATERAL BACK PAIN, UNSPECIFIED BACK LOCATION: Chronic | ICD-10-CM

## 2023-11-20 DIAGNOSIS — M54.9 CHRONIC BILATERAL BACK PAIN, UNSPECIFIED BACK LOCATION: Chronic | ICD-10-CM

## 2023-11-20 RX ORDER — OXYCODONE AND ACETAMINOPHEN 10; 325 MG/1; MG/1
1 TABLET ORAL EVERY 6 HOURS PRN
Qty: 90 TABLET | Refills: 0 | Status: SHIPPED | OUTPATIENT
Start: 2023-11-23 | End: 2023-12-18

## 2023-11-21 RX ORDER — METHOCARBAMOL 750 MG/1
TABLET, FILM COATED ORAL
Qty: 90 TABLET | Refills: 0 | Status: SHIPPED | OUTPATIENT
Start: 2023-11-21 | End: 2023-12-18

## 2023-11-21 NOTE — TELEPHONE ENCOUNTER
Signed Prescriptions:                        Disp   Refills    methocarbamol (ROBAXIN) 750 MG tablet      90 tab*0        Sig: TAKE ONE TABLET BY MOUTH THREE TIMES A DAY AS NEEDED           FOR MUSCLE SPASMS  Authorizing Provider: KAELA ANDINO

## 2023-12-18 ENCOUNTER — MYC REFILL (OUTPATIENT)
Dept: FAMILY MEDICINE | Facility: CLINIC | Age: 53
End: 2023-12-18
Payer: COMMERCIAL

## 2023-12-18 DIAGNOSIS — M54.9 CHRONIC BILATERAL BACK PAIN, UNSPECIFIED BACK LOCATION: Chronic | ICD-10-CM

## 2023-12-18 DIAGNOSIS — G89.4 CHRONIC PAIN SYNDROME: Chronic | ICD-10-CM

## 2023-12-18 DIAGNOSIS — G89.29 CHRONIC BILATERAL BACK PAIN, UNSPECIFIED BACK LOCATION: Chronic | ICD-10-CM

## 2023-12-18 RX ORDER — METHOCARBAMOL 750 MG/1
TABLET, FILM COATED ORAL
Qty: 90 TABLET | Refills: 0 | Status: SHIPPED | OUTPATIENT
Start: 2023-12-18 | End: 2024-01-20

## 2023-12-18 RX ORDER — OXYCODONE AND ACETAMINOPHEN 10; 325 MG/1; MG/1
1 TABLET ORAL EVERY 6 HOURS PRN
Qty: 90 TABLET | Refills: 0 | Status: SHIPPED | OUTPATIENT
Start: 2023-12-18 | End: 2024-01-20

## 2023-12-20 ENCOUNTER — PATIENT OUTREACH (OUTPATIENT)
Dept: CARE COORDINATION | Facility: CLINIC | Age: 53
End: 2023-12-20
Payer: COMMERCIAL

## 2023-12-27 DIAGNOSIS — F41.1 GAD (GENERALIZED ANXIETY DISORDER): ICD-10-CM

## 2023-12-27 DIAGNOSIS — G89.4 CHRONIC PAIN SYNDROME: Chronic | ICD-10-CM

## 2023-12-27 DIAGNOSIS — F33.1 MAJOR DEPRESSIVE DISORDER, RECURRENT EPISODE, MODERATE (H): Chronic | ICD-10-CM

## 2023-12-29 RX ORDER — DULOXETIN HYDROCHLORIDE 60 MG/1
60 CAPSULE, DELAYED RELEASE ORAL DAILY
Qty: 30 CAPSULE | Refills: 5 | Status: SHIPPED | OUTPATIENT
Start: 2023-12-29 | End: 2024-04-12

## 2024-01-02 ASSESSMENT — ANXIETY QUESTIONNAIRES
7. FEELING AFRAID AS IF SOMETHING AWFUL MIGHT HAPPEN: SEVERAL DAYS
GAD7 TOTAL SCORE: 9
GAD7 TOTAL SCORE: 9
4. TROUBLE RELAXING: SEVERAL DAYS
2. NOT BEING ABLE TO STOP OR CONTROL WORRYING: SEVERAL DAYS
1. FEELING NERVOUS, ANXIOUS, OR ON EDGE: MORE THAN HALF THE DAYS
6. BECOMING EASILY ANNOYED OR IRRITABLE: MORE THAN HALF THE DAYS
GAD7 TOTAL SCORE: 9
5. BEING SO RESTLESS THAT IT IS HARD TO SIT STILL: SEVERAL DAYS
3. WORRYING TOO MUCH ABOUT DIFFERENT THINGS: SEVERAL DAYS
7. FEELING AFRAID AS IF SOMETHING AWFUL MIGHT HAPPEN: SEVERAL DAYS
8. IF YOU CHECKED OFF ANY PROBLEMS, HOW DIFFICULT HAVE THESE MADE IT FOR YOU TO DO YOUR WORK, TAKE CARE OF THINGS AT HOME, OR GET ALONG WITH OTHER PEOPLE?: SOMEWHAT DIFFICULT
IF YOU CHECKED OFF ANY PROBLEMS ON THIS QUESTIONNAIRE, HOW DIFFICULT HAVE THESE PROBLEMS MADE IT FOR YOU TO DO YOUR WORK, TAKE CARE OF THINGS AT HOME, OR GET ALONG WITH OTHER PEOPLE: SOMEWHAT DIFFICULT

## 2024-01-04 ENCOUNTER — PATIENT OUTREACH (OUTPATIENT)
Dept: CARE COORDINATION | Facility: CLINIC | Age: 54
End: 2024-01-04
Payer: COMMERCIAL

## 2024-01-08 ENCOUNTER — VIRTUAL VISIT (OUTPATIENT)
Dept: PSYCHOLOGY | Facility: CLINIC | Age: 54
End: 2024-01-08
Payer: COMMERCIAL

## 2024-01-08 DIAGNOSIS — F41.1 GAD (GENERALIZED ANXIETY DISORDER): ICD-10-CM

## 2024-01-08 DIAGNOSIS — F33.1 MAJOR DEPRESSIVE DISORDER, RECURRENT EPISODE, MODERATE (H): Primary | ICD-10-CM

## 2024-01-08 PROCEDURE — 90834 PSYTX W PT 45 MINUTES: CPT | Mod: 95 | Performed by: SOCIAL WORKER

## 2024-01-08 ASSESSMENT — PATIENT HEALTH QUESTIONNAIRE - PHQ9
10. IF YOU CHECKED OFF ANY PROBLEMS, HOW DIFFICULT HAVE THESE PROBLEMS MADE IT FOR YOU TO DO YOUR WORK, TAKE CARE OF THINGS AT HOME, OR GET ALONG WITH OTHER PEOPLE: SOMEWHAT DIFFICULT
SUM OF ALL RESPONSES TO PHQ QUESTIONS 1-9: 9
SUM OF ALL RESPONSES TO PHQ QUESTIONS 1-9: 9

## 2024-01-08 NOTE — PROGRESS NOTES
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 will  encourage and process journaling with client to see if it has improved her mood . Continue to engage in healthy activities that improve her mood and makes her feel good about self.     Objective #B  Client will use cognitive strategies identified in therapy to challenge anxious thoughts.  Status: Continued - Date(s): 1-08-24     Intervention(s)  Therapist will assign homework Client will complete mood log to learn effective CBT skills and utilize daily.     Objective #C  Client will  engage in self care activities that reduce anxiety and improve mood .  Status: Continued - Date(s):  1-08-24   Intervention(s)  Therapist will assign homework Client will develop a list of activities that will imrpove her mood and engage in these activities three times per week.  .        Client has reviewed and agreed to the above plan.      SERVANDO Ames

## 2024-01-17 ENCOUNTER — PATIENT OUTREACH (OUTPATIENT)
Dept: CARE COORDINATION | Facility: CLINIC | Age: 54
End: 2024-01-17
Payer: COMMERCIAL

## 2024-01-18 ENCOUNTER — PATIENT OUTREACH (OUTPATIENT)
Dept: CARE COORDINATION | Facility: CLINIC | Age: 54
End: 2024-01-18
Payer: COMMERCIAL

## 2024-02-02 ENCOUNTER — OFFICE VISIT (OUTPATIENT)
Dept: ORTHOPEDICS | Facility: CLINIC | Age: 54
End: 2024-02-02
Payer: COMMERCIAL

## 2024-02-02 VITALS — HEIGHT: 63 IN | WEIGHT: 254 LBS | BODY MASS INDEX: 45 KG/M2

## 2024-02-02 DIAGNOSIS — M17.12 PRIMARY OSTEOARTHRITIS OF LEFT KNEE: Primary | ICD-10-CM

## 2024-02-02 PROCEDURE — 99213 OFFICE O/P EST LOW 20 MIN: CPT | Performed by: ORTHOPAEDIC SURGERY

## 2024-02-02 ASSESSMENT — KOOS JR
BENDING TO THE FLOOR TO PICK UP OBJECT: SEVERE
KOOS JR SCORING: 44.91
RISING FROM SITTING: SEVERE
TWISING OR PIVOTING ON KNEE: SEVERE
GOING UP OR DOWN STAIRS: MODERATE
STANDING UPRIGHT: MODERATE
HOW SEVERE IS YOUR KNEE STIFFNESS AFTER FIRST WAKING IN MORNING: MODERATE
STRAIGHTENING KNEE FULLY: MODERATE

## 2024-02-02 NOTE — LETTER
2/2/2024         RE: Velma Diaz  [address]  [facility] [address]        Dear Colleague,    Thank you for referring your patient, Velma Diaz, to the [facility] [place]. Please see a copy of my visit note below.    Orthopaedic Surgery Clinic Note - New Patient    Chief Complaint:  Follow-up left knee pain.    I saw Ms. Diaz today in follow-up for her left knee.  This pleasant 53-year old patient has tricompartmental left knee osteoarthritis, and we discussed the nonoperative and operative treatment options at her last appointment here 10/27/2023.  We discussed the option of a left total knee arthroplasty which I discussed I would be happy to help her with, though we did need to optimize her modifiable risk factors first to minimize the risk for complications, including her BMI, which was around 46.1 in October.  The patient has been working on this and reports she has lost several pounds.  She denies giving way or locking episodes with the left knee.  She endorses plans to start pool therapy.  She is doing [facility] physical therapy, and reports that this is going well and that she understands her exercises.  She does have custody of a young child which does keep her busy.  Her walking distance limited by her left knee pain and is now about 1 block in distance.  She rates her left knee pain as about 6 out of 10 in severity.  She reports doing PT in [place], [place] at [facility] on a weekly basis.  She endorses doing her home exercise program.  She endorses intermittent left knee effusion, tenderness, and difficulty with stairs.  She is a current nonsmoker and nondrinker in remission for both for years.    Treatments:  Cane, home exercises, PT, oxycodone, ibuprofen, peppermint and CBD oil, CSI with 25% relief x 1 day.    Examination of the left knee shows the skin overlying it to be intact without visible suspicious lesions or obvious palpable masses.   She demonstrates the ability to do a straight leg raise with a 0 degree lag.  Normal patellar tracking.  Range of motion is approximately 0 to 100 degrees.  The knee is stable to varus and valgus stress in extension without coronal plane laxity.  0 Lachman.  0 anterior drawer.  0 posterior drawer.  Tender to palpation along the medial joint line.    Imaging:  No new images were obtained today.  I did review her previous 10/27/2023 radiographs with her and showed her the images, which demonstrate tricompartmental left knee osteoarthritis, with worst involvement of the medial compartment where there is severe loss of joint space.    Impression:  53-year-old patient with left knee tricompartmental osteoarthritis, current BMI 44.99 kg/m .    Plan:  I discussed the findings and treatment options, both nonsurgical and surgical, with the patient layman's terms.  [place] agreement that a left total knee arthroplasty would be one good option for her, however it should optimally be done once her she is in optimized achievable medical condition for surgery with regards to her modifiable risk factors.  The nature of the surgery, as well as risks, benefits, and alternatives, and postop plan, were discussed.  The importance of knee exercises with regard to fall prevention and optimizing her recovery after surgery was discussed.  She verbalized interest in potentially having surgery sometime after May 2024 if possible.  Follow-up in 2 months.  All questions were answered.      Sincerely,        Dionisio Quarles MD

## 2024-02-02 NOTE — PROGRESS NOTES
Orthopaedic Surgery Clinic Note - New Patient    Chief Complaint:  Follow-up left knee pain.    I saw Ms. Diaz today in follow-up for her left knee.  This pleasant 53-year old patient has tricompartmental left knee osteoarthritis, and we discussed the nonoperative and operative treatment options at her last appointment here 10/27/2023.  We discussed the option of a left total knee arthroplasty which I discussed I would be happy to help her with, though we did need to optimize her modifiable risk factors first to minimize the risk for complications, including her BMI, which was around 46.1 in October.  The patient has been working on this and reports she has lost several pounds.  She denies giving way or locking episodes with the left knee.  She endorses plans to start pool therapy.  She is doing [facility] physical therapy, and reports that this is going well and that she understands her exercises.  She does have custody of a young child which does keep her busy.  Her walking distance limited by her left knee pain and is now about 1 block in distance.  She rates her left knee pain as about 6 out of 10 in severity.  She reports doing PT in [place], [place] at [facility] on a weekly basis.  She endorses doing her home exercise program.  She endorses intermittent left knee effusion, tenderness, and difficulty with stairs.  She is a current nonsmoker and nondrinker in remission for both for years.    Treatments:  Cane, home exercises, PT, oxycodone, ibuprofen, peppermint and CBD oil, CSI with 25% relief x 1 day.    Examination of the left knee shows the skin overlying it to be intact without visible suspicious lesions or obvious palpable masses.  She demonstrates the ability to do a straight leg raise with a 0 degree lag.  Normal patellar tracking.  Range of motion is approximately 0 to 100 degrees.  The knee is stable to varus and valgus stress in extension without coronal plane laxity.  0 Lachman.   0 anterior drawer.  0 posterior drawer.  Tender to palpation along the medial joint line.    Imaging:  No new images were obtained today.  I did review her previous 10/27/2023 radiographs with her and showed her the images, which demonstrate tricompartmental left knee osteoarthritis, with worst involvement of the medial compartment where there is severe loss of joint space.    Impression:  53-year-old patient with left knee tricompartmental osteoarthritis, current BMI 44.99 kg/m .    Plan:  I discussed the findings and treatment options, both nonsurgical and surgical, with the patient layman's terms.  [place] agreement that a left total knee arthroplasty would be one good option for her, however it should optimally be done once her she is in optimized achievable medical condition for surgery with regards to her modifiable risk factors.  The nature of the surgery, as well as risks, benefits, and alternatives, and postop plan, were discussed.  The importance of knee exercises with regard to fall prevention and optimizing her recovery after surgery was discussed.  She verbalized interest in potentially having surgery sometime after May 2024 if possible.  Follow-up in 2 months.  All questions were answered.

## 2024-02-02 NOTE — NURSING NOTE
"Reason For Visit:   Chief Complaint   Patient presents with    RECHECK     Patient returns 3 months since last visit to review her progress with weight loss and PT.  Patient has been doing PT in [place] at [facility] weekly since our previous visit.  And she has continued home exercises outside of PT.  Regarding the left knee, she still endorses intermittent effusion, tenderness and difficulty with stairs.  Some gradual pain improvement with occasional spikes of sharp pain.       Ht 1.6 m (5' 3\")   Wt 115.2 kg (254 lb)   LMP  (LMP Unknown)   BMI 44.99 kg/m      Pain Assessment  Patient Currently in Pain: Yes  0-10 Pain Scale: 6  Primary Pain Location: Knee    Gus Griggs, ATC    "

## 2024-02-09 DIAGNOSIS — G60.9 IDIOPATHIC PERIPHERAL NEUROPATHY: ICD-10-CM

## 2024-02-09 RX ORDER — NORTRIPTYLINE HCL 10 MG
CAPSULE ORAL
Qty: 270 CAPSULE | Refills: 0 | Status: SHIPPED | OUTPATIENT
Start: 2024-02-09 | End: 2024-04-12

## 2024-02-19 ENCOUNTER — MYC REFILL (OUTPATIENT)
Dept: FAMILY MEDICINE | Facility: CLINIC | Age: 54
End: 2024-02-19
Payer: COMMERCIAL

## 2024-02-19 DIAGNOSIS — G89.4 CHRONIC PAIN SYNDROME: Chronic | ICD-10-CM

## 2024-02-19 DIAGNOSIS — M79.89 LEG SWELLING: ICD-10-CM

## 2024-02-19 RX ORDER — FUROSEMIDE 20 MG
TABLET ORAL
Qty: 30 TABLET | Refills: 0 | Status: SHIPPED | OUTPATIENT
Start: 2024-02-19 | End: 2024-03-15

## 2024-02-19 RX ORDER — OXYCODONE AND ACETAMINOPHEN 10; 325 MG/1; MG/1
1 TABLET ORAL EVERY 6 HOURS PRN
Qty: 90 TABLET | Refills: 0 | Status: SHIPPED | OUTPATIENT
Start: 2024-02-19 | End: 2024-03-15

## 2024-02-19 NOTE — TELEPHONE ENCOUNTER
Patient needs to schedule for PCP upon PCP's return for updated pain contract etc.   Dilcia Becker PA-C

## 2024-03-15 ENCOUNTER — MYC REFILL (OUTPATIENT)
Dept: FAMILY MEDICINE | Facility: CLINIC | Age: 54
End: 2024-03-15
Payer: COMMERCIAL

## 2024-03-15 DIAGNOSIS — M54.9 CHRONIC BILATERAL BACK PAIN, UNSPECIFIED BACK LOCATION: Chronic | ICD-10-CM

## 2024-03-15 DIAGNOSIS — G89.29 CHRONIC BILATERAL BACK PAIN, UNSPECIFIED BACK LOCATION: Chronic | ICD-10-CM

## 2024-03-15 DIAGNOSIS — M79.89 LEG SWELLING: ICD-10-CM

## 2024-03-15 RX ORDER — METHOCARBAMOL 750 MG/1
TABLET, FILM COATED ORAL
Qty: 90 TABLET | Refills: 0 | Status: SHIPPED | OUTPATIENT
Start: 2024-03-15 | End: 2024-04-12

## 2024-03-15 RX ORDER — FUROSEMIDE 20 MG
20 TABLET ORAL DAILY
Qty: 30 TABLET | Refills: 0 | Status: SHIPPED | OUTPATIENT
Start: 2024-03-15 | End: 2024-04-12

## 2024-03-30 ENCOUNTER — FCC EXTENDED DOCUMENTATION (OUTPATIENT)
Dept: PSYCHOLOGY | Facility: CLINIC | Age: 54
End: 2024-03-30
Payer: COMMERCIAL

## 2024-03-30 NOTE — PROGRESS NOTES
Discharge Summary  Multiple Sessions    Client Name: Velma Diaz MRN#: [id number] YOB: 1970      Intake / Discharge Date: 3-30-24      DSM5 Diagnoses: (Sustained by DSM5 Criteria Listed Above)  Diagnoses: 296.32 (F33.1) Major Depressive Disorder, Recurrent Episode, Moderate _ and With anxious distress  300.02 (F41.1) Generalized Anxiety Disorder  Psychosocial & Contextual Factors: chronic health issues, raising child with special needs, life stressors  Presenting Concern:  Anxiety & Depression      Reason for Discharge:  Clician is leaving the organization      Disposition at Time of Last Encounter:   Comments:   Managing well overall; fair up of medical issues and pain management issues, mood stable     Risk Management:   Client denies a history of suicidal ideation, suicide attempts, self-injurious behavior, homicidal ideation, homicidal behavior, and and other safety concerns  Recommended that patient call 911 or go to the local ED should there be a change in any of these risk factors.      Referred To:  Patient given referral information for the future if she would like to consider future counseling services        SERVANDO Ames   3/30/2024

## 2024-04-02 DIAGNOSIS — S83.242A TRAUMATIC TEAR OF MEDIAL MENISCUS OF KNEE, LEFT, INITIAL ENCOUNTER: ICD-10-CM

## 2024-04-02 DIAGNOSIS — M17.12 PRIMARY OSTEOARTHRITIS OF LEFT KNEE: ICD-10-CM

## 2024-04-02 RX ORDER — LIDOCAINE 4 G/G
1 PATCH TOPICAL EVERY 24 HOURS
Qty: 15 PATCH | Refills: 3 | Status: SHIPPED | OUTPATIENT
Start: 2024-04-02

## 2024-04-02 NOTE — TELEPHONE ENCOUNTER
Pending Prescriptions:                       Disp   Refills    Lidocaine (LIDOCARE) 4 % Patch            15 pat*3            Sig: Place 1 patch onto the skin every 24 hours To           prevent lidocaine toxicity, patient should be           patch free for 12 hrs daily.   Susan Hines CMA

## 2024-04-05 SDOH — HEALTH STABILITY: PHYSICAL HEALTH: ON AVERAGE, HOW MANY DAYS PER WEEK DO YOU ENGAGE IN MODERATE TO STRENUOUS EXERCISE (LIKE A BRISK WALK)?: 0 DAYS

## 2024-04-05 SDOH — HEALTH STABILITY: PHYSICAL HEALTH: ON AVERAGE, HOW MANY MINUTES DO YOU ENGAGE IN EXERCISE AT THIS LEVEL?: 0 MIN

## 2024-04-05 ASSESSMENT — SOCIAL DETERMINANTS OF HEALTH (SDOH): HOW OFTEN DO YOU GET TOGETHER WITH FRIENDS OR RELATIVES?: MORE THAN THREE TIMES A WEEK

## 2024-04-05 NOTE — COMMUNITY RESOURCES LIST (ENGLISH)
April 5, 2024           YOUR PERSONALIZED LIST OF SERVICES & PROGRAMS           & RECREATION    Sports      [facility]  Phone: [phone]  Website: [email or web address]  Language: English  Hours: Mon 7:30 AM - 4:00 PM Tue 7:30 AM - 4:00 PM Wed 7:30 AM - 4:00 PM Thu 7:30 AM - 4:00 PM Fri 7:30 AM - 4:00 PM    Classes/Groups      [facility]  Phone: [phone]  Website: [email or web address]  Language: English  Hours: Mon 7:30 AM - 4:00 PM Tue 7:30 AM - 4:00 PM Wed 7:30 AM - 4:00 PM Thu 7:30 AM - 4:00 PM Fri 7:30 AM - 4:00 PM               IMPORTANT NUMBERS & WEBSITES        Emergency Services  911  .   [facility]  211 http://211unitedway.org  .   Poison Control  [phone]) [phone] http://[place]poison.org http://[place]poison.org  .     Suicide and Crisis Lifeline  988 http://988Teacher Training Instituteline.org  .   Childhelp [place] Child Abuse Hotline  759.770.6792 http://Childhelphotline.org   .   [place] Sexual Assault Hotline  (921) 630-1033 (HOPE) http://Auxmoneyn.org   .     [place] Runaway Safeline  (580) 498-3943 (RUNAWAY) http://Designer MaterialruShopSocially.org  .   Pregnancy & Postpartum Support  Call/text 125-261-5603  [place]: http://ppsupport[place].org  [place]: http://[employer]com/wi  .   Substance Abuse National Helpline ([facility])  137-049-HELP (3574) http://Findtreatment.gov   .                DISCLAIMER: These resources have been generated via the [employer]. [employer] does not endorse any service providers mentioned in this resource list. [employer] does not guarantee that the services mentioned in this resource list will be available to you or will improve your health or wellness.    [facility]

## 2024-04-11 ASSESSMENT — PATIENT HEALTH QUESTIONNAIRE - PHQ9
SUM OF ALL RESPONSES TO PHQ QUESTIONS 1-9: 9
SUM OF ALL RESPONSES TO PHQ QUESTIONS 1-9: 9
10. IF YOU CHECKED OFF ANY PROBLEMS, HOW DIFFICULT HAVE THESE PROBLEMS MADE IT FOR YOU TO DO YOUR WORK, TAKE CARE OF THINGS AT HOME, OR GET ALONG WITH OTHER PEOPLE: VERY DIFFICULT

## 2024-04-12 ENCOUNTER — OFFICE VISIT (OUTPATIENT)
Dept: FAMILY MEDICINE | Facility: CLINIC | Age: 54
End: 2024-04-12
Payer: COMMERCIAL

## 2024-04-12 VITALS
WEIGHT: 254 LBS | DIASTOLIC BLOOD PRESSURE: 80 MMHG | BODY MASS INDEX: 45 KG/M2 | OXYGEN SATURATION: 98 % | SYSTOLIC BLOOD PRESSURE: 117 MMHG | HEIGHT: 63 IN | TEMPERATURE: 97.9 F

## 2024-04-12 DIAGNOSIS — B37.2 CANDIDAL INTERTRIGO: ICD-10-CM

## 2024-04-12 DIAGNOSIS — E78.5 HYPERLIPIDEMIA WITH TARGET LDL LESS THAN 130: ICD-10-CM

## 2024-04-12 DIAGNOSIS — M54.9 CHRONIC BILATERAL BACK PAIN, UNSPECIFIED BACK LOCATION: Chronic | ICD-10-CM

## 2024-04-12 DIAGNOSIS — G89.29 CHRONIC PAIN OF LEFT KNEE: ICD-10-CM

## 2024-04-12 DIAGNOSIS — Z00.00 ROUTINE GENERAL MEDICAL EXAMINATION AT A HEALTH CARE FACILITY: Primary | ICD-10-CM

## 2024-04-12 DIAGNOSIS — F11.90 CHRONIC, CONTINUOUS USE OF OPIOIDS: ICD-10-CM

## 2024-04-12 DIAGNOSIS — E66.01 MORBID OBESITY WITH BODY MASS INDEX OF 40.0-49.9 (H): ICD-10-CM

## 2024-04-12 DIAGNOSIS — G89.29 CHRONIC BILATERAL BACK PAIN, UNSPECIFIED BACK LOCATION: Chronic | ICD-10-CM

## 2024-04-12 DIAGNOSIS — F41.1 GAD (GENERALIZED ANXIETY DISORDER): ICD-10-CM

## 2024-04-12 DIAGNOSIS — M25.562 CHRONIC PAIN OF LEFT KNEE: ICD-10-CM

## 2024-04-12 DIAGNOSIS — G47.33 OSA (OBSTRUCTIVE SLEEP APNEA): Chronic | ICD-10-CM

## 2024-04-12 DIAGNOSIS — M79.89 LEG SWELLING: ICD-10-CM

## 2024-04-12 DIAGNOSIS — R73.01 IMPAIRED FASTING GLUCOSE: ICD-10-CM

## 2024-04-12 DIAGNOSIS — M17.12 PRIMARY OSTEOARTHRITIS OF LEFT KNEE: ICD-10-CM

## 2024-04-12 DIAGNOSIS — G60.9 IDIOPATHIC PERIPHERAL NEUROPATHY: ICD-10-CM

## 2024-04-12 DIAGNOSIS — G89.4 CHRONIC PAIN SYNDROME: ICD-10-CM

## 2024-04-12 DIAGNOSIS — F33.1 MAJOR DEPRESSIVE DISORDER, RECURRENT EPISODE, MODERATE (H): Chronic | ICD-10-CM

## 2024-04-12 LAB
ANION GAP SERPL CALCULATED.3IONS-SCNC: 12 MMOL/L (ref 7–15)
BUN SERPL-MCNC: 19.4 MG/DL (ref 6–20)
CALCIUM SERPL-MCNC: 9.1 MG/DL (ref 8.6–10)
CHLORIDE SERPL-SCNC: 102 MMOL/L (ref 98–107)
CHOLEST SERPL-MCNC: 246 MG/DL
CREAT SERPL-MCNC: 0.85 MG/DL (ref 0.51–0.95)
CREAT UR-MCNC: 274 MG/DL
DEPRECATED HCO3 PLAS-SCNC: 23 MMOL/L (ref 22–29)
EGFRCR SERPLBLD CKD-EPI 2021: 81 ML/MIN/1.73M2
ERYTHROCYTE [DISTWIDTH] IN BLOOD BY AUTOMATED COUNT: 12.3 % (ref 10–15)
FASTING STATUS PATIENT QL REPORTED: NO
GLUCOSE SERPL-MCNC: 116 MG/DL (ref 70–99)
HBA1C MFR BLD: 5.3 % (ref 0–5.6)
HCT VFR BLD AUTO: 41.7 % (ref 35–47)
HDLC SERPL-MCNC: 42 MG/DL
HGB BLD-MCNC: 13.8 G/DL (ref 11.7–15.7)
LDLC SERPL CALC-MCNC: 148 MG/DL
MCH RBC QN AUTO: 30.8 PG (ref 26.5–33)
MCHC RBC AUTO-ENTMCNC: 33.1 G/DL (ref 31.5–36.5)
MCV RBC AUTO: 93 FL (ref 78–100)
NONHDLC SERPL-MCNC: 204 MG/DL
PLATELET # BLD AUTO: 304 10E3/UL (ref 150–450)
POTASSIUM SERPL-SCNC: 4.4 MMOL/L (ref 3.4–5.3)
RBC # BLD AUTO: 4.48 10E6/UL (ref 3.8–5.2)
SODIUM SERPL-SCNC: 137 MMOL/L (ref 135–145)
TRIGL SERPL-MCNC: 282 MG/DL
WBC # BLD AUTO: 9 10E3/UL (ref 4–11)

## 2024-04-12 PROCEDURE — 83036 HEMOGLOBIN GLYCOSYLATED A1C: CPT | Performed by: FAMILY MEDICINE

## 2024-04-12 PROCEDURE — 85027 COMPLETE CBC AUTOMATED: CPT | Performed by: FAMILY MEDICINE

## 2024-04-12 PROCEDURE — 36415 COLL VENOUS BLD VENIPUNCTURE: CPT | Performed by: FAMILY MEDICINE

## 2024-04-12 PROCEDURE — G0481 DRUG TEST DEF 8-14 CLASSES: HCPCS | Performed by: FAMILY MEDICINE

## 2024-04-12 PROCEDURE — 99214 OFFICE O/P EST MOD 30 MIN: CPT | Mod: 25 | Performed by: FAMILY MEDICINE

## 2024-04-12 PROCEDURE — 99396 PREV VISIT EST AGE 40-64: CPT | Performed by: FAMILY MEDICINE

## 2024-04-12 PROCEDURE — 80048 BASIC METABOLIC PNL TOTAL CA: CPT | Performed by: FAMILY MEDICINE

## 2024-04-12 PROCEDURE — 80061 LIPID PANEL: CPT | Performed by: FAMILY MEDICINE

## 2024-04-12 RX ORDER — OXYCODONE AND ACETAMINOPHEN 10; 325 MG/1; MG/1
1 TABLET ORAL EVERY 6 HOURS PRN
Qty: 90 TABLET | Refills: 0 | Status: SHIPPED | OUTPATIENT
Start: 2024-04-12 | End: 2024-05-10

## 2024-04-12 RX ORDER — NYSTATIN 100000 [USP'U]/G
POWDER TOPICAL
Qty: 30 G | Refills: 1 | Status: SHIPPED | OUTPATIENT
Start: 2024-04-12

## 2024-04-12 RX ORDER — NORTRIPTYLINE HCL 10 MG
CAPSULE ORAL
Qty: 270 CAPSULE | Refills: 3 | Status: SHIPPED | OUTPATIENT
Start: 2024-04-12

## 2024-04-12 RX ORDER — METHOCARBAMOL 750 MG/1
TABLET, FILM COATED ORAL
Qty: 90 TABLET | Refills: 2 | Status: SHIPPED | OUTPATIENT
Start: 2024-04-12

## 2024-04-12 RX ORDER — DULOXETIN HYDROCHLORIDE 60 MG/1
60 CAPSULE, DELAYED RELEASE ORAL DAILY
Qty: 30 CAPSULE | Refills: 5 | Status: SHIPPED | OUTPATIENT
Start: 2024-04-12 | End: 2024-09-30

## 2024-04-12 RX ORDER — FUROSEMIDE 20 MG
20 TABLET ORAL DAILY
Qty: 30 TABLET | Refills: 11 | Status: SHIPPED | OUTPATIENT
Start: 2024-04-12

## 2024-04-12 ASSESSMENT — PAIN SCALES - GENERAL: PAINLEVEL: EXTREME PAIN (8)

## 2024-04-12 NOTE — NURSING NOTE
Pain contract given to Rep. Romero to scan in to the patient's chart.   Lilliam Coyle  (RN)  2023 07:59:54

## 2024-04-12 NOTE — LETTER

## 2024-04-12 NOTE — PROGRESS NOTES
Preventive Care Visit  [facility]  Srinivasa Hunter MD, Family Medicine  Apr 12, 2024      Assessment & Plan       ICD-10-CM    1. Routine general medical examination at a health care facility  Z00.00       2. Chronic pain syndrome  G89.4 DULoxetine (CYMBALTA) 60 MG capsule     oxyCODONE-acetaminophen (PERCOCET)  MG per tablet     [id number] - Urine Drug Confirmation Panel (Comprehensive)      3. Chronic, continuous use of opioids  F11.90 [id number] - Urine Drug Confirmation Panel (Comprehensive)      4. Major depressive disorder, recurrent episode, moderate (H)  F33.1 DULoxetine (CYMBALTA) 60 MG capsule      5. ROSE MARIE (generalized anxiety disorder)  F41.1 DULoxetine (CYMBALTA) 60 MG capsule      6. Leg swelling  M79.89 furosemide (LASIX) 20 MG tablet     BASIC METABOLIC PANEL     BASIC METABOLIC PANEL      7. Chronic bilateral back pain, unspecified back location  M54.9 methocarbamol (ROBAXIN) 750 MG tablet    G89.29       8. Idiopathic peripheral neuropathy  G60.9 nortriptyline (PAMELOR) 10 MG capsule      9. Candidal intertrigo  B37.2 nystatin (NYAMYC) 372495 UNIT/GM external powder      10. Hyperlipidemia with target LDL less than 130  E78.5 Lipid panel reflex to direct LDL Non-fasting     Semaglutide-Weight Management (WEGOVY) 0.25 MG/0.5ML pen     Semaglutide-Weight Management (WEGOVY) 0.5 MG/0.5ML pen     Lipid panel reflex to direct LDL Non-fasting      11. Morbid obesity with body mass index of 40.0-49.9 (H)  E66.01 Semaglutide-Weight Management (WEGOVY) 0.25 MG/0.5ML pen     Semaglutide-Weight Management (WEGOVY) 0.5 MG/0.5ML pen      12. Impaired fasting glucose  R73.01 CBC with platelets     Hemoglobin A1c     Semaglutide-Weight Management (WEGOVY) 0.25 MG/0.5ML pen     Semaglutide-Weight Management (WEGOVY) 0.5 MG/0.5ML pen     CBC with platelets     Hemoglobin A1c      13. Primary osteoarthritis of left knee  M17.12       14. Chronic pain of left knee  M25.562     G89.29       15.  "MAYA (obstructive sleep apnea)/Hypoventilation Syndrome  G47.33         Blood pressure and other vital signs look good  We discussed the above items  We will check some nonfasting lab work today as above  Discussed various weight loss medications including the popular GLP-1 agonist class for weight loss  We will try her on Wegovy  I still encouraged her to follow through with the [facility] weight loss program when she can get into see them  Follow through with neurology and orthopedic appointments as scheduled  Continue to work on diet and exercise treatment for weight loss as well  We will continue her same baseline meds for now  I reviewed a controlled substance agreement again with her and we both signed off on that  Plan a tentative recheck in about 6 months      BMI  Estimated body mass index is 44.99 kg/m  as calculated from the following:    Height as of this encounter: 1.6 m (5' 3\").    Weight as of this encounter: 115.2 kg (254 lb).   Weight management plan: Patient referred to endocrine and/or weight management specialty Discussed healthy diet and exercise guidelines    Counseling  Appropriate preventive services were discussed with this patient, including applicable screening as appropriate for fall prevention, nutrition, physical activity, Tobacco-use cessation, weight loss and cognition.  Checklist reviewing preventive services available has been given to the patient.  Reviewed patient's diet, addressing concerns and/or questions.   The patient's PHQ-9 score is consistent with mild depression. She was provided with information regarding depression.           Subjective   Kate is a 53 year old, presenting for the following:  Physical    Other concerns:   Discuss Weight       4/12/2024     8:46 AM   Additional Questions   Roomed by cam   Accompanied by none         4/12/2024     8:46 AM   Patient Reported Additional Medications   Patient reports taking the following new medications none        Via the " Health Maintenance questionnaire, the patient has reported the following services have been completed -Mammogram, this information has been sent to the abstraction team.  Health Care Directive  Patient does not have a Health Care Directive or Living Will: Discussed advance care planning with patient; however, patient declined at this time.    HPI    She is advanced left knee osteoarthritis and is hopeful to have a knee replacement at some point for that.  She is not very active because of the knee pain.  She has had trouble losing weight.  She reached out to the [facility] weight loss clinic, but they are booked out 6 months.  She wonders if I could help her with some medication to lose weight.    She is on numerous baseline medications as below.  She continues to deal with chronic pain and is on oxycodone for that.  She sees neurology for there is paresthesias and will be seeing them again soon for some right upper extremity paresthesias.  She sees a chiropractor regularly for chronic back pain.      4/5/2024   General Health   How would you rate your overall physical health? (!) FAIR   Feel stress (tense, anxious, or unable to sleep) To some extent   (!) STRESS CONCERN      4/5/2024   Nutrition   Three or more servings of calcium each day? (!) NO   Diet: Regular (no restrictions)   How many servings of fruit and vegetables per day? (!) 2-3   How many sweetened beverages each day? 0-1         4/5/2024   Exercise   Days per week of moderate/strenous exercise 0 days   Average minutes spent exercising at this level 0 min   (!) EXERCISE CONCERN      4/5/2024   Social Factors   Frequency of gathering with friends or relatives More than three times a week   Worry food won't last until get money to buy more No   Food not last or not have enough money for food? No   Do you have housing?  Yes   Are you worried about losing your housing? No   Lack of transportation? No   Unable to get utilities (heat,electricity)? No          2024   Fall Risk   Reason Gait Speed Test Not Completed Unable to complete test, patient reported symptoms          2024   Dental   Dentist two times every year? Yes         2024   TB Screening   Were you born outside of the [place]? No       Today's PHQ-9 Score:       2024    11:55 AM   PHQ-9 SCORE   PHQ-9 Total Score MyChart 9 (Mild depression)   PHQ-9 Total Score 9         2024   Substance Use   Alcohol more than 3/day or more than 7/wk No   Do you use any other substances recreationally? (!) CANNABIS PRODUCTS     Social History     Tobacco Use    Smoking status: Former     Current packs/day: 0.00     Average packs/day: 0.5 packs/day for 15.0 years (7.5 ttl pk-yrs)     Types: Cigarettes     Start date: 2000     Quit date: 2015     Years since quittin.1    Smokeless tobacco: Never   Vaping Use    Vaping status: Never Used   Substance Use Topics    Alcohol use: No     Alcohol/week: 0.0 standard drinks of alcohol     Comment: Quit in     Drug use: No           2023   LAST FHS-7 RESULTS   1st degree relative breast or ovarian cancer Yes    Yes   Any relative bilateral breast cancer No    Unknown   Any male have breast cancer No    No   Any ONE woman have BOTH breast AND ovarian cancer No    No   Any woman with breast cancer before 50yrs No    No   2 or more relatives with breast AND/OR ovarian cancer Yes    Yes   2 or more relatives with breast AND/OR bowel cancer No    Yes        Mammogram Screening - Mammogram every 1-2 years updated in Health Maintenance based on mutual decision making        2024   STI Screening   New sexual partner(s) since last STI/HIV test? (!) YES     History of abnormal Pap smear: Status post benign hysterectomy. Health Maintenance and Surgical History updated.       ASCVD Risk   The 10-year ASCVD risk score (Mychal MAHER, et al., 2019) is: 2.3%    Values used to calculate the score:      Age: 53 years      Sex: Female      Is  Non- : No      Diabetic: No      Tobacco smoker: No      Systolic Blood Pressure: 117 mmHg      Is BP treated: No      HDL Cholesterol: 39 mg/dL      Total Cholesterol: 230 mg/dL           Reviewed and updated as needed this visit by Provider                    Patient Active Problem List   Diagnosis    History of cleft palate with cleft lip    MAYA (obstructive sleep apnea)/Hypoventilation Syndrome    Major depressive disorder, recurrent episode, moderate (H)    Paresthesias    Health Care Home    Vitamin D deficiency    Morbid obesity with body mass index of 40.0-49.9 (H)    Hyperlipidemia with target LDL less than 130    Intervertebral disc prolapse with impingement    Nonallopathic lesion of lumbar region    Chronic pain syndrome    Restless legs syndrome (RLS)    Insomnia    Migraine    Chronic bilateral back pain, unspecified back location    Chronic pain of left knee    ROSE MARIE (generalized anxiety disorder)    Idiopathic peripheral neuropathy    Urinary tract obstruction due to kidney stone    SI (sacroiliac) joint dysfunction    Right hip pain    Nasal septal perforation    Acute right lumbar radiculopathy    Hypertrophy of both inferior nasal turbinates    Congenital nasal septum deviation    Family history of colonic polyps    Impaired fasting glucose    Chronic, continuous use of opioids    Primary osteoarthritis of left knee     Past Surgical History:   Procedure Laterality Date    ANKLE SURGERY  7/13    Left for torn tendons & loose bone chips    ARTHROSCOPY KNEE  1986    Right knee    BACK SURGERY      Basal cell carcinoma  2011    Removal from the chest    BIOPSY      CARPAL TUNNEL RELEASE RT/LT  ~2010    Bilateral    COLONOSCOPY  2016    COSMETIC SURGERY      cysto with right ureteroscopy, stone manipulation, double J stent removal  10/04/2018    Dr. Cisneros -- removal of right kidney stone    cysto, right retrograde pyelogram, right ureteral stent Right 09/2018    for obstructing  right kidney stone    DECOMPRESSION CUBITAL TUNNEL Left 2015    Procedure: DECOMPRESSION CUBITAL TUNNEL;  Surgeon: Gus Donato MD;  [facility]    ENT SURGERY      Tonsillectomy and Adenoids    GYN SURGERY      Hysterectomy    HC REPAIR PERONEAL TENDONS      ORTHOPEDIC SURGERY  ,     Bilateral CTR    PE TUBES      yearly times 10 years    REPAIR CLEFT PALATE CHILD      Age 3 months & afterwards (multiple surgeries)    SOFT TISSUE SURGERY      Z VAGINAL HYSTERECTOMY         Social History     Tobacco Use    Smoking status: Former     Current packs/day: 0.00     Average packs/day: 0.5 packs/day for 15.0 years (7.5 ttl pk-yrs)     Types: Cigarettes     Start date: 2000     Quit date: 2015     Years since quittin.1    Smokeless tobacco: Never   Substance Use Topics    Alcohol use: No     Alcohol/week: 0.0 standard drinks of alcohol     Comment: Quit in      Family History   Problem Relation Age of Onset    Alzheimer Disease Paternal Grandmother 60    Cerebrovascular Disease Paternal Grandmother     Neurologic Disorder Sister 20        migraines    Depression/Anxiety Sister     Depression Sister     Neurologic Disorder Son 14        migraines    Asthma Son     Heart Disease Mother     Osteoporosis Mother     Obesity Mother     Cancer Father     Alcohol/Drug Father     Other Cancer Father         saliva glands & skin CA     Hypertension Father     Diabetes Maternal Grandmother     Breast Cancer Maternal Grandmother     Obesity Maternal Grandmother     Other Cancer Maternal Grandfather         skin cancer    Cancer - colorectal Other         Aunt    Breast Cancer Other     Asthma Daughter     Asthma Daughter     Asthma Son     Thyroid Disease Sister     Colon Cancer Other     Colorectal Cancer Other     Obesity Sister     Glaucoma No family hx of     Macular Degeneration No family hx of          Current Outpatient Medications   Medication Sig  "Dispense Refill    diclofenac (VOLTAREN) 1 % topical gel Apply 2 g topically 4 times daily 50 g 3    DULoxetine (CYMBALTA) 60 MG capsule Take 1 capsule (60 mg) by mouth daily 30 capsule 5    furosemide (LASIX) 20 MG tablet Take 1 tablet (20 mg) by mouth daily 30 tablet 11    ketoconazole (NIZORAL) 2 % external cream Apply to rash in skin folds twice a day as needed 60 g 3    Lidocaine (LIDOCARE) 4 % Patch Place 1 patch onto the skin every 24 hours To prevent lidocaine toxicity, patient should be patch free for 12 hrs daily. 15 patch 3    methocarbamol (ROBAXIN) 750 MG tablet TAKE ONE TABLET BY MOUTH THREE TIMES A DAY AS NEEDED FOR MUSCLE SPASMS 90 tablet 2    nortriptyline (PAMELOR) 10 MG capsule TAKE THREE CAPSULES BY MOUTH AT BEDTIME 270 capsule 3    nystatin (NYAMYC) 464448 UNIT/GM external powder APPLY TO AFFECTED AREA(S) TWO TIMES A DAY AS NEEDED 30 g 1    oxyCODONE-acetaminophen (PERCOCET)  MG per tablet Take 1 tablet by mouth every 6 hours as needed for moderate to severe pain 90 tablet 0     No Known Allergies      Review of Systems  She will be seeing neurology soon for an EMG to evaluate right upper extremity paresthesias.  She will be seeing orthopedics soon for further evaluation of her left knee pain and arthritis.     Objective    Exam  /80 (BP Location: Left arm, Patient Position: Sitting, Cuff Size: Adult Large)   Temp 97.9  F (36.6  C) (Temporal)   Ht 1.6 m (5' 3\")   Wt 115.2 kg (254 lb)   LMP  (LMP Unknown)   SpO2 98%   BMI 44.99 kg/m     Estimated body mass index is 44.99 kg/m  as calculated from the following:    Height as of this encounter: 1.6 m (5' 3\").    Weight as of this encounter: 115.2 kg (254 lb).    Physical Exam  GENERAL: alert and no distress  EYES: Eyes grossly normal to inspection, PERRL and conjunctivae and sclerae normal  HENT: Healed scars from prior cleft lip/palate repair, but no acute findings  NECK: no adenopathy, no asymmetry, masses, or scars  RESP: lungs " clear to auscultation - no rales, rhonchi or wheezes  CV: regular rate and rhythm, normal S1 S2, no S3 or S4, no murmur, click or rub, trace lower extremity peripheral edema  ABDOMEN: soft, nontender, no hepatosplenomegaly, no masses   MS: Some pain in the left knee with range of motion and weightbearing  SKIN: no suspicious lesions or rashes  NEURO: Grossly normal normal strength and tone, mentation intact and speech normal  PSYCH: mentation appears normal, affect normal/bright        Signed Electronically by: Srinivasa Hunter MD

## 2024-04-14 ASSESSMENT — KOOS JR
STRAIGHTENING KNEE FULLY: MODERATE
RISING FROM SITTING: SEVERE
GOING UP OR DOWN STAIRS: SEVERE
HOW SEVERE IS YOUR KNEE STIFFNESS AFTER FIRST WAKING IN MORNING: MODERATE
TWISING OR PIVOTING ON KNEE: EXTREME
BENDING TO THE FLOOR TO PICK UP OBJECT: SEVERE
STANDING UPRIGHT: EXTREME
KOOS JR SCORING: 34.17

## 2024-04-14 NOTE — RESULT ENCOUNTER NOTE
Kate,  Your blood sugar and cholesterol values are still running on the high side, but the rest of your lab results look good.  I know it has been hard for you to get physical activity lately, but the more exercise you can get, along with trying to eat healthy, would be appropriate treatment for these values.  Hopefully [facility] as well, which would also be helpful in this regard.    Srinivasa Hunter MD

## 2024-04-16 LAB
OXYCODONE UR CFM-MCNC: 4400 NG/ML
OXYCODONE/CREAT UR: 1606 NG/MG {CREAT}
OXYMORPHONE UR CFM-MCNC: 2340 NG/ML
OXYMORPHONE/CREAT UR: 854 NG/MG {CREAT}

## 2024-04-17 DIAGNOSIS — M17.12 PRIMARY OSTEOARTHRITIS OF LEFT KNEE: ICD-10-CM

## 2024-04-17 DIAGNOSIS — S83.242A TRAUMATIC TEAR OF MEDIAL MENISCUS OF KNEE, LEFT, INITIAL ENCOUNTER: ICD-10-CM

## 2024-04-19 ENCOUNTER — OFFICE VISIT (OUTPATIENT)
Dept: ORTHOPEDICS | Facility: CLINIC | Age: 54
End: 2024-04-19
Payer: COMMERCIAL

## 2024-04-19 VITALS — WEIGHT: 250.6 LBS | HEIGHT: 63 IN | BODY MASS INDEX: 44.4 KG/M2

## 2024-04-19 DIAGNOSIS — M17.12 PRIMARY OSTEOARTHRITIS OF LEFT KNEE: Primary | ICD-10-CM

## 2024-04-19 PROCEDURE — 99214 OFFICE O/P EST MOD 30 MIN: CPT | Performed by: ORTHOPAEDIC SURGERY

## 2024-04-19 ASSESSMENT — PAIN SCALES - GENERAL: PAINLEVEL: SEVERE PAIN (7)

## 2024-04-19 NOTE — NURSING NOTE
Reason For Visit:   Chief Complaint   Patient presents with    RECHECK     Patient is having more pain with the weather changes, more stumbling/buckling of the left knee. Using her cane now to help with walking. Pain in her right knee occasionally. More numbness, especially while sitting down, 15 to 20 minutes causes complete leg numbness. Percocet, Ibuprofen for her pain. Lidocaine patches have not helped, voltaren takes the edge off.       LMP  (LMP Unknown)          Judi Rodrigues RN

## 2024-04-19 NOTE — PROGRESS NOTES
Pre-Op Teaching was done in person at the clinic.    Teaching Flowsheet   Relevant Diagnosis: Pre-Op Teaching  Teaching Topic: L TKA Pre-Op     Person(s) involved in teaching:   Patient     Motivation Level:  Asks Questions: Yes  Eager to Learn: Yes  Cooperative: Yes  Receptive (willing/able to accept information): Yes  Any cultural factors/Latter-day beliefs that may influence understanding or compliance? No     Patient demonstrates understanding of the following:  Reason for the appointment, diagnosis and treatment plan: Yes  Knowledge of proper use of medications and conditions for which they are ordered (with special attention to potential side effects or drug interactions): Yes  Which situations necessitate calling provider and whom to contact: Yes- discussed the stoplight tool to help assist with this.      Teaching Concerns Addressed:      Proper use of surgical scrub explain: Yes    Nutritional needs and diet plan: Yes  Pain management techniques: Yes  Wound Care: Yes  How and/when to access community resources: Yes  Need for pre-op with in 30 days: Yes- asked that pre-op be done between 25-20 days before surgery is scheduled.   -I asked them to ensure they go over their daily medications during this visit and discuss what medications need to be stopped before surgery and when. If you are doing a pre-op with your PCP and they are not within the [employer] [facility], I ask them to fax it to our pre-op office. Patient verbalized understanding.      Instructional Materials Used/Given:  2 bottle of chlorhexidine, a surgery packet, and a joint booklet given to patient in clinic.      - Important contact info/ phone numbers: emphasizing clinic number and after hours number  - Map/ location of surgery  - Showering instructions  - Stop light tool  - Your Guide to Joint Replacement Booklet   - Antibiotic post op before every dentist appointment or procedure for the rest of their life.     Additionally the following was  discussed with patient:  - Patient, medical cab, will be driving the patient to surgery and able to pick the patient up after discharge.  - Need to schedule a dentist appointment and get done any recommended dental work prior to surgery.   - Online joint replacement call and the use of [employer] to send educational messages. Asked patient to sign up for join class during visit and patient declined to sign up at this time and stated will sign up later. Sheet with sign up instruction given to patient.   - Told patient to check in with insurance to check about coverage.       -Next step: Schedule a surgery date and schedule a Pre-Op appointment with PCP    Discussed the use of preoperative cannabis    Time spent with patient: 15 minutes.

## 2024-04-19 NOTE — LETTER
4/19/2024         RE: Velma Diaz  [address]  [facility] [address]        Dear Colleague,    Thank you for referring your patient, Velma Diaz, to the [facility] [place]. Please see a copy of my visit note below.    Orthopaedic Surgery Clinic Follow-up Appointment    Chief Complaint: Follow-up for left knee pain/osteoarthritis.    I saw this very pleasant 53-year-old patient today in follow-up for her left knee.  I previously saw her 02/02/2024 and 10/27/2023 for tricompartmental left knee osteoarthritis with severe involvement of the medial compartment.  We had discussed knee replacement surgery but also the need to first attempt to mitigate modifiable risk factors that would be expected to compromise her results after surgery, mainly BMI over 40.  She reports her pain is worsening and rates it as 8 out of 10 in severity at times.  She does report that it wakes her up at nighttime.  It is so painful that she is tearful at times during today's encounter.  She reports that the knee appears to be getting stiffer, particularly in the mornings, though there is no actual locking of the left knee.  She does report giving way and instability episodes of the left knee, though no actual falling yet.  She has started weight loss medication.  She expresses she would like to move forward with a left total knee arthroplasty.  She has had pain for about 7 years or longer.  She has tried home exercises, a cane, oral and topical medications, and a corticosteroid injection.  She reports difficulty with exercising to lose weight due to her left knee pain.    Examination shows the patient to be awake, alert, and sitting upright in a regular chair.  The left knee is tender to palpation especially on the medial joint line.  It appears to be stable to varus and valgus stress at maximal extension.  0 anterior and posterior drawer at this encounter.  She demonstrates the ability to do a left  lower extremity straight leg raise without a lag.  No palpable defects in the extensor mechanism.  Normal patellar tracking.  Positive patellofemoral grind test.  5/5 left tibialis anterior and gastrocsoleus strength.    Previous imaging was reviewed including 10/27/2023 weightbearing left knee radiographs showing tricompartmental left knee osteoarthritis with severe changes in the medial compartment.  Findings were discussed with the patient.    Impression: 53-year-old patient with left knee osteoarthritis.    Plan: The diagnosis, nonoperative and operative treatment options, and prognosis were all discussed in layman's terms.  After thorough discussion of the treatment options, the patient verbalized her wish to proceed at this time with a left total knee arthroplasty.  Given her elevated BMI I did discuss with her her increased perioperative risk for complications and this is potentially modifiable preoperatively by delaying surgery until her BMI is in a safer range.  I discussed the risks of infection and wound complications is higher than optimal, and I did give her the opportunity to delay surgery until this is further optimized, which we have been discussed at previous appointments.  However, she notes the difficulty in doing this due to her severe mobility limitations due to her left knee pain.  I acknowledged the difficulties she is facing and empathize with her plight.  She verbalized understanding of our discussion including the increased risks involved with knee replacement, and verbalized her wish to nevertheless proceed with surgery.  The nature of a left total knee arthroplasty, the risks, benefits, alternatives, the postoperative plan, and realistic imitations for outcome were all discussed in layman's terms.  She verbalized understanding of this discussion and agreement with the plan.  All questions were answered.    Pre-Op Teaching was done in person at the clinic.    Teaching Flowsheet   Relevant  Diagnosis: Pre-Op Teaching  Teaching Topic: L TKA Pre-Op     Person(s) involved in teaching:   Patient     Motivation Level:  Asks Questions: Yes  Eager to Learn: Yes  Cooperative: Yes  Receptive (willing/able to accept information): Yes  Any cultural factors/Christianity beliefs that may influence understanding or compliance? No     Patient demonstrates understanding of the following:  Reason for the appointment, diagnosis and treatment plan: Yes  Knowledge of proper use of medications and conditions for which they are ordered (with special attention to potential side effects or drug interactions): Yes  Which situations necessitate calling provider and whom to contact: Yes- discussed the stoplight tool to help assist with this.      Teaching Concerns Addressed:      Proper use of surgical scrub explain: Yes    Nutritional needs and diet plan: Yes  Pain management techniques: Yes  Wound Care: Yes  How and/when to access community resources: Yes  Need for pre-op with in 30 days: Yes- asked that pre-op be done between 25-20 days before surgery is scheduled.   -I asked them to ensure they go over their daily medications during this visit and discuss what medications need to be stopped before surgery and when. If you are doing a pre-op with your PCP and they are not within the [facility], I ask them to fax it to our pre-op office. Patient verbalized understanding.      Instructional Materials Used/Given:  2 bottle of chlorhexidine, a surgery packet, and a joint booklet given to patient in clinic.      - Important contact info/ phone numbers: emphasizing clinic number and after hours number  - Map/ location of surgery  - Showering instructions  - Stop light tool  - Your Guide to Joint Replacement Booklet   - Antibiotic post op before every dentist appointment or procedure for the rest of their life.     Additionally the following was discussed with patient:  - Patient, medical cab, will be driving the patient to  surgery and able to pick the patient up after discharge.  - Need to schedule a dentist appointment and get done any recommended dental work prior to surgery.   - Online joint replacement call and the use of [employer] to send educational messages. Asked patient to sign up for join class during visit and patient declined to sign up at this time and stated will sign up later. Sheet with sign up instruction given to patient.   - Told patient to check in with insurance to check about coverage.       -Next step: Schedule a surgery date and schedule a Pre-Op appointment with PCP    Discussed the use of preoperative cannabis    Time spent with patient: 15 minutes.         Dionisio Quarles MD

## 2024-04-19 NOTE — PROGRESS NOTES
Orthopaedic Surgery Clinic Follow-up Appointment    Chief Complaint: Follow-up for left knee pain/osteoarthritis.    I saw this very pleasant 53-year-old patient today in follow-up for her left knee.  I previously saw her 02/02/2024 and 10/27/2023 for tricompartmental left knee osteoarthritis with severe involvement of the medial compartment.  We had discussed knee replacement surgery but also the need to first attempt to mitigate modifiable risk factors that would be expected to compromise her results after surgery, mainly BMI over 40.  She reports her pain is worsening and rates it as 8 out of 10 in severity at times.  She does report that it wakes her up at nighttime.  It is so painful that she is tearful at times during today's encounter.  She reports that the knee appears to be getting stiffer, particularly in the mornings, though there is no actual locking of the left knee.  She does report giving way and instability episodes of the left knee, though no actual falling yet.  She has started weight loss medication.  She expresses she would like to move forward with a left total knee arthroplasty.  She has had pain for about 7 years or longer.  She has tried home exercises, a cane, oral and topical medications, and a corticosteroid injection.  She reports difficulty with exercising to lose weight due to her left knee pain.    Examination shows the patient to be awake, alert, and sitting upright in a regular chair.  The left knee is tender to palpation especially on the medial joint line.  It appears to be stable to varus and valgus stress at maximal extension.  0 anterior and posterior drawer at this encounter.  She demonstrates the ability to do a left lower extremity straight leg raise without a lag.  No palpable defects in the extensor mechanism.  Normal patellar tracking.  Positive patellofemoral grind test.  5/5 left tibialis anterior and gastrocsoleus strength.    Previous imaging was reviewed including  10/27/2023 weightbearing left knee radiographs showing tricompartmental left knee osteoarthritis with severe changes in the medial compartment.  Findings were discussed with the patient.    Impression: 53-year-old patient with left knee osteoarthritis.    Plan: The diagnosis, nonoperative and operative treatment options, and prognosis were all discussed in layman's terms.  After thorough discussion of the treatment options, the patient verbalized her wish to proceed at this time with a left total knee arthroplasty.  Given her elevated BMI I did discuss with her her increased perioperative risk for complications and this is potentially modifiable preoperatively by delaying surgery until her BMI is in a safer range.  I discussed the risks of infection and wound complications is higher than optimal, and I did give her the opportunity to delay surgery until this is further optimized, which we have been discussed at previous appointments.  However, she notes the difficulty in doing this due to her severe mobility limitations due to her left knee pain.  I acknowledged the difficulties she is facing and empathize with her plight.  She verbalized understanding of our discussion including the increased risks involved with knee replacement, and verbalized her wish to nevertheless proceed with surgery.  The nature of a left total knee arthroplasty, the risks, benefits, alternatives, the postoperative plan, and realistic imitations for outcome were all discussed in layman's terms.  She verbalized understanding of this discussion and agreement with the plan.  All questions were answered.

## 2024-04-23 NOTE — TELEPHONE ENCOUNTER
Patient notified that script is available for  at the [facility] .    Patient was contacted. Patient stated he would reach out to previous provider and would call back to schedule an appointment.

## 2024-04-28 ENCOUNTER — HEALTH MAINTENANCE LETTER (OUTPATIENT)
Age: 54
End: 2024-04-28

## 2024-04-30 ENCOUNTER — TELEPHONE (OUTPATIENT)
Dept: ORTHOPEDICS | Facility: CLINIC | Age: 54
End: 2024-04-30
Payer: COMMERCIAL

## 2024-04-30 NOTE — TELEPHONE ENCOUNTER
Other: pt called would like to set up surgery appt. Can care team reach out to her to schedule surgery appt?     Could we send this information to you in [facility] or would you prefer to receive a phone call?:   Patient would prefer a phone call   Okay to leave a detailed message?: Yes at Cell number on file:    Telephone Information:   Mobile [phone]

## 2024-04-30 NOTE — TELEPHONE ENCOUNTER
Message was initially routed to Dr. Yeo / [facility], however, patient has not been evaluated by Dr. Yeo. Upon chart review, patient was most recently seen by Dr. Quarles at [facility] on 4/19/24 where they discussed Left TKA. Routing to Dr. Quarles's team for further review.    Mireille Stout ATC

## 2024-05-01 NOTE — TELEPHONE ENCOUNTER
Patient is scheduled for surgery with Dr. Quarles    Spoke with: patient    Date of Surgery: 6/20/24    Location: [facility]    Post op: 7/9/24, 8/2/24    Pre op with Provider complete    H&P: Scheduled with Dr. Srinivasa Hunter - [facility]    Additional imaging/appointments: N/A    Surgery packet: received     Additional comments: on cx list for earlier surgery date (late May/early June)        Ceci Kendrick CMA on 5/1/2024 at 9:50 AM

## 2024-05-10 ENCOUNTER — MYC REFILL (OUTPATIENT)
Dept: FAMILY MEDICINE | Facility: CLINIC | Age: 54
End: 2024-05-10
Payer: COMMERCIAL

## 2024-05-10 DIAGNOSIS — G89.4 CHRONIC PAIN SYNDROME: Chronic | ICD-10-CM

## 2024-05-10 RX ORDER — OXYCODONE AND ACETAMINOPHEN 10; 325 MG/1; MG/1
1 TABLET ORAL EVERY 6 HOURS PRN
Qty: 90 TABLET | Refills: 0 | Status: SHIPPED | OUTPATIENT
Start: 2024-05-10 | End: 2024-06-08

## 2024-05-13 ENCOUNTER — TELEPHONE (OUTPATIENT)
Dept: FAMILY MEDICINE | Facility: CLINIC | Age: 54
End: 2024-05-13

## 2024-05-13 NOTE — TELEPHONE ENCOUNTER
Prior Authorization Retail Medication Request    Medication/Dose: Wegovy Auto-injectors 0.5 mg  Diagnosis and ICD code (if different than what is on RX):  Morbid obesity  New/renewal/insurance change PA/secondary ins. PA:  Previously Tried and Failed:    Rationale:  See above    Insurance   Primary: [facility]  Insurance ID:  [id number]    [facility] [id number]    Secondary (if applicable):  Insurance ID:      Pharmacy Information (if different than what is on RX)  Name:  [facility]  Phone:    Fax:

## 2024-05-24 NOTE — TELEPHONE ENCOUNTER
Prior Authorization Not Needed per Insurance    Medication: WEGOVY 0.5 MG/0.5ML SC SOAJ  Insurance Company: [employer] - Phone [phone] Fax [phone]  Expected CoPay: $    Pharmacy Filling the Rx: [facility] - [place], [place] - [address] [place] [address]  Pharmacy Notified: yes  Patient Notified: yes

## 2024-05-24 NOTE — TELEPHONE ENCOUNTER
PA Initiation    Medication: WEGOVY 0.5 MG/0.5ML SC SOAJ  Insurance Company: [employer] - Phone [phone] Fax [phone]  Pharmacy Filling the Rx: [facility] - [place] - [address]  Filling Pharmacy Phone:    Filling Pharmacy Fax:    Start Date: 5/24/2024    [id number]

## 2024-05-28 ENCOUNTER — OFFICE VISIT (OUTPATIENT)
Dept: FAMILY MEDICINE | Facility: CLINIC | Age: 54
End: 2024-05-28
Payer: COMMERCIAL

## 2024-05-28 ENCOUNTER — ANCILLARY PROCEDURE (OUTPATIENT)
Dept: MAMMOGRAPHY | Facility: CLINIC | Age: 54
End: 2024-05-28
Attending: FAMILY MEDICINE
Payer: COMMERCIAL

## 2024-05-28 VITALS
HEIGHT: 63 IN | SYSTOLIC BLOOD PRESSURE: 113 MMHG | TEMPERATURE: 98.2 F | BODY MASS INDEX: 43.23 KG/M2 | RESPIRATION RATE: 20 BRPM | DIASTOLIC BLOOD PRESSURE: 74 MMHG | HEART RATE: 70 BPM | OXYGEN SATURATION: 98 % | WEIGHT: 244 LBS

## 2024-05-28 DIAGNOSIS — Z12.31 VISIT FOR SCREENING MAMMOGRAM: ICD-10-CM

## 2024-05-28 DIAGNOSIS — G89.4 CHRONIC PAIN SYNDROME: Chronic | ICD-10-CM

## 2024-05-28 DIAGNOSIS — F11.90 CHRONIC, CONTINUOUS USE OF OPIOIDS: ICD-10-CM

## 2024-05-28 DIAGNOSIS — Z01.818 PREOP GENERAL PHYSICAL EXAM: Primary | ICD-10-CM

## 2024-05-28 DIAGNOSIS — M25.562 CHRONIC PAIN OF LEFT KNEE: ICD-10-CM

## 2024-05-28 DIAGNOSIS — G89.29 CHRONIC PAIN OF LEFT KNEE: ICD-10-CM

## 2024-05-28 DIAGNOSIS — E66.01 MORBID OBESITY WITH BODY MASS INDEX OF 40.0-49.9 (H): ICD-10-CM

## 2024-05-28 DIAGNOSIS — G60.9 IDIOPATHIC PERIPHERAL NEUROPATHY: ICD-10-CM

## 2024-05-28 LAB
ANION GAP SERPL CALCULATED.3IONS-SCNC: 14 MMOL/L (ref 7–15)
BUN SERPL-MCNC: 11.7 MG/DL (ref 6–20)
CALCIUM SERPL-MCNC: 9.8 MG/DL (ref 8.6–10)
CHLORIDE SERPL-SCNC: 100 MMOL/L (ref 98–107)
CREAT SERPL-MCNC: 0.88 MG/DL (ref 0.51–0.95)
DEPRECATED HCO3 PLAS-SCNC: 24 MMOL/L (ref 22–29)
EGFRCR SERPLBLD CKD-EPI 2021: 78 ML/MIN/1.73M2
ERYTHROCYTE [DISTWIDTH] IN BLOOD BY AUTOMATED COUNT: 11.9 % (ref 10–15)
GLUCOSE SERPL-MCNC: 96 MG/DL (ref 70–99)
HCT VFR BLD AUTO: 43.6 % (ref 35–47)
HGB BLD-MCNC: 14.6 G/DL (ref 11.7–15.7)
MCH RBC QN AUTO: 31.1 PG (ref 26.5–33)
MCHC RBC AUTO-ENTMCNC: 33.5 G/DL (ref 31.5–36.5)
MCV RBC AUTO: 93 FL (ref 78–100)
PLATELET # BLD AUTO: 345 10E3/UL (ref 150–450)
POTASSIUM SERPL-SCNC: 4.5 MMOL/L (ref 3.4–5.3)
RBC # BLD AUTO: 4.69 10E6/UL (ref 3.8–5.2)
SODIUM SERPL-SCNC: 138 MMOL/L (ref 135–145)
WBC # BLD AUTO: 7.3 10E3/UL (ref 4–11)

## 2024-05-28 PROCEDURE — 77067 SCR MAMMO BI INCL CAD: CPT | Mod: TC | Performed by: RADIOLOGY

## 2024-05-28 PROCEDURE — 36415 COLL VENOUS BLD VENIPUNCTURE: CPT | Performed by: FAMILY MEDICINE

## 2024-05-28 PROCEDURE — G2211 COMPLEX E/M VISIT ADD ON: HCPCS | Performed by: FAMILY MEDICINE

## 2024-05-28 PROCEDURE — 85027 COMPLETE CBC AUTOMATED: CPT | Performed by: FAMILY MEDICINE

## 2024-05-28 PROCEDURE — 80048 BASIC METABOLIC PNL TOTAL CA: CPT | Performed by: FAMILY MEDICINE

## 2024-05-28 PROCEDURE — 99214 OFFICE O/P EST MOD 30 MIN: CPT | Performed by: FAMILY MEDICINE

## 2024-05-28 ASSESSMENT — PAIN SCALES - GENERAL: PAINLEVEL: SEVERE PAIN (6)

## 2024-05-28 ASSESSMENT — PATIENT HEALTH QUESTIONNAIRE - PHQ9: SUM OF ALL RESPONSES TO PHQ QUESTIONS 1-9: 0

## 2024-05-28 NOTE — TELEPHONE ENCOUNTER
Patient declined earlier surgery date with Dr. Quarles, would like to keep 6/20/24 surgery date.    Patient removed from cancellation list.    Ceci  Perioperative   [phone]

## 2024-05-28 NOTE — RESULT ENCOUNTER NOTE
Kate,  These labs are all nice and normal and look in good shape for your planned knee replacement procedure.    Srinivasa Hunter MD

## 2024-05-28 NOTE — PROGRESS NOTES
Preoperative Evaluation  [facility]  [address] [place] [address]  [place] [address]  Phone: [phone]  Primary Provider: Srinivasa Hunter MD  Pre-op Performing Provider: Srinivasa Hunter MD  May 28, 2024             5/27/2024   Surgical Information   What procedure is being done? Total left knee replacement   Facility or Hospital where procedure/surgery will be performed: [facility]   Who is doing the procedure / surgery? Robina   Date of surgery / procedure: June 20, 2024   Time of surgery / procedure: 10am   Where do you plan to recover after surgery? Other - friends and neighbor and PCA     Fax number for surgical facility: Note does not need to be faxed, will be available electronically in Epic.    Assessment & Plan     The proposed surgical procedure is considered INTERMEDIATE risk.      ICD-10-CM    1. Preop general physical exam  Z01.818 Basic metabolic panel     CBC with platelets     Basic metabolic panel     CBC with platelets      2. Chronic pain of left knee  M25.562 Miscellaneous Order for DME - (Use only if a more specific DME order does not already exist    G89.29       3. Chronic pain syndrome  G89.4       4. Chronic, continuous use of opioids  F11.90       5. Morbid obesity with body mass index of 40.0-49.9 (H)  E66.01       6. Idiopathic peripheral neuropathy  G60.9         She has been on chronic narcotics for years, but has not had abuse issues with them.  She does use cannabis products.     - No identified additional risk factors other than previously addressed    Antiplatelet or Anticoagulation Medication Instructions   - Patient is on no antiplatelet or anticoagulation medications.    Additional Medication Instructions   - Diuretics: DO NOT TAKE on the day of surgery.   - GLP-1 Injectable (exenitide, liraglutide, semaglutide, dulaglutide, etc.): DO NOT TAKE 7 days before surgery     Recommendation  Approval given to proceed with proposed procedure, without further diagnostic  evaluation.      The longitudinal plan of care for the diagnosis(es)/condition(s) as documented were addressed during this visit. Due to the added complexity in care, I will continue to support Kate in the subsequent management and with ongoing continuity of care.      Subjective   Kate is a 53 year old, presenting for the following:  Pre-Op Exam          5/28/2024     9:47 AM   Additional Questions   Roomed by cam   Accompanied by none         5/28/2024     9:47 AM   Patient Reported Additional Medications   Patient reports taking the following new medications none     HPI related to upcoming procedure: 53-year-old with a history of chronic pain has been having ongoing left knee pain that has been quite debilitating.  She has severe osteoarthritis.  She is coming in now for a left TKA.        5/27/2024   Pre-Op Questionnaire   Have you ever had a heart attack or stroke? No   Have you ever had surgery on your heart or blood vessels, such as a stent placement, a coronary artery bypass, or surgery on an artery in your head, neck, heart, or legs? No   Do you have chest pain with activity? No   Do you have a history of heart failure? No   Do you currently have a cold, bronchitis or symptoms of other infection? No   Do you have a cough, shortness of breath, or wheezing? No   Do you or anyone in your family have previous history of blood clots? No   Do you or does anyone in your family have a serious bleeding problem such as prolonged bleeding following surgeries or cuts? No   Have you ever had problems with anemia or been told to take iron pills? (!) YES   Have you had any abnormal blood loss such as black, tarry or bloody stools, or abnormal vaginal bleeding? (!) YES   Have you ever had a blood transfusion? No   Are you willing to have a blood transfusion if it is medically needed before, during, or after your surgery? Yes   Have you or any of your relatives ever had problems with anesthesia? No   Do you have sleep apnea,  excessive snoring or daytime drowsiness? No   Do you have any artifical heart valves or other implanted medical devices like a pacemaker, defibrillator, or continuous glucose monitor? No   Do you have artificial joints? No   Are you allergic to latex? No       Preoperative Review of    reviewed - controlled substances reflected in medication list.      Status of Chronic Conditions:  See problem list for active medical problems.  Problems all longstanding and stable, except as noted/documented.  See ROS for pertinent symptoms related to these conditions.    Patient Active Problem List    Diagnosis Date Noted    Primary osteoarthritis of left knee 02/02/2024     Priority: Medium    Chronic, continuous use of opioids 05/10/2022     Priority: Medium    Impaired fasting glucose 11/30/2021     Priority: Medium    Family history of colonic polyps 11/11/2020     Priority: Medium    Nasal septal perforation 06/24/2020     Priority: Medium    Hypertrophy of both inferior nasal turbinates 06/24/2020     Priority: Medium    Congenital nasal septum deviation 06/24/2020     Priority: Medium    Urinary tract obstruction due to kidney stone 09/28/2018     Priority: Medium    Idiopathic peripheral neuropathy 06/07/2018     Priority: Medium    Acute right lumbar radiculopathy 06/05/2018     Priority: Medium    Right hip pain 03/14/2018     Priority: Medium    ROSE MARIE (generalized anxiety disorder) 09/14/2017     Priority: Medium    Chronic pain of left knee 11/03/2016     Priority: Medium    Chronic bilateral back pain, unspecified back location 10/27/2016     Priority: Medium    SI (sacroiliac) joint dysfunction 10/27/2016     Priority: Medium    Migraine 04/19/2016     Priority: Medium    Chronic pain syndrome 11/20/2015     Priority: Medium     She takes up to 90 oxycodone tablets per month for her chronic pain      Nonallopathic lesion of lumbar region 11/19/2015     Priority: Medium    Morbid obesity with body mass index of  40.0-49.9 (H) 10/26/2015     Priority: Medium    Hyperlipidemia with target LDL less than 130 10/26/2015     Priority: Medium    Vitamin D deficiency 01/09/2015     Priority: Medium    Health Care Home 12/08/2014     Priority: Medium     No Active Care Coordination 11/24/2015  Care Coordinator: Yesica PETERSW, MSW   See Letters for H Care Plan              Paresthesias 07/10/2014     Priority: Medium    Major depressive disorder, recurrent episode, moderate (H) 03/12/2014     Priority: Medium    MAYA (obstructive sleep apnea)/Hypoventilation Syndrome 02/24/2014     Priority: Medium     Study Date: 2/13/2014- (245.1 lbs) Sleep Associated Hypoventilation  suggested with baseline PCO2 42 mmHg, maximum PCO2 55 mmHg. Lowest oxygen saturation 85.0% Apnea/Hypopnea Index 5.5 events per hour.  REM AHI 37.2.  The supine AHI is 13.8. RDI 6.7  Sleep study 3/2014 (245#)- CPAP 6 cm effective, Transcutaneous CO2 Monitoring (TCM) within normal limits.            History of cleft palate with cleft lip 11/20/2013     Priority: Medium    Intervertebral disc prolapse with impingement 03/27/2013     Priority: Medium     L4-5 foraminal mod stenosis and L5-S1 Bilaterally 8/25/14  C3-4 left spurring no stenosis , C 5-6 right spurring no stenosis C 6-7 Broad Base Disc moderate stenosis April 2014  IMO Regulatory Load OCT 2020      Restless legs syndrome (RLS) 04/19/2016     Priority: Low    Insomnia 04/19/2016     Priority: Low      Past Medical History:   Diagnosis Date    Chronic pain syndrome 11/20/2015    She takes up to 90 oxycodone tablets per month for her chronic pain     Chronic, continuous use of opioids 5/10/2022    Depressive disorder 01/01/2000    Family history of colonic polyps     in mother -- pt should have colonoscopy every 5 years    History of cleft palate with cleft lip     cleft lip and palate, and has had 27 operations per the patient    Hyperlipidemia with target LDL less than 130 10/26/2015    Impaired  fasting glucose 11/30/2021    Morbid obesity with BMI of 40.0-44.9, adult (H) 10/26/2015    Neuropathy     MAYA (obstructive sleep apnea)/Hypoventilation Syndrome 02/24/2014    Study Date: 2/13/2014- (245.1 lbs) Sleep Associated Hypoventilation  suggested with baseline PCO2 42 mmHg, maximum PCO2 55 mmHg. Lowest oxygen saturation 85.0% Apnea/Hypopnea Index 5.5 events per hour.  REM AHI 37.2.  The supine AHI is 13.8. RDI 6.7 Sleep study 3/2014 (245#)- CPAP 6 cm effective, Transcutaneous CO2 Monitoring (TCM) within normal limits.         Skin cancer of anterior chest 01/01/2011    Basal cell on chest    TMJ (temporomandibular joint disorder)      Past Surgical History:   Procedure Laterality Date    ANKLE SURGERY  7/13    Left for torn tendons & loose bone chips    ARTHROSCOPY KNEE  1986    Right knee    BACK SURGERY      Basal cell carcinoma  2011    Removal from the chest    BIOPSY      CARPAL TUNNEL RELEASE RT/LT  ~2010    Bilateral    COLONOSCOPY  2016    COSMETIC SURGERY      cysto with right ureteroscopy, stone manipulation, double J stent removal  10/04/2018    Dr. Cisneros -- removal of right kidney stone    cysto, right retrograde pyelogram, right ureteral stent Right 09/2018    for obstructing right kidney stone    DECOMPRESSION CUBITAL TUNNEL Left 8/28/2015    Procedure: DECOMPRESSION CUBITAL TUNNEL;  Surgeon: Gus Donato MD;  [facility]    ENT SURGERY  1975    Tonsillectomy and Adenoids    GYN SURGERY  2011    Hysterectomy    HC REPAIR PERONEAL TENDONS  7/13    ORTHOPEDIC SURGERY  2011, 2011    Bilateral CTR    PE TUBES      yearly times 10 years    REPAIR CLEFT PALATE CHILD      Age 3 months & afterwards (multiple surgeries)    SOFT TISSUE SURGERY  2013    [facility] VAGINAL HYSTERECTOMY  2011     Current Outpatient Medications   Medication Sig Dispense Refill    diclofenac (VOLTAREN) 1 % topical gel Apply 2 g topically 4 times daily 350 g 4    DULoxetine (CYMBALTA) 60 MG capsule Take 1 capsule (60  mg) by mouth daily 30 capsule 5    furosemide (LASIX) 20 MG tablet Take 1 tablet (20 mg) by mouth daily 30 tablet 11    methocarbamol (ROBAXIN) 750 MG tablet TAKE ONE TABLET BY MOUTH THREE TIMES A DAY AS NEEDED FOR MUSCLE SPASMS 90 tablet 2    nortriptyline (PAMELOR) 10 MG capsule TAKE THREE CAPSULES BY MOUTH AT BEDTIME 270 capsule 3    oxyCODONE-acetaminophen (PERCOCET)  MG per tablet Take 1 tablet by mouth every 6 hours as needed for moderate to severe pain 90 tablet 0    Semaglutide-Weight Management (WEGOVY) 0.5 MG/0.5ML pen Inject 0.5 mg Subcutaneous once a week 2 mL 5    ketoconazole (NIZORAL) 2 % external cream Apply to rash in skin folds twice a day as needed (Patient not taking: Reported on 2024) 60 g 3    Lidocaine (LIDOCARE) 4 % Patch Place 1 patch onto the skin every 24 hours To prevent lidocaine toxicity, patient should be patch free for 12 hrs daily. (Patient not taking: Reported on 2024) 15 patch 3    nystatin (NYAMYC) 155455 UNIT/GM external powder APPLY TO AFFECTED AREA(S) TWO TIMES A DAY AS NEEDED (Patient not taking: Reported on 2024) 30 g 1       No Known Allergies     Social History     Tobacco Use    Smoking status: Former     Current packs/day: 0.00     Average packs/day: 0.5 packs/day for 15.0 years (7.5 ttl pk-yrs)     Types: Cigarettes     Start date: 2000     Quit date: 2015     Years since quittin.2    Smokeless tobacco: Never   Substance Use Topics    Alcohol use: No     Alcohol/week: 0.0 standard drinks of alcohol     Comment: Quit in      Family History   Problem Relation Age of Onset    Alzheimer Disease Paternal Grandmother 60    Cerebrovascular Disease Paternal Grandmother     Neurologic Disorder Sister 20        migraines    Depression/Anxiety Sister     Depression Sister     Neurologic Disorder Son 14        migraines    Asthma Son     Heart Disease Mother     Osteoporosis Mother     Obesity Mother     Cancer Father     Alcohol/Drug  "Father     Other Cancer Father         saliva glands & skin CA     Hypertension Father     Diabetes Maternal Grandmother     Breast Cancer Maternal Grandmother     Obesity Maternal Grandmother     Other Cancer Maternal Grandfather         skin cancer    Cancer - colorectal Other         Aunt    Breast Cancer Other     Asthma Daughter     Asthma Daughter     Asthma Son     Thyroid Disease Sister     Colon Cancer Other     Colorectal Cancer Other     Obesity Sister     Glaucoma No family hx of     Macular Degeneration No family hx of      History   Drug Use No             Review of Systems  Her other health conditions are generally stable and documented in her chart.  She has seen neurology for some paresthesias and neuropathy type symptoms.  She started Wegovy last month for weight loss.  She has had some allergy symptoms recently.  Review of systems is otherwise noncontributory.    Objective    /74 (BP Location: Right arm, Patient Position: Sitting, Cuff Size: Adult Large)   Pulse 70   Temp 98.2  F (36.8  C) (Temporal)   Resp 20   Ht 1.6 m (5' 3\")   Wt 110.7 kg (244 lb)   LMP  (LMP Unknown)   SpO2 98%   BMI 43.22 kg/m     Estimated body mass index is 43.22 kg/m  as calculated from the following:    Height as of this encounter: 1.6 m (5' 3\").    Weight as of this encounter: 110.7 kg (244 lb).  Physical Exam  GENERAL: alert and no distress  EYES: Eyes grossly normal to inspection, PERRL and conjunctivae and sclerae normal  HENT:  well-healed scars from prior cleft lip and palate repair, otherwise unremarkable  NECK: no adenopathy, no asymmetry, masses, or scars  RESP: lungs clear to auscultation - no rales, rhonchi or wheezes  CV: regular rate and rhythm, normal S1 S2, no S3 or S4, no murmur, click or rub, no peripheral edema  ABDOMEN: soft, nontender, no hepatosplenomegaly, no masses   MS: minimal swelling of left knee without any warmth or erythema  SKIN: no suspicious lesions or rashes  NEURO: Normal " strength and tone, mentation intact and speech normal  PSYCH: mentation appears normal, affect normal/bright    Recent Labs   Lab Test 04/12/24  0938   HGB 13.8         POTASSIUM 4.4   CR 0.85   A1C 5.3        Diagnostics  Labs pending at this time.  Results will be reviewed when available.   No EKG required, no history of coronary heart disease, significant arrhythmia, peripheral arterial disease or other structural heart disease.    Revised Cardiac Risk Index (RCRI)  The patient has the following serious cardiovascular risks for perioperative complications:   - No serious cardiac risks = 0 points     RCRI Interpretation: 0 points: Class I (very low risk - 0.4% complication rate)         Signed Electronically by: Srinivasa Hunter MD  Copy of this evaluation report is provided to requesting physician.

## 2024-05-28 NOTE — TELEPHONE ENCOUNTER
Phoned patient to offer earlier surgery date with Dr. Quarles. Message left with direct number to call back at their convenience.    Ceci  Perioperative   [phone]

## 2024-06-05 NOTE — TELEPHONE ENCOUNTER
Routing refill request to provider for review/approval because:  Drug not on the [facility] refill protocol   Marisela YOUNG,RN  [facility]           no

## 2024-06-08 ENCOUNTER — MYC REFILL (OUTPATIENT)
Dept: FAMILY MEDICINE | Facility: CLINIC | Age: 54
End: 2024-06-08
Payer: COMMERCIAL

## 2024-06-08 DIAGNOSIS — G89.4 CHRONIC PAIN SYNDROME: Chronic | ICD-10-CM

## 2024-06-10 ENCOUNTER — MYC MEDICAL ADVICE (OUTPATIENT)
Dept: ORTHOPEDICS | Facility: CLINIC | Age: 54
End: 2024-06-10
Payer: COMMERCIAL

## 2024-06-10 RX ORDER — OXYCODONE AND ACETAMINOPHEN 10; 325 MG/1; MG/1
1 TABLET ORAL EVERY 6 HOURS PRN
Qty: 90 TABLET | Refills: 0 | Status: SHIPPED | OUTPATIENT
Start: 2024-06-10 | End: 2024-07-06

## 2024-06-12 NOTE — PROGRESS NOTES
Ortho Navigator Note      Pre-op Date 5/28/24     Medical Clearance  Cleared     Labs Stable      COVID Test Date No longer indicated      Skin  Intact      Activity: Ambulates independently with straight cane and/or walker      Equipment Need Patient HAS a walker for discharge. Defer to PT/OT for recs.       Meds to Hold Held all supplements 14 days prior to surgery  Additional Medication Instructions   - Diuretics: DO NOT TAKE on the day of surgery.   - GLP-1 Injectable (exenitide, liraglutide, semaglutide, dulaglutide, etc.): DO NOT TAKE 7 days before surgery   * Medication recommendations are not intended to be exhaustive; they are limited to common medications that are potentially dangerous if incorrectly managed   NPO Instructions  Instructed to stop solids 8 hr prior to arrival and clears 2 hr prior to arrival.       Pre-op Joint Education Complete? Complete   Discharge Plan Patient has plan to discharge home on morning of POD 1.   Friend will arrive at hospital at 0800 to participate in therapy and discharge education. They will then transport patient home    /Transportation Friend and neighbors will be  and transportation. Patient also has a PCA x3 days/week.  is physically able to care for patient.      Barriers to Discharge No barriers to discharge.      Additional Info/   Special Needs : Patient had no unanswered questions or concerns.           06/12/24 1230   Discharge Planning   Patient/Family Anticipates Transition to home   Concerns to be Addressed no discharge needs identified   Living Arrangements   People in Home alone   Type of Residence Private Residence   Is your private residence a single family home or apartment? Apartment   Number of Stairs, Within Home, Primary greater than 10 stairs  (2 steps to get into home and 13 to get to upper level where she sleeps)   Stair Railings, Within Home, Primary railings safe and in good condition   Once home, are you able to live on one  level? No   Bathroom Shower/Tub Tub/Shower unit   Equipment Currently Used at Home cane, straight;walker, standard;shower chair   Support System   Support Systems Friends/Neighbors  (PCA 3x week)   Do you have someone available to stay with you one or two nights once you are home? Yes   Medical Clearance   Date of Physical 05/28/24   [facility]   It is recommended that you call and check with any specialty providers before surgery to see if you need surgical clearance.  Do you see any specialty providers outside of your primary care provider? No   Blood   Known Bleeding Disorder or Coagulopathy No   Does the patient have any Confucianism/cultural preferences related to blood products? No   Education   Has the patient scheduled or completed pre-op total joint education, either in class or online, in the last 12 months? Yes   Patient attended total joint pre-op class/received pre-op teaching  online

## 2024-06-19 ENCOUNTER — ANESTHESIA EVENT (OUTPATIENT)
Dept: SURGERY | Facility: CLINIC | Age: 54
End: 2024-06-19
Payer: COMMERCIAL

## 2024-06-20 ENCOUNTER — ANESTHESIA (OUTPATIENT)
Dept: SURGERY | Facility: CLINIC | Age: 54
End: 2024-06-20
Payer: COMMERCIAL

## 2024-06-20 ENCOUNTER — HOSPITAL ENCOUNTER (INPATIENT)
Facility: CLINIC | Age: 54
LOS: 1 days | Discharge: HOME | End: 2024-06-21
Attending: ORTHOPAEDIC SURGERY | Admitting: ORTHOPAEDIC SURGERY
Payer: COMMERCIAL

## 2024-06-20 ENCOUNTER — APPOINTMENT (OUTPATIENT)
Dept: PHYSICAL THERAPY | Facility: CLINIC | Age: 54
End: 2024-06-20
Attending: ORTHOPAEDIC SURGERY
Payer: COMMERCIAL

## 2024-06-20 ENCOUNTER — APPOINTMENT (OUTPATIENT)
Dept: GENERAL RADIOLOGY | Facility: CLINIC | Age: 54
End: 2024-06-20
Attending: ORTHOPAEDIC SURGERY
Payer: COMMERCIAL

## 2024-06-20 DIAGNOSIS — Z96.652 STATUS POST TOTAL LEFT KNEE REPLACEMENT: Primary | ICD-10-CM

## 2024-06-20 DIAGNOSIS — M17.12 PRIMARY OSTEOARTHRITIS OF LEFT KNEE: ICD-10-CM

## 2024-06-20 PROBLEM — M25.569 ACUTE POSTOPERATIVE PAIN OF KNEE: Status: ACTIVE | Noted: 2024-06-20

## 2024-06-20 PROBLEM — G89.18 ACUTE POSTOPERATIVE PAIN OF KNEE: Status: ACTIVE | Noted: 2024-06-20

## 2024-06-20 LAB — GLUCOSE BLDC GLUCOMTR-MCNC: 106 MG/DL (ref 70–99)

## 2024-06-20 PROCEDURE — 999N000141 HC STATISTIC PRE-PROCEDURE NURSING ASSESSMENT: Performed by: ORTHOPAEDIC SURGERY

## 2024-06-20 PROCEDURE — 27447 TOTAL KNEE ARTHROPLASTY: CPT | Mod: GC | Performed by: ORTHOPAEDIC SURGERY

## 2024-06-20 PROCEDURE — 250N000013 HC RX MED GY IP 250 OP 250 PS 637

## 2024-06-20 PROCEDURE — 250N000009 HC RX 250: Performed by: ANESTHESIOLOGY

## 2024-06-20 PROCEDURE — 250N000011 HC RX IP 250 OP 636: Mod: JZ

## 2024-06-20 PROCEDURE — 258N000001 HC RX 258: Performed by: ORTHOPAEDIC SURGERY

## 2024-06-20 PROCEDURE — 999N000065 XR KNEE LEFT 1/2 VIEWS: Mod: LT

## 2024-06-20 PROCEDURE — 258N000003 HC RX IP 258 OP 636: Mod: JZ

## 2024-06-20 PROCEDURE — 250N000009 HC RX 250: Performed by: ORTHOPAEDIC SURGERY

## 2024-06-20 PROCEDURE — 64447 NJX AA&/STRD FEMORAL NRV IMG: CPT | Mod: 59 | Performed by: ANESTHESIOLOGY

## 2024-06-20 PROCEDURE — 250N000025 HC SEVOFLURANE, PER MIN: Performed by: ORTHOPAEDIC SURGERY

## 2024-06-20 PROCEDURE — 27447 TOTAL KNEE ARTHROPLASTY: CPT

## 2024-06-20 PROCEDURE — 97530 THERAPEUTIC ACTIVITIES: CPT | Mod: GP

## 2024-06-20 PROCEDURE — 99254 IP/OBS CNSLTJ NEW/EST MOD 60: CPT

## 2024-06-20 PROCEDURE — 360N000077 HC SURGERY LEVEL 4, PER MIN: Performed by: ORTHOPAEDIC SURGERY

## 2024-06-20 PROCEDURE — 710N000010 HC RECOVERY PHASE 1, LEVEL 2, PER MIN: Performed by: ORTHOPAEDIC SURGERY

## 2024-06-20 PROCEDURE — 0SRD0J9 REPLACEMENT OF LEFT KNEE JOINT WITH SYNTHETIC SUBSTITUTE, CEMENTED, OPEN APPROACH: ICD-10-PCS | Performed by: ORTHOPAEDIC SURGERY

## 2024-06-20 PROCEDURE — 250N000013 HC RX MED GY IP 250 OP 250 PS 637: Performed by: ORTHOPAEDIC SURGERY

## 2024-06-20 PROCEDURE — C1776 JOINT DEVICE (IMPLANTABLE): HCPCS | Performed by: ORTHOPAEDIC SURGERY

## 2024-06-20 PROCEDURE — 120N000002 HC R&B MED SURG/OB UMMC

## 2024-06-20 PROCEDURE — 370N000017 HC ANESTHESIA TECHNICAL FEE, PER MIN: Performed by: ORTHOPAEDIC SURGERY

## 2024-06-20 PROCEDURE — 250N000011 HC RX IP 250 OP 636: Performed by: ORTHOPAEDIC SURGERY

## 2024-06-20 PROCEDURE — 272N000001 HC OR GENERAL SUPPLY STERILE: Performed by: ORTHOPAEDIC SURGERY

## 2024-06-20 PROCEDURE — 97116 GAIT TRAINING THERAPY: CPT | Mod: GP

## 2024-06-20 PROCEDURE — 27447 TOTAL KNEE ARTHROPLASTY: CPT | Performed by: ANESTHESIOLOGY

## 2024-06-20 PROCEDURE — 258N000003 HC RX IP 258 OP 636: Mod: JZ | Performed by: ORTHOPAEDIC SURGERY

## 2024-06-20 PROCEDURE — 97161 PT EVAL LOW COMPLEX 20 MIN: CPT | Mod: GP

## 2024-06-20 PROCEDURE — 250N000009 HC RX 250

## 2024-06-20 PROCEDURE — 250N000011 HC RX IP 250 OP 636: Mod: JZ | Performed by: ANESTHESIOLOGY

## 2024-06-20 PROCEDURE — C1713 ANCHOR/SCREW BN/BN,TIS/BN: HCPCS | Performed by: ORTHOPAEDIC SURGERY

## 2024-06-20 DEVICE — ATTUNE KNEE SYSTEM FEMORAL POSTERIOR STABILIZED SIZE 4 LEFT CEMENTED
Type: IMPLANTABLE DEVICE | Site: KNEE | Status: FUNCTIONAL
Brand: ATTUNE

## 2024-06-20 DEVICE — SIMPLEX® HV IS A FAST-SETTING ACRYLIC RESIN FOR USE IN BONE SURGERY. MIXING THE TWO SEPARATE STERILE COMPONENTS PRODUCES A DUCTILE BONE CEMENT WHICH, AFTER HARDENING, FIXES THE IMPLANT AND TRANSFERS STRESSES PRODUCED DURING MOVEMENT EVENLY TO THE BONE. SIMPLEX® HV CEMENT POWDER ALSO CONTAINS INSOLUBLE ZIRCONIUM DIOXIDE AS AN X-RAY CONTRAST MEDIUM. SIMPLEX® HV DOES NOT EMIT A SIGNAL AND DOES NOT POSE A SAFETY RISK IN A MAGNETIC RESONANCE ENVIRONMENT.
Type: IMPLANTABLE DEVICE | Site: KNEE | Status: FUNCTIONAL
Brand: SIMPLEX HV

## 2024-06-20 DEVICE — ATTUNE KNEE SYSTEM TIBIAL BASE FIXED BEARING SIZE 3 CEMENTED
Type: IMPLANTABLE DEVICE | Site: KNEE | Status: FUNCTIONAL
Brand: ATTUNE

## 2024-06-20 DEVICE — IMPLANTABLE DEVICE: Type: IMPLANTABLE DEVICE | Site: KNEE | Status: FUNCTIONAL

## 2024-06-20 DEVICE — ATTUNE KNEE SYSTEM TIBIAL INSERT FIXED BEARING POSTERIOR STABILIZED 4 5MM AOX
Type: IMPLANTABLE DEVICE | Site: KNEE | Status: FUNCTIONAL
Brand: ATTUNE

## 2024-06-20 RX ORDER — DEXAMETHASONE SODIUM PHOSPHATE 4 MG/ML
4 INJECTION, SOLUTION INTRA-ARTICULAR; INTRALESIONAL; INTRAMUSCULAR; INTRAVENOUS; SOFT TISSUE
Status: DISCONTINUED | OUTPATIENT
Start: 2024-06-20 | End: 2024-06-20 | Stop reason: HOSPADM

## 2024-06-20 RX ORDER — NALOXONE HYDROCHLORIDE 0.4 MG/ML
0.1 INJECTION, SOLUTION INTRAMUSCULAR; INTRAVENOUS; SUBCUTANEOUS
Status: DISCONTINUED | OUTPATIENT
Start: 2024-06-20 | End: 2024-06-20 | Stop reason: HOSPADM

## 2024-06-20 RX ORDER — DULOXETIN HYDROCHLORIDE 60 MG/1
60 CAPSULE, DELAYED RELEASE ORAL AT BEDTIME
Status: DISCONTINUED | OUTPATIENT
Start: 2024-06-20 | End: 2024-06-21 | Stop reason: HOSPADM

## 2024-06-20 RX ORDER — LIDOCAINE HYDROCHLORIDE 20 MG/ML
INJECTION, SOLUTION INFILTRATION; PERINEURAL PRN
Status: DISCONTINUED | OUTPATIENT
Start: 2024-06-20 | End: 2024-06-20

## 2024-06-20 RX ORDER — CEFAZOLIN SODIUM/WATER 2 G/20 ML
2 SYRINGE (ML) INTRAVENOUS
Status: COMPLETED | OUTPATIENT
Start: 2024-06-20 | End: 2024-06-20

## 2024-06-20 RX ORDER — EPHEDRINE SULFATE 50 MG/ML
INJECTION, SOLUTION INTRAMUSCULAR; INTRAVENOUS; SUBCUTANEOUS PRN
Status: DISCONTINUED | OUTPATIENT
Start: 2024-06-20 | End: 2024-06-20

## 2024-06-20 RX ORDER — SODIUM CHLORIDE, SODIUM LACTATE, POTASSIUM CHLORIDE, CALCIUM CHLORIDE 600; 310; 30; 20 MG/100ML; MG/100ML; MG/100ML; MG/100ML
INJECTION, SOLUTION INTRAVENOUS CONTINUOUS PRN
Status: DISCONTINUED | OUTPATIENT
Start: 2024-06-20 | End: 2024-06-20

## 2024-06-20 RX ORDER — HYDROMORPHONE HYDROCHLORIDE 1 MG/ML
0.2 INJECTION, SOLUTION INTRAMUSCULAR; INTRAVENOUS; SUBCUTANEOUS
Status: DISCONTINUED | OUTPATIENT
Start: 2024-06-20 | End: 2024-06-21 | Stop reason: HOSPADM

## 2024-06-20 RX ORDER — HYDROMORPHONE HYDROCHLORIDE 1 MG/ML
0.4 INJECTION, SOLUTION INTRAMUSCULAR; INTRAVENOUS; SUBCUTANEOUS EVERY 5 MIN PRN
Status: DISCONTINUED | OUTPATIENT
Start: 2024-06-20 | End: 2024-06-20 | Stop reason: HOSPADM

## 2024-06-20 RX ORDER — LIDOCAINE 40 MG/G
CREAM TOPICAL
Status: DISCONTINUED | OUTPATIENT
Start: 2024-06-20 | End: 2024-06-21 | Stop reason: HOSPADM

## 2024-06-20 RX ORDER — ASPIRIN 81 MG/1
81 TABLET ORAL 2 TIMES DAILY
Status: DISCONTINUED | OUTPATIENT
Start: 2024-06-20 | End: 2024-06-21 | Stop reason: HOSPADM

## 2024-06-20 RX ORDER — TRANEXAMIC ACID 10 MG/ML
1 INJECTION, SOLUTION INTRAVENOUS ONCE
Status: COMPLETED | OUTPATIENT
Start: 2024-06-20 | End: 2024-06-20

## 2024-06-20 RX ORDER — HYDROMORPHONE HYDROCHLORIDE 1 MG/ML
0.2 INJECTION, SOLUTION INTRAMUSCULAR; INTRAVENOUS; SUBCUTANEOUS EVERY 5 MIN PRN
Status: DISCONTINUED | OUTPATIENT
Start: 2024-06-20 | End: 2024-06-20 | Stop reason: HOSPADM

## 2024-06-20 RX ORDER — NALOXONE HYDROCHLORIDE 0.4 MG/ML
0.2 INJECTION, SOLUTION INTRAMUSCULAR; INTRAVENOUS; SUBCUTANEOUS
Status: DISCONTINUED | OUTPATIENT
Start: 2024-06-20 | End: 2024-06-21 | Stop reason: HOSPADM

## 2024-06-20 RX ORDER — NALOXONE HYDROCHLORIDE 0.4 MG/ML
0.4 INJECTION, SOLUTION INTRAMUSCULAR; INTRAVENOUS; SUBCUTANEOUS
Status: DISCONTINUED | OUTPATIENT
Start: 2024-06-20 | End: 2024-06-21 | Stop reason: HOSPADM

## 2024-06-20 RX ORDER — METHOCARBAMOL 750 MG/1
750 TABLET, FILM COATED ORAL EVERY 6 HOURS PRN
Status: DISCONTINUED | OUTPATIENT
Start: 2024-06-20 | End: 2024-06-21 | Stop reason: HOSPADM

## 2024-06-20 RX ORDER — ACETAMINOPHEN 325 MG/1
975 TABLET ORAL EVERY 8 HOURS
Status: DISCONTINUED | OUTPATIENT
Start: 2024-06-20 | End: 2024-06-21 | Stop reason: HOSPADM

## 2024-06-20 RX ORDER — CEFAZOLIN SODIUM 2 G/100ML
2 INJECTION, SOLUTION INTRAVENOUS EVERY 8 HOURS
Status: COMPLETED | OUTPATIENT
Start: 2024-06-20 | End: 2024-06-21

## 2024-06-20 RX ORDER — DEXMEDETOMIDINE HYDROCHLORIDE 4 UG/ML
INJECTION, SOLUTION INTRAVENOUS
Status: COMPLETED | OUTPATIENT
Start: 2024-06-20 | End: 2024-06-20

## 2024-06-20 RX ORDER — ONDANSETRON 2 MG/ML
INJECTION INTRAMUSCULAR; INTRAVENOUS PRN
Status: DISCONTINUED | OUTPATIENT
Start: 2024-06-20 | End: 2024-06-20

## 2024-06-20 RX ORDER — ONDANSETRON 4 MG/1
4 TABLET, ORALLY DISINTEGRATING ORAL EVERY 30 MIN PRN
Status: DISCONTINUED | OUTPATIENT
Start: 2024-06-20 | End: 2024-06-20 | Stop reason: HOSPADM

## 2024-06-20 RX ORDER — FENTANYL CITRATE 50 UG/ML
25 INJECTION, SOLUTION INTRAMUSCULAR; INTRAVENOUS EVERY 5 MIN PRN
Status: DISCONTINUED | OUTPATIENT
Start: 2024-06-20 | End: 2024-06-20 | Stop reason: HOSPADM

## 2024-06-20 RX ORDER — OXYCODONE HYDROCHLORIDE 5 MG/1
5 TABLET ORAL EVERY 4 HOURS PRN
Status: DISCONTINUED | OUTPATIENT
Start: 2024-06-20 | End: 2024-06-21 | Stop reason: HOSPADM

## 2024-06-20 RX ORDER — VANCOMYCIN HYDROCHLORIDE 1 G/20ML
INJECTION, POWDER, LYOPHILIZED, FOR SOLUTION INTRAVENOUS PRN
Status: DISCONTINUED | OUTPATIENT
Start: 2024-06-20 | End: 2024-06-20 | Stop reason: HOSPADM

## 2024-06-20 RX ORDER — HYDROMORPHONE HYDROCHLORIDE 1 MG/ML
0.4 INJECTION, SOLUTION INTRAMUSCULAR; INTRAVENOUS; SUBCUTANEOUS
Status: DISCONTINUED | OUTPATIENT
Start: 2024-06-20 | End: 2024-06-21 | Stop reason: HOSPADM

## 2024-06-20 RX ORDER — SODIUM CHLORIDE, SODIUM LACTATE, POTASSIUM CHLORIDE, CALCIUM CHLORIDE 600; 310; 30; 20 MG/100ML; MG/100ML; MG/100ML; MG/100ML
INJECTION, SOLUTION INTRAVENOUS CONTINUOUS
Status: DISCONTINUED | OUTPATIENT
Start: 2024-06-20 | End: 2024-06-20 | Stop reason: HOSPADM

## 2024-06-20 RX ORDER — AMOXICILLIN 250 MG
1 CAPSULE ORAL 2 TIMES DAILY
Status: DISCONTINUED | OUTPATIENT
Start: 2024-06-20 | End: 2024-06-21 | Stop reason: HOSPADM

## 2024-06-20 RX ORDER — BISACODYL 10 MG
10 SUPPOSITORY, RECTAL RECTAL DAILY PRN
Status: DISCONTINUED | OUTPATIENT
Start: 2024-06-20 | End: 2024-06-21 | Stop reason: HOSPADM

## 2024-06-20 RX ORDER — FENTANYL CITRATE 50 UG/ML
50 INJECTION, SOLUTION INTRAMUSCULAR; INTRAVENOUS EVERY 5 MIN PRN
Status: DISCONTINUED | OUTPATIENT
Start: 2024-06-20 | End: 2024-06-20 | Stop reason: HOSPADM

## 2024-06-20 RX ORDER — ONDANSETRON 4 MG/1
4 TABLET, ORALLY DISINTEGRATING ORAL EVERY 6 HOURS PRN
Status: DISCONTINUED | OUTPATIENT
Start: 2024-06-20 | End: 2024-06-21 | Stop reason: HOSPADM

## 2024-06-20 RX ORDER — BUPIVACAINE HYDROCHLORIDE 2.5 MG/ML
INJECTION, SOLUTION EPIDURAL; INFILTRATION; INTRACAUDAL
Status: COMPLETED | OUTPATIENT
Start: 2024-06-20 | End: 2024-06-20

## 2024-06-20 RX ORDER — GLYCOPYRROLATE 0.2 MG/ML
INJECTION, SOLUTION INTRAMUSCULAR; INTRAVENOUS PRN
Status: DISCONTINUED | OUTPATIENT
Start: 2024-06-20 | End: 2024-06-20

## 2024-06-20 RX ORDER — KETAMINE HYDROCHLORIDE 10 MG/ML
INJECTION INTRAMUSCULAR; INTRAVENOUS PRN
Status: DISCONTINUED | OUTPATIENT
Start: 2024-06-20 | End: 2024-06-20

## 2024-06-20 RX ORDER — OXYCODONE HYDROCHLORIDE 5 MG/1
10 TABLET ORAL EVERY 4 HOURS PRN
Status: DISCONTINUED | OUTPATIENT
Start: 2024-06-20 | End: 2024-06-21 | Stop reason: HOSPADM

## 2024-06-20 RX ORDER — PROCHLORPERAZINE MALEATE 10 MG
10 TABLET ORAL EVERY 6 HOURS PRN
Status: DISCONTINUED | OUTPATIENT
Start: 2024-06-20 | End: 2024-06-21 | Stop reason: HOSPADM

## 2024-06-20 RX ORDER — FENTANYL CITRATE 50 UG/ML
INJECTION, SOLUTION INTRAMUSCULAR; INTRAVENOUS PRN
Status: DISCONTINUED | OUTPATIENT
Start: 2024-06-20 | End: 2024-06-20

## 2024-06-20 RX ORDER — IBUPROFEN 600 MG/1
600 TABLET, FILM COATED ORAL EVERY 6 HOURS PRN
Status: DISCONTINUED | OUTPATIENT
Start: 2024-06-20 | End: 2024-06-21 | Stop reason: HOSPADM

## 2024-06-20 RX ORDER — SODIUM CHLORIDE, SODIUM LACTATE, POTASSIUM CHLORIDE, CALCIUM CHLORIDE 600; 310; 30; 20 MG/100ML; MG/100ML; MG/100ML; MG/100ML
INJECTION, SOLUTION INTRAVENOUS CONTINUOUS
Status: DISCONTINUED | OUTPATIENT
Start: 2024-06-20 | End: 2024-06-21 | Stop reason: HOSPADM

## 2024-06-20 RX ORDER — POLYETHYLENE GLYCOL 3350 17 G/17G
17 POWDER, FOR SOLUTION ORAL DAILY
Status: DISCONTINUED | OUTPATIENT
Start: 2024-06-21 | End: 2024-06-21 | Stop reason: HOSPADM

## 2024-06-20 RX ORDER — PROPOFOL 10 MG/ML
INJECTION, EMULSION INTRAVENOUS PRN
Status: DISCONTINUED | OUTPATIENT
Start: 2024-06-20 | End: 2024-06-20

## 2024-06-20 RX ORDER — HYDROXYZINE HYDROCHLORIDE 25 MG/1
25 TABLET, FILM COATED ORAL EVERY 6 HOURS PRN
Status: DISCONTINUED | OUTPATIENT
Start: 2024-06-20 | End: 2024-06-21 | Stop reason: HOSPADM

## 2024-06-20 RX ORDER — CALCIUM CARBONATE 500 MG/1
500 TABLET, CHEWABLE ORAL 4 TIMES DAILY PRN
Status: DISCONTINUED | OUTPATIENT
Start: 2024-06-20 | End: 2024-06-21 | Stop reason: HOSPADM

## 2024-06-20 RX ORDER — CEFAZOLIN SODIUM/WATER 2 G/20 ML
2 SYRINGE (ML) INTRAVENOUS SEE ADMIN INSTRUCTIONS
Status: DISCONTINUED | OUTPATIENT
Start: 2024-06-20 | End: 2024-06-20 | Stop reason: HOSPADM

## 2024-06-20 RX ORDER — TRANEXAMIC ACID 650 MG/1
1950 TABLET ORAL ONCE
Status: COMPLETED | OUTPATIENT
Start: 2024-06-20 | End: 2024-06-20

## 2024-06-20 RX ORDER — DEXAMETHASONE SODIUM PHOSPHATE 4 MG/ML
INJECTION, SOLUTION INTRA-ARTICULAR; INTRALESIONAL; INTRAMUSCULAR; INTRAVENOUS; SOFT TISSUE PRN
Status: DISCONTINUED | OUTPATIENT
Start: 2024-06-20 | End: 2024-06-20

## 2024-06-20 RX ORDER — FENTANYL CITRATE 50 UG/ML
25-100 INJECTION, SOLUTION INTRAMUSCULAR; INTRAVENOUS
Status: DISCONTINUED | OUTPATIENT
Start: 2024-06-20 | End: 2024-06-20 | Stop reason: HOSPADM

## 2024-06-20 RX ORDER — ONDANSETRON 2 MG/ML
4 INJECTION INTRAMUSCULAR; INTRAVENOUS EVERY 30 MIN PRN
Status: DISCONTINUED | OUTPATIENT
Start: 2024-06-20 | End: 2024-06-20 | Stop reason: HOSPADM

## 2024-06-20 RX ORDER — ONDANSETRON 2 MG/ML
4 INJECTION INTRAMUSCULAR; INTRAVENOUS EVERY 6 HOURS PRN
Status: DISCONTINUED | OUTPATIENT
Start: 2024-06-20 | End: 2024-06-21 | Stop reason: HOSPADM

## 2024-06-20 RX ORDER — ACETAMINOPHEN 325 MG/1
650 TABLET ORAL EVERY 4 HOURS PRN
Status: DISCONTINUED | OUTPATIENT
Start: 2024-06-23 | End: 2024-06-21 | Stop reason: HOSPADM

## 2024-06-20 RX ADMIN — SODIUM CHLORIDE, POTASSIUM CHLORIDE, SODIUM LACTATE AND CALCIUM CHLORIDE: 600; 310; 30; 20 INJECTION, SOLUTION INTRAVENOUS at 10:40

## 2024-06-20 RX ADMIN — MIDAZOLAM 1 MG: 1 INJECTION INTRAMUSCULAR; INTRAVENOUS at 07:35

## 2024-06-20 RX ADMIN — DEXAMETHASONE SODIUM PHOSPHATE 8 MG: 4 INJECTION, SOLUTION INTRA-ARTICULAR; INTRALESIONAL; INTRAMUSCULAR; INTRAVENOUS; SOFT TISSUE at 09:05

## 2024-06-20 RX ADMIN — FENTANYL CITRATE 50 MCG: 50 INJECTION INTRAMUSCULAR; INTRAVENOUS at 07:36

## 2024-06-20 RX ADMIN — SENNOSIDES AND DOCUSATE SODIUM 1 TABLET: 50; 8.6 TABLET ORAL at 22:00

## 2024-06-20 RX ADMIN — MIDAZOLAM 1 MG: 1 INJECTION INTRAMUSCULAR; INTRAVENOUS at 07:18

## 2024-06-20 RX ADMIN — EPHEDRINE SULFATE 5 MG: 5 INJECTION INTRAVENOUS at 09:52

## 2024-06-20 RX ADMIN — Medication 50 MG: at 08:48

## 2024-06-20 RX ADMIN — BUPIVACAINE HYDROCHLORIDE 20 ML: 2.5 INJECTION, SOLUTION EPIDURAL; INFILTRATION; INTRACAUDAL at 07:15

## 2024-06-20 RX ADMIN — SUGAMMADEX 200 MG: 100 INJECTION, SOLUTION INTRAVENOUS at 12:25

## 2024-06-20 RX ADMIN — EPHEDRINE SULFATE 5 MG: 5 INJECTION INTRAVENOUS at 09:45

## 2024-06-20 RX ADMIN — Medication 30 MG: at 08:45

## 2024-06-20 RX ADMIN — HYDROMORPHONE HYDROCHLORIDE 0.4 MG: 1 INJECTION, SOLUTION INTRAMUSCULAR; INTRAVENOUS; SUBCUTANEOUS at 22:00

## 2024-06-20 RX ADMIN — EPHEDRINE SULFATE 5 MG: 5 INJECTION INTRAVENOUS at 09:31

## 2024-06-20 RX ADMIN — LIDOCAINE HYDROCHLORIDE 100 MG: 20 INJECTION, SOLUTION INFILTRATION; PERINEURAL at 08:45

## 2024-06-20 RX ADMIN — FENTANYL CITRATE 50 MCG: 50 INJECTION INTRAMUSCULAR; INTRAVENOUS at 08:56

## 2024-06-20 RX ADMIN — Medication 2 G: at 09:05

## 2024-06-20 RX ADMIN — PROPOFOL 200 MG: 10 INJECTION, EMULSION INTRAVENOUS at 08:45

## 2024-06-20 RX ADMIN — SODIUM CHLORIDE, POTASSIUM CHLORIDE, SODIUM LACTATE AND CALCIUM CHLORIDE: 600; 310; 30; 20 INJECTION, SOLUTION INTRAVENOUS at 08:39

## 2024-06-20 RX ADMIN — CEFAZOLIN SODIUM 2 G: 2 INJECTION, SOLUTION INTRAVENOUS at 18:04

## 2024-06-20 RX ADMIN — HYDROMORPHONE HYDROCHLORIDE 0.5 MG: 1 INJECTION, SOLUTION INTRAMUSCULAR; INTRAVENOUS; SUBCUTANEOUS at 11:57

## 2024-06-20 RX ADMIN — Medication 10 MG: at 09:36

## 2024-06-20 RX ADMIN — HYDROMORPHONE HYDROCHLORIDE 0.5 MG: 1 INJECTION, SOLUTION INTRAMUSCULAR; INTRAVENOUS; SUBCUTANEOUS at 11:04

## 2024-06-20 RX ADMIN — Medication 20 MCG: at 07:15

## 2024-06-20 RX ADMIN — OXYCODONE HYDROCHLORIDE 10 MG: 5 TABLET ORAL at 16:25

## 2024-06-20 RX ADMIN — EPHEDRINE SULFATE 10 MG: 5 INJECTION INTRAVENOUS at 09:15

## 2024-06-20 RX ADMIN — ACETAMINOPHEN 975 MG: 325 TABLET, FILM COATED ORAL at 22:00

## 2024-06-20 RX ADMIN — Medication 10 MG: at 11:04

## 2024-06-20 RX ADMIN — ONDANSETRON 4 MG: 2 INJECTION INTRAMUSCULAR; INTRAVENOUS at 11:59

## 2024-06-20 RX ADMIN — TRANEXAMIC ACID 1 G: 10 INJECTION, SOLUTION INTRAVENOUS at 09:17

## 2024-06-20 RX ADMIN — METHOCARBAMOL 750 MG: 750 TABLET ORAL at 15:16

## 2024-06-20 RX ADMIN — Medication 10 MG: at 10:39

## 2024-06-20 RX ADMIN — GLYCOPYRROLATE 0.1 MG: 0.2 INJECTION, SOLUTION INTRAMUSCULAR; INTRAVENOUS at 09:58

## 2024-06-20 RX ADMIN — ASPIRIN 81 MG: 81 TABLET, COATED ORAL at 22:00

## 2024-06-20 RX ADMIN — FENTANYL CITRATE 50 MCG: 50 INJECTION INTRAMUSCULAR; INTRAVENOUS at 08:45

## 2024-06-20 RX ADMIN — HYDROMORPHONE HYDROCHLORIDE 0.5 MG: 1 INJECTION, SOLUTION INTRAMUSCULAR; INTRAVENOUS; SUBCUTANEOUS at 10:02

## 2024-06-20 RX ADMIN — GLYCOPYRROLATE 0.1 MG: 0.2 INJECTION, SOLUTION INTRAMUSCULAR; INTRAVENOUS at 09:49

## 2024-06-20 RX ADMIN — FENTANYL CITRATE 50 MCG: 50 INJECTION INTRAMUSCULAR; INTRAVENOUS at 07:18

## 2024-06-20 RX ADMIN — Medication 20 MG: at 10:13

## 2024-06-20 RX ADMIN — DULOXETINE HYDROCHLORIDE 60 MG: 60 CAPSULE, DELAYED RELEASE ORAL at 22:00

## 2024-06-20 RX ADMIN — ACETAMINOPHEN 975 MG: 325 TABLET, FILM COATED ORAL at 15:15

## 2024-06-20 ASSESSMENT — ACTIVITIES OF DAILY LIVING (ADL)
ADLS_ACUITY_SCORE: 22
ADLS_ACUITY_SCORE: 26
ADLS_ACUITY_SCORE: 18
ADLS_ACUITY_SCORE: 22
ADLS_ACUITY_SCORE: 22
ADLS_ACUITY_SCORE: 26
ADLS_ACUITY_SCORE: 22
ADLS_ACUITY_SCORE: 26
ADLS_ACUITY_SCORE: 22
ADLS_ACUITY_SCORE: 26
ADLS_ACUITY_SCORE: 26

## 2024-06-20 NOTE — PROGRESS NOTES
Orthopaedic Surgery Attending    I saw and examined this pleasant 53 year old patient preoperatively today in the preop area at the [facility]. The surgical plan for left total knee arhroplasty, including what the surgery involves, the risks, benefits, and alternatives, the postoperative plan, and realistic expectations for outcome, were all discussed in layman's terms. No guarantees were expressed or implied as to outcome. The correct surgical site was marked with patient participation. All questions were answered.

## 2024-06-20 NOTE — ANESTHESIA PROCEDURE NOTES
Adductor canal Procedure Note    Pre-Procedure   Staff -        Anesthesiologist:  Teo Ricks MD       Performed By: anesthesiologist       Location: pre-op       Procedure Start/Stop Times: 6/20/2024 7:15 AM and 6/20/2024 7:20 AM       Pre-Anesthestic Checklist: patient identified, IV checked, site marked, risks and benefits discussed, informed consent, monitors and equipment checked, pre-op evaluation, at physician/surgeon's request and post-op pain management  Timeout:       Correct Patient: Yes        Correct Procedure: Yes        Correct Site: Yes        Correct Position: Yes        Correct Laterality: Yes        Site Marked: Yes  Procedure Documentation  Procedure: Adductor canal       Laterality: left       Patient Position: supine       Patient Prep/Sterile Barriers: sterile gloves, mask       Skin prep: Chloraprep       Needle Type: insulated and short bevel       Needle Gauge: 20.        Needle Length (millimeters): 100        Ultrasound guided       1. Ultrasound was used to identify targeted nerve, plexus, vascular marker, or fascial plane and place a needle adjacent to it in real-time.       2. Ultrasound was used to visualize the spread of anesthetic in close proximity to the above referenced structure.       3. A permanent image is entered into the patient's record.       4. The visualized anatomic structures appeared normal.       5. There were no apparent abnormal pathologic findings.    Assessment/Narrative         The placement was negative for: blood aspirated, painful injection and site bleeding       Paresthesias: No.       Bolus given via needle..        Secured via.        Insertion/Infusion Method: Single Shot       Complications: none       Injection made incrementally with aspirations every 3 mL.    Medication(s) Administered   Bupivacaine 0.25% PF (Infiltration) - Infiltration   20 mL - 6/20/2024 7:15:00 AM  Dexmedetomidine 4 mcg/mL (Perineural) - Perineural   20 mcg - 6/20/2024  "7:15:00 AM  Medication Administration Time: 6/20/2024 7:15 AM     Comments:  Discussed plan for adductor canal nerve block with risks of block failure, bleeding, infection, damage to surrounding structures (muscles, nerve, blood vessels, other structures), persistent numbness/weakness, medication reactions.  Block performed without complication, tolerated well.       FOR [facility] ([facility]) ONLY:   Pain Team Contact information: please page the Pain Team Via [employer]. Search \"Pain\". During daytime hours, please page the attending first. At night please page the resident first.      "

## 2024-06-20 NOTE — ANESTHESIA POSTPROCEDURE EVALUATION
Patient: Velma Diaz    Procedure: Procedure(s):  Left Total Knee Arthroplasty       Anesthesia Type:  No value filed.    Note:  Disposition: Inpatient   Postop Pain Control: Uneventful            Sign Out: Well controlled pain   PONV: No   Neuro/Psych: Uneventful            Sign Out: Acceptable/Baseline neuro status   Airway/Respiratory: Uneventful            Sign Out: Acceptable/Baseline resp. status   CV/Hemodynamics: Uneventful            Sign Out: Acceptable CV status; No obvious hypovolemia; No obvious fluid overload   Other NRE:    DID A NON-ROUTINE EVENT OCCUR?            Last vitals:  Vitals Value Taken Time   /72 06/20/24 1415   Temp 37  C (98.6  F) 06/20/24 1415   Pulse 104 06/20/24 1423   Resp 13 06/20/24 1423   SpO2 96 % 06/20/24 1434   Vitals shown include unfiled device data.    Electronically Signed By: Amy Graf MD  June 20, 2024  2:49 PM

## 2024-06-20 NOTE — OP NOTE
DATE OF SURGERY:  06/20/2024.    PREOPERATIVE DIAGNOSIS:  Left knee osteoarthritis.     POSTOPERATIVE DIAGNOSIS:  Left knee osteoarthritis.     PROCEDURE:  Left total knee arthroplasty.     PRIMARY SURGEON:  Dionisio Quarles MD.     FIRST ASSISTANT:  Kamla Colon MD.     ANESTHESIA:  General endotracheal, with left adductor canal blockade.     COMPLICATIONS:  None apparent intraoperatively or immediately postoperatively.     IMPLANTS:  Cemented posterior stabilized DePuy Attune total knee arthroplasty system, with size 4 PS left femur, size 3 fixed bearing tibia, 5 mm thick PS tibial insert (size 4 to match the femur in this system), and size 32 mm medialized dome patella.     SIGNIFICANT FINDINGS:  End stage osteoarthritis of the left knee with the worst involvement of the patellofemoral compartment, with bony eburnation and full thickness articular cartilage loss.    SPECIMENS:  None.    DRAINS:  None.    ESTIMATED BLOOD LOSS:  150 mL.    TOURNIQUET TIME:  16 minutes at 250 mm Hg.      INDICATIONS:  Velma Diaz is a very pleasant 53-year-old female patient with severe left knee pain secondary to end stage osteoarthritis. She has tried appropriate forms of conservative treatment including home exercises, a cane, oral and topical medications, and a corticosteroid injection, but has failed to obtain sufficient long term relief of symptoms with this conservative care.  The options of continued conservative care versus total knee arthroplasty were discussed and offered.  She elected to proceed with a left total knee arthroplasty.  The nature of the planned procedure, the risks, benefits, and alternatives, the postoperative plan, and realistic expectations for short and long term outcome were discussed, all in layman's terms.  This discussed risks included but were not necessarily limited to infection, chronic pain, nonrelief of symptoms, reoperation or reoperations, neurovascular injury, fracture, chronic pain,  nonrelief of her preoperative symptoms, motor weakness, prosthetic loosening, malpositioning, unusual sensations and sounds, and venous thromboembolic disease.  No guarantees were expressed or implied as to outcome.  The patient had the opportunity to have all the questions at the time asked and answered appropriately, verbalized understanding of this discussion, and verbalized the wish to proceed with the planned procedure.  Written informed consent was obtained.     DESCRIPTION OF PROCEDURE:  The patient, Velma Diaz, was met in the preoperative area, where the correct surgical site was identified with patient confirmation and marked by the primary surgeon.  All questions were answered.  An adductor canal block on the operative lower extremity was performed by the regional anesthesia team in the preoperative area.  The patient was brought to the operating room, and carefully transferred to the operating table.  The anesthesia team successfully induced general anesthesia and placed an endotracheal tube.  The patient was was then positioned in the supine position with all bony prominences well padded and a safety strap across the waist.  A soft bump was placed underneath the hip of the operative lower extremity to keep the patella and toes pointing upwards.  Cefazolin was given IV per protocol.  Venous thromboembolic prophylaxis was performed with a sequential device on the nonoperative lower extremity.   A tourniquet was placed proximal to the surgical site on the proximal thigh of the operative lower extremity.  The patient's operative lower extremity was then prepped and draped in the usual sterile fashion for a total knee arthroplasty.  The foot, ankle and distal calf of the operative lower extremity were covered with stockinette and Coban.  The foot haynes for the LadderLock positioning system was applied and then secured in pace with Coban from the toes to the mid-calf.  All exposed skin was covered with  Damon.  Prior to proceeding with the operation a timeout was held per hospital policy correctly identifying the patient, procedure to be performed, and operative site including laterality.  All in the room agreed.    Prior to making any incision the primary surgeon's initials were visualized at the operative site.  A midline longitudinal incision was made with a #10 blade directly over the anterior aspect of the operative knee, of appropriate length for exposure for a total knee arthroplasty.  Careful meticulous dissection was performed through the subcutaneous tissues until the depth of the parapatellar retinaculum and quad tendon/VMO junction.  Due to the thickness of the soft tissue envelope, this was a relatively deep wound.  Care was taken to perform adequate medial-lateral exposure to visualize these structures but not create unnecessarily large subcutaneous flaps.  Meticulous hemostasis was achieved with the Aquamantys.  A new knife was used to perform a standard medial parapatellar arthrotomy, through the quadriceps tendon with a 3 mm cuff medially and the majority of the tendon laterally, around the medial aspect of the patella through the retinaculum with an adequate cuff of tissue attached to the patella for later repair, and along the medial aspect of the patellar tendon to the medial proximal tibia medial to the tibial tubercle.  A large knee effusion was evacuated.  There was no evidence for any purulence.  A modest subperiosteal medial release was performed sharply along the medial proximal tibia.  The retropatellar fat pad was carefully excised with a Gonzalez scissors while protecting the patellar tendon with an army-nav[employer] retractor.  There was end-stage tricompartmental knee osteoarthritis present, particularly in the patellofemoral compartment, where there was complete full-thickness loss of articular cartilage and eburnated bone.  The anterior portions of the medial and lateral menisci were released  and excised.  The suprapatellar synovium was excised with electrocautery.  Ninety-degree angled Hohmann retractors were placed directly on the medial and lateral aspects of the tibial plateau.  The knee was flexed and held using the LadderLock positioner, with the patella subluxated laterally.  The knee was very stiff and even without the patella subluxated laterally, only flexed to approximately 100 degrees or less. The appropriate starting point for the intramedullary step drill was marked with a rongeur on the medial aspect of the intercondylar notch anterior to the origin of the PCL.  The step drill was introduced at the correct angle and easily inserted into the intramedullary canal.  The intramedullary alignment guide with the attached bushing and cutting guide set for a left sided 5 degree valgus cut and 10 mm resection depth was inserted and pinned into place flush against the distal femoral surface.  The divine was removed. The distal femoral cut was performed with a large oscillating saw while carefully protecting the collateral ligaments with blunt retractors, the cutting guide/pins and bony fragments were removed, and the cut was visibly and palpably confirmed to be smooth and flat at an appropriate depth.    Attention was now turned to the remainder of the femoral preparation.  A measured resection technique was used for this procedure.  The knee was maximally flexed, which was only to around the  degree range, and additional portions of menisci were removed to facilitate insertion of the posterior referencing sizing guide.  The transepicondylar axis and [place]'s line were marked on the cut distal femoral surface with a pen, cautery, and straightedge, and noted to be perpendicular to each other.  The femoral sizing guide was applied to the cut surface of the distal femur and pinned into place.  This was confirmed to be flush against the cut distal femoral bony surface.  The sizing guide was set  for five degrees of external rotation compared with the posterior condylar axis.  This appeared to more appropriately align the two holes for the pins for the cutting guide parallel to the transepicondylar axis, and perpendicular to [place]'s line.  The femur sized to a size 4.  The pins were inserted using the sizing guide, and the sizing guide was removed.  The size 4 distal femoral cutting guide was inserted over the pins and additional pins were used to hold it in place.  The anterior, posterior, posterior chamfer, and anterior chamfer cuts were performed with an oscillating saw with bent Hohmann retractors bluntly and gently protecting the collateral ligaments.  The cutting guide, pins, and resected portions of bone were removed.  The residual bony surfaces were smoothed with a rongeur.  The appropriate sized femoral box cutting guide was then placed flush onto bone and pinned into place slightly lateralized compared with the midline, but without any lateral overhang off of the distal femur.  An saw was used to make the sides of the box cut followed by the roof of the box cut.  Care was taken to avoid injury to the proximal tibia and posterior neurovascular structures.  The guide and pins were removed, followed by the cut fragment of bone from the distal femoral box.  This was done carefully with electrocautery taking care to avoid any penetration posterior to the knee.    Attention was now turned to preparation for the proximal tibial cut.  A blunt PCL retractor was carefully positioned directly on bone on the posterior proximal tibia slightly medial to midline with gauze protecting the distal femur.  Ninety-degree Hohmann retractors were placed directly on bone on the medial and lateral tibial plateau.  The proximal tibial cutting block and alignment guide were applied to the tibia with the apex of the cutting guide aligned with the tibial tubercle for proper rotational alignment, and provisionally and  loosely  pinned into place to allow adjustments to resection depth, varus/valgus alignment, and slope.  A stylus was placed onto the cutting guide to set a resection depth of 2 mm from the more worn (medial) side.  [place] was set to an appropriate 3 degrees for a posterior stabilized knee, and varus/valgus was set to neutral, or 0 degrees from the long axis of the tibia.  The cutting block was pinned into place with two additional pins, one in each cluster of holes, and the headed pin placed in the central slot was carefully advanced to hold the cutting block in place.  With careful protection of the collateral ligaments, posterior neurovascular structures, popliteus, and patellar tendon with blunt retractors, the proximal tibial cut was performed.  The resected bony fragment was removed, a rongeur was used to resect small amounts of unresected bone, and the remaining cut surface appeared very flat and smooth by careful visualization and palpation.  The tibia appeared to size most appropriately to a size 3.    At this point the posterior knee preparation was undertaken.  The tibial cutting guide was removed, and a lamina  was placed into the lateral compartment to distract the knee.  The remaining portions of the medial meniscus were grasped with a Kocher clamp and resected with electrocautery, leaving an approximately 1 mm remnant attached to the capsule and MCL to prevent inadvertent MCL disruption.  The PCL was resected with electrocautery as well.  Particular care was taken to not penetrate past the posterior aspect of the tibial plateau to avoid neurovascular injury.  The posterior aspect of the medial femoral condyle was carefully debrided of small unresected bone and osteophytes until it was palpably smooth, taking care to avoid neurovascular and tendinous injury with careful use of the osteotome and protection using a blunt retractor.  The small fragments of bone were removed with a rongeur and angled  curet.  The lamina  was then reinserted into the medial compartment.  It was noted that the gaps as measured by the lamina spreaders appeared to be well-balanced.  The remaining portions of the lateral meniscus and posterior osteophytes/unresected bone were carefully likewise removed, again taking care to avoid injury to the neurovascular bundle and lateral collateral ligament with appropriately placed blunt retractors.  Small fragments of bone were again removed with rongeur and angled curet.  Meticulous posterior capsular hemostasis was obtained.  There was no evidence for neurovascular injury.  A posterior capsular block was performed with the anesthetic cocktail, taking care to avoid the area around the common peroneal nerve and popliteal artery.    The size 4 PS femoral trial component, then the size 3 tibial trial component, and finally the 5 mm thick (size 4 to match the femur in this system) trial tibial liner component were inserted.  The knee was able to easily obtain full extension to 0 degrees and flexion to 100 degrees, which was similar or slightly greater than the flexion preoperatively, with no anterior liftoff of the tibial component with terminal flexion.  There was 0 laxity in varus and valgus at extension, excellent patellar tracking with a successful thumbs off test, and no evidence for laxity or tightness in flexion.   An axially applied load with the knee in extension showed no tendency for the knee to buckle in flexion, confirming that full extension was actually attained.  There also appeared to be no evidence for valgus or varus laxity in flexion either.  The two lug holes were drilled into the distal femur trial component.    Attention was turned to the patellar preparation. The knee was extended, and a bump was placed behind the distal calf.  Towel clips were placed around the quadriceps tendon and patellar tendon to stabilize the patella.  The patella was measured with a caliper  to a native thickness of 22 mm.  The cutting guide was applied with a 2 mm beth and confirmed to be aligned correctly without tilting, and a 7.5 mm resection was carefully performed with a saw.  The resected bone was removed, and the cut surface of the remaining patella appeared very smooth and flat.  The remaining thickness appeared to be very appropriately thick and uniform by checking with the calipers.  A 32 mm cutting guide was applied and was noted to be the appropriate size.  It was medially translated while still avoiding any overhang.  The three patellar lug holes were then drilled with the guide properly oriented.  The cutting guide was then used to help bevel the lateral facet of the patella to help prevent bony impingement, using a saw.  The cutting guide was removed, and the trial 32 mm patellar component was placed which was noted to fit well.  The knee was taken through a trial range of motion again, and tracking was noted to be excellent with a successful thumbs off test.  All trial components were then removed.    At this point the proximal tibia preparation was finalized.  The knee was fully flexed, and retractors were carefully placed directly on bone medially, laterally, and posteriorly to deliver the tibia anteriorly.  A size 3 tibial trial component was placed which allowed relatively lateral translation without any bony overhang laterally, or anywhere else, with appropriate rotation such that the center was aligned even with the tibial tubercle.  Absence of overhang was confirmed with meticulous circumferential palpation around the trial with a [place] elevator.  The tibial trial was the pinned into place in the correct position with two short threaded pins.  It was confirmed visually and by palpation with a [place] elevator circumferentially that there was no overhang of this size 3 trial anywhere off of bone.  The guide for the axial drill hole and keel punch was applied, and the axial drill  hole and keel punch were performed to the appropriate depth using the guide, and then the guide, tibial trial, and pins were all removed.     Attention was now turned to planning for implant placement.  The actual components to be implanted were now opened including a size 4 PS left femoral component, size 3 fixed bearing tibial component, and size 32 mm medialized dome polyethylene patellar component, which were all kept readily available on the sterile back table.  The operative lower extremity was exsanguinated with a Elia bandage, taking care to avoid any hyperextension, and the tourniquet was inflated to 300 mm Hg.  The joint was thoroughly irrigated with sterile normal saline using pulsatile lavage while a double batch of high viscosity cement was prepared.  The bony surfaces were thoroughly aspirated and dried.  The knee was maximally flexed.  Angled Hohmann retractors were placed directly on bone medially and laterally, and a posterior tibial retractor was particularly carefully placed directly on bone posteriorly to deliver the tibia anteriorly, with gauze protecting the distal femur.  There was complete visualization of the proximal tibia resected surface.  The size 3 fixed bearing tibial baseplate was cemented in place and impacted until it was fully seated, with all excess extruded cement removed with a [place] elevator.  The posterior retractor was then removed, and the knee was brought out of maximal flexion to approximately 100 degrees of flexion.  Retractors were carefully placed to allow full visualization of the distal femur resected surfaces, which were thoroughly irrigated and dried.  The size 4 PS left femoral component was then cemented into place and impacted until it was fully seated, with all excess extruded cement removed with a [place] elevator.  The 5 mm thick (size 4 to match the femur in this system) trial insert was then placed and the knee was fully extended with a bump under the distal  calf.  The patellar resected surface was thoroughly irrigated and dried.  The size 32 mm medialized dome polyethylene patellar component was then appropriately cemented in place and clamped.  All excess extruded cement was removed with a Altus.  The knee was kept fully extended, and the clamp was carefully maintained in place during the entire time while the cement set.  During this time, the knee was bathed with a dilute Betadine solution per protocol which was then irrigated out.  The remainder of the pericapsular block was administered.    Once the cement was fully set, the patellar clamp was removed.  Again, testing was performed confirming excellent range of motion from 0 to 100 degrees, a successful axial load test with no buckling, excellent patellar tracking with a successful thumbs off test, excellent stability at full extension with 0 varus / valgus laxity, and no evidence for laxity or tightness in flexion.  The trial tibial liner was removed, and the tibial component surface was thoroughly irrigated and aspirated, any overhanging soft tissues were retracted, and it was confirmed there was no visible material on the surface of the tibial component.  Due to difficulty hyperflexing the knee and subluxating it anteriorly, the insert was inserted with the knee semiextended to facilitate polyethylene insertion, while still confirming with visualization and a Altus elevator retracting soft tissues that there was no soft tissue overlying the tibial component.  The size 5 mm thick (size 4 to match the femur in this system) tibial polyethylene insert was appropriately impacted into place and noted to be fully seated and stable.  The retractors were removed.  The knee was fully evaluated again including again excellent range of motion, stability, and tracking were noted as per the above parameters.     The tourniquet was deflated prior to wound closure, and meticulous hemostasis was achieved.  There was no evidence  for any vascular injury.  The knee was thoroughly irrigated again and aspirated.  One gram of vancomycin powder was inserted into the joint.  Closure was performed with the knee in flexion.  The parapatellar retinaculum and arthrotomy were closed with multiple interrupted figure-of-eight 0 Vicryl sutures and then oversewn with a running #1 Stratafix closure.  The deep subcutaneous tissues and bursa were closed to obtain additional closure of dead space and protection between the skin and the joint capsule using a running 0-Vicryl suture.  The subcutaneous tissues were approximated with inverted simple interrupted 2-0 Vicryl sutures.  The skin was approximated with a running 3-0 Monocryl subcuticular suture, then cleansed off and dried.  Dermabond was then applied to the skin incision.  After this was set, sterile dressings were applied consisting of alginate and Tegaderm dressings.     All surgical counts were reported as correct at the end of the case.  No intraoperative complications were noted.  The patient was carefully transferred back to the [facility] and then taken to the recovery room in stable condition.  I was present and scrubbed in for all critical portions of the case including incision, approach and exposure, releases, all bony cuts and preparation, trialing, evaluation of stability, tracking, and range of motion, component placement, and deep wound closure, and immediately available for the noncritical parts of the case including skin closure.      POSTOPERATIVE PLAN:  Admit:  To [facility], with orthopaedics primary and medicine consultation.  Imaging:  Operative knee radiographs in PACU.  Antibiotics:  24 hours perioperative cefazolin.  Diet:  Clear liquids and advance as tolerated.  Analgesia:  Multimodal pain control.  Wean from IV to oral medications.  Activity:  May be up ad jordon with appropriate assistive devices and assistance from nursing/PT. Encourage as much knee ROM as possible. Ice and  elevate operative  knee when in bed/sitting.  Float heels off of bed.  Weightbearing status:  Weightbear as tolerated on the operative lower extremity.  Use appropriate assistive gait devices and assistance from nursing/PT for ambulation.  Consults:  PT/OT for operative knee range of motion, gait training, strengthening, quad sets, ADL's, transfers and home exercise program.  Encourage as much knee ROM as possible.  Immobilization:  None.  Dressings:  Keep dressings clean, dry, and intact for two weeks.  Labs:  Trend Hg.  DVT prophylaxis:   mg po daily until 6 weeks postop.  Disposition:  May discharge home when pain adequately controlled on oral medications, medically stable, and safe for discharge home from a therapy standpoint.  Follow up:  Two weeks postop in ortho clinic for dressing change and wound check. No sutures to remove.  The next follow-up after that will be at 6 weeks postop at which time weightbearing radiographs consisting of AP, lateral, and sunrise views of the operative knee will be obtained.     Dionisio Quarles MD  Attending surgeon  Orthopaedic Surgery

## 2024-06-20 NOTE — ANESTHESIA PROCEDURE NOTES
Airway       Patient location during procedure: OR       Procedure Start/Stop Times: 6/20/2024 8:53 AM  Staff -        CRNA: Zack Rojas APRN CRNA       Performed By: CRNA  Consent for Airway        Urgency: elective  Indications and Patient Condition       Indications for airway management: day-procedural       Induction type:intravenous       Mask difficulty assessment: 1 - vent by mask    Final Airway Details       Final airway type: endotracheal airway       Successful airway: ETT - single and Oral  Endotracheal Airway Details        ETT size (mm): 7.0       Cuffed: yes       Tracheal cuff pressure (cm H2O): 20       Successful intubation technique: video laryngoscopy       VL Blade Size: Glidescope 3       Grade View of Cords: 1       Adjucts: stylet       Position: Right       Measured from: gums/teeth       Secured at (cm): 22       Bite block used: None    Post intubation assessment        Placement verified by: capnometry, equal breath sounds and chest rise        Number of attempts at approach: 2       Secured with: tape       Ease of procedure: easy       Dentition: Intact and Unchanged    Medication(s) Administered   Medication Administration Time: 6/20/2024 8:53 AM    Additional Comments       Grade 2b view with gutierrez 2. Easy intubation with glidescope

## 2024-06-20 NOTE — ANESTHESIA PREPROCEDURE EVALUATION
Anesthesia Pre-Procedure Evaluation    Patient: Velma Diaz   MRN: [id number] : 1970        Procedure : Procedure(s):  Left Total Knee Arthroplasty          Past Medical History:   Diagnosis Date    Chronic pain syndrome 2015    She takes up to 90 oxycodone tablets per month for her chronic pain     Chronic, continuous use of opioids 5/10/2022    Depressive disorder 2000    Family history of colonic polyps     in mother -- pt should have colonoscopy every 5 years    History of cleft palate with cleft lip     cleft lip and palate, and has had 27 operations per the patient    Hyperlipidemia with target LDL less than 130 10/26/2015    Impaired fasting glucose 2021    Morbid obesity with BMI of 40.0-44.9, adult (H) 10/26/2015    Neuropathy     MAYA (obstructive sleep apnea)/Hypoventilation Syndrome 2014    Study Date: 2014- (245.1 lbs) Sleep Associated Hypoventilation  suggested with baseline PCO2 42 mmHg, maximum PCO2 55 mmHg. Lowest oxygen saturation 85.0% Apnea/Hypopnea Index 5.5 events per hour.  REM AHI 37.2.  The supine AHI is 13.8. RDI 6.7 Sleep study 3/2014 (245#)- CPAP 6 cm effective, Transcutaneous CO2 Monitoring (TCM) within normal limits.         Skin cancer of anterior chest 2011    Basal cell on chest    TMJ (temporomandibular joint disorder)       Past Surgical History:   Procedure Laterality Date    ANKLE SURGERY      Left for torn tendons & loose bone chips    ARTHROSCOPY KNEE  1986    Right knee    BACK SURGERY      Basal cell carcinoma      Removal from the chest    BIOPSY      CARPAL TUNNEL RELEASE RT/LT  ~    Bilateral    COLONOSCOPY  2016    COSMETIC SURGERY      cysto with right ureteroscopy, stone manipulation, double J stent removal  10/04/2018    Dr. Cisneros -- removal of right kidney stone    cysto, right retrograde pyelogram, right ureteral stent Right 2018    for obstructing right kidney stone    DECOMPRESSION CUBITAL TUNNEL Left  2015    Procedure: DECOMPRESSION CUBITAL TUNNEL;  Surgeon: Gus Donato MD;  [facility]    ENT SURGERY      Tonsillectomy and Adenoids    GYN SURGERY      Hysterectomy    HC REPAIR PERONEAL TENDONS      ORTHOPEDIC SURGERY  ,     Bilateral CTR    PE TUBES      yearly times 10 years    REPAIR CLEFT PALATE CHILD      Age 3 months & afterwards (multiple surgeries)    SOFT TISSUE SURGERY      ZZC VAGINAL HYSTERECTOMY        No Known Allergies   Social History     Tobacco Use    Smoking status: Former     Current packs/day: 0.00     Average packs/day: 0.5 packs/day for 15.0 years (7.5 ttl pk-yrs)     Types: Cigarettes     Start date: 2000     Quit date: 2015     Years since quittin.3    Smokeless tobacco: Never   Substance Use Topics    Alcohol use: No     Comment: Quit in 2015      Wt Readings from Last 1 Encounters:   24 110.1 kg (242 lb 11.6 oz)        Anesthesia Evaluation   Pt has had prior anesthetic. Type: General and MAC.        ROS/MED HX  ENT/Pulmonary:     (+) sleep apnea,    MAYA risk factors,   obese,                                Neurologic:  - neg neurologic ROS     Cardiovascular:  - neg cardiovascular ROS     METS/Exercise Tolerance:     Hematologic:  - neg hematologic  ROS     Musculoskeletal:  - neg musculoskeletal ROS     GI/Hepatic:       Renal/Genitourinary:     (+) renal disease,             Endo:     (+)               Obesity,       Psychiatric/Substance Use:       Infectious Disease:       Malignancy:       Other:            Physical Exam    Airway        Mallampati: II   TM distance: > 3 FB   Neck ROM: full   Mouth opening: > 3 cm    Respiratory Devices and Support         Dental       (+) Minor Abnormalities - some fillings, tiny chips      Cardiovascular   cardiovascular exam normal          Pulmonary   pulmonary exam normal                OUTSIDE LABS:  CBC:   Lab Results   Component Value Date    WBC 7.3  "05/28/2024    WBC 9.0 04/12/2024    HGB 14.6 05/28/2024    HGB 13.8 04/12/2024    HCT 43.6 05/28/2024    HCT 41.7 04/12/2024     05/28/2024     04/12/2024     BMP:   Lab Results   Component Value Date     05/28/2024     04/12/2024    POTASSIUM 4.5 05/28/2024    POTASSIUM 4.4 04/12/2024    CHLORIDE 100 05/28/2024    CHLORIDE 102 04/12/2024    CO2 24 05/28/2024    CO2 23 04/12/2024    BUN 11.7 05/28/2024    BUN 19.4 04/12/2024    CR 0.88 05/28/2024    CR 0.85 04/12/2024     (H) 06/20/2024    GLC 96 05/28/2024     COAGS: No results found for: \"PTT\", \"INR\", \"FIBR\"  POC:   Lab Results   Component Value Date    HCG Negative 12/11/2014     HEPATIC:   Lab Results   Component Value Date    ALBUMIN 3.5 12/11/2014    PROTTOTAL 6.8 12/11/2014    ALT 23 11/17/2020    AST 29 11/17/2020    ALKPHOS 46 12/11/2014    BILITOTAL 0.3 12/11/2014     OTHER:   Lab Results   Component Value Date    A1C 5.3 04/12/2024    DENISE 9.8 05/28/2024    MAG 2.0 01/02/2015    TSH 1.29 10/26/2015    T4 0.83 10/26/2015    CRP <2.9 01/29/2020       Anesthesia Plan    ASA Status:  3    NPO Status:  NPO Appropriate             Techniques and Equipment:     - Lines/Monitors: 2nd IV     Consents    Anesthesia Plan(s) and associated risks, benefits, and realistic alternatives discussed. Questions answered and patient/representative(s) expressed understanding.     - Discussed: Risks, Benefits and Alternatives for the PROCEDURE were discussed     - Discussed with:  Patient      - Extended Intubation/Ventilatory Support Discussed: No.      - Patient is DNR/DNI Status: No     Use of blood products discussed: No .     Postoperative Care    Pain management: Multi-modal analgesia.   PONV prophylaxis: Ondansetron (or other 5HT-3), Dexamethasone or Solumedrol     Comments:    Other Comments: Ketamine bolus, GETTA or Spnal.           Amy Graf MD    I have reviewed the pertinent notes and labs in the chart from the past 30 days " "and (re)examined the patient.  Any updates or changes from those notes are reflected in this note.              # Severe Obesity: Estimated body mass index is 43 kg/m  as calculated from the following:    Height as of this encounter: 1.6 m (5' 3\").    Weight as of this encounter: 110.1 kg (242 lb 11.6 oz).      "

## 2024-06-20 NOTE — BRIEF OP NOTE
[facility]    Brief Operative Note    Pre-operative diagnosis: Primary osteoarthritis of left knee [M17.12]  Post-operative diagnosis Same as pre-operative diagnosis    Procedure: Left Total Knee Arthroplasty, Left - Knee    Surgeon: Surgeons and Role:     * Dionisio Quarles MD - Primary     * Kamla Colon MD - Resident - Assisting  Anesthesia: General with Block   Estimated Blood Loss: 150 mL from 2024  8:40 AM to 2024  1:08 PM      Drains: None  Specimens: * No specimens in log *  Findings:   None.  Complications: None.  Implants:   Implant Name Type Inv. Item Serial No.  Lot No. LRB No. Used Action   BONE CEMENT RADIOPAQUE SIMPLEX HV FULL DOSE 6194-1-001 - [id number] Cement, Bone BONE CEMENT RADIOPAQUE SIMPLEX HV FULL DOSE 6194-1-001  [employer] [id number] Left 2 Implanted   PATELLA 32 mm Total Joint Component/Insert   Depuy [id number] Left 1 Implanted   IMP COMP FEM JJ ATTUNE POST STAB LT JERI SZ4 [id number] - [id number] Total Joint Component/Insert IMP COMP FEM JJ ATTUNE POST STAB LT JERI SZ4 [id number]  [employer]- [id number] Left 1 Implanted   IMP INSERT JJ ATTUNE POST STAB FX BR SYS SZ4 5MM [id number] - [id number] Total Joint Component/Insert IMP INSERT JJ ATTUNE POST STAB FX BR SYS SZ4 5MM [id number]  [employer]- [id number] Left 1 Implanted   IMP TIB BASE JJ ATTUNE FX BR SYS JERI SZ3 1506- - [id number] Total Joint Component/Insert IMP TIB BASE JJ ATTUNE FX BR SYS JERI SZ3 1506-  [employer]- [id number] Left 1 Implanted   TIBIAL INSERT FIXED BEARING PS SIZE 4, 6 MM AOX Total Joint Component/Insert   Depuy [id number] Left 1 Implanted     Orthopaedic Surgery Brief Op-Note      Patient: Velma Diaz  : 1970  Date of Service: 2024 1:27 PM    Pre-operative Diagnosis: Primary osteoarthritis of left knee [M17.12]  Post-operative Diagnosis: same    Procedure(s) Performed: Procedure(s):  Left  Total Knee Arthroplasty    Staff: Dr. Quarles  Assistants:   Kamla Colon MD    Anesthesia: General  EBL: 150 cc  Tourniquet Time: 16 minutes at 300 mmHg    Implants:   Implant Name Type Inv. Item Serial No.  Lot No. LRB No. Used Action   BONE CEMENT RADIOPAQUE SIMPLEX HV FULL DOSE 6194-1-001 - [id number] Cement, Bone BONE CEMENT RADIOPAQUE SIMPLEX HV FULL DOSE 6194-1-001  [employer] [id number] Left 2 Implanted   PATELLA 32 mm Total Joint Component/Insert   Depuy [id number] Left 1 Implanted   IMP COMP FEM JJ ATTUNE POST STAB LT JERI SZ4 [id number] - [id number] Total Joint Component/Insert IMP COMP FEM JJ ATTUNE POST STAB LT JERI SZ4 [id number]  [employer] [facility]- [id number] Left 1 Implanted   IMP INSERT JJ ATTUNE POST STAB FX BR SYS SZ4 5MM [id number] - [id number] Total Joint Component/Insert IMP INSERT JJ ATTUNE POST STAB FX BR SYS SZ4 5MM [id number]  [employer] [facility]- [id number] Left 1 Implanted   IMP TIB BASE JJ ATTUNE FX BR SYS JERI SZ3 1506- - [id number] Total Joint Component/Insert IMP TIB BASE JJ ATTUNE FX BR SYS JERI SZ3 1506-  [employer] [facility]- [id number] Left 1 Implanted   TIBIAL INSERT FIXED BEARING PS SIZE 4, 6 MM AOX Total Joint Component/Insert   Depuy [id number] Left 1 Implanted     Drains: none  Intra-op Labs/Cxs/Specimens: None  Complications: No apparent complications during procedure  Findings: Please see dictated operative note for details    Disposition: Stable to PACU, then admit to Orthopaedics.    Post-Op Plan:  Assessment/Plan: Velma Diaz is a 53 year old female s/p Procedure(s):  Left Total Knee Arthroplasty on 6/20/2024 with Dr. Quarles.    Activity: Up with assist and assistive devices as needed until independent. Knee ROM as tolerated.   Weight bearing status: WBAT    Antibiotics: Cefazolin x 24 hours  Diet: Begin with clear fluids and progress diet as tolerated. Bowel regimen. Anti-emetics PRN.    DVT prophylaxis:   mg BID x 4 weeks   Elevation:  Elevate heels off of bed on pillows, no pillows behind the knee at any time    Wound Care: Tegaderm and alginate x 2 weeks   Pain management: Orals PRN, IV for breakthrough only  X-rays: AP/Lat operative knee XR in PACU.  Physical Therapy: Mobilization, ROM, ADL's  Occupational Therapy: ADL's  Labs: Trend Hgb on PODs #1 & 2  Cultures: None  Consults: PT, OT. Hospitalist, appreciate assistance in caring for this patient throughout the perioperative period     Future Appointments   Date Time Provider Department Center   6/20/2024  2:30 PM Zoe Corbin, YAIMA [facility] [place]   6/21/2024  8:15 AM Jessica Sheldon, PT [facility] [place]   6/21/2024  9:30 AM Kelsi Padgett, OTR [facility] [place]   6/21/2024 11:00 PM Jessica Sheldon, PT [facility] [place]   7/9/2024  2:20 PM Khloe Harris PA-C [facility]   8/2/2024 10:20 AM Dionisio Quarles MD [facility]     Kamla Colon MD

## 2024-06-20 NOTE — ANESTHESIA CARE TRANSFER NOTE
Patient: Velma Diaz    Procedure: Procedure(s):  Left Total Knee Arthroplasty       Diagnosis: Primary osteoarthritis of left knee [M17.12]  Diagnosis Additional Information: No value filed.    Anesthesia Type:   No value filed.     Note:    Oropharynx: oropharynx clear of all foreign objects  Level of Consciousness: drowsy  Oxygen Supplementation: face mask  Level of Supplemental Oxygen (L/min / FiO2): 6  Independent Airway: airway patency satisfactory and stable  Dentition: dentition unchanged  Vital Signs Stable: post-procedure vital signs reviewed and stable  Report to RN Given: handoff report given  Patient transferred to: PACU    Handoff Report: Identifed the Patient, Identified the Reponsible Provider, Reviewed the pertinent medical history, Discussed the surgical course, Reviewed Intra-OP anesthesia mangement and issues during anesthesia, Set expectations for post-procedure period and Allowed opportunity for questions and acknowledgement of understanding      Vitals:  Vitals Value Taken Time   /86 06/20/24 1311   Temp 37.1  C (98.8  F) 06/20/24 1311   Pulse 102 06/20/24 1315   Resp 12 06/20/24 1315   SpO2 99 % 06/20/24 1315   Vitals shown include unfiled device data.    Electronically Signed By: CONNOR Manzo CRNA  June 20, 2024  1:16 PM

## 2024-06-20 NOTE — PLAN OF CARE
"Goal Outcome Evaluation:      Plan of Care Reviewed With: patient    Overall Patient Progress: improvingOverall Patient Progress: improving    Outcome Evaluation: patient  arrived to 5 ortho at 1440,  alert and oriented x 4, call light appropriate. Denies chest pain, SOB, and  N/V.  Worked with PT,  WBAT, Ice  pack to keen ,encouraged.        VS: /74 (BP Location: Left arm, Patient Position: Semi-Guido's, Cuff Size: Adult Large)   Pulse 76   Temp 98  F (36.7  C) (Oral)   Resp 16   Ht 1.6 m (5' 3\")   Wt 110.1 kg (242 lb 11.6 oz)   LMP  (LMP Unknown)   SpO2 97%   BMI 43.00 kg/m       O2: 97% on R/A   Output: Voided,  small amounts, Bladder scan, 643, straight cath 300cc   Last BM: 6/20/24   Activity: Worked with PT, up to the bathroom with walker   Up for meals? Up in bed   Skin: Some redness to inner upper thigh, skin check done with 2 RN   Pain:  Tylenol, robaxin, oxycodone given    CMS:  CMS intact, Some numbness and tingling to    Dressing: Dressing clean dry and intact   Diet:  regular   LDA:  Left PIV   Equipment: PCD,    Plan: Possible discharge tomorrow   Additional Info:           "

## 2024-06-20 NOTE — PROGRESS NOTES
"   06/20/24 1513   Appointment Info   Signing Clinician's Name / Credentials (PT) Zoe Corbin DPT   Rehab Comments (PT) L TKA WBAT   Living Environment   People in Home child(bryan), dependent   Current Living Arrangements house   Home Accessibility stairs to enter home   Number of Stairs, Main Entrance 2   Stair Railings, Main Entrance railing on right side (ascending)   Number of Stairs, Within Home, Primary greater than 10 stairs  (2 steps to get into home and 13 to get to upper level where she sleeps/bathroom)   Stair Railings, Within Home, Primary railings safe and in good condition;railing on left side (ascending)   Transportation Anticipated family or friend will provide   Living Environment Comments Lives with her 10 y.o. \"grandson\" and her two cats. Will have assist from neighbors and her best friend following d/c. Has a PCA 3x/wk (MWF).   Self-Care   Usual Activity Tolerance poor   Current Activity Tolerance fair   Equipment Currently Used at Home cane, straight;dressing device;raised toilet seat;shower chair;walker, rolling  (4WW)   Fall history within last six months yes  (Reports of knee buckling and history of neuropathy that impairs her sensation. Reports most recent fall was outside on the sidewalk.)   Activity/Exercise/Self-Care Comment Reports occasional use of SPC. When out would use grocery cart as AD. Reports IND with basic ADLs (grooming/dressing). PCA assists with more \"homemaking\" - cooking/cleaning.   General Information   Onset of Illness/Injury or Date of Surgery 06/20/24   Referring Physician Dionisio Quarles MD   Pertinent History of Current Problem (include personal factors and/or comorbidities that impact the POC) Velma Diaz is a 53 year old female s/p Procedure(s):  Left Total Knee Arthroplasty on 6/20/2024 with Dr. Quarles.   Existing Precautions/Restrictions fall   Weight-Bearing Status - LLE weight-bearing as tolerated   General Observations Supine in bed with LLE elevated, " and ice pack on LLE. ACE bandage on LLE.   Cognition   Affect/Mental Status (Cognition) WNL   Orientation Status (Cognition) oriented x 4   Follows Commands (Cognition) follows multi-step commands   Pain Assessment   Patient Currently in Pain Yes, see Vital Sign flowsheet   Integumentary/Edema   Integumentary/Edema Comments L knee wrapped with Ace bandage, incision fully covered and not visible.   Posture    Posture Forward head position;Protracted shoulders   Range of Motion (ROM)   ROM Comment Able to achieve ~35 deg of knee flexion with Yara   Strength (Manual Muscle Testing)   Strength Comments at least 3+/5, demo via functional mobility   Bed Mobility   Comment, (Bed Mobility) Supine>sit with SBA, pt demo incr use of bed rails with HOB elevated, but able to IND manage LLE   Transfers   Comment, (Transfers) STS from EOB with CGA and FWW, demo ability to tolerate WB on LLE during standing   Gait/Stairs (Locomotion)   Comment, (Gait/Stairs) Ambulated 5ft with FWW and CGA. Decreased stance time on LLE, slow gait speed.   Balance   Balance Comments Demo good static sitting balance at EOB with intermittent UE support. Good static standing balance with support from FWW   Sensory Examination   Sensory Perception Comments Pt reports history of neuropathy   Clinical Impression   Criteria for Skilled Therapeutic Intervention Yes, treatment indicated   PT Diagnosis (PT) impaired mobility   Influenced by the following impairments increased pain, WBAT, decreased ROM, generalized weakness, decreased activity tolerance   Functional limitations due to impairments bed mobility, transfers, gait, stairs   Clinical Presentation (PT Evaluation Complexity) stable   Clinical Presentation Rationale per clinical judgement   Clinical Decision Making (Complexity) low complexity   Planned Therapy Interventions (PT) balance training;bed mobility training;gait training;home exercise program;neuromuscular re-education;patient/family  education;ROM (range of motion);stair training;strengthening;transfer training;progressive activity/exercise;home program guidelines;risk factor education   Risk & Benefits of therapy have been explained evaluation/treatment results reviewed;care plan/treatment goals reviewed;risks/benefits reviewed;current/potential barriers reviewed;participants voiced agreement with care plan   Clinical Impression Comments Pt is moving well POD0. Able to demo CASH with bed mobility. May benefit from standard FWW, pt has 4WW in her room but was demo increased reliance on FWW during ambultion. Pt did experience some lightheadedness during OOB mobility. Overall is doing well and anticipate after practicing stairs in the AM - pt will be able to d/c home with assist from family/friends.   PT Total Evaluation Time   PT Eval, Low Complexity Minutes (39604) 20   Physical Therapy Goals   PT Frequency Daily   PT Predicted Duration/Target Date for Goal Attainment 06/21/24   PT Goals Bed Mobility;Transfers;Gait;Stairs   PT: Bed Mobility Independent;Supine to/from sit   PT: Transfers Modified independent;Sit to/from stand;Assistive device   PT: Gait Modified independent;Rolling walker;150 feet   PT: Stairs 3 stairs;Supervision/stand-by assist;Rail on right   Interventions   Interventions Quick Adds Gait Training;Therapeutic Activity   Therapeutic Activity   Therapeutic Activities: dynamic activities to improve functional performance Minutes (91727) 14   Symptoms Noted During/After Treatment Dizziness;Increased pain   Treatment Detail/Skilled Intervention Pt able to complete multiple STS transfers throughout session. Use of FWW and CGA with intial STS, progressing to SBA by the end. Pt did express some lightheadedness/dizziness with OOB mobility. Education provided on low BP following surgery, and encouraged pt to continue to spend time upright to normalize the body to being OOB. Education provided on wb status, pt is WBAT. Education and  handout provided for supine TKA exercises. Pt demo all supine TKA exercises. Required Yara for heel slides, able to achieve ~35 deg. Reporting increased pain with knee flexion. Pt used toliet with supervision, able to complete transfers with SBA and demo IND completion of pericares. Pt returned to supine from sitting with CASH. BP reading at end of session was 107/55. Pt left in supine with all needs met and VSS.   Gait Training   Gait Training Minutes (41605) 10   Symptoms Noted During/After Treatment (Gait Training) dizziness;increased pain   Treatment Detail/Skilled Intervention Pt ambulated ~25ft with FWW and CGA and assist to manage IV pole. Ambulated into bathroom in order to urinate. Following seated rest break on toliet, pt ambulated ~20ft back to bed with FWW and SBA with management of IV pole. Pt demo slow gait speed and decreased stance time on LLE. Demo ability to bear wt on LLE during ambulation. Anticipate with improvement in BP and increased time post-op tolerance for ambulation will improve. Given increased lighteheadedness and feeling of sweating reported by the patient deferred completion of stairs. Instructed pt that PT will be by in the AM to clear pt on stairs prior to d/c. Pt agreeable to this plan.   PT Discharge Planning   PT Plan check off stairs, progress ambulation distance, issue FWW if pt requests   PT Discharge Recommendation (DC Rec) home with assist;home with outpatient physical therapy   PT Rationale for DC Rec Pt is mobilizing well post-op. Pt has assistance from friends and neighbors. Pt is reporting reduction in pain compared to pre-surgery. Anticipate that with increased time post-op that patient tolerance for ambulation and stairs will be sufficient for safe d/c home with support from friends and progression to OP PT when able.   PT Brief overview of current status Ax1 with FWW   PT Equipment Needed at Discharge walker, rolling   Total Session Time   Timed Code Treatment Minutes  24   Total Session Time (sum of timed and untimed services) 44

## 2024-06-20 NOTE — OR NURSING
"PACU to Inpatient Nursing Handoff    Patient Velma Diaz is a 53 year old female who speaks English.   Procedure Procedure(s):  Left Total Knee Arthroplasty   Surgeon(s) Primary: Dionisio Quarles MD  Resident - Assisting: Kamla Colon MD     No Known Allergies    Isolation  [unfilled]     Past Medical History   has a past medical history of Chronic pain syndrome (11/20/2015), Chronic, continuous use of opioids (5/10/2022), Depressive disorder (01/01/2000), Family history of colonic polyps, History of cleft palate with cleft lip, Hyperlipidemia with target LDL less than 130 (10/26/2015), Impaired fasting glucose (11/30/2021), Morbid obesity with BMI of 40.0-44.9, adult (H) (10/26/2015), Neuropathy, MAYA (obstructive sleep apnea)/Hypoventilation Syndrome (02/24/2014), Skin cancer of anterior chest (01/01/2011), and TMJ (temporomandibular joint disorder).    Anesthesia General with Block   Dermatome Level     Preop Meds Not applicable   Nerve block Adductor canal.  Location:left. Med:bupivacaine. Time given: 0715   Intraop Meds dexamethasone (Decadron)  dexmedetomidine (Precedex): 20 mcg total  fentanyl (Sublimaze): 200 mcg total  hydromorphone (Dilaudid): 1.5 mg total  ketamine (Ketalar): 50 mg given  ketorolac (Toradol): last given at 1151  ondansetron (Zofran): last given at 1200   Local Meds Yes - Local Cocktail (morphine, ropivacaine, epinephrine, Toradol)   Antibiotics cefazolin (Ancef) - last given at 905  vancomycin (Vancocin) - last given at 1221     Pain Patient Currently in Pain: no   PACU meds  Not applicable   PCA / epidural No   Capnography     Telemetry ECG Rhythm: Sinus rhythm   Inpatient Telemetry Monitor Ordered? No        Labs Glucose Lab Results   Component Value Date     06/20/2024    GLC 98 01/19/2023    GLC 91 12/24/2020       Hgb Lab Results   Component Value Date    HGB 14.6 05/28/2024    HGB 13.2 12/24/2020       INR No results found for: \"INR\"   PACU Imaging Not applicable "     Wound/Incision Incision/Surgical Site 08/28/15 Left Elbow (Active)   Number of days: 3219       Incision/Surgical Site 06/20/24 Left Knee (Active)   Incision Assessment UTV 06/20/24 1330   Closure NATHANIEL 06/20/24 1330   Incision Drainage Amount None 06/20/24 1330   Dressing Intervention Clean, dry, intact 06/20/24 1330   Number of days: 0      CMS        Equipment ice pack   Other LDA       IV Access Peripheral IV 06/20/24 Anterior;Right Hand;Wrist (Active)   Site Assessment WDL 06/20/24 1311   Line Status Infusing 06/20/24 1311   Dressing Gauze;Transparent;Securement device 06/20/24 1311   Dressing Status clean;intact;dry 06/20/24 1311   Dressing Intervention Dressing reinforced 06/20/24 0732   Line Intervention Flushed 06/20/24 0732   Phlebitis Scale 0-->no symptoms 06/20/24 1311   Infiltration? no 06/20/24 1311   Number of days: 0       Peripheral IV 06/20/24 Right Lower forearm (Active)   Site Assessment WDL 06/20/24 1311   Line Status Saline locked 06/20/24 1311   Dressing Transparent 06/20/24 1311   Dressing Status clean;dry;intact 06/20/24 1311   Line Intervention Flushed 06/20/24 1311   Phlebitis Scale 0-->no symptoms 06/20/24 1311   Infiltration? no 06/20/24 1311   Number of days: 0      Blood Products Not applicable  mL   Intake/Output Date 06/20/24 0700 - 06/21/24 0659   Shift 4297-7964 6316-4778 9497-6636 24 Hour Total   INTAKE   I.V. 1200   1200   Shift Total(mL/kg) 1200(10.9)   1200(10.9)   OUTPUT   Blood 150   150   Shift Total(mL/kg) 150(1.36)   150(1.36)   Weight (kg) 110.1 110.1 110.1 110.1      Drains / Dixon     Time of void PreOp Time of Void Prior to Procedure: 0621 (06/20/24 0620)    PostOp      Diapered? No   Bladder Scan Bladder Scan Volume (mL): 377 ml (06/20/24 1336)   PO    N/A     Vitals    B/P: 128/75  T: 98.8  F (37.1  C)    Temp src: Axillary  P:  Pulse: 101 (06/20/24 1330)          R: 14  O2:  SpO2: 96 %    O2 Device: Simple face mask (06/20/24 1330)    Oxygen Delivery: 6 LPM  (06/20/24 6270)         Family/support present N/A Friend to  Friday when ready to discharge   Patient belongings     Patient transported on bed   DC meds/scripts (obs/outpt) Not applicable   Inpatient Pain Meds Released? Yes       Special needs/considerations None   Tasks needing completion None       Kimmie Jacobsen, RN

## 2024-06-20 NOTE — CONSULTS
"[facility]  Consult Note - Hospitalist Service  Date of Admission:  6/20/2024  Consult Requested by: Dr. Quarles  Reason for Consult: Medical co-management s/p TKA    Assessment & Plan   Velma Diaz is a 53 year old female with a history of depression, anxiety, MAYA and idiopathic peripheral neuropathy, who was admitted to [facility] on 6/20/2024 for left total knee arthroplasty. Medicine was consulted for medical co-management.     S/p Left Total Knee Arthroplasty  Primary osteoarthritis of left knee  Underwent aforementioned procedure by orthopedics on 6/20/2024. EBL of 150 mL without complications. Patient doing well post-operatively.  - Per ortho    Lower Extremity Edema  Patient with intermittent lower extremity edema and takes Lasix PRN. Denies any heart failure history.  - Hold PTA Lasix PRN    Depression, anxiety Continue PTA duloxetine.  Idiopathic peripheral neuropathy Continue PTA nortriptyline.   MAYA Continue PTA CPAP.      The patient's care was discussed with the Patient and Primary team via this note.    Clinically Significant Risk Factors Present on Admission                              # Severe Obesity: Estimated body mass index is 43 kg/m  as calculated from the following:    Height as of this encounter: 1.6 m (5' 3\").    Weight as of this encounter: 110.1 kg (242 lb 11.6 oz).              Darcy Tsai PA-C  Hospitalist Service  Securely message with [employer] (more info)  Text page via [facility] Paging/Directory   ______________________________________________________________________    Chief Complaint   Ortho post-op    History is obtained from the patient and patient's chart    History of Present Illness   Velma Diaz is a 53 year old female with a history of depression, anxiety, MAYA and idiopathic peripheral neuropathy, who was admitted to [facility] on 6/20/2024 for left total knee arthroplasty. Medicine was consulted for medical co-management.     Patient " reports she is feeling well. Having some pain post-operatively. Has not yet urinated or passed gas. Eating and drinking without issues. No other acute complaints.       Past Medical History    Past Medical History:   Diagnosis Date    Chronic pain syndrome 11/20/2015    She takes up to 90 oxycodone tablets per month for her chronic pain     Chronic, continuous use of opioids 5/10/2022    Depressive disorder 01/01/2000    Family history of colonic polyps     in mother -- pt should have colonoscopy every 5 years    History of cleft palate with cleft lip     cleft lip and palate, and has had 27 operations per the patient    Hyperlipidemia with target LDL less than 130 10/26/2015    Impaired fasting glucose 11/30/2021    Morbid obesity with BMI of 40.0-44.9, adult (H) 10/26/2015    Neuropathy     MAYA (obstructive sleep apnea)/Hypoventilation Syndrome 02/24/2014    Study Date: 2/13/2014- (245.1 lbs) Sleep Associated Hypoventilation  suggested with baseline PCO2 42 mmHg, maximum PCO2 55 mmHg. Lowest oxygen saturation 85.0% Apnea/Hypopnea Index 5.5 events per hour.  REM AHI 37.2.  The supine AHI is 13.8. RDI 6.7 Sleep study 3/2014 (245#)- CPAP 6 cm effective, Transcutaneous CO2 Monitoring (TCM) within normal limits.         Skin cancer of anterior chest 01/01/2011    Basal cell on chest    TMJ (temporomandibular joint disorder)        Past Surgical History   Past Surgical History:   Procedure Laterality Date    ANKLE SURGERY  7/13    Left for torn tendons & loose bone chips    ARTHROSCOPY KNEE  1986    Right knee    BACK SURGERY      Basal cell carcinoma  2011    Removal from the chest    BIOPSY      CARPAL TUNNEL RELEASE RT/LT  ~2010    Bilateral    COLONOSCOPY  2016    COSMETIC SURGERY      cysto with right ureteroscopy, stone manipulation, double J stent removal  10/04/2018    Dr. Cisneros -- removal of right kidney stone    cysto, right retrograde pyelogram, right ureteral stent Right 09/2018    for obstructing right  kidney stone    DECOMPRESSION CUBITAL TUNNEL Left 8/28/2015    Procedure: DECOMPRESSION CUBITAL TUNNEL;  Surgeon: Gus Donato MD;  [facility]    ENT SURGERY  1975    Tonsillectomy and Adenoids    GYN SURGERY  2011    Hysterectomy    HC REPAIR PERONEAL TENDONS  7/13    ORTHOPEDIC SURGERY  2011, 2011    Bilateral CTR    PE TUBES      yearly times 10 years    REPAIR CLEFT PALATE CHILD      Age 3 months & afterwards (multiple surgeries)    SOFT TISSUE SURGERY  2013    [facility] VAGINAL HYSTERECTOMY  2011       Medications   I have reviewed this patient's current medications        Physical Exam   Vital Signs: Temp: 98.6  F (37  C) Temp src: Axillary BP: 120/72 Pulse: 96   Resp: 12 SpO2: 94 % O2 Device: None (Room air) Oxygen Delivery: 6 LPM  Weight: 242 lbs 11.62 oz  General Appearance: Comfortable, nontoxic appearing female seen laying in bed. Family at bedside.   Eyes: PERRLA.  No conjunctival icterus.  HEENT: Atraumatic.  Respiratory: Breathing comfortably on room air.   Lymph/Hematologic: No bruising on exposed skin.  Skin: No lesions or rashes noted on exposed skin.  Musculoskeletal: Moving all extremities spontaneously. Leg knee with compression wrap and ice bag in place.   Neurologic: Cranial nerves II through XII grossly intact.  Psychiatric: Mood appropriate.    Medical Decision Making       40 MINUTES SPENT BY ME on the date of service doing chart review, history, exam, documentation & further activities per the note.      Data   Pre-Op Labs 5/28/2024  Creatinine 0.88  Hemoglobin 14.6

## 2024-06-21 ENCOUNTER — APPOINTMENT (OUTPATIENT)
Dept: PHYSICAL THERAPY | Facility: CLINIC | Age: 54
End: 2024-06-21
Attending: ORTHOPAEDIC SURGERY
Payer: COMMERCIAL

## 2024-06-21 VITALS
OXYGEN SATURATION: 97 % | BODY MASS INDEX: 43.01 KG/M2 | RESPIRATION RATE: 16 BRPM | SYSTOLIC BLOOD PRESSURE: 105 MMHG | HEART RATE: 93 BPM | DIASTOLIC BLOOD PRESSURE: 56 MMHG | HEIGHT: 63 IN | TEMPERATURE: 99.2 F | WEIGHT: 242.73 LBS

## 2024-06-21 LAB
GLUCOSE BLDC GLUCOMTR-MCNC: 121 MG/DL (ref 70–99)
HGB BLD-MCNC: 11.4 G/DL (ref 11.7–15.7)

## 2024-06-21 PROCEDURE — 85018 HEMOGLOBIN: CPT

## 2024-06-21 PROCEDURE — 250N000011 HC RX IP 250 OP 636: Mod: JZ

## 2024-06-21 PROCEDURE — 99232 SBSQ HOSP IP/OBS MODERATE 35: CPT | Performed by: INTERNAL MEDICINE

## 2024-06-21 PROCEDURE — 97530 THERAPEUTIC ACTIVITIES: CPT | Mod: GP

## 2024-06-21 PROCEDURE — 97116 GAIT TRAINING THERAPY: CPT | Mod: GP

## 2024-06-21 PROCEDURE — 250N000013 HC RX MED GY IP 250 OP 250 PS 637

## 2024-06-21 PROCEDURE — 36416 COLLJ CAPILLARY BLOOD SPEC: CPT

## 2024-06-21 PROCEDURE — 999N000111 HC STATISTIC OT IP EVAL DEFER

## 2024-06-21 RX ORDER — ACETAMINOPHEN 325 MG/1
650 TABLET ORAL EVERY 4 HOURS PRN
Qty: 100 TABLET | Refills: 0 | Status: SHIPPED | OUTPATIENT
Start: 2024-06-23

## 2024-06-21 RX ORDER — ASPIRIN 81 MG/1
81 TABLET ORAL 2 TIMES DAILY
Qty: 60 TABLET | Refills: 0 | Status: SHIPPED | OUTPATIENT
Start: 2024-06-21 | End: 2024-08-29

## 2024-06-21 RX ORDER — AMOXICILLIN 250 MG
1 CAPSULE ORAL 2 TIMES DAILY
Qty: 60 TABLET | Refills: 0 | Status: SHIPPED | OUTPATIENT
Start: 2024-06-21 | End: 2024-07-31

## 2024-06-21 RX ORDER — POLYETHYLENE GLYCOL 3350 17 G/17G
17 POWDER, FOR SOLUTION ORAL DAILY
Qty: 510 G | Refills: 0 | Status: SHIPPED | OUTPATIENT
Start: 2024-06-21 | End: 2024-07-31

## 2024-06-21 RX ADMIN — SENNOSIDES AND DOCUSATE SODIUM 1 TABLET: 50; 8.6 TABLET ORAL at 07:41

## 2024-06-21 RX ADMIN — OXYCODONE HYDROCHLORIDE 10 MG: 5 TABLET ORAL at 12:43

## 2024-06-21 RX ADMIN — CEFAZOLIN SODIUM 2 G: 2 INJECTION, SOLUTION INTRAVENOUS at 00:43

## 2024-06-21 RX ADMIN — ASPIRIN 81 MG: 81 TABLET, COATED ORAL at 07:41

## 2024-06-21 RX ADMIN — POLYETHYLENE GLYCOL 3350 17 G: 17 POWDER, FOR SOLUTION ORAL at 07:40

## 2024-06-21 RX ADMIN — METHOCARBAMOL 750 MG: 750 TABLET ORAL at 03:32

## 2024-06-21 RX ADMIN — OXYCODONE HYDROCHLORIDE 10 MG: 5 TABLET ORAL at 07:40

## 2024-06-21 RX ADMIN — METHOCARBAMOL 750 MG: 750 TABLET ORAL at 12:43

## 2024-06-21 RX ADMIN — OXYCODONE HYDROCHLORIDE 5 MG: 5 TABLET ORAL at 03:32

## 2024-06-21 RX ADMIN — ACETAMINOPHEN 975 MG: 325 TABLET, FILM COATED ORAL at 06:51

## 2024-06-21 ASSESSMENT — ACTIVITIES OF DAILY LIVING (ADL)
ADLS_ACUITY_SCORE: 26

## 2024-06-21 NOTE — DISCHARGE INSTRUCTIONS
In addition with Methocarbamol and Acetaminophen, resume prior filled on 6/10/2024 Oxycodone -acetaminophen (Percocet)  mg tablet as ordered for pain control.

## 2024-06-21 NOTE — PLAN OF CARE
Occupational Therapy: Orders received. Chart reviewed and discussed with care team.? Occupational Therapy not indicated due to per discussion with PT, pt is near mobility and ADL baseline at this time with good home set-up and support from a friend and PCA 3x/wk. No IP OT needs at this time.? Defer discharge recommendations to PT.? Will complete orders.

## 2024-06-21 NOTE — PLAN OF CARE
Physical Therapy Discharge Summary    Reason for therapy discharge:    All goals and outcomes met, no further needs identified.    Progress towards therapy goal(s). See goals on Care Plan in [facility] electronic health record for goal details.  Goals met    Therapy recommendation(s):    Continued therapy is recommended.  Rationale/Recommendations:  continue w/ OP PT.

## 2024-06-21 NOTE — PLAN OF CARE
Pt. discharged at 1440 to home, and left with personal belongings. Pt. received complete discharge paperwork and medications as filled by discharge pharmacy.  Pt. was given times of last dose for all discharge medications in writing on discharge medication sheets. Discharge teaching included  medication, pain management, activity restrictions, dressing changes, and signs and symptoms of infection.  Pt. to follow up with Ortho in 2 weeks and PT outpatient. Pt received DME standard 2 wheel walker from therapy team Pt. had no further questions at the time of discharge and no unmet needs were identified.

## 2024-06-21 NOTE — PLAN OF CARE
"  2300 - 1900    Goal Outcome Evaluation:      Plan of Care Reviewed With: patient    VS: Blood pressure 128/73, pulse 86, temperature 98.4  F (36.9  C), temperature source Oral, resp. rate 16, height 1.6 m (5' 3\"), weight 110.1 kg (242 lb 11.6 oz), SpO2 99%, not currently breastfeeding.     O2:    Output: Voided, small amounts, Bladder scan, 42ml this morning. No straight cath done.    Last BM: 6/20   Activity: SBA with a walker    Skin: L inner thigh rush and r foot scab, and incision site.   Pain: Managed with PRN Robaxin and Oxycodone x1 this shift   CMS: A&O X4    Dressing: Incision dressing CDI   Diet: Regular thin liquid,takes pills whole .   LDA: R PIV SL   Equipment: IV POLE,PCD   Plan: Possible discharge 6/21   Additional Info:        Monika Smith RN    "

## 2024-06-21 NOTE — PROGRESS NOTES
Orthopaedic Surgery Progress Note 06/21/2024    S: No acute events overnight.  Pain well controlled. Denies numbness or tingling. Denies chest pain, SOB, nausea/vomiting. Tolerating small diet. No BM + flatus. Voiding spontaneously.  Has not been UOOB yet.     O:  Temp: 98.4  F (36.9  C) Temp src: Oral BP: 128/73 Pulse: 86   Resp: 16 SpO2: 99 % O2 Device: None (Room air) Oxygen Delivery: 6 LPM    Exam:  Gen: No acute distress, resting comfortably in bed.  Resp: Non-labored breathing  MSK:  LLE:  - Dressings c/d/i  - SILT tibial/sural/saphenous/DP/SP nerves  - Fires Quad, TA, EHL, FHL, GaSC  - PT/DP pulses 2+, foot wwp      Recent Labs   Lab 06/21/24  0711   HGB 11.4*       Assessment:  Velma Diaz is a 53 year old female s/p Procedure(s):  Left Total Knee Arthroplasty on 6/20/2024 with Dr. Quarles.    Today's Plan:   - UOOB, therapy  - Pain control  - Advance diet     Pending medical stability, therapy, and pain control, could discharge to home today.      Activity: Up with assist and assistive devices as needed until independent. Knee ROM as tolerated.   Weight bearing status: WBAT    Antibiotics: Cefazolin x 24 hours  Diet: Begin with clear fluids and progress diet as tolerated. Bowel regimen. Anti-emetics PRN.    DVT prophylaxis:   mg BID x 4 weeks   Elevation: Elevate heels off of bed on pillows, no pillows behind the knee at any time    Wound Care: Tegaderm and alginate x 2 weeks   Pain management: Orals PRN, IV for breakthrough only  X-rays: AP/Lat operative knee XR in PACU.  Physical Therapy: Mobilization, ROM, ADL's  Occupational Therapy: ADL's  Labs: Trend Hgb on PODs #1 & 2  Cultures: None  Consults: PT, OT. Hospitalist, appreciate assistance in caring for this patient throughout the perioperative period     Kamla Colon MD  Orthopaedic Surgery PGY-4

## 2024-06-21 NOTE — PROGRESS NOTES
"[facility]    Medicine Progress Note - Hospitalist Service, GOLD TEAM 18    Date of Admission:  6/20/2024    Assessment & Plan     Velma Diaz is a 53 year old female with a history of depression, anxiety, MAYA and idiopathic peripheral neuropathy, who was admitted to [facility] on 6/20/2024 for left total knee arthroplasty. Medicine was consulted for medical co-management.      S/p Left Total Knee Arthroplasty  Primary osteoarthritis of left knee  Underwent aforementioned procedure by orthopedics on 6/20/2024. EBL of 150 mL without complications. Patient doing well post-operatively.  - Per ortho     Lower Extremity Edema  Patient with intermittent lower extremity edema and takes Lasix PRN. Denies any heart failure history.  - Hold PTA Lasix PRN     Depression, anxiety   Continue PTA duloxetine.  Idiopathic peripheral neuropathy   Continue PTA nortriptyline.   MAYA Continue PTA CPAP.            Diet: Advance Diet as Tolerated: Regular Diet Adult  Discharge Instruction - Regular Diet Adult    DVT Prophylaxis: Defer to primary service  Dixon Catheter: Not present  Lines: None     Cardiac Monitoring: None  Code Status: Full Code      Clinically Significant Risk Factors                               # Severe Obesity: Estimated body mass index is 43 kg/m  as calculated from the following:    Height as of this encounter: 1.6 m (5' 3\").    Weight as of this encounter: 110.1 kg (242 lb 11.6 oz)., PRESENT ON ADMISSION            Disposition Plan     Medically Ready for Discharge: Anticipated Today             Chani Latham MD  Hospitalist Service, GOLD TEAM 18  [facility]  Securely message with [employer] (more info)  Text page via [facility] Paging/Directory   See signed in provider for up to date coverage information  ______________________________________________________________________    Interval History   No chest pain  or shortness of " breath , worked with therapy     Physical Exam   Vital Signs: Temp: 98.4  F (36.9  C) Temp src: Oral BP: 128/73 Pulse: 86   Resp: 16 SpO2: 99 % O2 Device: None (Room air) Oxygen Delivery: 6 LPM  Weight: 242 lbs 11.62 oz  General appearence: awake alert   no apparent distress    RESPIRATORY: lungs clear   CARDIOVASCULAR:S1 S2 regular rate and rhythm, no rubs gallops or murmurs appreciated  GASTROINTESTINAL:soft, non-distended , non-tender , + bowel sounds,   SKIN: warm and dry, no mottling noted   NEUROLOGIC; awake alert and oriented, no focal deficits found  EXTREMITIES: no clubbing, cyanosis or edema , left leg wrapped    Data     I have personally reviewed the following data over the past 24 hrs:    N/A  \   11.4 (L)   / N/A     N/A N/A N/A /  121 (H)   N/A N/A N/A \       Imaging results reviewed over the past 24 hrs:   Recent Results (from the past 24 hour(s))   XR Knee Left 1/2 Views    Narrative    EXAM: XR KNEE LEFT 1/2 VIEWS  LOCATION: [facility]  DATE: 6/20/2024    INDICATION: S/p TKA.  COMPARISON: Radiographs from 10/27/2023.      Impression    IMPRESSION: Recent total knee arthroplasty with adjacent gas. Excellent position and alignment of components. There is no evidence of complication or periprosthetic fracture.

## 2024-06-21 NOTE — DISCHARGE SUMMARY
[facility]  Orthopaedic Discharge Summary    Patient: Velma Diaz MRN# [id number]   Age/Sex: 53 year old female YOB: 1970      Date of Admission:  6/20/2024  Date of Discharge:  6/21/2024  1:47 PM  Admitting Physician:  Dionisio Quarles MD  Discharge Physician:  Kamla Colon MD  Primary Care Provider:  Srinivasa Hunter  Discharge location         Home    DISCHARGE DIAGNOSIS:    Primary osteoarthritis of left knee [M17.12]    PROCEDURE(S):   6/20/2024 Procedure(s):  Left Total Knee Arthroplasty     BRIEF HISTORY: Velma is a 53 year old female with longstanding left knee osteoarthritis that was not controlled with nonoperative modalities. After preop optimization and trial of nonoperative management, the patient elected to proceed with left total knee arthroplasty on 6/20/2024    HOSPITAL COURSE:    Surgery was uncomplicated. Postoperatively the patient was admitted to the orthopedic surgery service.  Intravenous antibiotics were provided for 24 hours after the surgery.  Medicine was consulted to help with management of medical comorbidities. Patient received routine nursing cares on the floor.  A clear liquid diet was started and subsequently advance to regular, which the patient tolerated without issue.  Stool softeners were administered while taking pain medications to prevent constipation.  The  patient was able to void independently.  Post operative xrays were obtained. Physical and occupational therapy were initiated for safety training, ADLs, mobility, and ROM exercises. After demonstrating the ability to tolerate a diet, pain control on oral pain medications, and evaluation by physical and occupational therapy, the patient was deemed medically safe for discharge to home.    FOLLOW UP:      Future Appointments   Date Time Provider Department Center   6/21/2024  8:15 AM Jessica Sheldon PT [facility] [place]   6/21/2024  9:30 AM Lorin  "Kelsi, OTR [facility] [place]   6/21/2024  2:15 PM Sosa Montelongo, PT [facility] [place]   7/9/2024  2:20 PM Khloe Harris PA-C [facility]   8/2/2024 10:20 AM Dionisio Quarles MD [facility] ORTHOPEDICS - Located 4th Floor (4D ) in the [facility] at [address] [place] [address].     Department Address: [address] [place] [address] 4th [facility] [address]     [facility] Orthopedic Scheduling contact number: [phone]    Call if you haven't heard regarding these appointments within 7 days of discharge.      PHYSICAL EXAMINATION:  Gen: No acute distress, resting comfortably in bed.  Resp: Non-labored breathing  MSK:  LLE:  - Dressings c/d/i  - SILT tibial/sural/saphenous/DP/SP nerves  - Fires Quad, TA, EHL, FHL, GaSC  - PT/DP pulses 2+, foot wwp           PLANNED DISCHARGE ORDERS:          Discharge Procedure Orders   Reason for your hospital stay   Order Comments: Total knee arthroplasty with Dr. Turner     When to call - Contact Surgeon Team   Order Comments: You may experience symptoms that require follow-up before your scheduled appointment. Refer to the \"Stoplight Tool\" for instructions on when to contact your Surgeon Team if you are concerned about pain control, blood clots, constipation, or if you are unable to urinate.     When to call - Reach out to Urgent Care   Order Comments: If you are not able to reach your Surgeon Team and you need immediate care, go to the Orthopedic Walk-in Clinic or Urgent Care at your Surgeon's office.  Do NOT go to the Emergency Room unless you have shortness of breath, chest pain, or other signs of a medical emergency.     When to call - Reasons to Call 911   Order Comments: Call 911 immediately if you experience sudden-onset chest pain, arm weakness/numbness, slurred speech, or shortness of breath     Discharge Instruction - Breathing exercises   Order Comments: Perform breathing exercises using your Incentive Spirometer 10 " times per hour while awake for 2 weeks.     Symptoms - Fever Management   Order Comments: A low grade fever can be expected after surgery.  Use acetaminophen (TYLENOL) as needed for fever management.  Contact your Surgeon Team if you have a fever greater than 101.5 F, chills, and/or night sweats.     Symptoms - Constipation management   Order Comments: Constipation (hard, dry bowel movements) is expected after surgery due to the combination of being less active, the anesthetic, and the opioid pain medication.  You can do the following to help reduce constipation:  ~  FLUIDS:  Drink clear liquids (water or Gatorade), or juice (apple/prune).  ~  DIET:  Eat a fiber rich diet.    ~  ACTIVITY:  Get up and move around several times a day.  Increase your activity as you are able.  MEDICATIONS:  Reduce the risk of constipation by starting medications before you are constipated.  You can take Miralax   (1 packet as directed) and/or a stool softener (Senokot 1-2 tablets 1-2 times a day).  If you already have constipation and these medications are not working, you can get magnesium citrate and use as directed.  If you continue to have constipation you can try an over the counter suppository or enema.  Call your Surgeon Team if it has been greater than 3 days since your last bowel movement.     Symptoms - Reduced Urine Output   Order Comments: Changes in the amount of fluids you drank before and after surgery may result in problems urinating.  It is important to stay well-hydrated after surgery and drink plenty of water. If it has been greater than 8 hours since you have urinated despite drinking plenty of water, call your Surgeon Team.     Activity - Exercises to prevent blood clots   Order Comments: Unless otherwise directed by your Surgeon team, perform the following exercises at least three times per day for the first four weeks after surgery to prevent blood clots in your legs: 1) Point and flex your feet (Ankle Pumps), 2)  "Move your ankle around in big circles, 3) Wiggle your toes, 4) Walk, even for short distances, several times a day, will help decrease the risk of blood clots.     Order Specific Question Answer Comments   Is discharge order? Yes      Comfort and Pain Management - Pain after Surgery   Order Comments: Pain after surgery is normal and expected.  You will have some amount of pain for several weeks after surgery.  Your pain will improve with time.  There are several things you can do to help reduce your pain including: rest, ice, elevation, and using pain medications as needed. Contact your Surgeon Team if you have pain that persists or worsens after surgery despite rest, ice, elevation, and taking your medication(s) as prescribed. Contact your Surgeon Team if you have new numbness, tingling, or weakness in your operative extremity.     Comfort and Pain Management - Swelling after Surgery   Order Comments: Swelling and/or bruising of the surgical extremity is common and may persist for several months after surgery. In addition to frequent icing and elevation, gentle compressive support with an ACE wrap or tubigrip may help with swelling. Apply compression regularly, removing at least twice daily to perform skin checks. Contact your Surgeon Team if your swelling increases and is NOT associated with an increase in your activity level, or if your swelling increases and is associated with redness and pain.     Comfort and Pain Management - LOWER Extremity Elevation   Order Comments: Swelling is expected for several months after surgery. This type of swelling is usually associated with gravity and activity, and can be improved with elevation.   The best way to do this is to get your \"toes above your nose\" by laying down and placing several pillows lengthwise under your calf and heel. When elevating your leg keep your knee completely straight. Perform this elevation as often as possible especially for the first two weeks " after surgery.     Comfort and Pain Management - Cold therapy   Order Comments: Ice can be used to control swelling and discomfort after surgery. Place a thin towel over your operative site and apply the ice pack overtop. Leave ice pack in place for 20 minutes, then remove for 20 minutes. Repeat this 20 minutes on/20 minutes off routine as often as tolerated.     Medication Instructions - Acetaminophen (TYLENOL) Instructions   Order Comments: You were discharged with acetaminophen (TYLENOL) for pain management after surgery. Acetaminophen most effectively manages pain symptoms when it is taken on a schedule without missing doses (every four, six, or eight hours). Your Provider will prescribe a safe daily dose between 3000 - 4000 mg.  Do NOT exceed this daily dose. Most patients use acetaminophen for pain control for the first four weeks after surgery.  You can wean from this medication as your pain decreases.     Medication Instructions - NSAID Instructions   Order Comments: You were discharged with an anti-inflammatory medication for pain management to use in combination with acetaminophen (TYLENOL) and the narcotic pain medication.  Take this medication exactly as directed.  You should only take one anti-inflammatory at a time.  Some common anti-inflammatories include: ibuprofen (ADVIL, MOTRIN), naproxen (ALEVE, NAPROSYN), celecoxib (CELEBREX), meloxicam (MOBIC), ketorolac (TORADOL).  Take this medication with food and water.     Follow Up Care   Order Comments: Follow-up with your Surgeon Team in 2 weeks for wound check.     Physical Therapy Instructions   Order Comments: Begin physical therapy within one week following surgery.     Activity - Total Knee Arthroplasty     Order Specific Question Answer Comments   Is discharge order? Yes      Return to Driving   Order Comments: Return to driving - Driving is NOT permitted until directed by your provider. Under no circumstance are you permitted to drive while using  narcotic pain medications.     Order Specific Question Answer Comments   Is discharge order? Yes      Return to athletic activities   Order Comments: Return to athletic activities- Activities such as swimming, bicycling, jogging, running, and stop-and-go sports should be avoided until permitted by your Provider.     Order Specific Question Answer Comments   Is discharge order? Yes      Return to School / Work   Order Comments: Return to School / Work: You may return to work/school when directed by your Provider.     Order Specific Question Answer Comments   Is discharge order? Yes      NO Precautions   Order Comments: No precautions directed by your Provider.  You may perform range of motion activities as tolerated.     Order Specific Question Answer Comments   Is discharge order? Yes      Weight bearing as tolerated   Order Comments: Weight bearing as tolerated on your operative extremity.     Order Specific Question Answer Comments   Is discharge order? Yes      Dressing / Wound Care - Wound   Order Comments: You have a clean dressing on your surgical wound. Dressing change instructions as follows: remove your dressing in 7 days, and leave incision open to air. Contact your Surgeon Team if you have increased redness, warmth around the surgical wound, and/or drainage from the surgical wound.     Dressing Wound Care - Shower with wound/dressing NOT covered   Order Comments: You do not need to cover your dressing or incision in the shower, you may allow water and soap to run over top of the surgical dressing or incision. You may shower 3 days after surgery.  You are strictly prohibited from soaking or submerging the surgical wound underwater.     Dressing / Wound Care - NO Tub Bathing   Order Comments: Tub bathing, swimming, or any other activities that will cause your incision to be submerged in water should be avoided until allowed by your Surgeon.     Medication instructions -  Anticoagulation - aspirin   Order  Comments: Take the aspirin as prescribed for a total of four weeks after surgery.  This is given to help minimize your risk of blood clot.     Crutches DME   Order Comments: DME Documentation: Describe the reason for need to support medical necessity: Impaired gait status post knee surgery. I, the undersigned, certify that the above prescribed supplies are medically necessary for this patient and is both reasonable and necessary in reference to accepted standards of medical practice in the treatment of this patient's condition and is not prescribed as a convenience.     Order Specific Question Answer Comments   Medical Equipment (DME) Supplier: [employer]    PATIENT INSTRUCTIONS: If you did not receive this ordered item today, please contact [employer] for availability ([place] Locations: [phone], [place]: [phone]).    Crutch Type: Standard    Crutches Add On: NA    Length of Need: Lifetime      Cane DME   Order Comments: DME Documentation: Describe the reason for need to support medical necessity: Impaired gait status post knee surgery. I, the undersigned, certify that the above prescribed supplies are medically necessary for this patient and is both reasonable and necessary in reference to accepted standards of medical practice in the treatment of this patient's condition and is not prescribed as a convenience.     Order Specific Question Answer Comments   Medical Equipment (DME) Supplier: [employer]    PATIENT INSTRUCTIONS: If you did not receive this ordered item today, please contact [employer] for availability ([facility] Locations: [phone], [place]: [phone]).    Cane Type: Single Tip    Reminder: Patient can typically get 1 every 5 years      Walker DME   Order Comments: DME Documentation: Describe the reason for need to support medical necessity: Impaired gait status post knee surgery. I, the undersigned, certify  that the above prescribed supplies are medically necessary for this patient and is both reasonable and necessary in reference to accepted standards of medical practice in the treatment of this patient's condition and is not prescribed as a convenience.     Order Specific Question Answer Comments   Medical Equipment (DME) Supplier: [employer]    PATIENT INSTRUCTIONS: If you did not receive this ordered item today, please contact [employer] for availability ([facility] Locations: [phone], [place]: [phone]).    Walker Type: Standard (2 Wheel)    Accessories: N/A      Discharge Instruction - Regular Diet Adult   Order Comments: Return to your pre-surgery diet unless instructed otherwise     Order Specific Question Answer Comments   Is discharge order? Yes            Kamla Colon MD  Orthopaedic Surgery, PGY-4    Staff: Dr. Quarles

## 2024-06-21 NOTE — PLAN OF CARE
"Goal Outcome Evaluation:      VS: /59   Pulse 85   Temp 98  F (36.7  C) (Oral)   Resp 16   Ht 1.6 m (5' 3\")   Wt 110.1 kg (242 lb 11.6 oz)   LMP  (LMP Unknown)   SpO2 97%   BMI 43.00 kg/m          O2:  R/A   Output: Voided,  small amounts,   Last BM: 6/20/24   Activity: SBA walker and GB   Up for meals? Up in bed   Skin: Some redness to inner upper thigh,   Pain:  Tylenol, oxycodone given    CMS:  CMS intact, Some numbness and tingling     Dressing: Dressing clean dry and intact   Diet:  regular   LDA:  Left PIV INFUSING lr 75ml/hr   Equipment: PCD,    Plan: Possible discharge tomorrow   Additional Info:                "

## 2024-07-06 ENCOUNTER — MYC REFILL (OUTPATIENT)
Dept: FAMILY MEDICINE | Facility: CLINIC | Age: 54
End: 2024-07-06
Payer: COMMERCIAL

## 2024-07-06 DIAGNOSIS — G89.4 CHRONIC PAIN SYNDROME: Chronic | ICD-10-CM

## 2024-07-07 RX ORDER — OXYCODONE AND ACETAMINOPHEN 10; 325 MG/1; MG/1
1 TABLET ORAL EVERY 6 HOURS PRN
Qty: 90 TABLET | Refills: 0 | Status: SHIPPED | OUTPATIENT
Start: 2024-07-07 | End: 2024-07-31

## 2024-07-09 ENCOUNTER — TRANSFERRED RECORDS (OUTPATIENT)
Dept: HEALTH INFORMATION MANAGEMENT | Facility: CLINIC | Age: 54
End: 2024-07-09

## 2024-07-15 DIAGNOSIS — Z96.652 STATUS POST TOTAL LEFT KNEE REPLACEMENT: Primary | ICD-10-CM

## 2024-07-16 ASSESSMENT — KOOS JR
STANDING UPRIGHT: MILD
KOOS JR SCORING: 59.38
HOW SEVERE IS YOUR KNEE STIFFNESS AFTER FIRST WAKING IN MORNING: MODERATE
STRAIGHTENING KNEE FULLY: MILD
RISING FROM SITTING: MODERATE
BENDING TO THE FLOOR TO PICK UP OBJECT: MODERATE
GOING UP OR DOWN STAIRS: MILD
TWISING OR PIVOTING ON KNEE: MODERATE

## 2024-07-18 ENCOUNTER — OFFICE VISIT (OUTPATIENT)
Dept: ORTHOPEDICS | Facility: CLINIC | Age: 54
End: 2024-07-18
Payer: COMMERCIAL

## 2024-07-18 ENCOUNTER — ANCILLARY PROCEDURE (OUTPATIENT)
Dept: GENERAL RADIOLOGY | Facility: CLINIC | Age: 54
End: 2024-07-18
Attending: ORTHOPAEDIC SURGERY
Payer: COMMERCIAL

## 2024-07-18 DIAGNOSIS — Z96.652 STATUS POST TOTAL LEFT KNEE REPLACEMENT: ICD-10-CM

## 2024-07-18 DIAGNOSIS — Z48.89 ENCOUNTER FOR POSTOPERATIVE CARE: Primary | ICD-10-CM

## 2024-07-18 PROCEDURE — 73562 X-RAY EXAM OF KNEE 3: CPT | Mod: LT | Performed by: RADIOLOGY

## 2024-07-18 PROCEDURE — 99024 POSTOP FOLLOW-UP VISIT: CPT

## 2024-07-18 NOTE — PROGRESS NOTES
Chief Complaint:   Follow up, DOS 6/20/24 with Dr. Quarles    Procedures:  Left total knee arthroplasty     History:  Velma Diaz is a 53 year old patient s/p L TKA with Dr. Quarles. She had been doing well though her recovery was complicated by acute appendicitis now s/p laparoscopic appendicectomy 7/9/24. States she is doing well now, does feel she had a set back with her knee while she was recovering from surgery. Working with outpatient PT and working diligently on home exercise program. Reports best ROM 0-95. Using a cane in the community, no assistive device at home. Kate is on chronic opioids managed by her PCP, back on her baseline dose. Taking ASA for DVT prophylaxis as prescribed. Kate does not have any concerns today.       Exam:     General: Awake, Alert, and oriented. Articulates and communicates with a normal affect     Left Lower Extremity:  Incisions well healed without evidence of infection, no erythema, drainage, or wound dehiscence   No effusion or ecchymosis   Range of motion 0-85  TA/Gsc/EHL/FHL with 5/5 strength  Distal pulses palpable, toes are warm and well perfused   Gait: Antalgic with less time on the left    Imaging:  Radiographs of the left knee from 7/18/24 were independently reviewed by me and findings were discussed with the patient today. The imaging demonstrates stable postsurgical changes s/p L TKA, no acute complication    Medications:     Current Outpatient Medications:     acetaminophen (TYLENOL) 325 MG tablet, Take 2 tablets (650 mg) by mouth every 4 hours as needed for other (For optimal non-opioid multimodal pain management to improve pain control.), Disp: 100 tablet, Rfl: 0    aspirin 81 MG EC tablet, Take 1 tablet (81 mg) by mouth 2 times daily, Disp: 60 tablet, Rfl: 0    DULoxetine (CYMBALTA) 60 MG capsule, Take 1 capsule (60 mg) by mouth daily (Patient taking differently: Take 60 mg by mouth at bedtime), Disp: 30 capsule, Rfl: 5    furosemide (LASIX) 20 MG tablet, Take 1  tablet (20 mg) by mouth daily, Disp: 30 tablet, Rfl: 11    ketoconazole (NIZORAL) 2 % external cream, Apply to rash in skin folds twice a day as needed, Disp: 60 g, Rfl: 3    Lidocaine (LIDOCARE) 4 % Patch, Place 1 patch onto the skin every 24 hours To prevent lidocaine toxicity, patient should be patch free for 12 hrs daily., Disp: 15 patch, Rfl: 3    methocarbamol (ROBAXIN) 750 MG tablet, TAKE ONE TABLET BY MOUTH THREE TIMES A DAY AS NEEDED FOR MUSCLE SPASMS, Disp: 90 tablet, Rfl: 2    nortriptyline (PAMELOR) 10 MG capsule, TAKE THREE CAPSULES BY MOUTH AT BEDTIME, Disp: 270 capsule, Rfl: 3    nystatin (NYAMYC) 647371 UNIT/GM external powder, APPLY TO AFFECTED AREA(S) TWO TIMES A DAY AS NEEDED, Disp: 30 g, Rfl: 1    oxyCODONE-acetaminophen (PERCOCET)  MG per tablet, Take 1 tablet by mouth every 6 hours as needed for moderate to severe pain, Disp: 90 tablet, Rfl: 0    polyethylene glycol (MIRALAX) 17 GM/Dose powder, Take 17 g by mouth daily, Disp: 510 g, Rfl: 0    Semaglutide-Weight Management (WEGOVY) 0.5 MG/0.5ML pen, Inject 0.5 mg Subcutaneous once a week, Disp: 2 mL, Rfl: 5    senna-docusate (SENOKOT-S/PERICOLACE) 8.6-50 MG tablet, Take 1 tablet by mouth 2 times daily, Disp: 60 tablet, Rfl: 0    Assessment:  Doing well 4 weeks s/p left TKA with Dr. Quarles. Basic wound cares were performed today, I did not appreciate signs of infection. We discussed the plan below, all questions were answered to the best of my ability.     Plan:   -Weight bearing: WBAT   -Range of motion as tolerated, discussed aggressive work on flexion over the next few weeks  -Follow with physical therapy focusing on gait and stability  -DVT prophylaxis:  mg daily x 4 weeks  -Follow up: 8/2/24 with Dr. Robina Harris PA-C 7/18/2024 9:19 AM  Orthopedic Surgery

## 2024-07-18 NOTE — NURSING NOTE
Reason For Visit:   Chief Complaint   Patient presents with    Surgical Followup     DOS 6/20/24 // Left total knee arthroplasty       LMP  (LMP Unknown)          Goldie Tejeda ATC

## 2024-07-18 NOTE — LETTER
7/18/2024      Velma Diaz  [address]  [facility] [address]      Dear Colleague,    Thank you for referring your patient, Velma Diaz, to the [facility] [place]. Please see a copy of my visit note below.    Chief Complaint:   Follow up, DOS 6/20/24 with Dr. Quarles    Procedures:  Left total knee arthroplasty     History:  Velma Diaz is a 53 year old patient s/p L TKA with Dr. Quarles. She had been doing well though her recovery was complicated by acute appendicitis now s/p laparoscopic appendicectomy 7/9/24. States she is doing well now, does feel she had a set back with her knee while she was recovering from surgery. Working with outpatient PT and working diligently on home exercise program. Reports best ROM 0-95. Using a cane in the community, no assistive device at home. Kate is on chronic opioids managed by her PCP, back on her baseline dose. Taking ASA for DVT prophylaxis as prescribed. Kate does not have any concerns today.       Exam:     General: Awake, Alert, and oriented. Articulates and communicates with a normal affect     Left Lower Extremity:  Incisions well healed without evidence of infection, no erythema, drainage, or wound dehiscence   No effusion or ecchymosis   Range of motion 0-85  TA/Gsc/EHL/FHL with 5/5 strength  Distal pulses palpable, toes are warm and well perfused   Gait: Antalgic with less time on the left    Imaging:  Radiographs of the left knee from 7/18/24 were independently reviewed by me and findings were discussed with the patient today. The imaging demonstrates stable postsurgical changes s/p L TKA, no acute complication    Medications:     Current Outpatient Medications:      acetaminophen (TYLENOL) 325 MG tablet, Take 2 tablets (650 mg) by mouth every 4 hours as needed for other (For optimal non-opioid multimodal pain management to improve pain control.), Disp: 100 tablet, Rfl: 0     aspirin 81 MG EC tablet, Take 1 tablet (81 mg) by mouth 2  times daily, Disp: 60 tablet, Rfl: 0     DULoxetine (CYMBALTA) 60 MG capsule, Take 1 capsule (60 mg) by mouth daily (Patient taking differently: Take 60 mg by mouth at bedtime), Disp: 30 capsule, Rfl: 5     furosemide (LASIX) 20 MG tablet, Take 1 tablet (20 mg) by mouth daily, Disp: 30 tablet, Rfl: 11     ketoconazole (NIZORAL) 2 % external cream, Apply to rash in skin folds twice a day as needed, Disp: 60 g, Rfl: 3     Lidocaine (LIDOCARE) 4 % Patch, Place 1 patch onto the skin every 24 hours To prevent lidocaine toxicity, patient should be patch free for 12 hrs daily., Disp: 15 patch, Rfl: 3     methocarbamol (ROBAXIN) 750 MG tablet, TAKE ONE TABLET BY MOUTH THREE TIMES A DAY AS NEEDED FOR MUSCLE SPASMS, Disp: 90 tablet, Rfl: 2     nortriptyline (PAMELOR) 10 MG capsule, TAKE THREE CAPSULES BY MOUTH AT BEDTIME, Disp: 270 capsule, Rfl: 3     nystatin (NYAMYC) 735174 UNIT/GM external powder, APPLY TO AFFECTED AREA(S) TWO TIMES A DAY AS NEEDED, Disp: 30 g, Rfl: 1     oxyCODONE-acetaminophen (PERCOCET)  MG per tablet, Take 1 tablet by mouth every 6 hours as needed for moderate to severe pain, Disp: 90 tablet, Rfl: 0     polyethylene glycol (MIRALAX) 17 GM/Dose powder, Take 17 g by mouth daily, Disp: 510 g, Rfl: 0     Semaglutide-Weight Management (WEGOVY) 0.5 MG/0.5ML pen, Inject 0.5 mg Subcutaneous once a week, Disp: 2 mL, Rfl: 5     senna-docusate (SENOKOT-S/PERICOLACE) 8.6-50 MG tablet, Take 1 tablet by mouth 2 times daily, Disp: 60 tablet, Rfl: 0    Assessment:  Doing well 4 weeks s/p left TKA with Dr. Quarles. Basic wound cares were performed today, I did not appreciate signs of infection. We discussed the plan below, all questions were answered to the best of my ability.     Plan:   -Weight bearing: WBAT   -Range of motion as tolerated, discussed aggressive work on flexion over the next few weeks  -Follow with physical therapy focusing on gait and stability  -DVT prophylaxis:  mg daily x 4  weeks  -Follow up: 8/2/24 with Dr. Robina Harris PA-C 7/18/2024 9:19 AM  Orthopedic Surgery          Again, thank you for allowing me to participate in the care of your patient.        Sincerely,        ALON POOLE PA-C

## 2024-07-31 ENCOUNTER — TRANSFERRED RECORDS (OUTPATIENT)
Dept: HEALTH INFORMATION MANAGEMENT | Facility: CLINIC | Age: 54
End: 2024-07-31
Payer: COMMERCIAL

## 2024-07-31 ENCOUNTER — MYC REFILL (OUTPATIENT)
Dept: FAMILY MEDICINE | Facility: CLINIC | Age: 54
End: 2024-07-31
Payer: COMMERCIAL

## 2024-07-31 DIAGNOSIS — G89.4 CHRONIC PAIN SYNDROME: Chronic | ICD-10-CM

## 2024-07-31 PROBLEM — G89.18 ACUTE POSTOPERATIVE PAIN OF KNEE: Status: RESOLVED | Noted: 2024-06-20 | Resolved: 2024-07-31

## 2024-07-31 PROBLEM — M25.569 ACUTE POSTOPERATIVE PAIN OF KNEE: Status: RESOLVED | Noted: 2024-06-20 | Resolved: 2024-07-31

## 2024-08-01 RX ORDER — OXYCODONE AND ACETAMINOPHEN 10; 325 MG/1; MG/1
1 TABLET ORAL EVERY 6 HOURS PRN
Qty: 90 TABLET | Refills: 0 | Status: SHIPPED | OUTPATIENT
Start: 2024-08-01 | End: 2024-08-29

## 2024-08-01 ASSESSMENT — KOOS JR
TWISING OR PIVOTING ON KNEE: MODERATE
BENDING TO THE FLOOR TO PICK UP OBJECT: MODERATE
HOW SEVERE IS YOUR KNEE STIFFNESS AFTER FIRST WAKING IN MORNING: MILD
KOOS JR SCORING: 63.78
STANDING UPRIGHT: MILD
RISING FROM SITTING: MILD
GOING UP OR DOWN STAIRS: MILD
STRAIGHTENING KNEE FULLY: MILD

## 2024-08-02 ENCOUNTER — OFFICE VISIT (OUTPATIENT)
Dept: ORTHOPEDICS | Facility: CLINIC | Age: 54
End: 2024-08-02
Payer: COMMERCIAL

## 2024-08-02 DIAGNOSIS — Z96.652 S/P TOTAL KNEE ARTHROPLASTY, LEFT: Primary | ICD-10-CM

## 2024-08-02 PROCEDURE — 99024 POSTOP FOLLOW-UP VISIT: CPT | Performed by: ORTHOPAEDIC SURGERY

## 2024-08-02 NOTE — LETTER
8/2/2024      Velma Diaz  [address]  [facility] [address]      Dear Colleague,    Thank you for referring your patient, Velma Diaz, to the [facility] [place]. Please see a copy of my visit note below.    Orthopaedic Surgery Clinic Follow-up Appointment    DOS:  06/20/2024, L TKA.    HPI  I had the opportunity to see this pleasant 53-year-old patient approximately 6-week status post primary left TKA.  She denies fevers, chills, chest pain, shortness of breath, or wound drainage.  Her recent history is notable for having had acute appendicitis several weeks postop requiring appendectomy on 07/09/2024 at [facility] and so was temporarily unable to rehab her knee.  Despite this setback, she reports her left knee is doing well and she is able to bounce back and obtain passive left knee flexion to 110 with therapy.  She endorses that the left knee feels stable without giving way and has weaned off of assistive gait devices.  She rates her pain as 5 out of 10 in severity.    Exam  Examination today shows patient to be a pleasant, cooperative, awake, and alert adult sitting upright in a regular chair in no acute distress.  She is nonseptic appearing from a general clinical standpoint.  No assistive gait devices are seen.  Breathing pattern is regular and nonlabored on room air.  The left knee has a well-healed surgical scar without evidence for infection, drainage, dehiscence, or blistering.  There is no erythema.  The knee is stable to varus and valgus stress with 0 laxity at full extension, and 0 anterior and posterior drawer at 90.  Active range of motion is approximately 0 to 95 degrees of flexion.  There is normal patellar tracking.  The knee is appropriately tender along the medial joint line.  Distally, 3/4 easily palpable left DP and PT pulses, light touch sensation endorsed as intact in all dermatomes, and 5/5 motor strength for left tib ant, EHL, EDL, gastrocsoleus, FHL,  FDL, PTT, and peroneals.    Imaging  Independent review of imaging was performed including recent 07/18/2024 left knee weightbearing radiographs showing a stable appearing and well aligned TKA prosthesis in place without evidence for loosening or fracture.  Posterior knee opacities similar to those seen initially postop as well as preoperatively.    Impression  53-year-old patient doing well 6 weeks status post primary left TKA.    Plan  Findings and treatment plan were discussed with Ms. Diaz in layman's terms.  Range of motion and strengthening exercises were discussed.  The relatively limited window of opportunity to improve ROM was discussed.  Appropriate activities were discussed.  Follow-up in 6 weeks with weightbearing left knee radiographs.  If she is doing well at that time with improved range of motion and no concerns she may call to cancel.  I also instructed her to follow up with me on a yearly basis with examination and radiographs indefinitely, and of course sooner for any problems or concerns with the knee.  This very pleasant patient verbalized understanding of and agreement with plan.  All questions were answered.      Again, thank you for allowing me to participate in the care of your patient.        Sincerely,        Dionisio Quarles MD

## 2024-08-02 NOTE — NURSING NOTE
Reason For Visit:   Chief Complaint   Patient presents with    RECHECK     Patient returns 6w 1d s/p L. TKA.  DOS: 6/20/24.  Several weeks after this, she had acute appendicitis and had her appendix removed.  Patient states despite this setback, her left knee is doing well.  She was able to get to 110 degrees of passive knee flexion at PT.       LMP  (LMP Unknown)     Pain Assessment  Patient Currently in Pain: Yes  0-10 Pain Scale: 5  Primary Pain Location: Knee (left)    Gus Griggs ATC

## 2024-08-02 NOTE — PROGRESS NOTES
Orthopaedic Surgery Clinic Follow-up Appointment    DOS:  06/20/2024, L TKA.    HPI  I had the opportunity to see this pleasant 53-year-old patient approximately 6-week status post primary left TKA.  She denies fevers, chills, chest pain, shortness of breath, or wound drainage.  Her recent history is notable for having had acute appendicitis several weeks postop requiring appendectomy on 07/09/2024 at [facility] and so was temporarily unable to rehab her knee.  Despite this setback, she reports her left knee is doing well and she is able to bounce back and obtain passive left knee flexion to 110 with therapy.  She endorses that the left knee feels stable without giving way and has weaned off of assistive gait devices.  She rates her pain as 5 out of 10 in severity.    Exam  Examination today shows patient to be a pleasant, cooperative, awake, and alert adult sitting upright in a regular chair in no acute distress.  She is nonseptic appearing from a general clinical standpoint.  No assistive gait devices are seen.  Breathing pattern is regular and nonlabored on room air.  The left knee has a well-healed surgical scar without evidence for infection, drainage, dehiscence, or blistering.  There is no erythema.  The knee is stable to varus and valgus stress with 0 laxity at full extension, and 0 anterior and posterior drawer at 90.  Active range of motion is approximately 0 to 95 degrees of flexion.  There is normal patellar tracking.  The knee is appropriately tender along the medial joint line.  Distally, 3/4 easily palpable left DP and PT pulses, light touch sensation endorsed as intact in all dermatomes, and 5/5 motor strength for left tib ant, EHL, EDL, gastrocsoleus, FHL, FDL, PTT, and peroneals.    Imaging  Independent review of imaging was performed including recent 07/18/2024 left knee weightbearing radiographs showing a stable appearing and well aligned TKA prosthesis in place without evidence for loosening or  fracture.  Posterior knee opacities similar to those seen initially postop as well as preoperatively.    Impression  53-year-old patient doing well 6 weeks status post primary left TKA.    Plan  Findings and treatment plan were discussed with Ms. Diaz in layman's terms.  Range of motion and strengthening exercises were discussed.  The relatively limited window of opportunity to improve ROM was discussed.  Appropriate activities were discussed.  Follow-up in 6 weeks with weightbearing left knee radiographs.  If she is doing well at that time with improved range of motion and no concerns she may call to cancel.  I also instructed her to follow up with me on a yearly basis with examination and radiographs indefinitely, and of course sooner for any problems or concerns with the knee.  This very pleasant patient verbalized understanding of and agreement with plan.  All questions were answered.

## 2024-08-04 PROBLEM — Z96.652 S/P TOTAL KNEE ARTHROPLASTY, LEFT: Status: ACTIVE | Noted: 2024-08-04

## 2024-08-27 ENCOUNTER — TELEPHONE (OUTPATIENT)
Dept: ORTHOPEDICS | Facility: CLINIC | Age: 54
End: 2024-08-27
Payer: COMMERCIAL

## 2024-08-27 NOTE — TELEPHONE ENCOUNTER
Left Voicemail (1st Attempt) and Sent Mychart (1st Attempt) for the patient to call back and rschedule the following:    Appointment type: Post Op [facility]  Provider: Dr. Robina Rapp  Return date: 9/27/24

## 2024-08-29 ENCOUNTER — MYC REFILL (OUTPATIENT)
Dept: FAMILY MEDICINE | Facility: CLINIC | Age: 54
End: 2024-08-29
Payer: COMMERCIAL

## 2024-08-29 DIAGNOSIS — G89.4 CHRONIC PAIN SYNDROME: Chronic | ICD-10-CM

## 2024-08-29 RX ORDER — OXYCODONE AND ACETAMINOPHEN 10; 325 MG/1; MG/1
1 TABLET ORAL EVERY 6 HOURS PRN
Qty: 90 TABLET | Refills: 0 | Status: SHIPPED | OUTPATIENT
Start: 2024-08-29 | End: 2024-09-25

## 2024-08-29 NOTE — TELEPHONE ENCOUNTER
Left Voicemail (2nd Attempt) and Sent Mychart (2nd Attempt) for the patient to call back and schedule the following:    Appointment type: Post Op MSK  Provider: Dr. Quarles to Camilla Rapp  Return date: 9/27/24

## 2024-09-25 ENCOUNTER — MYC REFILL (OUTPATIENT)
Dept: FAMILY MEDICINE | Facility: CLINIC | Age: 54
End: 2024-09-25
Payer: COMMERCIAL

## 2024-09-25 DIAGNOSIS — G89.4 CHRONIC PAIN SYNDROME: Chronic | ICD-10-CM

## 2024-09-25 RX ORDER — OXYCODONE AND ACETAMINOPHEN 10; 325 MG/1; MG/1
1 TABLET ORAL EVERY 6 HOURS PRN
Qty: 90 TABLET | Refills: 0 | Status: SHIPPED | OUTPATIENT
Start: 2024-09-25

## 2024-09-28 DIAGNOSIS — F41.1 GAD (GENERALIZED ANXIETY DISORDER): ICD-10-CM

## 2024-09-28 DIAGNOSIS — F33.1 MAJOR DEPRESSIVE DISORDER, RECURRENT EPISODE, MODERATE (H): Chronic | ICD-10-CM

## 2024-09-28 DIAGNOSIS — G89.4 CHRONIC PAIN SYNDROME: Chronic | ICD-10-CM

## 2024-09-30 RX ORDER — DULOXETIN HYDROCHLORIDE 60 MG/1
60 CAPSULE, DELAYED RELEASE ORAL DAILY
Qty: 30 CAPSULE | Refills: 1 | Status: SHIPPED | OUTPATIENT
Start: 2024-09-30

## 2024-10-16 DIAGNOSIS — M54.9 CHRONIC BILATERAL BACK PAIN, UNSPECIFIED BACK LOCATION: Chronic | ICD-10-CM

## 2024-10-16 DIAGNOSIS — G89.29 CHRONIC BILATERAL BACK PAIN, UNSPECIFIED BACK LOCATION: Chronic | ICD-10-CM

## 2024-10-16 RX ORDER — METHOCARBAMOL 750 MG/1
TABLET, FILM COATED ORAL
Qty: 90 TABLET | Refills: 2 | Status: SHIPPED | OUTPATIENT
Start: 2024-10-16

## 2024-10-23 ENCOUNTER — MYC REFILL (OUTPATIENT)
Dept: FAMILY MEDICINE | Facility: CLINIC | Age: 54
End: 2024-10-23
Payer: COMMERCIAL

## 2024-10-23 DIAGNOSIS — G89.4 CHRONIC PAIN SYNDROME: Chronic | ICD-10-CM

## 2024-10-23 RX ORDER — OXYCODONE AND ACETAMINOPHEN 10; 325 MG/1; MG/1
1 TABLET ORAL EVERY 6 HOURS PRN
Qty: 90 TABLET | Refills: 0 | Status: SHIPPED | OUTPATIENT
Start: 2024-10-23

## 2024-11-19 ENCOUNTER — MYC REFILL (OUTPATIENT)
Dept: FAMILY MEDICINE | Facility: CLINIC | Age: 54
End: 2024-11-19
Payer: COMMERCIAL

## 2024-11-19 DIAGNOSIS — G89.4 CHRONIC PAIN SYNDROME: Chronic | ICD-10-CM

## 2024-11-19 RX ORDER — OXYCODONE AND ACETAMINOPHEN 10; 325 MG/1; MG/1
1 TABLET ORAL EVERY 6 HOURS PRN
Qty: 90 TABLET | Refills: 0 | Status: SHIPPED | OUTPATIENT
Start: 2024-11-19

## 2024-12-26 DIAGNOSIS — F41.1 GAD (GENERALIZED ANXIETY DISORDER): ICD-10-CM

## 2024-12-26 DIAGNOSIS — F33.1 MAJOR DEPRESSIVE DISORDER, RECURRENT EPISODE, MODERATE (H): Chronic | ICD-10-CM

## 2024-12-26 DIAGNOSIS — G89.4 CHRONIC PAIN SYNDROME: Chronic | ICD-10-CM

## 2024-12-26 RX ORDER — DULOXETIN HYDROCHLORIDE 60 MG/1
60 CAPSULE, DELAYED RELEASE ORAL DAILY
Qty: 30 CAPSULE | Refills: 0 | Status: SHIPPED | OUTPATIENT
Start: 2024-12-26

## 2025-02-10 ENCOUNTER — MYC REFILL (OUTPATIENT)
Dept: FAMILY MEDICINE | Facility: CLINIC | Age: 55
End: 2025-02-10
Payer: COMMERCIAL

## 2025-02-10 DIAGNOSIS — G89.4 CHRONIC PAIN SYNDROME: Chronic | ICD-10-CM

## 2025-02-10 RX ORDER — OXYCODONE AND ACETAMINOPHEN 10; 325 MG/1; MG/1
1 TABLET ORAL EVERY 6 HOURS PRN
Qty: 90 TABLET | Refills: 0 | Status: SHIPPED | OUTPATIENT
Start: 2025-02-13

## 2025-02-23 DIAGNOSIS — F41.1 GAD (GENERALIZED ANXIETY DISORDER): ICD-10-CM

## 2025-02-23 DIAGNOSIS — G89.4 CHRONIC PAIN SYNDROME: Chronic | ICD-10-CM

## 2025-02-23 DIAGNOSIS — F33.1 MAJOR DEPRESSIVE DISORDER, RECURRENT EPISODE, MODERATE (H): Chronic | ICD-10-CM

## 2025-02-24 RX ORDER — DULOXETIN HYDROCHLORIDE 60 MG/1
60 CAPSULE, DELAYED RELEASE ORAL DAILY
Qty: 30 CAPSULE | Refills: 5 | Status: SHIPPED | OUTPATIENT
Start: 2025-02-24

## 2025-03-03 ENCOUNTER — MYC MEDICAL ADVICE (OUTPATIENT)
Dept: FAMILY MEDICINE | Facility: CLINIC | Age: 55
End: 2025-03-03
Payer: COMMERCIAL

## 2025-03-10 ENCOUNTER — MYC REFILL (OUTPATIENT)
Dept: FAMILY MEDICINE | Facility: CLINIC | Age: 55
End: 2025-03-10
Payer: COMMERCIAL

## 2025-03-10 DIAGNOSIS — M54.9 CHRONIC BILATERAL BACK PAIN, UNSPECIFIED BACK LOCATION: Chronic | ICD-10-CM

## 2025-03-10 DIAGNOSIS — G89.4 CHRONIC PAIN SYNDROME: Chronic | ICD-10-CM

## 2025-03-10 DIAGNOSIS — G89.29 CHRONIC BILATERAL BACK PAIN, UNSPECIFIED BACK LOCATION: Chronic | ICD-10-CM

## 2025-03-10 RX ORDER — METHOCARBAMOL 750 MG/1
TABLET, FILM COATED ORAL
Qty: 90 TABLET | Refills: 2 | Status: SHIPPED | OUTPATIENT
Start: 2025-03-10

## 2025-03-10 RX ORDER — OXYCODONE AND ACETAMINOPHEN 10; 325 MG/1; MG/1
1 TABLET ORAL EVERY 6 HOURS PRN
Qty: 90 TABLET | Refills: 0 | Status: SHIPPED | OUTPATIENT
Start: 2025-03-10

## 2025-03-13 ENCOUNTER — PATIENT OUTREACH (OUTPATIENT)
Dept: CARE COORDINATION | Facility: CLINIC | Age: 55
End: 2025-03-13
Payer: COMMERCIAL

## 2025-03-23 DIAGNOSIS — M79.89 LEG SWELLING: ICD-10-CM

## 2025-03-24 RX ORDER — FUROSEMIDE 20 MG/1
20 TABLET ORAL DAILY
Qty: 90 TABLET | Refills: 0 | Status: SHIPPED | OUTPATIENT
Start: 2025-03-24

## 2025-03-27 ENCOUNTER — PATIENT OUTREACH (OUTPATIENT)
Dept: CARE COORDINATION | Facility: CLINIC | Age: 55
End: 2025-03-27
Payer: COMMERCIAL

## 2025-04-07 ENCOUNTER — MYC REFILL (OUTPATIENT)
Dept: FAMILY MEDICINE | Facility: CLINIC | Age: 55
End: 2025-04-07
Payer: COMMERCIAL

## 2025-04-07 DIAGNOSIS — G89.4 CHRONIC PAIN SYNDROME: Chronic | ICD-10-CM

## 2025-04-07 RX ORDER — OXYCODONE AND ACETAMINOPHEN 10; 325 MG/1; MG/1
1 TABLET ORAL EVERY 6 HOURS PRN
Qty: 90 TABLET | Refills: 0 | Status: SHIPPED | OUTPATIENT
Start: 2025-04-07

## 2025-04-28 ENCOUNTER — PATIENT OUTREACH (OUTPATIENT)
Dept: CARE COORDINATION | Facility: CLINIC | Age: 55
End: 2025-04-28
Payer: COMMERCIAL

## 2025-04-29 DIAGNOSIS — G60.9 IDIOPATHIC PERIPHERAL NEUROPATHY: ICD-10-CM

## 2025-04-29 RX ORDER — NORTRIPTYLINE HYDROCHLORIDE 10 MG/1
CAPSULE ORAL
Qty: 270 CAPSULE | Refills: 3 | Status: SHIPPED | OUTPATIENT
Start: 2025-04-29

## 2025-05-06 ENCOUNTER — MYC REFILL (OUTPATIENT)
Dept: FAMILY MEDICINE | Facility: CLINIC | Age: 55
End: 2025-05-06
Payer: COMMERCIAL

## 2025-05-06 DIAGNOSIS — G89.4 CHRONIC PAIN SYNDROME: Chronic | ICD-10-CM

## 2025-05-06 RX ORDER — OXYCODONE AND ACETAMINOPHEN 10; 325 MG/1; MG/1
1 TABLET ORAL EVERY 6 HOURS PRN
Qty: 90 TABLET | Refills: 0 | Status: SHIPPED | OUTPATIENT
Start: 2025-05-06

## 2025-05-13 ENCOUNTER — TELEPHONE (OUTPATIENT)
Dept: ORTHOPEDICS | Facility: CLINIC | Age: 55
End: 2025-05-13
Payer: COMMERCIAL

## 2025-05-13 NOTE — TELEPHONE ENCOUNTER
Joan with [facility] calling to confirm that on form that was sent that is a temporary placement of accommodation, Please call Joan at [phone] ok to leave vm    Could we send this information to you in [employer] or would you prefer to receive a phone call?:   Patient would prefer a phone call   Okay to leave a detailed message?: Yes at Other phone number:  [phone]

## 2025-05-13 NOTE — TELEPHONE ENCOUNTER
Action 05/13/25 2:09 PM AC   Action Taken  Called Joan white and laureen that I faxed form to F: [phone] this am, provided direct phone number for more questions.

## 2025-05-16 ENCOUNTER — TELEPHONE (OUTPATIENT)
Dept: ORTHOPEDICS | Facility: CLINIC | Age: 55
End: 2025-05-16
Payer: COMMERCIAL

## 2025-05-16 NOTE — TELEPHONE ENCOUNTER
Other: Joan from [facility] calling regarding questions about visitor accommodations form requesting to speak to care team      Could we [facility] this information to you [facility] or would you prefer to receive a phone call?:   Patient would prefer a phone call   Okay to leave a detailed message?: Yes at Other phone number:  [phone]

## 2025-05-20 NOTE — TELEPHONE ENCOUNTER
Other: Requesting c/b- need to know length of accomodation request and if there is metal in the knee

## 2025-05-20 NOTE — TELEPHONE ENCOUNTER
Joan from [facility] was called and relayed accomodations for patient's visitation.    Darell Singh CMA

## 2025-05-26 ENCOUNTER — PATIENT OUTREACH (OUTPATIENT)
Dept: CARE COORDINATION | Facility: CLINIC | Age: 55
End: 2025-05-26
Payer: COMMERCIAL

## 2025-05-27 DIAGNOSIS — M79.89 LEG SWELLING: ICD-10-CM

## 2025-05-27 RX ORDER — FUROSEMIDE 20 MG/1
20 TABLET ORAL DAILY
Qty: 90 TABLET | Refills: 0 | Status: SHIPPED | OUTPATIENT
Start: 2025-05-27

## 2025-06-01 ENCOUNTER — HEALTH MAINTENANCE LETTER (OUTPATIENT)
Age: 55
End: 2025-06-01

## 2025-06-02 ENCOUNTER — MYC REFILL (OUTPATIENT)
Dept: FAMILY MEDICINE | Facility: CLINIC | Age: 55
End: 2025-06-02
Payer: COMMERCIAL

## 2025-06-02 DIAGNOSIS — G89.4 CHRONIC PAIN SYNDROME: Chronic | ICD-10-CM

## 2025-06-02 DIAGNOSIS — B37.2 CANDIDAL INTERTRIGO: ICD-10-CM

## 2025-06-03 RX ORDER — OXYCODONE AND ACETAMINOPHEN 10; 325 MG/1; MG/1
1 TABLET ORAL EVERY 6 HOURS PRN
Qty: 90 TABLET | Refills: 0 | Status: SHIPPED | OUTPATIENT
Start: 2025-06-03

## 2025-06-03 RX ORDER — NYSTATIN 100000 [USP'U]/G
POWDER TOPICAL
Qty: 30 G | Refills: 0 | Status: SHIPPED | OUTPATIENT
Start: 2025-06-03

## 2025-07-02 ENCOUNTER — MYC REFILL (OUTPATIENT)
Dept: FAMILY MEDICINE | Facility: CLINIC | Age: 55
End: 2025-07-02
Payer: COMMERCIAL

## 2025-07-02 DIAGNOSIS — G89.4 CHRONIC PAIN SYNDROME: Chronic | ICD-10-CM

## 2025-07-03 RX ORDER — OXYCODONE AND ACETAMINOPHEN 10; 325 MG/1; MG/1
1 TABLET ORAL EVERY 6 HOURS PRN
Qty: 90 TABLET | Refills: 0 | Status: SHIPPED | OUTPATIENT
Start: 2025-07-03

## 2025-07-03 NOTE — TELEPHONE ENCOUNTER
PDMP reviewed.  She is only getting her oxycodone from me/us.  She has a physical scheduled with me in September.  Her medication was refilled.    Srinivasa Hunter MD

## 2025-07-28 DIAGNOSIS — M54.9 CHRONIC BILATERAL BACK PAIN, UNSPECIFIED BACK LOCATION: Chronic | ICD-10-CM

## 2025-07-28 DIAGNOSIS — B37.2 CANDIDAL INTERTRIGO: ICD-10-CM

## 2025-07-28 DIAGNOSIS — G89.29 CHRONIC BILATERAL BACK PAIN, UNSPECIFIED BACK LOCATION: Chronic | ICD-10-CM

## 2025-07-28 RX ORDER — NYSTATIN 100000 [USP'U]/G
POWDER TOPICAL
Qty: 30 G | Refills: 0 | Status: SHIPPED | OUTPATIENT
Start: 2025-07-28

## 2025-07-28 RX ORDER — METHOCARBAMOL 750 MG/1
750 TABLET, FILM COATED ORAL 3 TIMES DAILY PRN
Qty: 90 TABLET | Refills: 2 | Status: SHIPPED | OUTPATIENT
Start: 2025-07-28

## 2025-07-30 ENCOUNTER — MYC REFILL (OUTPATIENT)
Dept: FAMILY MEDICINE | Facility: CLINIC | Age: 55
End: 2025-07-30
Payer: COMMERCIAL

## 2025-07-30 DIAGNOSIS — G89.4 CHRONIC PAIN SYNDROME: Chronic | ICD-10-CM

## 2025-07-30 RX ORDER — OXYCODONE AND ACETAMINOPHEN 10; 325 MG/1; MG/1
1 TABLET ORAL EVERY 6 HOURS PRN
Qty: 90 TABLET | Refills: 0 | Status: SHIPPED | OUTPATIENT
Start: 2025-07-30

## 2025-07-30 NOTE — TELEPHONE ENCOUNTER
PDMP reviewed.  Patient has an appointment to see me in September.  Med refilled.    Srinivasa Hunter MD

## 2025-08-03 ENCOUNTER — HEALTH MAINTENANCE LETTER (OUTPATIENT)
Age: 55
End: 2025-08-03

## 2025-08-07 PROBLEM — M17.12 PRIMARY OSTEOARTHRITIS OF LEFT KNEE: Status: RESOLVED | Noted: 2024-02-02 | Resolved: 2025-08-07

## 2025-08-07 PROBLEM — J34.3 HYPERTROPHY OF BOTH INFERIOR NASAL TURBINATES: Status: RESOLVED | Noted: 2020-06-24 | Resolved: 2025-08-07

## 2025-08-07 PROBLEM — M54.16 ACUTE RIGHT LUMBAR RADICULOPATHY: Status: RESOLVED | Noted: 2018-06-05 | Resolved: 2025-08-07

## 2025-08-21 DIAGNOSIS — G89.4 CHRONIC PAIN SYNDROME: Chronic | ICD-10-CM

## 2025-08-21 DIAGNOSIS — F41.1 GAD (GENERALIZED ANXIETY DISORDER): ICD-10-CM

## 2025-08-21 DIAGNOSIS — F33.1 MAJOR DEPRESSIVE DISORDER, RECURRENT EPISODE, MODERATE (H): Chronic | ICD-10-CM

## 2025-08-21 RX ORDER — DULOXETIN HYDROCHLORIDE 60 MG/1
60 CAPSULE, DELAYED RELEASE ORAL DAILY
Qty: 30 CAPSULE | Refills: 5 | Status: SHIPPED | OUTPATIENT
Start: 2025-08-21

## 2025-09-01 ENCOUNTER — MYC REFILL (OUTPATIENT)
Dept: FAMILY MEDICINE | Facility: CLINIC | Age: 55
End: 2025-09-01
Payer: COMMERCIAL

## 2025-09-01 DIAGNOSIS — G89.4 CHRONIC PAIN SYNDROME: Chronic | ICD-10-CM

## 2025-09-02 RX ORDER — OXYCODONE AND ACETAMINOPHEN 10; 325 MG/1; MG/1
1 TABLET ORAL EVERY 6 HOURS PRN
Qty: 90 TABLET | Refills: 0 | Status: SHIPPED | OUTPATIENT
Start: 2025-09-02

## (undated) DEVICE — DRSG TEGADERM 4X10" 1627

## (undated) DEVICE — GLOVE BIOGEL PI MICRO INDICATOR UNDERGLOVE SZ 8.0 48980

## (undated) DEVICE — BLADE SAW RECIP STRK 70X12.5X1.2MM 0277-096-281

## (undated) DEVICE — PREP BRUSH SURG SCRUB CHLOROXYLENOL PCMX 3% 371163

## (undated) DEVICE — SU DERMABOND ADVANCED .7ML DNX12

## (undated) DEVICE — LINEN BACK PACK 5440

## (undated) DEVICE — SUTURE VICRYL+ 2-0 CT-2 27" UND VCP269H

## (undated) DEVICE — ESU PENCIL W/SMOKE EVAC NEPTUNE STRYKER 0703-046-000

## (undated) DEVICE — SUCTION MANIFOLD NEPTUNE 2 SYS 4 PORT 0702-020-000

## (undated) DEVICE — DRSG TEGADERM 4X4 3/4" 1626W

## (undated) DEVICE — SUCTION TIP YANKAUER STR K87

## (undated) DEVICE — ESU GROUND PAD ADULT W/CORD E7507

## (undated) DEVICE — ESU BIOPOLAR SEALER AQUAMANTIS 2.3MM 23-113-1

## (undated) DEVICE — GOWN XLG DISP 9545

## (undated) DEVICE — KIT ATTUNE EPAK PIN SYSTEM 2544-00-111

## (undated) DEVICE — STRAP KNEE/BODY 31143004

## (undated) DEVICE — Device

## (undated) DEVICE — SU VICRYL 0 CT-1 36" J946H

## (undated) DEVICE — DECANTER VIAL 2006S

## (undated) DEVICE — SUCTION IRR SYSTEM W/O TIP INTERPULSE HANDPIECE 0210-100-000

## (undated) DEVICE — BONE CLEANING TIP INTERPULSE  0210-010-000

## (undated) DEVICE — SPONGE LAP 18X18" X8435

## (undated) DEVICE — LINEN TOWEL PACK X5 5464

## (undated) DEVICE — SU MONOCRYL 3-0 PS-1 27" Y936H

## (undated) DEVICE — HOOD SURG T7PLUS PEEL AWAY FACE SHIELD STRL LF 0416-801-100

## (undated) DEVICE — BLADE SAW SAGITTAL STRK 18X90X1.27MM HD SYS 6 6118-127-090

## (undated) DEVICE — SOL WATER IRRIG 1000ML BOTTLE 2F7114

## (undated) DEVICE — SOL NACL 0.9% IRRIG 3000ML BAG 2B7477

## (undated) DEVICE — TOURNIQUET CUFF 30" REPRO BLUE 60-7070-105

## (undated) DEVICE — SU STRATAFIX PDS PLUS 1 CT-1 18" SXPP1A404

## (undated) DEVICE — BONE CEMENT MIXEVAC III HI VAC KIT  0206-015-000

## (undated) DEVICE — GLOVE BIOGEL PI SZ 8.0 40880

## (undated) DEVICE — BASIN SET MAJOR

## (undated) DEVICE — SOL NACL 0.9% IRRIG 1000ML BOTTLE 2F7124

## (undated) DEVICE — DRSG TEGADERM ALGINATE AG 4X5" 90303

## (undated) DEVICE — SOL NACL 0.9% INJ 250ML BAG 2B1322Q

## (undated) DEVICE — PREP CHLORAPREP 26ML TINTED HI-LITE ORANGE 930815

## (undated) RX ORDER — TRANEXAMIC ACID 650 MG/1
TABLET ORAL
Status: DISPENSED
Start: 2024-06-20

## (undated) RX ORDER — CEFAZOLIN SODIUM/WATER 2 G/20 ML
SYRINGE (ML) INTRAVENOUS
Status: DISPENSED
Start: 2024-06-20

## (undated) RX ORDER — PROPOFOL 10 MG/ML
INJECTION, EMULSION INTRAVENOUS
Status: DISPENSED
Start: 2024-06-20

## (undated) RX ORDER — HYDROMORPHONE HYDROCHLORIDE 1 MG/ML
INJECTION, SOLUTION INTRAMUSCULAR; INTRAVENOUS; SUBCUTANEOUS
Status: DISPENSED
Start: 2024-06-20

## (undated) RX ORDER — ONDANSETRON 2 MG/ML
INJECTION INTRAMUSCULAR; INTRAVENOUS
Status: DISPENSED
Start: 2024-06-20

## (undated) RX ORDER — EPHEDRINE SULFATE 50 MG/ML
INJECTION, SOLUTION INTRAMUSCULAR; INTRAVENOUS; SUBCUTANEOUS
Status: DISPENSED
Start: 2024-06-20

## (undated) RX ORDER — VANCOMYCIN HYDROCHLORIDE 1 G/20ML
INJECTION, POWDER, LYOPHILIZED, FOR SOLUTION INTRAVENOUS
Status: DISPENSED
Start: 2024-06-20

## (undated) RX ORDER — FENTANYL CITRATE 50 UG/ML
INJECTION, SOLUTION INTRAMUSCULAR; INTRAVENOUS
Status: DISPENSED
Start: 2024-06-20

## (undated) RX ORDER — LIDOCAINE HYDROCHLORIDE 10 MG/ML
INJECTION, SOLUTION EPIDURAL; INFILTRATION; INTRACAUDAL; PERINEURAL
Status: DISPENSED
Start: 2020-03-03

## (undated) RX ORDER — DEXAMETHASONE SODIUM PHOSPHATE 4 MG/ML
INJECTION, SOLUTION INTRA-ARTICULAR; INTRALESIONAL; INTRAMUSCULAR; INTRAVENOUS; SOFT TISSUE
Status: DISPENSED
Start: 2024-06-20